# Patient Record
Sex: MALE | Race: WHITE | NOT HISPANIC OR LATINO | Employment: OTHER | URBAN - METROPOLITAN AREA
[De-identification: names, ages, dates, MRNs, and addresses within clinical notes are randomized per-mention and may not be internally consistent; named-entity substitution may affect disease eponyms.]

---

## 2017-01-13 RX ORDER — LISINOPRIL 5 MG/1
TABLET ORAL
Qty: 30 TABLET | Refills: 11 | Status: SHIPPED | OUTPATIENT
Start: 2017-01-13 | End: 2017-04-21

## 2017-01-23 RX ORDER — PEN NEEDLE, DIABETIC 31 GX5/16"
NEEDLE, DISPOSABLE MISCELLANEOUS
Qty: 100 EACH | Refills: 3 | Status: SHIPPED | OUTPATIENT
Start: 2017-01-23 | End: 2017-08-08 | Stop reason: SDUPTHER

## 2017-01-26 DIAGNOSIS — G56.03 CARPAL TUNNEL SYNDROME, BILATERAL: ICD-10-CM

## 2017-01-26 DIAGNOSIS — G56.22 CUBITAL TUNNEL SYNDROME, LEFT: ICD-10-CM

## 2017-01-26 RX ORDER — MELOXICAM 15 MG/1
15 TABLET ORAL DAILY
Qty: 30 TABLET | Refills: 0 | Status: SHIPPED | OUTPATIENT
Start: 2017-01-26 | End: 2017-02-19 | Stop reason: SDUPTHER

## 2017-01-26 NOTE — TELEPHONE ENCOUNTER
----- Message from Madison Monte sent at 1/26/2017  2:23 PM CST -----  Contact: Wayne  Checking the status of a refill Mobic, can be reached at  [phone]//thanksW

## 2017-02-10 ENCOUNTER — LAB VISIT (OUTPATIENT)
Dept: LAB | Facility: HOSPITAL | Age: 67
End: 2017-02-10
Attending: PEDIATRICS
Payer: COMMERCIAL

## 2017-02-10 DIAGNOSIS — E78.5 HYPERLIPIDEMIA ASSOCIATED WITH TYPE 2 DIABETES MELLITUS: ICD-10-CM

## 2017-02-10 DIAGNOSIS — E11.69 HYPERLIPIDEMIA ASSOCIATED WITH TYPE 2 DIABETES MELLITUS: ICD-10-CM

## 2017-02-10 LAB
ALT SERPL W/O P-5'-P-CCNC: 26 U/L
AST SERPL-CCNC: 26 U/L
CHOLEST/HDLC SERPL: 3.9 {RATIO}
HDL/CHOLESTEROL RATIO: 25.8 %
HDLC SERPL-MCNC: 25 MG/DL
HDLC SERPL-MCNC: 97 MG/DL
LDLC SERPL CALC-MCNC: 41.8 MG/DL
NONHDLC SERPL-MCNC: 72 MG/DL
TRIGL SERPL-MCNC: 151 MG/DL

## 2017-02-10 PROCEDURE — 80061 LIPID PANEL: CPT

## 2017-02-10 PROCEDURE — 84460 ALANINE AMINO (ALT) (SGPT): CPT

## 2017-02-10 PROCEDURE — 36415 COLL VENOUS BLD VENIPUNCTURE: CPT | Mod: PO

## 2017-02-10 PROCEDURE — 83036 HEMOGLOBIN GLYCOSYLATED A1C: CPT

## 2017-02-10 PROCEDURE — 84450 TRANSFERASE (AST) (SGOT): CPT

## 2017-02-11 LAB
ESTIMATED AVG GLUCOSE: 186 MG/DL
HBA1C MFR BLD HPLC: 8.1 %

## 2017-02-13 RX ORDER — INSULIN ASPART 100 [IU]/ML
INJECTION, SOLUTION INTRAVENOUS; SUBCUTANEOUS
Qty: 30 ML | Refills: 11 | Status: SHIPPED | OUTPATIENT
Start: 2017-02-13 | End: 2017-02-20 | Stop reason: CLARIF

## 2017-02-16 ENCOUNTER — OFFICE VISIT (OUTPATIENT)
Dept: INTERNAL MEDICINE | Facility: CLINIC | Age: 67
End: 2017-02-16
Payer: COMMERCIAL

## 2017-02-16 VITALS
OXYGEN SATURATION: 97 % | SYSTOLIC BLOOD PRESSURE: 132 MMHG | HEIGHT: 71 IN | WEIGHT: 219.56 LBS | DIASTOLIC BLOOD PRESSURE: 84 MMHG | TEMPERATURE: 96 F | HEART RATE: 64 BPM | BODY MASS INDEX: 30.74 KG/M2

## 2017-02-16 DIAGNOSIS — Z12.11 COLON CANCER SCREENING: ICD-10-CM

## 2017-02-16 DIAGNOSIS — Z79.4 TYPE 2 DIABETES MELLITUS WITH COMPLICATION, WITH LONG-TERM CURRENT USE OF INSULIN: ICD-10-CM

## 2017-02-16 DIAGNOSIS — Z12.5 PROSTATE CANCER SCREENING: ICD-10-CM

## 2017-02-16 DIAGNOSIS — E78.5 HYPERLIPIDEMIA ASSOCIATED WITH TYPE 2 DIABETES MELLITUS: ICD-10-CM

## 2017-02-16 DIAGNOSIS — I25.10 CORONARY ARTERY DISEASE INVOLVING NATIVE CORONARY ARTERY OF NATIVE HEART WITHOUT ANGINA PECTORIS: ICD-10-CM

## 2017-02-16 DIAGNOSIS — E11.8 TYPE 2 DIABETES MELLITUS WITH COMPLICATION, WITH LONG-TERM CURRENT USE OF INSULIN: ICD-10-CM

## 2017-02-16 DIAGNOSIS — E11.59 HYPERTENSION ASSOCIATED WITH DIABETES: Primary | ICD-10-CM

## 2017-02-16 DIAGNOSIS — G47.33 OBSTRUCTIVE SLEEP APNEA: ICD-10-CM

## 2017-02-16 DIAGNOSIS — I15.2 HYPERTENSION ASSOCIATED WITH DIABETES: Primary | ICD-10-CM

## 2017-02-16 DIAGNOSIS — E11.69 HYPERLIPIDEMIA ASSOCIATED WITH TYPE 2 DIABETES MELLITUS: ICD-10-CM

## 2017-02-16 PROCEDURE — 99214 OFFICE O/P EST MOD 30 MIN: CPT | Mod: S$GLB,,, | Performed by: PEDIATRICS

## 2017-02-16 PROCEDURE — 3045F PR MOST RECENT HEMOGLOBIN A1C LEVEL 7.0-9.0%: CPT | Mod: S$GLB,,, | Performed by: PEDIATRICS

## 2017-02-16 PROCEDURE — 3079F DIAST BP 80-89 MM HG: CPT | Mod: S$GLB,,, | Performed by: PEDIATRICS

## 2017-02-16 PROCEDURE — 3075F SYST BP GE 130 - 139MM HG: CPT | Mod: S$GLB,,, | Performed by: PEDIATRICS

## 2017-02-16 PROCEDURE — 99999 PR PBB SHADOW E&M-EST. PATIENT-LVL III: CPT | Mod: PBBFAC,,, | Performed by: PEDIATRICS

## 2017-02-16 PROCEDURE — 4010F ACE/ARB THERAPY RXD/TAKEN: CPT | Mod: S$GLB,,, | Performed by: PEDIATRICS

## 2017-02-16 NOTE — MR AVS SNAPSHOT
[facility] Internal Medicine  [address] [facility] [address] [facility] [address] [place] [address]  Phone: [phone]  Fax: [phone]                  Timothy Ortega   2017 4:40 PM   Office Visit    Description:  Male : 1950   Provider:  SHANE Ochoa Jr., MD   Department:  [facility] - Internal Medicine           Reason for Visit     Follow-up           Diagnoses this Visit        Comments    Hypertension associated with diabetes    -  Primary     Type 2 diabetes mellitus with complication, with long-term current use of insulin         Hyperlipidemia associated with type 2 diabetes mellitus         Obstructive sleep apnea         Coronary artery disease involving native coronary artery of native heart without angina pectoris         Prostate cancer screening         Colon cancer screening                To Do List           Future Appointments        Provider Department Dept Phone    3/21/2017 11:30 AM Lcaho Phillip Jr., PA-C [facility] Diabetes Management [phone]    2017 7:50 AM LABORATORY, [facility] [phone]    2017 8:00 AM SPECIMEN, [facility] [phone]    2017 10:40 AM SHANE Ochoa Jr., MD [facility] Internal Medicine [phone]    2017 8:20 AM Elizabeth Lejeune, NP [facility] Pulmonary Services [phone]      Goals (5 Years of Data)     None      Follow-Up and Disposition     Return in about 6 months (around 2017).    Follow-up and Disposition History      [facility]     [facility] -  Assistance  Registered nurses in the [facility] provide clinical advisement, health education, appointment booking, and other advisory services.  Call for this free service at [phone].             Medications           Message regarding Medications     Verify the changes and/or additions to your medication regime listed below are the same as discussed with your clinician today.  If any of these  "changes or additions are incorrect, please notify your healthcare provider.             Verify that the below list of medications is an accurate representation of the medications you are currently taking.  If none reported, the list may be blank. If incorrect, please contact your healthcare provider. Carry this list with you in case of emergency.           Current Medications     ASCORBATE CALCIUM (VITAMIN C ORAL) Take by mouth once daily.    aspirin 81 mg Tab Take 1 tablet by mouth Daily.    BD INSULIN PEN NEEDLE UF SHORT 31 gauge x 5/16" Ndle USE FIVE TIMES DAILY AS DIRECTED    blood sugar diagnostic (ONE TOUCH ULTRA TEST) Strp Inject 1 strip into the skin as directed. - Strip In Vitro -.  check blood sugar 4-6 times daily. One touch strips    dulaglutide (TRULICITY) 1.5 mg/0.5 mL PnIj Inject 1.5 mg into the skin every 7 days.    ERGOCALCIFEROL, VITAMIN D2, (VITAMIN D ORAL) Take by mouth once daily.    hydrocortisone 2.5 % cream ERROL TO RASH BID PRN    insulin aspart (NOVOLOG FLEXPEN) 100 unit/mL InPn pen INJECT 14 TO 20 UNITS UNDER THE SKIN THREE TIMES DAILY 10 TO 15 MINUTES BEFORE MEALS    insulin needles, disposable, (BD INSULIN PEN NEEDLE UF SHORT) 31 X 5/16 " Ndle USE 5 TIMES DAILY AS DIRECTED    KRILL OIL ORAL Take 1 capsule by mouth once daily.    lancets (ONE TOUCH DELICA LANCETS) 33 gauge Misc Inject 1 lancet into the skin as directed. Use to check BG 4-6 x daily.    LANTUS SOLOSTAR 100 unit/mL (3 mL) InPn pen INJECT 30 UNITS UNDER THE SKIN EVERY MORNING AND 50 UNITS EVERY EVENING    lisinopril (PRINIVIL,ZESTRIL) 5 MG tablet TAKE 1 TABLET BY MOUTH ONCE DAILY    meloxicam (MOBIC) 15 MG tablet Take 1 tablet (15 mg total) by mouth once daily.    metformin (GLUCOPHAGE) 1000 MG tablet TAKE ONE TABLET BY MOUTH TWICE DAILY WITH MEALS    multivitamin (THERAGRAN) per tablet Take 1 tablet by mouth Daily.    rosuvastatin (CRESTOR) 40 MG Tab Take 1 tablet (40 mg total) by mouth every evening.           Clinical " "Reference Information           Your Vitals Were     BP Pulse Temp Height    132/84 (BP Location: Left arm, Patient Position: Sitting, BP Method: Manual) 64 96.3 °F (35.7 °C) (Tympanic) 5' 11" (1.803 m)    Weight SpO2 BMI    99.6 kg (219 lb 9.3 oz) 97% 30.62 kg/m2      Blood Pressure          Most Recent Value    BP  132/84      Allergies as of 2/16/2017     No Known Drug Allergies      Immunizations Administered on Date of Encounter - 2/16/2017     None      Orders Placed During Today's Visit      Normal Orders This Visit    Ambulatory Referral to Diabetic Education     Case request GI: COLONOSCOPY     Future Labs/Procedures Expected by Expires    ALT (SGPT)  2/16/2017 4/17/2018    AST (SGOT)  2/16/2017 4/17/2018    Basic metabolic panel  2/16/2017 4/17/2018    Hemoglobin A1c  2/16/2017 4/17/2018    Lipid panel  2/16/2017 2/16/2018    Microalbumin/creatinine urine ratio  2/16/2017 4/17/2018    PSA, Screening  2/16/2017 4/17/2018      Language Assistance Services     ATTENTION: Language assistance services are available, free of charge. Please call [phone].      ATENCIÓN: Si habla español, tiene a tobias disposición servicios gratuitos de asistencia lingüística. Llame al [phone].     CHÚ Ý: N?u b?n nói Ti?ng Vi?t, có các d?ch v? h? tr? ngôn ng? mi?n phí dành cho b?n. G?i s? [phone].         [facility] complies with applicable Federal civil rights laws and does not discriminate on the basis of race, color, national origin, age, disability, or sex.        "
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
## 2017-02-19 DIAGNOSIS — G56.22 CUBITAL TUNNEL SYNDROME, LEFT: ICD-10-CM

## 2017-02-19 DIAGNOSIS — G56.03 CARPAL TUNNEL SYNDROME, BILATERAL: ICD-10-CM

## 2017-02-19 RX ORDER — MELOXICAM 15 MG/1
TABLET ORAL
Qty: 30 TABLET | Refills: 0 | Status: SHIPPED | OUTPATIENT
Start: 2017-02-19 | End: 2017-03-15 | Stop reason: SDUPTHER

## 2017-02-20 RX ORDER — INSULIN LISPRO 100 [IU]/ML
INJECTION, SOLUTION INTRAVENOUS; SUBCUTANEOUS
Qty: 15 ML | Refills: 11 | Status: SHIPPED | OUTPATIENT
Start: 2017-02-20 | End: 2017-12-05 | Stop reason: SDUPTHER

## 2017-02-20 RX ORDER — SODIUM, POTASSIUM,MAG SULFATES 17.5-3.13G
SOLUTION, RECONSTITUTED, ORAL ORAL
Qty: 354 ML | Refills: 0 | Status: SHIPPED | OUTPATIENT
Start: 2017-02-20 | End: 2017-06-16

## 2017-03-15 DIAGNOSIS — G56.03 CARPAL TUNNEL SYNDROME, BILATERAL: ICD-10-CM

## 2017-03-15 DIAGNOSIS — G56.22 CUBITAL TUNNEL SYNDROME, LEFT: ICD-10-CM

## 2017-03-15 RX ORDER — MELOXICAM 15 MG/1
TABLET ORAL
Qty: 30 TABLET | Refills: 0 | Status: ON HOLD | OUTPATIENT
Start: 2017-03-15 | End: 2017-03-31 | Stop reason: HOSPADM

## 2017-03-31 ENCOUNTER — ANESTHESIA (OUTPATIENT)
Dept: ENDOSCOPY | Facility: HOSPITAL | Age: 67
End: 2017-03-31
Payer: COMMERCIAL

## 2017-03-31 ENCOUNTER — SURGERY (OUTPATIENT)
Age: 67
End: 2017-03-31

## 2017-03-31 ENCOUNTER — ANESTHESIA EVENT (OUTPATIENT)
Dept: ENDOSCOPY | Facility: HOSPITAL | Age: 67
End: 2017-03-31
Payer: COMMERCIAL

## 2017-03-31 ENCOUNTER — HOSPITAL ENCOUNTER (OUTPATIENT)
Facility: HOSPITAL | Age: 67
Discharge: HOME | End: 2017-03-31
Attending: INTERNAL MEDICINE | Admitting: INTERNAL MEDICINE
Payer: COMMERCIAL

## 2017-03-31 VITALS
HEIGHT: 71 IN | SYSTOLIC BLOOD PRESSURE: 132 MMHG | TEMPERATURE: 98 F | RESPIRATION RATE: 18 BRPM | RESPIRATION RATE: 25 BRPM | DIASTOLIC BLOOD PRESSURE: 79 MMHG | HEART RATE: 69 BPM | WEIGHT: 209 LBS | OXYGEN SATURATION: 96 % | BODY MASS INDEX: 29.26 KG/M2

## 2017-03-31 DIAGNOSIS — Z86.010 HISTORY OF COLON POLYPS: ICD-10-CM

## 2017-03-31 PROBLEM — Z86.0100 HISTORY OF COLON POLYPS: Status: ACTIVE | Noted: 2017-03-31

## 2017-03-31 LAB — POCT GLUCOSE: 147 MG/DL (ref 70–110)

## 2017-03-31 PROCEDURE — 88305 TISSUE EXAM BY PATHOLOGIST: CPT | Performed by: PATHOLOGY

## 2017-03-31 PROCEDURE — 45385 COLONOSCOPY W/LESION REMOVAL: CPT | Mod: 33,,, | Performed by: INTERNAL MEDICINE

## 2017-03-31 PROCEDURE — 27201012 HC FORCEPS, HOT/COLD, DISP: Performed by: INTERNAL MEDICINE

## 2017-03-31 PROCEDURE — 45380 COLONOSCOPY AND BIOPSY: CPT | Performed by: INTERNAL MEDICINE

## 2017-03-31 PROCEDURE — 25000003 PHARM REV CODE 250: Performed by: INTERNAL MEDICINE

## 2017-03-31 PROCEDURE — 63600175 PHARM REV CODE 636 W HCPCS: Performed by: NURSE ANESTHETIST, CERTIFIED REGISTERED

## 2017-03-31 PROCEDURE — 37000009 HC ANESTHESIA EA ADD 15 MINS: Performed by: INTERNAL MEDICINE

## 2017-03-31 PROCEDURE — 27201089 HC SNARE, DISP (ANY): Performed by: INTERNAL MEDICINE

## 2017-03-31 PROCEDURE — 25000003 PHARM REV CODE 250: Performed by: NURSE ANESTHETIST, CERTIFIED REGISTERED

## 2017-03-31 PROCEDURE — 37000008 HC ANESTHESIA 1ST 15 MINUTES: Performed by: INTERNAL MEDICINE

## 2017-03-31 PROCEDURE — 88305 TISSUE EXAM BY PATHOLOGIST: CPT | Mod: 26,,, | Performed by: PATHOLOGY

## 2017-03-31 PROCEDURE — 45385 COLONOSCOPY W/LESION REMOVAL: CPT | Performed by: INTERNAL MEDICINE

## 2017-03-31 PROCEDURE — 45380 COLONOSCOPY AND BIOPSY: CPT | Mod: 59,,, | Performed by: INTERNAL MEDICINE

## 2017-03-31 RX ORDER — SODIUM CHLORIDE, SODIUM LACTATE, POTASSIUM CHLORIDE, CALCIUM CHLORIDE 600; 310; 30; 20 MG/100ML; MG/100ML; MG/100ML; MG/100ML
INJECTION, SOLUTION INTRAVENOUS CONTINUOUS PRN
Status: DISCONTINUED | OUTPATIENT
Start: 2017-03-31 | End: 2017-03-31

## 2017-03-31 RX ORDER — PROPOFOL 10 MG/ML
INJECTION, EMULSION INTRAVENOUS
Status: DISCONTINUED | OUTPATIENT
Start: 2017-03-31 | End: 2017-03-31

## 2017-03-31 RX ORDER — LIDOCAINE HCL/PF 100 MG/5ML
SYRINGE (ML) INTRAVENOUS
Status: DISCONTINUED | OUTPATIENT
Start: 2017-03-31 | End: 2017-03-31

## 2017-03-31 RX ORDER — SODIUM CHLORIDE, SODIUM LACTATE, POTASSIUM CHLORIDE, CALCIUM CHLORIDE 600; 310; 30; 20 MG/100ML; MG/100ML; MG/100ML; MG/100ML
INJECTION, SOLUTION INTRAVENOUS CONTINUOUS
Status: DISCONTINUED | OUTPATIENT
Start: 2017-03-31 | End: 2017-03-31 | Stop reason: HOSPADM

## 2017-03-31 RX ADMIN — LIDOCAINE HYDROCHLORIDE 100 MG: 20 INJECTION, SOLUTION INTRAVENOUS at 10:03

## 2017-03-31 RX ADMIN — PROPOFOL 30 MG: 10 INJECTION, EMULSION INTRAVENOUS at 10:03

## 2017-03-31 RX ADMIN — SODIUM CHLORIDE, SODIUM LACTATE, POTASSIUM CHLORIDE, AND CALCIUM CHLORIDE: 600; 310; 30; 20 INJECTION, SOLUTION INTRAVENOUS at 10:03

## 2017-03-31 RX ADMIN — SODIUM CHLORIDE, SODIUM LACTATE, POTASSIUM CHLORIDE, AND CALCIUM CHLORIDE: .6; .31; .03; .02 INJECTION, SOLUTION INTRAVENOUS at 08:03

## 2017-03-31 RX ADMIN — PROPOFOL 140 MG: 10 INJECTION, EMULSION INTRAVENOUS at 10:03

## 2017-03-31 NOTE — TRANSFER OF CARE
"Anesthesia Transfer of Care Note    Patient: Timothy Ortega    Procedure(s) Performed: Procedure(s) (LRB):  COLONOSCOPY (N/A)    Patient location: PACU    Anesthesia Type: MAC    Transport from OR: Transported from OR on room air with adequate spontaneous ventilation    Post pain: adequate analgesia    Post assessment: no apparent anesthetic complications    Post vital signs: stable    Level of consciousness: sedated    Nausea/Vomiting: no nausea/vomiting    Complications: none          Last vitals:   Visit Vitals    /74 (BP Location: Right leg, Patient Position: Lying, BP Method: Manual)    Pulse 67    Temp 36.8 °C (98.2 °F) (Oral)    Resp 16    Ht 5' 11" (1.803 m)    Wt 94.8 kg (209 lb)    SpO2 (!) 94%    BMI 29.15 kg/m2     "

## 2017-03-31 NOTE — IP AVS SNAPSHOT
[address] [place] [address]           Patient Discharge Instructions   Our goal is to set you up for success. This packet includes information on your condition, medications, and your home care.  It will help you care for yourself to prevent having to return to the hospital.     Please ask your nurse if you have any questions.      There are many details to remember when preparing to leave the hospital. Here is what you will need to do:    1. Take your medicine. If you are prescribed medications, review your Medication List on the following pages. You may have new medications to  at the pharmacy and others that you'll need to stop taking. Review the instructions for how and when to take your medications. Talk with your doctor or nurses if you are unsure of what to do.     2. Go to your follow-up appointments. Specific follow-up information is listed in the following pages. Your may be contacted by a nurse or clinical provider about future appointments. Be sure we have all of the phone numbers to reach you. Please contact your provider's office if you are unable to make an appointment.     3. Watch for warning signs. Your doctor or nurse will give you detailed warning signs to watch for and when to call for assistance. These instructions may also include educational information about your condition. If you experience any of warning signs to your health, call your doctor.               ** Verify the list of medication(s) below is accurate and up to date. Carry this with you in case of emergency. If your medications have changed, please notify your healthcare provider.             Medication List      CONTINUE taking these medications        Additional Info                      aspirin 81 mg Tab   Refills:  0   Dose:  1 tablet    Instructions:  Take 1 tablet by mouth Daily.     Begin Date    AM    Noon    PM    Bedtime       blood sugar diagnostic Strp    Commonly known as:  ONETOUCH ULTRA TEST   Quantity:  450 strip   Refills:  11   Dose:  1 strip    Instructions:  Inject 1 strip into the skin as directed. - Strip In Vitro -.  check blood sugar 4-6 times daily. One touch strips     Begin Date    AM    Noon    PM    Bedtime       dulaglutide 1.5 mg/0.5 mL Pnij   Commonly known as:  TRULICITY   Quantity:  4 Syringe   Refills:  11   Dose:  1.5 mg    Instructions:  Inject 1.5 mg into the skin every 7 days.     Begin Date    AM    Noon    PM    Bedtime       hydrocortisone 2.5 % cream   Refills:  3    Instructions:  ERROL TO RASH BID PRN     Begin Date    AM    Noon    PM    Bedtime       insulin lispro 100 unit/mL injection   Commonly known as:  HUMALOG   Quantity:  15 mL   Refills:  11   Comments:  Switching from novolog due to formulary.    Instructions:  14-25 units before each meal     Begin Date    AM    Noon    PM    Bedtime       KRILL OIL ORAL   Refills:  0   Dose:  1 capsule    Instructions:  Take 1 capsule by mouth once daily.     Begin Date    AM    Noon    PM    Bedtime       lancets 33 gauge Misc   Commonly known as:  ONETOUCH DELICA LANCETS   Quantity:  450 each   Refills:  11   Dose:  1 lancet    Instructions:  Inject 1 lancet into the skin as directed. Use to check BG 4-6 x daily.     Begin Date    AM    Noon    PM    Bedtime       LANTUS SOLOSTAR 100 unit/mL (3 mL) Inpn pen   Quantity:  30 mL   Refills:  11   Generic drug:  insulin glargine    Instructions:  INJECT 30 UNITS UNDER THE SKIN EVERY MORNING AND 50 UNITS EVERY EVENING     Begin Date    AM    Noon    PM    Bedtime       lisinopril 5 MG tablet   Commonly known as:  PRINIVIL,ZESTRIL   Quantity:  30 tablet   Refills:  11    Instructions:  TAKE 1 TABLET BY MOUTH ONCE DAILY     Begin Date    AM    Noon    PM    Bedtime       metformin 1000 MG tablet   Commonly known as:  GLUCOPHAGE   Quantity:  180 tablet   Refills:  3    Instructions:  TAKE ONE TABLET BY MOUTH TWICE DAILY WITH MEALS     Begin Date  "   AM    Noon    PM    Bedtime       multivitamin per tablet   Commonly known as:  THERAGRAN   Refills:  0   Dose:  1 tablet    Instructions:  Take 1 tablet by mouth Daily.     Begin Date    AM    Noon    PM    Bedtime       pen needle, diabetic 31 gauge x 5/16" Ndle   Commonly known as:  BD INSULIN PEN NEEDLE UF SHORT   Quantity:  150 each   Refills:  11    Instructions:  USE 5 TIMES DAILY AS DIRECTED     Begin Date    AM    Noon    PM    Bedtime       BD INSULIN PEN NEEDLE UF SHORT 31 gauge x 5/16" Ndle   Quantity:  100 each   Refills:  3   Generic drug:  pen needle, diabetic    Instructions:  USE FIVE TIMES DAILY AS DIRECTED     Begin Date    AM    Noon    PM    Bedtime       rosuvastatin 40 MG Tab   Commonly known as:  CRESTOR   Quantity:  30 tablet   Refills:  11   Dose:  40 mg    Instructions:  Take 1 tablet (40 mg total) by mouth every evening.     Begin Date    AM    Noon    PM    Bedtime       VITAMIN C ORAL   Refills:  0    Instructions:  Take by mouth once daily.     Begin Date    AM    Noon    PM    Bedtime       VITAMIN D ORAL   Refills:  0    Instructions:  Take by mouth once daily.     Begin Date    AM    Noon    PM    Bedtime         STOP taking these medications     meloxicam 15 MG tablet   Commonly known as:  MOBIC         ASK your doctor about these medications        Additional Info                      sodium,potassium,mag sulfates 17.5-3.13-1.6 gram Solr   Commonly known as:  SUPREP BOWEL PREP KIT   Quantity:  354 mL   Refills:  0    Instructions:  Use as directed.     Begin Date    AM    Noon    PM    Bedtime                  Please bring to all follow up appointments:    1. A copy of your discharge instructions.  2. All medicines you are currently taking in their original bottles.  3. Identification and insurance card.    Please arrive 15 minutes ahead of scheduled appointment time.    Please call 24 hours in advance if you must reschedule your appointment and/or time.        Your Scheduled " Appointments     Apr 21, 2017  7:30 AM CDT   New Patient with Lacho Phillip Jr., MYRNA   [facility] ([facility])    [address]  [place] [address]   [phone]            Aug 09, 2017  7:50 AM CDT   Fasting Lab with LABORATORY, [facility]    [address]  [place] [address]   [phone]            Aug 09, 2017  8:00 AM CDT   Urine with SPECIMEN, [facility] ([facility]    [address]  [place] [address]   [phone]            Aug 16, 2017 10:40 AM CDT   Adult Established Patient with SHANE Ochoa Jr., MD   [facility] ([facility])    [address] [place] [address]   [phone]            Sep 29, 2017  8:20 AM CDT   Established Patient Visit with Elizabeth Lejeune, NP   [facility] ([facility])    [address] [place] [address] [phone]              Follow-up Information     Follow up with EM Ochoa Jr, MD.    Specialties:  Internal Medicine, Pediatrics    Contact information:    [address] [place] [address]  [phone]          Discharge Instructions     Future Orders    Activity as tolerated     Diet general     Questions:    Total calories:      Fat restriction, if any:      Protein restriction, if any:      Na restriction, if any:      Fluid restriction:      Additional restrictions:          Discharge Instructions         Understanding Colon and Rectal Polyps    The colon (also called the large intestine) is a muscular tube that forms the last part of the digestive tract. It absorbs water and stores food waste. The colon is about 4 to 6 feet long. The rectum is the last 6 inches of the colon. The colon and rectum have a smooth lining composed of millions of cells. Changes in these cells can lead to growths in the colon that can become cancerous and should be removed. Multiple tests are  available to screen for colon cancer, but the colonoscopy is the most recommended test. During colonoscopy, these polyps can be removed. How often you need this test depends on many things including your condition, your family history, symptoms, and what the findings were at the previous colonoscopy.   When the colon lining changes  Changes that happen in the cells that line the colon or rectum can lead to growths called polyps. Over a period of years, polyps can turn cancerous. Removing polyps early may prevent cancer from ever forming.  Polyps  Polyps are fleshy clumps of tissue that form on the lining of the colon or rectum. Small polyps are usually benign (not cancerous). However, over time, cells in a polyp can change and become cancerous. Certain types of polyps known as adenomatous polyps are premalignant. The risk for invasive cancer increases with the size of the polyp and certain cell and gene features. This means that they can become cancerous if they're not removed. Hyperplastic polyps are benign. They can grow quite large and not turn cancerous.   Cancer  Almost all colorectal cancers start when polyp cells begin growing abnormally. As a cancerous tumor grows, it may involve more and more of the colon or rectum. In time, cancer can also grow beyond the colon or rectum and spread to nearby organs or to glands called lymph nodes. The cells can also travel to other parts of the body. This is known as metastasis. The earlier a cancerous tumor is removed, the better the chance of preventing its spread.    Date Last Reviewed: 8/1/2016  © 8827-1563 [employer]. [address] [place] [address], [place] [address]. All rights reserved. This information is not intended as a substitute for professional medical care. Always follow your healthcare professional's instructions.        Ischemic Colitis     Ischemic colitis happens if blood flow to a part of the colon is reduced.   Ischemic colitis happens when  blood flow to the colon is reduced or blocked. Bloody diarrhea and severe belly pain are the most common symptoms. Other symptoms include vomiting, fever, and fainting. Diarrhea can lead to severe dehydration. This is the rapid loss of the fluids your body needs to function. Because of the severe pain and the risk for dehydration, ischemic colitis should be treated right away.  Causes of ischemic colitis  The cause of the reduction or blockage of blood flow to the colon is not well understood. In some cases, a sudden drop in blood pressure, or dehydration, leads to an episode. Ischemic colitis is more likely in people with blood clotting problems or heart and blood vessel disease.  Diagnosing ischemic colitis  The presence of severe belly pain and bloody diarrhea is often enough to diagnose ischemic colitis. After these symptoms are treated, a test called a colonoscopy will likely be done. This helps rule out other colon problems. The test uses a thin, flexible scope with a light and camera on the end. The scope is inserted through the rectum into the colon. The scope sends pictures from inside the colon to a video screen. A small sample of tissue (biopsy) from the colon may be taken for further testing in a lab.  Treating ischemic colitis  Episodes are treated in the hospital. You may remain in the hospital for several days or longer:  · An IV line is put into a vein in your hand or arm. You will be given fluids through the IV to treat dehydration. You are also given IV pain medicines if you need them.  · You may be given IV antibiotics (medicines that treat infection).  · To rest the bowel, you will not eat or drink for a few days. In rare cases, when symptoms are very severe, you will be given nutrition through the IV.  · If you lost a lot of blood during the episode, you may receive a blood transfusion.  · In rare cases, an episode causes severe damage to the colon. In this case, surgery may need to be done to  "remove the damaged section. Your healthcare provider can tell you more if this is needed.  Follow-up  While you are being treated, your healthcare provider will work to find the cause of your ischemic colitis. After you recover, you may need to make lifestyle changes, such as quitting smoking, or take medicines to decrease your risk of another episode. Call 911 or emergency services or go to the emergency room right away if your symptoms return.  Date Last Reviewed: 7/1/2016  © 7446-7010 [employer]. [address] [place] [address]. All rights reserved. This information is not intended as a substitute for professional medical care. Always follow your healthcare professional's instructions.            Admission Information     Date & Time Provider Department CSN    3/31/2017  8:24 AM Cornelius Ibarra MD [facility] -  [id number]      Care Providers     Provider Role Specialty Primary office phone    Cornelius Ibarra MD Attending Provider Gastroenterology [phone]    Cornelius Ibarra MD Surgeon  Gastroenterology [phone]      Your Vitals Were     BP Pulse Temp Resp Height Weight    116/74 (BP Location: Right leg, Patient Position: Lying, BP Method: Manual) 67 98.2 °F (36.8 °C) (Oral) 16 5' 11" (1.803 m) 94.8 kg (209 lb)    SpO2 BMI             94% 29.15 kg/m2         Recent Lab Values        6/27/2014 12/1/2014 1/2/2015 6/26/2015 1/8/2016 3/4/2016 8/5/2016 2/10/2017      7:44 AM 12:01 PM  7:50 AM  7:42 AM  7:35 AM  7:34 AM  7:27 AM  7:13 AM    A1C 7.4 (H) 7.1 (H) 7.6 (H) 7.1 (H) 7.0 (H) 6.9 (H) 6.7 (H) 8.1 (H)    Comment for A1C at  7:27 AM on 8/5/2016:  According to ADA guidelines, hemoglobin A1C <7.0% represents  optimal control in non-pregnant diabetic patients.  Different  metrics may apply to specific populations.   Standards of Medical Care in Diabetes - 2016.  For the purpose of screening for the presence of diabetes:  <5.7%     Consistent with the absence of " diabetes  5.7-6.4%  Consistent with increasing risk for diabetes   (prediabetes)  >or=6.5%  Consistent with diabetes  Currently no consensus exists for use of hemoglobin A1C  for diagnosis of diabetes for children.      Comment for A1C at  7:13 AM on 2/10/2017:  According to ADA guidelines, hemoglobin A1C <7.0% represents  optimal control in non-pregnant diabetic patients.  Different  metrics may apply to specific populations.   Standards of Medical Care in Diabetes - 2016.  For the purpose of screening for the presence of diabetes:  <5.7%     Consistent with the absence of diabetes  5.7-6.4%  Consistent with increasing risk for diabetes   (prediabetes)  >or=6.5%  Consistent with diabetes  Currently no consensus exists for use of hemoglobin A1C  for diagnosis of diabetes for children.        Pending Labs     Order Current Status    Specimen to Pathology - Surgery Collected (03/31/17 1029)      Allergies as of 3/31/2017        Reactions    No Known Drug Allergies       [facility]     [facility] Nurse Care Line - 24/7 Assistance  Unless otherwise directed by your provider, please contact [facility], our nurse care line that is available for 24/7 assistance.     Registered nurses in the [facility] provide clinical advisement, health education, appointment booking, and other advisory services.  Call for this free service at [phone].        Advance Directives     An advance directive is a document which, in the event you are no longer able to make decisions for yourself, tells your healthcare team what kind of treatment you do or do not want to receive, or who you would like to make those decisions for you.  If you do not currently have an advance directive, [facility] encourages you to create one.  For more information call:  [phone]), [phone] ([phone]),  or log on to [email or web address].        Smoking Cessation     If you would like to quit  smoking:   You may be eligible for free services if you are a [place] resident and started smoking cigarettes before September 1, 1988.  Call the Smoking Cessation Trust (SCT) toll free at (574) 893-9758 or (238) 056-3196.   Call 1-800-QUIT-NOW if you do not meet the above criteria.   Contact us via email: [email or web address]   View our website for more information: [email or web address]        Language Assistance Services     ATTENTION: Language assistance services are available, free of charge. Please call [phone].      ATENCIÓN: Si habla español, tiene a tobias disposición servicios gratuitos de asistencia lingüística. Llame al [phone].     CHÚ Ý: N?u b?n nói Ti?ng Vi?t, có các d?ch v? h? tr? ngôn ng? mi?n phí dành cho b?n. G?i s? [phone].        Diabetes Discharge Instructions                                    [facility] -  complies with applicable Federal civil rights laws and does not discriminate on the basis of race, color, national origin, age, disability, or sex.

## 2017-03-31 NOTE — H&P
Short Stay Endoscopy History and Physical    PCP - EM Ochoa Jr, MD    Procedure - Colonoscopy  ASA - 3  Mallampati - per anesthesia  History of Anesthesia problems - no  Family history Anesthesia problems -  no     HPI:  This is a 66 y.o. male here for evaluation of :     Average Risk Screening: No  High risk screening: yes  History of polyps: yes  Anemia: No  Blood in stools: No  Diarrhea: No  Abdominal Pain: No    Review of Systems:  CONSTITUTIONAL: Denies weight change,  fatigue, fevers, chills, night sweats.  CARDIOVASCULAR: Denies chest pain, shortness of breath, orthopnea and edema.  RESPIRATORY: Denies cough, hemoptysis, dyspnea, and wheezing.  GI: See HPI.    Medical History:  Past Medical History:   Diagnosis Date    Coronary artery disease 2010    [facility] - no stents    Diabetes mellitus 10-12 years     am 10/31/2014    DM (diabetes mellitus) 13 years     am 11/03/2015    DM (diabetes mellitus) 2002     lunch 10/28/2016    Hyperlipemia     Kidney stones     Neuropathy        Surgical History:   Past Surgical History:   Procedure Laterality Date    (L) knee scope      CARDIAC CATHETERIZATION  2010    teeth implantation         Family History:   Family History   Problem Relation Age of Onset    Diabetes Mother     Glaucoma Mother     Heart disease Mother 75     CAB    Diabetes Father     Heart attack Father 58     Fatal MI    Diabetes Brother     Diabetes Sister     Diabetes Sister     Cataracts Paternal Uncle     Cataracts Maternal Grandmother        Social History:   Social History   Substance Use Topics    Smoking status: Former Smoker     Packs/day: 1.00     Years: 20.00     Quit date: 1/1/2002    Smokeless tobacco: Never Used    Alcohol use No       Allergies: Reviewed.    Medications:  No current facility-administered medications on file prior to encounter.      Current Outpatient Prescriptions on File Prior to Encounter   Medication Sig Dispense Refill  "   ASCORBATE CALCIUM (VITAMIN C ORAL) Take by mouth once daily.      BD INSULIN PEN NEEDLE UF SHORT 31 gauge x 5/16" Ndle USE FIVE TIMES DAILY AS DIRECTED 100 each 3    blood sugar diagnostic (ONE TOUCH ULTRA TEST) Strp Inject 1 strip into the skin as directed. - Strip In Vitro -.  check blood sugar 4-6 times daily. One touch strips 450 strip 11    dulaglutide (TRULICITY) 1.5 mg/0.5 mL PnIj Inject 1.5 mg into the skin every 7 days. 4 Syringe 11    ERGOCALCIFEROL, VITAMIN D2, (VITAMIN D ORAL) Take by mouth once daily.      hydrocortisone 2.5 % cream ERROL TO RASH BID PRN  3    insulin needles, disposable, (BD INSULIN PEN NEEDLE UF SHORT) 31 X 5/16 " Ndle USE 5 TIMES DAILY AS DIRECTED 150 each 11    KRILL OIL ORAL Take 1 capsule by mouth once daily.      lancets (ONE TOUCH DELICA LANCETS) 33 gauge Misc Inject 1 lancet into the skin as directed. Use to check BG 4-6 x daily. 450 each 11    LANTUS SOLOSTAR 100 unit/mL (3 mL) InPn pen INJECT 30 UNITS UNDER THE SKIN EVERY MORNING AND 50 UNITS EVERY EVENING 30 mL 11    lisinopril (PRINIVIL,ZESTRIL) 5 MG tablet TAKE 1 TABLET BY MOUTH ONCE DAILY 30 tablet 11    metformin (GLUCOPHAGE) 1000 MG tablet TAKE ONE TABLET BY MOUTH TWICE DAILY WITH MEALS 180 tablet 3    multivitamin (THERAGRAN) per tablet Take 1 tablet by mouth Daily.      rosuvastatin (CRESTOR) 40 MG Tab Take 1 tablet (40 mg total) by mouth every evening. 30 tablet 11    aspirin 81 mg Tab Take 1 tablet by mouth Daily.         Physical Exam:  Vital Signs:   Vitals:    03/31/17 0840   BP: (!) 151/87   Pulse: 64   Resp: 18   Temp: 98.2 °F (36.8 °C)     General Appearance: Well appearing in no acute distress  ENT: OP clear  Chest: CTA B  CV: RRR, no m/r/g  Abd: s/nt/nd/nabs  Ext: no edema    Labs:  Reviewed    Impression: Hx of polyps    Plan:  I have explained the risks and benefits of colonoscopy to the patient including but not limited to bleeding, perforation, infection, and death. The patient wishes to " proceed with colonoscopy.

## 2017-03-31 NOTE — ANESTHESIA POSTPROCEDURE EVALUATION
"Anesthesia Post Evaluation    Patient: Timothy Ortega    Procedure(s) Performed: Procedure(s) (LRB):  COLONOSCOPY (N/A)    Final Anesthesia Type: MAC  Patient location during evaluation: PACU  Patient participation: Yes- Able to Participate  Level of consciousness: awake and alert and oriented  Post-procedure vital signs: reviewed and stable  Pain management: adequate  Airway patency: patent  PONV status at discharge: No PONV  Anesthetic complications: no      Cardiovascular status: blood pressure returned to baseline  Respiratory status: unassisted, room air and spontaneous ventilation  Hydration status: euvolemic  Follow-up not needed.        Visit Vitals    /74 (BP Location: Right leg, Patient Position: Lying, BP Method: Manual)    Pulse 67    Temp 36.8 °C (98.2 °F) (Oral)    Resp 16    Ht 5' 11" (1.803 m)    Wt 94.8 kg (209 lb)    SpO2 (!) 94%    BMI 29.15 kg/m2       Pain/Erik Score: Pain Assessment Performed: Yes (3/31/2017 10:36 AM)  Presence of Pain: non-verbal indicators absent (3/31/2017 10:36 AM)  Erik Score: 9 (3/31/2017 10:36 AM)      "

## 2017-03-31 NOTE — ANESTHESIA PREPROCEDURE EVALUATION
03/31/2017  Timothy Ortega is a 66 y.o., male.    [facility] Anesthesia Evaluation    I have reviewed the Patient Summary Reports.    I have reviewed the Nursing Notes.   I have reviewed the Medications.     Review of Systems  Anesthesia Hx:  No problems with previous Anesthesia    Social:  Former Smoker, No Alcohol Use    Hematology/Oncology:  Hematology Normal   Oncology Normal     EENT/Dental:   chronic allergic rhinitis   Cardiovascular:   Hypertension, well controlled CAD   hyperlipidemia   CONCLUSIONS     1 - Normal left ventricular systolic function (EF 55-60%).     2 - Normal left ventricular diastolic function.     3 - Normal right ventricular systolic function .     4 - Mild mitral regurgitation.     5 - Mild tricuspid regurgitation.    Pulmonary:   Sleep Apnea, CPAP    Renal/:   renal calculi    Hepatic/GI:   Bowel Prep. 0600 last drink of fluid.   Musculoskeletal:   Arthritis     Neurological:  Neurology Normal    Endocrine:   Diabetes, well controlled, type 2    Dermatological:  Skin Normal    Psych:  Psychiatric Normal           Physical Exam  General:  Well nourished    Airway/Jaw/Neck:  Airway Findings: Mallampati: III                Anesthesia Plan  Type of Anesthesia, risks & benefits discussed:  Anesthesia Type:  MAC  Patient's Preference:   Intra-op Monitoring Plan:   Intra-op Monitoring Plan Comments:   Post Op Pain Control Plan:   Post Op Pain Control Plan Comments:   Induction:   IV  Beta Blocker:  Patient is not currently on a Beta-Blocker (No further documentation required).       Informed Consent: Patient understands risks and agrees with Anesthesia plan.  Questions answered. Anesthesia consent signed with patient.  ASA Score: 3     Day of Surgery Review of History & Physical: I have interviewed and examined the patient. I have reviewed the patient's H&P dated: 03/31/17. There are no  significant changes.  H&P update referred to the surgeon.         Ready For Surgery From Anesthesia Perspective.

## 2017-03-31 NOTE — DISCHARGE SUMMARY
"[facility]  Brief Operative Note     SUMMARY     Surgery Date: 3/31/2017     Surgeon(s) and Role:     * Cornelius Ibarra MD - Primary    Assisting Surgeon: None    Pre-op Diagnosis:  Colon cancer screening [Z12.11]    Post-op Diagnosis:  Post-Op Diagnosis Codes:     * Colon cancer screening [Z12.11]    Procedure(s) (LRB):  COLONOSCOPY (N/A)    Anesthesia: Choice    Description of the findings of the procedure: Procedure completed. See Procedure note for details.     Findings/Key Components: Procedure completed. See Procedure note for details.     Prosthesis/Implants: None    Estimated Blood Loss: less than 10         Specimens:   Specimen (12h ago through future)    Start     Ordered    03/31/17 1020  Specimen to Pathology - Surgery  Once     Comments:  1. Colon biopsy for colitis  2. Rectal polyp    03/31/17 1029          Discharge Note    SUMMARY     Admit Date: 3/31/2017    Discharge Date and Time:  03/31/2017 10:31 AM    Hospital Course (synopsis of major diagnoses, care, treatment, and services provided during the course of the hospital stay): Procedure completed. See Procedure note for details.      Final Diagnosis: Post-Op Diagnosis Codes:     * Colon cancer screening [Z12.11]    Disposition: Home or Self Care    Follow Up/Patient Instructions:     Medications:  Reconciled Home Medications:   Current Discharge Medication List      CONTINUE these medications which have NOT CHANGED    Details   ASCORBATE CALCIUM (VITAMIN C ORAL) Take by mouth once daily.      !! BD INSULIN PEN NEEDLE UF SHORT 31 gauge x 5/16" Ndle USE FIVE TIMES DAILY AS DIRECTED  Qty: 100 each, Refills: 3      blood sugar diagnostic (ONE TOUCH ULTRA TEST) Strp Inject 1 strip into the skin as directed. - Strip In Vitro -.  check blood sugar 4-6 times daily. One touch strips  Qty: 450 strip, Refills: 11    Associated Diagnoses: Type II or unspecified type diabetes mellitus without mention of complication, uncontrolled    " "  dulaglutide (TRULICITY) 1.5 mg/0.5 mL PnIj Inject 1.5 mg into the skin every 7 days.  Qty: 4 Syringe, Refills: 11    Associated Diagnoses: Diabetes mellitus type 2, uncontrolled, without complications      ERGOCALCIFEROL, VITAMIN D2, (VITAMIN D ORAL) Take by mouth once daily.      hydrocortisone 2.5 % cream ERROL TO RASH BID PRN  Refills: 3      insulin lispro (HUMALOG) 100 unit/mL injection 14-25 units before each meal  Qty: 15 mL, Refills: 11    Comments: Switching from novolog due to formulary.      !! insulin needles, disposable, (BD INSULIN PEN NEEDLE UF SHORT) 31 X 5/16 " Ndle USE 5 TIMES DAILY AS DIRECTED  Qty: 150 each, Refills: 11      KRILL OIL ORAL Take 1 capsule by mouth once daily.      lancets (ONE TOUCH DELICA LANCETS) 33 gauge Misc Inject 1 lancet into the skin as directed. Use to check BG 4-6 x daily.  Qty: 450 each, Refills: 11    Associated Diagnoses: Type II or unspecified type diabetes mellitus without mention of complication, uncontrolled      LANTUS SOLOSTAR 100 unit/mL (3 mL) InPn pen INJECT 30 UNITS UNDER THE SKIN EVERY MORNING AND 50 UNITS EVERY EVENING  Qty: 30 mL, Refills: 11      lisinopril (PRINIVIL,ZESTRIL) 5 MG tablet TAKE 1 TABLET BY MOUTH ONCE DAILY  Qty: 30 tablet, Refills: 11      metformin (GLUCOPHAGE) 1000 MG tablet TAKE ONE TABLET BY MOUTH TWICE DAILY WITH MEALS  Qty: 180 tablet, Refills: 3      multivitamin (THERAGRAN) per tablet Take 1 tablet by mouth Daily.      rosuvastatin (CRESTOR) 40 MG Tab Take 1 tablet (40 mg total) by mouth every evening.  Qty: 30 tablet, Refills: 11      aspirin 81 mg Tab Take 1 tablet by mouth Daily.      sodium,potassium,mag sulfates (SUPREP BOWEL PREP KIT) 17.5-3.13-1.6 gram SolR Use as directed.  Qty: 354 mL, Refills: 0       !! - Potential duplicate medications found. Please discuss with provider.      STOP taking these medications       meloxicam (MOBIC) 15 MG tablet Comments:   Reason for Stopping:               Discharge Procedure " Orders  Diet general     Activity as tolerated       Follow-up Information     Follow up with EM Ochoa Jr, MD.    Specialties:  Internal Medicine, Pediatrics    Contact information:    [address] [place] [address] [phone]

## 2017-03-31 NOTE — ANESTHESIA RELEASE NOTE
"Anesthesia Release from PACU Note    Patient: Timothy Ortega    Procedure(s) Performed: Procedure(s) (LRB):  COLONOSCOPY (N/A)    Anesthesia type: MAC    Post pain: Adequate analgesia    Post assessment: no apparent anesthetic complications, tolerated procedure well and no evidence of recall    Last Vitals:   Visit Vitals    /74 (BP Location: Right leg, Patient Position: Lying, BP Method: Manual)    Pulse 67    Temp 36.8 °C (98.2 °F) (Oral)    Resp 16    Ht 5' 11" (1.803 m)    Wt 94.8 kg (209 lb)    SpO2 (!) 94%    BMI 29.15 kg/m2       Post vital signs: stable    Level of consciousness: awake    Nausea/Vomiting: no nausea/no vomiting    Complications: none    Airway Patency: patent    Respiratory: unassisted, spontaneous ventilation, room air    Cardiovascular: stable    Hydration: euvolemic  "

## 2017-03-31 NOTE — DISCHARGE INSTRUCTIONS
Understanding Colon and Rectal Polyps    The colon (also called the large intestine) is a muscular tube that forms the last part of the digestive tract. It absorbs water and stores food waste. The colon is about 4 to 6 feet long. The rectum is the last 6 inches of the colon. The colon and rectum have a smooth lining composed of millions of cells. Changes in these cells can lead to growths in the colon that can become cancerous and should be removed. Multiple tests are available to screen for colon cancer, but the colonoscopy is the most recommended test. During colonoscopy, these polyps can be removed. How often you need this test depends on many things including your condition, your family history, symptoms, and what the findings were at the previous colonoscopy.   When the colon lining changes  Changes that happen in the cells that line the colon or rectum can lead to growths called polyps. Over a period of years, polyps can turn cancerous. Removing polyps early may prevent cancer from ever forming.  Polyps  Polyps are fleshy clumps of tissue that form on the lining of the colon or rectum. Small polyps are usually benign (not cancerous). However, over time, cells in a polyp can change and become cancerous. Certain types of polyps known as adenomatous polyps are premalignant. The risk for invasive cancer increases with the size of the polyp and certain cell and gene features. This means that they can become cancerous if they're not removed. Hyperplastic polyps are benign. They can grow quite large and not turn cancerous.   Cancer  Almost all colorectal cancers start when polyp cells begin growing abnormally. As a cancerous tumor grows, it may involve more and more of the colon or rectum. In time, cancer can also grow beyond the colon or rectum and spread to nearby organs or to glands called lymph nodes. The cells can also travel to other parts of the body. This is known as metastasis. The earlier a cancerous  tumor is removed, the better the chance of preventing its spread.    Date Last Reviewed: 8/1/2016  © 7749-1640 [employer]. [address] [place] [address], [place] [address]. All rights reserved. This information is not intended as a substitute for professional medical care. Always follow your healthcare professional's instructions.        Ischemic Colitis     Ischemic colitis happens if blood flow to a part of the colon is reduced.   Ischemic colitis happens when blood flow to the colon is reduced or blocked. Bloody diarrhea and severe belly pain are the most common symptoms. Other symptoms include vomiting, fever, and fainting. Diarrhea can lead to severe dehydration. This is the rapid loss of the fluids your body needs to function. Because of the severe pain and the risk for dehydration, ischemic colitis should be treated right away.  Causes of ischemic colitis  The cause of the reduction or blockage of blood flow to the colon is not well understood. In some cases, a sudden drop in blood pressure, or dehydration, leads to an episode. Ischemic colitis is more likely in people with blood clotting problems or heart and blood vessel disease.  Diagnosing ischemic colitis  The presence of severe belly pain and bloody diarrhea is often enough to diagnose ischemic colitis. After these symptoms are treated, a test called a colonoscopy will likely be done. This helps rule out other colon problems. The test uses a thin, flexible scope with a light and camera on the end. The scope is inserted through the rectum into the colon. The scope sends pictures from inside the colon to a video screen. A small sample of tissue (biopsy) from the colon may be taken for further testing in a lab.  Treating ischemic colitis  Episodes are treated in the hospital. You may remain in the hospital for several days or longer:  · An IV line is put into a vein in your hand or arm. You will be given fluids through the IV to treat dehydration.  You are also given IV pain medicines if you need them.  · You may be given IV antibiotics (medicines that treat infection).  · To rest the bowel, you will not eat or drink for a few days. In rare cases, when symptoms are very severe, you will be given nutrition through the IV.  · If you lost a lot of blood during the episode, you may receive a blood transfusion.  · In rare cases, an episode causes severe damage to the colon. In this case, surgery may need to be done to remove the damaged section. Your healthcare provider can tell you more if this is needed.  Follow-up  While you are being treated, your healthcare provider will work to find the cause of your ischemic colitis. After you recover, you may need to make lifestyle changes, such as quitting smoking, or take medicines to decrease your risk of another episode. Call 911 or emergency services or go to the emergency room right away if your symptoms return.  Date Last Reviewed: 7/1/2016  © 9545-9508 [employer]. [address] [place] [address], [place] [address]. All rights reserved. This information is not intended as a substitute for professional medical care. Always follow your healthcare professional's instructions.

## 2017-04-12 ENCOUNTER — PATIENT MESSAGE (OUTPATIENT)
Dept: RESEARCH | Facility: HOSPITAL | Age: 67
End: 2017-04-12

## 2017-04-21 ENCOUNTER — OFFICE VISIT (OUTPATIENT)
Dept: DIABETES | Facility: CLINIC | Age: 67
End: 2017-04-21
Payer: COMMERCIAL

## 2017-04-21 VITALS
HEIGHT: 71 IN | SYSTOLIC BLOOD PRESSURE: 126 MMHG | WEIGHT: 212.94 LBS | BODY MASS INDEX: 29.81 KG/M2 | DIASTOLIC BLOOD PRESSURE: 78 MMHG

## 2017-04-21 DIAGNOSIS — E11.8 TYPE 2 DIABETES MELLITUS WITH COMPLICATION, WITH LONG-TERM CURRENT USE OF INSULIN: Primary | ICD-10-CM

## 2017-04-21 DIAGNOSIS — Z79.4 TYPE 2 DIABETES MELLITUS WITH COMPLICATION, WITH LONG-TERM CURRENT USE OF INSULIN: Primary | ICD-10-CM

## 2017-04-21 LAB — GLUCOSE SERPL-MCNC: 140 MG/DL (ref 70–110)

## 2017-04-21 PROCEDURE — 1160F RVW MEDS BY RX/DR IN RCRD: CPT | Mod: S$GLB,,, | Performed by: PHYSICIAN ASSISTANT

## 2017-04-21 PROCEDURE — 3078F DIAST BP <80 MM HG: CPT | Mod: S$GLB,,, | Performed by: PHYSICIAN ASSISTANT

## 2017-04-21 PROCEDURE — 3074F SYST BP LT 130 MM HG: CPT | Mod: S$GLB,,, | Performed by: PHYSICIAN ASSISTANT

## 2017-04-21 PROCEDURE — 82948 REAGENT STRIP/BLOOD GLUCOSE: CPT | Mod: S$GLB,,, | Performed by: PHYSICIAN ASSISTANT

## 2017-04-21 PROCEDURE — 99999 PR PBB SHADOW E&M-EST. PATIENT-LVL III: CPT | Mod: PBBFAC,,, | Performed by: PHYSICIAN ASSISTANT

## 2017-04-21 PROCEDURE — 99214 OFFICE O/P EST MOD 30 MIN: CPT | Mod: S$GLB,,, | Performed by: PHYSICIAN ASSISTANT

## 2017-04-21 PROCEDURE — 3045F PR MOST RECENT HEMOGLOBIN A1C LEVEL 7.0-9.0%: CPT | Mod: S$GLB,,, | Performed by: PHYSICIAN ASSISTANT

## 2017-04-21 NOTE — LETTER
April 22, 2017      SHANE Ochoa Jr., MD  [address]  [place] [address]           [facility]  [address] [place] [address]  Phone: [phone]  Fax: [phone]          Patient: Timothy Ortega   MR Number: [id number]   YOB: 1950   Date of Visit: 4/21/2017       Dear Dr. SHANE Ochoa Jr.:    Thank you for referring Timothy Ortega to me for evaluation. Attached you will find relevant portions of my assessment and plan of care.    If you have questions, please do not hesitate to call me. I look forward to following Timothy Ortega along with you.    Sincerely,    Lacho Phillip Jr., MYRNA    Enclosure  CC:  No Recipients    If you would like to receive this communication electronically, please contact [email or web address] or [phone] to request more information on [employer] Link access.    For providers and/or their staff who would like to refer a patient to [facility], please contact us through our one-stop-shop provider referral line, [facility], at [phone].    If you feel you have received this communication in error or would no longer like to receive these types of communications, please e-mail [email or web address]

## 2017-04-21 NOTE — MR AVS SNAPSHOT
[facility]  [address] [place] [address]  Phone: [phone]  Fax: [phone]                  Timothy Ortega   2017 7:30 AM   Office Visit    Description:  Male : 1950   Provider:  Lacho Phillip Jr., PA-C   Department:  [facility]           Reason for Visit     Diabetes Mellitus           Diagnoses this Visit        Comments    Type 2 diabetes mellitus with complication, with long-term current use of insulin    -  Primary            To Do List           Future Appointments        Provider Department Dept Phone    2017 8:00 AM Lacho Phillip Jr., PA-C [facility] Diabetes [facility] [phone]    2017 7:50 AM LABORATORY, [facility] [phone]    2017 8:00 AM SPECIMEN, [facility] [phone]    2017 10:40 AM SHANE Ochoa Jr., MD [facility] Internal Medicine [phone]    2017 8:20 AM Elizabeth Lejeune, NP [facility] Pulmonary Services [phone]      Goals (5 Years of Data)     None      Follow-Up and Disposition     Release results to [facility] Return in about 6 weeks (around 2017), or if symptoms worsen or fail to improve.      [facility]     [facility] Care [facility] -  Assistance  Unless otherwise directed by your provider, please contact [facility], our nurse care line that is available for  assistance.     Registered nurses in the [facility] provide: appointment scheduling, clinical advisement, health education, and other advisory services.  Call: [phone] (toll free)               Medications           Message regarding Medications     Verify the changes and/or additions to your medication regime listed below are the same as discussed with your clinician today.  If any of these changes or additions are incorrect, please notify your healthcare provider.        STOP taking these medications     lisinopril  "(PRINIVIL,ZESTRIL) 5 MG tablet TAKE 1 TABLET BY MOUTH ONCE DAILY           Verify that the below list of medications is an accurate representation of the medications you are currently taking.  If none reported, the list may be blank. If incorrect, please contact your healthcare provider. Carry this list with you in case of emergency.           Current Medications     ASCORBATE CALCIUM (VITAMIN C ORAL) Take by mouth once daily.    aspirin 81 mg Tab Take 1 tablet by mouth Daily.    BD INSULIN PEN NEEDLE UF SHORT 31 gauge x 5/16" Ndle USE FIVE TIMES DAILY AS DIRECTED    blood sugar diagnostic (ONE TOUCH ULTRA TEST) Strp Inject 1 strip into the skin as directed. - Strip In Vitro -.  check blood sugar 4-6 times daily. One touch strips    dulaglutide (TRULICITY) 1.5 mg/0.5 mL PnIj Inject 1.5 mg into the skin every 7 days.    ERGOCALCIFEROL, VITAMIN D2, (VITAMIN D ORAL) Take by mouth once daily.    hydrocortisone 2.5 % cream ERROL TO RASH BID PRN    insulin lispro (HUMALOG) 100 unit/mL injection 14-25 units before each meal    insulin needles, disposable, (BD INSULIN PEN NEEDLE UF SHORT) 31 X 5/16 " Ndle USE 5 TIMES DAILY AS DIRECTED    KRILL OIL ORAL Take 1 capsule by mouth once daily.    lancets (ONE TOUCH DELICA LANCETS) 33 gauge Misc Inject 1 lancet into the skin as directed. Use to check BG 4-6 x daily.    LANTUS SOLOSTAR 100 unit/mL (3 mL) InPn pen INJECT 30 UNITS UNDER THE SKIN EVERY MORNING AND 50 UNITS EVERY EVENING    metformin (GLUCOPHAGE) 1000 MG tablet TAKE ONE TABLET BY MOUTH TWICE DAILY WITH MEALS    multivitamin (THERAGRAN) per tablet Take 1 tablet by mouth Daily.    rosuvastatin (CRESTOR) 40 MG Tab Take 1 tablet (40 mg total) by mouth every evening.    sodium,potassium,mag sulfates (SUPREP BOWEL PREP KIT) 17.5-3.13-1.6 gram SolR Use as directed.           Clinical Reference Information           Your Vitals Were     BP Height Weight BMI       126/78 (BP Location: Right arm, Patient Position: Sitting, BP " "Method: Manual) 5' 11" (1.803 m) 96.6 kg (212 lb 15.4 oz) 29.7 kg/m2       Blood Pressure          Most Recent Value    BP  126/78      Allergies as of 4/21/2017     No Known Drug Allergies      Immunizations Administered on Date of Encounter - 4/21/2017     None      Orders Placed During Today's Visit      Normal Orders This Visit    POCT glucose          4/21/2017  7:38 AM - Shy [place] LPN      Component Results     Component Value Flag Ref Range Units Status    POC Glucose 140 (A) 70 - 110 mg/dL Final            Instructions     I have reviewed your results and they are still quite high. I would like you to start "Treating to Target". The treatment will be Insulin and your target will be the Fasting and 2 hour post meal blood sugar. It will work in this manner;    1. Goal for Fasting blood sugar is  mg/dl. I realize that you will need time to adjust to the new levels and presently you may feel too low if you are too aggressive now. So go slow and aim to lower your blood sugar to below 200 then 150 then 100 over several months.    2. Goal for 2 hour post meal blood sugar is below 180 mg/dl, here the same rules apply as in #1.    3. You will check your fasting blood sugar daily, if not where we would like it to be over a 3 day period then that evening we will increase the Lantus dose by 5 units. Then repeat the process over the next 3 days. Remember this is a slow process and take our time getting to goal. But, each week should be better than prior weeks. Blood sugars below 70 are unacceptable and should raise a "RED FLAG" where we may have to reduce our dose of insulin.    4. You will check your post meal glucose daily as well. However, each day you will check a different meal, (ie. Monday-breakfast; Tuesday- lunch; Wednesday- supper, then repeat). If your post meal glucose is not where we would like, increase pre-meal insulin by 2 units next time. A word of CAUTION: mealtime insulin is dependant on " "the size and concentration of your meal content. If not consuming a large meal do not take large dose of insulin. Use the reasonable person rule.     5. If you have any questions please do not hesitate to call.    Intensive insulin Therapy with correction factor:    You are on Intensive insulin therapy with Basal and Bolus insulin. Lantus, Levamir or NPH is your Basal insulin and will help maintain your fasting and between meal sugar. Your fast acting or rescue insulin is either Humalog, Novalog or Regular insulin and will control your post meal sugar.     You will Take 60 units of Lantus at 9 pm each night. This will be adjusted up or down depending on your fasting blood sugar before breakfast.    Humalog will follow this pre meal schedule; Correction factor of "2 units per 50 mg/dl" and is based on 30-60 grams of carbohydrates per meal.    If blood sugar is below 70 eat first then check your blood sugar 2 hours later and make correction.  If blood pre-meal sugar is  70 -150 take 22 units of Humalog;  If blood pre-meal sugar is 151-200 take +2 units of Humalog;  If blood pre-meal sugar is 201-250 take +4 units of Humalog;  If blood pre-meal sugar is 251-300 take +6 units of Humalog;  If blood pre-meal sugar is 301-350 take +8 units of Humalog;  If blood pre-meal sugar is 351-400+ take +10 units of Humalog;  Also increase water intake and call for appointment.       Language Assistance Services     ATTENTION: Language assistance services are available, free of charge. Please call [phone].      ATENCIÓN: Si habla español, tiene a tobias disposición servicios gratuitos de asistencia lingüística. Llame al [phone].     CHÚ Ý: N?u b?n nói Ti?ng Vi?t, có các d?ch v? h? tr? ngôn ng? mi?n phí dành cho b?n. G?i s? [phone].         [facility] complies with applicable Federal civil rights laws and does not discriminate on the basis of race, color, national origin, age, disability, or sex.      "

## 2017-04-21 NOTE — PATIENT INSTRUCTIONS
" I have reviewed your results and they are still quite high. I would like you to start "Treating to Target". The treatment will be Insulin and your target will be the Fasting and 2 hour post meal blood sugar. It will work in this manner;    1. Goal for Fasting blood sugar is  mg/dl. I realize that you will need time to adjust to the new levels and presently you may feel too low if you are too aggressive now. So go slow and aim to lower your blood sugar to below 200 then 150 then 100 over several months.    2. Goal for 2 hour post meal blood sugar is below 180 mg/dl, here the same rules apply as in #1.    3. You will check your fasting blood sugar daily, if not where we would like it to be over a 3 day period then that evening we will increase the Lantus dose by 5 units. Then repeat the process over the next 3 days. Remember this is a slow process and take our time getting to goal. But, each week should be better than prior weeks. Blood sugars below 70 are unacceptable and should raise a "RED FLAG" where we may have to reduce our dose of insulin.    4. You will check your post meal glucose daily as well. However, each day you will check a different meal, (ie. Monday-breakfast; Tuesday- lunch; Wednesday- supper, then repeat). If your post meal glucose is not where we would like, increase pre-meal insulin by 2 units next time. A word of CAUTION: mealtime insulin is dependant on the size and concentration of your meal content. If not consuming a large meal do not take large dose of insulin. Use the reasonable person rule.     5. If you have any questions please do not hesitate to call.    Intensive insulin Therapy with correction factor:    You are on Intensive insulin therapy with Basal and Bolus insulin. Lantus, Levamir or NPH is your Basal insulin and will help maintain your fasting and between meal sugar. Your fast acting or rescue insulin is either Humalog, Novalog or Regular insulin and will control your " "post meal sugar.     You will Take 60 units of Lantus at 9 pm each night. This will be adjusted up or down depending on your fasting blood sugar before breakfast.    Humalog will follow this pre meal schedule; Correction factor of "2 units per 50 mg/dl" and is based on 30-60 grams of carbohydrates per meal.    If blood sugar is below 70 eat first then check your blood sugar 2 hours later and make correction.  If blood pre-meal sugar is  70 -150 take 22 units of Humalog;  If blood pre-meal sugar is 151-200 take +2 units of Humalog;  If blood pre-meal sugar is 201-250 take +4 units of Humalog;  If blood pre-meal sugar is 251-300 take +6 units of Humalog;  If blood pre-meal sugar is 301-350 take +8 units of Humalog;  If blood pre-meal sugar is 351-400+ take +10 units of Humalog;  Also increase water intake and call for appointment.  "

## 2017-04-21 NOTE — PROGRESS NOTES
Subjective:      Patient ID: Timothy Ortega is a 66 y.o. male.    PCP: EM Ochoa Jr, MD      Timothy Ortega is a pleasant 66 y.o. male presenting to follow up on diabetes mellitus. He has had diabetes for 20 or more years. His last visit in Diabetes Management was 11/20/2015.  Since that time he has had mild improvement in his glycemia. His blood sugar range fasting has been 140 and 2 hour post meal has been 200+, and he has been monitoring 2-3 times per day as directed. His current concerns are glycemic control.    He denies any hospital admissions, emergency room visits, hypoglycemia, syncope, diaphoresis, chest pain, or dyspnea.    He has gained 1 pounds since last visit. His BMI is 29.97    His blood sugar in the clinic today was:   Lab Results   Component Value Date    POCGLU 140 (A) 04/21/2017       We discussed the American diabetes Association recommendations:  hemoglobin A1c below 7.0%; all diabetics should be on statins unless contraindicated; one aspirin daily unless contraindicated; fasting blood sugar between 80 and 130 mg/dL; postprandial blood sugar below 180 mg/dl; prevention of hypoglycemia, may adjust goals to higher levels if persistent; ACE or ARB therapy if not contraindicated; and maintain in an ideal body weight with BMI below 25.    Timothy is compliant most of the time with DM medications.     Timothy is compliant most of the time with lifestyle modifications to include activity and meal planning.       STANDARDS OF CARE:  Eye doctor: Dr. Stoddard, last exam 10/28/2016.  Dental exam: Recommend regular exams; denies gums bleeding.  Podiatry doctor:    ACTIVITY LEVEL: He exercises rarely.  MEAL PLANNING: Number of meals per day - 3. Number of snacks per day - 2.  Per dietary recall, patient is not limiting carbohydrates, saturated fats and sodium.   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    Lab Results   Component Value Date    WBC 7.02 04/17/2015    HGB 14.7 04/17/2015    HCT 43.7 04/17/2015     04/17/2015    CHOL 97 (L) 02/10/2017    TRIG 151 (H) 02/10/2017    HDL 25 (L) 02/10/2017    ALT 26 02/10/2017    AST 26 02/10/2017     08/05/2016    K 4.3 08/05/2016     08/05/2016    CREATININE 1.0 08/05/2016    ESTGFRAFRICA >60.0 08/05/2016    EGFRNONAA >60.0 08/05/2016    BUN 15 08/05/2016    CO2 26 08/05/2016    TSH 1.003 10/05/2012    PSA 0.35 12/30/2011    INR 1.0 12/01/2014     (H) 08/05/2016       Lab Results   Component Value Date    HGBA1C 8.1 (H) 02/10/2017    HGBA1C 6.7 (H) 08/05/2016    HGBA1C 6.9 (H) 03/04/2016     Lab Results   Component Value Date    TSH 1.003 10/05/2012     Lab Results   Component Value Date    TOTALTESTOST 330 04/26/2013     Lab Results   Component Value Date    CALCIUM 9.6 08/05/2016           Review of patient's allergies indicates:   Allergen Reactions    No known drug allergies        Past Medical History:   Diagnosis Date    Coronary artery disease 2010    [facility] - no stents    Diabetes mellitus 10-12 years     am 10/31/2014    DM (diabetes mellitus) 13 years     am 11/03/2015    DM (diabetes mellitus) 2002     lunch 10/28/2016    Hyperlipemia     Kidney stones     Neuropathy        Review of Systems   Constitutional: Negative.  Negative for activity change, appetite change, chills, diaphoresis, fatigue, fever and unexpected weight change.   HENT: Negative.  Negative for dental problem, facial swelling, hearing loss, nosebleeds, trouble swallowing and voice change.    Eyes: Negative.  Negative for photophobia, pain, discharge, redness, itching and visual disturbance.   Respiratory: Negative.  Negative for cough, choking, chest tightness, shortness of breath and wheezing.    Cardiovascular: Negative.  Negative for chest pain, palpitations and leg swelling.   Gastrointestinal: Negative.   "Negative for abdominal distention, abdominal pain, blood in stool, constipation, diarrhea, nausea and vomiting.   Endocrine: Negative.  Negative for cold intolerance, heat intolerance, polydipsia, polyphagia and polyuria.   Genitourinary: Negative.  Negative for decreased urine volume, difficulty urinating, dysuria, frequency, scrotal swelling, testicular pain and urgency.   Musculoskeletal: Negative.  Negative for arthralgias, back pain, gait problem, joint swelling, myalgias, neck pain and neck stiffness.   Skin: Negative.  Negative for color change, pallor, rash and wound.   Allergic/Immunologic: Negative.  Negative for environmental allergies, food allergies and immunocompromised state.   Neurological: Negative.  Negative for dizziness, tremors, seizures, syncope, facial asymmetry, speech difficulty, weakness, light-headedness, numbness and headaches.   Hematological: Negative.  Negative for adenopathy. Does not bruise/bleed easily.   Psychiatric/Behavioral: Negative.  Negative for agitation, behavioral problems, confusion, decreased concentration, dysphoric mood, hallucinations, self-injury, sleep disturbance and suicidal ideas. The patient is not nervous/anxious and is not hyperactive.       Objective:     Vitals - 1 value per visit 3/31/2017 3/31/2017 4/21/2017   SYSTOLIC 132 - 126   DIASTOLIC 79 - 78   PULSE 69 - -   TEMPERATURE 98.2 - -   RESPIRATIONS 18 25 -   SPO2 96 - -   Weight (lb) 209 - 212.96   Weight (kg) 94.802 - 96.6   HEIGHT 5' 11" - 5' 11"   BODY MASS INDEX 29.15 - 29.7   VISIT REPORT - - -   Pain Score  - - -       Physical Exam   Constitutional: He is oriented to person, place, and time. He appears well-developed and well-nourished. He is cooperative.  Non-toxic appearance. He does not have a sickly appearance. He does not appear ill. No distress. He is not intubated.   HENT:   Head: Normocephalic and atraumatic. Not macrocephalic and not microcephalic. Head is without raccoon's eyes, without " Noriega's sign, without abrasion, without contusion, without laceration, without right periorbital erythema and without left periorbital erythema. Hair is normal.   Right Ear: External ear normal. No lacerations. No drainage, swelling or tenderness. No foreign bodies. No mastoid tenderness. Tympanic membrane is not injected, not scarred, not perforated, not erythematous, not retracted and not bulging. Tympanic membrane mobility is normal. No middle ear effusion. No hemotympanum. No decreased hearing is noted.   Left Ear: External ear normal. No lacerations. No drainage, swelling or tenderness. No foreign bodies. No mastoid tenderness. Tympanic membrane is not injected, not scarred, not perforated, not erythematous, not retracted and not bulging. Tympanic membrane mobility is normal.  No middle ear effusion. No hemotympanum. No decreased hearing is noted.   Nose: Nose normal.   Mouth/Throat: Oropharynx is clear and moist.   Eyes: EOM and lids are normal. Pupils are equal, round, and reactive to light. Right eye exhibits no chemosis, no discharge, no exudate and no hordeolum. No foreign body present in the right eye. Left eye exhibits no chemosis, no discharge, no exudate and no hordeolum. No foreign body present in the left eye. Right conjunctiva is not injected. Right conjunctiva has no hemorrhage. Left conjunctiva is not injected. Left conjunctiva has no hemorrhage. No scleral icterus. Right eye exhibits normal extraocular motion and no nystagmus. Left eye exhibits normal extraocular motion and no nystagmus. Right pupil is round and reactive. Left pupil is round and reactive. Pupils are equal.   Fundoscopic exam:       The right eye shows no arteriolar narrowing, no AV nicking, no exudate, no hemorrhage and no papilledema. The right eye shows red reflex and venous pulsations.        The left eye shows no arteriolar narrowing, no AV nicking, no exudate, no hemorrhage and no papilledema. The left eye shows red reflex  and venous pulsations.   Neck: Normal range of motion, full passive range of motion without pain and phonation normal. Neck supple. Normal carotid pulses, no hepatojugular reflux and no JVD present. No tracheal tenderness, no spinous process tenderness and no muscular tenderness present. Carotid bruit is not present. No rigidity. No tracheal deviation, no edema, no erythema and normal range of motion present. No thyroid mass and no thyromegaly present.   Cardiovascular: Normal rate, regular rhythm, normal heart sounds and intact distal pulses.   No extrasystoles are present. PMI is not displaced.  Exam reveals no gallop, no friction rub and no decreased pulses.    No murmur heard.  Pulses:       Carotid pulses are 2+ on the right side, and 2+ on the left side.       Radial pulses are 2+ on the right side, and 2+ on the left side.        Dorsalis pedis pulses are 2+ on the right side, and 2+ on the left side.        Posterior tibial pulses are 2+ on the right side, and 2+ on the left side.   Pulmonary/Chest: Effort normal and breath sounds normal. No accessory muscle usage or stridor. No apnea, no tachypnea and no bradypnea. He is not intubated. No respiratory distress. He has no decreased breath sounds. He has no wheezes. He has no rhonchi. He has no rales. He exhibits no tenderness.   Abdominal: Soft. Bowel sounds are normal. He exhibits no shifting dullness, no distension, no pulsatile liver, no fluid wave, no abdominal bruit, no ascites, no pulsatile midline mass and no mass. There is no hepatosplenomegaly, splenomegaly or hepatomegaly. There is no tenderness. There is no rigidity, no rebound, no guarding, no CVA tenderness and no tenderness at McBurney's point. No hernia. Hernia confirmed negative in the ventral area.   Musculoskeletal: Normal range of motion. He exhibits no edema or tenderness.        Right foot: There is normal range of motion and no deformity.        Left foot: There is normal range of motion  and no deformity.   Feet:   Right Foot:   Protective Sensation: 6 sites tested. 6 sites sensed.   Skin Integrity: Negative for ulcer, blister, skin breakdown, erythema, warmth, callus or dry skin.   Left Foot:   Protective Sensation: 6 sites tested. 6 sites sensed.   Skin Integrity: Negative for ulcer, blister, skin breakdown, erythema, warmth, callus or dry skin.   Lymphadenopathy:        Head (right side): No submental, no submandibular, no tonsillar, no preauricular, no posterior auricular and no occipital adenopathy present.        Head (left side): No submental, no submandibular, no tonsillar, no preauricular, no posterior auricular and no occipital adenopathy present.     He has no cervical adenopathy.        Right cervical: No superficial cervical, no deep cervical and no posterior cervical adenopathy present.       Left cervical: No superficial cervical, no deep cervical and no posterior cervical adenopathy present.   Neurological: He is alert and oriented to person, place, and time. He has normal reflexes. He displays no atrophy and no tremor. No cranial nerve deficit or sensory deficit. He exhibits normal muscle tone. He displays no seizure activity. Coordination and gait normal.   Reflex Scores:       Bicep reflexes are 2+ on the right side and 2+ on the left side.       Brachioradialis reflexes are 2+ on the right side and 2+ on the left side.       Patellar reflexes are 2+ on the right side and 2+ on the left side.  Skin: Skin is warm and dry. No rash noted. No erythema. No pallor.   Psychiatric: He has a normal mood and affect. His mood appears not anxious. His affect is not angry, not blunt, not labile and not inappropriate. His speech is not rapid and/or pressured, not delayed, not tangential and not slurred. He is not agitated, not aggressive, not hyperactive, not slowed, not withdrawn, not actively hallucinating and not combative. Thought content is not paranoid and not delusional. Cognition and  "memory are not impaired. He does not express impulsivity or inappropriate judgment. He does not exhibit a depressed mood. He expresses no homicidal and no suicidal ideation. He expresses no suicidal plans and no homicidal plans. He is communicative. He exhibits normal recent memory and normal remote memory. He is attentive.     Assessment:     1. Type 2 diabetes mellitus with complication, with long-term current use of insulin       Plan:   Timothy Ortega is seen today for   1. Type 2 diabetes mellitus with complication, with long-term current use of insulin      We have discussed the etiology and treatment options associated with the diagnosis as well as alternatives. He has elected the following treatments.     Type 2 diabetes mellitus with complication, with long-term current use of insulin  -     POCT glucose  - Will need to intensify his therapy to basal bolus therapy with terat-to-target as follows.  -  Recheck in 6 weeks     I have reviewed your results and they are still quite high. I would like you to start "Treating to Target". The treatment will be Insulin and your target will be the Fasting and 2 hour post meal blood sugar. It will work in this manner;    1. Goal for Fasting blood sugar is  mg/dl. I realize that you will need time to adjust to the new levels and presently you may feel too low if you are too aggressive now. So go slow and aim to lower your blood sugar to below 200 then 150 then 100 over several months.    2. Goal for 2 hour post meal blood sugar is below 180 mg/dl, here the same rules apply as in #1.    3. You will check your fasting blood sugar daily, if not where we would like it to be over a 3 day period then that evening we will increase the Lantus dose by 5 units. Then repeat the process over the next 3 days. Remember this is a slow process and take our time getting to goal. But, each week should be better than prior weeks. Blood sugars below 70 are unacceptable and should raise " "a "RED FLAG" where we may have to reduce our dose of insulin.    4. You will check your post meal glucose daily as well. However, each day you will check a different meal, (ie. Monday-breakfast; Tuesday- lunch; Wednesday- supper, then repeat). If your post meal glucose is not where we would like, increase pre-meal insulin by 2 units next time. A word of CAUTION: mealtime insulin is dependant on the size and concentration of your meal content. If not consuming a large meal do not take large dose of insulin. Use the reasonable person rule.     5. If you have any questions please do not hesitate to call.    Intensive insulin Therapy with correction factor:    You are on Intensive insulin therapy with Basal and Bolus insulin. Lantus, Levamir or NPH is your Basal insulin and will help maintain your fasting and between meal sugar. Your fast acting or rescue insulin is either Humalog, Novalog or Regular insulin and will control your post meal sugar.     You will Take 60 units of Lantus at 9 pm each night. This will be adjusted up or down depending on your fasting blood sugar before breakfast.    Humalog will follow this pre meal schedule; Correction factor of "2 units per 50 mg/dl" and is based on 30-60 grams of carbohydrates per meal.    If blood sugar is below 70 eat first then check your blood sugar 2 hours later and make correction.  If blood pre-meal sugar is  70 -150 take 22 units of Humalog;  If blood pre-meal sugar is 151-200 take +2 units of Humalog;  If blood pre-meal sugar is 201-250 take +4 units of Humalog;  If blood pre-meal sugar is 251-300 take +6 units of Humalog;  If blood pre-meal sugar is 301-350 take +8 units of Humalog;  If blood pre-meal sugar is 351-400+ take +10 units of Humalog;  Also increase water intake and call for appointment.    1.) Patient was instructed to monitor blood glucose twice daily, fasting, and 2 hour post meal; if on Multiple Daily Injections (MDI) he will need to have pre-meal " blood glucose as well. Reminded to bring BG meter or record to each visit for review.  2.) Reviewed pathophysiology of type 2 diabetes, complications related to the disease, importance of annual dilated eye exam and self daily foot examination.  3.) Continue medications as prescribed MDI with lantus and Humalog. [facility] MyChart or Phone review in 1 week with BG records for adjustment of medication.  4.) Reviewed carb counting, portion control, importance of spacing meals throughout the day to prevent post prandial elevations. Recommended low saturated fat, low sodium diet to aid in control of hypertension and cholesterol.  5.) Discussed activity, benefits, methods, and precautions. Recommended patient start/continue some form of exercise and increase as tolerated to 60 minutes per day to facilitate weight loss and aid in control of BGs. Also reminded patient of WHO recommendation of 10,000 steps daily as a goal.   6.) A1C, TSH, Lipid Panel, CMP with eGFR and Micro/Creatinine per ADA protocol.  7.) Return to clinic in 6 weeks for follow up. Advised patient to call clinic with any questions or concerns.    A total of 60 minutes was spent in face to face time, of which 50 % was spent in counseling patient on disease process, complications, treatment, and side effects of medications.    The patient was explained the above plan and given opportunity to ask questions.  He understands, chooses and consents to this plan and accepts all the risks, which include but are not limited to the risks mentioned above.   He understands the alternative of having no testing, interventions or treatments at this time. He left content and without further questions.

## 2017-05-26 ENCOUNTER — PATIENT MESSAGE (OUTPATIENT)
Dept: DIABETES | Facility: CLINIC | Age: 67
End: 2017-05-26

## 2017-05-26 ENCOUNTER — PATIENT MESSAGE (OUTPATIENT)
Dept: INTERNAL MEDICINE | Facility: CLINIC | Age: 67
End: 2017-05-26

## 2017-06-06 RX ORDER — DULAGLUTIDE 1.5 MG/.5ML
1.5 INJECTION, SOLUTION SUBCUTANEOUS
Qty: 2 ML | Refills: 11 | Status: SHIPPED | OUTPATIENT
Start: 2017-06-06 | End: 2018-06-18 | Stop reason: SDUPTHER

## 2017-06-16 ENCOUNTER — OFFICE VISIT (OUTPATIENT)
Dept: DIABETES | Facility: CLINIC | Age: 67
End: 2017-06-16
Payer: COMMERCIAL

## 2017-06-16 VITALS
DIASTOLIC BLOOD PRESSURE: 74 MMHG | WEIGHT: 210.31 LBS | BODY MASS INDEX: 29.44 KG/M2 | SYSTOLIC BLOOD PRESSURE: 138 MMHG | HEIGHT: 71 IN

## 2017-06-16 DIAGNOSIS — E11.8 TYPE 2 DIABETES MELLITUS WITH COMPLICATION, WITH LONG-TERM CURRENT USE OF INSULIN: Primary | ICD-10-CM

## 2017-06-16 DIAGNOSIS — Z79.4 TYPE 2 DIABETES MELLITUS WITH COMPLICATION, WITH LONG-TERM CURRENT USE OF INSULIN: Primary | ICD-10-CM

## 2017-06-16 LAB — GLUCOSE SERPL-MCNC: 308 MG/DL (ref 70–110)

## 2017-06-16 PROCEDURE — 99214 OFFICE O/P EST MOD 30 MIN: CPT | Mod: S$GLB,,, | Performed by: PHYSICIAN ASSISTANT

## 2017-06-16 PROCEDURE — 3045F PR MOST RECENT HEMOGLOBIN A1C LEVEL 7.0-9.0%: CPT | Mod: S$GLB,,, | Performed by: PHYSICIAN ASSISTANT

## 2017-06-16 PROCEDURE — 99999 PR PBB SHADOW E&M-EST. PATIENT-LVL III: CPT | Mod: PBBFAC,,, | Performed by: PHYSICIAN ASSISTANT

## 2017-06-16 PROCEDURE — 82948 REAGENT STRIP/BLOOD GLUCOSE: CPT | Mod: S$GLB,,, | Performed by: PHYSICIAN ASSISTANT

## 2017-06-16 RX ORDER — LANCETS 33 GAUGE
1 EACH MISCELLANEOUS 3 TIMES DAILY
Qty: 100 EACH | Refills: 11 | Status: SHIPPED | OUTPATIENT
Start: 2017-06-16 | End: 2018-02-28 | Stop reason: SDUPTHER

## 2017-06-16 RX ORDER — INSULIN PUMP SYRINGE, 3 ML
EACH MISCELLANEOUS
Qty: 1 EACH | Refills: 0 | Status: SHIPPED | OUTPATIENT
Start: 2017-06-16 | End: 2022-11-07

## 2017-06-16 NOTE — PROGRESS NOTES
"Subjective:      Patient ID: Timothy Ortega is a 66 y.o. male.    PCP: EM Ochoa Jr, MD      Timothy Ortega is a pleasant 66 y.o. male presenting to follow up on diabetes mellitus. He has had diabetes for 20 or more years. His last visit in Diabetes Management was 4/21/2017 Since that time he has had moderate improvement in his glycemia. His blood sugar range fasting has been  and 2 hour post meal has been , and he has been monitoring 3 times per day as directed. His current concerns are glycemic control.  He has not had any significant problems with medication and no hypoglycemic episodes.  He also has obstructive sleep apnea, hyperlipidemia, hypertension, and coronary artery disease all which has been stable or controlled.              He denies any hospital admissions, emergency room visits, hypoglycemia, syncope, diaphoresis, chest pain, or dyspnea.    He has lost 2 pounds since last visit. His BMI is 29.33    His blood sugar in the clinic today was: "1 hour after meal"  Lab Results   Component Value Date    POCGLU 308 (A) 06/16/2017     We discussed the American diabetes Association recommendations:  hemoglobin A1c below 7.0%; all diabetics should be on statins unless contraindicated; one aspirin daily unless contraindicated; fasting blood sugar between 80 and 130 mg/dL; postprandial blood sugar below 180 mg/dl; prevention of hypoglycemia, may adjust goals to higher levels if persistent; ACE or ARB therapy if not contraindicated; and maintain in an ideal body weight with BMI below 25.    Timothy is compliant most of the time with DM medications.     Timothy is compliant most of the time with lifestyle modifications to include activity and meal planning.       STANDARDS OF CARE:  Eye doctor: Dr. Stoddard, last exam 10/28/2016.  Dental exam: Recommend regular exams; denies gums bleeding.  Podiatry doctor:     ACTIVITY LEVEL: He exercises rarely.  MEAL PLANNING: Number of meals per day - 3. Number of " snacks per day - 2.  Per dietary recall, patient is not limiting carbohydrates, saturated fats and sodium.   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    Lab Results   Component Value Date    WBC 7.02 04/17/2015    HGB 14.7 04/17/2015    HCT 43.7 04/17/2015     04/17/2015    CHOL 97 (L) 02/10/2017    TRIG 151 (H) 02/10/2017    HDL 25 (L) 02/10/2017    ALT 26 02/10/2017    AST 26 02/10/2017     08/05/2016    K 4.3 08/05/2016     08/05/2016    CREATININE 1.0 08/05/2016    ESTGFRAFRICA >60.0 08/05/2016    EGFRNONAA >60.0 08/05/2016    BUN 15 08/05/2016    CO2 26 08/05/2016    TSH 1.003 10/05/2012    PSA 0.35 12/30/2011    INR 1.0 12/01/2014     (H) 08/05/2016       Lab Results   Component Value Date    HGBA1C 8.1 (H) 02/10/2017    HGBA1C 6.7 (H) 08/05/2016    HGBA1C 6.9 (H) 03/04/2016     Lab Results   Component Value Date    TSH 1.003 10/05/2012     Lab Results   Component Value Date    TOTALTESTOST 330 04/26/2013     Lab Results   Component Value Date    CALCIUM 9.6 08/05/2016       Review of patient's allergies indicates:   Allergen Reactions    No known drug allergies        Past Medical History:   Diagnosis Date    Coronary artery disease 2010    [facility] - no stents    Diabetes mellitus 10-12 years     am 10/31/2014    DM (diabetes mellitus) 13 years     am 11/03/2015    DM (diabetes mellitus) 2002     lunch 10/28/2016    Hyperlipemia     Kidney stones     Neuropathy        Review of Systems   Constitutional: Negative.  Negative for activity change, appetite change, chills, diaphoresis, fatigue, fever and unexpected weight change.   HENT: Negative.  Negative for dental problem, facial swelling, hearing loss, nosebleeds, trouble swallowing and voice change.    Eyes: Negative.  Negative for photophobia, pain, discharge, redness, itching and  "visual disturbance.   Respiratory: Negative.  Negative for cough, choking, chest tightness, shortness of breath and wheezing.    Cardiovascular: Negative.  Negative for chest pain, palpitations and leg swelling.   Gastrointestinal: Negative.  Negative for abdominal distention, abdominal pain, blood in stool, constipation, diarrhea, nausea and vomiting.   Endocrine: Negative.  Negative for cold intolerance, heat intolerance, polydipsia, polyphagia and polyuria.   Genitourinary: Negative.  Negative for decreased urine volume, difficulty urinating, dysuria, frequency, scrotal swelling, testicular pain and urgency.   Musculoskeletal: Negative.  Negative for arthralgias, back pain, gait problem, joint swelling, myalgias, neck pain and neck stiffness.   Skin: Negative.  Negative for color change, pallor, rash and wound.   Allergic/Immunologic: Negative.  Negative for environmental allergies, food allergies and immunocompromised state.   Neurological: Negative.  Negative for dizziness, tremors, seizures, syncope, facial asymmetry, speech difficulty, weakness, light-headedness, numbness and headaches.   Hematological: Negative.  Negative for adenopathy. Does not bruise/bleed easily.   Psychiatric/Behavioral: Negative.  Negative for agitation, behavioral problems, confusion, decreased concentration, dysphoric mood, hallucinations, self-injury, sleep disturbance and suicidal ideas. The patient is not nervous/anxious and is not hyperactive.       Objective:     Vitals - 1 value per visit 3/31/2017 3/31/2017 4/21/2017   SYSTOLIC 132 - 126   DIASTOLIC 79 - 78   PULSE 69 - -   TEMPERATURE 98.2 - -   RESPIRATIONS 18 25 -   SPO2 96 - -   Weight (lb) 209 - 212.96   Weight (kg) 94.802 - 96.6   HEIGHT 5' 11" - 5' 11"   BODY MASS INDEX 29.15 - 29.7   VISIT REPORT - - -   Pain Score  - - -       Physical Exam   Constitutional: He is oriented to person, place, and time. He appears well-developed and well-nourished. He is cooperative.  " Non-toxic appearance. He does not have a sickly appearance. He does not appear ill. No distress. He is not intubated.   HENT:   Head: Normocephalic and atraumatic. Not macrocephalic and not microcephalic. Head is without raccoon's eyes, without Noriega's sign, without abrasion, without contusion, without laceration, without right periorbital erythema and without left periorbital erythema. Hair is normal.   Right Ear: External ear normal. No lacerations. No drainage, swelling or tenderness. No foreign bodies. No mastoid tenderness. Tympanic membrane is not injected, not scarred, not perforated, not erythematous, not retracted and not bulging. Tympanic membrane mobility is normal. No middle ear effusion. No hemotympanum. No decreased hearing is noted.   Left Ear: External ear normal. No lacerations. No drainage, swelling or tenderness. No foreign bodies. No mastoid tenderness. Tympanic membrane is not injected, not scarred, not perforated, not erythematous, not retracted and not bulging. Tympanic membrane mobility is normal.  No middle ear effusion. No hemotympanum. No decreased hearing is noted.   Nose: Nose normal.   Mouth/Throat: Oropharynx is clear and moist.   Eyes: EOM and lids are normal. Pupils are equal, round, and reactive to light. Right eye exhibits no chemosis, no discharge, no exudate and no hordeolum. No foreign body present in the right eye. Left eye exhibits no chemosis, no discharge, no exudate and no hordeolum. No foreign body present in the left eye. Right conjunctiva is not injected. Right conjunctiva has no hemorrhage. Left conjunctiva is not injected. Left conjunctiva has no hemorrhage. No scleral icterus. Right eye exhibits normal extraocular motion and no nystagmus. Left eye exhibits normal extraocular motion and no nystagmus. Right pupil is round and reactive. Left pupil is round and reactive. Pupils are equal.   Fundoscopic exam:       The right eye shows no arteriolar narrowing, no AV  nicking, no exudate, no hemorrhage and no papilledema. The right eye shows red reflex and venous pulsations.        The left eye shows no arteriolar narrowing, no AV nicking, no exudate, no hemorrhage and no papilledema. The left eye shows red reflex and venous pulsations.   Neck: Normal range of motion, full passive range of motion without pain and phonation normal. Neck supple. Normal carotid pulses, no hepatojugular reflux and no JVD present. No tracheal tenderness, no spinous process tenderness and no muscular tenderness present. Carotid bruit is not present. No no neck rigidity. No tracheal deviation, no edema, no erythema and normal range of motion present. No thyroid mass and no thyromegaly present.   Cardiovascular: Normal rate, regular rhythm, normal heart sounds and intact distal pulses.   No extrasystoles are present. PMI is not displaced.  Exam reveals no gallop, no friction rub and no decreased pulses.    No murmur heard.  Pulses:       Carotid pulses are 2+ on the right side, and 2+ on the left side.       Radial pulses are 2+ on the right side, and 2+ on the left side.        Dorsalis pedis pulses are 2+ on the right side, and 2+ on the left side.        Posterior tibial pulses are 2+ on the right side, and 2+ on the left side.   Pulmonary/Chest: Effort normal and breath sounds normal. No accessory muscle usage or stridor. No apnea, no tachypnea and no bradypnea. He is not intubated. No respiratory distress. He has no decreased breath sounds. He has no wheezes. He has no rhonchi. He has no rales. He exhibits no tenderness.   Abdominal: Soft. Bowel sounds are normal. He exhibits no shifting dullness, no distension, no pulsatile liver, no fluid wave, no abdominal bruit, no ascites, no pulsatile midline mass and no mass. There is no hepatosplenomegaly, splenomegaly or hepatomegaly. There is no tenderness. There is no rigidity, no rebound, no guarding, no CVA tenderness and no tenderness at McBurney's  point. No hernia. Hernia confirmed negative in the ventral area.   Musculoskeletal: Normal range of motion. He exhibits no edema or tenderness.        Right foot: There is normal range of motion and no deformity.        Left foot: There is normal range of motion and no deformity.   Feet:   Right Foot:   Protective Sensation: 6 sites tested. 6 sites sensed.   Skin Integrity: Negative for ulcer, blister, skin breakdown, erythema, warmth, callus or dry skin.   Left Foot:   Protective Sensation: 6 sites tested. 6 sites sensed.   Skin Integrity: Negative for ulcer, blister, skin breakdown, erythema, warmth, callus or dry skin.   Lymphadenopathy:        Head (right side): No submental, no submandibular, no tonsillar, no preauricular, no posterior auricular and no occipital adenopathy present.        Head (left side): No submental, no submandibular, no tonsillar, no preauricular, no posterior auricular and no occipital adenopathy present.     He has no cervical adenopathy.        Right cervical: No superficial cervical, no deep cervical and no posterior cervical adenopathy present.       Left cervical: No superficial cervical, no deep cervical and no posterior cervical adenopathy present.   Neurological: He is alert and oriented to person, place, and time. He has normal reflexes. He displays no atrophy and no tremor. No cranial nerve deficit or sensory deficit. He exhibits normal muscle tone. He displays no seizure activity. Coordination and gait normal.   Reflex Scores:       Bicep reflexes are 2+ on the right side and 2+ on the left side.       Brachioradialis reflexes are 2+ on the right side and 2+ on the left side.       Patellar reflexes are 2+ on the right side and 2+ on the left side.  Skin: Skin is warm and dry. No rash noted. No erythema. No pallor.   Psychiatric: He has a normal mood and affect. His mood appears not anxious. His affect is not angry, not blunt, not labile and not inappropriate. His speech is not  rapid and/or pressured, not delayed, not tangential and not slurred. He is not agitated, not aggressive, not hyperactive, not slowed, not withdrawn, not actively hallucinating and not combative. Thought content is not paranoid and not delusional. Cognition and memory are not impaired. He does not express impulsivity or inappropriate judgment. He does not exhibit a depressed mood. He expresses no homicidal and no suicidal ideation. He expresses no suicidal plans and no homicidal plans. He is communicative. He exhibits normal recent memory and normal remote memory. He is attentive.     Assessment:     1. Type 2 diabetes mellitus with complication, with long-term current use of insulin       Plan:     Timothy Ortega is seen today for   1. Type 2 diabetes mellitus with complication, with long-term current use of insulin      We have discussed the etiology and treatment options associated with the diagnosis as well as alternatives. He has elected the following treatments.     Type 2 diabetes mellitus with complication, with long-term current use of insulin  -     POCT glucose    1.) Patient was instructed to monitor blood glucose twice daily, fasting, post meal and ac dinner or at bedtime. Reminded to bring BG records or meter to each visit for review.  2.) Reviewed pathophysiology of type 2 diabetes, complications related to the disease, importance of annual dilated eye exam and self daily foot examination.  3.) Continue medications as prescribed MDI with Lantus and Humalog; Metformin; Trulicity. [facility] MyChart or Phone review monthly with BG records for adjustment of medication.  4.) Reviewed carb counting, portion control, importance of spacing meals throughout the day to prevent post prandial elevations. Recommended low saturated fat, low sodium diet to aid in control of hypertension and cholesterol.  5.) Discussed activity, benefits, methods, and precautions. Recommended patient start/continue some form of exercise  and increase as tolerated to 60 minutes per day to facilitate weight loss and aid in control of BGs. Also reminded patient of WHO recommendation of 10,000 steps daily as a goal.   6.) A1C, TSH, Lipid Panel, CMP with eGFR and Micro/Creatinine per ADA protocol.  7.) Return to clinic in 6 months for follow up. Advised patient to call clinic with any questions or concerns.     A total of 40 minutes was spent in face to face time, of which 50 % was spent in counseling patient on disease process, complications, treatment, and side effects of medications.    The patient was explained the above plan and given opportunity to ask questions. He understands, chooses and consents to this plan and accepts all the risks, which include but are not limited to the risks mentioned above. He understands the alternative of having no testing, interventions or treatments at this time. He left content and without further questions.

## 2017-07-20 ENCOUNTER — TELEPHONE (OUTPATIENT)
Dept: INTERNAL MEDICINE | Facility: CLINIC | Age: 67
End: 2017-07-20

## 2017-07-20 NOTE — TELEPHONE ENCOUNTER
I contacted the pt regarding a script request we received for for topical pain and inflammation management creams and acid reflux /peptic ulcer/gerd medications he stated he did not request any of these medications I informed him I was calling because I noticed none of these were in his current or past meds list . He stated it can be discarded. I faxed back to sender and informed them of this and asked that they discard.

## 2017-07-28 ENCOUNTER — PATIENT MESSAGE (OUTPATIENT)
Dept: INTERNAL MEDICINE | Facility: CLINIC | Age: 67
End: 2017-07-28

## 2017-08-08 RX ORDER — PEN NEEDLE, DIABETIC 31 GX5/16"
NEEDLE, DISPOSABLE MISCELLANEOUS
Qty: 100 EACH | Refills: 3 | Status: SHIPPED | OUTPATIENT
Start: 2017-08-08 | End: 2018-02-23 | Stop reason: SDUPTHER

## 2017-08-11 ENCOUNTER — PATIENT MESSAGE (OUTPATIENT)
Dept: INTERNAL MEDICINE | Facility: CLINIC | Age: 67
End: 2017-08-11

## 2017-08-11 ENCOUNTER — LAB VISIT (OUTPATIENT)
Dept: LAB | Facility: HOSPITAL | Age: 67
End: 2017-08-11
Attending: PEDIATRICS
Payer: COMMERCIAL

## 2017-08-11 DIAGNOSIS — Z12.5 PROSTATE CANCER SCREENING: ICD-10-CM

## 2017-08-11 DIAGNOSIS — E11.69 HYPERLIPIDEMIA ASSOCIATED WITH TYPE 2 DIABETES MELLITUS: ICD-10-CM

## 2017-08-11 DIAGNOSIS — Z79.4 TYPE 2 DIABETES MELLITUS WITH COMPLICATION, WITH LONG-TERM CURRENT USE OF INSULIN: ICD-10-CM

## 2017-08-11 DIAGNOSIS — E78.5 HYPERLIPIDEMIA ASSOCIATED WITH TYPE 2 DIABETES MELLITUS: ICD-10-CM

## 2017-08-11 DIAGNOSIS — E11.8 TYPE 2 DIABETES MELLITUS WITH COMPLICATION, WITH LONG-TERM CURRENT USE OF INSULIN: ICD-10-CM

## 2017-08-11 LAB
ALT SERPL W/O P-5'-P-CCNC: 23 U/L
ANION GAP SERPL CALC-SCNC: 7 MMOL/L
AST SERPL-CCNC: 22 U/L
BUN SERPL-MCNC: 15 MG/DL
CALCIUM SERPL-MCNC: 9.5 MG/DL
CHLORIDE SERPL-SCNC: 106 MMOL/L
CHOLEST/HDLC SERPL: 3.3 {RATIO}
CO2 SERPL-SCNC: 25 MMOL/L
COMPLEXED PSA SERPL-MCNC: 0.31 NG/ML
CREAT SERPL-MCNC: 1.1 MG/DL
EST. GFR  (AFRICAN AMERICAN): >60 ML/MIN/1.73 M^2
EST. GFR  (NON AFRICAN AMERICAN): >60 ML/MIN/1.73 M^2
ESTIMATED AVG GLUCOSE: 166 MG/DL
GLUCOSE SERPL-MCNC: 167 MG/DL
HBA1C MFR BLD HPLC: 7.4 %
HDL/CHOLESTEROL RATIO: 30.8 %
HDLC SERPL-MCNC: 28 MG/DL
HDLC SERPL-MCNC: 91 MG/DL
LDLC SERPL CALC-MCNC: 37.4 MG/DL
NONHDLC SERPL-MCNC: 63 MG/DL
POTASSIUM SERPL-SCNC: 3.9 MMOL/L
SODIUM SERPL-SCNC: 138 MMOL/L
TRIGL SERPL-MCNC: 128 MG/DL

## 2017-08-11 PROCEDURE — 80048 BASIC METABOLIC PNL TOTAL CA: CPT

## 2017-08-11 PROCEDURE — 80061 LIPID PANEL: CPT

## 2017-08-11 PROCEDURE — 84153 ASSAY OF PSA TOTAL: CPT

## 2017-08-11 PROCEDURE — 84460 ALANINE AMINO (ALT) (SGPT): CPT

## 2017-08-11 PROCEDURE — 36415 COLL VENOUS BLD VENIPUNCTURE: CPT | Mod: PO

## 2017-08-11 PROCEDURE — 84450 TRANSFERASE (AST) (SGOT): CPT

## 2017-08-11 PROCEDURE — 83036 HEMOGLOBIN GLYCOSYLATED A1C: CPT

## 2017-08-17 ENCOUNTER — OFFICE VISIT (OUTPATIENT)
Dept: INTERNAL MEDICINE | Facility: CLINIC | Age: 67
End: 2017-08-17
Payer: COMMERCIAL

## 2017-08-17 VITALS
SYSTOLIC BLOOD PRESSURE: 124 MMHG | TEMPERATURE: 97 F | HEART RATE: 68 BPM | WEIGHT: 211.19 LBS | OXYGEN SATURATION: 97 % | HEIGHT: 71 IN | BODY MASS INDEX: 29.56 KG/M2 | DIASTOLIC BLOOD PRESSURE: 76 MMHG

## 2017-08-17 DIAGNOSIS — I25.10 CORONARY ARTERY DISEASE INVOLVING NATIVE CORONARY ARTERY OF NATIVE HEART WITHOUT ANGINA PECTORIS: ICD-10-CM

## 2017-08-17 DIAGNOSIS — Z79.4 TYPE 2 DIABETES MELLITUS WITH COMPLICATION, WITH LONG-TERM CURRENT USE OF INSULIN: Primary | ICD-10-CM

## 2017-08-17 DIAGNOSIS — E11.8 TYPE 2 DIABETES MELLITUS WITH COMPLICATION, WITH LONG-TERM CURRENT USE OF INSULIN: Primary | ICD-10-CM

## 2017-08-17 DIAGNOSIS — E78.5 HYPERLIPIDEMIA ASSOCIATED WITH TYPE 2 DIABETES MELLITUS: ICD-10-CM

## 2017-08-17 DIAGNOSIS — E11.59 HYPERTENSION ASSOCIATED WITH DIABETES: ICD-10-CM

## 2017-08-17 DIAGNOSIS — I15.2 HYPERTENSION ASSOCIATED WITH DIABETES: ICD-10-CM

## 2017-08-17 DIAGNOSIS — G47.33 OBSTRUCTIVE SLEEP APNEA: ICD-10-CM

## 2017-08-17 DIAGNOSIS — E11.69 HYPERLIPIDEMIA ASSOCIATED WITH TYPE 2 DIABETES MELLITUS: ICD-10-CM

## 2017-08-17 PROCEDURE — 4010F ACE/ARB THERAPY RXD/TAKEN: CPT | Mod: S$GLB,,, | Performed by: PEDIATRICS

## 2017-08-17 PROCEDURE — 3078F DIAST BP <80 MM HG: CPT | Mod: S$GLB,,, | Performed by: PEDIATRICS

## 2017-08-17 PROCEDURE — 99999 PR PBB SHADOW E&M-EST. PATIENT-LVL III: CPT | Mod: PBBFAC,,, | Performed by: PEDIATRICS

## 2017-08-17 PROCEDURE — 3074F SYST BP LT 130 MM HG: CPT | Mod: S$GLB,,, | Performed by: PEDIATRICS

## 2017-08-17 PROCEDURE — 3045F PR MOST RECENT HEMOGLOBIN A1C LEVEL 7.0-9.0%: CPT | Mod: S$GLB,,, | Performed by: PEDIATRICS

## 2017-08-17 PROCEDURE — 3008F BODY MASS INDEX DOCD: CPT | Mod: S$GLB,,, | Performed by: PEDIATRICS

## 2017-08-17 PROCEDURE — 99214 OFFICE O/P EST MOD 30 MIN: CPT | Mod: S$GLB,,, | Performed by: PEDIATRICS

## 2017-08-17 RX ORDER — LISINOPRIL 10 MG/1
10 TABLET ORAL DAILY
Qty: 90 TABLET | Refills: 3 | Status: CANCELLED | OUTPATIENT
Start: 2017-08-17 | End: 2018-08-17

## 2017-08-17 RX ORDER — LOSARTAN POTASSIUM 50 MG/1
50 TABLET ORAL DAILY
Qty: 90 TABLET | Refills: 3 | Status: SHIPPED | OUTPATIENT
Start: 2017-08-17 | End: 2018-09-06 | Stop reason: SDUPTHER

## 2017-08-17 NOTE — PROGRESS NOTES
Subjective:       Patient ID: Timothy Ortega is a 66 y.o. male.    Chief Complaint: Follow-up (6mo w/lab)    HTN: B/P good, no HTNive symptoms.    LIPIDS:not following D&E, tolerating and compliant with med(s).    DM: no hyper/hypoglycemic symptoms. Self monitoring TIDnormal BS(near normal) trulicity working, Working with DM program.  CAD: no CP, SOB, CHOU. Has seen cards.  He has not yet decided on CTS surgery  LABS REVIEWED AND DISCUSSED WITH PATIENT       Review of Systems   Constitutional: Negative for fever and unexpected weight change.   HENT: Negative for congestion and rhinorrhea.    Eyes: Negative for discharge and redness.   Respiratory: Negative for cough and wheezing.    Cardiovascular: Negative for chest pain, palpitations and leg swelling.   Gastrointestinal: Negative for constipation, diarrhea and vomiting.   Endocrine: Negative for cold intolerance, heat intolerance, polydipsia, polyphagia and polyuria.   Genitourinary: Negative for decreased urine volume and difficulty urinating.   Musculoskeletal: Positive for arthralgias. Negative for joint swelling.   Skin: Negative for rash and wound.   Neurological: Negative for syncope and headaches.   Psychiatric/Behavioral: Negative for behavioral problems and sleep disturbance.       Objective:      Physical Exam   Constitutional: He is oriented to person, place, and time. He appears well-developed and well-nourished. No distress.   HENT:   Head: Normocephalic and atraumatic.   Right Ear: Tympanic membrane normal.   Left Ear: Tympanic membrane normal.   Mouth/Throat: Uvula is midline and mucous membranes are normal. Normal dentition.   Eyes: Conjunctivae, EOM and lids are normal. Pupils are equal, round, and reactive to light.   Neck: Trachea normal and normal range of motion. Neck supple. No JVD present. No thyromegaly present.   Cardiovascular: Normal rate, regular rhythm, S1 normal, S2 normal, normal heart sounds and normal pulses.  Exam reveals no  gallop and no friction rub.    No murmur heard.  Pulmonary/Chest: Effort normal and breath sounds normal. He has no wheezes. He has no rales.   Abdominal: Soft. Normal appearance and bowel sounds are normal. He exhibits no mass. There is no hepatosplenomegaly. There is no tenderness. There is no rebound and no guarding.   Musculoskeletal: He exhibits no edema.   Lymphadenopathy:     He has no cervical adenopathy.   Neurological: He is alert and oriented to person, place, and time. He has normal strength. No cranial nerve deficit. Coordination and gait normal.   Skin: Skin is warm and intact. No rash noted.   Psychiatric: He has a normal mood and affect. His speech is normal and behavior is normal. Judgment and thought content normal.       Assessment:       1. Type 2 diabetes mellitus with complication, with long-term current use of insulin    2. Hyperlipidemia associated with type 2 diabetes mellitus    3. Hypertension associated with diabetes    4. Coronary artery disease involving native coronary artery of native heart without angina pectoris    5. Obstructive sleep apnea        Plan:       Type 2 diabetes mellitus with complication, with long-term current use of insulin  -     ALT (SGPT); Future; Expected date: 08/17/2017  -     AST (SGOT); Future; Expected date: 08/17/2017  -     Basic metabolic panel; Future; Expected date: 08/17/2017  -     Lipid panel; Future; Expected date: 08/17/2017  -     Hemoglobin A1c; Future; Expected date: 08/17/2017  -     Microalbumin/creatinine urine ratio; Future; Expected date: 08/17/2017  -     losartan (COZAAR) 50 MG tablet; Take 1 tablet (50 mg total) by mouth once daily.  Dispense: 90 tablet; Refill: 3    Hyperlipidemia associated with type 2 diabetes mellitus    Hypertension associated with diabetes  -     losartan (COZAAR) 50 MG tablet; Take 1 tablet (50 mg total) by mouth once daily.  Dispense: 90 tablet; Refill: 3    Coronary artery disease involving native coronary  artery of native heart without angina pectoris    Obstructive sleep apnea    Other orders  -     Cancel: lisinopril 10 MG tablet; Take 1 tablet (10 mg total) by mouth once daily.  Dispense: 90 tablet; Refill: 3    Lisinopril caused cough, use losartan for microalbinuria. Continue meds, D&, weight loss, self monitoring. Keep subspecialty care. F/u 6 months with labs.

## 2017-08-30 RX ORDER — ROSUVASTATIN CALCIUM 40 MG/1
TABLET, COATED ORAL
Qty: 30 TABLET | Refills: 11 | Status: SHIPPED | OUTPATIENT
Start: 2017-08-30 | End: 2018-09-17 | Stop reason: SDUPTHER

## 2017-09-29 ENCOUNTER — OFFICE VISIT (OUTPATIENT)
Dept: PULMONOLOGY | Facility: CLINIC | Age: 67
End: 2017-09-29
Payer: COMMERCIAL

## 2017-09-29 VITALS
BODY MASS INDEX: 30 KG/M2 | WEIGHT: 214.31 LBS | RESPIRATION RATE: 18 BRPM | SYSTOLIC BLOOD PRESSURE: 126 MMHG | DIASTOLIC BLOOD PRESSURE: 78 MMHG | HEIGHT: 71 IN | HEART RATE: 63 BPM | OXYGEN SATURATION: 96 %

## 2017-09-29 DIAGNOSIS — G47.33 OSA (OBSTRUCTIVE SLEEP APNEA): Primary | ICD-10-CM

## 2017-09-29 PROCEDURE — 99999 PR PBB SHADOW E&M-EST. PATIENT-LVL IV: CPT | Mod: PBBFAC,,, | Performed by: NURSE PRACTITIONER

## 2017-09-29 PROCEDURE — 99213 OFFICE O/P EST LOW 20 MIN: CPT | Mod: 25,S$GLB,, | Performed by: NURSE PRACTITIONER

## 2017-09-29 PROCEDURE — 90686 IIV4 VACC NO PRSV 0.5 ML IM: CPT | Mod: S$GLB,,, | Performed by: NURSE PRACTITIONER

## 2017-09-29 PROCEDURE — 3008F BODY MASS INDEX DOCD: CPT | Mod: S$GLB,,, | Performed by: NURSE PRACTITIONER

## 2017-09-29 PROCEDURE — 3074F SYST BP LT 130 MM HG: CPT | Mod: S$GLB,,, | Performed by: NURSE PRACTITIONER

## 2017-09-29 PROCEDURE — 90471 IMMUNIZATION ADMIN: CPT | Mod: S$GLB,,, | Performed by: NURSE PRACTITIONER

## 2017-09-29 PROCEDURE — 3078F DIAST BP <80 MM HG: CPT | Mod: S$GLB,,, | Performed by: NURSE PRACTITIONER

## 2017-09-29 NOTE — PROGRESS NOTES
"Subjective:      Patient ID: Timothy Ortega is a 66 y.o. male.    Chief Complaint: Sleep Apnea    Presents to office for review of CPAP therapy. Patient states improved symptoms with use of CPAP. Sleeping more soundly. Waking up feeling more refreshed. Improved daytime sleepiness. Patient states he is benefiting from use of the CPAP.   He qualifies for a new machine 06/2018   No fever, chills, or hemoptysis. No pleuritic type chest pain. Breathing is stable as compared to 6 months ago.    Patient Active Problem List:     Obstructive sleep apnea     Hyperlipidemia associated with type 2 diabetes mellitus     Hypertension associated with diabetes     CAD (coronary artery disease)     Type 2 diabetes mellitus with complication, with long-term current use of insulin     History of colon polyps            /78   Pulse 63   Resp 18   Ht 5' 11" (1.803 m)   Wt 97.2 kg (214 lb 4.6 oz)   SpO2 96%   BMI 29.89 kg/m²   Body mass index is 29.89 kg/m².    Review of Systems   Constitutional: Negative.    HENT: Negative.    Respiratory: Negative.    Cardiovascular: Negative.    Musculoskeletal: Negative.    Gastrointestinal: Negative.    Neurological: Negative.    Psychiatric/Behavioral: Negative.      Objective:      Physical Exam   Constitutional: He is oriented to person, place, and time. He appears well-developed and well-nourished.   HENT:   Head: Normocephalic and atraumatic.   Nose: Nose normal.   Mouth/Throat: Uvula is midline and oropharynx is clear and moist.   Neck: Trachea normal and normal range of motion. Neck supple. No thyroid mass and no thyromegaly present.   Cardiovascular: Normal rate, regular rhythm and normal heart sounds.    Pulmonary/Chest: Effort normal and breath sounds normal. He has no wheezes. He has no rhonchi. He has no rales. Chest wall is not dull to percussion.   Abdominal: Soft. He exhibits no mass. There is no hepatosplenomegaly or splenomegaly. There is no tenderness. " "  Musculoskeletal: Normal range of motion. He exhibits no edema.   Neurological: He is alert and oriented to person, place, and time.   Skin: Skin is warm and dry.   Psychiatric: He has a normal mood and affect.     Personal Diagnostic Review  CPAP download shows patient wears greater than 4 hrs 100 % of the time over the last 30 days.     Assessment:       1. BRIANA (obstructive sleep apnea)        Outpatient Encounter Prescriptions as of 9/29/2017   Medication Sig Dispense Refill    ASCORBATE CALCIUM (VITAMIN C ORAL) Take by mouth once daily.      aspirin 81 mg Tab Take 1 tablet by mouth Daily.      BD INSULIN PEN NEEDLE UF SHORT 31 gauge x 5/16" Ndle USE 5 TIMES DAILY AS DIRECTED 100 each 3    blood sugar diagnostic (FREESTYLE TEST) Strp 1 strip by Misc.(Non-Drug; Combo Route) route 3 (three) times daily. Freestyle test strips 100 strip 11    blood-glucose meter (FREESTYLE LITE METER) kit Freestyle meter   Use as instructed 1 each 0    ERGOCALCIFEROL, VITAMIN D2, (VITAMIN D ORAL) Take by mouth once daily.      hydrocortisone 2.5 % cream ERROL TO RASH BID PRN  3    insulin lispro (HUMALOG) 100 unit/mL injection 14-25 units before each meal 15 mL 11    insulin needles, disposable, (BD INSULIN PEN NEEDLE UF SHORT) 31 X 5/16 " Ndle USE 5 TIMES DAILY AS DIRECTED 150 each 11    KRILL OIL ORAL Take 1 capsule by mouth once daily.      lancets 33 gauge Misc 1 lancet by Misc.(Non-Drug; Combo Route) route 3 (three) times daily. 100 each 11    LANTUS SOLOSTAR 100 unit/mL (3 mL) InPn pen INJECT 30 UNITS UNDER THE SKIN EVERY MORNING AND 50 UNITS EVERY EVENING (Patient taking differently: 70 UNITS EVERY EVENING) 30 mL 11    losartan (COZAAR) 50 MG tablet Take 1 tablet (50 mg total) by mouth once daily. 90 tablet 3    metformin (GLUCOPHAGE) 1000 MG tablet TAKE ONE TABLET BY MOUTH TWICE DAILY WITH MEALS 180 tablet 3    multivitamin (THERAGRAN) per tablet Take 1 tablet by mouth Daily.      rosuvastatin (CRESTOR) 40 MG " Tab TAKE ONE TABLET BY MOUTH EVERY EVENING 30 tablet 11    TRULICITY 1.5 mg/0.5 mL PnIj INJECT 1.5 MG INTO THE SKIN EVERY 7 DAYS 2 mL 11     No facility-administered encounter medications on file as of 9/29/2017.      Orders Placed This Encounter   Procedures    CPAP/BIPAP SUPPLIES     Order Specific Question:   Type of mask:     Answer:   Nasal     Order Specific Question:   Headgear?     Answer:   Yes     Order Specific Question:   Tubing?     Answer:   Yes     Order Specific Question:   Humidifier chamber?     Answer:   Yes     Order Specific Question:   Chin strap?     Answer:   Yes     Order Specific Question:   Filters?     Answer:   Yes     Order Specific Question:   Length of need (1-99 months):     Answer:   99     Order Specific Question:   Vendor:     Answer:   [facility]     Order Specific Question:   Expected Date of Delivery:     Answer:   9/29/2017     Plan:   Doing well on PAP settings. Patient is compliant. Follow up in 12 months with PAP data download or call earlier if any problems.

## 2017-10-23 RX ORDER — METFORMIN HYDROCHLORIDE 1000 MG/1
TABLET ORAL
Qty: 180 TABLET | Refills: 3 | Status: SHIPPED | OUTPATIENT
Start: 2017-10-23 | End: 2018-11-12 | Stop reason: SDUPTHER

## 2017-11-03 ENCOUNTER — OFFICE VISIT (OUTPATIENT)
Dept: OPHTHALMOLOGY | Facility: CLINIC | Age: 67
End: 2017-11-03
Payer: MEDICARE

## 2017-11-03 DIAGNOSIS — H02.836 DERMATOCHALASIS, BILATERAL: ICD-10-CM

## 2017-11-03 DIAGNOSIS — H52.4 BILATERAL PRESBYOPIA: ICD-10-CM

## 2017-11-03 DIAGNOSIS — H02.833 DERMATOCHALASIS, BILATERAL: ICD-10-CM

## 2017-11-03 DIAGNOSIS — E11.9 DIABETES MELLITUS TYPE 2 WITHOUT RETINOPATHY: Primary | ICD-10-CM

## 2017-11-03 DIAGNOSIS — H25.13 CATARACT, NUCLEAR SCLEROTIC SENILE, BILATERAL: ICD-10-CM

## 2017-11-03 PROCEDURE — 92014 COMPRE OPH EXAM EST PT 1/>: CPT | Mod: S$PBB,,, | Performed by: OPTOMETRIST

## 2017-11-03 PROCEDURE — 99999 PR PBB SHADOW E&M-EST. PATIENT-LVL I: CPT | Mod: PBBFAC,,, | Performed by: OPTOMETRIST

## 2017-11-03 PROCEDURE — 99211 OFF/OP EST MAY X REQ PHY/QHP: CPT | Mod: PBBFAC | Performed by: OPTOMETRIST

## 2017-11-03 PROCEDURE — 92015 DETERMINE REFRACTIVE STATE: CPT | Mod: ,,, | Performed by: OPTOMETRIST

## 2017-11-03 NOTE — PROGRESS NOTES
HPI     Last TRF exam 10/28/2016  Diabetic/IDDM  Screening for glaucoma  RE  No visual complaints    Last edited by Ahsan Heard MA on 11/3/2017  7:58 AM. (History)            Assessment /Plan     For exam results, see Encounter Report.    Diabetes mellitus type 2 without retinopathy    Cataract, nuclear sclerotic senile, bilateral    Dermatochalasis, bilateral    Bilateral presbyopia      No Background Diabetic Retinopathy    Mild cataracts OU, not surgical.    Mild dermatochalasis and brow ptosis OU    Dispense Final Rx for glasses.  RTC 1 year

## 2017-11-07 RX ORDER — INSULIN GLARGINE 100 [IU]/ML
INJECTION, SOLUTION SUBCUTANEOUS
Qty: 30 ML | Refills: 11 | Status: SHIPPED | OUTPATIENT
Start: 2017-11-07 | End: 2018-02-19 | Stop reason: SDUPTHER

## 2017-12-06 RX ORDER — INSULIN LISPRO 100 [IU]/ML
INJECTION, SOLUTION INTRAVENOUS; SUBCUTANEOUS
Qty: 15 ML | Refills: 11 | Status: SHIPPED | OUTPATIENT
Start: 2017-12-06 | End: 2018-07-03 | Stop reason: SDUPTHER

## 2018-01-12 ENCOUNTER — OFFICE VISIT (OUTPATIENT)
Dept: URGENT CARE | Facility: CLINIC | Age: 68
End: 2018-01-12
Payer: MEDICARE

## 2018-01-12 VITALS
WEIGHT: 212.75 LBS | HEIGHT: 71 IN | TEMPERATURE: 99 F | SYSTOLIC BLOOD PRESSURE: 126 MMHG | BODY MASS INDEX: 29.78 KG/M2 | HEART RATE: 73 BPM | DIASTOLIC BLOOD PRESSURE: 68 MMHG | RESPIRATION RATE: 20 BRPM

## 2018-01-12 DIAGNOSIS — H10.9 CONJUNCTIVITIS, UNSPECIFIED CONJUNCTIVITIS TYPE, UNSPECIFIED LATERALITY: ICD-10-CM

## 2018-01-12 DIAGNOSIS — J40 BRONCHITIS: Primary | ICD-10-CM

## 2018-01-12 PROCEDURE — 99213 OFFICE O/P EST LOW 20 MIN: CPT | Mod: PBBFAC,PO | Performed by: FAMILY MEDICINE

## 2018-01-12 PROCEDURE — 99214 OFFICE O/P EST MOD 30 MIN: CPT | Mod: S$PBB,,, | Performed by: FAMILY MEDICINE

## 2018-01-12 PROCEDURE — 99999 PR PBB SHADOW E&M-EST. PATIENT-LVL III: CPT | Mod: PBBFAC,,, | Performed by: FAMILY MEDICINE

## 2018-01-12 RX ORDER — BENZONATATE 200 MG/1
200 CAPSULE ORAL 3 TIMES DAILY PRN
Qty: 30 CAPSULE | Refills: 0 | Status: SHIPPED | OUTPATIENT
Start: 2018-01-12 | End: 2018-01-22

## 2018-01-12 RX ORDER — METHYLPREDNISOLONE 4 MG/1
TABLET ORAL
Qty: 1 PACKAGE | Refills: 0 | Status: SHIPPED | OUTPATIENT
Start: 2018-01-12 | End: 2018-02-23

## 2018-01-12 RX ORDER — ERYTHROMYCIN 5 MG/G
OINTMENT OPHTHALMIC NIGHTLY
Qty: 1 TUBE | Refills: 0 | Status: SHIPPED | OUTPATIENT
Start: 2018-01-12 | End: 2018-01-19

## 2018-01-12 NOTE — PROGRESS NOTES
"Subjective:       Patient ID: Timothy Ortega is a 67 y.o. male.    Chief Complaint: Sore Throat (cough, ear ache, left eye irritated)    /68 (BP Location: Right arm, Patient Position: Sitting, BP Method: Large (Manual))   Pulse 73   Temp 98.6 °F (37 °C) (Tympanic)   Resp 20   Ht 5' 11" (1.803 m)   Wt 96.5 kg (212 lb 11.9 oz)   BMI 29.67 kg/m²     HPI  Patient presented with symptoms described in chief complaint. Left eye red and irritated, glued shut this morning. Cough, congestion earache.     Review of Systems   Constitutional: Negative.  Negative for chills and fever.   HENT: Positive for congestion and sore throat. Negative for ear pain, postnasal drip and sinus pressure.    Eyes: Positive for discharge, redness and itching. Negative for visual disturbance.   Respiratory: Positive for cough. Negative for chest tightness and wheezing.    Cardiovascular: Negative.    Gastrointestinal: Negative.    Musculoskeletal: Negative.    Neurological: Negative.        Objective:      Physical Exam   Constitutional: He is oriented to person, place, and time. He appears well-developed and well-nourished. No distress.   HENT:   Head: Normocephalic and atraumatic.   Mouth/Throat: No oropharyngeal exudate.   Tm: clear effusion   Eyes: EOM are normal. Pupils are equal, round, and reactive to light. Right eye exhibits no discharge. Left eye exhibits discharge (conjunctiva injected, inner lid slighly macerated, no preauricular node).   Neck: Normal range of motion. Neck supple.   Cardiovascular: Regular rhythm and normal heart sounds.    No murmur heard.  Pulmonary/Chest: Effort normal. He has no wheezes. He has no rales.   Scant rhonchi on left   Musculoskeletal: Normal range of motion.   Lymphadenopathy:     He has no cervical adenopathy.   Neurological: He is alert and oriented to person, place, and time.   Skin: Skin is warm and dry. He is not diaphoretic.   Nursing note and vitals reviewed.      Assessment:     "   1. Bronchitis    2. Conjunctivitis, unspecified conjunctivitis type, unspecified laterality        Plan:     Timothy was seen today for sore throat.    Diagnoses and all orders for this visit:    Bronchitis  -     benzonatate (TESSALON) 200 MG capsule; Take 1 capsule (200 mg total) by mouth 3 (three) times daily as needed for Cough.  -     methylPREDNISolone (MEDROL DOSEPACK) 4 mg tablet; use as directed    Conjunctivitis, unspecified conjunctivitis type, unspecified laterality  -     erythromycin (ROMYCIN) ophthalmic ointment; Place into the left eye every evening.              Discussed with pt/family all information and results pertaining to this visit. Discussed diagnosis and plan of treatment.  All questions and concerns were addressed at this time. Pt/family expresses understanding of information and instructions.  Care and follow up instruction provided.

## 2018-02-04 ENCOUNTER — PATIENT MESSAGE (OUTPATIENT)
Dept: INTERNAL MEDICINE | Facility: CLINIC | Age: 68
End: 2018-02-04

## 2018-02-06 ENCOUNTER — PATIENT OUTREACH (OUTPATIENT)
Dept: ADMINISTRATIVE | Facility: HOSPITAL | Age: 68
End: 2018-02-06

## 2018-02-16 ENCOUNTER — LAB VISIT (OUTPATIENT)
Dept: LAB | Facility: HOSPITAL | Age: 68
End: 2018-02-16
Attending: PEDIATRICS
Payer: MEDICARE

## 2018-02-16 DIAGNOSIS — E11.8 TYPE 2 DIABETES MELLITUS WITH COMPLICATION, WITH LONG-TERM CURRENT USE OF INSULIN: ICD-10-CM

## 2018-02-16 DIAGNOSIS — Z79.4 TYPE 2 DIABETES MELLITUS WITH COMPLICATION, WITH LONG-TERM CURRENT USE OF INSULIN: ICD-10-CM

## 2018-02-16 LAB
ALT SERPL W/O P-5'-P-CCNC: 30 U/L
ANION GAP SERPL CALC-SCNC: 7 MMOL/L
AST SERPL-CCNC: 21 U/L
BUN SERPL-MCNC: 16 MG/DL
CALCIUM SERPL-MCNC: 9.9 MG/DL
CHLORIDE SERPL-SCNC: 103 MMOL/L
CHOLEST SERPL-MCNC: 131 MG/DL
CHOLEST/HDLC SERPL: 3.4 {RATIO}
CO2 SERPL-SCNC: 30 MMOL/L
CREAT SERPL-MCNC: 1.1 MG/DL
EST. GFR  (AFRICAN AMERICAN): >60 ML/MIN/1.73 M^2
EST. GFR  (NON AFRICAN AMERICAN): >60 ML/MIN/1.73 M^2
ESTIMATED AVG GLUCOSE: 186 MG/DL
GLUCOSE SERPL-MCNC: 143 MG/DL
HBA1C MFR BLD HPLC: 8.1 %
HDLC SERPL-MCNC: 39 MG/DL
HDLC SERPL: 29.8 %
LDLC SERPL CALC-MCNC: 77.2 MG/DL
NONHDLC SERPL-MCNC: 92 MG/DL
POTASSIUM SERPL-SCNC: 4.2 MMOL/L
SODIUM SERPL-SCNC: 140 MMOL/L
TRIGL SERPL-MCNC: 74 MG/DL

## 2018-02-16 PROCEDURE — 80048 BASIC METABOLIC PNL TOTAL CA: CPT

## 2018-02-16 PROCEDURE — 80061 LIPID PANEL: CPT

## 2018-02-16 PROCEDURE — 84460 ALANINE AMINO (ALT) (SGPT): CPT

## 2018-02-16 PROCEDURE — 83036 HEMOGLOBIN GLYCOSYLATED A1C: CPT

## 2018-02-16 PROCEDURE — 84450 TRANSFERASE (AST) (SGOT): CPT

## 2018-02-16 PROCEDURE — 36415 COLL VENOUS BLD VENIPUNCTURE: CPT | Mod: PO

## 2018-02-19 ENCOUNTER — OFFICE VISIT (OUTPATIENT)
Dept: INTERNAL MEDICINE | Facility: CLINIC | Age: 68
End: 2018-02-19
Payer: MEDICARE

## 2018-02-19 VITALS
BODY MASS INDEX: 29.72 KG/M2 | DIASTOLIC BLOOD PRESSURE: 78 MMHG | SYSTOLIC BLOOD PRESSURE: 124 MMHG | HEIGHT: 71 IN | WEIGHT: 212.31 LBS | OXYGEN SATURATION: 98 % | HEART RATE: 74 BPM | TEMPERATURE: 96 F

## 2018-02-19 DIAGNOSIS — E78.5 HYPERLIPIDEMIA ASSOCIATED WITH TYPE 2 DIABETES MELLITUS: ICD-10-CM

## 2018-02-19 DIAGNOSIS — G47.33 OBSTRUCTIVE SLEEP APNEA: ICD-10-CM

## 2018-02-19 DIAGNOSIS — E11.69 HYPERLIPIDEMIA ASSOCIATED WITH TYPE 2 DIABETES MELLITUS: ICD-10-CM

## 2018-02-19 DIAGNOSIS — Z79.4 TYPE 2 DIABETES MELLITUS WITH COMPLICATION, WITH LONG-TERM CURRENT USE OF INSULIN: Primary | ICD-10-CM

## 2018-02-19 DIAGNOSIS — Z12.5 PROSTATE CANCER SCREENING: ICD-10-CM

## 2018-02-19 DIAGNOSIS — E11.8 TYPE 2 DIABETES MELLITUS WITH COMPLICATION, WITH LONG-TERM CURRENT USE OF INSULIN: Primary | ICD-10-CM

## 2018-02-19 DIAGNOSIS — K21.9 GASTROESOPHAGEAL REFLUX DISEASE, ESOPHAGITIS PRESENCE NOT SPECIFIED: ICD-10-CM

## 2018-02-19 DIAGNOSIS — B35.3 TINEA PEDIS OF BOTH FEET: ICD-10-CM

## 2018-02-19 DIAGNOSIS — I15.2 HYPERTENSION ASSOCIATED WITH DIABETES: ICD-10-CM

## 2018-02-19 DIAGNOSIS — E11.59 HYPERTENSION ASSOCIATED WITH DIABETES: ICD-10-CM

## 2018-02-19 DIAGNOSIS — I25.10 CORONARY ARTERY DISEASE INVOLVING NATIVE CORONARY ARTERY OF NATIVE HEART WITHOUT ANGINA PECTORIS: ICD-10-CM

## 2018-02-19 PROCEDURE — 99999 PR PBB SHADOW E&M-EST. PATIENT-LVL III: CPT | Mod: PBBFAC,,, | Performed by: PEDIATRICS

## 2018-02-19 PROCEDURE — 99214 OFFICE O/P EST MOD 30 MIN: CPT | Mod: S$PBB,,, | Performed by: PEDIATRICS

## 2018-02-19 PROCEDURE — 99213 OFFICE O/P EST LOW 20 MIN: CPT | Mod: PBBFAC,PO | Performed by: PEDIATRICS

## 2018-02-19 RX ORDER — INSULIN GLARGINE 100 [IU]/ML
70 INJECTION, SOLUTION SUBCUTANEOUS DAILY
Qty: 30 ML | Refills: 11 | Status: SHIPPED | OUTPATIENT
Start: 2018-02-19 | End: 2018-08-23

## 2018-02-19 NOTE — PROGRESS NOTES
Subjective:       Patient ID: Timothy Ortega is a 67 y.o. male.    Chief Complaint: Follow-up (6mo/f/u/labs)    HTN: B/P good, no HTNive symptoms.    LIPIDS:not following D&E, tolerating and compliant with med(s).    DM: no hyper/hypoglycemic symptoms. Self monitoring TID normal BS(near normal) except when he was sick and 2 rounds of steroids. Trulicity, metformin, humalog (meal sliding scale) and lantus 70 units working, Working with DM program.  CAD: no CP, SOB, CHOU. Has seen cards.  He has not yet decided on CTS surgery  LABS REVIEWED AND DISCUSSED WITH PATIENT       Review of Systems   Constitutional: Negative for fever and unexpected weight change.   HENT: Negative for congestion and rhinorrhea.    Eyes: Negative for discharge and redness.   Respiratory: Negative for cough and wheezing.    Cardiovascular: Negative for chest pain, palpitations and leg swelling.   Gastrointestinal: Positive for abdominal pain (has had some GERD, otc nexium controls). Negative for constipation, diarrhea and vomiting.   Endocrine: Negative for cold intolerance, heat intolerance, polydipsia, polyphagia and polyuria.   Genitourinary: Negative for decreased urine volume and difficulty urinating.   Musculoskeletal: Negative for arthralgias and joint swelling.   Skin: Negative for rash and wound.   Neurological: Negative for syncope and headaches.   Psychiatric/Behavioral: Negative for behavioral problems and sleep disturbance.       Objective:      Physical Exam   Constitutional: He is oriented to person, place, and time. He appears well-developed and well-nourished. No distress.   HENT:   Head: Normocephalic and atraumatic.   Right Ear: Tympanic membrane and external ear normal.   Left Ear: Tympanic membrane and external ear normal.   Nose: Nose normal.   Mouth/Throat: Uvula is midline, oropharynx is clear and moist and mucous membranes are normal. Normal dentition.   Eyes: Conjunctivae, EOM and lids are normal. Pupils are equal,  round, and reactive to light.   Neck: Trachea normal and normal range of motion. Neck supple. No JVD present. No thyromegaly present.   Cardiovascular: Normal rate, regular rhythm, S1 normal, S2 normal, normal heart sounds and normal pulses.  Exam reveals no gallop and no friction rub.    No murmur heard.  Pulmonary/Chest: Effort normal and breath sounds normal. He has no wheezes. He has no rales.   Abdominal: Soft. Normal appearance and bowel sounds are normal. He exhibits no mass. There is no hepatosplenomegaly. There is no tenderness. There is no rebound and no guarding.   Musculoskeletal: He exhibits no edema.   Lymphadenopathy:     He has no cervical adenopathy.   Neurological: He is alert and oriented to person, place, and time. He has normal strength. No cranial nerve deficit. Coordination and gait normal.   Skin: Skin is warm and intact. Capillary refill takes less than 2 seconds. No rash noted.   Tinea pedis both feet.   Psychiatric: He has a normal mood and affect. His speech is normal and behavior is normal. Judgment and thought content normal.       Assessment:       1. Type 2 diabetes mellitus with complication, with long-term current use of insulin    2. Hyperlipidemia associated with type 2 diabetes mellitus    3. Hypertension associated with diabetes    4. Obstructive sleep apnea    5. Coronary artery disease involving native coronary artery of native heart without angina pectoris    6. Prostate cancer screening    7. Tinea pedis of both feet    8. Gastroesophageal reflux disease, esophagitis presence not specified        Plan:       Type 2 diabetes mellitus with complication, with long-term current use of insulin  -     Hemoglobin A1c; Future; Expected date: 02/19/2018  -     Microalbumin/creatinine urine ratio; Future; Expected date: 02/19/2018  -     ALT (SGPT); Future; Expected date: 02/19/2018  -     AST (SGOT); Future; Expected date: 02/19/2018  -     Basic metabolic panel; Future; Expected  date: 02/19/2018  -     Lipid panel; Future; Expected date: 02/19/2018    Hyperlipidemia associated with type 2 diabetes mellitus    Hypertension associated with diabetes    Obstructive sleep apnea    Coronary artery disease involving native coronary artery of native heart without angina pectoris    Prostate cancer screening  -     PSA, Screening; Future; Expected date: 02/19/2018    Tinea pedis of both feet    Gastroesophageal reflux disease, esophagitis presence not specified    Other orders  -     insulin glargine (LANTUS SOLOSTAR U-100 INSULIN) 100 unit/mL (3 mL) InPn pen; Inject 70 Units into the skin once daily.  Dispense: 30 mL; Refill: 11    He needs to work with D&E since steroids are finished. Work with DM program. Use lamisil at and cortaid to tinea on feet. Meds reviewed. If nexium does not help after 4 weeks, reevaluate. F/U 6 months with labs.

## 2018-02-23 ENCOUNTER — OFFICE VISIT (OUTPATIENT)
Dept: DIABETES | Facility: CLINIC | Age: 68
End: 2018-02-23
Payer: MEDICARE

## 2018-02-23 VITALS
WEIGHT: 208.56 LBS | BODY MASS INDEX: 29.2 KG/M2 | DIASTOLIC BLOOD PRESSURE: 72 MMHG | HEIGHT: 71 IN | SYSTOLIC BLOOD PRESSURE: 114 MMHG

## 2018-02-23 DIAGNOSIS — E11.8 TYPE 2 DIABETES MELLITUS WITH COMPLICATION, WITH LONG-TERM CURRENT USE OF INSULIN: Primary | ICD-10-CM

## 2018-02-23 DIAGNOSIS — Z79.4 TYPE 2 DIABETES MELLITUS WITH COMPLICATION, WITH LONG-TERM CURRENT USE OF INSULIN: Primary | ICD-10-CM

## 2018-02-23 LAB — GLUCOSE SERPL-MCNC: 180 MG/DL (ref 70–110)

## 2018-02-23 PROCEDURE — 99213 OFFICE O/P EST LOW 20 MIN: CPT | Mod: PBBFAC,PO | Performed by: PHYSICIAN ASSISTANT

## 2018-02-23 PROCEDURE — 99999 PR PBB SHADOW E&M-EST. PATIENT-LVL III: CPT | Mod: PBBFAC,,, | Performed by: PHYSICIAN ASSISTANT

## 2018-02-23 PROCEDURE — 99214 OFFICE O/P EST MOD 30 MIN: CPT | Mod: S$PBB,,, | Performed by: PHYSICIAN ASSISTANT

## 2018-02-23 PROCEDURE — 82948 REAGENT STRIP/BLOOD GLUCOSE: CPT | Mod: PBBFAC,PO | Performed by: PHYSICIAN ASSISTANT

## 2018-02-23 RX ORDER — PEN NEEDLE, DIABETIC 30 GX3/16"
NEEDLE, DISPOSABLE MISCELLANEOUS
Qty: 450 EACH | Refills: 3 | Status: SHIPPED | OUTPATIENT
Start: 2018-02-23 | End: 2019-09-06 | Stop reason: SDUPTHER

## 2018-02-23 NOTE — PROGRESS NOTES
"Subjective:      Patient ID: Timothy Ortega is a 67 y.o. male.    PCP: EM Ochoa Jr, MD      Timothy Ortega is a pleasant 67 y.o. male presenting to follow up on diabetes mellitus. He has had diabetes for 20 or more years. His last visit in Diabetes Management was 6/16/2017. Since that time he has had mild improvement in his glycemia. His blood sugar range fasting has been 120-158 and 2 hour post meal has been not taking, His pre meal is  and he has been monitoring 3 times per day. His current concerns are glycemic control.  Also, because of orthopedic as well as respiratory problems recently his had 2 steroid injections which has affected his glycemic status as well.    He denies any hospital admissions, emergency room visits, hypoglycemia, syncope, diaphoresis, chest pain, or dyspnea.    He has lost 4 pounds since last visit. His BMI is 29.09 kg/m².    His blood sugar in the clinic today was:   Lab Results   Component Value Date    POCGLU 180 (A) 02/23/2018       We discussed the American diabetes Association recommendations:  hemoglobin A1c below 7.0%; all diabetics should be on statins unless contraindicated; one aspirin daily unless contraindicated; fasting blood sugar between 80 and 130 mg/dL; postprandial blood sugar below 180 mg/dl; prevention of hypoglycemia, may adjust goals to higher levels if persistent; ACE or ARB therapy if not contraindicated; and maintain in an ideal body weight with BMI below 25.    Timothy is compliant most of the time with DM medications.     Timothy is compliant most of the time with lifestyle modifications to include activity and meal planning.       Current Outpatient Prescriptions:     ASCORBATE CALCIUM (VITAMIN C ORAL), Take by mouth once daily., Disp: , Rfl:     aspirin 81 mg Tab, Take 1 tablet by mouth Daily., Disp: , Rfl:     BD INSULIN PEN NEEDLE UF SHORT 31 gauge x 5/16" Ndle, USE 5 TIMES DAILY AS DIRECTED, Disp: 100 each, Rfl: 3    blood sugar diagnostic " "(FREESTYLE TEST) Strp, 1 strip by Misc.(Non-Drug; Combo Route) route 3 (three) times daily. Freestyle test strips, Disp: 100 strip, Rfl: 11    blood-glucose meter (FREESTYLE LITE METER) kit, Freestyle meter   Use as instructed, Disp: 1 each, Rfl: 0    ERGOCALCIFEROL, VITAMIN D2, (VITAMIN D ORAL), Take by mouth once daily., Disp: , Rfl:     HUMALOG KWIKPEN 100 unit/mL InPn pen, INJECT 14 TO 25 UNITS UNDER THE SKIN BEFORE EACH MEAL, Disp: 15 mL, Rfl: 11    hydrocortisone 2.5 % cream, ERROL TO RASH BID PRN, Disp: , Rfl: 3    insulin glargine (LANTUS SOLOSTAR U-100 INSULIN) 100 unit/mL (3 mL) InPn pen, Inject 70 Units into the skin once daily., Disp: 30 mL, Rfl: 11    insulin needles, disposable, (BD INSULIN PEN NEEDLE UF SHORT) 31 X 5/16 " Ndle, USE 5 TIMES DAILY AS DIRECTED, Disp: 150 each, Rfl: 11    KRILL OIL ORAL, Take 1 capsule by mouth once daily., Disp: , Rfl:     lancets 33 gauge Misc, 1 lancet by Misc.(Non-Drug; Combo Route) route 3 (three) times daily., Disp: 100 each, Rfl: 11    losartan (COZAAR) 50 MG tablet, Take 1 tablet (50 mg total) by mouth once daily., Disp: 90 tablet, Rfl: 3    metformin (GLUCOPHAGE) 1000 MG tablet, TAKE 1 TABLET BY MOUTH TWICE DAILY WITH MEALS, Disp: 180 tablet, Rfl: 3    methylPREDNISolone (MEDROL DOSEPACK) 4 mg tablet, use as directed, Disp: 1 Package, Rfl: 0    multivitamin (THERAGRAN) per tablet, Take 1 tablet by mouth Daily., Disp: , Rfl:     rosuvastatin (CRESTOR) 40 MG Tab, TAKE ONE TABLET BY MOUTH EVERY EVENING, Disp: 30 tablet, Rfl: 11    TRULICITY 1.5 mg/0.5 mL PnIj, INJECT 1.5 MG INTO THE SKIN EVERY 7 DAYS, Disp: 2 mL, Rfl: 11    STANDARDS OF CARE:  Eye doctor: Dr. Stoddard, last exam 11/03/2017.  Dental exam: Recommend regular exams; denies gums bleeding.  Podiatry doctor:     ACTIVITY LEVEL: He exercises rarely.  MEAL PLANNING: Number of meals per day - 3. Number of snacks per day - 2.  Per dietary recall, patient is not limiting carbohydrates, saturated fats " and sodium.   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:  ACE/ARB: Yes  Statin: Yes    Health Maintenance   Topic Date Due    Abdominal Aortic Aneurysm Screening  11/13/2017    Pneumococcal (65+) (2 of 2 - PPSV23) 05/07/2018    Foot Exam  06/16/2018    PROSTATE-SPECIFIC ANTIGEN  08/11/2018    Hemoglobin A1c  08/16/2018    Eye Exam  11/03/2018    Lipid Panel  02/16/2019    Urine Microalbumin  02/16/2019    Colonoscopy  03/31/2022    TETANUS VACCINE  01/31/2023    Hepatitis C Screening  Completed    Zoster Vaccine  Completed    Influenza Vaccine  Completed       Diabetes Management Status    Statin: Taking  ACE/ARB: Taking    Screening or Prevention Patient's value Goal Complete/Controlled?   HgA1C Testing and Control   Lab Results   Component Value Date    HGBA1C 8.1 (H) 02/16/2018      Annually/Less than 8% No   Lipid profile : 02/16/2018 Annually Yes   LDL control Lab Results   Component Value Date    LDLCALC 77.2 02/16/2018    Annually/Less than 100 mg/dl  Yes   Nephropathy screening Lab Results   Component Value Date    LABMICR 48.0 02/16/2018     No results found for: PROTEINUA Annually Yes   Blood pressure BP Readings from Last 1 Encounters:   02/19/18 124/78    Less than 140/90 Yes   Dilated retinal exam : 11/03/2017 Annually Yes   Foot exam   : 06/16/2017 Annually Yes       The following results were reviewed with patient.    Lab Results   Component Value Date    WBC 7.02 04/17/2015    HGB 14.7 04/17/2015    HCT 43.7 04/17/2015     04/17/2015    CHOL 131 02/16/2018    TRIG 74 02/16/2018    HDL 39 (L) 02/16/2018    ALT 30 02/16/2018    AST 21 02/16/2018     02/16/2018    K 4.2 02/16/2018     02/16/2018    CREATININE 1.1 02/16/2018    ESTGFRAFRICA >60.0 02/16/2018    EGFRNONAA >60.0 02/16/2018    BUN 16 02/16/2018    CO2 30 (H) 02/16/2018    TSH 1.003 10/05/2012    PSA 0.31 08/11/2017    INR 1.0  12/01/2014     (H) 02/16/2018       Lab Results   Component Value Date    HGBA1C 8.1 (H) 02/16/2018    HGBA1C 7.4 (H) 08/11/2017    HGBA1C 8.1 (H) 02/10/2017     Lab Results   Component Value Date    TSH 1.003 10/05/2012     Lab Results   Component Value Date    TOTALTESTOST 330 04/26/2013     Lab Results   Component Value Date    CALCIUM 9.9 02/16/2018       Review of patient's allergies indicates:   Allergen Reactions    No known drug allergies        Past Medical History:   Diagnosis Date    Coronary artery disease 2010    [facility] - no stents    Diabetes mellitus 10-12 years     am 10/31/2014    DM (diabetes mellitus) 13 years     am 11/03/2015    DM (diabetes mellitus) 2002     lunch 10/28/2016    Hyperlipemia     Kidney stones     Neuropathy        Review of Systems   Constitutional: Negative.  Negative for activity change, appetite change, chills, diaphoresis, fatigue, fever and unexpected weight change.   HENT: Negative.  Negative for dental problem, facial swelling, hearing loss, nosebleeds, trouble swallowing and voice change.    Eyes: Negative.  Negative for photophobia, pain, discharge, redness, itching and visual disturbance.   Respiratory: Negative.  Negative for cough, choking, chest tightness, shortness of breath and wheezing.    Cardiovascular: Negative.  Negative for chest pain, palpitations and leg swelling.   Gastrointestinal: Negative.  Negative for abdominal distention, abdominal pain, blood in stool, constipation, diarrhea, nausea and vomiting.   Endocrine: Negative.  Negative for cold intolerance, heat intolerance, polydipsia, polyphagia and polyuria.   Genitourinary: Negative.  Negative for decreased urine volume, difficulty urinating, dysuria, frequency, scrotal swelling, testicular pain and urgency.   Musculoskeletal: Negative.  Negative for arthralgias, back pain, gait problem, joint swelling, myalgias, neck pain and neck stiffness.   Skin: Negative.  Negative  "for color change, pallor, rash and wound.   Allergic/Immunologic: Negative.  Negative for environmental allergies, food allergies and immunocompromised state.   Neurological: Negative.  Negative for dizziness, tremors, seizures, syncope, facial asymmetry, speech difficulty, weakness, light-headedness, numbness and headaches.   Hematological: Negative.  Negative for adenopathy. Does not bruise/bleed easily.   Psychiatric/Behavioral: Negative.  Negative for agitation, behavioral problems, confusion, decreased concentration, dysphoric mood, hallucinations, self-injury, sleep disturbance and suicidal ideas. The patient is not nervous/anxious and is not hyperactive.       Objective:     Vitals - 1 value per visit 1/12/2018 2/19/2018 2/23/2018   SYSTOLIC 126 124 114   DIASTOLIC 68 78 72   PULSE 73 74 -   TEMPERATURE 98.6 96.1 -   RESPIRATIONS 20 - -   SPO2 - 98 -   Weight (lb) 212.74 212.3 208.56   Weight (kg) 96.5 96.3 94.6   HEIGHT 5' 11" 5' 11" 5' 11"   BODY MASS INDEX 29.67 29.61 29.09   VISIT REPORT - - -   Pain Score  - 0 0   Some recent data might be hidden     Physical Exam   Constitutional: He is oriented to person, place, and time. He appears well-developed and well-nourished. He is cooperative.  Non-toxic appearance. He does not have a sickly appearance. He does not appear ill. No distress. He is not intubated.   HENT:   Head: Normocephalic and atraumatic. Not macrocephalic and not microcephalic. Head is without raccoon's eyes, without Noriega's sign, without abrasion, without contusion, without laceration, without right periorbital erythema and without left periorbital erythema. Hair is normal.   Right Ear: External ear normal. No lacerations. No drainage, swelling or tenderness. No foreign bodies. No mastoid tenderness. Tympanic membrane is not injected, not scarred, not perforated, not erythematous, not retracted and not bulging. Tympanic membrane mobility is normal. No middle ear effusion. No hemotympanum. " No decreased hearing is noted.   Left Ear: External ear normal. No lacerations. No drainage, swelling or tenderness. No foreign bodies. No mastoid tenderness. Tympanic membrane is not injected, not scarred, not perforated, not erythematous, not retracted and not bulging. Tympanic membrane mobility is normal.  No middle ear effusion. No hemotympanum. No decreased hearing is noted.   Nose: Nose normal.   Mouth/Throat: Oropharynx is clear and moist.   Eyes: EOM and lids are normal. Pupils are equal, round, and reactive to light. Right eye exhibits no chemosis, no discharge, no exudate and no hordeolum. No foreign body present in the right eye. Left eye exhibits no chemosis, no discharge, no exudate and no hordeolum. No foreign body present in the left eye. Right conjunctiva is not injected. Right conjunctiva has no hemorrhage. Left conjunctiva is not injected. Left conjunctiva has no hemorrhage. No scleral icterus. Right eye exhibits normal extraocular motion and no nystagmus. Left eye exhibits normal extraocular motion and no nystagmus. Right pupil is round and reactive. Left pupil is round and reactive. Pupils are equal.   Neck: Normal range of motion, full passive range of motion without pain and phonation normal. Neck supple. Normal carotid pulses, no hepatojugular reflux and no JVD present. No tracheal tenderness, no spinous process tenderness and no muscular tenderness present. Carotid bruit is not present. No neck rigidity. No tracheal deviation, no edema, no erythema and normal range of motion present. No thyroid mass and no thyromegaly present.   Cardiovascular: Normal rate, regular rhythm, normal heart sounds and intact distal pulses.   No extrasystoles are present. PMI is not displaced.  Exam reveals no gallop, no friction rub and no decreased pulses.    No murmur heard.  Pulses:       Carotid pulses are 2+ on the right side, and 2+ on the left side.       Radial pulses are 2+ on the right side, and 2+ on  the left side.        Dorsalis pedis pulses are 2+ on the right side, and 2+ on the left side.        Posterior tibial pulses are 2+ on the right side, and 2+ on the left side.   Pulmonary/Chest: Effort normal and breath sounds normal. No accessory muscle usage or stridor. No apnea, no tachypnea and no bradypnea. He is not intubated. No respiratory distress. He has no decreased breath sounds. He has no wheezes. He has no rhonchi. He has no rales. He exhibits no tenderness.   Abdominal: Soft. Bowel sounds are normal. He exhibits no shifting dullness, no distension, no pulsatile liver, no fluid wave, no abdominal bruit, no ascites, no pulsatile midline mass and no mass. There is no hepatosplenomegaly, splenomegaly or hepatomegaly. There is no tenderness. There is no rigidity, no rebound, no guarding, no CVA tenderness and no tenderness at McBurney's point. No hernia. Hernia confirmed negative in the ventral area.   Musculoskeletal: Normal range of motion. He exhibits no edema or tenderness.        Right foot: There is normal range of motion and no deformity.        Left foot: There is normal range of motion and no deformity.   Feet:   Right Foot:   Protective Sensation: 6 sites tested. 6 sites sensed.   Skin Integrity: Negative for ulcer, blister, skin breakdown, erythema, warmth, callus or dry skin.   Left Foot:   Protective Sensation: 6 sites tested. 6 sites sensed.   Skin Integrity: Negative for ulcer, blister, skin breakdown, erythema, warmth, callus or dry skin.   Lymphadenopathy:        Head (right side): No submental, no submandibular, no tonsillar, no preauricular, no posterior auricular and no occipital adenopathy present.        Head (left side): No submental, no submandibular, no tonsillar, no preauricular, no posterior auricular and no occipital adenopathy present.     He has no cervical adenopathy.        Right cervical: No superficial cervical, no deep cervical and no posterior cervical adenopathy  present.       Left cervical: No superficial cervical, no deep cervical and no posterior cervical adenopathy present.   Neurological: He is alert and oriented to person, place, and time. He has normal reflexes. He displays no atrophy and no tremor. No cranial nerve deficit or sensory deficit. He exhibits normal muscle tone. He displays no seizure activity. Coordination and gait normal.   Reflex Scores:       Bicep reflexes are 2+ on the right side and 2+ on the left side.       Brachioradialis reflexes are 2+ on the right side and 2+ on the left side.       Patellar reflexes are 2+ on the right side and 2+ on the left side.  Skin: Skin is warm and dry. No rash noted. No erythema. No pallor.   Psychiatric: He has a normal mood and affect. His mood appears not anxious. His affect is not angry, not blunt, not labile and not inappropriate. His speech is not rapid and/or pressured, not delayed, not tangential and not slurred. He is not agitated, not aggressive, not hyperactive, not slowed, not withdrawn, not actively hallucinating and not combative. Thought content is not paranoid and not delusional. Cognition and memory are not impaired. He does not express impulsivity or inappropriate judgment. He does not exhibit a depressed mood. He expresses no homicidal and no suicidal ideation. He expresses no suicidal plans and no homicidal plans. He is communicative. He exhibits normal recent memory and normal remote memory. He is attentive.     Assessment:     1. Type 2 diabetes mellitus with complication, with long-term current use of insulin      Plan:     Timothy Ortega is seen today for   1. Type 2 diabetes mellitus with complication, with long-term current use of insulin      We have discussed the etiology and treatment options associated with the diagnosis as well as alternatives. He has elected the following treatments.     Type 2 diabetes mellitus with complication, with long-term current use of insulin  -     POCT  "glucose  -     pen needle, diabetic (BD INSULIN PEN NEEDLE UF SHORT) 31 gauge x 5/16" Ndle; USE 5 TIMES DAILY AS DIRECTED  Dispense: 450 each; Refill: 3  -     blood sugar diagnostic (FREESTYLE TEST) Strp; 1 strip by Misc.(Non-Drug; Combo Route) route 3 (three) times daily. Freestyle test strips  Dispense: 270 strip; Refill: 3    Hyperlipidemia:   - Stable and well controlled on statin    Hypertension:   - Stable and well controlled on angiotensin receptor blocker      1.) Patient was instructed to monitor blood glucose twice daily, fasting, post meal and ac dinner or at bedtime. If on MDI will also need to test pre-meal blood sugar. Reminded to bring BG meter or record to each visit for review.  2.) Reviewed pathophysiology of type 2 diabetes, complications related to the disease, importance of annual dilated eye exam and self daily foot examination.  3.) Continue medications as prescribed MDI with Humalog and Lantus; Metformin; Trulicity. [facility] MyChart or Phone review in 1 week with [facility] records for adjustment of medication.  4.) Dietician/CDE evaluation for ongoing meal planning, carbohydrate counting, and diabetic education.  5.) Discussed activity, benefits, methods, and precautions. Recommended patient start/continue some form of exercise and increase as tolerated to 60 minutes per day to facilitate weight loss and aid in control of BGs. Also reminded patient of WHO recommendation of 10,000 steps daily as a goal.   6.) A1C, TSH, Lipid Panel, CMP/Renal panel with eGFR and Micro/Creatinine.  7.) Return to clinic in 3 months for follow up. Advised patient to call clinic with any questions or concerns.     A total of 30 minutes was spent in face to face time, of which 50 % was spent in counseling patient on disease process, complications, treatment, and side effects of medications.    The patient was explained the above plan and given opportunity to ask questions. He understands, chooses and consents to this plan " and accepts all the risks, which include but are not limited to the risks mentioned above. He understands the alternative of having no testing, interventions or treatments at this time. He left content and without further questions.

## 2018-02-28 RX ORDER — LANCETS 33 GAUGE
1 EACH MISCELLANEOUS 3 TIMES DAILY
Qty: 100 EACH | Refills: 11 | Status: SHIPPED | OUTPATIENT
Start: 2018-02-28

## 2018-05-18 ENCOUNTER — LAB VISIT (OUTPATIENT)
Dept: LAB | Facility: HOSPITAL | Age: 68
End: 2018-05-18
Attending: PHYSICIAN ASSISTANT
Payer: MEDICARE

## 2018-05-18 ENCOUNTER — OFFICE VISIT (OUTPATIENT)
Dept: DIABETES | Facility: CLINIC | Age: 68
End: 2018-05-18
Payer: MEDICARE

## 2018-05-18 VITALS
SYSTOLIC BLOOD PRESSURE: 128 MMHG | BODY MASS INDEX: 30.19 KG/M2 | DIASTOLIC BLOOD PRESSURE: 72 MMHG | HEIGHT: 71 IN | WEIGHT: 215.63 LBS

## 2018-05-18 DIAGNOSIS — Z79.4 TYPE 2 DIABETES MELLITUS WITH COMPLICATION, WITH LONG-TERM CURRENT USE OF INSULIN: Primary | ICD-10-CM

## 2018-05-18 DIAGNOSIS — Z79.4 TYPE 2 DIABETES MELLITUS WITH COMPLICATION, WITH LONG-TERM CURRENT USE OF INSULIN: ICD-10-CM

## 2018-05-18 DIAGNOSIS — E11.8 TYPE 2 DIABETES MELLITUS WITH COMPLICATION, WITH LONG-TERM CURRENT USE OF INSULIN: ICD-10-CM

## 2018-05-18 DIAGNOSIS — E11.8 TYPE 2 DIABETES MELLITUS WITH COMPLICATION, WITH LONG-TERM CURRENT USE OF INSULIN: Primary | ICD-10-CM

## 2018-05-18 LAB
ESTIMATED AVG GLUCOSE: 166 MG/DL
GLUCOSE SERPL-MCNC: 148 MG/DL (ref 70–110)
HBA1C MFR BLD HPLC: 7.4 %

## 2018-05-18 PROCEDURE — 99999 PR PBB SHADOW E&M-EST. PATIENT-LVL III: CPT | Mod: PBBFAC,,, | Performed by: PHYSICIAN ASSISTANT

## 2018-05-18 PROCEDURE — 99214 OFFICE O/P EST MOD 30 MIN: CPT | Mod: S$PBB,,, | Performed by: PHYSICIAN ASSISTANT

## 2018-05-18 PROCEDURE — 99213 OFFICE O/P EST LOW 20 MIN: CPT | Mod: PBBFAC,PO | Performed by: PHYSICIAN ASSISTANT

## 2018-05-18 PROCEDURE — 82948 REAGENT STRIP/BLOOD GLUCOSE: CPT | Mod: PBBFAC,PO | Performed by: PHYSICIAN ASSISTANT

## 2018-05-18 PROCEDURE — 83036 HEMOGLOBIN GLYCOSYLATED A1C: CPT

## 2018-05-18 PROCEDURE — 36415 COLL VENOUS BLD VENIPUNCTURE: CPT | Mod: PO

## 2018-05-18 NOTE — PROGRESS NOTES
Subjective:      Patient ID: Timothy Ortega is a 67 y.o. male.    PCP: EM Ochoa Jr, MD      Timothy Ortega is a pleasant 67 y.o. male presenting to follow up on diabetes mellitus. Also, pertinent to this visit in decision making is Hypertension, Hyperlipidemia, and compliance. He has had diabetes for 20 or more years. His last visit in Diabetes Management was 2/23/2018. Since that time he has had mild improvement in his glycemia.His current concerns are glycemic control.    His glucometer down load reveals he has had 74 data points between 4/19/18 - 5/18/18 0 of which were after meals. His average blood glucose is 148 mg/dl and his average reading per day is 2.5.  He is within the target range (ITR) 80%; above target range (ATR) 20%; and below target range (BTR) 0%; his standard deviation is 40.      He denies any hospital admissions, emergency room visits, hypoglycemia, syncope, diaphoresis, chest pain, or dyspnea.    He has gained 7 pounds since last visit. His BMI is 30.07 kg/m².    His blood sugar in the clinic today was:   Lab Results   Component Value Date    POCGLU 148 (A) 05/18/2018       We discussed the American diabetes Association recommendations:  hemoglobin A1c below 7.0%; all diabetics should be on statins unless contraindicated; one aspirin daily unless contraindicated; fasting blood sugar between 80 and 130 mg/dL; postprandial blood sugar below 180 mg/dl; prevention of hypoglycemia, may adjust goals to higher levels if persistent; ACE or ARB therapy if not contraindicated; and maintain in an ideal body weight with BMI below 25.    Timothy is compliant most of the time with DM medications.     Timothy is noncompliant much of the time with lifestyle modifications to include activity and meal planning.       Current Outpatient Prescriptions:     ASCORBATE CALCIUM (VITAMIN C ORAL), Take by mouth once daily., Disp: , Rfl:     aspirin 81 mg Tab, Take 1 tablet by mouth Daily., Disp: , Rfl:     blood  "sugar diagnostic (FREESTYLE TEST) Strp, 1 strip by Misc.(Non-Drug; Combo Route) route 3 (three) times daily. Freestyle test strips, Disp: 270 strip, Rfl: 3    blood-glucose meter (FREESTYLE LITE METER) kit, Freestyle meter   Use as instructed, Disp: 1 each, Rfl: 0    ERGOCALCIFEROL, VITAMIN D2, (VITAMIN D ORAL), Take by mouth once daily., Disp: , Rfl:     HUMALOG KWIKPEN 100 unit/mL InPn pen, INJECT 14 TO 25 UNITS UNDER THE SKIN BEFORE EACH MEAL, Disp: 15 mL, Rfl: 11    hydrocortisone 2.5 % cream, ERROL TO RASH BID PRN, Disp: , Rfl: 3    insulin glargine (LANTUS SOLOSTAR U-100 INSULIN) 100 unit/mL (3 mL) InPn pen, Inject 70 Units into the skin once daily., Disp: 30 mL, Rfl: 11    KRILL OIL ORAL, Take 1 capsule by mouth once daily., Disp: , Rfl:     lancets 33 gauge Misc, 1 lancet by Misc.(Non-Drug; Combo Route) route 3 (three) times daily., Disp: 100 each, Rfl: 11    losartan (COZAAR) 50 MG tablet, Take 1 tablet (50 mg total) by mouth once daily., Disp: 90 tablet, Rfl: 3    metformin (GLUCOPHAGE) 1000 MG tablet, TAKE 1 TABLET BY MOUTH TWICE DAILY WITH MEALS, Disp: 180 tablet, Rfl: 3    pen needle, diabetic (BD INSULIN PEN NEEDLE UF SHORT) 31 gauge x 5/16" Ndle, USE 5 TIMES DAILY AS DIRECTED, Disp: 450 each, Rfl: 3    rosuvastatin (CRESTOR) 40 MG Tab, TAKE ONE TABLET BY MOUTH EVERY EVENING, Disp: 30 tablet, Rfl: 11    TRULICITY 1.5 mg/0.5 mL PnIj, INJECT 1.5 MG INTO THE SKIN EVERY 7 DAYS, Disp: 2 mL, Rfl: 11    STANDARDS OF CARE:  Eye doctor: Dr. Stoddard, last exam 11/03/2017.  Dental exam: Recommend regular exams; denies gums bleeding.  Podiatry doctor:     ACTIVITY LEVEL: He exercises rarely.  MEAL PLANNING: Number of meals per day - 3. Number of snacks per day - 2.  Per dietary recall, patient is not limiting carbohydrates, saturated fats and sodium.   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:  ACE/ARB: Yes  Statin: " Yes    Health Maintenance   Topic Date Due    Abdominal Aortic Aneurysm Screening  11/13/2017    Pneumococcal (65+) (2 of 2 - PPSV23) 05/07/2018    Influenza Vaccine  08/01/2018    PROSTATE-SPECIFIC ANTIGEN  08/11/2018    Hemoglobin A1c  08/16/2018    Eye Exam  11/03/2018    Lipid Panel  02/16/2019    Urine Microalbumin  02/16/2019    Foot Exam  02/23/2019    Colonoscopy  03/31/2022    TETANUS VACCINE  01/31/2023    Hepatitis C Screening  Completed    Zoster Vaccine  Completed       Diabetes Management Status    Statin: Taking  ACE/ARB: Taking    Screening or Prevention Patient's value Goal Complete/Controlled?   HgA1C Testing and Control   Lab Results   Component Value Date    HGBA1C 7.4 (H) 05/18/2018      Annually/Less than 8% Yes   Lipid profile : 02/16/2018 Annually Yes   LDL control Lab Results   Component Value Date    LDLCALC 77.2 02/16/2018    Annually/Less than 100 mg/dl  Yes   Nephropathy screening Lab Results   Component Value Date    LABMICR 48.0 02/16/2018     No results found for: PROTEINUA Annually Yes   Blood pressure BP Readings from Last 1 Encounters:   05/18/18 128/72    Less than 140/90 Yes   Dilated retinal exam : 11/03/2017 Annually Yes   Foot exam   : 05/18/2018 Annually Yes     The following results were reviewed with patient.    Lab Results   Component Value Date    WBC 7.02 04/17/2015    HGB 14.7 04/17/2015    HCT 43.7 04/17/2015     04/17/2015    CHOL 131 02/16/2018    TRIG 74 02/16/2018    HDL 39 (L) 02/16/2018    ALT 30 02/16/2018    AST 21 02/16/2018     02/16/2018    K 4.2 02/16/2018     02/16/2018    ANIONGAP 7 (L) 02/16/2018    CREATININE 1.1 02/16/2018    ESTGFRAFRICA >60.0 02/16/2018    EGFRNONAA >60.0 02/16/2018    BUN 16 02/16/2018    CO2 30 (H) 02/16/2018    TSH 1.003 10/05/2012    PSA 0.31 08/11/2017    INR 1.0 12/01/2014     (H) 02/16/2018       Lab Results   Component Value Date    HGBA1C 8.1 (H) 02/16/2018    HGBA1C 7.4 (H) 08/11/2017     HGBA1C 8.1 (H) 02/10/2017     Lab Results   Component Value Date    TSH 1.003 10/05/2012     Lab Results   Component Value Date    TOTALTESTOST 330 04/26/2013     Lab Results   Component Value Date    CALCIUM 9.9 02/16/2018       Review of patient's allergies indicates:   Allergen Reactions    No known drug allergies        Past Medical History:   Diagnosis Date    Coronary artery disease 2010    [facility] - no stents    Diabetes mellitus 10-12 years     am 10/31/2014    DM (diabetes mellitus) 13 years     am 11/03/2015    DM (diabetes mellitus) 2002     lunch 10/28/2016    Hyperlipemia     Kidney stones     Neuropathy        Review of Systems   Constitutional: Negative.  Negative for activity change, appetite change, chills, diaphoresis, fatigue, fever and unexpected weight change.   HENT: Negative.  Negative for dental problem, facial swelling, hearing loss, nosebleeds, trouble swallowing and voice change.    Eyes: Negative.  Negative for photophobia, pain, discharge, redness, itching and visual disturbance.   Respiratory: Negative.  Negative for cough, choking, chest tightness, shortness of breath and wheezing.    Cardiovascular: Negative.  Negative for chest pain, palpitations and leg swelling.   Gastrointestinal: Negative.  Negative for abdominal distention, abdominal pain, blood in stool, constipation, diarrhea, nausea and vomiting.   Endocrine: Negative.  Negative for cold intolerance, heat intolerance, polydipsia, polyphagia and polyuria.   Genitourinary: Negative.  Negative for decreased urine volume, difficulty urinating, dysuria, frequency, scrotal swelling, testicular pain and urgency.   Musculoskeletal: Negative.  Negative for arthralgias, back pain, gait problem, joint swelling, myalgias, neck pain and neck stiffness.   Skin: Negative.  Negative for color change, pallor, rash and wound.   Allergic/Immunologic: Negative.  Negative for environmental allergies, food allergies and  "immunocompromised state.   Neurological: Negative.  Negative for dizziness, tremors, seizures, syncope, facial asymmetry, speech difficulty, weakness, light-headedness, numbness and headaches.   Hematological: Negative.  Negative for adenopathy. Does not bruise/bleed easily.   Psychiatric/Behavioral: Negative.  Negative for agitation, behavioral problems, confusion, decreased concentration, dysphoric mood, hallucinations, self-injury, sleep disturbance and suicidal ideas. The patient is not nervous/anxious and is not hyperactive.       Objective:     Vitals - 1 value per visit 2/19/2018 2/23/2018 5/18/2018   SYSTOLIC 124 114 128   DIASTOLIC 78 72 72   PULSE 74 - -   TEMPERATURE 96.1 - -   RESPIRATIONS - - -   SPO2 98 - -   Weight (lb) 212.3 208.56 215.61   Weight (kg) 96.3 94.6 97.8   HEIGHT 5' 11" 5' 11" 5' 11"   BODY MASS INDEX 29.61 29.09 30.07   VISIT REPORT - - -   Pain Score  0 0 0   Some recent data might be hidden       Physical Exam   Constitutional: He is oriented to person, place, and time. He appears well-developed and well-nourished. He is cooperative.  Non-toxic appearance. He does not have a sickly appearance. He does not appear ill. No distress. He is not intubated.   HENT:   Head: Normocephalic and atraumatic. Not macrocephalic and not microcephalic. Head is without raccoon's eyes, without Noriega's sign, without abrasion, without contusion, without laceration, without right periorbital erythema and without left periorbital erythema. Hair is normal.   Right Ear: External ear normal. No lacerations. No drainage, swelling or tenderness. No foreign bodies. No mastoid tenderness. Tympanic membrane is not injected, not scarred, not perforated, not erythematous, not retracted and not bulging. Tympanic membrane mobility is normal. No middle ear effusion. No hemotympanum. No decreased hearing is noted.   Left Ear: External ear normal. No lacerations. No drainage, swelling or tenderness. No foreign bodies. No " mastoid tenderness. Tympanic membrane is not injected, not scarred, not perforated, not erythematous, not retracted and not bulging. Tympanic membrane mobility is normal.  No middle ear effusion. No hemotympanum. No decreased hearing is noted.   Nose: Nose normal.   Mouth/Throat: Oropharynx is clear and moist.   Eyes: EOM and lids are normal. Pupils are equal, round, and reactive to light. Right eye exhibits no chemosis, no discharge, no exudate and no hordeolum. No foreign body present in the right eye. Left eye exhibits no chemosis, no discharge, no exudate and no hordeolum. No foreign body present in the left eye. Right conjunctiva is not injected. Right conjunctiva has no hemorrhage. Left conjunctiva is not injected. Left conjunctiva has no hemorrhage. No scleral icterus. Right eye exhibits normal extraocular motion and no nystagmus. Left eye exhibits normal extraocular motion and no nystagmus. Right pupil is round and reactive. Left pupil is round and reactive. Pupils are equal.   Neck: Normal range of motion, full passive range of motion without pain and phonation normal. Neck supple. Normal carotid pulses, no hepatojugular reflux and no JVD present. No tracheal tenderness, no spinous process tenderness and no muscular tenderness present. Carotid bruit is not present. No neck rigidity. No tracheal deviation, no edema, no erythema and normal range of motion present. No thyroid mass and no thyromegaly present.   Cardiovascular: Normal rate, regular rhythm, normal heart sounds and intact distal pulses.   No extrasystoles are present. PMI is not displaced.  Exam reveals no gallop, no friction rub and no decreased pulses.    No murmur heard.  Pulses:       Carotid pulses are 2+ on the right side, and 2+ on the left side.       Radial pulses are 2+ on the right side, and 2+ on the left side.        Dorsalis pedis pulses are 2+ on the right side, and 2+ on the left side.        Posterior tibial pulses are 2+ on the  right side, and 2+ on the left side.   Pulmonary/Chest: Effort normal and breath sounds normal. No accessory muscle usage or stridor. No apnea, no tachypnea and no bradypnea. He is not intubated. No respiratory distress. He has no decreased breath sounds. He has no wheezes. He has no rhonchi. He has no rales. He exhibits no tenderness.   Abdominal: Soft. Bowel sounds are normal. He exhibits no shifting dullness, no distension, no pulsatile liver, no fluid wave, no abdominal bruit, no ascites, no pulsatile midline mass and no mass. There is no hepatosplenomegaly, splenomegaly or hepatomegaly. There is no tenderness. There is no rigidity, no rebound, no guarding, no CVA tenderness and no tenderness at McBurney's point. No hernia. Hernia confirmed negative in the ventral area.   Musculoskeletal: Normal range of motion. He exhibits no edema or tenderness.        Right foot: There is normal range of motion and no deformity.        Left foot: There is normal range of motion and no deformity.   Feet:   Right Foot:   Protective Sensation: 6 sites tested. 6 sites sensed.   Skin Integrity: Negative for ulcer, blister, skin breakdown, erythema, warmth, callus or dry skin.   Left Foot:   Protective Sensation: 6 sites tested. 6 sites sensed.   Skin Integrity: Negative for ulcer, blister, skin breakdown, erythema, warmth, callus or dry skin.   Lymphadenopathy:        Head (right side): No submental, no submandibular, no tonsillar, no preauricular, no posterior auricular and no occipital adenopathy present.        Head (left side): No submental, no submandibular, no tonsillar, no preauricular, no posterior auricular and no occipital adenopathy present.     He has no cervical adenopathy.        Right cervical: No superficial cervical, no deep cervical and no posterior cervical adenopathy present.       Left cervical: No superficial cervical, no deep cervical and no posterior cervical adenopathy present.   Neurological: He is alert  and oriented to person, place, and time. He has normal reflexes. He displays no atrophy and no tremor. No cranial nerve deficit or sensory deficit. He exhibits normal muscle tone. He displays no seizure activity. Coordination and gait normal.   Reflex Scores:       Bicep reflexes are 2+ on the right side and 2+ on the left side.       Brachioradialis reflexes are 2+ on the right side and 2+ on the left side.       Patellar reflexes are 2+ on the right side and 2+ on the left side.  Skin: Skin is warm and dry. No rash noted. No erythema. No pallor.   Psychiatric: He has a normal mood and affect. His mood appears not anxious. His affect is not angry, not blunt, not labile and not inappropriate. His speech is not rapid and/or pressured, not delayed, not tangential and not slurred. He is not agitated, not aggressive, not hyperactive, not slowed, not withdrawn, not actively hallucinating and not combative. Thought content is not paranoid and not delusional. Cognition and memory are not impaired. He does not express impulsivity or inappropriate judgment. He does not exhibit a depressed mood. He expresses no homicidal and no suicidal ideation. He expresses no suicidal plans and no homicidal plans. He is communicative. He exhibits normal recent memory and normal remote memory. He is attentive.     Assessment:     1. Type 2 diabetes mellitus with complication, with long-term current use of insulin       Plan:     Timothy Ortega is seen today for   1. Type 2 diabetes mellitus with complication, with long-term current use of insulin      We have discussed the etiology and treatment options associated with the diagnosis as well as alternatives. He has elected the following treatments.     Type 2 diabetes mellitus with complication, with long-term current use of insulin  -     POCT glucose  -     Hemoglobin A1c; Future; Expected date: 05/18/2018  - Continue with D&E, reduce portion size, and restrict carbohydrates (no more that  45 grams ) per meal.      1.) Patient was instructed to monitor blood glucose 6 times daily. Unless he is not on Multiple Daily Injections (MDI). Then he will check it twice daily, Fasting and occasionally 2 hours after meals. Reminded to bring BG meter or record to each visit for review.  2.) Reviewed pathophysiology of diabetes, complications related to the disease, importance of annual dilated eye exam and self daily foot examination.  3.) Continue medications as prescribed MDI with Lantus and Humalog; Trulicity; Metformin. [facility] MyChart or Phone review in 1 week with [facility] records for adjustment of medication.  4.) Advised patient to continue to follow up with Dietician/CDE as directed.  5.) Discussed activity, benefits, methods, and precautions. Recommended patient start/continue some form of exercise and increase as tolerated to 60 minutes per day to facilitate weight loss and aid in control of BGs. Also reminded patient of WHO recommendation of 10,000 steps daily as a goal.   6.) A1C, TSH, Lipid Panel, CMP/renal panel with eGFR and Micro/Creatinine prior to next visit, if not already done.  7.) Return to clinic in 12 weeks for follow up. Advised patient to call clinic with any questions or concerns.    A total of 30 minutes was spent in face to face time, of which 50 % was spent in counseling patient on disease process, complications, treatment, and side effects of medications.    The patient was explained the above plan and given opportunity to ask questions.  He understands, chooses and consents to this plan and accepts all the risks, which include but are not limited to the risks mentioned above.   He understands the alternative of having no testing, interventions or treatments at this time. He left content and without further questions.     Disclaimer:  This note is prepared using voice recognition software and as such is likely to have errors and has not been proof read. Please contact me for questions.

## 2018-06-12 ENCOUNTER — TELEPHONE (OUTPATIENT)
Dept: PULMONOLOGY | Facility: CLINIC | Age: 68
End: 2018-06-12

## 2018-06-12 NOTE — TELEPHONE ENCOUNTER
----- Message from Fabiola Bach sent at 6/12/2018  2:53 PM CDT -----  Contact: Jeffery with [facility]  Caller states they were unable to contact the pt. [phone] ext [phone]

## 2018-06-18 RX ORDER — DULAGLUTIDE 1.5 MG/.5ML
1.5 INJECTION, SOLUTION SUBCUTANEOUS
Qty: 2 ML | Refills: 11 | Status: SHIPPED | OUTPATIENT
Start: 2018-06-18 | End: 2019-06-01 | Stop reason: SDUPTHER

## 2018-06-20 ENCOUNTER — PATIENT MESSAGE (OUTPATIENT)
Dept: PULMONOLOGY | Facility: CLINIC | Age: 68
End: 2018-06-20

## 2018-06-20 DIAGNOSIS — G47.33 OSA (OBSTRUCTIVE SLEEP APNEA): Primary | ICD-10-CM

## 2018-07-03 RX ORDER — INSULIN LISPRO 100 [IU]/ML
INJECTION, SOLUTION INTRAVENOUS; SUBCUTANEOUS
Qty: 66 ML | Refills: 11 | Status: SHIPPED | OUTPATIENT
Start: 2018-07-03 | End: 2018-08-23

## 2018-07-26 RX ORDER — INSULIN LISPRO 100 [IU]/ML
INJECTION, SOLUTION INTRAVENOUS; SUBCUTANEOUS
Qty: 66 ML | Refills: 11 | Status: SHIPPED | OUTPATIENT
Start: 2018-07-26 | End: 2018-08-23

## 2018-07-29 ENCOUNTER — PATIENT MESSAGE (OUTPATIENT)
Dept: SLEEP MEDICINE | Facility: CLINIC | Age: 68
End: 2018-07-29

## 2018-07-30 ENCOUNTER — PATIENT MESSAGE (OUTPATIENT)
Dept: INTERNAL MEDICINE | Facility: CLINIC | Age: 68
End: 2018-07-30

## 2018-08-06 ENCOUNTER — PATIENT MESSAGE (OUTPATIENT)
Dept: INTERNAL MEDICINE | Facility: CLINIC | Age: 68
End: 2018-08-06

## 2018-08-10 ENCOUNTER — PATIENT OUTREACH (OUTPATIENT)
Dept: ADMINISTRATIVE | Facility: HOSPITAL | Age: 68
End: 2018-08-10

## 2018-08-10 NOTE — PROGRESS NOTES
Health Maintenance reviewed. AAAS order pended. PREVISIT CHART AUDIT LETTER SENT VIA PATIENT PORTAL

## 2018-08-13 ENCOUNTER — LAB VISIT (OUTPATIENT)
Dept: LAB | Facility: HOSPITAL | Age: 68
End: 2018-08-13
Attending: PEDIATRICS
Payer: MEDICARE

## 2018-08-13 DIAGNOSIS — Z79.4 TYPE 2 DIABETES MELLITUS WITH COMPLICATION, WITH LONG-TERM CURRENT USE OF INSULIN: ICD-10-CM

## 2018-08-13 DIAGNOSIS — Z12.5 PROSTATE CANCER SCREENING: ICD-10-CM

## 2018-08-13 DIAGNOSIS — E11.8 TYPE 2 DIABETES MELLITUS WITH COMPLICATION, WITH LONG-TERM CURRENT USE OF INSULIN: ICD-10-CM

## 2018-08-13 LAB
ALT SERPL W/O P-5'-P-CCNC: 22 U/L
ANION GAP SERPL CALC-SCNC: 9 MMOL/L
AST SERPL-CCNC: 18 U/L
BUN SERPL-MCNC: 15 MG/DL
CALCIUM SERPL-MCNC: 9.6 MG/DL
CHLORIDE SERPL-SCNC: 104 MMOL/L
CHOLEST SERPL-MCNC: 120 MG/DL
CHOLEST/HDLC SERPL: 5.2 {RATIO}
CO2 SERPL-SCNC: 28 MMOL/L
COMPLEXED PSA SERPL-MCNC: 0.36 NG/ML
CREAT SERPL-MCNC: 1 MG/DL
EST. GFR  (AFRICAN AMERICAN): >60 ML/MIN/1.73 M^2
EST. GFR  (NON AFRICAN AMERICAN): >60 ML/MIN/1.73 M^2
ESTIMATED AVG GLUCOSE: 180 MG/DL
GLUCOSE SERPL-MCNC: 169 MG/DL
HBA1C MFR BLD HPLC: 7.9 %
HDLC SERPL-MCNC: 23 MG/DL
HDLC SERPL: 19.2 %
LDLC SERPL CALC-MCNC: 46.2 MG/DL
NONHDLC SERPL-MCNC: 97 MG/DL
POTASSIUM SERPL-SCNC: 4.6 MMOL/L
SODIUM SERPL-SCNC: 141 MMOL/L
TRIGL SERPL-MCNC: 254 MG/DL

## 2018-08-13 PROCEDURE — 80048 BASIC METABOLIC PNL TOTAL CA: CPT

## 2018-08-13 PROCEDURE — 80061 LIPID PANEL: CPT

## 2018-08-13 PROCEDURE — 84153 ASSAY OF PSA TOTAL: CPT

## 2018-08-13 PROCEDURE — 83036 HEMOGLOBIN GLYCOSYLATED A1C: CPT

## 2018-08-13 PROCEDURE — 36415 COLL VENOUS BLD VENIPUNCTURE: CPT | Mod: PO

## 2018-08-13 PROCEDURE — 84450 TRANSFERASE (AST) (SGOT): CPT

## 2018-08-13 PROCEDURE — 84460 ALANINE AMINO (ALT) (SGPT): CPT

## 2018-08-17 ENCOUNTER — OFFICE VISIT (OUTPATIENT)
Dept: PULMONOLOGY | Facility: CLINIC | Age: 68
End: 2018-08-17
Payer: MEDICARE

## 2018-08-17 VITALS
OXYGEN SATURATION: 97 % | SYSTOLIC BLOOD PRESSURE: 120 MMHG | WEIGHT: 215.38 LBS | BODY MASS INDEX: 30.15 KG/M2 | HEIGHT: 71 IN | HEART RATE: 70 BPM | RESPIRATION RATE: 18 BRPM | DIASTOLIC BLOOD PRESSURE: 74 MMHG

## 2018-08-17 DIAGNOSIS — G47.33 OSA (OBSTRUCTIVE SLEEP APNEA): Primary | ICD-10-CM

## 2018-08-17 PROCEDURE — 99214 OFFICE O/P EST MOD 30 MIN: CPT | Mod: PBBFAC,PO | Performed by: NURSE PRACTITIONER

## 2018-08-17 PROCEDURE — 99999 PR PBB SHADOW E&M-EST. PATIENT-LVL IV: CPT | Mod: PBBFAC,,, | Performed by: NURSE PRACTITIONER

## 2018-08-17 PROCEDURE — 99213 OFFICE O/P EST LOW 20 MIN: CPT | Mod: S$PBB,,, | Performed by: NURSE PRACTITIONER

## 2018-08-17 NOTE — PROGRESS NOTES
"Subjective:      Patient ID: Timothy Ortega is a 67 y.o. male.    Chief Complaint: Sleep Apnea    Patient presents to the office today for evaluation of CPAP.  She his CPAP is not functioning correctly and is over 5 years old.  He has humidifiers broken.  He needs replacement.  Otherwise he is doing well on CPAP and benefitting from use.  He is wearing nightly.        /74 (BP Location: Right arm, Patient Position: Sitting)   Pulse 70   Resp 18   Ht 5' 10.98" (1.803 m)   Wt 97.7 kg (215 lb 6.2 oz)   SpO2 97%   BMI 30.05 kg/m²   Body mass index is 30.05 kg/m².    Review of Systems   Constitutional: Negative.    HENT: Negative.    Respiratory: Negative.    Cardiovascular: Negative.    Musculoskeletal: Negative.    Gastrointestinal: Negative.    Neurological: Negative.    Psychiatric/Behavioral: Negative.      Objective:      Physical Exam   Constitutional: He is oriented to person, place, and time. He appears well-developed and well-nourished.   HENT:   Head: Normocephalic and atraumatic.   Left Ear: External ear normal.   Neck: Normal range of motion. Neck supple.   Cardiovascular: Normal rate and regular rhythm.   Pulmonary/Chest: Effort normal and breath sounds normal.   Musculoskeletal: Normal range of motion. He exhibits no edema.   Neurological: He is alert and oriented to person, place, and time.   Skin: Skin is warm and dry.   Psychiatric: He has a normal mood and affect.     Personal Diagnostic Review    CPAP download shows patient wears on average 6 hrs and 34 minutes. Greater than 4 hrs 100 % of the time over the last 30 days    Assessment:       1. BRIANA (obstructive sleep apnea)        Outpatient Encounter Medications as of 8/17/2018   Medication Sig Dispense Refill    ASCORBATE CALCIUM (VITAMIN C ORAL) Take by mouth once daily.      aspirin 81 mg Tab Take 1 tablet by mouth Daily.      blood sugar diagnostic (FREESTYLE TEST) Strp 1 strip by Misc.(Non-Drug; Combo Route) route 3 (three) times " "daily. Freestyle test strips 270 strip 3    ERGOCALCIFEROL, VITAMIN D2, (VITAMIN D ORAL) Take by mouth once daily.      HUMALOG KWIKPEN INSULIN 100 unit/mL InPn pen INJECT 14 TO 25 UNITS UNDER THE SKIN BEFORE EACH MEAL 66 mL 11    HUMALOG KWIKPEN INSULIN 100 unit/mL InPn pen INJECT 14 TO 25 UNITS UNDER THE SKIN BEFORE EACH MEAL 66 mL 11    hydrocortisone 2.5 % cream ERROL TO RASH BID PRN  3    insulin glargine (LANTUS SOLOSTAR U-100 INSULIN) 100 unit/mL (3 mL) InPn pen Inject 70 Units into the skin once daily. 30 mL 11    KRILL OIL ORAL Take 1 capsule by mouth once daily.      lancets 33 gauge Misc 1 lancet by Misc.(Non-Drug; Combo Route) route 3 (three) times daily. 100 each 11    losartan (COZAAR) 50 MG tablet Take 1 tablet (50 mg total) by mouth once daily. 90 tablet 3    metformin (GLUCOPHAGE) 1000 MG tablet TAKE 1 TABLET BY MOUTH TWICE DAILY WITH MEALS 180 tablet 3    pen needle, diabetic (BD INSULIN PEN NEEDLE UF SHORT) 31 gauge x 5/16" Ndle USE 5 TIMES DAILY AS DIRECTED 450 each 3    rosuvastatin (CRESTOR) 40 MG Tab TAKE ONE TABLET BY MOUTH EVERY EVENING 30 tablet 11    TRULICITY 1.5 mg/0.5 mL PnIj INJECT 1.5 MG INTO THE SKIN EVERY 7 DAYS 2 mL 11    blood-glucose meter (FREESTYLE LITE METER) kit Freestyle meter   Use as instructed 1 each 0    diphth,pertus,acell,,tetanus (BOOSTRIX) 2.5-8-5 Lf-mcg-Lf/0.5mL Syrg injection Inject into the muscle once 0.5 mL 0     No facility-administered encounter medications on file as of 8/17/2018.      Orders Placed This Encounter   Procedures    CPAP FOR HOME USE     [employer]     Order Specific Question:   Type:     Answer:   CPAP     Order Specific Question:   CPAP setting (cmH20):     Answer:   11     Order Specific Question:   Length of need (1-99 months):     Answer:   99     Order Specific Question:   Humidification:     Answer:   Heated     Order Specific Question:   Type of mask:     Answer:   FFM     Order Specific Question:   Headgear?     Answer:   Yes "     Order Specific Question:   Tubing?     Answer:   Yes     Order Specific Question:   Humidifier chamber?     Answer:   Yes     Order Specific Question:   Chin strap?     Answer:   Yes     Order Specific Question:   Filters?     Answer:   Yes     Plan:      PAP replacement. CPAP over 5 years old and at end of life. Humidifier broken. He is compliant and benefits from use.  CPAP 10 cm H2O  follow up in 8 weeks with download of data card and review of symptoms.

## 2018-08-23 ENCOUNTER — OFFICE VISIT (OUTPATIENT)
Dept: INTERNAL MEDICINE | Facility: CLINIC | Age: 68
End: 2018-08-23
Payer: MEDICARE

## 2018-08-23 VITALS
TEMPERATURE: 98 F | HEIGHT: 71 IN | SYSTOLIC BLOOD PRESSURE: 122 MMHG | OXYGEN SATURATION: 96 % | DIASTOLIC BLOOD PRESSURE: 80 MMHG | HEART RATE: 81 BPM | RESPIRATION RATE: 18 BRPM | BODY MASS INDEX: 30.27 KG/M2 | WEIGHT: 216.25 LBS

## 2018-08-23 DIAGNOSIS — E78.5 HYPERLIPIDEMIA ASSOCIATED WITH TYPE 2 DIABETES MELLITUS: ICD-10-CM

## 2018-08-23 DIAGNOSIS — Z79.4 TYPE 2 DIABETES MELLITUS WITH COMPLICATION, WITH LONG-TERM CURRENT USE OF INSULIN: ICD-10-CM

## 2018-08-23 DIAGNOSIS — E11.69 HYPERLIPIDEMIA ASSOCIATED WITH TYPE 2 DIABETES MELLITUS: ICD-10-CM

## 2018-08-23 DIAGNOSIS — Z13.6 SCREENING FOR AAA (AORTIC ABDOMINAL ANEURYSM): Primary | ICD-10-CM

## 2018-08-23 DIAGNOSIS — E11.59 HYPERTENSION ASSOCIATED WITH DIABETES: ICD-10-CM

## 2018-08-23 DIAGNOSIS — I25.10 CORONARY ARTERY DISEASE INVOLVING NATIVE CORONARY ARTERY OF NATIVE HEART WITHOUT ANGINA PECTORIS: ICD-10-CM

## 2018-08-23 DIAGNOSIS — E11.8 TYPE 2 DIABETES MELLITUS WITH COMPLICATION, WITH LONG-TERM CURRENT USE OF INSULIN: ICD-10-CM

## 2018-08-23 DIAGNOSIS — I15.2 HYPERTENSION ASSOCIATED WITH DIABETES: ICD-10-CM

## 2018-08-23 PROCEDURE — 99213 OFFICE O/P EST LOW 20 MIN: CPT | Mod: PBBFAC,PO | Performed by: PEDIATRICS

## 2018-08-23 PROCEDURE — 99999 PR PBB SHADOW E&M-EST. PATIENT-LVL III: CPT | Mod: PBBFAC,,, | Performed by: PEDIATRICS

## 2018-08-23 PROCEDURE — 99214 OFFICE O/P EST MOD 30 MIN: CPT | Mod: S$PBB,,, | Performed by: PEDIATRICS

## 2018-08-23 RX ORDER — INSULIN GLARGINE 100 [IU]/ML
75 INJECTION, SOLUTION SUBCUTANEOUS DAILY
Qty: 30 ML | Refills: 11 | Status: SHIPPED | OUTPATIENT
Start: 2018-08-23 | End: 2019-09-07 | Stop reason: SDUPTHER

## 2018-08-23 RX ORDER — INSULIN LISPRO 100 [IU]/ML
INJECTION, SOLUTION INTRAVENOUS; SUBCUTANEOUS
Qty: 66 ML | Refills: 11 | Status: SHIPPED | OUTPATIENT
Start: 2018-08-23 | End: 2019-03-08 | Stop reason: CLARIF

## 2018-08-23 NOTE — PROGRESS NOTES
Subjective:       Patient ID: Timothy Ortega is a 67 y.o. male.    Chief Complaint: Follow-up (6 month f/u with labs)    HTN: B/P good, no HTNive symptoms.    LIPIDS:not following D&E, tolerating and compliant with med(s).    DM: no hyper/hypoglycemic symptoms. Self monitoring TID about 150 average BS. Trulicity, metformin, humalog (meal sliding scale) and lantus 70 units , Working with DM program.  CAD: no CP, SOB, CHOU. Has seen cards.  He has not yet decided on CTS surgery  LABS REVIEWED AND DISCUSSED WITH PATIENT           Review of Systems   Constitutional: Negative for fever and unexpected weight change.   HENT: Negative for congestion and rhinorrhea.    Eyes: Negative for discharge and redness.   Respiratory: Negative for cough and wheezing.    Cardiovascular: Negative for chest pain, palpitations and leg swelling.   Gastrointestinal: Negative for abdominal pain, constipation, diarrhea and vomiting.   Endocrine: Negative for cold intolerance, heat intolerance, polydipsia, polyphagia and polyuria.   Genitourinary: Negative for decreased urine volume and difficulty urinating.   Musculoskeletal: Positive for arthralgias and back pain. Negative for joint swelling.        Chronic long term   Skin: Negative for rash and wound.   Neurological: Negative for syncope and headaches.   Psychiatric/Behavioral: Negative for behavioral problems and sleep disturbance.       Objective:      Physical Exam   Constitutional: He is oriented to person, place, and time. He appears well-developed and well-nourished. No distress.   Neck: No JVD present. No thyromegaly present.   Cardiovascular: Normal rate, regular rhythm and normal heart sounds.   No murmur heard.  Pulmonary/Chest: Effort normal and breath sounds normal. No respiratory distress. He has no wheezes. He has no rales.   Abdominal: Soft. He exhibits no distension and no mass. There is no tenderness. There is no guarding.   Musculoskeletal: He exhibits no edema.    Lymphadenopathy:     He has no cervical adenopathy.   Neurological: He is alert and oriented to person, place, and time. No cranial nerve deficit. Coordination normal.   Skin: Capillary refill takes less than 2 seconds. No rash noted.   Psychiatric: He has a normal mood and affect. His behavior is normal. Judgment and thought content normal.       Assessment:       1. Screening for AAA (aortic abdominal aneurysm)    2. Hyperlipidemia associated with type 2 diabetes mellitus    3. Hypertension associated with diabetes    4. Coronary artery disease involving native coronary artery of native heart without angina pectoris    5. Type 2 diabetes mellitus with complication, with long-term current use of insulin        Plan:       Screening for AAA (aortic abdominal aneurysm)  -     US Abdominal Aorta; Future; Expected date: 08/23/2018    Hyperlipidemia associated with type 2 diabetes mellitus    Hypertension associated with diabetes    Coronary artery disease involving native coronary artery of native heart without angina pectoris    Type 2 diabetes mellitus with complication, with long-term current use of insulin  -     Hemoglobin A1c; Future; Expected date: 08/23/2018  -     Microalbumin/creatinine urine ratio; Future; Expected date: 08/23/2018  -     Basic metabolic panel; Future; Expected date: 08/23/2018  -     AST (SGOT); Future; Expected date: 08/23/2018  -     ALT (SGPT); Future; Expected date: 08/23/2018  -     Lipid panel; Future; Expected date: 08/23/2018    Other orders  -     insulin glargine (LANTUS SOLOSTAR U-100 INSULIN) 100 unit/mL (3 mL) InPn pen; Inject 75 Units into the skin once daily.  Dispense: 30 mL; Refill: 11  -     insulin lispro (HUMALOG KWIKPEN INSULIN) 100 unit/mL InPn pen; 24 units each meal with 2 units extra at 150(26 total) or 4 units extra at > 200(28 total)  Dispense: 66 mL; Refill: 11    Work on D&E, self monitoring, weight loss. Maintain meds but increase lantus to 75 ubits and  humalog to 24 with 2 units/50 at 150 and 200. F/U DM program in 1 months, me in 6 months labs.

## 2018-08-30 ENCOUNTER — TELEPHONE (OUTPATIENT)
Dept: RADIOLOGY | Facility: HOSPITAL | Age: 68
End: 2018-08-30

## 2018-08-31 ENCOUNTER — HOSPITAL ENCOUNTER (OUTPATIENT)
Dept: RADIOLOGY | Facility: HOSPITAL | Age: 68
Discharge: HOME | End: 2018-08-31
Attending: PEDIATRICS
Payer: MEDICARE

## 2018-08-31 DIAGNOSIS — Z13.6 SCREENING FOR AAA (AORTIC ABDOMINAL ANEURYSM): ICD-10-CM

## 2018-08-31 PROCEDURE — 76775 US EXAM ABDO BACK WALL LIM: CPT | Mod: 26,,, | Performed by: RADIOLOGY

## 2018-08-31 PROCEDURE — 76775 US EXAM ABDO BACK WALL LIM: CPT | Mod: TC,PO

## 2018-09-06 DIAGNOSIS — E11.8 TYPE 2 DIABETES MELLITUS WITH COMPLICATION, WITH LONG-TERM CURRENT USE OF INSULIN: ICD-10-CM

## 2018-09-06 DIAGNOSIS — I15.2 HYPERTENSION ASSOCIATED WITH DIABETES: ICD-10-CM

## 2018-09-06 DIAGNOSIS — Z79.4 TYPE 2 DIABETES MELLITUS WITH COMPLICATION, WITH LONG-TERM CURRENT USE OF INSULIN: ICD-10-CM

## 2018-09-06 DIAGNOSIS — E11.59 HYPERTENSION ASSOCIATED WITH DIABETES: ICD-10-CM

## 2018-09-07 RX ORDER — LOSARTAN POTASSIUM 50 MG/1
TABLET ORAL
Qty: 90 TABLET | Refills: 0 | Status: SHIPPED | OUTPATIENT
Start: 2018-09-07 | End: 2018-12-06 | Stop reason: SDUPTHER

## 2018-09-18 RX ORDER — ROSUVASTATIN CALCIUM 40 MG/1
TABLET, COATED ORAL
Qty: 30 TABLET | Refills: 11 | Status: SHIPPED | OUTPATIENT
Start: 2018-09-18 | End: 2019-07-23 | Stop reason: SDUPTHER

## 2018-10-05 ENCOUNTER — OFFICE VISIT (OUTPATIENT)
Dept: DIABETES | Facility: CLINIC | Age: 68
End: 2018-10-05
Payer: MEDICARE

## 2018-10-05 VITALS
BODY MASS INDEX: 30.62 KG/M2 | DIASTOLIC BLOOD PRESSURE: 82 MMHG | HEIGHT: 70 IN | WEIGHT: 213.88 LBS | SYSTOLIC BLOOD PRESSURE: 118 MMHG

## 2018-10-05 DIAGNOSIS — E11.8 TYPE 2 DIABETES MELLITUS WITH COMPLICATION, WITH LONG-TERM CURRENT USE OF INSULIN: Primary | ICD-10-CM

## 2018-10-05 DIAGNOSIS — Z79.4 TYPE 2 DIABETES MELLITUS WITH COMPLICATION, WITH LONG-TERM CURRENT USE OF INSULIN: Primary | ICD-10-CM

## 2018-10-05 LAB — GLUCOSE SERPL-MCNC: 205 MG/DL (ref 70–110)

## 2018-10-05 PROCEDURE — 99214 OFFICE O/P EST MOD 30 MIN: CPT | Mod: S$PBB,,, | Performed by: PHYSICIAN ASSISTANT

## 2018-10-05 PROCEDURE — 82948 REAGENT STRIP/BLOOD GLUCOSE: CPT | Mod: PBBFAC,PO | Performed by: PHYSICIAN ASSISTANT

## 2018-10-05 PROCEDURE — 99213 OFFICE O/P EST LOW 20 MIN: CPT | Mod: PBBFAC,PO | Performed by: PHYSICIAN ASSISTANT

## 2018-10-05 PROCEDURE — 99999 PR PBB SHADOW E&M-EST. PATIENT-LVL III: CPT | Mod: PBBFAC,,, | Performed by: PHYSICIAN ASSISTANT

## 2018-10-05 NOTE — PROGRESS NOTES
Subjective:      Patient ID: Timothy Ortega is a 67 y.o. male.    PCP: EM Ochoa Jr, MD    Timothy Ortega is a pleasant 67 y.o. male presenting to follow up on diabetes mellitus. Also, pertinent to this visit in decision making is hypertension, hyperlipidemia, and compliance. He has had diabetes for 20 or more years. His last visit in Diabetes Management was 5/18/2018. Since that time he has had significant improvement in his glycemia. His blood sugar average range fasting has been 120 and he has been monitoring 2-3 times per day. His current concerns are glycemic control.    He denies any hospital admissions, emergency room visits, hypoglycemia, syncope, diaphoresis, chest pain, or dyspnea.    He has lost 3 pounds since last visit. His BMI is 30.68 kg/m².    His blood sugar in the clinic today was:   Lab Results   Component Value Date    POCGLU 205 (A) 10/05/2018     We discussed the American diabetes Association recommendations:  hemoglobin A1c below 7.0%; all diabetics should be on statins unless contraindicated; one aspirin daily unless contraindicated; fasting blood sugar between 80 and 130 mg/dL; postprandial blood sugar below 180 mg/dl; prevention of hypoglycemia, may adjust goals to higher levels if persistent; ACE or ARB therapy if not contraindicated; and maintain in an ideal body weight with BMI below 25.    Timothy is compliant most of the time with DM medications.     Timothy is compliant most of the time with lifestyle modifications to include activity and meal planning.       Current Outpatient Medications:     ASCORBATE CALCIUM (VITAMIN C ORAL), Take by mouth once daily., Disp: , Rfl:     aspirin 81 mg Tab, Take 1 tablet by mouth Daily., Disp: , Rfl:     blood sugar diagnostic (FREESTYLE TEST) Strp, 1 strip by Misc.(Non-Drug; Combo Route) route 3 (three) times daily. Freestyle test strips, Disp: 270 strip, Rfl: 3    blood-glucose meter (FREESTYLE LITE METER) kit, Freestyle meter   Use as  "instructed, Disp: 1 each, Rfl: 0    diphth,pertus,acell,,tetanus (BOOSTRIX) 2.5-8-5 Lf-mcg-Lf/0.5mL Syrg injection, Inject into the muscle once, Disp: 0.5 mL, Rfl: 0    ERGOCALCIFEROL, VITAMIN D2, (VITAMIN D ORAL), Take by mouth once daily., Disp: , Rfl:     hydrocortisone 2.5 % cream, ERROL TO RASH BID PRN, Disp: , Rfl: 3    influenza (FLUZONE HIGH-DOSE) 180 mcg/0.5 mL vaccine, Inject into the muscle., Disp: 0.5 mL, Rfl: 0    insulin glargine (LANTUS SOLOSTAR U-100 INSULIN) 100 unit/mL (3 mL) InPn pen, Inject 75 Units into the skin once daily., Disp: 30 mL, Rfl: 11    insulin lispro (HUMALOG KWIKPEN INSULIN) 100 unit/mL InPn pen, 24 units each meal with 2 units extra at 150(26 total) or 4 units extra at > 200(28 total), Disp: 66 mL, Rfl: 11    KRILL OIL ORAL, Take 1 capsule by mouth once daily., Disp: , Rfl:     lancets 33 gauge Misc, 1 lancet by Misc.(Non-Drug; Combo Route) route 3 (three) times daily., Disp: 100 each, Rfl: 11    losartan (COZAAR) 50 MG tablet, TAKE 1 TABLET(50 MG) BY MOUTH EVERY DAY, Disp: 90 tablet, Rfl: 0    metformin (GLUCOPHAGE) 1000 MG tablet, TAKE 1 TABLET BY MOUTH TWICE DAILY WITH MEALS, Disp: 180 tablet, Rfl: 3    pen needle, diabetic (BD INSULIN PEN NEEDLE UF SHORT) 31 gauge x 5/16" Ndle, USE 5 TIMES DAILY AS DIRECTED, Disp: 450 each, Rfl: 3    rosuvastatin (CRESTOR) 40 MG Tab, TAKE ONE TABLET BY MOUTH EVERY EVENING, Disp: 30 tablet, Rfl: 11    TRULICITY 1.5 mg/0.5 mL PnIj, INJECT 1.5 MG INTO THE SKIN EVERY 7 DAYS, Disp: 2 mL, Rfl: 11    varicella-zoster gE-AS01B, PF, (SHINGRIX, PF,) 50 mcg/0.5 mL injection, Inject into the muscle., Disp: 0.5 mL, Rfl: 1    STANDARDS OF CARE:  Eye doctor: Dr. Stoddard, last exam 11/03/2017.  Dental exam: Recommend regular exams; denies gums bleeding.  Podiatry doctor:     ACTIVITY LEVEL: He exercises rarely.  MEAL PLANNING: Number of meals per day - 3. Number of snacks per day - 2.  Per dietary recall, patient is not limiting carbohydrates, " saturated fats and sodium.   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:  ACE/ARB: Yes  Statin: Yes    Health Maintenance   Topic Date Due    Pneumococcal (65+) (2 of 2 - PPSV23) 05/07/2018    Influenza Vaccine  08/01/2018    Eye Exam  11/03/2018    Hemoglobin A1c  02/13/2019    Foot Exam  05/18/2019    PROSTATE-SPECIFIC ANTIGEN  08/13/2019    Lipid Panel  08/13/2019    Urine Microalbumin  08/13/2019    Colonoscopy  03/31/2022    TETANUS VACCINE  07/20/2028    Hepatitis C Screening  Completed    Zoster Vaccine  Completed    Abdominal Aortic Aneurysm Screening  Completed       Diabetes Status:   Diabetes Management Status    Statin: Taking  ACE/ARB: Taking    Screening or Prevention Patient's value Goal Complete/Controlled?   HgA1C Testing and Control   Lab Results   Component Value Date    HGBA1C 7.9 (H) 08/13/2018      Annually/Less than 8% Yes   Lipid profile : 08/13/2018 Annually Yes   LDL control Lab Results   Component Value Date    LDLCALC 46.2 (L) 08/13/2018    Annually/Less than 100 mg/dl  Yes   Nephropathy screening Lab Results   Component Value Date    LABMICR 19.0 08/13/2018     No results found for: PROTEINUA Annually Yes   Blood pressure BP Readings from Last 1 Encounters:   10/05/18 118/82    Less than 140/90 Yes   Dilated retinal exam : 11/03/2017 Annually Yes   Foot exam   : 05/18/2018 Annually Yes     The following results were reviewed with patient.    Lab Results   Component Value Date    WBC 7.02 04/17/2015    HGB 14.7 04/17/2015    HCT 43.7 04/17/2015     04/17/2015    CHOL 120 08/13/2018    TRIG 254 (H) 08/13/2018    HDL 23 (L) 08/13/2018    LDLCALC 46.2 (L) 08/13/2018    ALT 22 08/13/2018    AST 18 08/13/2018     08/13/2018    K 4.6 08/13/2018     08/13/2018    ANIONGAP 9 08/13/2018    CREATININE 1.0 08/13/2018    ESTGFRAFRICA >60.0 08/13/2018    EGFRNONAA >60.0 08/13/2018    BUN 15  08/13/2018    CO2 28 08/13/2018    TSH 1.003 10/05/2012    PSA 0.36 08/13/2018    INR 1.0 12/01/2014     (H) 08/13/2018    UTPCR 0.10 01/25/2013       Lab Results   Component Value Date    HGBA1C 7.9 (H) 08/13/2018    HGBA1C 7.4 (H) 05/18/2018    HGBA1C 8.1 (H) 02/16/2018       Lab Results   Component Value Date    TSH 1.003 10/05/2012    TOTALTESTOST 330 04/26/2013    CALCIUM 9.6 08/13/2018       Review of patient's allergies indicates:   Allergen Reactions    No known drug allergies        Past Medical History:   Diagnosis Date    Coron[facility] artery disease 2010    [facility] - no stents    Diabetes mellitus 10-12 years     am 10/31/2014    DM (diabetes mellitus) 13 years     am 11/03/2015    DM (diabetes mellitus) 2002     lunch 10/28/2016    Hyperlipemia     Kidney stones     Neuropathy        Review of Systems   Constitutional: Negative.  Negative for activity change, appetite change, chills, diaphoresis, fatigue, fever and unexpected weight change.   HENT: Negative.  Negative for dental problem, facial swelling, hearing loss, nosebleeds, trouble swallowing and voice change.    Eyes: Negative.  Negative for photophobia, pain, discharge, redness, itching and visual disturbance.   Respiratory: Negative.  Negative for cough, choking, chest tightness, shortness of breath and wheezing.    Cardiovascular: Negative.  Negative for chest pain, palpitations and leg swelling.   Gastrointestinal: Negative.  Negative for abdominal distention, abdominal pain, blood in stool, constipation, diarrhea, nausea and vomiting.   Endocrine: Negative.  Negative for cold intolerance, heat intolerance, polydipsia, polyphagia and polyuria.   Genitourinary: Negative.  Negative for decreased urine volume, difficulty urinating, dysuria, frequency, scrotal swelling, testicular pain and urgency.   Musculoskeletal: Negative.  Negative for arthralgias, back pain, gait problem, joint swelling, myalgias, neck pain and neck  "stiffness.   Skin: Negative.  Negative for color change, pallor, rash and wound.   Allergic/Immunologic: Negative.  Negative for environmental allergies, food allergies and immunocompromised state.   Neurological: Negative.  Negative for dizziness, tremors, seizures, syncope, facial asymmetry, speech difficulty, weakness, light-headedness, numbness and headaches.   Hematological: Negative.  Negative for adenopathy. Does not bruise/bleed easily.   Psychiatric/Behavioral: Negative.  Negative for agitation, behavioral problems, confusion, decreased concentration, dysphoric mood, hallucinations, self-injury, sleep disturbance and suicidal ideas. The patient is not nervous/anxious and is not hyperactive.       Objective:     Vitals - 1 value per visit 5/18/2018 8/17/2018 8/23/2018   SYSTOLIC 128 120 122   DIASTOLIC 72 74 80   PULSE - 70 81   TEMPERATURE - - 98.4   RESPIRATIONS - 18 18   SPO2 - 97 96   Weight (lb) 215.61 215.39 216.27   Weight (kg) 97.8 97.7 98.1   HEIGHT 5' 11" 5' 10.984" 5' 11"   BODY MASS INDEX 30.07 30.05 30.16   VISIT REPORT - - -   Pain Score  0 - 0   Some recent data might be hidden       Physical Exam   Constitutional: He is oriented to person, place, and time. He appears well-developed and well-nourished. He is cooperative.  Non-toxic appearance. He does not have a sickly appearance. He does not appear ill. No distress. He is not intubated.   HENT:   Head: Normocephalic and atraumatic. Not macrocephalic and not microcephalic. Head is without raccoon's eyes, without Noriega's sign, without abrasion, without contusion, without laceration, without right periorbital erythema and without left periorbital erythema. Hair is normal.   Right Ear: External ear normal. No lacerations. No drainage, swelling or tenderness. No foreign bodies. No mastoid tenderness. Tympanic membrane is not injected, not scarred, not perforated, not erythematous, not retracted and not bulging. Tympanic membrane mobility is " normal. No middle ear effusion. No hemotympanum. No decreased hearing is noted.   Left Ear: External ear normal. No lacerations. No drainage, swelling or tenderness. No foreign bodies. No mastoid tenderness. Tympanic membrane is not injected, not scarred, not perforated, not erythematous, not retracted and not bulging. Tympanic membrane mobility is normal.  No middle ear effusion. No hemotympanum. No decreased hearing is noted.   Nose: Nose normal.   Mouth/Throat: Oropharynx is clear and moist.   Eyes: EOM and lids are normal. Pupils are equal, round, and reactive to light. Right eye exhibits no chemosis, no discharge, no exudate and no hordeolum. No foreign body present in the right eye. Left eye exhibits no chemosis, no discharge, no exudate and no hordeolum. No foreign body present in the left eye. Right conjunctiva is not injected. Right conjunctiva has no hemorrhage. Left conjunctiva is not injected. Left conjunctiva has no hemorrhage. No scleral icterus. Right eye exhibits normal extraocular motion and no nystagmus. Left eye exhibits normal extraocular motion and no nystagmus. Right pupil is round and reactive. Left pupil is round and reactive. Pupils are equal.   Neck: Normal range of motion, full passive range of motion without pain and phonation normal. Neck supple. Normal carotid pulses, no hepatojugular reflux and no JVD present. No tracheal tenderness, no spinous process tenderness and no muscular tenderness present. Carotid bruit is not present. No neck rigidity. No tracheal deviation, no edema, no erythema and normal range of motion present. No thyroid mass and no thyromegaly present.   Cardiovascular: Normal rate, regular rhythm, normal heart sounds and intact distal pulses.  No extrasystoles are present. PMI is not displaced. Exam reveals no gallop, no friction rub and no decreased pulses.   No murmur heard.  Pulses:       Carotid pulses are 2+ on the right side, and 2+ on the left side.       Radial  pulses are 2+ on the right side, and 2+ on the left side.        Dorsalis pedis pulses are 2+ on the right side, and 2+ on the left side.        Posterior tibial pulses are 2+ on the right side, and 2+ on the left side.   Pulmonary/Chest: Effort normal and breath sounds normal. No accessory muscle usage or stridor. No apnea, no tachypnea and no bradypnea. He is not intubated. No respiratory distress. He has no decreased breath sounds. He has no wheezes. He has no rhonchi. He has no rales. He exhibits no tenderness.   Abdominal: Soft. Bowel sounds are normal. He exhibits no shifting dullness, no distension, no pulsatile liver, no fluid wave, no abdominal bruit, no ascites, no pulsatile midline mass and no mass. There is no hepatosplenomegaly, splenomegaly or hepatomegaly. There is no tenderness. There is no rigidity, no rebound, no guarding, no CVA tenderness and no tenderness at McBurney's point. No hernia. Hernia confirmed negative in the ventral area.   Musculoskeletal: Normal range of motion. He exhibits no edema or tenderness.        Right foot: There is normal range of motion and no deformity.        Left foot: There is normal range of motion and no deformity.   Feet:   Right Foot:   Protective Sensation: 6 sites tested. 6 sites sensed.   Skin Integrity: Negative for ulcer, blister, skin breakdown, erythema, warmth, callus or dry skin.   Left Foot:   Protective Sensation: 6 sites tested. 6 sites sensed.   Skin Integrity: Negative for ulcer, blister, skin breakdown, erythema, warmth, callus or dry skin.   Lymphadenopathy:        Head (right side): No submental, no submandibular, no tonsillar, no preauricular, no posterior auricular and no occipital adenopathy present.        Head (left side): No submental, no submandibular, no tonsillar, no preauricular, no posterior auricular and no occipital adenopathy present.     He has no cervical adenopathy.        Right cervical: No superficial cervical, no deep cervical  and no posterior cervical adenopathy present.       Left cervical: No superficial cervical, no deep cervical and no posterior cervical adenopathy present.   Neurological: He is alert and oriented to person, place, and time. He has normal reflexes. He displays no atrophy and no tremor. No cranial nerve deficit or sensory deficit. He exhibits normal muscle tone. He displays no seizure activity. Coordination and gait normal.   Reflex Scores:       Bicep reflexes are 2+ on the right side and 2+ on the left side.       Brachioradialis reflexes are 2+ on the right side and 2+ on the left side.       Patellar reflexes are 2+ on the right side and 2+ on the left side.  Skin: Skin is warm and dry. No rash noted. No erythema. No pallor.   Psychiatric: He has a normal mood and affect. His mood appears not anxious. His affect is not angry, not blunt, not labile and not inappropriate. His speech is not rapid and/or pressured, not delayed, not tangential and not slurred. He is not agitated, not aggressive, not hyperactive, not slowed, not withdrawn, not actively hallucinating and not combative. Thought content is not paranoid and not delusional. Cognition and memory are not impaired. He does not express impulsivity or inappropriate judgment. He does not exhibit a depressed mood. He expresses no homicidal and no suicidal ideation. He expresses no suicidal plans and no homicidal plans. He is communicative. He exhibits normal recent memory and normal remote memory. He is attentive.     Assessment:     1. Type 2 diabetes mellitus with complication, with long-term current use of insulin       Plan:   Timothy Ortega is seen today for   1. Type 2 diabetes mellitus with complication, with long-term current use of insulin      We have discussed the etiology and treatment options associated with the diagnosis as well as alternatives.       He has elected the following treatments.     Type 2 diabetes mellitus with complication, with  long-term current use of insulin  -     POCT glucose  -  He has had his pre-meal insulin increased with limited effect. However I have asked him to continue at current level.  - Titrate his basal insulin at HS  With goal fasting BG between   - Will add Jardiance today  - F/U in 3 months.    1.) Patient was instructed to continue to monitor blood glucose 4 times daily. Reminded to bring BG meter or record to each visit for review.  2.) Reviewed pathophysiology of diabetes, complications related to the disease, importance of annual dilated eye exam and self daily foot examination.  3.) Continue medications as prescribed MDI with Lantus and Humalog; Trulicity; Metformin. [facility] MyChart or Phone review in 1 week with BG records for adjustment of medication.  4.) Advised patient to continue to follow up with Dietician/CDE as directed.  5.) Discussed activity, benefits, methods, and precautions. Recommended patient start/continue some form of exercise and increase as tolerated to 60 minutes per day to facilitate weight loss and aid in control of BGs. Also reminded patient of WHO recommendation of 10,000 steps daily as a goal.   6.) A1C, TSH, Lipid Panel, CMP/renal panel with eGFR and Micro/Creatinine prior to next visit, if not already done.  7.) Return to clinic in 12 weeks for follow up. Advised patient to call clinic with any questions or concerns.    A total of 30 minutes was spent in face to face time, of which 50 % was spent in counseling patient on disease process, complications, treatment, and side effects of medications.    The patient was explained the above plan and given opportunity to ask questions.  He understands, chooses and consents to this plan and accepts all the risks, which include but are not limited to the risks mentioned above.   He understands the alternative of having no testing, interventions or treatments at this time. He left content and without further questions.     Disclaimer:  This  note is prepared using voice recognition software and as such is likely to have errors and has not been proof read. Please contact me for questions.

## 2018-10-19 ENCOUNTER — OFFICE VISIT (OUTPATIENT)
Dept: PULMONOLOGY | Facility: CLINIC | Age: 68
End: 2018-10-19
Payer: MEDICARE

## 2018-10-19 VITALS
HEART RATE: 70 BPM | HEIGHT: 71 IN | SYSTOLIC BLOOD PRESSURE: 110 MMHG | OXYGEN SATURATION: 97 % | WEIGHT: 215.38 LBS | BODY MASS INDEX: 30.15 KG/M2 | DIASTOLIC BLOOD PRESSURE: 64 MMHG | RESPIRATION RATE: 18 BRPM

## 2018-10-19 DIAGNOSIS — G47.33 OSA (OBSTRUCTIVE SLEEP APNEA): Primary | ICD-10-CM

## 2018-10-19 DIAGNOSIS — Z23 NEED FOR 23-POLYVALENT PNEUMOCOCCAL POLYSACCHARIDE VACCINE: ICD-10-CM

## 2018-10-19 PROCEDURE — 90662 IIV NO PRSV INCREASED AG IM: CPT | Mod: PBBFAC,PO

## 2018-10-19 PROCEDURE — 90732 PPSV23 VACC 2 YRS+ SUBQ/IM: CPT | Mod: PBBFAC,PO

## 2018-10-19 PROCEDURE — 99999 PR PBB SHADOW E&M-EST. PATIENT-LVL V: CPT | Mod: PBBFAC,,, | Performed by: NURSE PRACTITIONER

## 2018-10-19 PROCEDURE — 99215 OFFICE O/P EST HI 40 MIN: CPT | Mod: PBBFAC,PO,25 | Performed by: NURSE PRACTITIONER

## 2018-10-19 PROCEDURE — 99213 OFFICE O/P EST LOW 20 MIN: CPT | Mod: 25,S$PBB,, | Performed by: NURSE PRACTITIONER

## 2018-10-19 NOTE — PROGRESS NOTES
"Subjective:      Patient ID: Timothy Ortega is a 67 y.o. male.    Chief Complaint: Sleep Apnea    HPI  Presents to office for review of CPAP therapy. Recent replacement. Patient states improved symptoms with use of CPAP. Sleeping more soundly. Waking up feeling more refreshed. Improved daytime sleepiness. Patient states he is benefiting from use of the CPAP.     Patient Active Problem List   Diagnosis    Obstructive sleep apnea    Hyperlipidemia associated with type 2 diabetes mellitus    Hypertension associated with diabetes    CAD (coronary artery disease)    Type 2 diabetes mellitus with complication, with long-term current use of insulin    History of colon polyps     /64   Pulse 70   Resp 18   Ht 5' 10.8" (1.798 m)   Wt 97.7 kg (215 lb 6.2 oz)   SpO2 97%   BMI 30.21 kg/m²   Body mass index is 30.21 kg/m².    Review of Systems   Constitutional: Negative.    HENT: Negative.    Respiratory: Negative.    Cardiovascular: Negative.    Musculoskeletal: Negative.    Gastrointestinal: Negative.    Neurological: Negative.    Psychiatric/Behavioral: Negative.      Objective:      Physical Exam   Constitutional: He is oriented to person, place, and time. He appears well-developed and well-nourished.   HENT:   Head: Normocephalic and atraumatic.   Mouth/Throat: Oropharynx is clear and moist.   Mallampati Score: II     Neck: Normal range of motion. Neck supple.   Cardiovascular: Normal rate and regular rhythm.   Pulmonary/Chest: Effort normal and breath sounds normal.   Abdominal: Soft.   Musculoskeletal: Normal range of motion. He exhibits no edema.   Neurological: He is alert and oriented to person, place, and time.   Skin: Skin is warm and dry.   Psychiatric: He has a normal mood and affect.     Personal Diagnostic Review    CPAP download shows patient wears on average 6 hrs and 33 minutes. Greater than 4 hrs 96.7 % of the time. AHI 1.6 over the last 30 days    Assessment:       1. BRIANA (obstructive " "sleep apnea)    2. Need for 23-polyvalent pneumococcal polysaccharide vaccine        Outpatient Encounter Medications as of 10/19/2018   Medication Sig Dispense Refill    ASCORBATE CALCIUM (VITAMIN C ORAL) Take by mouth once daily.      aspirin 81 mg Tab Take 1 tablet by mouth Daily.      blood sugar diagnostic (FREESTYLE TEST) Strp 1 strip by Misc.(Non-Drug; Combo Route) route 3 (three) times daily. Freestyle test strips 270 strip 3    diphth,pertus,acell,,tetanus (BOOSTRIX) 2.5-8-5 Lf-mcg-Lf/0.5mL Syrg injection Inject into the muscle once 0.5 mL 0    empagliflozin (JARDIANCE) 10 mg Tab Take 10 mg by mouth once daily. 30 tablet 11    ERGOCALCIFEROL, VITAMIN D2, (VITAMIN D ORAL) Take by mouth once daily.      hydrocortisone 2.5 % cream ERROL TO RASH BID PRN  3    insulin glargine (LANTUS SOLOSTAR U-100 INSULIN) 100 unit/mL (3 mL) InPn pen Inject 75 Units into the skin once daily. 30 mL 11    insulin lispro (HUMALOG KWIKPEN INSULIN) 100 unit/mL InPn pen 24 units each meal with 2 units extra at 150(26 total) or 4 units extra at > 200(28 total) 66 mL 11    KRILL OIL ORAL Take 1 capsule by mouth once daily.      lancets 33 gauge Misc 1 lancet by Misc.(Non-Drug; Combo Route) route 3 (three) times daily. 100 each 11    losartan (COZAAR) 50 MG tablet TAKE 1 TABLET(50 MG) BY MOUTH EVERY DAY 90 tablet 0    metformin (GLUCOPHAGE) 1000 MG tablet TAKE 1 TABLET BY MOUTH TWICE DAILY WITH MEALS 180 tablet 3    pen needle, diabetic (BD INSULIN PEN NEEDLE UF SHORT) 31 gauge x 5/16" Ndle USE 5 TIMES DAILY AS DIRECTED 450 each 3    rosuvastatin (CRESTOR) 40 MG Tab TAKE ONE TABLET BY MOUTH EVERY EVENING 30 tablet 11    TRULICITY 1.5 mg/0.5 mL PnIj INJECT 1.5 MG INTO THE SKIN EVERY 7 DAYS 2 mL 11    blood-glucose meter (FREESTYLE LITE METER) kit Freestyle meter   Use as instructed 1 each 0    influenza (FLUZONE HIGH-DOSE) 180 mcg/0.5 mL vaccine Inject into the muscle. 0.5 mL 0    varicella-zoster gE-AS01B, PF, " (SHINGRIX, PF,) 50 mcg/0.5 mL injection Inject into the muscle. 0.5 mL 1     No facility-administered encounter medications on file as of 10/19/2018.      Orders Placed This Encounter   Procedures    CPAP/BIPAP SUPPLIES     Order Specific Question:   Type of mask:     Answer:   FFM     Order Specific Question:   Headgear?     Answer:   Yes     Order Specific Question:   Tubing?     Answer:   Yes     Order Specific Question:   Humidifier chamber?     Answer:   Yes     Order Specific Question:   Chin strap?     Answer:   Yes     Order Specific Question:   Filters?     Answer:   Yes     Order Specific Question:   Length of need (1-99 months):     Answer:   99    Influenza - High Dose (65+) (PF) (IM)    Pneumococcal polysaccharide vaccine 23-valent greater than or equal to 1yo subcutaneous/IM     Plan:   Doing well on PAP settings. Patient is compliant. Follow up in 12 months with PAP data download or call earlier if any problems.

## 2018-11-07 ENCOUNTER — TELEPHONE (OUTPATIENT)
Dept: PHARMACY | Facility: CLINIC | Age: 68
End: 2018-11-07

## 2018-11-07 NOTE — TELEPHONE ENCOUNTER
Called and spoke with patient reminding him second dose of Shingrix vaccination is due.    Patient will come on Friday 11/16/18 to get vaccination.    Thanks,   Joanna Gamboa CPhT, B.A  Patient Care Advocate   [facility]  Phone: [phone] Ext [phone]  Fax: [phone]

## 2018-11-13 RX ORDER — METFORMIN HYDROCHLORIDE 1000 MG/1
TABLET ORAL
Qty: 180 TABLET | Refills: 3 | Status: SHIPPED | OUTPATIENT
Start: 2018-11-13 | End: 2019-09-27 | Stop reason: SDUPTHER

## 2018-12-06 DIAGNOSIS — E11.59 HYPERTENSION ASSOCIATED WITH DIABETES: ICD-10-CM

## 2018-12-06 DIAGNOSIS — I15.2 HYPERTENSION ASSOCIATED WITH DIABETES: ICD-10-CM

## 2018-12-06 DIAGNOSIS — Z79.4 TYPE 2 DIABETES MELLITUS WITH COMPLICATION, WITH LONG-TERM CURRENT USE OF INSULIN: ICD-10-CM

## 2018-12-06 DIAGNOSIS — E11.8 TYPE 2 DIABETES MELLITUS WITH COMPLICATION, WITH LONG-TERM CURRENT USE OF INSULIN: ICD-10-CM

## 2018-12-10 RX ORDER — LOSARTAN POTASSIUM 50 MG/1
TABLET ORAL
Qty: 90 TABLET | Refills: 0 | Status: SHIPPED | OUTPATIENT
Start: 2018-12-10 | End: 2019-03-01 | Stop reason: SDUPTHER

## 2018-12-26 ENCOUNTER — PATIENT MESSAGE (OUTPATIENT)
Dept: INTERNAL MEDICINE | Facility: CLINIC | Age: 68
End: 2018-12-26

## 2018-12-26 DIAGNOSIS — I83.893 SYMPTOMATIC VARICOSE VEINS, BILATERAL: ICD-10-CM

## 2018-12-26 DIAGNOSIS — G89.29 CHRONIC BACK PAIN GREATER THAN 3 MONTHS DURATION: Primary | ICD-10-CM

## 2018-12-26 DIAGNOSIS — M54.5 CHRONIC LOW BACK PAIN, UNSPECIFIED BACK PAIN LATERALITY, WITH SCIATICA PRESENCE UNSPECIFIED: Primary | ICD-10-CM

## 2018-12-26 DIAGNOSIS — G89.29 CHRONIC LOW BACK PAIN, UNSPECIFIED BACK PAIN LATERALITY, WITH SCIATICA PRESENCE UNSPECIFIED: Primary | ICD-10-CM

## 2018-12-26 DIAGNOSIS — M54.9 CHRONIC BACK PAIN GREATER THAN 3 MONTHS DURATION: Primary | ICD-10-CM

## 2018-12-26 NOTE — TELEPHONE ENCOUNTER
See [employer] message. Referrals sent to provider for authorization.    LV 08/23/18  [place] 02/22/19

## 2018-12-28 ENCOUNTER — IMMUNIZATION (OUTPATIENT)
Dept: PHARMACY | Facility: CLINIC | Age: 68
End: 2018-12-28
Payer: MEDICARE

## 2018-12-28 NOTE — TELEPHONE ENCOUNTER
Pt showed up to clinic stating he was told he was seeing PMR and [facility] appt today. See [facility] message - pt advised per Dr. Ochoa referrals were placed for Dr. Nicole price/ [facility] [place] and PMR provider, and their offices would be calling pt to sched appts w/ their providers.

## 2018-12-28 NOTE — TELEPHONE ENCOUNTER
Message sent to PMR dept for their office to call and sched pt for appt. Faxed referral to [facility] w/ [facility] and demographics, instructing their office to call pt and sched for appt w/ Dr. Rivera, fax transmission confirmed [phone].

## 2018-12-31 ENCOUNTER — TELEPHONE (OUTPATIENT)
Dept: PHYSICAL MEDICINE AND REHAB | Facility: CLINIC | Age: 68
End: 2018-12-31

## 2018-12-31 NOTE — TELEPHONE ENCOUNTER
----- Message from Norris Campbell LPN sent at 12/28/2018 11:42 AM CST -----  Contact: PCP      ----- Message -----  From: Debra Rowe MA  Sent: 12/28/2018  11:14 AM  To: [facility] Staff    See referral Dr. Ochoa. Please call pt to sched appt w/ next available provider. Thank you/JULIA

## 2019-01-05 RX ORDER — LANCETS 28 GAUGE
EACH MISCELLANEOUS
Qty: 100 EACH | Refills: 0 | Status: SHIPPED | OUTPATIENT
Start: 2019-01-05

## 2019-01-28 ENCOUNTER — OFFICE VISIT (OUTPATIENT)
Dept: PHYSICAL MEDICINE AND REHAB | Facility: CLINIC | Age: 69
End: 2019-01-28
Payer: MEDICARE

## 2019-01-28 VITALS
RESPIRATION RATE: 14 BRPM | SYSTOLIC BLOOD PRESSURE: 165 MMHG | HEIGHT: 71 IN | BODY MASS INDEX: 28.7 KG/M2 | DIASTOLIC BLOOD PRESSURE: 76 MMHG | WEIGHT: 205 LBS | HEART RATE: 82 BPM

## 2019-01-28 DIAGNOSIS — M46.1 SACROILIITIS, NOT ELSEWHERE CLASSIFIED: Primary | ICD-10-CM

## 2019-01-28 DIAGNOSIS — M62.9 HAMSTRING TIGHTNESS OF BOTH LOWER EXTREMITIES: ICD-10-CM

## 2019-01-28 PROCEDURE — 99999 PR PBB SHADOW E&M-EST. PATIENT-LVL V: ICD-10-PCS | Mod: PBBFAC,,, | Performed by: PHYSICAL MEDICINE & REHABILITATION

## 2019-01-28 PROCEDURE — 99204 OFFICE O/P NEW MOD 45 MIN: CPT | Mod: S$PBB,,, | Performed by: PHYSICAL MEDICINE & REHABILITATION

## 2019-01-28 PROCEDURE — 99204 PR OFFICE/OUTPT VISIT, NEW, LEVL IV, 45-59 MIN: ICD-10-PCS | Mod: S$PBB,,, | Performed by: PHYSICAL MEDICINE & REHABILITATION

## 2019-01-28 PROCEDURE — 99999 PR PBB SHADOW E&M-EST. PATIENT-LVL V: CPT | Mod: PBBFAC,,, | Performed by: PHYSICAL MEDICINE & REHABILITATION

## 2019-01-28 PROCEDURE — 99215 OFFICE O/P EST HI 40 MIN: CPT | Mod: PBBFAC,PN | Performed by: PHYSICAL MEDICINE & REHABILITATION

## 2019-01-28 NOTE — PROGRESS NOTES
PM&R NEW PATIENT HISTORY & PHYSICAL :    Referring Physician:    Chief Complaint   Patient presents with    Back Pain     low back pain, to the right buttocks     HPI: This is a 68 y.o.  male being seen in clinic today for evaluation of chronic low back pain that has worsened over the past few months.  He describes an achy pain in his low back that is worse after periods of inactivity.  With movement and change of position, he has a slow improvement of symptoms.  He denies numbness, tingling, or weakness into his legs.     History obtained from patient    Functional History:  Walking: Not limited  Transfers: Independent  Assistive devices: No  Power mobility: No  Falls: None     Needs help with:  Nothing - all ADLS normal    Cooking   Cleaning  Bathing   Dressing   Toileting     Past family, medical, social, and surgical history reviewed in chart    Review of Systems:     General- denies lethargy, weight change, fever, chills  Head/neck- denies swallowing difficulties  ENT- denies hearing changes  Cardiovascular-denies chest pain  Pulmonary- denies shortness of breath  GI- denies constipation or bowel incontinence  - denies bladder incontinence  Skin- denies wounds or rashes  Musculoskeletal- denies weakness, +pain  Neurologic- denies numbness and tingling  Psychiatric- denies depressive or psychotic features, denies anxiety  Lymphatic-denies swelling  Endocrine- denies hypoglycemic symptoms/DM history  All other pertinent systems negative     Physical Examination:  General: Well developed, well nourished male, NAD  HEENT:NCAT EOMI bilaterally   Pulmonary:Normal respirations    Spinal Examination: CERVICAL  Active ROM is within normal limits.  Inspection: No deformity of spinal alignment.      Spinal Examination: LUMBAR or THORACIC  Active ROM is within normal limits except limited at full flex due to tightness  Inspection: No deformity of spinal alignment.  No palpable olisthesis.  Palpation: No vertebral  tenderness to percussion.  ttp at si joints  RENETTA: positive bilaterally  Facet loading mild +  SLR Test (seated and supine):negative to greater than 70 degrees bilaterally, tight hamstrings  Able to stand on heels and toes    Bilateral Upper and Lower Extremities:  Pulses are 2+ at radial,  bilaterally.  Shoulder/Elbow/Wrist/Hand ROM   Hip/Knee/Ankle ROM wnl, mild discomfort with hip ext rot  Bilateral Extremities show normal capillary refill.  No signs of cyanosis, rubor, edema, skin changes, or dysvascular changes of appendages.  Nails appear intact.    Neurological Exam:  Cranial Nerves:  II-XII grossly intact    Manual Muscle Testing: (Motor 5=normal)  RIGHT Lower extremity: Hip flexion 5/5, Hip Abduction 5/5, Hip Adduction 5/5, Knee extension 5/5, Knee flexion 5/5, Ankle dorsiflexion 5/5, Extensor hallucis longus 5/5, Ankle plantarflexion 5/5  LEFT Lower extremity:  Hip flexion 5/5, Hip Abduction 5/5,Hip Adduction 5/5, Knee extension 5/5, Knee flexion 5/5, Ankle dorsiflexion 5/5, Extensor hallucis longus 5/5, Ankle plantarflexion 5/5    No focal atrophy is noted of either  lower extremity.    Bilateral Reflexes:hypo at patellar  No clonus at knee or ankle.    Sensation: tested to light touch  - intact in legs  Gait: Narrow base and good arm swing.      IMPRESSION/PLAN: This is a 68 y.o.  male with sacroiliitis, prob mild facet arthropathy, hip DJD-mild, hamstring tightness     1. Handouts on back care, core and hip strengthening, stretch, etc provided  2. Cont otc nsaids or tylenol prn, ice/heat modalities  3. Pt will focus on diet and weight loss  4. If not improving, will consider formal PT    Vaishnavi Dickinson M.D.  Physical Medicine and Rehab

## 2019-01-28 NOTE — LETTER
January 28, 2019      SHANE Ochoa Jr., MD  [address]  [place] [address]           [facility]  [address]  [place] [address]  Phone: [phone]  Fax: [phone]          Patient: Timothy Ortega   MR Number: [id number]   YOB: 1950   Date of Visit: 1/28/2019       Dear Dr. SHANE Ochoa Jr.:    Thank you for referring Timothy Ortega to me for evaluation. Attached you will find relevant portions of my assessment and plan of care.    If you have questions, please do not hesitate to call me. I look forward to following Timothy Ortega along with you.    Sincerely,    Vaishnavi Dickinson MD    Enclosure  CC:  No Recipients    If you would like to receive this communication electronically, please contact [email or web address] or [phone] to request more information on [employer] access.    For providers and/or their staff who would like to refer a patient to [facility], please contact us through our one-stop-shop provider referral line, [facility], at [phone].    If you feel you have received this communication in error or would no longer like to receive these types of communications, please e-mail [email or web address]

## 2019-01-28 NOTE — PATIENT INSTRUCTIONS
Back Care Tips    Caring for your back  These are things you can do to prevent a recurrence of acute back pain and to reduce symptoms from chronic back pain:  · Maintain a healthy weight. If you are overweight, losing weight will help most types of back pain.  · Exercise is an important part of recovery from most types of back pain. The muscles behind and in front of the spine support the back. This means strengthening both the back muscles and the abdominal muscles will provide better support for your spine.   · Swimming and brisk walking are good overall exercises to improve your fitness level.  · Practice safe lifting methods (below).  · Practice good posture when sitting, standing and walking. Avoid prolonged sitting. This puts more stress on the lower back than standing or walking.  · Wear quality shoes with sufficient arch support. Foot and ankle alignment can affect back symptoms. Women should avoid wearing high heels.  · Therapeutic massage can help relax the back muscles without stretching them.  · During the first 24 to 72 hours after an acute injury or flare-up of chronic back pain, apply an ice pack to the painful area for 20 minutes and then remove it for 20 minutes, over a period of 60 to 90 minutes, or several times a day. As a safety precaution, do not use a heating pad at bedtime. Sleeping on a heating pad can lead to skin burns or tissue damage.  · You can alternate ice and heat therapies.  Medications  Talk to your healthcare provider before using medicines, especially if you have other medical problems or are taking other medicines.  · You may use acetaminophen or ibuprofen to control pain, unless your healthcare provider prescribed other pain medicine. If you have chronic conditions like diabetes, liver or kidney disease, stomach ulcers, or gastrointestinal bleeding, or are taking blood thinners, talk with your healthcare provider before taking any medicines.  · Be careful if you are given  prescription pain medicines, narcotics, or medicine for muscle spasm. They can cause drowsiness, affect your coordination, reflexes, and judgment. Do not drive or operate heavy machinery while taking these types of medicines. Take prescription pain medicine only as prescribed by your healthcare provider.  Lumbar stretch  Here is a simple stretching exercise that will help relax muscle spasm and keep your back more limber. If exercise makes your back pain worse, dont do it.  · Lie on your back with your knees bent and both feet on the ground.  · Slowly raise your left knee to your chest as you flatten your lower back against the floor. Hold for 5 seconds.  · Relax and repeat the exercise with your right knee.  · Do 10 of these exercises for each leg.  Safe lifting method  · Dont bend over at the waist to lift an object off the floor.  Instead, bend your knees and hips in a squat.   · Keep your back and head upright  · Hold the object close to your body, directly in front of you.  · Straighten your legs to lift the object.   · Lower the object to the floor in the reverse fashion.  · If you must slide something across the floor, push it.  Posture tips  Sitting  Sit in chairs with straight backs or low-back support. Keep your knees lower than your hips, with your feet flat on the floor.  When driving, sit up straight. Adjust the seat forward so you are not leaning toward the steering wheel.  A small pillow or rolled towel behind your lower back may help if you are driving long distances.   Standing  When standing for long periods, shift most of your weight to one leg at a time. Alternate legs every few minutes.   Sleeping  The best way to sleep is on your side with your knees bent. Put a low pillow under your head to support your neck in a neutral spine position. Avoid thick pillows that bend your neck to one side. Put a pillow between your legs to further relax your lower back. If you sleep on your back, put pillows  under your knees to support your legs in a slightly flexed position. Use a firm mattress. If your mattress sags, replace it, or use a 1/2-inch plywood board under the mattress to add support.  Follow-up care  Follow up with your healthcare provider, or as advised.  If X-rays, a CT scan or an MRI scan were taken, they will be reviewed by a radiologist. You will be notified of any new findings that may affect your care.  Call 911  Seek emergency medical care if any of the following occur:  · Trouble breathing  · Confusion  · Very drowsy  · Fainting or loss of consciousness  · Rapid or very slow heart rate  · Loss of  bowel or bladder control  When to seek medical care  Call your healthcare provider if any of the following occur:  · Pain becomes worse or spreads to your arms or legs  · Weakness or numbness in one or both arms or legs  · Numbness in the groin area  Date Last Reviewed: 6/1/2016  © 6130-3295 [employer]. [address] [place] [address]. All rights reserved. This information is not intended as a substitute for professional medical care. Always follow your healthcare professional's instructions.        Exercises to Strengthen Your Lower Back  Strong lower back and abdominal muscles work together to support your spine. The exercises below will help strengthen the lower back. It is important that you begin exercising slowly and increase levels gradually.  Always begin any exercise program with stretching. If you feel pain while doing any of these exercises, stop and talk to your doctor about a more specific exercise program that better suits your condition.   Low back stretch  The point of stretching is to make you more flexible and increase your range of motion. Stretch only as much as you are able. Stretch slowly. Do not push your stretch to the limit. If at any point you feel pain while stretching, this is your (temporary) limit.  · Lie on your back with your knees bent and both  feet on the ground.  · Slowly raise your left knee to your chest as you flatten your lower back against the floor. Hold for 5 seconds.  · Relax and repeat the exercise with your right knee.  · Do 10 of these exercises for each leg.  · Repeat hugging both knees to your chest at the same time.  Building lower back strength  Start your exercise routine with 10 to 30 minutes a day, 1 to 3 times a day.  Initial exercises  Lying on your back:  1. Ankle pumps: Move your foot up and down, towards your head, and then away. Repeat 10 times with each foot.  2. Heel slides: Slowly bend your knee, drawing the heel of your foot towards you. Then slide your heel/foot from you, straightening your knee. Do not lift your foot off the floor (this is not a leg lift).  3. Abdominal contraction: Bend your knees and put your hands on your stomach. Tighten your stomach muscles. Hold for 5 seconds, then relax. Repeat 10 times.  4. Straight leg raise: Bend one leg at the knee and keep the other leg straight. Tighten your stomach muscles. Slowly lift your straight leg 6 to 12 inches off the floor and hold for up to 5 seconds. Repeat 10 times on each side.  Standin. Wall squats: Stand with your back against the wall. Move your feet about 12 inches away from the wall. Tighten your stomach muscles, and slowly bend your knees until they are at about a 45 degree angle. Do not go down too far. Hold about 5 seconds. Then slowly return to your starting position. Repeat 10 times.  2. Heel raises: Stand facing the wall. Slowly raise the heels of your feet up and down, while keeping your toes on the floor. If you have trouble balancing, you can touch the wall with your hands. Repeat 10 times.  More advanced exercises  When you feel comfortable enough, try these exercises.  1. Kneeling lumbar extension: Begin on your hands and knees. At the same time, raise and straighten your right arm and left leg until they are parallel to the ground. Hold for 2  seconds and come back slowly to a starting position. Repeat with left arm and right leg, alternating 10 times.  2. Prone lumbar extension: Lie face down, arms extended overhead, palms on the floor. At the same time, raise your right arm and left leg as high as comfortably possible. Hold for 10 seconds and slowly return to start. Repeat with left arm and right leg, alternating 10 times. Gradually build up to 20 times. (Advanced: Repeat this exercise raising both arms and both legs a few inches off the floor at the same time. Hold for 5 seconds and release.)  3. Pelvic tilt: Lie on the floor on your back with your knees bent at 90 degrees. Your feet should be flat on the floor. Inhale, exhale, then slowly contract your abdominal muscles bringing your navel toward your spine. Let your pelvis rock back until your lower back is flat on the floor. Hold for 10 seconds while breathing smoothly.  4. Abdominal crunch: Perform a pelvic tilt (above) flattening your lower back against the floor. Holding the tension in your abdominal muscles, take another breath and raise your shoulder blades off the ground (this is not a full sit-up). Keep your head in line with your body (dont bend your neck forward). Hold for 2 seconds, then slowly lower.  Date Last Reviewed: 6/1/2016  © 7280-6894 [employer]. [address] [place] [address], [place] [address]. All rights reserved. This information is not intended as a substitute for professional medical care. Always follow your healthcare professional's instructions.        Relieving Back Pain  Back pain is a common problem. You can strain back muscles by lifting too much weight or just by moving the wrong way. Back strain can be uncomfortable, even painful. And it can take weeks or months to improve. To help yourself feel better and prevent future back strains, try these tips.  Important Note: Do not give aspirin to children or teens without first discussing it with your healthcare  provider.      ? Ice    Ice reduces muscle pain and swelling. It helps most during the first 24 to 48 hours after an injury.  · Wrap an ice pack or a bag of frozen peas in a thin towel. (Never place ice directly on your skin.)  · Place the ice where your back hurts the most.  · Dont ice for more than 20 minutes at a time.  · You can use ice several times a day.  ? Medicines  Over-the-counter pain relievers can include acetaminophen and anti-inflammatory medicines, which includes aspirin or ibuprofen. They can help ease discomfort. Some also reduce swelling.  · Tell your healthcare provider about any medicines you are already taking.  · Take medicines only as directed.  ? Heat  After the first 48 hours, heat can relax sore muscles and improve blood flow.  · Try a warm bath or shower. Or use a heating pad set on low. To prevent a burn, keep a cloth between you and the heating pad.  · Dont use a heating pad for more than 15 minutes at a time. Never sleep on a heating pad.  Date Last Reviewed: 9/1/2015 © 2000-2017 [employer]. [address] [place] [address]. All rights reserved. This information is not intended as a substitute for professional medical care. Always follow your healthcare professional's instructions.        Possible Causes of Low Back or Leg Pain    The symptoms in your back or leg may be due to pressure on a nerve. This pressure may be caused by a damaged disk or by abnormal bone growth. Either way, you may feel pain, burning, tingling, or numbness. If you have pressure on a nerve that connects to the sciatic nerve, pain may shoot down your leg.    Pressure from the disk  Constant wear and tear can weaken a disk over time and cause back pain. The disk can then be damaged by a sudden movement or injury. If its soft center begins to bulge, the disk may press on a nerve. Or the outside of the disk may tear, and the soft center may squeeze through and pinch a nerve.    Pressure  from bone  As a disk wears out, the vertebrae right above and below the disk begin to touch. This can put pressure on a nerve. Often, abnormal bone (called bone spurs) grows where the vertebrae rub against each other. This can cause the foramen or the spinal canal to narrow (called stenosis) and press against a nerve.  Date Last Reviewed: 10/4/2015  © 2770-6372 [employer]. [address] [place] [address], [place] [address]. All rights reserved. This information is not intended as a substitute for professional medical care. Always follow your healthcare professional's instructions.        Caring for Your Back Throughout the Day  Take care of your back throughout the day. You will likely have fewer back problems if you do. Try to warm up before you move. Shift positions often. Also do your best to form healthy habits.    Warm up for the day  Do a few slow, catlike stretches before starting your day. This simple warmup can soften your disks, stretch your back muscles, and help prevent injuries.  Shift positions often  At work and at home, change positions often. This helps keep your body from getting stiff. Stand up or lean back while you sit. If you can, get up and move every 1/2 hour.  Form healthy habits  Here are some suggestions:   · Keep a healthy weight. When you weigh too much, your back is under excess strain. But losing just a few extra pounds can help a lot.  · Try not to overeat. Learn about serving sizes. The size of a serving depends on the food and the food group. Many foods list serving sizes on the labels.  · Handle minor aches with cold and heat. Apply cold the first 24 to 48 hours. Use heat after that. Always place a thin cloth between your skin and the source of cold or heat.  · Take medicines as directed. This helps keep pain under control. Always read labels, and call your healthcare provider or pharmacist if you have any questions.  Walk each day  A daily walk keeps your back and thigh  muscles stretched and strong. This gives your back better support. Be sure to walk with your spines three curves aligned, by keeping your head, hips, and toes connected by a vertical line.   Date Last Reviewed: 10/18/2015  © 9503-1229 . [address] [place] [address]. All rights reserved. This information is not intended as a substitute for professional medical care. Always follow your healthcare professional's instructions.        Back Exercises: Abdominal Lift Brace with Marching    The abdominal lift brace with march strengthens your lower abdominal muscles, helping you keep your pelvis and back stable:  · Lie on the floor with both knees bent. Put your feet flat on the floor and your arms by your sides. Tighten your abdominal muscles. Be sure to continue to breathe.  · Lift one bent knee about 2 inches then return it to the floor and lift the other about 2 inches. Keep your abdominal muscles tight and continue to breathe. These motions should be slow and controlled without your pelvis rocking side to side.  · Repeat 10 times.  Date Last Reviewed: 8/16/2015  © 9650-5175 . [address] [place] [address]. All rights reserved. This information is not intended as a substitute for professional medical care. Always follow your healthcare professional's instructions.        Back Exercises: Back Press    Do this exercise on your hands and knees. Keep your knees under your hips and your hands under your shoulders. Keep your spine in a neutral position (not arched or sagging). Be sure to maintain your necks natural curve:  · Tighten your stomach and buttock muscles to press your back upward. Let your head drop slightly.  · Hold for 5 seconds. Return to starting position.  · Repeat 5 times.  Date Last Reviewed: 10/11/2015  © 2208-7304 . [address] [place] [address]. All rights reserved. This information is not  intended as a substitute for professional medical care. Always follow your healthcare professional's instructions.        Back Exercises: Leg Pull    To start, lie on your back with your knees bent and feet flat on the floor. Dont press your neck or lower back to the floor. Breathe deeply. You should feel comfortable and relaxed in this position.  · Pull one knee to your chest.  · Hold for 30 to 60 seconds. Return to starting position.  · Repeat 2 times.  · Switch legs.  · For a double leg pull, pull both legs to your chest at the same time. Repeat 2 times.  For your safety, check with your healthcare provider before starting an exercise program.   Date Last Reviewed: 8/16/2015  © 3311-3622 [employer]. [address] [place] [address]. All rights reserved. This information is not intended as a substitute for professional medical care. Always follow your healthcare professional's instructions.        Back Exercises: Lower Back Rotation    To start, lie on your back with your knees bent and feet flat on the floor. Dont press your neck or lower back to the floor. Breathe deeply. You should feel comfortable and relaxed in this position.  · Drop both knees to one side. Turn your head to the other side. Keep your shoulders flat on the floor.  · Do not push through pain.  · Hold for 20 seconds.  · Slowly switch sides.  · Repeat 2 to 5 times.  Date Last Reviewed: 10/11/2015  © 9726-0969 [employer]. [address] [place] [address]. All rights reserved. This information is not intended as a substitute for professional medical care. Always follow your healthcare professional's instructions.        Back Exercises: Lower Back Stretch    To start, sit in a chair with your feet flat on the floor. Shift your weight slightly forward. Relax, and keep your ears, shoulders, and hips aligned.  · Sit with your feet well apart.  · Bend forward and touch the floor with the backs of your  hands. Relax and let your body drop.  · Hold for 20 seconds. Return to starting position.  · Repeat 2 times.   Date Last Reviewed: 8/16/2015  © 4933-6404 [employer]. [address] [place] [address]. All rights reserved. This information is not intended as a substitute for professional medical care. Always follow your healthcare professional's instructions.        Back Exercises: Seated Rotation    To start, sit in a chair with your feet flat on the floor. Shift your weight slightly forward to avoid rounding your back. Relax, and keep your ears, shoulders, and hips aligned:  · Fold your arms and elbows just below shoulder height.  · Turn from the waist with hips forward. Turn your head last. Do not push through the pain.  · Hold for a count of 10 to 30. Return to starting position.  · Repeat 3 to 5 times on one side. Then switch sides.  Date Last Reviewed: 10/11/2015  © 3846-5483 [employer]. [address] [place] [address]. All rights reserved. This information is not intended as a substitute for professional medical care. Always follow your healthcare professional's instructions.        Back Exercises: Side Stretch      To start, sit in a chair with your feet flat on the floor. Shift your weight slightly forward to avoid rounding your back. Relax. Keep your ears, shoulders, and hips aligned:  · Stretch your right arm overhead.  · Slowly bend to the left. Dont twist your torso. Stay within your pain limits.  · Hold for 20 seconds. Return to starting position.  · Repeat 2 to 5 times. Then, switch to the other side.  Date Last Reviewed: 10/13/2015  © 6625-9632 [employer]. [address] [place] [address]. All rights reserved. This information is not intended as a substitute for professional medical care. Always follow your healthcare professional's instructions.        Back Safety: Bending  Bending can strain or even injure your back. Follow the  tips below to move safely and protect your back as you perform everyday activities.  Bending over    · Keep your feet shoulder-width apart.  · Move your whole body as one unit.  · Bend at your hips and knees, not at your waist.  · Flatten your stomach and tighten your leg muscles.  · To keep your spine straight, let your buttocks move out behind you. Dont try to tuck them under.  · If you need to, place one hand on a sturdy object for support.     Bending to the floor  · Lower yourself to one knee. If you can, rest one hand on a sturdy object to help lower yourself.  · Rest one arm on your raised knee.  · Dont bend at the waist.  · Do not hunch your back or neck to reach to the floor. Instead, bend more at your hips and knees to get closer.  Date Last Reviewed: 8/31/2015 © 2000-2017 [employer]. [address] [place] [address]. All rights reserved. This information is not intended as a substitute for professional medical care. Always follow your healthcare professional's instructions.        Back Safety: Lifting  Lifting can strain or even injure your back. Follow these tips to keep your back safe while you bend, lift, and carry.      Protect Your Back While Lifting       Step 1:  · Face the object.  · With your back straight, get down on one knee.  · If you can, tilt the object so one side lifts off the ground.  · Keep the object close to you. Step 2:  · Tighten your stomach muscles.  · Use your legs, arms, and buttocks to lift, not your back.  · Avoid twisting.  · Lift the object to your knee.  · Grasp the object firmly. Step 3:  · Lift with your arms and legs, not your back.  · Move quickly to help make this easier.   To carry an object:  · Hold it close to your body.  · Bend your knees slightly as you walk. The heavier the object, the more you should bend your knees.  · Get help with heavy or unbalanced objects.  Date Last Reviewed: 8/31/2015 © 2000-2017 [employer]. [facility] [place] [address]. All rights reserved. This information is not intended as a substitute for professional medical care. Always follow your healthcare professional's instructions.        Back Safety: Sleeping Positions  Good posture protects your back when you sit, stand, and walk. It is also important while sleeping. Keep your ears, shoulders, and hips in line. Try the tips below. Also, be sure to follow any guidelines from your health care provider.  If you lie on your back  Safe sleeping     Place pillows under your legs from your thighs to your ankles.     Ask your healthcare provider how firm your mattress should be.  · Find a position that keeps your back aligned and comfortable.  · Fill gaps between your body and the mattress with pillows.  · Never sleep on your back without bending your legs.  · Never sleep on your stomach.  If you lie on your side  Turning in bed     Support your upper body and top leg with pillows.    · If you change positions, you will need to move your pillows. This can become so natural that you hardly wake up.  · When you turn in bed, move your whole body as one unit.  · Tighten your stomach muscles. Bend your knees slightly toward your chest.  · Roll to one side, keeping your ears, shoulders, and hips in line. Keep a pillow between your legs.  · Be careful not to bend or twist at the waist.  Date Last Reviewed: 8/31/2015  © 2422-5558 [employer]. [address] [place] [address]. All rights reserved. This information is not intended as a substitute for professional medical care. Always follow your healthcare professional's instructions.        Back Exercises: Hip Rotator Stretch    To start, lie on your back with your knees bent and feet flat on the floor. Dont press your neck or lower back to the floor. Breathe deeply. You should feel comfortable and relaxed in this position.  · Rest your right ankle on your left knee.  · Place a towel  behind your left thigh, and use it to pull the knee toward your chest. Feel the stretch in your buttocks.  · Hold for 30 to 60 seconds. Release.  · Repeat 2 times.  · Switch legs.   · If there is any pain other than stretch in the knee or buttock, stop and contact your healthcare provider.  For your safety, check with your healthcare provider before starting an exercise program.   Date Last Reviewed: 8/16/2015  © 1083-8480 [employer]. [address] [place] [address]. All rights reserved. This information is not intended as a substitute for professional medical care. Always follow your healthcare professional's instructions.        Back Exercises: Hip Lift    To start, lie on your back with your knees bent and feet flat on the floor. Dont press your neck or lower back to the floor. Breathe deeply. You should feel comfortable and relaxed in this position:  · Tighten your abdomen and buttocks.  · Slowly raise your hips upward. Be careful not to arch your back.  · Hold for 5 seconds. Lower your hips to the floor.  · Repeat 10 times.  For your safety, check with your healthcare provider before starting an exercise program.   Date Last Reviewed: 8/16/2015 © 2000-2017 [employer]. [address] [place] [address]. All rights reserved. This information is not intended as a substitute for professional medical care. Always follow your healthcare professional's instructions.        Hip Abduction (Strength)    1. Lie on your right side on the floor with your legs straight.  2. Raise your left leg about 6 to 8 inches. Keep your legs and hips straight. Dont roll back onto your hip. Hold for 5 seconds, then lower your leg.  3. Repeat 10 times, or as instructed.  4. Switch legs and repeat.     Challenge yourself  Put an elastic band or tubing around both ankles. Hold the band to the floor with your bottom ankle. Raise and lower your top leg slowly and steadily.   Date Last  Reviewed: 3/10/2016  © 0384-2217 [employer]. [address] [place] [address]. All rights reserved. This information is not intended as a substitute for professional medical care. Always follow your healthcare professional's instructions.        Hip Adduction (Strength)    These instructions are for your right foot. Switch sides for your left foot.  5. Lie on your right side on the floor. Keep your right leg straight. Bend your left leg and put your left foot flat on the floor behind your right knee.  6. Raise your right leg as high as you comfortably can. Hold for 5 seconds, then lower it back down.  7. Repeat 10 times, or as instructed.  8. Switch legs and repeat.  Date Last Reviewed: 3/10/2016  © 6190-3304 [employer]. [address] [place] [address]. All rights reserved. This information is not intended as a substitute for professional medical care. Always follow your healthcare professional's instructions.        Hip Adductor Stretch (Flexibility)    9. Sit on the floor. Put the soles of your feet together so your knees are pointed outward.  10. Pull your heels in toward your groin, as close as is comfortable.  11. Put your hands on your knees, and gently push them closer to the floor.  12. Hold for 30 to 60 seconds.  13. Relax and repeat 2 times, or as instructed.  14. Repeat this exercise 3 times a day, or as instructed.  Date Last Reviewed: 3/10/2016  © 3531-6417 [employer]. [address] [place] [address]. All rights reserved. This information is not intended as a substitute for professional medical care. Always follow your healthcare professional's instructions.        Prone Hip Extension     15. Lie on your stomach on the floor with your legs straight. You can lie on a mat or towel. Rest your head on your arms.  16. Raise your right leg a few inches off the floor. Keep the right knee straight. Hold for 5 seconds.  17. Slowly lower  your leg back down.  18. Repeat 5 times, or as instructed.  Date Last Reviewed: 3/10/2016  © 8655-0620 [employer]. [address] [place] [address]. All rights reserved. This information is not intended as a substitute for professional medical care. Always follow your healthcare professional's instructions.        Side Lying Hip Abduction (Strength)    19. Lie down on the floor on your side. Rest your head on your arm. Bend your legs at the knees.  20. Keep your feet together and lift your top leg up so that your knees are . Keep your hips steady.     21. Slowly lower your leg back down.  22. Repeat 10 times, or as instructed.  23. Switch sides if instructed.     Challenge yourself  Put an elastic band or tubing around your thighs. Raise and lower your top leg slowly and steadily.      Date Last Reviewed: 3/29/2016  © 0262-5752 [employer]. [address] [place] [address]. All rights reserved. This information is not intended as a substitute for professional medical care. Always follow your healthcare professional's instructions.        Sacroiliitis  The sacrum is the triangle-shaped bone at the base of the spine. It is linked to the other pelvis bones by the sacroiliac joints, also called SI joints. Sacroiliitis is when one or both SI joints are hurt or inflamed. It can make small movements of the lower back and pelvis very painful.        This condition has been linked to other diseases. They include ankylosing spondylitis, rheumatoid arthritis, psoriasis, and Crohns disease or colitis. Symptoms may include pain or stiffness in the hips, lower back, thighs, or buttocks. Pain occurs most often in the morning or after sitting for long periods of time. The pain may get worse when you walk. The swinging motion of the hips strains the SI joints.  Sacroiliitis is caused by many factors such as:  · Heavy lifting, especially if not done the right way  · Severe  injury, such as a fall or car accident  · Osteoarthritis  · Pregnancy  · Infection of the joint  This condition is hard to diagnose. It may be confused with other causes of low back pain. To confirm the diagnosis, you may be given a shot of numbing medicine in the SI joint. Treatment includes rest, physical therapy, and anti-inflammatory medicines. If another health problem is the cause, then that must also be treated. More testing may be needed if your symptoms dont get better.  Home care  · If your healthcare provider has prescribed medicines, take all of them as directed.  · You may use over-the-counter pain medicine to control pain, unless another medicine was prescribed. If prednisone was prescribed, dont use NSAIDs, or nonsteroidal anti-inflammatory drugs, such as ibuprofen or naproxen. Talk with your provider before using this medicine if you have chronic liver or kidney disease or ever had a stomach ulcer or GI bleeding.  · If you were referred to physical therapy, make an appointment. Be sure to do any prescribed exercises.  · Dont smoke. Smoking reduces blood flow to the inflamed area. This makes it harder to treat.  Follow-up care  Follow up with your healthcare provider, or as advised.  If you had an X-ray or an MRI, you will be notified of any new findings that may affect your care.  When to seek medical advice  Contact your healthcare provider right away if any of these occur:  · Increasing low back pain  · Inflammation of the eyes  · Skin rash or redness  · Weakness or numbness in one or both legs  · Loss of bowel or bladder control  · Numbness in the groin area  Date Last Reviewed: 11/24/2015  © 7290-1123 [employer]. [address] [place] [address], [place] [address]. All rights reserved. This information is not intended as a substitute for professional medical care. Always follow your healthcare professional's instructions.

## 2019-01-31 ENCOUNTER — TELEPHONE (OUTPATIENT)
Dept: INTERNAL MEDICINE | Facility: CLINIC | Age: 69
End: 2019-01-31

## 2019-01-31 NOTE — TELEPHONE ENCOUNTER
Left voicemail for pt advising him appt 02/22/19 r/s to 02/21/19 d/t provider meeting. [employer] message also sent.

## 2019-02-15 ENCOUNTER — LAB VISIT (OUTPATIENT)
Dept: LAB | Facility: HOSPITAL | Age: 69
End: 2019-02-15
Attending: PEDIATRICS
Payer: MEDICARE

## 2019-02-15 ENCOUNTER — TELEPHONE (OUTPATIENT)
Dept: INTERNAL MEDICINE | Facility: CLINIC | Age: 69
End: 2019-02-15

## 2019-02-15 DIAGNOSIS — E11.8 TYPE 2 DIABETES MELLITUS WITH COMPLICATION, WITH LONG-TERM CURRENT USE OF INSULIN: ICD-10-CM

## 2019-02-15 DIAGNOSIS — Z79.4 TYPE 2 DIABETES MELLITUS WITH COMPLICATION, WITH LONG-TERM CURRENT USE OF INSULIN: ICD-10-CM

## 2019-02-15 LAB
ALT SERPL W/O P-5'-P-CCNC: 27 U/L
ANION GAP SERPL CALC-SCNC: 8 MMOL/L
AST SERPL-CCNC: 20 U/L
BUN SERPL-MCNC: 17 MG/DL
CALCIUM SERPL-MCNC: 9.6 MG/DL
CHLORIDE SERPL-SCNC: 103 MMOL/L
CHOLEST SERPL-MCNC: 132 MG/DL
CHOLEST/HDLC SERPL: 5.3 {RATIO}
CO2 SERPL-SCNC: 27 MMOL/L
CREAT SERPL-MCNC: 0.8 MG/DL
EST. GFR  (AFRICAN AMERICAN): >60 ML/MIN/1.73 M^2
EST. GFR  (NON AFRICAN AMERICAN): >60 ML/MIN/1.73 M^2
ESTIMATED AVG GLUCOSE: 157 MG/DL
GLUCOSE SERPL-MCNC: 104 MG/DL
HBA1C MFR BLD HPLC: 7.1 %
HDLC SERPL-MCNC: 25 MG/DL
HDLC SERPL: 18.9 %
LDLC SERPL CALC-MCNC: 50.4 MG/DL
NONHDLC SERPL-MCNC: 107 MG/DL
POTASSIUM SERPL-SCNC: 3.9 MMOL/L
SODIUM SERPL-SCNC: 138 MMOL/L
TRIGL SERPL-MCNC: 283 MG/DL

## 2019-02-15 PROCEDURE — 84450 TRANSFERASE (AST) (SGOT): CPT

## 2019-02-15 PROCEDURE — 80048 BASIC METABOLIC PNL TOTAL CA: CPT

## 2019-02-15 PROCEDURE — 80061 LIPID PANEL: CPT

## 2019-02-15 PROCEDURE — 36415 COLL VENOUS BLD VENIPUNCTURE: CPT

## 2019-02-15 PROCEDURE — 83036 HEMOGLOBIN GLYCOSYLATED A1C: CPT

## 2019-02-15 PROCEDURE — 84460 ALANINE AMINO (ALT) (SGPT): CPT

## 2019-02-15 NOTE — TELEPHONE ENCOUNTER
Per fax request received from pt's pharmacy, initiated prior auth request to pt's insurance for Humalog Kwikpen rx, submitted online via [email or web address] Request [id number]; returned fax to pt's pharmacy notifying them PA request initiated and awaiting insurance response, F#[phone] fax transmission confirmed.

## 2019-02-21 ENCOUNTER — PATIENT MESSAGE (OUTPATIENT)
Dept: ADMINISTRATIVE | Facility: OTHER | Age: 69
End: 2019-02-21

## 2019-02-21 ENCOUNTER — OFFICE VISIT (OUTPATIENT)
Dept: INTERNAL MEDICINE | Facility: CLINIC | Age: 69
End: 2019-02-21
Payer: MEDICARE

## 2019-02-21 VITALS
TEMPERATURE: 98 F | WEIGHT: 209.88 LBS | BODY MASS INDEX: 29.38 KG/M2 | DIASTOLIC BLOOD PRESSURE: 68 MMHG | HEART RATE: 69 BPM | HEIGHT: 71 IN | SYSTOLIC BLOOD PRESSURE: 124 MMHG | OXYGEN SATURATION: 96 %

## 2019-02-21 DIAGNOSIS — E11.8 TYPE 2 DIABETES MELLITUS WITH COMPLICATION, WITH LONG-TERM CURRENT USE OF INSULIN: Primary | ICD-10-CM

## 2019-02-21 DIAGNOSIS — E11.59 HYPERTENSION ASSOCIATED WITH DIABETES: ICD-10-CM

## 2019-02-21 DIAGNOSIS — G47.33 OBSTRUCTIVE SLEEP APNEA: ICD-10-CM

## 2019-02-21 DIAGNOSIS — I15.2 HYPERTENSION ASSOCIATED WITH DIABETES: ICD-10-CM

## 2019-02-21 DIAGNOSIS — I25.10 CORONARY ARTERY DISEASE INVOLVING NATIVE CORONARY ARTERY OF NATIVE HEART WITHOUT ANGINA PECTORIS: ICD-10-CM

## 2019-02-21 DIAGNOSIS — E11.9 TYPE 2 DIABETES MELLITUS: ICD-10-CM

## 2019-02-21 DIAGNOSIS — E78.5 HYPERLIPIDEMIA ASSOCIATED WITH TYPE 2 DIABETES MELLITUS: ICD-10-CM

## 2019-02-21 DIAGNOSIS — E11.69 HYPERLIPIDEMIA ASSOCIATED WITH TYPE 2 DIABETES MELLITUS: ICD-10-CM

## 2019-02-21 DIAGNOSIS — Z79.4 TYPE 2 DIABETES MELLITUS WITH COMPLICATION, WITH LONG-TERM CURRENT USE OF INSULIN: Primary | ICD-10-CM

## 2019-02-21 DIAGNOSIS — Z12.5 PROSTATE CANCER SCREENING: ICD-10-CM

## 2019-02-21 PROCEDURE — 99214 PR OFFICE/OUTPT VISIT, EST, LEVL IV, 30-39 MIN: ICD-10-PCS | Mod: S$PBB,,, | Performed by: PEDIATRICS

## 2019-02-21 PROCEDURE — 99214 OFFICE O/P EST MOD 30 MIN: CPT | Mod: S$PBB,,, | Performed by: PEDIATRICS

## 2019-02-21 PROCEDURE — 99213 OFFICE O/P EST LOW 20 MIN: CPT | Mod: PBBFAC,PN | Performed by: PEDIATRICS

## 2019-02-21 PROCEDURE — 99999 PR PBB SHADOW E&M-EST. PATIENT-LVL III: CPT | Mod: PBBFAC,,, | Performed by: PEDIATRICS

## 2019-02-21 PROCEDURE — 99999 PR PBB SHADOW E&M-EST. PATIENT-LVL III: ICD-10-PCS | Mod: PBBFAC,,, | Performed by: PEDIATRICS

## 2019-02-21 NOTE — PROGRESS NOTES
Subjective:       Patient ID: Timothy Ortega is a 68 y.o. male.    Chief Complaint: No chief complaint on file.    HTN: B/P good, no HTNive symptoms.    LIPIDS:not following D&E, tolerating and compliant with med(s).    DM: no hyper/hypoglycemic symptoms. Self monitoring TID about 140 AM average BS, variable in afternoon, none > 200 uses sliding scale for 150-180. Trulicity, metformin, humalog (meal sliding scale) and lantus 70 units , Working with DM program.  CAD: no CP, SOB, CHOU. Followed by cards.    LABS REVIEWED AND DISCUSSED WITH PATIENT       Review of Systems   Constitutional: Negative for fever and unexpected weight change.   HENT: Negative for congestion and rhinorrhea.    Eyes: Negative for discharge and redness.   Respiratory: Negative for cough and wheezing.    Cardiovascular: Negative for chest pain, palpitations and leg swelling.   Gastrointestinal: Negative for constipation, diarrhea and vomiting.   Endocrine: Negative for polydipsia, polyphagia and polyuria.   Genitourinary: Negative for decreased urine volume and difficulty urinating.   Musculoskeletal: Positive for arthralgias, back pain and myalgias. Negative for joint swelling.        Chronic back and CTS.   Skin: Negative for rash and wound.   Neurological: Negative for syncope and headaches.   Psychiatric/Behavioral: Negative for behavioral problems, dysphoric mood and sleep disturbance. The patient is not nervous/anxious.        Objective:      Physical Exam   Constitutional: He is oriented to person, place, and time. He appears well-developed and well-nourished. No distress.   Neck: No JVD present. No thyromegaly present.   Cardiovascular: Normal rate, regular rhythm and normal heart sounds.   No murmur heard.  Pulmonary/Chest: Effort normal and breath sounds normal. No respiratory distress. He has no wheezes. He has no rales.   Abdominal: Soft. He exhibits no distension and no mass. There is no tenderness. There is no guarding.    Musculoskeletal: He exhibits no edema.   Lymphadenopathy:     He has no cervical adenopathy.   Neurological: He is alert and oriented to person, place, and time. No cranial nerve deficit. Coordination normal.   Skin: Capillary refill takes less than 2 seconds. No rash noted.   Psychiatric: He has a normal mood and affect. His behavior is normal. Judgment and thought content normal.       Assessment:       1. Type 2 diabetes mellitus with complication, with long-term current use of insulin    2. Coronary artery disease involving native coronary artery of native heart without angina pectoris    3. Hypertension associated with diabetes    4. Hyperlipidemia associated with type 2 diabetes mellitus    5. Obstructive sleep apnea    6. Prostate cancer screening        Plan:       Type 2 diabetes mellitus with complication, with long-term current use of insulin  -     Hemoglobin A1c; Future; Expected date: 02/21/2019  -     Lipid panel; Future; Expected date: 02/21/2019  -     ALT (SGPT); Future; Expected date: 02/21/2019  -     AST (SGOT); Future; Expected date: 02/21/2019    Coronary artery disease involving native coronary artery of native heart without angina pectoris  -     Ambulatory consult to Cardiology    Hypertension associated with diabetes    Hyperlipidemia associated with type 2 diabetes mellitus    Obstructive sleep apnea    Prostate cancer screening  -     PSA, Screening; Future; Expected date: 02/21/2019    Over all he has made improvement. Continue working with DM program, enroll in Dig medicine. D&e, weight loss, self monitor B/P and BS. See cards in follow up, also have them consider additional lipid therapy, possible PCSK9 inhibitor tx if appropriate. F/U in 6 months with labs.

## 2019-02-22 ENCOUNTER — PATIENT OUTREACH (OUTPATIENT)
Dept: OTHER | Facility: OTHER | Age: 69
End: 2019-02-22

## 2019-02-22 NOTE — PROGRESS NOTES
Digital Medicine Enrollment Call    Introduced Mr. Timothy Ortega to Digital Medicine.     Discussed program expectations and requirements.    Introduced digital medicine care team.     Reviewed the importance of self-monitoring for digital medicine participation.     Reviewed that the Digital Medicine team is not available for emergencies and instructed the patient to call 911 or [facility] ([phone] or [phone]) if one arises.    Pt states that he also enrolled in the DDMP. He's waiting to begin blood sugar readings once he receives instructions from his MD regarding frequency due to the number of strips he received.                 1st attempt for enrollment call. Left voicemail.         Last 5 Patient Entered Readings                                      Current 30 Day Average: 140/89     Recent Readings 2/21/2019 2/21/2019    SBP (mmHg) 140 140    DBP (mmHg) 89 89    Pulse 66 66

## 2019-02-22 NOTE — LETTER
February 22, 2019     Timothy Ortega  [address]  [place] [address]       Dear Timothy,    Welcome to [facility]! Our goal is to make care effective, proactive and convenient by using data you send us from home to better treat your chronic conditions.          My name is Shanell Madrigal, and I am your dedicated Digital Medicine clinician. As an expert in medication management, I will help ensure that the medications you are taking continue to provide the intended benefits and help you reach your goals. You can reach me directly at [phone] or by sending me a message directly through your [facility] account.        I am Karina Allen and I will be your health . My job is to help you identify lifestyle changes to improve your disease control. We will talk about nutrition, exercise, and other ways you may be able to adjust your current habits to better your health. Additionally, we will help ensure you are completing the tests and screenings that are necessary to help manage your conditions. You can reach me directly at [phone] or by sending me a message directly through your [facility] account.    Most importantly, YOU are at the center of this team. Together, we will work to improve your overall health and encourage you to meet your goals for a healthier lifestyle.     What we expect from YOU:  · Please take frequent home blood pressure measurements. We ask that you take at least 1 blood pressure reading per week, but more information will better help us get you know you. Be sure you rest for a few minutes before taking the reading in a quiet, comfortable place.     Be available to receive phone calls or [facility] messages, when appropriate, from your care team. Please let us know if there are any specific days or times that work best for us to reach you via phone.     Complete routine tests and screenings. Dont worry, we will help keep you on track!           What  you should expect from your Digital Medicine Care Team:   We will work with you to create a personalized plan of care and provide you with encouragement and education, including regarding lifestyle changes, that could help you manage your disease states.     We will adjust your current medications, if needed, and continue to monitor your long-term progress.     We will provide you and your physician with monthly progress reports after you have been in the program for more than 30 days.     We will send you reminders through [facility] and text messages to help ensure you do not miss any testing deadlines to help manage your disease states.    You will be able to reach us by phone or through your [facility] account by clicking our names under Care Team on the right side of the home screen.    I look forward to working with you to achieve your blood pressure goals!    We look forward to working with you to help manage your health,    Sincerely,    Your Digital Medicine Team    Please visit our websites to learn more:   · Hypertension: [email or web address]      Remember, we are not available for emergencies. If you have an emergency, please contact your doctors office directly or call [facility] on-call ([phone] or [phone]) or [phone].

## 2019-02-27 ENCOUNTER — PATIENT OUTREACH (OUTPATIENT)
Dept: OTHER | Facility: OTHER | Age: 69
End: 2019-02-27

## 2019-02-27 ENCOUNTER — TELEPHONE (OUTPATIENT)
Dept: INTERNAL MEDICINE | Facility: CLINIC | Age: 69
End: 2019-02-27

## 2019-02-27 NOTE — PROGRESS NOTES
Last 5 Patient Entered Readings                                      Current 30 Day Average: 143/79     Recent Readings 2/23/2019 2/23/2019 2/21/2019 2/21/2019    SBP (mmHg) 145 145 140 140    DBP (mmHg) 69 69 89 89    Pulse 80 80 66 66          2/27:   Digital Medicine: Health  Introduction Call     Left voicemail and requested call back in order to complete Digital Medicine health  introduction call.  Will try again in a couple days.    Digital Medicine: Health  Introduction    Introduced . Timothy Ortega to Digital Medicine. Discussed health  role and recommended lifestyle modifications.    Went over proper technique    Lifestyle Assessment:  Current Dietary Habits(i.e. low sodium, food labels, dining out):    Patient still uses seasonings, and has not cut out salt; used in moderation. States he is is eating out and cooking about 50/50. He states he is mindful of sodium intake but does not want to cut out salt in his diet.     Exercise:  Patient still works in the construction industry (on-site). He states he has been promising he would start walking around his neighborhood and walk more at work.     Alcohol/Tobacco:    Deferred.     Medication Adherence: has been compliant with the medicaiton regimen    Other goals:  None currently.     Reviewed AHA/AACE recommendations:  Limit sodium intake to <2000mg/day  Recommended CHO intake, 45-65% of daily caloric intake  Perform 150 minutes of physical activity per week    Reviewed the importance of self-monitoring, medication adherence, and that the health  can be used as a resource for lifestyle modifications to help reduce or maintain a healthy lifestyle.  Reviewed that the Digital Medicine team is not available for emergencies and instructed the patient to call 911 or [facility] ([phone] or [phone]) if one arises.

## 2019-02-27 NOTE — TELEPHONE ENCOUNTER
PA APPROVAL received from pt's insurance for Humalog Kwikpen rx DOS 02/15/2019 or 02/14/2020, faxed approval to pt's pharmacy instructing them to fill rx and notify pt when ready, F#[phone] fax transmission confirmed.

## 2019-03-01 ENCOUNTER — OFFICE VISIT (OUTPATIENT)
Dept: OPHTHALMOLOGY | Facility: CLINIC | Age: 69
End: 2019-03-01
Payer: MEDICARE

## 2019-03-01 ENCOUNTER — OFFICE VISIT (OUTPATIENT)
Dept: CARDIOLOGY | Facility: CLINIC | Age: 69
End: 2019-03-01
Payer: MEDICARE

## 2019-03-01 ENCOUNTER — CLINICAL SUPPORT (OUTPATIENT)
Dept: CARDIOLOGY | Facility: CLINIC | Age: 69
End: 2019-03-01
Payer: MEDICARE

## 2019-03-01 VITALS
WEIGHT: 211.19 LBS | HEART RATE: 72 BPM | BODY MASS INDEX: 29.56 KG/M2 | HEIGHT: 71 IN | DIASTOLIC BLOOD PRESSURE: 76 MMHG | SYSTOLIC BLOOD PRESSURE: 132 MMHG

## 2019-03-01 DIAGNOSIS — E11.9 DIABETES MELLITUS TYPE 2 WITHOUT RETINOPATHY: Primary | ICD-10-CM

## 2019-03-01 DIAGNOSIS — I25.10 CAD (CORONARY ARTERY DISEASE): Primary | ICD-10-CM

## 2019-03-01 DIAGNOSIS — E11.59 HYPERTENSION ASSOCIATED WITH DIABETES: ICD-10-CM

## 2019-03-01 DIAGNOSIS — I25.10 CAD (CORONARY ARTERY DISEASE): ICD-10-CM

## 2019-03-01 DIAGNOSIS — H52.4 BILATERAL PRESBYOPIA: ICD-10-CM

## 2019-03-01 DIAGNOSIS — H25.13 CATARACT, NUCLEAR SCLEROTIC SENILE, BILATERAL: ICD-10-CM

## 2019-03-01 DIAGNOSIS — Z79.4 TYPE 2 DIABETES MELLITUS WITH COMPLICATION, WITH LONG-TERM CURRENT USE OF INSULIN: ICD-10-CM

## 2019-03-01 DIAGNOSIS — E11.8 TYPE 2 DIABETES MELLITUS WITH COMPLICATION, WITH LONG-TERM CURRENT USE OF INSULIN: ICD-10-CM

## 2019-03-01 DIAGNOSIS — E11.9 TYPE 2 DIABETES MELLITUS: ICD-10-CM

## 2019-03-01 DIAGNOSIS — I15.2 HYPERTENSION ASSOCIATED WITH DIABETES: ICD-10-CM

## 2019-03-01 PROCEDURE — 92014 COMPRE OPH EXAM EST PT 1/>: CPT | Mod: S$PBB,,, | Performed by: OPTOMETRIST

## 2019-03-01 PROCEDURE — 99999 PR PBB SHADOW E&M-EST. PATIENT-LVL I: CPT | Mod: PBBFAC,,, | Performed by: OPTOMETRIST

## 2019-03-01 PROCEDURE — 92015 PR REFRACTION: ICD-10-PCS | Mod: ,,, | Performed by: OPTOMETRIST

## 2019-03-01 PROCEDURE — 99999 PR PBB SHADOW E&M-EST. PATIENT-LVL III: CPT | Mod: PBBFAC,,, | Performed by: INTERNAL MEDICINE

## 2019-03-01 PROCEDURE — 99213 OFFICE O/P EST LOW 20 MIN: CPT | Mod: PBBFAC,27,25 | Performed by: INTERNAL MEDICINE

## 2019-03-01 PROCEDURE — 99999 PR PBB SHADOW E&M-EST. PATIENT-LVL I: ICD-10-PCS | Mod: PBBFAC,,, | Performed by: OPTOMETRIST

## 2019-03-01 PROCEDURE — 99214 OFFICE O/P EST MOD 30 MIN: CPT | Mod: S$PBB,,, | Performed by: INTERNAL MEDICINE

## 2019-03-01 PROCEDURE — 93010 ELECTROCARDIOGRAM REPORT: CPT | Mod: S$PBB,,, | Performed by: INTERNAL MEDICINE

## 2019-03-01 PROCEDURE — 99214 PR OFFICE/OUTPT VISIT, EST, LEVL IV, 30-39 MIN: ICD-10-PCS | Mod: S$PBB,,, | Performed by: INTERNAL MEDICINE

## 2019-03-01 PROCEDURE — 92014 PR EYE EXAM, EST PATIENT,COMPREHESV: ICD-10-PCS | Mod: S$PBB,,, | Performed by: OPTOMETRIST

## 2019-03-01 PROCEDURE — 99211 OFF/OP EST MAY X REQ PHY/QHP: CPT | Mod: PBBFAC | Performed by: OPTOMETRIST

## 2019-03-01 PROCEDURE — 99999 PR PBB SHADOW E&M-EST. PATIENT-LVL III: ICD-10-PCS | Mod: PBBFAC,,, | Performed by: INTERNAL MEDICINE

## 2019-03-01 PROCEDURE — 92015 DETERMINE REFRACTIVE STATE: CPT | Mod: ,,, | Performed by: OPTOMETRIST

## 2019-03-01 PROCEDURE — 93005 ELECTROCARDIOGRAM TRACING: CPT | Mod: PBBFAC | Performed by: INTERNAL MEDICINE

## 2019-03-01 PROCEDURE — 93010 EKG 12-LEAD: ICD-10-PCS | Mod: S$PBB,,, | Performed by: INTERNAL MEDICINE

## 2019-03-01 RX ORDER — LOSARTAN POTASSIUM 100 MG/1
100 TABLET ORAL DAILY
Qty: 30 TABLET | Refills: 5 | Status: SHIPPED | OUTPATIENT
Start: 2019-03-01 | End: 2019-05-03 | Stop reason: DRUGHIGH

## 2019-03-01 RX ORDER — MINOCYCLINE HYDROCHLORIDE 50 MG/1
CAPSULE ORAL
Refills: 2 | COMMUNITY
Start: 2019-02-22 | End: 2020-09-21

## 2019-03-01 NOTE — PROGRESS NOTES
HPI     Diabetic Eye Exam      Additional comments: Latest blood sugar reading was 97. Normally   fluctuating widely.               Comments     Pt here for annual DM exam. No pain or discomfort. VA has been stable. Pt   says he only needs to use cheaters for reading. Able to see distance   without glasses.           Last edited by Moris Mcfarland, Patient Care Assistant on 3/1/2019  2:43   PM. (History)            Assessment /Plan     For exam results, see Encounter Report.    Diabetes mellitus type 2 without retinopathy    Cataract, nuclear sclerotic senile, bilateral    Bilateral presbyopia      No Background Diabetic Retinopathy    Mild cataracts OU, not surgical.    Dispense Final Rx for glasses.  RTC 1 year  Discussed above and answered questions.

## 2019-03-01 NOTE — LETTER
March 1, 2019      [place]  [address]  [place] [address]           '[facility]  [address] [place] [id number]  Phone: [phone]  Fax: [phone]          Patient: Timothy Ortega   MR Number: [id number]   YOB: 1950   Date of Visit: 3/1/2019       Dear Dr. SHANE Ochoa Jr.:    Thank you for referring Timothy Ortega to me for evaluation. Attached you will find relevant portions of my assessment and plan of care.    If you have questions, please do not hesitate to call me. I look forward to following Timothy Ortega along with you.    Sincerely,    Mekhi Stoddard, OD    Enclosure  CC:  No Recipients    If you would like to receive this communication electronically, please contact [email or web address] or [phone] to request more information on [employer] Link access.    For providers and/or their staff who would like to refer a patient to [facility], please contact us through our one-stop-shop provider referral line, [facility], at [phone].    If you feel you have received this communication in error or would no longer like to receive these types of communications, please e-mail [email or web address]

## 2019-03-01 NOTE — LETTER
March 1, 2019      [place]  [address]  [place] [address]           [facility]  [address] [place] [address]  Phone: [phone]  Fax: [phone]          Patient: Timothy Ortega   MR Number: [id number]   YOB: 1950   Date of Visit: 3/1/2019       Dear Dr. SHANE Ochoa Jr.:    Thank you for referring Timothy Ortega to me for evaluation. Attached you will find relevant portions of my assessment and plan of care.    If you have questions, please do not hesitate to call me. I look forward to following Timothy Ortega along with you.    Sincerely,    Naveen Gr MD    Enclosure  CC:  No Recipients    If you would like to receive this communication electronically, please contact [email or web address] or [phone] to request more information on [employer] Link access.    For providers and/or their staff who would like to refer a patient to [facility], please contact us through our one-stop-shop provider referral line, [facility] , at [phone].    If you feel you have received this communication in error or would no longer like to receive these types of communications, please e-mail [email or web address]

## 2019-03-01 NOTE — PROGRESS NOTES
"Subjective:   Patient ID:  Timothy Ortega is a 68 y.o. male who presents for follow up of Consult      69 yo male, came in for f/u. Last visit with Dr. Fontana was . .  Hx of CAD, had LHC in  and was told "nonobstructive", HTN, HLP, IDDM, BRIANA on cpap, ex smoker.    No chest pain, no sob, no palpitation, no dizziness, no syncope, no CNS symptoms.  Patient has fairly good exercise tolerance.  Patient is compliant with medications.  No smoking/drinking  EKG NSR and possible inferior infarct   nuke stress normal EF and no ischemia.        Past Medical History:   Diagnosis Date    Coronary artery disease     [facility] - no stents    Diabetes mellitus 10-12 years     am 10/31/2014    DM (diabetes mellitus) 13 years     am 2015    DM (diabetes mellitus)      lunch 10/28/2016    Hyperlipemia     Kidney stones     Neuropathy        Past Surgical History:   Procedure Laterality Date    (L) knee scope      CARDIAC CATHETERIZATION      COLONOSCOPY N/A 3/31/2017    Performed by Cornelius Ibarra [facility] at [facility] END[facility]    teeth implantation         Social History     Tobacco Use    Smoking status: Former Smoker     Packs/day: 1.00     Years: 20.00     Pack years: 20.00     Last attempt to quit: 2002     Years since quittin.1    Smokeless tobacco: Never Used   Substance Use Topics    Alcohol use: No     Alcohol/week: 0.0 oz    Drug use: No       Family History   Problem Relation Age of Onset    Diabetes Mother     Glaucoma Mother     Heart disease Mother 75        CAB    Diabetes Father     Heart attack Father 58        Fatal MI    Diabetes Brother     Diabetes Sister     Diabetes Sister     Cataracts Paternal Uncle     Cataracts Maternal Grandmother          Review of Systems   Constitution: Negative for decreased appetite, diaphoresis, fever, weakness, malaise/fatigue and night sweats.   HENT: Negative for nosebleeds.    Eyes: " Negative for blurred vision and double vision.   Cardiovascular: Negative for chest pain, claudication, dyspnea on exertion, irregular heartbeat, leg swelling, near-syncope, orthopnea, palpitations, paroxysmal nocturnal dyspnea and syncope.   Respiratory: Negative for cough, shortness of breath, sleep disturbances due to breathing, snoring, sputum production and wheezing.    Endocrine: Negative for cold intolerance and polyuria.   Hematologic/Lymphatic: Does not bruise/bleed easily.   Skin: Negative for rash.   Musculoskeletal: Negative for back pain, falls, joint pain, joint swelling and neck pain.   Gastrointestinal: Negative for abdominal pain, heartburn, nausea and vomiting.   Genitourinary: Negative for dysuria, frequency and hematuria.   Neurological: Negative for difficulty with concentration, dizziness, focal weakness, headaches, light-headedness, numbness and seizures.   Psychiatric/Behavioral: Negative for depression, memory loss and substance abuse. The patient does not have insomnia.    Allergic/Immunologic: Negative for HIV exposure and hives.       Objective:   Physical Exam   Constitutional: He is oriented to person, place, and time. He appears well-nourished.   HENT:   Head: Normocephalic.   Eyes: Pupils are equal, round, and reactive to light.   Neck: Normal carotid pulses and no JVD present. Carotid bruit is not present. No thyromegaly present.   Cardiovascular: Normal rate, regular rhythm, normal heart sounds and normal pulses.  No extrasystoles are present. PMI is not displaced. Exam reveals no gallop and no S3.   No murmur heard.  Pulmonary/Chest: Breath sounds normal. No stridor. No respiratory distress.   Abdominal: Soft. Bowel sounds are normal. There is no tenderness. There is no rebound.   Musculoskeletal: Normal range of motion.   Neurological: He is alert and oriented to person, place, and time.   Skin: Skin is intact. No rash noted.   Psychiatric: His behavior is normal.       Lab  Results   Component Value Date    CHOL 132 02/15/2019    CHOL 120 08/13/2018    CHOL 131 02/16/2018     Lab Results   Component Value Date    HDL 25 (L) 02/15/2019    HDL 23 (L) 08/13/2018    HDL 39 (L) 02/16/2018     Lab Results   Component Value Date    LDLCALC 50.4 (L) 02/15/2019    LDLCALC 46.2 (L) 08/13/2018    LDLCALC 77.2 02/16/2018     Lab Results   Component Value Date    TRIG 283 (H) 02/15/2019    TRIG 254 (H) 08/13/2018    TRIG 74 02/16/2018     Lab Results   Component Value Date    CHOLHDL 18.9 (L) 02/15/2019    CHOLHDL 19.2 (L) 08/13/2018    CHOLHDL 29.8 02/16/2018       Chemistry        Component Value Date/Time     02/15/2019 0735    K 3.9 02/15/2019 0735     02/15/2019 0735    CO2 27 02/15/2019 0735    BUN 17 02/15/2019 0735    CREATININE 0.8 02/15/2019 0735     02/15/2019 0735        Component Value Date/Time    CALCIUM 9.6 02/15/2019 0735    ALKPHOS 46 (L) 12/01/2014 0545    AST 20 02/15/2019 0735    ALT 27 02/15/2019 0735    BILITOT 0.5 12/01/2014 0545    ESTGFRAFRICA >60.0 02/15/2019 0735    EGFRNONAA >60.0 02/15/2019 0735          Lab Results   Component Value Date    HGBA1C 7.1 (H) 02/15/2019     Lab Results   Component Value Date    TSH 1.003 10/05/2012     Lab Results   Component Value Date    INR 1.0 12/01/2014    INR 1.0 05/06/2011     Lab Results   Component Value Date    WBC 7.02 04/17/2015    HGB 14.7 04/17/2015    HCT 43.7 04/17/2015    MCV 96 04/17/2015     04/17/2015     BMP  Sodium   Date Value Ref Range Status   02/15/2019 138 136 - 145 mmol/L Final     Potassium   Date Value Ref Range Status   02/15/2019 3.9 3.5 - 5.1 mmol/L Final     Chloride   Date Value Ref Range Status   02/15/2019 103 95 - 110 mmol/L Final     CO2   Date Value Ref Range Status   02/15/2019 27 23 - 29 mmol/L Final     BUN, Bld   Date Value Ref Range Status   02/15/2019 17 8 - 23 mg/dL Final     Creatinine   Date Value Ref Range Status   02/15/2019 0.8 0.5 - 1.4 mg/dL Final     Calcium    Date Value Ref Range Status   02/15/2019 9.6 8.7 - 10.5 mg/dL Final     Anion Gap   Date Value Ref Range Status   02/15/2019 8 8 - 16 mmol/L Final     eGFR if    Date Value Ref Range Status   02/15/2019 >60.0 >60 mL/min/1.73 m^2 Final     eGFR if non    Date Value Ref Range Status   02/15/2019 >60.0 >60 mL/min/1.73 m^2 Final     Comment:     Calculation used to obtain the estimated glomerular filtration  rate (eGFR) is the CKD-EPI equation.        BNP  @LABRCNTIP(BNP,BNPTRIAGEBLO)@  @LABRCNTIP(troponini)@  CrCl cannot be calculated (Patient's most recent lab result is older than the maximum 7 days allowed.).  No results found in the last 24 hours.  No results found in the last 24 hours.  No results found in the last 24 hours.    Assessment:      1. Type 2 diabetes mellitus with complication, with long-term current use of insulin    2. Hypertension associated with diabetes        Plan:   Increase Losartan from 50 mg to 100 mg daily  Continue current meds.  Check BP at home  Recommend heart-healthy diet, weight control and regular exercise.  [place]. Risk modification.   RTC in 1 yr with ERROL    I have reviewed all pertinent labs and cardiac studies. Plans and recommendations have been formulated under my direct supervision. All questions answered and patient voiced understanding. Patient to continue current medications.

## 2019-03-07 DIAGNOSIS — E11.8 TYPE 2 DIABETES MELLITUS WITH COMPLICATION, WITH LONG-TERM CURRENT USE OF INSULIN: ICD-10-CM

## 2019-03-07 DIAGNOSIS — E11.59 HYPERTENSION ASSOCIATED WITH DIABETES: ICD-10-CM

## 2019-03-07 DIAGNOSIS — I15.2 HYPERTENSION ASSOCIATED WITH DIABETES: ICD-10-CM

## 2019-03-07 DIAGNOSIS — Z79.4 TYPE 2 DIABETES MELLITUS WITH COMPLICATION, WITH LONG-TERM CURRENT USE OF INSULIN: ICD-10-CM

## 2019-03-08 ENCOUNTER — OFFICE VISIT (OUTPATIENT)
Dept: DIABETES | Facility: CLINIC | Age: 69
End: 2019-03-08
Payer: MEDICARE

## 2019-03-08 ENCOUNTER — PATIENT OUTREACH (OUTPATIENT)
Dept: OTHER | Facility: OTHER | Age: 69
End: 2019-03-08

## 2019-03-08 VITALS
BODY MASS INDEX: 29.35 KG/M2 | DIASTOLIC BLOOD PRESSURE: 70 MMHG | SYSTOLIC BLOOD PRESSURE: 126 MMHG | HEIGHT: 71 IN | WEIGHT: 209.69 LBS

## 2019-03-08 DIAGNOSIS — Z79.4 TYPE 2 DIABETES MELLITUS WITH COMPLICATION, WITH LONG-TERM CURRENT USE OF INSULIN: Primary | ICD-10-CM

## 2019-03-08 DIAGNOSIS — E11.8 TYPE 2 DIABETES MELLITUS WITH COMPLICATION, WITH LONG-TERM CURRENT USE OF INSULIN: Primary | ICD-10-CM

## 2019-03-08 LAB — GLUCOSE SERPL-MCNC: 176 MG/DL (ref 70–110)

## 2019-03-08 PROCEDURE — 99999 PR PBB SHADOW E&M-EST. PATIENT-LVL III: ICD-10-PCS | Mod: PBBFAC,,, | Performed by: PHYSICIAN ASSISTANT

## 2019-03-08 PROCEDURE — 82962 GLUCOSE BLOOD TEST: CPT | Mod: PBBFAC,PN | Performed by: PHYSICIAN ASSISTANT

## 2019-03-08 PROCEDURE — 99214 OFFICE O/P EST MOD 30 MIN: CPT | Mod: S$PBB,,, | Performed by: PHYSICIAN ASSISTANT

## 2019-03-08 PROCEDURE — 99999 PR PBB SHADOW E&M-EST. PATIENT-LVL III: CPT | Mod: PBBFAC,,, | Performed by: PHYSICIAN ASSISTANT

## 2019-03-08 PROCEDURE — 99213 OFFICE O/P EST LOW 20 MIN: CPT | Mod: PBBFAC,PN | Performed by: PHYSICIAN ASSISTANT

## 2019-03-08 PROCEDURE — 99214 PR OFFICE/OUTPT VISIT, EST, LEVL IV, 30-39 MIN: ICD-10-PCS | Mod: S$PBB,,, | Performed by: PHYSICIAN ASSISTANT

## 2019-03-08 RX ORDER — INSULIN ASPART 100 [IU]/ML
28 INJECTION, SOLUTION INTRAVENOUS; SUBCUTANEOUS
Qty: 75.6 ML | Refills: 3 | Status: SHIPPED | OUTPATIENT
Start: 2019-03-08 | End: 2020-04-03 | Stop reason: SDUPTHER

## 2019-03-08 RX ORDER — LOSARTAN POTASSIUM 50 MG/1
TABLET ORAL
Qty: 90 TABLET | Refills: 3 | OUTPATIENT
Start: 2019-03-08

## 2019-03-08 NOTE — PROGRESS NOTES
HPI:  69 yo male with a PMH listed below.  Past Medical History:   Diagnosis Date    Coronary artery disease 2010    [facility] - [facility] stents    Diabetes mellitus 10-12 years     am 10/31/2014    DM (diabetes mellitus) 13 years     am 11/03/2015    DM (diabetes mellitus) 2002     lunch 10/28/2016    Hyperlipemia     Kidney stones     Neuropathy        Last 5 Patient Entered Readings                                      Current 30 Day Average: 138/72     Recent Readings 2/27/2019 2/27/2019 2/27/2019 2/27/2019 2/23/2019    SBP (mmHg) 130 130 145 145 145    DBP (mmHg) 59 59 69 69 69    Pulse 73 73 80 80 80        Patient denies s/s of hypotension (lightheadedness, dizziness, nausea, fatigue) associated with low readings. Instructed patient to inform me if this occurs, patient confirms understanding.    Patient denies s/s of hypertension (SOB, CP, severe headaches, changes in vision) associated with high readings. Instructed patient to go to the ED if BP >180/110 and accompanied by hypertensive s/s, patient confirms understanding.    Last 6 Patient Entered Readings                                          Most Recent A1c: 7.1% on 2/15/2019  (Goal: 7%)     Recent Readings 3/8/2019 3/8/2019 3/7/2019 3/7/2019 3/7/2019    Blood Glucose (mg/dL) 81 127 124 126 135        Patient denies s/s or episodes of hypoglycemia (weakness, dizziness, hunger, shakiness, nausea, headache, heart palpitations, sweating, fatigue, anxiety, etc.). Patient denies a history of hypoglycemia. We reviewed how to correct a reading <70.    Patient denies s/s of hyperglycemia (headache, increased thirst, increased urination, fatigue, blurred vision).    Patient seen by endocrine today who is pleased with his blood glucose readings.    Patient cannot remember his log in and password for [facility] causing his lack of BP readings.     Assessment:  Patient's current 30-day average is not at goal of <130/80 mmHg based on ACC/AHA HTN  guidelines.     Diabetes is not controlled based on patient's last A1C. SMBG readings reviewed and are at goal.    Health maintenance is up to date.    Plan:  Continue current regimen. If his BP remains elevated, I will add amlodipine. Contact the technical team for the [employer] to obtain your user name and password.  Patients health , Karina Chuckyporfirio, will be following up every 3-4 weeks.   I will continue to monitor regularly and will follow-up in 3 weeks, sooner if blood pressure or blood glucose begins to trend upward or downward.     Current medication regimen:  Hypertension Medications             losartan (COZAAR) 100 MG tablet Take 1 tablet (100 mg total) by mouth once daily.        Diabetes Medications             empagliflozin (JARDIANCE) 10 mg Tab Take 10 mg by mouth once daily.    insulin glargine (LANTUS SOLOSTAR U-100 INSULIN) 100 unit/mL (3 mL) InPn pen Inject 75 Units into the skin once daily.    metFORMIN (GLUCOPHAGE) 1000 MG tablet TAKE 1 TABLET BY MOUTH TWICE DAILY WITH MEALS    TRULICITY 1.5 mg/0.5 mL PnIj INJECT 1.5 MG INTO THE SKIN EVERY 7 DAYS    insulin aspart U-100 (NOVOLOG) 100 unit/mL InPn pen Inject 28 Units into the skin 3 (three) times daily before meals.        Patient has my contact information and knows to call with any concerns or clinical changes.

## 2019-03-08 NOTE — PROGRESS NOTES
Subjective:      Patient ID: Timothy Ortega is a 68 y.o. male.    PCP: EM Ochoa Jr, MD      Timothy Ortega is a pleasant 68 y.o. male presenting to follow up on diabetes mellitus. Also, pertinent to this visit in decision making is hypertension, hyperlipidemia, and adherence. He has had diabetes for 20 or more years. His last visit in Diabetes Management was Visit date not found.  Since that time he has been in his usual state of health without significant hyperglycemia or hypoglycemia. He has been checking his blood glucose regularly twice daily, fasting and before supper. Also, since that time he has had moderate improvement in his glycemia with A1c of 7.1%. His digital medicine tab was reviewed and his BG's are at goal fasting >90% of the time. His current concerns are glycemic control.    He denies any hospital admissions, emergency room visits, hypoglycemia, syncope, diaphoresis, chest pain, or dyspnea.    He has lost 3 pounds since last visit. His BMI is 29.24 kg/m².    His blood sugar in the clinic today was:   Lab Results   Component Value Date    POCGLU 176 (A) 03/08/2019     We discussed the American diabetes Association recommendations:  hemoglobin A1c below 7.0%; all diabetics should be on statins unless contraindicated; one aspirin daily unless contraindicated; fasting blood sugar between 80 and 130 mg/dL; postprandial blood sugar below 180 mg/dl; prevention of hypoglycemia, may adjust goals to higher levels if persistent; ACE or ARB therapy if not contraindicated; and maintain in an ideal body weight with BMI below 25.    Timothy is compliant most of the time with DM medications.     Timothy is compliant most of the time with lifestyle modifications to include activity and meal planning.       Current Outpatient Medications:     ASCORBATE CALCIUM (VITAMIN C ORAL), Take by mouth once daily., Disp: , Rfl:     aspirin 81 mg Tab, Take 1 tablet by mouth Daily., Disp: , Rfl:     empagliflozin  "(JARDIANCE) 10 mg Tab, Take 10 mg by mouth once daily., Disp: 30 tablet, Rfl: 11    ERGOCALCIFEROL, VITAMIN D2, (VITAMIN D ORAL), Take by mouth once daily., Disp: , Rfl:     FREESTYLE LANCETS 28 gauge lancets, USE THREE TIMES DAILY, Disp: 100 each, Rfl: 0    hydrocortisone 2.5 % cream, ERROL TO RASH BID PRN, Disp: , Rfl: 3    insulin glargine (LANTUS SOLOSTAR U-100 INSULIN) 100 unit/mL (3 mL) InPn pen, Inject 75 Units into the skin once daily., Disp: 30 mL, Rfl: 11    insulin lispro (HUMALOG KWIKPEN INSULIN) 100 unit/mL InPn pen, 24 units each meal with 2 units extra at 150(26 total) or 4 units extra at > 200(28 total), Disp: 66 mL, Rfl: 11    KRILL OIL ORAL, Take 1 capsule by mouth once daily., Disp: , Rfl:     lancets 33 gauge Misc, 1 lancet by Misc.(Non-Drug; Combo Route) route 3 (three) times daily., Disp: 100 each, Rfl: 11    losartan (COZAAR) 100 MG tablet, Take 1 tablet (100 mg total) by mouth once daily., Disp: 30 tablet, Rfl: 5    metFORMIN (GLUCOPHAGE) 1000 MG tablet, TAKE 1 TABLET BY MOUTH TWICE DAILY WITH MEALS, Disp: 180 tablet, Rfl: 3    minocycline (MINOCIN,DYNACIN) 50 MG Cap, TK 1 C PO BID, Disp: , Rfl: 2    pen needle, diabetic (BD INSULIN PEN NEEDLE UF SHORT) 31 gauge x 5/16" Ndle, USE 5 TIMES DAILY AS DIRECTED, Disp: 450 each, Rfl: 3    rosuvastatin (CRESTOR) 40 MG Tab, TAKE ONE TABLET BY MOUTH EVERY EVENING, Disp: 30 tablet, Rfl: 11    TRULICITY 1.5 mg/0.5 mL PnIj, INJECT 1.5 MG INTO THE SKIN EVERY 7 DAYS, Disp: 2 mL, Rfl: 11    blood-glucose meter (FREESTYLE LITE METER) kit, Freestyle meter   Use as instructed, Disp: 1 each, Rfl: 0    STANDARDS OF CARE:  Eye doctor: Dr. Stoddard, last exam 11/03/2017.  Dental exam: Recommend regular exams; denies gums bleeding.  Podiatry doctor:     ACTIVITY LEVEL: He exercises rarely.  MEAL PLANNING: Number of meals per day - 3. Number of snacks per day - 2.  Per dietary recall, patient is not limiting carbohydrates, saturated fats and sodium. 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:  ACE/ARB: Yes  Statin: Yes    Health Maintenance   Topic Date Due    PROSTATE-SPECIFIC ANTIGEN  08/13/2019    Hemoglobin A1c  08/15/2019    Foot Exam  10/05/2019    Lipid Panel  02/15/2020    Urine Microalbumin  02/15/2020    Eye Exam  03/01/2020    High Dose Statin  03/08/2020    Aspirin/Antiplatelet Therapy  03/08/2020    Colonoscopy  03/31/2022    TETANUS VACCINE  07/20/2028    Hepatitis C Screening  Completed    Zoster Vaccine  Completed    Pneumococcal Vaccine (65+ Low/Medium Risk)  Completed    Influenza Vaccine  Completed    Abdominal Aortic Aneurysm Screening  Completed       Diabetes Status:   Diabetes Management Status    Statin: Taking  ACE/ARB: Taking    Screening or Prevention Patient's value Goal Complete/Controlled?   HgA1C Testing and Control   Lab Results   Component Value Date    HGBA1C 7.1 (H) 02/15/2019      Annually/Less than 8% Yes   Lipid profile : 02/15/2019 Annually Yes   LDL control Lab Results   Component Value Date    LDLCALC 50.4 (L) 02/15/2019    Annually/Less than 100 mg/dl  Yes   Nephropathy screening Lab Results   Component Value Date    LABMICR 16.0 02/15/2019     No results found for: PROTEINUA Annually Yes   Blood pressure BP Readings from Last 1 Encounters:   03/08/19 126/70    Less than 140/90 Yes   Dilated retinal exam : 03/01/2019 Annually Yes   Foot exam   : 10/05/2018 Annually Yes     The following results were reviewed with patient.    Lab Results   Component Value Date    WBC 7.02 04/17/2015    HGB 14.7 04/17/2015    HCT 43.7 04/17/2015     04/17/2015    CHOL 132 02/15/2019    TRIG 283 (H) 02/15/2019    HDL 25 (L) 02/15/2019    LDLCALC 50.4 (L) 02/15/2019    ALT 27 02/15/2019    AST 20 02/15/2019     02/15/2019    K 3.9 02/15/2019     02/15/2019    ANIONGAP 8 02/15/2019    CREATININE 0.8 02/15/2019    ESTGFRAFRICA >60.0  02/15/2019    EGFRNONAA >60.0 02/15/2019    BUN 17 02/15/2019    CO2 27 02/15/2019    TSH 1.003 10/05/2012    PSA 0.36 08/13/2018    INR 1.0 12/01/2014     02/15/2019    UTPCR 0.10 01/25/2013       Lab Results   Component Value Date    HGBA1C 7.1 (H) 02/15/2019    HGBA1C 7.9 (H) 08/13/2018    HGBA1C 7.4 (H) 05/18/2018       Lab Results   Component Value Date    TSH 1.003 10/05/2012    TOTALTESTOST 330 04/26/2013    CALCIUM 9.6 02/15/2019       Review of patient's allergies indicates:   Allergen Reactions    No known drug allergies        Past Medical History:   Diagnosis Date    Coronary artery disease 2010    [facility] - no stents    Diabetes mellitus 10-12 years     am 10/31/2014    DM (diabetes mellitus) 13 years     am 11/03/2015    DM (diabetes mellitus) 2002     lunch 10/28/2016    Hyperlipemia     Kidney stones     Neuropathy        Review of Systems   Constitutional: Negative.  Negative for activity change, appetite change, chills, diaphoresis, fatigue, fever and unexpected weight change.   HENT: Negative.  Negative for dental problem, facial swelling, hearing loss, nosebleeds, trouble swallowing and voice change.    Eyes: Negative.  Negative for photophobia, pain, discharge, redness, itching and visual disturbance.   Respiratory: Negative.  Negative for cough, choking, chest tightness, shortness of breath and wheezing.    Cardiovascular: Negative.  Negative for chest pain, palpitations and leg swelling.   Gastrointestinal: Negative.  Negative for abdominal distention, abdominal pain, blood in stool, constipation, diarrhea, nausea and vomiting.   Endocrine: Negative.  Negative for cold intolerance, heat intolerance, polydipsia, polyphagia and polyuria.   Genitourinary: Negative.  Negative for decreased urine volume, difficulty urinating, dysuria, frequency, scrotal swelling, testicular pain and urgency.   Musculoskeletal: Negative.  Negative for arthralgias, back pain, gait  "problem, joint swelling, myalgias, neck pain and neck stiffness.   Skin: Negative.  Negative for color change, pallor, rash and wound.   Allergic/Immunologic: Negative.  Negative for environmental allergies, food allergies and immunocompromised state.   Neurological: Negative.  Negative for dizziness, tremors, seizures, syncope, facial asymmetry, speech difficulty, weakness, light-headedness, numbness and headaches.   Hematological: Negative.  Negative for adenopathy. Does not bruise/bleed easily.   Psychiatric/Behavioral: Negative.  Negative for agitation, behavioral problems, confusion, decreased concentration, dysphoric mood, hallucinations, self-injury, sleep disturbance and suicidal ideas. The patient is not nervous/anxious and is not hyperactive.       Objective:     Vitals - 1 value per visit 2/21/2019 3/1/2019 3/8/2019   SYSTOLIC 124 132 126   DIASTOLIC 68 76 70   PULSE 69 72 -   TEMPERATURE 98.1 - -   RESPIRATIONS - - -   SPO2 96 - -   Weight (lb) 209.88 211.2 209.66   Weight (kg) 95.2 95.8 95.1   HEIGHT 5' 10.8" 5' 10.8" 5' 11"   BODY MASS INDEX 29.44 29.62 29.24   VISIT REPORT - - -   Pain Score  0 0 0   Some recent data might be hidden       Physical Exam   Constitutional: He is oriented to person, place, and time. He appears well-developed and well-nourished. He is cooperative.  Non-toxic appearance. He does not have a sickly appearance. He does not appear ill. No distress. He is not intubated.   HENT:   Head: Normocephalic and atraumatic. Not macrocephalic and not microcephalic. Head is without raccoon's eyes, without Noriega's sign, without abrasion, without contusion, without laceration, without right periorbital erythema and without left periorbital erythema. Hair is normal.   Right Ear: External ear normal. No lacerations. No drainage, swelling or tenderness. No foreign bodies. No mastoid tenderness. Tympanic membrane is not injected, not scarred, not perforated, not erythematous, not retracted and " not bulging. Tympanic membrane mobility is normal. No middle ear effusion. No hemotympanum. No decreased hearing is noted.   Left Ear: External ear normal. No lacerations. No drainage, swelling or tenderness. No foreign bodies. No mastoid tenderness. Tympanic membrane is not injected, not scarred, not perforated, not erythematous, not retracted and not bulging. Tympanic membrane mobility is normal.  No middle ear effusion. No hemotympanum. No decreased hearing is noted.   Nose: Nose normal.   Mouth/Throat: Oropharynx is clear and moist.   Eyes: EOM and lids are normal. Pupils are equal, round, and reactive to light. Right eye exhibits no chemosis, no discharge, no exudate and no hordeolum. No foreign body present in the right eye. Left eye exhibits no chemosis, no discharge, no exudate and no hordeolum. No foreign body present in the left eye. Right conjunctiva is not injected. Right conjunctiva has no hemorrhage. Left conjunctiva is not injected. Left conjunctiva has no hemorrhage. No scleral icterus. Right eye exhibits normal extraocular motion and no nystagmus. Left eye exhibits normal extraocular motion and no nystagmus. Right pupil is round and reactive. Left pupil is round and reactive. Pupils are equal.   Neck: Normal range of motion, full passive range of motion without pain and phonation normal. Neck supple. Normal carotid pulses, no hepatojugular reflux and no JVD present. No tracheal tenderness, no spinous process tenderness and no muscular tenderness present. Carotid bruit is not present. No neck rigidity. No tracheal deviation, no edema, no erythema and normal range of motion present. No thyroid mass and no thyromegaly present.   Cardiovascular: Normal rate, regular rhythm, normal heart sounds and intact distal pulses.  No extrasystoles are present. PMI is not displaced. Exam reveals no gallop, no friction rub and no decreased pulses.   No murmur heard.  Pulses:       Carotid pulses are 2+ on the right  side, and 2+ on the left side.       Radial pulses are 2+ on the right side, and 2+ on the left side.        Dorsalis pedis pulses are 2+ on the right side, and 2+ on the left side.        Posterior tibial pulses are 2+ on the right side, and 2+ on the left side.   Pulmonary/Chest: Effort normal and breath sounds normal. No accessory muscle usage or stridor. No apnea, no tachypnea and no bradypnea. He is not intubated. No respiratory distress. He has no decreased breath sounds. He has no wheezes. He has no rhonchi. He has no rales. He exhibits no tenderness.   Abdominal: Soft. Bowel sounds are normal. He exhibits no shifting dullness, no distension, no pulsatile liver, no fluid wave, no abdominal bruit, no ascites, no pulsatile midline mass and no mass. There is no hepatosplenomegaly, splenomegaly or hepatomegaly. There is no tenderness. There is no rigidity, no rebound, no guarding, no CVA tenderness and no tenderness at McBurney's point. No hernia. Hernia confirmed negative in the ventral area.   Musculoskeletal: Normal range of motion. He exhibits no edema or tenderness.        Right foot: There is normal range of motion and no deformity.        Left foot: There is normal range of motion and no deformity.   Feet:   Right Foot:   Protective Sensation: 6 sites tested. 6 sites sensed.   Skin Integrity: Negative for ulcer, blister, skin breakdown, erythema, warmth, callus or dry skin.   Left Foot:   Protective Sensation: 6 sites tested. 6 sites sensed.   Skin Integrity: Negative for ulcer, blister, skin breakdown, erythema, warmth, callus or dry skin.   Lymphadenopathy:        Head (right side): No submental, no submandibular, no tonsillar, no preauricular, no posterior auricular and no occipital adenopathy present.        Head (left side): No submental, no submandibular, no tonsillar, no preauricular, no posterior auricular and no occipital adenopathy present.     He has no cervical adenopathy.        Right  cervical: No superficial cervical, no deep cervical and no posterior cervical adenopathy present.       Left cervical: No superficial cervical, no deep cervical and no posterior cervical adenopathy present.   Neurological: He is alert and oriented to person, place, and time. He has normal reflexes. He displays no atrophy and no tremor. No cranial nerve deficit or sensory deficit. He exhibits normal muscle tone. He displays no seizure activity. Coordination and gait normal.   Reflex Scores:       Bicep reflexes are 2+ on the right side and 2+ on the left side.       Brachioradialis reflexes are 2+ on the right side and 2+ on the left side.       Patellar reflexes are 2+ on the right side and 2+ on the left side.  Skin: Skin is warm and dry. No rash noted. No erythema. No pallor.   Psychiatric: He has a normal mood and affect. His mood appears not anxious. His affect is not angry, not blunt, not labile and not inappropriate. His speech is not rapid and/or pressured, not delayed, not tangential and not slurred. He is not agitated, not aggressive, not hyperactive, not slowed, not withdrawn, not actively hallucinating and not combative. Thought content is not paranoid and not delusional. Cognition and memory are not impaired. He does not express impulsivity or inappropriate judgment. He does not exhibit a depressed mood. He expresses no homicidal and no suicidal ideation. He expresses no suicidal plans and no homicidal plans. He is communicative. He exhibits normal recent memory and normal remote memory. He is attentive.     Assessment:     1. Type 2 diabetes mellitus with complication, with long-term current use of insulin       Plan:   Timothy Ortega is seen today for   1. Type 2 diabetes mellitus with complication, with long-term current use of insulin      We have discussed the etiology and treatment options associated with the diagnosis as well as alternatives.       He has elected the following treatments.     Type  2 diabetes mellitus with complication, with long-term current use of insulin  -     POCT Glucose, Hand-Held Device    Type 2 diabetes mellitus with complication, with long-term current use of insulin  -     POCT glucose  -  improved control will continue at current treatment regimen  - Titrate his basal insulin at HS  With goal fasting BG between . Continue digital medicine.  - Continue with D&E, reduce portion size, and restrict carbohydrates (no more that 45 grams ) per meal.  - F/U in 3 months.    1.) Patient was instructed to continue to monitor blood glucose 4 times daily. Reminded to bring BG meter or record to each visit for review.  2.) Reviewed pathophysiology of diabetes, complications related to the disease, importance of annual dilated eye exam and self daily foot examination.  3.) Continue medications as prescribed MDI with Lantus and Humalog; Trulicity; Metformin. [facility] MyChart or Phone review in 1 week with BG records for adjustment of medication.  4.) Advised patient to continue to follow up with Dietician/CDE as directed.  5.) Discussed activity, benefits, methods, and precautions. Recommended patient start/continue some form of exercise and increase as tolerated to 60 minutes per day to facilitate weight loss and aid in control of BGs. Also reminded patient of WHO recommendation of 10,000 steps daily as a goal.   6.) A1C, TSH, Lipid Panel, CMP/renal panel with eGFR and Micro/Creatinine prior to next visit, if not already done.  7.) Return to clinic in 12 weeks for follow up. Advised patient to call clinic with any questions or concerns.    A total of 30 minutes was spent in face to face time, of which 50 % was spent in counseling patient on disease process, complications, treatment, and side effects of medications.    The patient was explained the above plan and given opportunity to ask questions.  He understands, chooses and consents to this plan and accepts all the risks, which include  but are not limited to the risks mentioned above.   He understands the alternative of having no testing, interventions or treatments at this time. He left content and without further questions.     Disclaimer:  This note is prepared using voice recognition software and as such is likely to have errors and has not been proof read. Please contact me for questions.

## 2019-03-18 ENCOUNTER — PATIENT MESSAGE (OUTPATIENT)
Dept: ADMINISTRATIVE | Facility: OTHER | Age: 69
End: 2019-03-18

## 2019-03-25 ENCOUNTER — PATIENT OUTREACH (OUTPATIENT)
Dept: OTHER | Facility: OTHER | Age: 69
End: 2019-03-25

## 2019-03-25 DIAGNOSIS — E11.59 HYPERTENSION ASSOCIATED WITH DIABETES: Primary | ICD-10-CM

## 2019-03-25 DIAGNOSIS — I15.2 HYPERTENSION ASSOCIATED WITH DIABETES: Primary | ICD-10-CM

## 2019-03-25 RX ORDER — AMLODIPINE BESYLATE 5 MG/1
5 TABLET ORAL DAILY
Qty: 30 TABLET | Refills: 3 | Status: SHIPPED | OUTPATIENT
Start: 2019-03-25 | End: 2019-07-26 | Stop reason: SDUPTHER

## 2019-03-25 NOTE — PROGRESS NOTES
HPI:  Last 5 Patient Entered Readings                                      Current 30 Day Average: 138/75     Recent Readings 3/22/2019 3/22/2019 3/22/2019 3/22/2019 3/21/2019    SBP (mmHg) 150 150 151 151 126    DBP (mmHg) 83 83 83 83 69    Pulse 63 63 60 60 75        Patient denies s/s of hypotension (lightheadedness, dizziness, nausea, fatigue) associated with low readings. Instructed patient to inform me if this occurs, patient confirms understanding.    Patient denies s/s of hypertension (SOB, CP, severe headaches, changes in vision) associated with high readings. Instructed patient to go to the ED if BP >180/110 and accompanied by hypertensive s/s, patient confirms understanding.    Last 6 Patient Entered Readings                                          Most Recent A1c: 7.1% on 2/15/2019  (Goal: 8%)     Recent Readings 3/25/2019 3/24/2019 3/24/2019 3/24/2019 3/22/2019    Blood Glucose (mg/dL) 106 214 96 127 156        Patient denies s/s or episodes of hypoglycemia (weakness, dizziness, hunger, shakiness, nausea, headache, heart palpitations, sweating, fatigue, anxiety, etc.). He had two low readings that weren't accurate on 3/19. They were deleted.    Patient denies s/s of hyperglycemia (headache, increased thirst, increased urination, fatigue, blurred vision).    Assessment:  Patient's current 30-day average is not at goal of <130/80 mmHg based on ACC/AHA HTN guidelines.     Diabetes is close to goal based on patient's last A1C. SMBG readings reviewed and are elevated at times due to a diet change. Overall, they are controlled.    Health maintenance is up to date.    Plan:  Add amlodipine 5mg daily. Maintain current DM medications.  Patients health , Karina Allen, will be following up every 3-4 weeks.   I will continue to monitor regularly and will follow-up in 2 weeks, sooner if blood pressure or blood glucose begins to trend upward or downward.     Current medication regimen:  Hypertension  Medications             losartan (COZAAR) 100 MG tablet Take 1 tablet (100 mg total) by mouth once daily.        Diabetes Medications             empagliflozin (JARDIANCE) 10 mg Tab Take 10 mg by mouth once daily.    insulin aspart U-100 (NOVOLOG) 100 unit/mL InPn pen Inject 28 Units into the skin 3 (three) times daily before meals.    insulin glargine (LANTUS SOLOSTAR U-100 INSULIN) 100 unit/mL (3 mL) InPn pen Inject 75 Units into the skin once daily.    metFORMIN (GLUCOPHAGE) 1000 MG tablet TAKE 1 TABLET BY MOUTH TWICE DAILY WITH MEALS    TRULICITY 1.5 mg/0.5 mL PnIj INJECT 1.5 MG INTO THE SKIN EVERY 7 DAYS        Patient has my contact information and knows to call with any concerns or clinical changes.

## 2019-04-03 ENCOUNTER — PATIENT OUTREACH (OUTPATIENT)
Dept: OTHER | Facility: OTHER | Age: 69
End: 2019-04-03

## 2019-04-03 NOTE — PROGRESS NOTES
"Last 5 Patient Entered Readings                                      Current 30 Day Average: 134/77     Recent Readings 3/29/2019 3/29/2019 3/28/2019 3/28/2019 3/27/2019    SBP (mmHg) 128 128 118 118 121    DBP (mmHg) 67 67 75 75 73    Pulse 69 69 77 77 80          Last 6 Patient Entered Readings                                          Most Recent A1c: 7.1% on 2/15/2019  (Goal: 7%)     Recent Readings 4/3/2019 4/3/2019 4/2/2019 4/2/2019 4/2/2019    Blood Glucose (mg/dL) 216 128 115 206 93          Digital Medicine: Health  Follow Up    Lifestyle Modifications:    1.Dietary Modifications (Sodium intake <2,000mg/day, food labels, dining out):  Patient is avoiding "bad foods" such as starches, breads, etc.    2.Physical Activity: patient started walking 2 miles 5-7 times a week    3.Medication Therapy: Patient has been compliant with the medication regimen.    4.Patient has the following medication side effects/concerns:  The low (67) on 3/29 was accurate and he states he felt dizzy and that he ate some raisins and felt better shortly after.  (Frequency/Alleviating factors/Precipitating factors, etc.)     Follow up with Mr. Timothy Ortgea completed. No further questions or concerns. Will continue to follow up to achieve health goals.    "

## 2019-04-08 ENCOUNTER — PATIENT OUTREACH (OUTPATIENT)
Dept: OTHER | Facility: OTHER | Age: 69
End: 2019-04-08

## 2019-04-08 NOTE — PROGRESS NOTES
HPI:  Called patient to discuss his low BG reading 4/5 and see how he is tolerating amlodipine.  Last 5 Patient Entered Readings                                      Current 30 Day Average: 133/76     Recent Readings 4/5/2019 4/5/2019 3/29/2019 3/29/2019 3/28/2019    SBP (mmHg) 132 132 128 128 118    DBP (mmHg) 72 72 67 67 75    Pulse 62 62 69 69 77        Patient denies s/s of hypotension (lightheadedness, dizziness, nausea, fatigue) associated with low readings. Instructed patient to inform me if this occurs, patient confirms understanding.    Patient denies s/s of hypertension (SOB, CP, severe headaches, changes in vision) associated with high readings. Instructed patient to go to the ED if BP >180/110 and accompanied by hypertensive s/s, patient confirms understanding.    Patient is tolerating amlodipine without any issues.    Last 6 Patient Entered Readings                                          Most Recent A1c: 7.1% on 2/15/2019  (Goal: 7%)     Recent Readings 4/8/2019 4/8/2019 4/7/2019 4/7/2019 4/7/2019    Blood Glucose (mg/dL) 101 188 175 147 127        Patient had s/s or episodes of hypoglycemia of dizziness and denies weakness, hunger, shakiness, nausea, headache, heart palpitations, sweating, fatigue, and anxiety. He had a true low reading 4/5 since he missed a snack.    Patient denies s/s of hyperglycemia (headache, increased thirst, increased urination, fatigue, blurred vision).    Assessment:  Patient's current 30-day average is close to the goal of <130/80 mmHg based on ACC/AHA HTN guidelines.     Diabetes is close to goal based on patient's last A1C. SMBG readings reviewed and are close to goal.    Health maintenance is up to date.    Plan:  Continue current regimen.  Patients health , Karina Allen, will be following up every 3-4 weeks.   I will continue to monitor regularly and will follow-up in 3 weeks, sooner if blood pressure or blood glucose begins to trend upward or downward.      Current medication regimen:  Hypertension Medications             amLODIPine (NORVASC) 5 MG tablet Take 1 tablet (5 mg total) by mouth once daily.    losartan (COZAAR) 100 MG tablet Take 1 tablet (100 mg total) by mouth once daily.        Diabetes Medications             empagliflozin (JARDIANCE) 10 mg Tab Take 10 mg by mouth once daily.    insulin aspart U-100 (NOVOLOG) 100 unit/mL InPn pen Inject 28 Units into the skin 3 (three) times daily before meals.    insulin glargine (LANTUS SOLOSTAR U-100 INSULIN) 100 unit/mL (3 mL) InPn pen Inject 75 Units into the skin once daily.    metFORMIN (GLUCOPHAGE) 1000 MG tablet TAKE 1 TABLET BY MOUTH TWICE DAILY WITH MEALS    TRULICITY 1.5 mg/0.5 mL PnIj INJECT 1.5 MG INTO THE SKIN EVERY 7 DAYS        Patient has my contact information and knows to call with any concerns or clinical changes.

## 2019-04-29 ENCOUNTER — PATIENT MESSAGE (OUTPATIENT)
Dept: INTERNAL MEDICINE | Facility: CLINIC | Age: 69
End: 2019-04-29

## 2019-04-29 NOTE — TELEPHONE ENCOUNTER
This sounds like trouble not in back but in neck or distal arm. Come in to be seen by me or  Augustine.

## 2019-05-01 ENCOUNTER — OFFICE VISIT (OUTPATIENT)
Dept: INTERNAL MEDICINE | Facility: CLINIC | Age: 69
End: 2019-05-01
Payer: MEDICARE

## 2019-05-01 VITALS
HEART RATE: 69 BPM | HEIGHT: 71 IN | OXYGEN SATURATION: 97 % | SYSTOLIC BLOOD PRESSURE: 116 MMHG | BODY MASS INDEX: 29.78 KG/M2 | TEMPERATURE: 98 F | DIASTOLIC BLOOD PRESSURE: 70 MMHG | WEIGHT: 212.75 LBS

## 2019-05-01 DIAGNOSIS — G62.9 POLYNEUROPATHY: Primary | ICD-10-CM

## 2019-05-01 DIAGNOSIS — G56.20 ULNAR NERVE ENTRAPMENT, UNSPECIFIED LATERALITY: ICD-10-CM

## 2019-05-01 DIAGNOSIS — M50.90 CERVICAL DISC DISORDER: ICD-10-CM

## 2019-05-01 DIAGNOSIS — G56.03 BILATERAL CARPAL TUNNEL SYNDROME: ICD-10-CM

## 2019-05-01 PROCEDURE — 99213 OFFICE O/P EST LOW 20 MIN: CPT | Mod: PBBFAC | Performed by: PEDIATRICS

## 2019-05-01 PROCEDURE — 99999 PR PBB SHADOW E&M-EST. PATIENT-LVL III: ICD-10-PCS | Mod: PBBFAC,,, | Performed by: PEDIATRICS

## 2019-05-01 PROCEDURE — 99213 OFFICE O/P EST LOW 20 MIN: CPT | Mod: S$PBB,,, | Performed by: PEDIATRICS

## 2019-05-01 PROCEDURE — 99213 PR OFFICE/OUTPT VISIT, EST, LEVL III, 20-29 MIN: ICD-10-PCS | Mod: S$PBB,,, | Performed by: PEDIATRICS

## 2019-05-01 PROCEDURE — 99999 PR PBB SHADOW E&M-EST. PATIENT-LVL III: CPT | Mod: PBBFAC,,, | Performed by: PEDIATRICS

## 2019-05-01 NOTE — PROGRESS NOTES
Subjective:       Patient ID: Timothy Ortega is a 68 y.o. male.    Chief Complaint: Numbness (hands, arms, neck)    Has hx polyneuropathy, CTS, ulnar compression back in 2016 by [facility]. Splints and NSAIDs had worked. Now several months of fingers tingling stinging up into neck, both sides. No coordination, strength issues. Mild pain in neck. Wakes him at night. Positioning worsens.     Review of Systems   Constitutional: Negative for fever and unexpected weight change.   HENT: Negative for congestion and rhinorrhea.    Eyes: Negative for discharge and redness.   Respiratory: Negative for cough and wheezing.    Cardiovascular: Negative for chest pain, palpitations and leg swelling.   Gastrointestinal: Negative for constipation, diarrhea and vomiting.   Genitourinary: Negative for decreased urine volume and difficulty urinating.   Musculoskeletal: Positive for neck pain. Negative for arthralgias and joint swelling.   Skin: Negative for rash and wound.   Neurological: Negative for syncope and headaches.   Psychiatric/Behavioral: Negative for behavioral problems and sleep disturbance.       Objective:      Physical Exam   Constitutional: He is oriented to person, place, and time. He appears well-nourished. No distress.   Neck: Normal range of motion. Neck supple.   Musculoskeletal: Normal range of motion.   Lymphadenopathy:     He has no cervical adenopathy.   Neurological: He is alert and oriented to person, place, and time. He displays normal reflexes. No cranial nerve deficit or sensory deficit. He exhibits normal muscle tone. Coordination normal.   Light touch/descrimination to both upper extremities intact. 5/5 all muscle groups but does release with stressing of abduction and external rotation of both shoulders due to neck pain.   Psychiatric: He has a normal mood and affect. His behavior is normal. Judgment and thought content normal.       Assessment:       1. Polyneuropathy    2. Cervical disc disorder    3.  Bilateral carpal tunnel syndrome    4. Ulnar nerve entrapment, unspecified laterality        Plan:       Polyneuropathy  -     Nerve conduction test; Future  -     MRI Cervical Spine Without Contrast; Future; Expected date: 05/01/2019    Cervical disc disorder  -     Nerve conduction test; Future  -     MRI Cervical Spine Without Contrast; Future; Expected date: 05/01/2019    Bilateral carpal tunnel syndrome  -     Nerve conduction test; Future  -     MRI Cervical Spine Without Contrast; Future; Expected date: 05/01/2019    Ulnar nerve entrapment, unspecified laterality  -     Nerve conduction test; Future  -     MRI Cervical Spine Without Contrast; Future; Expected date: 05/01/2019    I suspect he has multilevel sources. Await test results to see if ortho and or NS/PMR referral. Continue NSAIDs.

## 2019-05-03 ENCOUNTER — PATIENT OUTREACH (OUTPATIENT)
Dept: OTHER | Facility: OTHER | Age: 69
End: 2019-05-03

## 2019-05-03 DIAGNOSIS — I15.2 HYPERTENSION ASSOCIATED WITH DIABETES: Primary | ICD-10-CM

## 2019-05-03 DIAGNOSIS — E11.59 HYPERTENSION ASSOCIATED WITH DIABETES: Primary | ICD-10-CM

## 2019-05-03 RX ORDER — VALSARTAN 320 MG/1
320 TABLET ORAL DAILY
Qty: 30 TABLET | Refills: 3 | Status: SHIPPED | OUTPATIENT
Start: 2019-05-03 | End: 2019-08-28 | Stop reason: SDUPTHER

## 2019-05-03 NOTE — PROGRESS NOTES
HPI:  Last 5 Patient Entered Readings                                      Current 30 Day Average: 135/73     Recent Readings 5/1/2019 4/28/2019 4/24/2019 4/18/2019 4/13/2019    SBP (mmHg) 136 136 133 134 141    DBP (mmHg) 80 77 67 70 74    Pulse 76 63 80 70 68        Patient denies s/s of hypotension (lightheadedness, dizziness, nausea, fatigue) associated with low readings. Instructed patient to inform me if this occurs, patient confirms understanding.    Patient denies s/s of hypertension (SOB, CP, severe headaches, changes in vision) associated with high readings. Instructed patient to go to the ED if BP >180/110 and accompanied by hypertensive s/s, patient confirms understanding.    Last 6 Patient Entered Readings                                          Most Recent A1c: 7.1% on 2/15/2019  (Goal: 7%)     Recent Readings 5/3/2019 5/3/2019 5/2/2019 5/2/2019 5/2/2019    Blood Glucose (mg/dL) 147 152 80 152 101        Patient denies s/s or episodes of hypoglycemia (weakness, dizziness, hunger, shakiness, nausea, headache, heart palpitations, sweating, fatigue, anxiety, etc.).    Patient denies s/s of hyperglycemia (headache, increased thirst, increased urination, fatigue, blurred vision).    Assessment:  Patient's current 30-day average is close to goal of <130/80 mmHg based on ACC/AHA HTN guidelines.     Diabetes is close to control based on patient's last A1C. SMBG readings reviewed and are     Health maintenance is up to date.    Plan:  Change losartan 100mg daily to valsartan 320mg daily for better SBP control.  Patients health , Karina Allen, will be following up every 3-4 weeks.   I will continue to monitor regularly and will follow-up in 2 weeks, sooner if blood pressure or blood glucose begins to trend upward or downward.     Current medication regimen:  Hypertension Medications             amLODIPine (NORVASC) 5 MG tablet Take 1 tablet (5 mg total) by mouth once daily.    losartan (COZAAR) 100 MG  tablet Take 1 tablet (100 mg total) by mouth once daily.        Diabetes Medications             empagliflozin (JARDIANCE) 10 mg Tab Take 10 mg by mouth once daily.    insulin aspart U-100 (NOVOLOG) 100 unit/mL InPn pen Inject 28 Units into the skin 3 (three) times daily before meals.    insulin glargine (LANTUS SOLOSTAR U-100 INSULIN) 100 unit/mL (3 mL) InPn pen Inject 75 Units into the skin once daily.    metFORMIN (GLUCOPHAGE) 1000 MG tablet TAKE 1 TABLET BY MOUTH TWICE DAILY WITH MEALS    TRULICITY 1.5 mg/0.5 mL PnIj INJECT 1.5 MG INTO THE SKIN EVERY 7 DAYS        Patient has my contact information and knows to call with any concerns or clinical changes.

## 2019-05-05 ENCOUNTER — PATIENT MESSAGE (OUTPATIENT)
Dept: OTHER | Facility: OTHER | Age: 69
End: 2019-05-05

## 2019-05-06 ENCOUNTER — OFFICE VISIT (OUTPATIENT)
Dept: PHYSICAL MEDICINE AND REHAB | Facility: CLINIC | Age: 69
End: 2019-05-06
Payer: MEDICARE

## 2019-05-06 ENCOUNTER — HOSPITAL ENCOUNTER (OUTPATIENT)
Dept: RADIOLOGY | Facility: HOSPITAL | Age: 69
Discharge: HOME | End: 2019-05-06
Attending: PHYSICAL MEDICINE & REHABILITATION
Payer: MEDICARE

## 2019-05-06 VITALS
RESPIRATION RATE: 14 BRPM | DIASTOLIC BLOOD PRESSURE: 74 MMHG | WEIGHT: 212 LBS | HEART RATE: 73 BPM | BODY MASS INDEX: 29.68 KG/M2 | HEIGHT: 71 IN | SYSTOLIC BLOOD PRESSURE: 137 MMHG

## 2019-05-06 DIAGNOSIS — G56.03 BILATERAL CARPAL TUNNEL SYNDROME: ICD-10-CM

## 2019-05-06 PROCEDURE — 99999 PR PBB SHADOW E&M-EST. PATIENT-LVL III: ICD-10-PCS | Mod: PBBFAC,,, | Performed by: PHYSICAL MEDICINE & REHABILITATION

## 2019-05-06 PROCEDURE — 95911 NRV CNDJ TEST 9-10 STUDIES: CPT | Mod: 26,S$PBB,, | Performed by: PHYSICAL MEDICINE & REHABILITATION

## 2019-05-06 PROCEDURE — 99214 OFFICE O/P EST MOD 30 MIN: CPT | Mod: 25,S$PBB,, | Performed by: PHYSICAL MEDICINE & REHABILITATION

## 2019-05-06 PROCEDURE — 99214 PR OFFICE/OUTPT VISIT, EST, LEVL IV, 30-39 MIN: ICD-10-PCS | Mod: 25,S$PBB,, | Performed by: PHYSICAL MEDICINE & REHABILITATION

## 2019-05-06 PROCEDURE — 95911 PR NERVE CONDUCTION STUDY; 9-10 STUDIES: ICD-10-PCS | Mod: 26,S$PBB,, | Performed by: PHYSICAL MEDICINE & REHABILITATION

## 2019-05-06 PROCEDURE — 99999 PR PBB SHADOW E&M-EST. PATIENT-LVL III: CPT | Mod: PBBFAC,,, | Performed by: PHYSICAL MEDICINE & REHABILITATION

## 2019-05-06 PROCEDURE — 99213 OFFICE O/P EST LOW 20 MIN: CPT | Mod: PBBFAC,25,PN | Performed by: PHYSICAL MEDICINE & REHABILITATION

## 2019-05-06 PROCEDURE — 95911 NRV CNDJ TEST 9-10 STUDIES: CPT | Mod: PBBFAC | Performed by: PHYSICAL MEDICINE & REHABILITATION

## 2019-05-06 PROCEDURE — 73130 XR HAND COMPLETE 3 VIEWS BILATERAL: ICD-10-PCS | Mod: 26,50,, | Performed by: RADIOLOGY

## 2019-05-06 PROCEDURE — 73130 X-RAY EXAM OF HAND: CPT | Mod: 26,50,, | Performed by: RADIOLOGY

## 2019-05-06 PROCEDURE — 73130 X-RAY EXAM OF HAND: CPT | Mod: 50,TC

## 2019-05-06 NOTE — PROGRESS NOTES
[facility]   [address]  [place] [address]  Phone: [phone]        Full Name: deysi ellis YOB: 1950  Patient ID: [id number]      Visit Date: 5/6/2019 08:34  Age: 68 Years 5 Months Old  Examining Physician: Vaishnavi Dickinson M.D.  Referring Physician:   Reason for Referral: uex numbness      Chief Complaint   Patient presents with    Numbness     bilateral hand numbness       HPI: This is a 68 y.o.  male being seen in clinic today for evaluation of chronic bilateral hand numbness and tingling that has worsened over the past few months.  His symptoms have become daily-often worse with increased hand usage, but at rest as well.  Changing the position or shaking his hands provide minimal relief.  He denies significant weakness or loss of .  Lately his right hand has been worse than the left.     History obtained from patient    Past family, medical, social, and surgical history reviewed in chart    Review of Systems:     General- denies lethargy, weight change, fever, chills  Head/neck- denies swallowing difficulties  ENT- denies hearing changes  Cardiovascular-denies chest pain  Pulmonary- denies shortness of breath  GI- denies constipation or bowel incontinence  - denies bladder incontinence  Skin- denies wounds or rashes  Musculoskeletal- denies weakness, +pain  Neurologic- +numbness and tingling  Psychiatric- denies depressive or psychotic features, denies anxiety  Lymphatic-denies swelling  Endocrine- denies hypoglycemic symptoms/+DM history  All other pertinent systems negative     Physical Examination:  General: Well developed, well nourished male, NAD  HEENT:NCAT EOMI bilaterally   Pulmonary:Normal respirations    Spinal Examination: CERVICAL  Active ROM is within normal limits.  Inspection: No deformity of spinal alignment.  Palpation: No vertebral tenderness to percussion.      Spinal Examination: LUMBAR or THORACIC  Active ROM is within normal  limits.  Inspection: No deformity of spinal alignment.      Musculoskeletal Tests:  Phalen: neg  Elbow compression (ulnar): neg  Tinels at wrist: +bilaterally    Bilateral Upper and Lower Extremities:  Pulses are 2+ at radial bilaterally.  Shoulder/Elbow/Wrist/Hand ROM wnl, mild ttp at bilateral trapezius   Hip/Knee/Ankle ROM   Bilateral Extremities show normal capillary refill.  No signs of cyanosis, rubor, edema, skin changes, or dysvascular changes of appendages.  Nails appear intact.    Neurological Exam:  Cranial Nerves:  II-XII grossly intact    Manual Muscle Testing: (Motor 5=normal)  5/5 strength bilateral upper extremities    No focal atrophy is noted of either upper extremity.    Bilateral Reflexes:1+bic tri br  Lea's response is absent bilaterally.    Sensation: tested to light touch  - intact in arms  Gait: Narrow base and good arm swing.      Entire procedure explained to patient prior to proceeding.  Verbal consent obtained        SNC      Nerve / Sites Rec. Site Onset Lat Peak Lat Amp Segments Distance Velocity     ms ms µV  mm m/s   R Median - Digit II (Antidromic)      Wrist Dig II 9.8 11.1 14.4 Wrist - Dig  14   L Median - Digit II (Antidromic)      Wrist Dig II 4.6 6.3 6.3 Wrist - Dig  31   R Ulnar - Digit V (Antidromic)      Wrist Dig V 2.7 3.2 14.8 Wrist - Dig V 140 52   L Ulnar - Digit V (Antidromic)      Wrist Dig V 2.7 3.4 11.1 Wrist - Dig V 140 52   R Radial - Anatomical snuff box (Forearm)      Forearm Wrist 1.9 2.5 10.7 Forearm - Wrist 100 52   L Radial - Anatomical snuff box (Forearm)      Forearm Wrist 1.9 2.6 11.6 Forearm - Wrist 100 52       MNC      Nerve / Sites Muscle Latency Amplitude Duration Rel Amp Segments Distance Lat Diff Velocity     ms mV ms %  mm ms m/s   R Median - APB      Wrist APB 6.7 6.4 7.1 100 Wrist - APB 80        Elbow APB 11.6 5.7 7.5 89 Elbow - Wrist 260 4.9 53   L Median - APB      Wrist APB 6.5 4.9 7.9 100 Wrist - APB 80        Elbow APB 12.3  4.7 7.2 96.9 Elbow - Wrist 255 5.8 44   R Ulnar - ADM      Wrist ADM 2.8 7.9 5.8 100 Wrist - ADM 80        B.Elbow ADM 6.9 7.6 6.3 96.9 B.Elbow - Wrist 230 4.1 57      A.Elbow ADM 8.8 7.4 6.6 96.5 A.Elbow - B.Elbow 120 1.9 62         A.Elbow - Wrist  6.0    L Ulnar - ADM      Wrist ADM 3.0 6.8 6.4 100 Wrist - ADM 80        B.Elbow ADM 7.4 6.5 6.4 95.3 B.Elbow - Wrist 260 4.4 59      A.Elbow ADM 9.2 6.3 7.1 97.3 A.Elbow - B.Elbow 100 1.8 56         A.Elbow - Wrist  6.2                                             INTERPRETATION  -Bilateral median motor nerve conduction study showed prolonged latency, normal amplitude, and dec conduction velocity on the left  -Bilateral median sensory nerve conduction study showed prolonged peak latency and dec amplitude on the left  -Bilateral ulnar motor nerve conduction study showed normal latency, amplitude, and conduction velocity  -Bilateral ulnar sensory nerve conduction study showed normal peak latency and amplitude  -Bilateral radial sensory nerve conduction study showed normal peak latency and amplitude      IMPRESSION  1. ABNORMAL study  2. 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  There was no preliminary evidence of a cervical radiculopathy and no evidence of diabetic polyneuropathy     PLAN  1. Follow up with referring provider: Dr. SHANE Ochoa Jr.  2. Ref to orthopedics for CTS release eval. Handouts on CTS provided. Will contact pt to review MRI results.   3. This study is good for one year. If symptoms worsen or do not improve, please re-consult.    Vaishnavi Dickinson M.D.  Physical Medicine and Rehab

## 2019-05-06 NOTE — PATIENT INSTRUCTIONS
Carpal Tunnel Syndrome Prevention Tips  Some repetitive hand activities put you at higher risk for carpal tunnel syndrome (CTS). But you can reduce your risk. Learn how to change the way you use your hands. Below are tips for at home and on the job. Be sure to also follow the hand and wrist safety policies at your workplace.      Keep your wrist in a neutral (straight) position when exercising.      Keep your wrist in neutral  Keep a neutral (straight) wrist position as often as you can. Dont use your wrist in a bent (flexed) position for long periods of time. This includes extended or twisted positions.  Watch your   Dont just use your thumb and index finger to grasp or lift. This can put stress on your wrist. When you can, use your whole hand and all its fingers to grasp an object.  Minimize repetition  Dont move your arms or hands or hold an object in the same way for long periods of time. Even simple, light tasks can cause injury this way. Instead, alternate tasks or switch hands.  Rest your hands  Give your hands a break from time to time with a rest. Even a few minutes once an hour can help.  Reduce speed and force  Slow down the speed in which you do a forceful, repetitive motion. This gives your wrist time to recover from the effort. Use power tools to help reduce the force.  Strengthen the muscles  Weak muscles may lead to a poor wrist or arm position. Exercises will make your hand and arm muscles stronger. This can help you keep a better position.  Date Last Reviewed: 9/11/2015 © 2000-2017 [employer].. [address] [place] [address], [place] [address]. All rights reserved. This information is not intended as a substitute for professional medical care. Always follow your healthcare professional's instructions.        Carpal Tunnel Syndrome    Carpal tunnel syndrome is a painful condition of the wrist and arm. It is caused by pressure on the median nerve.  The median nerve is one of the  nerves that give feeling and movement to the hand. It passes through a tunnel in the wrist called the carpal tunnel. This tunnel is made up of bones and ligaments. Narrowing of this tunnel or swelling of the tissues inside the tunnel puts pressure on the median nerve. This causes numbness, pins and needles, or electric shooting pains in your hand and forearm. Often the pain is worse at night and may wake you when you are asleep.  Carpal tunnel syndrome may occur during pregnancy and with use of birth control pills. It is more common in workers who must often bend their wrists. It is also common in people who work with power tools that cause strong vibrations.  Home care  · Rest the painful wrist. Avoid repeated bending of the wrist back and forth. This puts pressure on the median nerve. Avoid using power tools with strong vibrations.  · If you were given a splint, wear it at night while you sleep. You may also wear it during the day for comfort.  · Move your fingers and wrists often to avoid stiffness.  · Elevate your arms on pillows when you lie down.  · Try using the unaffected hand more.  · Try not to hold your wrists in a bent, downward position.  · Sometimes changes in the work place may ease symptoms. If you type most of the day, it may help to change the position of your keyboard or add a wrist support. Your wrist should be in a neutral position and not bent back when typing.  · You may use over-the-counter pain medicine to treat pain and inflammation, unless another medicine was prescribed. Anti-inflammatory pain medicines, such as ibuprofen or naproxen may be more effective than acetaminophen, which treats pain, but not inflammation. If you have chronic liver or kidney disease or ever had a stomach ulcer or GI bleeding, talk with your doctor before using these medicines.  · Opioid pain medicine will only give temporary relief and does not treat the problem. If pain continues, you may need a shot of a  steroid drug into your wrist.  · If the above methods fail, you may need surgery. This will open the carpal tunnel and release the pressure on the trapped nerve.  Follow-up care  Follow up with your healthcare provider, or as advised, if the pain doesnt begin to improve within the next week.  If X-rays were taken, you will be notified of any new findings that may affect your care.  When to seek medical advice  Call your healthcare provider right away if any of these occur:  · Pain not improving with the above treatment  · Fingers or hand become cold, blue, numb, or tingly  · Your whole arm becomes swollen or weak  Date Last Reviewed: 11/23/2015 © 2000-2017 [employer]. [address] [place] [address], [place] [address]. All rights reserved. This information is not intended as a substitute for professional medical care. Always follow your healthcare professional's instructions.        Understanding Carpal Tunnel Syndrome    The carpal tunnel is a narrow space inside the wrist. It is ringed by bone and a band of tough tissue called the transverse carpal ligament. A major nerve called the median nerve runs from the forearm into the hand through the carpal tunnel. Tendons also run through the carpal tunnel.  With carpal tunnel syndrome, the tendons or nearby tissues within the carpal tunnel may swell or thicken. Or the transverse carpal ligament may harden and shorten. This narrows the space in the carpal tunnel and puts pressure on the median nerve. This pressure leads to tingling and numbness of the hand and wrist. In time, the condition can make even simple tasks hard to do.  What causes carpal tunnel syndrome?  Doctors arent entirely clear why the condition occurs. Certain things may make a person more likely to have it. These include:  · Being female  · Being pregnant  · Being overweight  · Having diabetes or rheumatoid arthritis  Symptoms of carpal tunnel syndrome  Symptoms often come and go. At first,  symptoms may occur mainly at night. Later, they may be noticed during the day as well. They may get worse with activities such as driving, reading, typing, or holding a phone. Symptoms can include:  · Tingling and numbness in the hand or wrist  · Sharp pain that shoots up the arm or down to the fingers  · Hand stiffness or cramping, especially in the morning  · Trouble making a fist  · Hand weakness and clumsiness  Treatment for carpal tunnel syndrome  Certain treatments help reduce the pressure on the median nerve and relieve symptoms. Choices for treatment may include one or more of the following:  · Wrist splint. This involves wearing a special brace on the wrist and hand. The splint holds the wrist straight, in a neutral position. This helps keep the carpal tunnel as open as possible.  · Cortisone shots. Cortisone is a medicine that helps reduce swelling. It is injected directly into the wrist. It helps shrink tissues inside the carpal tunnel. This relieves symptoms for a time.  · Pain medicines. You may take over-the-counter or prescription medicines to help reduce swelling and relieve symptoms.  · Surgery. If the condition doesnt respond to other treatments and doesnt go away on its own, you may need surgery. During surgery, the surgeon cuts the transverse carpal ligament to relieve pressure on the median nerve.     When to call your healthcare provider  Call your healthcare provider right away if you have any of these:  · Fever of 100.4°F (38°C) or higher, or as directed  · Symptoms that dont get better, or get worse  · New symptoms   Date Last Reviewed: 3/10/2016  © 4769-2121 [employer]. [address] [place] [address]. All rights reserved. This information is not intended as a substitute for professional medical care. Always follow your healthcare professional's instructions.        Carpal Tunnel Release Surgery  Surgery may be done if your carpal tunnel syndrome (CTS) symptoms  become severe. Or, you may have surgery if no other treatment brings relief. There are 2 types of CTS procedures. You will be told about the one you will have. Youll also be instructed how to prepare for it.      The goals of surgery  Two types of surgery--open and endoscopic--are used to treat CTS.  · With open surgery, your surgeon makes one incision in your palm. Standard surgical tools are used.  · With endoscopic surgery, one or two small incisions may be made in your hand. A scope (with a very small camera attached) and tools are inserted under the carpal ligament. The surgeon then operates while watching images on a video screen. No matter which one you have, the goal remains the same: Your surgeon will relieve pressure on the median nerve. To do this, the transverse carpal ligament is cut (released).  After surgery  If youve had carpal tunnel surgery, you will spend a few hours resting before you go home. The nerve sensation and circulation in your hand will be checked at this time. For the safest healing, keep the following in mind.  · Keep your hand raised above heart level. This will help reduce swelling.  · Limit hand and wrist use as instructed. A wrist brace may be required.  · Take any pain medication as directed.  · Do hand exercises as directed by your surgeon or therapist.  When to call the surgeon  Call your surgeon if you notice any of the following:  · White or pale-blue hand or nails (If you pinch your skin or nail and the color doesnt return)  · Pain that is not relieved by prescribed medicine  · Loss of sensation or excess swelling in hand or fingers  · Fever over 100.4°F (38°C)   Date Last Reviewed: 9/11/2015  © 1965-1030 [employer]. [address] [place] [address]. All rights reserved. This information is not intended as a substitute for professional medical care. Always follow your healthcare professional's instructions.

## 2019-05-06 NOTE — LETTER
May 6, 2019      [place]  [address] [place] [address]           [facility]  [address] [place] [address]  Phone: [phone]  Fax: [phone]          Patient: Timothy Ortega   MR Number: [id number]   YOB: 1950   Date of Visit: 5/6/2019       Dear Dr. SHANE Ochoa Jr.:    Thank you for referring Timothy Ortega to me for evaluation. Attached you will find relevant portions of my assessment and plan of care.    If you have questions, please do not hesitate to call me. I look forward to following Timothy Ortega along with you.    Sincerely,    Vaishnavi Dickinson MD    Enclosure  CC:  No Recipients    If you would like to receive this communication electronically, please contact [email or web address] or [phone] to request more information on [employer] access.    For providers and/or their staff who would like to refer a patient to [facility], please contact us through our one-stop-shop provider referral line, [facility], at [phone].    If you feel you have received this communication in error or would no longer like to receive these types of communications, please e-mail [email or web address]

## 2019-05-09 ENCOUNTER — TELEPHONE (OUTPATIENT)
Dept: RADIOLOGY | Facility: HOSPITAL | Age: 69
End: 2019-05-09

## 2019-05-10 ENCOUNTER — HOSPITAL ENCOUNTER (OUTPATIENT)
Dept: RADIOLOGY | Facility: HOSPITAL | Age: 69
Discharge: HOME | End: 2019-05-10
Attending: PEDIATRICS
Payer: MEDICARE

## 2019-05-10 DIAGNOSIS — G56.20 ULNAR NERVE ENTRAPMENT, UNSPECIFIED LATERALITY: ICD-10-CM

## 2019-05-10 DIAGNOSIS — G56.03 BILATERAL CARPAL TUNNEL SYNDROME: ICD-10-CM

## 2019-05-10 DIAGNOSIS — G62.9 POLYNEUROPATHY: ICD-10-CM

## 2019-05-10 DIAGNOSIS — M50.90 CERVICAL DISC DISORDER: ICD-10-CM

## 2019-05-10 PROCEDURE — 72141 MRI NECK SPINE W/O DYE: CPT | Mod: TC

## 2019-05-10 PROCEDURE — 72141 MRI CERVICAL SPINE WITHOUT CONTRAST: ICD-10-PCS | Mod: 26,,, | Performed by: RADIOLOGY

## 2019-05-10 PROCEDURE — 72141 MRI NECK SPINE W/O DYE: CPT | Mod: 26,,, | Performed by: RADIOLOGY

## 2019-05-13 ENCOUNTER — PATIENT MESSAGE (OUTPATIENT)
Dept: PHYSICAL MEDICINE AND REHAB | Facility: CLINIC | Age: 69
End: 2019-05-13

## 2019-05-16 ENCOUNTER — PATIENT MESSAGE (OUTPATIENT)
Dept: ADMINISTRATIVE | Facility: OTHER | Age: 69
End: 2019-05-16

## 2019-05-17 ENCOUNTER — OFFICE VISIT (OUTPATIENT)
Dept: ORTHOPEDICS | Facility: CLINIC | Age: 69
End: 2019-05-17
Payer: MEDICARE

## 2019-05-17 ENCOUNTER — PATIENT OUTREACH (OUTPATIENT)
Dept: OTHER | Facility: OTHER | Age: 69
End: 2019-05-17

## 2019-05-17 VITALS
HEART RATE: 66 BPM | DIASTOLIC BLOOD PRESSURE: 76 MMHG | SYSTOLIC BLOOD PRESSURE: 129 MMHG | WEIGHT: 212.06 LBS | BODY MASS INDEX: 29.69 KG/M2 | HEIGHT: 71 IN

## 2019-05-17 DIAGNOSIS — G56.03 BILATERAL CARPAL TUNNEL SYNDROME: Primary | ICD-10-CM

## 2019-05-17 PROCEDURE — 99999 PR PBB SHADOW E&M-EST. PATIENT-LVL III: CPT | Mod: PBBFAC,,, | Performed by: ORTHOPAEDIC SURGERY

## 2019-05-17 PROCEDURE — 99999 PR PBB SHADOW E&M-EST. PATIENT-LVL III: ICD-10-PCS | Mod: PBBFAC,,, | Performed by: ORTHOPAEDIC SURGERY

## 2019-05-17 PROCEDURE — 99213 OFFICE O/P EST LOW 20 MIN: CPT | Mod: PBBFAC | Performed by: ORTHOPAEDIC SURGERY

## 2019-05-17 PROCEDURE — 99214 OFFICE O/P EST MOD 30 MIN: CPT | Mod: S$PBB,,, | Performed by: ORTHOPAEDIC SURGERY

## 2019-05-17 PROCEDURE — 99214 PR OFFICE/OUTPT VISIT, EST, LEVL IV, 30-39 MIN: ICD-10-PCS | Mod: S$PBB,,, | Performed by: ORTHOPAEDIC SURGERY

## 2019-05-17 RX ORDER — MELOXICAM 15 MG/1
15 TABLET ORAL DAILY
Qty: 30 TABLET | Refills: 2 | Status: SHIPPED | OUTPATIENT
Start: 2019-05-17 | End: 2019-08-03 | Stop reason: SDUPTHER

## 2019-05-17 NOTE — PROGRESS NOTES
HPI:  Called patient to follow-up on the change to valsartan.  Last 5 Patient Entered Readings                                      Current 30 Day Average: 130/72     Recent Readings 5/14/2019 5/10/2019 5/1/2019 4/28/2019 4/24/2019    SBP (mmHg) 113 125 136 136 133    DBP (mmHg) 67 68 80 77 67    Pulse 78 67 76 63 80        Patient denies s/s of hypotension (lightheadedness, dizziness, nausea, fatigue) associated with low readings. Instructed patient to inform me if this occurs, patient confirms understanding.    Patient denies s/s of hypertension (SOB, CP, severe headaches, changes in vision) associated with high readings. Instructed patient to go to the ED if BP >180/110 and accompanied by hypertensive s/s, patient confirms understanding.    He is tolerating the change to valsartan without any issues.    Last 6 Patient Entered Readings                                          Most Recent A1c: 7.1% on 2/15/2019  (Goal: 7%)     Recent Readings 5/16/2019 5/15/2019 5/15/2019 5/15/2019 5/14/2019    Blood Glucose (mg/dL) 164 88 175 152 127        Patient denies s/s or episodes of hypoglycemia (weakness, dizziness, hunger, shakiness, nausea, headache, heart palpitations, sweating, fatigue, anxiety, etc.).    Patient denies s/s of hyperglycemia (headache, increased thirst, increased urination, fatigue, blurred vision).    Assessment:  Patient's current 30-day average is at goal of <130/80 mmHg based on ACC/AHA HTN guidelines.     Diabetes is close to controlled based on patient's last A1C. SMBG readings reviewed and are on a slight upward trend.     Health maintenance is up to date.    Plan:  Continue current regimen.  If his BG readings continue to climb, will consider increasing Jardiance.   Patients health , Karnia Allen, will be following up every 3-4 weeks.   I will continue to monitor regularly and will follow-up in 4 weeks, sooner if blood pressure or blood glucose begins to trend upward or downward.      Current medication regimen:  Hypertension Medications             amLODIPine (NORVASC) 5 MG tablet Take 1 tablet (5 mg total) by mouth once daily.    valsartan (DIOVAN) 320 MG tablet Take 1 tablet (320 mg total) by mouth once daily.        Diabetes Medications             empagliflozin (JARDIANCE) 10 mg Tab Take 10 mg by mouth once daily.    insulin aspart U-100 (NOVOLOG) 100 unit/mL InPn pen Inject 28 Units into the skin 3 (three) times daily before meals.    insulin glargine (LANTUS SOLOSTAR U-100 INSULIN) 100 unit/mL (3 mL) InPn pen Inject 75 Units into the skin once daily.    metFORMIN (GLUCOPHAGE) 1000 MG tablet TAKE 1 TABLET BY MOUTH TWICE DAILY WITH MEALS    TRULICITY 1.5 mg/0.5 mL PnIj INJECT 1.5 MG INTO THE SKIN EVERY 7 DAYS        Patient has my contact information and knows to call with any concerns or clinical changes.

## 2019-05-17 NOTE — LETTER
May 17, 2019      Vaishnavi Dickinson MD  [address]  [place] [address]           [facility]  [address]  [place] [address]  Phone: [phone]  Fax: [phone]          Patient: Timothy Ortega   MR Number: [id number]   YOB: 1950   Date of Visit: 5/17/2019       Dear Dr. Vaishnavi Dickinson:    Thank you for referring Timothy Ortega to me for evaluation. Attached you will find relevant portions of my assessment and plan of care.    If you have questions, please do not hesitate to call me. I look forward to following Timothy Ortega along with you.    Sincerely,    Moshe Street MD    Enclosure  CC:  No Recipients    If you would like to receive this communication electronically, please contact [email or web address] or [phone] to request more information on [employer] Link access.    For providers and/or their staff who would like to refer a patient to [facility], please contact us through our one-stop-shop provider referral line, [facility], at [phone].    If you feel you have received this communication in error or would no longer like to receive these types of communications, please e-mail [email or web address]

## 2019-05-17 NOTE — PROGRESS NOTES
Subjective:     Patient ID: Timothy Ortega is a 68 y.o. male.    Chief Complaint: Pain of the Left Wrist and Pain of the Right Wrist    Bilateral carpal tunnel syndrome.  Wears night splints at night with some mild improvement.  Had recent nerve study done.     Wrist Pain    The pain is present in the right wrist and left wrist. The pain radiates to the left forearm and right forearm. This is a new problem. The current episode started more than 1 year ago. The problem occurs intermittently. The quality of the pain is described as dull, tingling and numb. The pain is at a severity of 1/10. Associated symptoms include numbness and tingling. Pertinent negatives include no fever. The symptoms are aggravated by lying down. He has tried NSAIDs and OTC pain meds for the symptoms. The treatment provided no relief.       Past Medical History:   Diagnosis Date    Coronary artery disease 2010    [facility] - no stents    DM (diabetes mellitus) 2002     lunch 10/28/2016    Hyperlipemia     Hypertension     Kidney stones     Neuropathy     Type 2 diabetes mellitus 10-12 years     am 10/31/2014     Past Surgical History:   Procedure Laterality Date    CARDIAC CATHETERIZATION  2010    COLONOSCOPY N/A 3/31/2017    [facility]    DENTAL SURGERY      Implant    KNEE ARTHROSCOPY Left      Family History   Problem Relation Age of Onset    Diabetes Mother     Glaucoma Mother     Heart disease Mother 75        CAB    Diabetes Father     Heart attack Father 58        Fatal MI    Diabetes Brother     Diabetes Sister     Diabetes Sister     Cataracts Paternal Uncle     Cataracts Maternal Grandmother      Social History     Socioeconomic History    Marital status:      Spouse name: Not on file    Number of children: Not on file    Years of education: Not on file    Highest education level: Not on file   Occupational History    Not on file   Social Needs    Financial  resource strain: Not on file    Food insecurity:     Worry: Not on file     Inability: Not on file    Transportation needs:     Medical: Not on file     Non-medical: Not on file   Tobacco Use    Smoking status: Former Smoker     Packs/day: 1.00     Years: 20.00     Pack years: 20.00     Last attempt to quit: 2002     Years since quittin.3    Smokeless tobacco: Never Used   Substance and Sexual Activity    Alcohol use: No     Alcohol/week: 0.0 oz    Drug use: No    Sexual activity: Yes     Partners: Female   Lifestyle    Physical activity:     Days per week: Not on file     Minutes per session: Not on file    Stress: Not on file   Relationships    Social connections:     Talks on phone: Not on file     Gets together: Not on file     Attends Orthodoxy service: Not on file     Active member of club or organization: Not on file     Attends meetings of clubs or organizations: Not on file     Relationship status: Not on file   Other Topics Concern    Not on file   Social History Narrative    . Lives with spouse. Has 2 children. Patient works full time as  in construction.      Medication List with Changes/Refills   New Medications    MELOXICAM (MOBIC) 15 MG TABLET    Take 1 tablet (15 mg total) by mouth once daily.   Current Medications    AMLODIPINE (NORVASC) 5 MG TABLET    Take 1 tablet (5 mg total) by mouth once daily.    ASCORBATE CALCIUM (VITAMIN C ORAL)    Take by mouth once daily.    ASPIRIN 81 MG TAB    Take 1 tablet by mouth Daily.    BLOOD-GLUCOSE METER (FREESTYLE LITE METER) KIT    Freestyle meter   Use as instructed    EMPAGLIFLOZIN (JARDIANCE) 10 MG TAB    Take 10 mg by mouth once daily.    ERGOCALCIFEROL, VITAMIN D2, (VITAMIN D ORAL)    Take by mouth once daily.    FREESTYLE LANCETS 28 GAUGE LANCETS    USE THREE TIMES DAILY    HYDROCORTISONE 2.5 % CREAM    ERROL TO RASH BID PRN    INSULIN ASPART U-100 (NOVOLOG) 100 UNIT/ML INPN PEN    Inject 28 Units into the skin 3  "(three) times daily before meals.    INSULIN GLARGINE (LANTUS SOLOSTAR U-100 INSULIN) 100 UNIT/ML (3 ML) INPN PEN    Inject 75 Units into the skin once daily.    KRILL OIL ORAL    Take 1 capsule by mouth once daily.    LANCETS 33 GAUGE MISC    1 lancet by Misc.(Non-Drug; Combo Route) route 3 (three) times daily.    METFORMIN (GLUCOPHAGE) 1000 MG TABLET    TAKE 1 TABLET BY MOUTH TWICE DAILY WITH MEALS    MINOCYCLINE (MINOCIN,DYNACIN) 50 MG CAP    TK 1 C PO BID    PEN NEEDLE, DIABETIC (BD INSULIN PEN NEEDLE UF SHORT) 31 GAUGE X 5/16" NDLE    USE 5 TIMES DAILY AS DIRECTED    ROSUVASTATIN (CRESTOR) 40 MG TAB    TAKE ONE TABLET BY MOUTH EVERY EVENING    TRULICITY 1.5 MG/0.5 ML PNIJ    INJECT 1.5 MG INTO THE SKIN EVERY 7 DAYS    VALSARTAN (DIOVAN) 320 MG TABLET    Take 1 tablet (320 mg total) by mouth once daily.     Review of patient's allergies indicates:  No Known Allergies  Review of Systems   Constitution: Negative for fever and night sweats.   HENT: Negative for hearing loss.    Eyes: Positive for blurred vision. Negative for visual disturbance.   Cardiovascular: Negative for chest pain and leg swelling.   Respiratory: Negative for shortness of breath.    Endocrine: Negative for polyuria.   Hematologic/Lymphatic: Negative for bleeding problem.   Skin: Negative for rash.   Musculoskeletal: Positive for back pain and joint pain. Negative for joint swelling, muscle cramps and muscle weakness.   Gastrointestinal: Negative for melena.   Genitourinary: Negative for hematuria.   Neurological: Positive for numbness and tingling. Negative for loss of balance and paresthesias.   Psychiatric/Behavioral: Negative for altered mental status.       Objective:   Body mass index is 29.58 kg/m².  Vitals:    05/17/19 0707   BP: 129/76   Pulse: 66       General: Timothy is well-developed, well-nourished, appears stated age, in no acute distress, alert and oriented.       General    Vitals reviewed.  Constitutional: He is oriented to " person, place, and time. He appears well-developed and well-nourished. No distress.   HENT:   Right Ear: External ear normal.   Left Ear: External ear normal.   Nose: Nose normal.   Eyes: Pupils are equal, round, and reactive to light. Right eye exhibits no discharge. Left eye exhibits no discharge.   Neck: Normal range of motion.   Pulmonary/Chest: Effort normal. No respiratory distress.   Abdominal: Soft.   Neurological: He is alert and oriented to person, place, and time. No cranial nerve deficit. He exhibits normal muscle tone. Coordination normal.   Psychiatric: He has a normal mood and affect. His behavior is normal. Judgment and thought content normal.             Right Hand/Wrist Exam     Inspection   Scars: Wrist - absent   Effusion: Wrist - absent   Bruising: Wrist - absent   Deformity: Wrist - deformity     Range of Motion     Wrist   Extension:  50 normal   Flexion:  70 normal   Abduction: 20 normal  Adduction: 35 normal    Tests   Phalens sign: positive  Tinel's sign (median nerve): positive  Finkelstein's test: negative  Carpal Tunnel Compression Test: positive  Cubital Tunnel Compression Test: negative      Other     Neuorologic Exam    Median Distribution: normal  Ulnar Distribution: normal  Radial Distribution: normal      Left Hand/Wrist Exam     Inspection   Scars: Wrist - absent   Effusion: Wrist - absent   Bruising: Wrist - absent   Deformity: Wrist - absent     Range of Motion     Wrist   Extension:  50 normal   Flexion:  70 normal   Abduction: 20 normal  Adduction: 35 normal    Tests   Phalens sign: positive  Tinel's sign (median nerve): positive  Finkelstein's test: negative  Carpal Tunnel Compression Test: positive  Cubital Tunnel Compression Test: negative      Other     Sensory Exam  Median Distribution: normal  Ulnar Distribution: normal  Radial Distribution: normal      Right Elbow Exam     Inspection   Scars: absent  Effusion: absent  Bruising: absent  Deformity: absent  Atrophy:  absent    Range of Motion   Extension:  0 normal   Flexion:  130 normal   Pronation: normal   Supination:  80 normal     Tests   Varus: negative  Valgus: negative  Tinel's sign (cubital tunnel): negative  Tennis Elbow: negative  Golfer's Elbow: negative  Radial Capitellar Grind: negative    Other   Sensation: normal      Left Elbow Exam     Inspection   Scars: absent  Effusion: absent  Bruising: absent  Deformity: absent  Atrophy: absent    Range of Motion   Extension:  0 normal   Flexion:  130 normal   Pronation: normal   Supination:  80 normal     Tests   Varus: negative  Tinel's sign (cubital tunnel): negative  Tennis Elbow: negative  Golfer's Elbow: negative  Radial Capitellar Grind: negative    Other   Sensation: normal        Muscle Strength   Right Upper Extremity   Wrist extension: 5/5/5   Wrist flexion: 5/5/5   : 5/5/5   Pinch Mechanism: 5/5  Elbow Pronation:  5/5   Elbow Supination:  5/5   Elbow Extension: 5/5  Elbow Flexion: 5/5  Intrinsics: 5/5  EPL (Extensor Pollicis Longus): 5/5  Left Upper Extremity  Wrist extension: 5/5/5   Wrist flexion: 5/5/5   :  5/5/5   Pinch Mechanism: 5/5  Elbow Pronation:  5/5   Elbow Supination:  5/5   Elbow Extension: 5/5  Elbow Flexion: 5/5  Intrinsics: 5/5  EPL (Extensor Pollicis Longus): 5/5    Vascular Exam     Right Pulses      Radial:                    2+      Left Pulses      Radial:                    2+      Capillary Refill  Right Hand: normal capillary refill  Left Hand: normal capillary refill    Edema  Right Forearm: absent  Left Forearm: absent      Imaging reviewed and interpreted today  Lang Morales DO 5/6/2019       Narrative     EXAMINATION:  XR HAND COMPLETE 3 VIEWS BILATERAL    CLINICAL HISTORY:  . Carpal tunnel syndrome, bilateral upper limbs    TECHNIQUE:  PA, lateral, and oblique views of both hands were performed.    COMPARISON:  None    FINDINGS:  Moderate degenerative changes noted at the interphalangeal joints, 1st through 3rd MCP  joints bilaterally, the bilateral 1st CMC joints and triscaphe joints and to a lesser degree the radiocarpal joints.  No acute fracture or dislocation.      Impression       As above      Electronically signed by: Lang Morales DO  Date: 05/06/2019  Time: 09:21            EMG/NCS report reviewed:  INTERPRETATION  -Bilateral median motor nerve conduction study showed prolonged latency, normal amplitude, and dec conduction velocity on the left  -Bilateral median sensory nerve conduction study showed prolonged peak latency and dec amplitude on the left  -Bilateral ulnar motor nerve conduction study showed normal latency, amplitude, and conduction velocity  -Bilateral ulnar sensory nerve conduction study showed normal peak latency and amplitude  -Bilateral radial sensory nerve conduction study showed normal peak latency and amplitude        IMPRESSION  1. ABNORMAL study  2. 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  There was no preliminary evidence of a cervical radiculopathy and no evidence of diabetic polyneuropathy        Assessment:     Encounter Diagnosis   Name Primary?    Bilateral carpal tunnel syndrome Yes        Plan:     We reviewed with Timothy today, the pathology and natural history of his diagnosis. We had an extensive discussion as to the conservative treatment and management of their condition. We also discussed the variety of treatment options to include medication, physical therapy, diagnostic testing as well as other treatments.  Surgery was discussed, patient would like to think about it for now and maybe plan in a few months. The decision was made to go forward with:    -continue night splinting  -Mobic  -follow-up 3 months

## 2019-05-30 NOTE — PROGRESS NOTES
Last 5 Patient Entered Readings                                      Current 30 Day Average: 126/71     Recent Readings 5/28/2019 5/22/2019 5/17/2019 5/14/2019 5/10/2019    SBP (mmHg) 137 128 119 113 125    DBP (mmHg) 72 70 71 67 68    Pulse 68 71 68 78 67          Last 6 Patient Entered Readings                                          Most Recent A1c: 7.1% on 2/15/2019  (Goal: 7%)     Recent Readings 5/30/2019 5/29/2019 5/29/2019 5/29/2019 5/28/2019    Blood Glucose (mg/dL) 112 76 135 127 82        Patient's low reading 5/27 was in error. He felt fine and tested minutes later and was above 70.     He is making lifestyle improvements that are improving his BG readings.

## 2019-06-03 RX ORDER — DULAGLUTIDE 1.5 MG/.5ML
1.5 INJECTION, SOLUTION SUBCUTANEOUS
Qty: 2 ML | Refills: 11 | Status: SHIPPED | OUTPATIENT
Start: 2019-06-03 | End: 2020-04-30

## 2019-06-17 ENCOUNTER — PATIENT OUTREACH (OUTPATIENT)
Dept: OTHER | Facility: OTHER | Age: 69
End: 2019-06-17

## 2019-06-17 NOTE — PROGRESS NOTES
"Last 5 Patient Entered Readings                                      Current 30 Day Average: 130/69     Recent Readings 6/15/2019 6/9/2019 6/8/2019 5/31/2019 5/28/2019    SBP (mmHg) 112 142 124 137 137    DBP (mmHg) 63 74 65 69 72    Pulse 78 71 64 67 68          Last 6 Patient Entered Readings                                          Most Recent A1c: 7.1% on 2/15/2019  (Goal: 7%)     Recent Readings 6/17/2019 6/17/2019 6/16/2019 6/16/2019 6/16/2019    Blood Glucose (mg/dL) 156 143 236 101 202          Digital Medicine: Health  Follow Up    Lifestyle Modifications:    1.Dietary Modifications (Sodium intake <2,000mg/day, food labels, dining out):   Patient states his "high" readings are from what he is eating. He states he is monitoring his diet for the most part. Mentioned 80/20 and to monitor intake with indulging.     2.Physical Activity:    He is continuing to walk 2 miles 5-7 days a week. Encouraged to continue and maybe push more. States he is trying to  his pace in order to make sure he gets his HR up. Mentioned that it will aid in his weight loss.     3.Medication Therapy: Patient has been compliant with the medication regimen.    4.Patient has the following medication side effects/concerns:    None.   (Frequency/Alleviating factors/Precipitating factors, etc.)     Patient expressed new goal of cutting back on his medication. Mentioned the importance of lifestyle maintenance in order to consider option and he expressed understanding.     Follow up with Mr. Timothy Ortega completed. No further questions or concerns. Will continue to follow up to achieve health goals.    "

## 2019-07-06 ENCOUNTER — PATIENT MESSAGE (OUTPATIENT)
Dept: PULMONOLOGY | Facility: CLINIC | Age: 69
End: 2019-07-06

## 2019-07-08 ENCOUNTER — PATIENT MESSAGE (OUTPATIENT)
Dept: SLEEP MEDICINE | Facility: CLINIC | Age: 69
End: 2019-07-08

## 2019-07-10 ENCOUNTER — OFFICE VISIT (OUTPATIENT)
Dept: SLEEP MEDICINE | Facility: CLINIC | Age: 69
End: 2019-07-10
Payer: MEDICARE

## 2019-07-10 VITALS
BODY MASS INDEX: 29.75 KG/M2 | HEIGHT: 71 IN | WEIGHT: 212.5 LBS | SYSTOLIC BLOOD PRESSURE: 132 MMHG | OXYGEN SATURATION: 96 % | HEART RATE: 74 BPM | RESPIRATION RATE: 18 BRPM | DIASTOLIC BLOOD PRESSURE: 60 MMHG

## 2019-07-10 DIAGNOSIS — G47.33 OBSTRUCTIVE SLEEP APNEA: ICD-10-CM

## 2019-07-10 PROCEDURE — 99999 PR PBB SHADOW E&M-EST. PATIENT-LVL V: CPT | Mod: PBBFAC,,, | Performed by: NURSE PRACTITIONER

## 2019-07-10 PROCEDURE — 99213 OFFICE O/P EST LOW 20 MIN: CPT | Mod: S$PBB,,, | Performed by: NURSE PRACTITIONER

## 2019-07-10 PROCEDURE — 99213 PR OFFICE/OUTPT VISIT, EST, LEVL III, 20-29 MIN: ICD-10-PCS | Mod: S$PBB,,, | Performed by: NURSE PRACTITIONER

## 2019-07-10 PROCEDURE — 99999 PR PBB SHADOW E&M-EST. PATIENT-LVL V: ICD-10-PCS | Mod: PBBFAC,,, | Performed by: NURSE PRACTITIONER

## 2019-07-10 PROCEDURE — 99215 OFFICE O/P EST HI 40 MIN: CPT | Mod: PBBFAC | Performed by: NURSE PRACTITIONER

## 2019-07-10 NOTE — PROGRESS NOTES
"Subjective:      Patient ID: Timothy Ortega is a 68 y.o. male.    Chief Complaint: Sleep Apnea    HPI  Patient presents to the office today for evaluation of sleep apnea.  Patient on CPAP 11 cm water pressure.  Last visit October 2018,  patient was tolerating CPAP pressure well.  I recently received phone call about requesting to lower settings due to intolerance and mask blowing off of his face.  I advised patient to come in for evaluation.  He states he been doing fine on the prescribed pressure until 1 week ago when the pressure felt much higher.  He has new supplies, he denies sinus problems.  Denies any mask fitting problems.    Patient Active Problem List   Diagnosis    Obstructive sleep apnea    Hyperlipidemia associated with type 2 diabetes mellitus    Hypertension associated with diabetes    CAD (coronary artery disease)    Type 2 diabetes mellitus with complication, with long-term current use of insulin    History of colon polyps    Bilateral carpal tunnel syndrome           /60   Pulse 74   Resp 18   Ht 5' 11" (1.803 m)   Wt 96.4 kg (212 lb 8.4 oz)   SpO2 96%   BMI 29.64 kg/m²   Body mass index is 29.64 kg/m².    Review of Systems  Objective:      Physical Exam  Personal Diagnostic Review  CPAP download shows patient wears on average 6 hrs and 19 minutes. Greater than 4 hrs 96.7 % of the time. AHI 2.5    Assessment:       1. Obstructive sleep apnea        Outpatient Encounter Medications as of 7/10/2019   Medication Sig Dispense Refill    amLODIPine (NORVASC) 5 MG tablet Take 1 tablet (5 mg total) by mouth once daily. 30 tablet 3    ASCORBATE CALCIUM (VITAMIN C ORAL) Take by mouth once daily.      aspirin 81 mg Tab Take 1 tablet by mouth Daily.      empagliflozin (JARDIANCE) 10 mg Tab Take 10 mg by mouth once daily. 30 tablet 11    ERGOCALCIFEROL, VITAMIN D2, (VITAMIN D ORAL) Take by mouth once daily.      FREESTYLE LANCETS 28 gauge lancets USE THREE TIMES DAILY 100 each 0    " "hydrocortisone 2.5 % cream ERROL TO RASH BID PRN  3    insulin aspart U-100 (NOVOLOG) 100 unit/mL InPn pen Inject 28 Units into the skin 3 (three) times daily before meals. 75.6 mL 3    insulin glargine (LANTUS SOLOSTAR U-100 INSULIN) 100 unit/mL (3 mL) InPn pen Inject 75 Units into the skin once daily. 30 mL 11    KRILL OIL ORAL Take 1 capsule by mouth once daily.      lancets 33 gauge Misc 1 lancet by Misc.(Non-Drug; Combo Route) route 3 (three) times daily. 100 each 11    meloxicam (MOBIC) 15 MG tablet Take 1 tablet (15 mg total) by mouth once daily. 30 tablet 2    metFORMIN (GLUCOPHAGE) 1000 MG tablet TAKE 1 TABLET BY MOUTH TWICE DAILY WITH MEALS 180 tablet 3    minocycline (MINOCIN,DYNACIN) 50 MG Cap TK 1 C PO BID  2    pen needle, diabetic (BD INSULIN PEN NEEDLE UF SHORT) 31 gauge x 5/16" Ndle USE 5 TIMES DAILY AS DIRECTED 450 each 3    rosuvastatin (CRESTOR) 40 MG Tab TAKE ONE TABLET BY MOUTH EVERY EVENING 30 tablet 11    TRULICITY 1.5 mg/0.5 mL PnIj INJECT 1.5 MG INTO THE SKIN EVERY 7 DAYS 2 mL 11    valsartan (DIOVAN) 320 MG tablet Take 1 tablet (320 mg total) by mouth once daily. 30 tablet 3    blood-glucose meter (FREESTYLE LITE METER) kit Freestyle meter   Use as instructed 1 each 0     No facility-administered encounter medications on file as of 7/10/2019.      No orders of the defined types were placed in this encounter.    Plan:   Patient is compliant benefits from use.  Over the last week CPAP pressure feels much higher and almost intolerable.  No answer for this reviewing his download.  I have advised patient to go by Sussy LOZA to measure out pressure using manometer.     Problem List Items Addressed This Visit        Other    Obstructive sleep apnea    Overview     CPAP. Sussy LOZA. FFM.              "

## 2019-07-23 DIAGNOSIS — E78.5 HYPERLIPIDEMIA ASSOCIATED WITH TYPE 2 DIABETES MELLITUS: Primary | ICD-10-CM

## 2019-07-23 DIAGNOSIS — E11.69 HYPERLIPIDEMIA ASSOCIATED WITH TYPE 2 DIABETES MELLITUS: Primary | ICD-10-CM

## 2019-07-23 RX ORDER — ROSUVASTATIN CALCIUM 40 MG/1
40 TABLET, COATED ORAL NIGHTLY
Qty: 90 TABLET | Refills: 3 | Status: SHIPPED | OUTPATIENT
Start: 2019-07-23 | End: 2020-07-08

## 2019-07-23 NOTE — TELEPHONE ENCOUNTER
Fax refill request received from pt's pharmacy for #90 ct refill, request sent to Dr. Ochoa for authorization [Rosuvastatin].    [place] 05/01/19  [place] 08/21/19    Last lipid panel 02/15/19, repeat sched 08/14/19.

## 2019-07-26 DIAGNOSIS — E11.59 HYPERTENSION ASSOCIATED WITH DIABETES: ICD-10-CM

## 2019-07-26 DIAGNOSIS — I15.2 HYPERTENSION ASSOCIATED WITH DIABETES: ICD-10-CM

## 2019-07-26 RX ORDER — AMLODIPINE BESYLATE 5 MG/1
TABLET ORAL
Qty: 30 TABLET | Refills: 11 | Status: SHIPPED | OUTPATIENT
Start: 2019-07-26 | End: 2020-07-05

## 2019-08-02 DIAGNOSIS — G56.03 BILATERAL CARPAL TUNNEL SYNDROME: ICD-10-CM

## 2019-08-03 RX ORDER — MELOXICAM 15 MG/1
TABLET ORAL
Qty: 30 TABLET | Refills: 0 | Status: SHIPPED | OUTPATIENT
Start: 2019-08-03 | End: 2019-08-29 | Stop reason: SDUPTHER

## 2019-08-06 ENCOUNTER — PATIENT OUTREACH (OUTPATIENT)
Dept: OTHER | Facility: OTHER | Age: 69
End: 2019-08-06

## 2019-08-06 NOTE — PROGRESS NOTES
HPI:  Called Mr. Timothy Ortega for hypertension and diabetes digital medicine follow-up. Patient reports adherence to medication regimen with no complaints/complaints.   Last 5 Patient Entered Readings                                      Current 30 Day Average: 126/71     Recent Readings 8/3/2019 7/29/2019 7/29/2019 7/25/2019 7/25/2019    SBP (mmHg) 137 130 140 134 151    DBP (mmHg) 78 64 77 79 75    Pulse 66 70 69 68 73        Patient denies s/s of hypotension (lightheadedness, dizziness, nausea, fatigue) associated with low readings. Instructed patient to inform me if this occurs, patient confirms understanding.    Patient denies s/s of hypertension (SOB, CP, severe headaches, changes in vision) associated with high readings. Instructed patient to go to the ED if BP >180/110 and accompanied by hypertensive s/s, patient confirms understanding.    Last 6 Patient Entered Readings                                          Most Recent A1c: 7.1% on 2/15/2019  (Goal: 7%)     Recent Readings 8/6/2019 8/6/2019 8/5/2019 8/5/2019 8/5/2019    Blood Glucose (mg/dL) 182 146 127 136 112        Patient denies s/s or episodes of hypoglycemia (weakness, dizziness, hunger, shakiness, nausea, headache, heart palpitations, sweating, fatigue, anxiety, etc.).    Patient denies s/s of hyperglycemia (headache, increased thirst, increased urination, fatigue, blurred vision).    Assessment:  Patient's current 30-day average is at goal of <130/80 mmHg based on ACC/AHA HTN guidelines but on an upward trend.     Diabetes is not at goal based on patient's last A1C. SMBG readings reviewed and are elevated in his fasting readings.    Health maintenance is up to date.    Plan:  Continue current regimen.  Patient needs an updated A1C.  Patients health , John Casillas, will follow-up as scheduled.    I will continue to monitor regularly and will follow-up in 2 weeks, sooner if blood pressure or blood glucose begins to trend upward or  downward.     Current medication regimen:  Hypertension Medications             amLODIPine (NORVASC) 5 MG tablet TAKE 1 TABLET BY MOUTH ONCE DAILY    valsartan (DIOVAN) 320 MG tablet Take 1 tablet (320 mg total) by mouth once daily.        Diabetes Medications             empagliflozin (JARDIANCE) 10 mg Tab Take 10 mg by mouth once daily.    insulin aspart U-100 (NOVOLOG) 100 unit/mL InPn pen Inject 28 Units into the skin 3 (three) times daily before meals.    insulin glargine (LANTUS SOLOSTAR U-100 INSULIN) 100 unit/mL (3 mL) InPn pen Inject 75 Units into the skin once daily.    metFORMIN (GLUCOPHAGE) 1000 MG tablet TAKE 1 TABLET BY MOUTH TWICE DAILY WITH MEALS    TRULICITY 1.5 mg/0.5 mL PnIj INJECT 1.5 MG INTO THE SKIN EVERY 7 DAYS        Patient has my contact information and knows to call with any concerns or clinical changes.

## 2019-08-09 ENCOUNTER — OFFICE VISIT (OUTPATIENT)
Dept: DIABETES | Facility: CLINIC | Age: 69
End: 2019-08-09
Payer: MEDICARE

## 2019-08-09 VITALS
WEIGHT: 209.44 LBS | DIASTOLIC BLOOD PRESSURE: 68 MMHG | SYSTOLIC BLOOD PRESSURE: 110 MMHG | BODY MASS INDEX: 28.37 KG/M2 | HEIGHT: 72 IN

## 2019-08-09 DIAGNOSIS — E11.8 TYPE 2 DIABETES MELLITUS WITH COMPLICATION, WITH LONG-TERM CURRENT USE OF INSULIN: Primary | ICD-10-CM

## 2019-08-09 DIAGNOSIS — Z79.4 TYPE 2 DIABETES MELLITUS WITH COMPLICATION, WITH LONG-TERM CURRENT USE OF INSULIN: Primary | ICD-10-CM

## 2019-08-09 LAB — GLUCOSE SERPL-MCNC: 112 MG/DL (ref 70–110)

## 2019-08-09 PROCEDURE — 99214 OFFICE O/P EST MOD 30 MIN: CPT | Mod: S$PBB,,, | Performed by: PHYSICIAN ASSISTANT

## 2019-08-09 PROCEDURE — 99214 OFFICE O/P EST MOD 30 MIN: CPT | Mod: PBBFAC | Performed by: PHYSICIAN ASSISTANT

## 2019-08-09 PROCEDURE — 99214 PR OFFICE/OUTPT VISIT, EST, LEVL IV, 30-39 MIN: ICD-10-PCS | Mod: S$PBB,,, | Performed by: PHYSICIAN ASSISTANT

## 2019-08-09 PROCEDURE — 99999 PR PBB SHADOW E&M-EST. PATIENT-LVL IV: CPT | Mod: PBBFAC,,, | Performed by: PHYSICIAN ASSISTANT

## 2019-08-09 PROCEDURE — 82962 GLUCOSE BLOOD TEST: CPT | Mod: PBBFAC | Performed by: PHYSICIAN ASSISTANT

## 2019-08-09 PROCEDURE — 99999 PR PBB SHADOW E&M-EST. PATIENT-LVL IV: ICD-10-PCS | Mod: PBBFAC,,, | Performed by: PHYSICIAN ASSISTANT

## 2019-08-09 NOTE — PROGRESS NOTES
Subjective:      Patient ID: Timothy Ortega is a 68 y.o. male.    PCP: EM Ochoa Jr, MD      Timothy Ortega is a pleasant 68 y.o. male presenting to follow up on Type 3 diabetes mellitus.  Also, pertinent to this visit in decision making is hypertension, hyperlipidemia, and adherence.  He has had diabetes for 20 or more years.  His last visit in Diabetes Management was 3/8/2019 .   Since that time he has been in his usual state of health without significant hyperglycemia or hypoglycemia. He has been checking his blood glucose regularly four times daily, before meals and HS.  Since that time he has had moderate improvement in his glycemia with A1c of 7.1%.   He is currently on MDI with Lantus and NovoLog along with Metformin, Jardiance and Trulicity.  His blood sugar range fasting has been  and fed has been ; 182, and he has been monitoring 2-3 times per day. His current concerns are glycemic control.    He denies any hospital admissions, emergency room visits, hypoglycemia, syncope, diaphoresis, chest pain, or dyspnea.    He has lost 3 pounds since last visit. His BMI is 28.80 kg/m².    His blood sugar in the clinic today was:   Lab Results   Component Value Date    POCGLU 176 (A) 03/08/2019     Timothy is compliant most of the time with DM medications.     Timothy is compliant most of the time with lifestyle modifications to include activity and meal planning.       Diabetes Management Status    Statin: Taking  ACE/ARB: Taking    Screening or Prevention Patient's value Goal Complete/Controlled?   HgA1C Testing and Control   Lab Results   Component Value Date    HGBA1C 7.1 (H) 02/15/2019      Annually/Less than 8% Yes   Lipid profile : 02/15/2019 Annually Yes   LDL control Lab Results   Component Value Date    LDLCALC 50.4 (L) 02/15/2019    Annually/Less than 100 mg/dl  Yes   Nephropathy screening Lab Results   Component Value Date    LABMICR 16.0 02/15/2019     No results found for: PROTEINUA  "Annually Yes   Blood pressure BP Readings from Last 1 Encounters:   08/09/19 110/68    Less than 140/90 Yes   Dilated retinal exam : 03/01/2019 Annually Yes   Foot exam   : 03/08/2019 Annually Yes         Lab Results   Component Value Date    HGBA1C 7.1 (H) 02/15/2019    HGBA1C 7.9 (H) 08/13/2018    HGBA1C 7.4 (H) 05/18/2018       Review of Systems   Constitutional: Negative for activity change and unexpected weight change.   Eyes: Negative for visual disturbance.   Respiratory: Negative for chest tightness and shortness of breath.    Cardiovascular: Negative for chest pain and leg swelling.   Gastrointestinal: Negative for constipation and diarrhea.   Endocrine: Negative for cold intolerance, heat intolerance, polydipsia, polyphagia and polyuria.   Genitourinary: Negative for frequency.   Skin: Negative for wound.   Neurological: Negative for numbness.   Psychiatric/Behavioral: Negative for confusion.      Objective:   /68   Ht 5' 11.5" (1.816 m)   Wt 95 kg (209 lb 7 oz)   BMI 28.80 kg/m²       Physical Exam   Constitutional: He is oriented to person, place, and time. He appears well-developed and well-nourished. No distress.   Neck: Normal range of motion. Neck supple. No tracheal deviation present. No thyromegaly present.   Cardiovascular: Normal rate, regular rhythm and normal heart sounds.   Pulmonary/Chest: Effort normal and breath sounds normal.   Musculoskeletal: He exhibits no edema.   Lymphadenopathy:     He has no cervical adenopathy.   Neurological: He is alert and oriented to person, place, and time.   Skin: Skin is warm and dry. He is not diaphoretic.   Psychiatric: He has a normal mood and affect.     Assessment:     1. Type 2 diabetes mellitus with complication, with long-term current use of insulin       Plan:   Timothy Ortega is seen today for   1. Type 2 diabetes mellitus with complication, with long-term current use of insulin      Type 2 diabetes mellitus with complication, with " long-term current use of insulin  -     POCT Glucose, Hand-Held Device    Type 2 diabetes mellitus with complication, with long-term current use of insulin  -     POCT glucose  -  improved control will continue at current treatment regimen  - Titrate his basal insulin at HS  With goal fasting BG between . Continue digital medicine.  - Continue with D&E, reduce portion size, and restrict carbohydrates (no more that 45 grams ) per meal.  - F/U in 6 months.    A total of 30 minutes was spent in face to face time, of which 50 % was spent in counseling patient on disease process, complications, treatment, and side effects of medications.    The patient was explained the above plan and given opportunity to ask questions.  He understands, chooses and consents to this plan and accepts all the risks, which include but are not limited to the risks mentioned above.   He understands the alternative of having no testing, interventions or treatments at this time. He left content and without further questions.     Disclaimer:  This note is prepared using voice recognition software and as such is likely to have errors and has not been proof read. Please contact me for questions.

## 2019-08-16 ENCOUNTER — PATIENT OUTREACH (OUTPATIENT)
Dept: OTHER | Facility: OTHER | Age: 69
End: 2019-08-16

## 2019-08-16 NOTE — PROGRESS NOTES
"Last 5 Patient Entered Readings                                      Current 30 Day Average: 128/72     Recent Readings 8/15/2019 8/7/2019 8/3/2019 7/29/2019 7/29/2019    SBP (mmHg) 130 114 137 130 140    DBP (mmHg) 67 68 78 64 77    Pulse 66 80 66 70 69          Last 6 Patient Entered Readings                                          Most Recent A1c: 7.1% on 2/15/2019  (Goal: 8%)     Recent Readings 8/16/2019 8/16/2019 8/15/2019 8/15/2019 8/15/2019    Blood Glucose (mg/dL) 183 114 133 142 129        Checked in with patient to briefly introduce myself as his new health  and to check in regarding his recent low glucometer reading of 44. He discussed that the reading was wrong, and that he took it again shortly after. We reviewed the digital medicine goals for hypertension and discussed that his glucometer goals are often within the program goals. He discussed that his "daibetes person was happy", and that he has an appointment shortly when he will discuss his blood pressure, which he feels good about. He mentioned that he has been walking at least two miles a day, plus "whatever he walks at work".     "

## 2019-08-17 ENCOUNTER — LAB VISIT (OUTPATIENT)
Dept: LAB | Facility: HOSPITAL | Age: 69
End: 2019-08-17
Attending: PEDIATRICS
Payer: MEDICARE

## 2019-08-17 DIAGNOSIS — Z79.4 TYPE 2 DIABETES MELLITUS WITH COMPLICATION, WITH LONG-TERM CURRENT USE OF INSULIN: ICD-10-CM

## 2019-08-17 DIAGNOSIS — E11.8 TYPE 2 DIABETES MELLITUS WITH COMPLICATION, WITH LONG-TERM CURRENT USE OF INSULIN: ICD-10-CM

## 2019-08-17 DIAGNOSIS — Z12.5 PROSTATE CANCER SCREENING: ICD-10-CM

## 2019-08-17 LAB
ALT SERPL W/O P-5'-P-CCNC: 28 U/L (ref 10–44)
AST SERPL-CCNC: 23 U/L (ref 10–40)
CHOLEST SERPL-MCNC: 113 MG/DL (ref 120–199)
CHOLEST/HDLC SERPL: 3.4 {RATIO} (ref 2–5)
COMPLEXED PSA SERPL-MCNC: 0.44 NG/ML (ref 0–4)
ESTIMATED AVG GLUCOSE: 140 MG/DL (ref 68–131)
HBA1C MFR BLD HPLC: 6.5 % (ref 4–5.6)
HDLC SERPL-MCNC: 33 MG/DL (ref 40–75)
HDLC SERPL: 29.2 % (ref 20–50)
LDLC SERPL CALC-MCNC: 55.8 MG/DL (ref 63–159)
NONHDLC SERPL-MCNC: 80 MG/DL
TRIGL SERPL-MCNC: 121 MG/DL (ref 30–150)

## 2019-08-17 PROCEDURE — 36415 COLL VENOUS BLD VENIPUNCTURE: CPT

## 2019-08-17 PROCEDURE — 80061 LIPID PANEL: CPT

## 2019-08-17 PROCEDURE — 83036 HEMOGLOBIN GLYCOSYLATED A1C: CPT

## 2019-08-17 PROCEDURE — 84450 TRANSFERASE (AST) (SGOT): CPT

## 2019-08-17 PROCEDURE — 84153 ASSAY OF PSA TOTAL: CPT

## 2019-08-17 PROCEDURE — 84460 ALANINE AMINO (ALT) (SGPT): CPT

## 2019-08-20 NOTE — PROGRESS NOTES
Last 5 Patient Entered Readings                                      Current 30 Day Average: 129/71     Recent Readings 8/18/2019 8/15/2019 8/7/2019 8/3/2019 7/29/2019    SBP (mmHg) 131 130 114 137 130    DBP (mmHg) 68 67 68 78 64    Pulse 68 66 80 66 70          Last 6 Patient Entered Readings                                          Most Recent A1c: 6.5% on 8/17/2019  (Goal: 7%)     Recent Readings 8/20/2019 8/20/2019 8/19/2019 8/19/2019 8/19/2019    Blood Glucose (mg/dL) 102 100 82 158 95        Patient's A1C returned within goal. His BP and BG are trending down nicely. Will maintain current medications at this time.

## 2019-08-21 ENCOUNTER — OFFICE VISIT (OUTPATIENT)
Dept: INTERNAL MEDICINE | Facility: CLINIC | Age: 69
End: 2019-08-21
Payer: MEDICARE

## 2019-08-21 VITALS
SYSTOLIC BLOOD PRESSURE: 108 MMHG | WEIGHT: 210.31 LBS | HEIGHT: 72 IN | HEART RATE: 66 BPM | OXYGEN SATURATION: 98 % | DIASTOLIC BLOOD PRESSURE: 62 MMHG | BODY MASS INDEX: 28.48 KG/M2 | TEMPERATURE: 99 F

## 2019-08-21 DIAGNOSIS — I15.2 HYPERTENSION ASSOCIATED WITH DIABETES: ICD-10-CM

## 2019-08-21 DIAGNOSIS — E11.69 HYPERLIPIDEMIA ASSOCIATED WITH TYPE 2 DIABETES MELLITUS: ICD-10-CM

## 2019-08-21 DIAGNOSIS — I25.10 CORONARY ARTERY DISEASE INVOLVING NATIVE CORONARY ARTERY OF NATIVE HEART WITHOUT ANGINA PECTORIS: ICD-10-CM

## 2019-08-21 DIAGNOSIS — E11.59 HYPERTENSION ASSOCIATED WITH DIABETES: ICD-10-CM

## 2019-08-21 DIAGNOSIS — G47.33 OBSTRUCTIVE SLEEP APNEA: ICD-10-CM

## 2019-08-21 DIAGNOSIS — E11.8 TYPE 2 DIABETES MELLITUS WITH COMPLICATION, WITH LONG-TERM CURRENT USE OF INSULIN: Primary | ICD-10-CM

## 2019-08-21 DIAGNOSIS — E78.5 HYPERLIPIDEMIA ASSOCIATED WITH TYPE 2 DIABETES MELLITUS: ICD-10-CM

## 2019-08-21 DIAGNOSIS — G56.03 BILATERAL CARPAL TUNNEL SYNDROME: ICD-10-CM

## 2019-08-21 DIAGNOSIS — Z79.4 TYPE 2 DIABETES MELLITUS WITH COMPLICATION, WITH LONG-TERM CURRENT USE OF INSULIN: Primary | ICD-10-CM

## 2019-08-21 PROCEDURE — 99213 OFFICE O/P EST LOW 20 MIN: CPT | Mod: PBBFAC | Performed by: PEDIATRICS

## 2019-08-21 PROCEDURE — 99999 PR PBB SHADOW E&M-EST. PATIENT-LVL III: CPT | Mod: PBBFAC,,, | Performed by: PEDIATRICS

## 2019-08-21 PROCEDURE — 99999 PR PBB SHADOW E&M-EST. PATIENT-LVL III: ICD-10-PCS | Mod: PBBFAC,,, | Performed by: PEDIATRICS

## 2019-08-21 PROCEDURE — 99214 OFFICE O/P EST MOD 30 MIN: CPT | Mod: S$PBB,,, | Performed by: PEDIATRICS

## 2019-08-21 PROCEDURE — 99214 PR OFFICE/OUTPT VISIT, EST, LEVL IV, 30-39 MIN: ICD-10-PCS | Mod: S$PBB,,, | Performed by: PEDIATRICS

## 2019-08-21 NOTE — PROGRESS NOTES
Subjective:       Patient ID: Timothy Ortega is a 68 y.o. male.     Chief Complaint: F/U in DM program and Dig HTN med, working hard at lifestyle mod.     HTN: B/P good, no HTNive symptoms.    LIPIDS:not following D&E, tolerating and compliant with med(s).    DM: no hyper/hypoglycemic symptoms. Self monitoring TID normal. Compliant with meds , Working with DM program.  CAD: no CP, SOB, CHOU. Followed by cards.  CTS: will be having surgery this winter.   LABS REVIEWED AND DISCUSSED WITH PATIENT         Review of Systems   Constitutional: Negative for fever and unexpected weight change.   HENT: Negative for congestion and rhinorrhea.    Eyes: Negative for discharge and redness.   Respiratory: Negative for cough and wheezing.    Cardiovascular: Negative for chest pain, palpitations and leg swelling.   Gastrointestinal: Negative for constipation, diarrhea and vomiting.   Endocrine: Negative for polydipsia, polyphagia and polyuria.   Genitourinary: Negative for decreased urine volume and difficulty urinating.   Musculoskeletal: Positive for arthralgias, back pain and myalgias. Negative for joint swelling.        Chronic back and CTS.   Skin: Negative for rash and wound.   Neurological: Negative for syncope and headaches.   Psychiatric/Behavioral: Negative for behavioral problems, dysphoric mood and sleep disturbance. The patient is not nervous/anxious.        Objective:   Physical Exam   Constitutional: He is oriented to person, place, and time. He appears well-developed and well-nourished. No distress.   Neck: No JVD present. No thyromegaly present.   Cardiovascular: Normal rate, regular rhythm and normal heart sounds.   No murmur heard.  Pulmonary/Chest: Effort normal and breath sounds normal. No respiratory distress. He has no wheezes. He has no rales.   Abdominal: Soft. He exhibits no distension and no mass. There is no tenderness. There is no guarding.   Musculoskeletal: He exhibits no edema.   Lymphadenopathy:      He has no cervical adenopathy.   Neurological: He is alert and oriented to person, place, and time. No cranial nerve deficit. Coordination normal.   Skin: Capillary refill takes less than 2 seconds. No rash noted.   Psychiatric: He has a normal mood and affect. His behavior is normal. Judgment and thought content normal.       Assessment:       1. Type 2 diabetes mellitus with complication, with long-term current use of insulin    2. Coronary artery disease involving native coronary artery of native heart without angina pectoris    3. Hypertension associated with diabetes    4. Hyperlipidemia associated with type 2 diabetes mellitus    5. Obstructive sleep apnea    6. CTS       Plan:    Over all he has made improvement. Continue working with DM program and Dig medicine. D&E, weight loss, self monitor B/P and BS. F/U in 6 months with labs.

## 2019-08-26 ENCOUNTER — OFFICE VISIT (OUTPATIENT)
Dept: ORTHOPEDICS | Facility: CLINIC | Age: 69
End: 2019-08-26
Payer: MEDICARE

## 2019-08-26 VITALS
SYSTOLIC BLOOD PRESSURE: 115 MMHG | BODY MASS INDEX: 29.4 KG/M2 | HEIGHT: 71 IN | DIASTOLIC BLOOD PRESSURE: 65 MMHG | WEIGHT: 210 LBS | HEART RATE: 69 BPM

## 2019-08-26 DIAGNOSIS — G56.03 BILATERAL CARPAL TUNNEL SYNDROME: Primary | ICD-10-CM

## 2019-08-26 DIAGNOSIS — M18.11 PRIMARY OSTEOARTHRITIS OF FIRST CARPOMETACARPAL JOINT OF RIGHT HAND: ICD-10-CM

## 2019-08-26 PROCEDURE — 99213 OFFICE O/P EST LOW 20 MIN: CPT | Mod: S$PBB,,, | Performed by: ORTHOPAEDIC SURGERY

## 2019-08-26 PROCEDURE — 99999 PR PBB SHADOW E&M-EST. PATIENT-LVL III: CPT | Mod: PBBFAC,,, | Performed by: ORTHOPAEDIC SURGERY

## 2019-08-26 PROCEDURE — 99999 PR PBB SHADOW E&M-EST. PATIENT-LVL III: ICD-10-PCS | Mod: PBBFAC,,, | Performed by: ORTHOPAEDIC SURGERY

## 2019-08-26 PROCEDURE — 99213 OFFICE O/P EST LOW 20 MIN: CPT | Mod: PBBFAC | Performed by: ORTHOPAEDIC SURGERY

## 2019-08-26 PROCEDURE — 99213 PR OFFICE/OUTPT VISIT, EST, LEVL III, 20-29 MIN: ICD-10-PCS | Mod: S$PBB,,, | Performed by: ORTHOPAEDIC SURGERY

## 2019-08-26 NOTE — PROGRESS NOTES
Subjective:     Patient ID: Timothy Ortega is a 68 y.o. male.    Chief Complaint: Follow-up, Numbness, and Pain of the Left Wrist and Follow-up, Numbness, and Pain of the Right Wrist    Bilateral carpal tunnel syndrome.  Right worse than left.  Has been wearing night splints but having some worsening in his symptoms recently.    Wrist Pain    The pain is present in the right wrist and left wrist. The pain radiates to the left forearm and right forearm. This is a chronic problem. The current episode started more than 1 year ago. The problem occurs intermittently. The quality of the pain is described as dull, tingling, numb and numbing. The pain is at a severity of 3/10. Associated symptoms include tingling. The symptoms are aggravated by lying down. He has tried NSAIDs and OTC pain meds for the symptoms. The treatment provided significant relief. Physical therapy was not tried.      Past Medical History:   Diagnosis Date    Coronary artery disease 2010    [facility] - no stents    DM (diabetes mellitus) 2002     lunch 10/28/2016    Hyperlipemia     Hypertension     Kidney stones     Neuropathy     Type 2 diabetes mellitus 10-12 years     am 10/31/2014     Past Surgical History:   Procedure Laterality Date    CARDIAC CATHETERIZATION  2010    COLONOSCOPY N/A 3/31/2017    Per[facility]    DENTAL SURGERY      Implant    KNEE ARTHROSCOPY Left      Family History   Problem Relation Age of Onset    Diabetes Mother     Glaucoma Mother     Heart disease Mother 75        CAB    Diabetes Father     Heart attack Father 58        Fatal MI    Diabetes Brother     Diabetes Sister     Diabetes Sister     Cataracts Paternal Uncle     Cataracts Maternal Grandmother      Social History     Socioeconomic History    Marital status:      Spouse name: Not on file    Number of children: Not on file    Years of education: Not on file    Highest education level: Not on  file   Occupational History    Not on file   Social Needs    Financial resource strain: Not on file    Food insecurity:     Worry: Not on file     Inability: Not on file    Transportation needs:     Medical: Not on file     Non-medical: Not on file   Tobacco Use    Smoking status: Former Smoker     Packs/day: 1.00     Years: 20.00     Pack years: 20.00     Last attempt to quit: 2002     Years since quittin.6    Smokeless tobacco: Never Used   Substance and Sexual Activity    Alcohol use: No     Alcohol/week: 0.0 oz    Drug use: No    Sexual activity: Yes     Partners: Female   Lifestyle    Physical activity:     Days per week: Not on file     Minutes per session: Not on file    Stress: Not on file   Relationships    Social connections:     Talks on phone: Not on file     Gets together: Not on file     Attends Nondenominational service: Not on file     Active member of club or organization: Not on file     Attends meetings of clubs or organizations: Not on file     Relationship status: Not on file   Other Topics Concern    Not on file   Social History Narrative    . Lives with spouse. Has 2 children. Patient works full time as  in construction.      Medication List with Changes/Refills   Current Medications    AMLODIPINE (NORVASC) 5 MG TABLET    TAKE 1 TABLET BY MOUTH ONCE DAILY    ASCORBATE CALCIUM (VITAMIN C ORAL)    Take by mouth once daily.    ASPIRIN 81 MG TAB    Take 1 tablet by mouth Daily.    BLOOD-GLUCOSE METER (FREESTYLE LITE METER) KIT    Freestyle meter   Use as instructed    EMPAGLIFLOZIN (JARDIANCE) 10 MG TAB    Take 10 mg by mouth once daily.    ERGOCALCIFEROL, VITAMIN D2, (VITAMIN D ORAL)    Take by mouth once daily.    FREESTYLE LANCETS 28 GAUGE LANCETS    USE THREE TIMES DAILY    HYDROCORTISONE 2.5 % CREAM    ERROL TO RASH BID PRN    INSULIN ASPART U-100 (NOVOLOG) 100 UNIT/ML INPN PEN    Inject 28 Units into the skin 3 (three) times daily before meals.    INSULIN  "GLARGINE (LANTUS SOLOSTAR U-100 INSULIN) 100 UNIT/ML (3 ML) INPN PEN    Inject 75 Units into the skin once daily.    KRILL OIL ORAL    Take 1 capsule by mouth once daily.    LANCETS 33 GAUGE MISC    1 lancet by Misc.(Non-Drug; Combo Route) route 3 (three) times daily.    MELOXICAM (MOBIC) 15 MG TABLET    TAKE 1 TABLET BY MOUTH ONCE DAILY    METFORMIN (GLUCOPHAGE) 1000 MG TABLET    TAKE 1 TABLET BY MOUTH TWICE DAILY WITH MEALS    MINOCYCLINE (MINOCIN,DYNACIN) 50 MG CAP    TK 1 C PO BID    PEN NEEDLE, DIABETIC (BD INSULIN PEN NEEDLE UF SHORT) 31 GAUGE X 5/16" NDLE    USE 5 TIMES DAILY AS DIRECTED    ROSUVASTATIN (CRESTOR) 40 MG TAB    Take 1 tablet (40 mg total) by mouth every evening.    TRULICITY 1.5 MG/0.5 ML PNIJ    INJECT 1.5 MG INTO THE SKIN EVERY 7 DAYS    VALSARTAN (DIOVAN) 320 MG TABLET    Take 1 tablet (320 mg total) by mouth once daily.     Review of patient's allergies indicates:  No Known Allergies  Review of Systems   Constitution: Negative for night sweats.   HENT: Negative for ear discharge and hearing loss.    Eyes: Positive for blurred vision. Negative for visual disturbance.   Cardiovascular: Negative for leg swelling.   Respiratory: Negative for shortness of breath.    Endocrine: Negative for polyuria.   Hematologic/Lymphatic: Negative for bleeding problem.   Musculoskeletal: Positive for back pain and joint pain. Negative for muscle cramps and muscle weakness.   Gastrointestinal: Negative for melena.   Genitourinary: Negative for hematuria.   Neurological: Positive for tingling. Negative for loss of balance and paresthesias.   Psychiatric/Behavioral: Negative for altered mental status.       Objective:   Body mass index is 29.29 kg/m².  Vitals:    08/26/19 1508   BP: 115/65   Pulse: 69       General: Timothy is well-developed, well-nourished, appears stated age, in no acute distress, alert and oriented.       General    Vitals reviewed.  Constitutional: He is oriented to person, place, and time. He " appears well-developed and well-nourished. No distress.   HENT:   Right Ear: External ear normal.   Left Ear: External ear normal.   Nose: Nose normal.   Eyes: Pupils are equal, round, and reactive to light. Right eye exhibits no discharge. Left eye exhibits no discharge.   Neck: Normal range of motion.   Pulmonary/Chest: Effort normal. No respiratory distress.   Abdominal: Soft.   Neurological: He is alert and oriented to person, place, and time. No cranial nerve deficit. He exhibits normal muscle tone. Coordination normal.   Psychiatric: He has a normal mood and affect. His behavior is normal. Judgment and thought content normal.             Right Hand/Wrist Exam     Inspection   Scars: Wrist - absent   Effusion: Wrist - absent   Bruising: Wrist - absent   Deformity: Wrist - deformity     Range of Motion     Wrist   Extension:  50 normal   Flexion:  70 normal   Abduction: 20 normal  Adduction: 35 normal    Tests   Phalens sign: positive  Tinel's sign (median nerve): positive  Finkelstein's test: negative  Carpal Tunnel Compression Test: positive  Cubital Tunnel Compression Test: negative      Other     Neuorologic Exam    Median Distribution: normal  Ulnar Distribution: normal  Radial Distribution: normal      Left Hand/Wrist Exam     Inspection   Scars: Wrist - absent   Effusion: Wrist - absent   Bruising: Wrist - absent   Deformity: Wrist - absent     Range of Motion     Wrist   Extension:  50 normal   Flexion:  70 normal   Abduction: 20 normal  Adduction: 35 normal    Tests   Phalens sign: positive  Tinel's sign (median nerve): positive  Finkelstein's test: negative  Carpal Tunnel Compression Test: positive  Cubital Tunnel Compression Test: negative      Other     Sensory Exam  Median Distribution: normal  Ulnar Distribution: normal  Radial Distribution: normal      Right Elbow Exam     Inspection   Scars: absent  Effusion: absent  Bruising: absent  Deformity: absent  Atrophy: absent    Range of Motion    Extension:  0 normal   Flexion:  130 normal   Pronation: normal   Supination:  80 normal     Tests   Varus: negative  Valgus: negative  Tinel's sign (cubital tunnel): negative  Tennis Elbow: negative  Golfer's Elbow: negative  Radial Capitellar Grind: negative    Other   Sensation: normal    Comments:  +cmc grind      Left Elbow Exam     Inspection   Scars: absent  Effusion: absent  Bruising: absent  Deformity: absent  Atrophy: absent    Range of Motion   Extension:  0 normal   Flexion:  130 normal   Pronation: normal   Supination:  80 normal     Tests   Varus: negative  Tinel's sign (cubital tunnel): negative  Tennis Elbow: negative  Golfer's Elbow: negative  Radial Capitellar Grind: negative    Other   Sensation: normal        Muscle Strength   Right Upper Extremity   Wrist extension: 5/5/5   Wrist flexion: 5/5/5   : 5/5/5   Pinch Mechanism: 5/5  Elbow Pronation:  5/5   Elbow Supination:  5/5   Elbow Extension: 5/5  Elbow Flexion: 5/5  Intrinsics: 5/5  EPL (Extensor Pollicis Longus): 5/5  Left Upper Extremity  Wrist extension: 5/5/5   Wrist flexion: 5/5/5   :  5/5/5   Pinch Mechanism: 5/5  Elbow Pronation:  5/5   Elbow Supination:  5/5   Elbow Extension: 5/5  Elbow Flexion: 5/5  Intrinsics: 5/5  EPL (Extensor Pollicis Longus): 5/5    Vascular Exam     Right Pulses      Radial:                    2+      Left Pulses      Radial:                    2+      Capillary Refill  Right Hand: normal capillary refill  Left Hand: normal capillary refill    Edema  Right Forearm: absent  Left Forearm: absent      Imaging reviewed and interpreted today  Lang Morales DO 5/6/2019       Narrative     EXAMINATION:  XR HAND COMPLETE 3 VIEWS BILATERAL    CLINICAL HISTORY:  . Carpal tunnel syndrome, bilateral upper limbs    TECHNIQUE:  PA, lateral, and oblique views of both hands were performed.    COMPARISON:  None    FINDINGS:  Moderate degenerative changes noted at the interphalangeal joints, 1st through 3rd MCP  joints bilaterally, the bilateral 1st CMC joints and triscaphe joints and to a lesser degree the radiocarpal joints.  No acute fracture or dislocation.      Impression       As above      Electronically signed by: Lang Morales DO  Date: 05/06/2019  Time: 09:21            EMG/NCS report reviewed:  INTERPRETATION  -Bilateral median motor nerve conduction study showed prolonged latency, normal amplitude, and dec conduction velocity on the left  -Bilateral median sensory nerve conduction study showed prolonged peak latency and dec amplitude on the left  -Bilateral ulnar motor nerve conduction study showed normal latency, amplitude, and conduction velocity  -Bilateral ulnar sensory nerve conduction study showed normal peak latency and amplitude  -Bilateral radial sensory nerve conduction study showed normal peak latency and amplitude        IMPRESSION  1. ABNORMAL study  2. 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  There was no preliminary evidence of a cervical radiculopathy and no evidence of diabetic polyneuropathy        Assessment:     Encounter Diagnoses   Name Primary?    Bilateral carpal tunnel syndrome Yes    Primary osteoarthritis of first carpometacarpal joint of right hand         Plan:     We reviewed with Timothy today, the pathology and natural history of his diagnosis. We had an extensive discussion as to the conservative treatment and management of their condition. We also discussed the variety of treatment options to include medication, physical therapy, diagnostic testing as well as other treatments.  Surgery was discussed, patient would like to think about it for now and maybe plan in a few months. The decision was made to go forward with:    -continue night splinting    As he is having symptomatic right hand CMC osteoarthritis, I will  get him referred to hand for evaluation for eval for possible treatment for his CMC osteoarthritis with carpal tunnel release.

## 2019-08-27 ENCOUNTER — PATIENT OUTREACH (OUTPATIENT)
Dept: OTHER | Facility: OTHER | Age: 69
End: 2019-08-27

## 2019-08-27 NOTE — PROGRESS NOTES
"Last 5 Patient Entered Readings                                      Current 30 Day Average: 130/71     Recent Readings 8/25/2019 8/18/2019 8/15/2019 8/7/2019 8/3/2019    SBP (mmHg) 137 131 130 114 137    DBP (mmHg) 73 68 67 68 78    Pulse 75 68 66 80 66          Last 6 Patient Entered Readings                                          Most Recent A1c: 6.5% on 8/17/2019  (Goal: 7%)     Recent Readings 8/27/2019 8/27/2019 8/26/2019 8/26/2019 8/26/2019    Blood Glucose (mg/dL) 144 124 96 154 140          Called patient to follow up on the low glucometer readings from 8/24 - patient reports that he felt "fine" despite the reading of 66. He discussed that he didn't have any symptoms of hypoglycemia, but that "he ate a little something" and retested it for a higher glucometer readings. He agreed that he will start to charge his glucometer once a month to ensure continued accuracy of readings.   "

## 2019-08-28 DIAGNOSIS — G56.03 BILATERAL CARPAL TUNNEL SYNDROME: ICD-10-CM

## 2019-08-28 DIAGNOSIS — I15.2 HYPERTENSION ASSOCIATED WITH DIABETES: ICD-10-CM

## 2019-08-28 DIAGNOSIS — E11.59 HYPERTENSION ASSOCIATED WITH DIABETES: ICD-10-CM

## 2019-08-29 RX ORDER — VALSARTAN 320 MG/1
TABLET ORAL
Qty: 30 TABLET | Refills: 11 | Status: SHIPPED | OUTPATIENT
Start: 2019-08-29 | End: 2020-04-03 | Stop reason: SDUPTHER

## 2019-08-29 RX ORDER — MELOXICAM 15 MG/1
TABLET ORAL
Qty: 30 TABLET | Refills: 0 | Status: SHIPPED | OUTPATIENT
Start: 2019-08-29 | End: 2019-10-16 | Stop reason: SDUPTHER

## 2019-09-07 RX ORDER — PEN NEEDLE, DIABETIC 30 GX3/16"
NEEDLE, DISPOSABLE MISCELLANEOUS
Qty: 500 EACH | Refills: 0 | Status: SHIPPED | OUTPATIENT
Start: 2019-09-07 | End: 2020-08-26 | Stop reason: SDUPTHER

## 2019-09-09 RX ORDER — INSULIN GLARGINE 100 [IU]/ML
INJECTION, SOLUTION SUBCUTANEOUS
Qty: 30 ML | Refills: 11 | Status: SHIPPED | OUTPATIENT
Start: 2019-09-09 | End: 2020-05-11 | Stop reason: SDUPTHER

## 2019-09-27 ENCOUNTER — PATIENT OUTREACH (OUTPATIENT)
Dept: OTHER | Facility: OTHER | Age: 69
End: 2019-09-27

## 2019-09-30 RX ORDER — METFORMIN HYDROCHLORIDE 1000 MG/1
TABLET ORAL
Qty: 180 TABLET | Refills: 4 | Status: SHIPPED | OUTPATIENT
Start: 2019-09-30 | End: 2020-05-11 | Stop reason: SDUPTHER

## 2019-10-04 ENCOUNTER — OFFICE VISIT (OUTPATIENT)
Dept: ORTHOPEDICS | Facility: CLINIC | Age: 69
End: 2019-10-04
Payer: MEDICARE

## 2019-10-04 VITALS
HEART RATE: 63 BPM | SYSTOLIC BLOOD PRESSURE: 134 MMHG | WEIGHT: 210 LBS | DIASTOLIC BLOOD PRESSURE: 75 MMHG | HEIGHT: 71 IN | BODY MASS INDEX: 29.4 KG/M2

## 2019-10-04 DIAGNOSIS — G56.03 BILATERAL CARPAL TUNNEL SYNDROME: Primary | ICD-10-CM

## 2019-10-04 DIAGNOSIS — M18.0 ARTHRITIS OF CARPOMETACARPAL (CMC) JOINT OF BOTH THUMBS: ICD-10-CM

## 2019-10-04 PROCEDURE — 99213 OFFICE O/P EST LOW 20 MIN: CPT | Mod: PBBFAC | Performed by: ORTHOPAEDIC SURGERY

## 2019-10-04 PROCEDURE — 99213 PR OFFICE/OUTPT VISIT, EST, LEVL III, 20-29 MIN: ICD-10-PCS | Mod: S$PBB,,, | Performed by: ORTHOPAEDIC SURGERY

## 2019-10-04 PROCEDURE — 99213 OFFICE O/P EST LOW 20 MIN: CPT | Mod: S$PBB,,, | Performed by: ORTHOPAEDIC SURGERY

## 2019-10-04 PROCEDURE — 99999 PR PBB SHADOW E&M-EST. PATIENT-LVL III: CPT | Mod: PBBFAC,,, | Performed by: ORTHOPAEDIC SURGERY

## 2019-10-04 PROCEDURE — 99999 PR PBB SHADOW E&M-EST. PATIENT-LVL III: ICD-10-PCS | Mod: PBBFAC,,, | Performed by: ORTHOPAEDIC SURGERY

## 2019-10-04 NOTE — LETTER
October 4, 2019      [place]  [address]  [place] [address]           '[facility]  [address] [place] [id number]  Phone: [phone]  Fax: [phone]          Patient: Timothy Ortega   MR Number: [id number]   YOB: 1950   Date of Visit: 10/4/2019       Dear Dr. SHANE Ochoa Jr.:    Thank you for referring Timothy Ortega to me for evaluation. Attached you will find relevant portions of my assessment and plan of care.    If you have questions, please do not hesitate to call me. I look forward to following Timothy Ortega along with you.    Sincerely,    Conner Ramírez MD    Enclosure  CC:  No Recipients    If you would like to receive this communication electronically, please contact [email or web address] or [phone] to request more information on [employer] Link access.    For providers and/or their staff who would like to refer a patient to [facility], please contact us through our one-stop-shop provider referral line, [facility] , at [phone].    If you feel you have received this communication in error or would no longer like to receive these types of communications, please e-mail [email or web address]

## 2019-10-04 NOTE — PROGRESS NOTES
Subjective:     Patient ID: Timothy Ortega is a 68 y.o. male.    Chief Complaint: Pain and Numbness of the Right Hand and Pain and Numbness of the Left Hand    The patient is 68-year-old male with bilateral carpal tunnel syndrome documented by nerve conduction study. His numbness is constant rather than intermittent.  He has tried splints without improvement.  He also has an early osteoarthritis of both thumb basal joints but does not wish to have surgery at this time for that or injection.      Past Medical History:   Diagnosis Date    Coronary artery disease 2010    [facility] - no stents    DM (diabetes mellitus) 2002     lunch 10/28/2016    Hyperlipemia     Hypertension     Kidney stones     Neuropathy     Type 2 diabetes mellitus 10-12 years     am 10/31/2014     Past Surgical History:   Procedure Laterality Date    CARDIAC CATHETERIZATION  2010    COLONOSCOPY N/A 3/31/2017    Procedure: COLONOSCOPY;  Surgeon: Cornelius Ibarra MD;  Location: [facility];  Service: Endoscopy;  Laterality: N/A;    DENTAL SURGERY      Implant    KNEE ARTHROSCOPY Left      Family History   Problem Relation Age of Onset    Diabetes Mother     Glaucoma Mother     Heart disease Mother 75        CAB    Diabetes Father     Heart attack Father 58        Fatal MI    Diabetes Brother     Diabetes Sister     Diabetes Sister     Cataracts Paternal Uncle     Cataracts Maternal Grandmother      Social History     Socioeconomic History    Marital status:      Spouse name: Not on file    Number of children: Not on file    Years of education: Not on file    Highest education level: Not on file   Occupational History    Not on file   Social Needs    Financial resource strain: Not on file    Food insecurity:     Worry: Not on file     Inability: Not on file    Transportation needs:     Medical: Not on file     Non-medical: Not on file   Tobacco Use    Smoking status: Former Smoker     Packs/day: 1.00      Years: 20.00     Pack years: 20.00     Last attempt to quit: 2002     Years since quittin.7    Smokeless tobacco: Never Used   Substance and Sexual Activity    Alcohol use: No     Alcohol/week: 0.0 standard drinks    Drug use: No    Sexual activity: Yes     Partners: Female   Lifestyle    Physical activity:     Days per week: Not on file     Minutes per session: Not on file    Stress: Not on file   Relationships    Social connections:     Talks on phone: Not on file     Gets together: Not on file     Attends [employer] service: Not on file     Active member of club or organization: Not on file     Attends meetings of clubs or organizations: Not on file     Relationship status: Not on file   Other Topics Concern    Not on file   Social History Narrative    . Lives with spouse. Has 2 children. Patient works full time as  in construction.      Medication List with Changes/Refills   Current Medications    AMLODIPINE (NORVASC) 5 MG TABLET    TAKE 1 TABLET BY MOUTH ONCE DAILY    ASCORBATE CALCIUM (VITAMIN C ORAL)    Take by mouth once daily.    ASPIRIN 81 MG TAB    Take 1 tablet by mouth Daily.    BLOOD-GLUCOSE METER (FREESTYLE LITE METER) KIT    Freestyle meter   Use as instructed    EMPAGLIFLOZIN (JARDIANCE) 10 MG TAB    Take 10 mg by mouth once daily.    ERGOCALCIFEROL, VITAMIN D2, (VITAMIN D ORAL)    Take by mouth once daily.    FREESTYLE LANCETS 28 GAUGE LANCETS    USE THREE TIMES DAILY    HYDROCORTISONE 2.5 % CREAM    ERROL TO RASH BID PRN    INSULIN ASPART U-100 (NOVOLOG) 100 UNIT/ML INPN PEN    Inject 28 Units into the skin 3 (three) times daily before meals.    KRILL OIL ORAL    Take 1 capsule by mouth once daily.    LANCETS 33 GAUGE MISC    1 lancet by Misc.(Non-Drug; Combo Route) route 3 (three) times daily.    LANTUS SOLOSTAR U-100 INSULIN GLARGINE 100 UNITS/ML (3ML) SUBQ PEN    ADMINISTER 75 UNITS UNDER THE SKIN EVERY DAY    MELOXICAM (MOBIC) 15 MG TABLET    TAKE 1 TABLET BY  "MOUTH ONCE DAILY    METFORMIN (GLUCOPHAGE) 1000 MG TABLET    TAKE 1 TABLET BY MOUTH TWICE DAILY WITH MEALS    MINOCYCLINE (MINOCIN,DYNACIN) 50 MG CAP    TK 1 C PO BID    PEN NEEDLE, DIABETIC (BD ULTRA-FINE SHORT PEN NEEDLE) 31 GAUGE X 5/16" NDLE    USE AS DIRECTED FIVE TIMES DAILY    ROSUVASTATIN (CRESTOR) 40 MG TAB    Take 1 tablet (40 mg total) by mouth every evening.    TRULICITY 1.5 MG/0.5 ML PNIJ    INJECT 1.5 MG INTO THE SKIN EVERY 7 DAYS    VALSARTAN (DIOVAN) 320 MG TABLET    TAKE 1 TABLET BY MOUTH ONCE DAILY     Review of patient's allergies indicates:  No Known Allergies  Review of Systems   Constitution: Negative for malaise/fatigue.   HENT: Negative for hearing loss.    Eyes: Negative for double vision and visual disturbance.   Cardiovascular: Positive for chest pain.   Respiratory: Positive for sleep disturbances due to breathing. Negative for shortness of breath.    Endocrine: Negative for cold intolerance.   Hematologic/Lymphatic: Does not bruise/bleed easily.   Skin: Negative for poor wound healing and suspicious lesions.   Musculoskeletal: Positive for arthritis, joint pain and joint swelling. Negative for gout.   Gastrointestinal: Negative for nausea and vomiting.   Genitourinary: Negative for dysuria.   Neurological: Positive for numbness, paresthesias and sensory change.   Psychiatric/Behavioral: Negative for depression, memory loss and substance abuse. The patient is not nervous/anxious.    Allergic/Immunologic: Negative for persistent infections.       Objective:   Body mass index is 29.29 kg/m².  Vitals:    10/04/19 0803   BP: 134/75   Pulse: 63                General    Constitutional: He is oriented to person, place, and time. He appears well-developed and well-nourished. No distress.   HENT:   Head: Normocephalic.   Eyes: EOM are normal.   Neck: Normal range of motion.   Pulmonary/Chest: Effort normal.   Neurological: He is oriented to person, place, and time.   Psychiatric: He has a normal " mood and affect.             Right Hand/Wrist Exam     Inspection   Scars: Wrist - absent Hand -  absent    Pain   Wrist - The patient exhibits pain of the CMC.    Tenderness   The patient is tender to palpation of the milton area and radial area.    Tests   Phalens sign: positive  Tinel's sign (median nerve): positive  Carpal Tunnel Compression Test: positive    Atrophy   Thenar:  negative  Intrinsic:  negative    Other     Neuorologic Exam    Median Distribution: abnormal  Ulnar Distribution: normal  Radial Distribution: normal    Comments:  The patient has tenderness about the basal joint with a positive axial circumduction grind test that is fairly minimal.  He also has a positive Tinel and positive Phalen sign.  There is no intrinsic atrophy noted.      Left Hand/Wrist Exam     Inspection   Scars: Wrist - absent Hand -  absent    Pain   Wrist - The patient exhibits pain of the CMC.    Tenderness   The patient is tender to palpation of the milton area and radial area.     Tests   Phalens sign: positive  Tinel's sign (median nerve): positive  Carpal Tunnel Compression Test: positive    Atrophy  Thenar:  Negative  Intrinsic: negative    Other     Sensory Exam  Median Distribution: abnormal  Ulnar Distribution: normal  Radial Distribution: normal    Comments:  The patient has tenderness about the basal joint with a positive axial circumduction grind test that is fairly minimal.  He also has a positive Tinel and positive Phalen sign.  There is no intrinsic atrophy noted.          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  He wished to have a right carpal tunnel release.  He wishes to wait until March when he retires.  He will return at that time for re-evaluation and scheduling          Disclaimer: This note was prepared using a voice recognition system and is likely to have sound alike errors within the text.

## 2019-10-08 RX ORDER — EMPAGLIFLOZIN 10 MG/1
TABLET, FILM COATED ORAL
Qty: 30 TABLET | Refills: 0 | Status: SHIPPED | OUTPATIENT
Start: 2019-10-08 | End: 2019-11-04 | Stop reason: SDUPTHER

## 2019-10-16 DIAGNOSIS — G56.03 BILATERAL CARPAL TUNNEL SYNDROME: ICD-10-CM

## 2019-10-17 RX ORDER — MELOXICAM 15 MG/1
TABLET ORAL
Qty: 30 TABLET | Refills: 0 | Status: SHIPPED | OUTPATIENT
Start: 2019-10-17 | End: 2020-07-16 | Stop reason: RX

## 2019-11-04 RX ORDER — EMPAGLIFLOZIN 10 MG/1
TABLET, FILM COATED ORAL
Qty: 30 TABLET | Refills: 0 | Status: SHIPPED | OUTPATIENT
Start: 2019-11-04 | End: 2019-11-30 | Stop reason: SDUPTHER

## 2019-11-15 NOTE — PROGRESS NOTES
"Digital Medicine: Health  Follow-Up    Checked in with patient briefly who was at work. He explained that his blood sugar and blood pressure numbers are "fine" and that he "finally" got all of the issues taken care of with his phone and his equipment. We quickly reviewed digital medicine program goals for both blood sugar and blood pressure and how his readings compare.           INTERVENTION(S)  encouragement/support    PLAN  continue monitoring      There are no preventive care reminders to display for this patient.    Last 5 Patient Entered Readings                                      Current 30 Day Average: 134/68     Recent Readings 11/13/2019 11/3/2019 10/27/2019 10/19/2019 9/30/2019    SBP (mmHg) 130 133 143 128 138    DBP (mmHg) 74 59 72 65 68    Pulse 69 75 71 78 78        Last 6 Patient Entered Readings                                          Most Recent A1c: 6.5% on 8/17/2019  (Goal: 7%)     Recent Readings 11/15/2019 11/15/2019 11/14/2019 11/14/2019 11/13/2019    Blood Glucose (mg/dL) 136 119 157 135 86                Screenings    SDOH  "

## 2019-12-01 RX ORDER — EMPAGLIFLOZIN 10 MG/1
TABLET, FILM COATED ORAL
Qty: 30 TABLET | Refills: 1 | Status: SHIPPED | OUTPATIENT
Start: 2019-12-01 | End: 2020-02-07

## 2019-12-05 ENCOUNTER — PATIENT OUTREACH (OUTPATIENT)
Dept: OTHER | Facility: OTHER | Age: 69
End: 2019-12-05

## 2019-12-05 NOTE — PROGRESS NOTES
Digital Medicine: Clinician Follow-Up    Called patient to discuss his upward trend in blood pressure and blood glucose.    The history is provided by the patient.     Follow Up  Follow-up reason(s): reading review      Readings are trending up due to lifestyle change and lack of exercise and weight gain.  Patient's weight is increasing attributing to his blood pressure and higher blood glucose readings.    Patient denies hypertension symptoms.     Patient isn't eating regularly or exercising due to a change in work schedule. Patient hasn't been walking two miles a day causing his higher readings.       INTERVENTION(S)  reviewed appropriate dose schedule and recommended physical activity    PLAN  patient verbalizes understanding and patient amenable to changes    Patient will focus on lifestyle by resuming daily walking before making further medication changes.     Patient will continue his current HTN and DM medication regimen at this time.      There are no preventive care reminders to display for this patient.    Last 5 Patient Entered Readings                                      Current 30 Day Average: 135/72     Recent Readings 12/2/2019 11/25/2019 11/15/2019 11/13/2019 11/3/2019    SBP (mmHg) 139 137 133 130 133    DBP (mmHg) 72 74 67 74 59    Pulse 71 71 70 69 75        Last 6 Patient Entered Readings                                          Most Recent A1c: 6.5% on 8/17/2019  (Goal: 7%)     Recent Readings 12/5/2019 12/5/2019 12/4/2019 12/4/2019 12/4/2019    Blood Glucose (mg/dL) 149 137 149 180 164           Hypertension Medications             amLODIPine (NORVASC) 5 MG tablet TAKE 1 TABLET BY MOUTH ONCE DAILY    valsartan (DIOVAN) 320 MG tablet TAKE 1 TABLET BY MOUTH ONCE DAILY        Diabetes Medications             insulin aspart U-100 (NOVOLOG) 100 unit/mL InPn pen Inject 28 Units into the skin 3 (three) times daily before meals.    JARDIANCE 10 mg Tab TAKE 1 TABLET BY MOUTH EVERY DAY    LANTUS  SOLOSTAR U-100 INSULIN glargine 100 units/mL (3mL) SubQ pen ADMINISTER 75 UNITS UNDER THE SKIN EVERY DAY    metFORMIN (GLUCOPHAGE) 1000 MG tablet TAKE 1 TABLET BY MOUTH TWICE DAILY WITH MEALS    TRULICITY 1.5 mg/0.5 mL PnIj INJECT 1.5 MG INTO THE SKIN EVERY 7 DAYS                           Medication Adherence Screening   He misses doses: never      His wife manages his medications.

## 2020-01-10 ENCOUNTER — PATIENT OUTREACH (OUTPATIENT)
Dept: OTHER | Facility: OTHER | Age: 70
End: 2020-01-10

## 2020-01-10 NOTE — PROGRESS NOTES
Digital Medicine: Health  Follow-Up    Patient reports that his blood sugar got a little high but that he is working on reducing it. He notes that it happened during the holidays.             INTERVENTION(S)  encouragement/support          Topic    Urine Protein Check        Last 5 Patient Entered Readings                                      Current 30 Day Average: 123/64     Recent Readings 1/8/2020 12/28/2019 12/24/2019 12/19/2019 12/10/2019    SBP (mmHg) 113 127 135 117 129    DBP (mmHg) 60 73 65 58 58    Pulse 76 63 71 81 73        Last 6 Patient Entered Readings                                          Most Recent A1c: 6.5% on 8/17/2019  (Goal: 7%)     Recent Readings 1/10/2020 1/10/2020 1/9/2020 1/9/2020 1/9/2020    Blood Glucose (mg/dL) 114 134 111 151 103                    Physical Activity Screening   When asked if exercising, patient responded: yesHis level of intensity when exercising is moderate.    Patient participates in the following activities: walking    Patient reports that he is getting more regular with his walking. His goal is minimum of 4 walks a week with a target of 5, even shooting eventually for 6-7. He has a route that he walks around [place] - 2 miles at a brisk pace that takes him about 40 minutes. He notes the benefits of his regular exercise on his blood sugar, blood pressure, and weight management. 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     Intervention(s): goal tracking       SDOH

## 2020-01-22 ENCOUNTER — PATIENT OUTREACH (OUTPATIENT)
Dept: OTHER | Facility: OTHER | Age: 70
End: 2020-01-22

## 2020-01-22 NOTE — PROGRESS NOTES
Digital Medicine: Health  Follow-Up    Checked in with patient regarding the reading of 52 from this morning. He reports no symptoms of hypoglycemia, he ate lunch shortly after and meant to but forgot to retest. He reports no change in his diet or routine and reports that his equipment is charged. He is unclear as to wether it was a true reading. He explained that he believes that he had adequate blood on the strip.     We reviewed the digital medicine standard to re-test shortly after receiving a reading under 70.           INTERVENTION(S)  reviewed monitoring technique and encouragement/support          Topic    Urine Protein Check        Last 5 Patient Entered Readings                                      Current 30 Day Average: 124/65     Recent Readings 1/20/2020 1/12/2020 1/8/2020 12/28/2019 12/24/2019    SBP (mmHg) 115 130 113 127 135    DBP (mmHg) 66 62 60 73 65    Pulse 67 68 76 63 71        Last 6 Patient Entered Readings                                          Most Recent A1c: 6.5% on 8/17/2019  (Goal: 7%)     Recent Readings 1/22/2020 1/22/2020 1/21/2020 1/21/2020 1/21/2020    Blood Glucose (mg/dL) 52 120 119 113 99                Screenings    SDOH

## 2020-02-06 ENCOUNTER — PATIENT OUTREACH (OUTPATIENT)
Dept: OTHER | Facility: OTHER | Age: 70
End: 2020-02-06

## 2020-02-07 ENCOUNTER — PATIENT OUTREACH (OUTPATIENT)
Dept: OTHER | Facility: OTHER | Age: 70
End: 2020-02-07

## 2020-02-07 ENCOUNTER — OFFICE VISIT (OUTPATIENT)
Dept: DIABETES | Facility: CLINIC | Age: 70
End: 2020-02-07
Payer: MEDICARE

## 2020-02-07 VITALS
SYSTOLIC BLOOD PRESSURE: 119 MMHG | DIASTOLIC BLOOD PRESSURE: 70 MMHG | HEIGHT: 71 IN | BODY MASS INDEX: 28.67 KG/M2 | HEART RATE: 70 BPM | WEIGHT: 204.81 LBS

## 2020-02-07 DIAGNOSIS — I15.2 HYPERTENSION ASSOCIATED WITH DIABETES: ICD-10-CM

## 2020-02-07 DIAGNOSIS — E78.5 HYPERLIPIDEMIA ASSOCIATED WITH TYPE 2 DIABETES MELLITUS: ICD-10-CM

## 2020-02-07 DIAGNOSIS — L60.8 TOENAIL DEFORMITY: ICD-10-CM

## 2020-02-07 DIAGNOSIS — E11.69 HYPERLIPIDEMIA ASSOCIATED WITH TYPE 2 DIABETES MELLITUS: ICD-10-CM

## 2020-02-07 DIAGNOSIS — I25.10 CORONARY ARTERY DISEASE INVOLVING NATIVE CORONARY ARTERY OF NATIVE HEART WITHOUT ANGINA PECTORIS: ICD-10-CM

## 2020-02-07 DIAGNOSIS — E11.59 HYPERTENSION ASSOCIATED WITH DIABETES: ICD-10-CM

## 2020-02-07 DIAGNOSIS — Z79.4 TYPE 2 DIABETES MELLITUS WITH COMPLICATION, WITH LONG-TERM CURRENT USE OF INSULIN: Primary | ICD-10-CM

## 2020-02-07 DIAGNOSIS — E11.8 TYPE 2 DIABETES MELLITUS WITH COMPLICATION, WITH LONG-TERM CURRENT USE OF INSULIN: Primary | ICD-10-CM

## 2020-02-07 LAB — GLUCOSE SERPL-MCNC: 155 MG/DL (ref 70–110)

## 2020-02-07 PROCEDURE — 99214 OFFICE O/P EST MOD 30 MIN: CPT | Mod: PBBFAC | Performed by: PHYSICIAN ASSISTANT

## 2020-02-07 PROCEDURE — 82962 GLUCOSE BLOOD TEST: CPT | Mod: PBBFAC | Performed by: PHYSICIAN ASSISTANT

## 2020-02-07 PROCEDURE — 99999 PR PBB SHADOW E&M-EST. PATIENT-LVL IV: CPT | Mod: PBBFAC,,, | Performed by: PHYSICIAN ASSISTANT

## 2020-02-07 PROCEDURE — 99214 PR OFFICE/OUTPT VISIT, EST, LEVL IV, 30-39 MIN: ICD-10-PCS | Mod: S$PBB,,, | Performed by: PHYSICIAN ASSISTANT

## 2020-02-07 PROCEDURE — 99214 OFFICE O/P EST MOD 30 MIN: CPT | Mod: S$PBB,,, | Performed by: PHYSICIAN ASSISTANT

## 2020-02-07 PROCEDURE — 99999 PR PBB SHADOW E&M-EST. PATIENT-LVL IV: ICD-10-PCS | Mod: PBBFAC,,, | Performed by: PHYSICIAN ASSISTANT

## 2020-02-07 NOTE — PATIENT INSTRUCTIONS
"Intensive insulin Therapy with correction factor:    You are on Intensive insulin therapy with Basal and Bolus insulin. Lantus is your Basal (or long acting) insulin and will help maintain your fasting and between meal sugar. Your fast acting or rescue insulin is Novalog and will control your post meal sugar.     You will Take 75 units of Lantus each night. This amount is based off of your FASTING blood sugars the next morning and may be adjusted by your provider if not at goal between .     Novolog will follow this pre meal schedule; Correction factor of "2 units per 50 mg/dl" and is based on 30-50 grams of carbohydrates per meal.    If blood sugar is below 70 eat first then check your blood sugar 2 hours later and make correction.  If blood pre-meal sugar is  70 -150 take 22 units of Novolog;  If blood pre-meal sugar is 151-200 take +2 units of Novolog;  If blood pre-meal sugar is 201-250 take +4 units of Novolog;  If blood pre-meal sugar is 251-300 take +6 units of Novolog;  If blood pre-meal sugar is 301-350 take +8 units of Novolog;  If blood pre-meal sugar is 351-400+ take +10 units of Novolog;  Also increase water intake and call for appointment.    "

## 2020-02-07 NOTE — PROGRESS NOTES
"PCP: EM Ochoa Jr, MD    Subjective:     Chief Complaint: Diabetes - Established Patient    HISTORY OF PRESENT ILLNESS: 69 y.o.   male presenting for diabetes management visit.   Patient has previously been established with the Diabetes Management Department and was last seen by Lacho Phillip PA-C on 08/09/19.   Patient is new to me and is here for establishment of future care .   Patient has had Type II diabetes since 1990s.  Pertinent to decision making is the following comorbidities: HTN, HLD, CAD and Overweight by BMI  Patient has the following Diabetes complications: without complications  He  has attended diabetes education in the past.     Patient's most recent A1c of 6.5% was completed 5 months ago.   Patient states since His last A1c His blood glucose levels have been low throughout the day .   Patient monitors blood glucose 3 times per day with [facility] meter : Fasting, Before Lunch and Before Dinner.   Patient blood glucose monitoring device data will be reviewed under the Episodes tab today.   Patient endorses the following diabetes related symptoms: None*.   Patient is due today for the following diabetes-related health maintenance standards: Foot Exam - to be completed today, Eye Exam and Urine Microalbumin/creatinine ratio.   He denies any recent hospital admissions or emergency room visits.   He voices having hypoglycemia episodes and has documented episodes to 52 in chart prior to meals.   Patient's concerns today include glycemic control.   Patient medication regimen is as below.     CURRENT DM MEDICATIONS:    Metformin 1000 mg BID    Lantus 75 units qhs   Novolog 24 units w/ ss TID wm    Jardiance 10 mg    Trulicity 1.5 mg weekly      Labs Reviewed.       No results found for: CPEPTIDE  No results found for: GLUTAMICACID    Height: 5' 11" (180.3 cm)  //  Weight: 92.9 kg (204 lb 12.9 oz), Body mass index is 28.56 kg/m².  His blood sugar in clinic today " is:    Lab Results   Component Value Date    POCGLU 155 (A) 02/07/2020       Review of Systems   Constitutional: Negative for activity change, appetite change, chills and fever.   HENT: Negative for dental problem, mouth sores, nosebleeds, sore throat and trouble swallowing.    Eyes: Negative for pain and discharge.   Respiratory: Negative for shortness of breath, wheezing and stridor.    Cardiovascular: Negative for chest pain, palpitations and leg swelling.   Gastrointestinal: Negative for abdominal pain, diarrhea, nausea and vomiting.   Endocrine: Negative for polydipsia, polyphagia and polyuria.   Genitourinary: Negative for dysuria, frequency and urgency.   Musculoskeletal: Negative for joint swelling and myalgias.   Skin: Negative for rash and wound.   Neurological: Negative for dizziness, syncope, weakness and headaches.   Psychiatric/Behavioral: Negative for behavioral problems and dysphoric mood.         Diabetes Management Status  Statin: Taking  ACE/ARB: Taking    Screening or Prevention Patient's value Goal Complete/Controlled?   HgA1C Testing and Control   Lab Results   Component Value Date    HGBA1C 6.5 (H) 08/17/2019      Annually/Less than 8% Yes   Lipid profile : 08/17/2019 Annually Yes   LDL control Lab Results   Component Value Date    LDLCALC 55.8 (L) 08/17/2019    Annually/Less than 100 mg/dl  Yes   Nephropathy screening Lab Results   Component Value Date    MICALBCREAT 19.0 02/15/2019     No results found for: PROTEINUA Annually Yes   Blood pressure BP Readings from Last 1 Encounters:   02/07/20 119/70    Less than 140/90 Yes   Dilated retinal exam : 03/01/2019 Annually Yes    Foot exam   : 03/08/2019 Annually Yes     ACTIVITY LEVEL: Moderately Active. Discussed activities, benefits, methods, and precautions.  MEAL PLANNING: Patient reports number of meals per day to be 3 and number of snacks per day to be 2.   Patient is encouraged to carb count and consume no more than 30 - 45 grams of  carbohydrates in each meal and 15 grams of carbohydrates in each snack.     Social History     Socioeconomic History    Marital status:      Spouse name: Not on file    Number of children: Not on file    Years of education: Not on file    Highest education level: Not on file   Occupational History    Not on file   Social Needs    Financial resource strain: Not on file    Food insecurity:     Worry: Not on file     Inability: Not on file    Transportation needs:     Medical: Not on file     Non-medical: Not on file   Tobacco Use    Smoking status: Former Smoker     Packs/day: 1.00     Years: 20.00     Pack years: 20.00     Last attempt to quit: 2002     Years since quittin.1    Smokeless tobacco: Never Used   Substance and Sexual Activity    Alcohol use: No     Alcohol/week: 0.0 standard drinks    Drug use: No    Sexual activity: Yes     Partners: Female   Lifestyle    Physical activity:     Days per week: Not on file     Minutes per session: Not on file    Stress: Not on file   Relationships    Social connections:     Talks on phone: Not on file     Gets together: Not on file     Attends Taoism service: Not on file     Active member of club or organization: Not on file     Attends meetings of clubs or organizations: Not on file     Relationship status: Not on file   Other Topics Concern    Not on file   Social History Narrative    . Lives with spouse. Has 2 children. Patient works full time as  in construction.      Past Medical History:   Diagnosis Date    Coronary artery disease     [facility] - no stents    DM (diabetes mellitus)      lunch 10/28/2016    Hyperlipemia     Hypertension     Kidney stones     Neuropathy     Type 2 diabetes mellitus 10-12 years     am 10/31/2014       Objective:        Physical Exam   Constitutional: He is oriented to person, place, and time. He appears well-developed and well-nourished. No distress.   HENT:    Head: Normocephalic and atraumatic.   Right Ear: External ear normal.   Left Ear: External ear normal.   Nose: Nose normal.   Eyes: Pupils are equal, round, and reactive to light. EOM are normal. Right eye exhibits no discharge. Left eye exhibits no discharge.   Neck: Normal range of motion. Neck supple.   Cardiovascular: Normal rate, regular rhythm, normal heart sounds and intact distal pulses.   Pulses:       Dorsalis pedis pulses are 2+ on the right side, and 2+ on the left side.        Posterior tibial pulses are 2+ on the right side, and 2+ on the left side.   Pulmonary/Chest: Effort normal and breath sounds normal.   Abdominal: Soft.   Musculoskeletal: Normal range of motion.        Right foot: There is normal range of motion and no deformity.        Left foot: There is normal range of motion and no deformity.   Feet:   Right Foot:   Protective Sensation: 6 sites tested. 6 sites sensed.   Skin Integrity: Positive for callus and dry skin. Negative for ulcer, blister, skin breakdown, erythema or warmth.   Left Foot:   Protective Sensation: 6 sites tested. 6 sites sensed.   Skin Integrity: Positive for callus and dry skin. Negative for ulcer, blister, skin breakdown, erythema or warmth.   Neurological: He is oriented to person, place, and time.   Skin: Skin is warm and dry. Capillary refill takes less than 2 seconds. He is not diaphoretic.   Psychiatric: He has a normal mood and affect. His behavior is normal. Judgment and thought content normal.         Assessment / Plan:     Type 2 diabetes mellitus with complication, with long-term current use of insulin  -     POCT Glucose, Hand-Held Device  -     empagliflozin (JARDIANCE) 25 mg Tab; Take 25 mg by mouth once daily.  Dispense: 90 tablet; Refill: 3  -     Hemoglobin A1c; Standing  -     Ambulatory referral/consult to Podiatry; Future; Expected date: 02/14/2020    Toenail deformity  -     Ambulatory referral/consult to Podiatry; Future; Expected date:  02/14/2020    Hypertension associated with diabetes    Hyperlipidemia associated with type 2 diabetes mellitus    Coronary artery disease involving native coronary artery of native heart without angina pectoris      Additional Plan Details:    - POCT Glucose  - Encouraged continuation of lifestyle changes including regular exercise and limiting carbohydrates to 30-45 grams per meal threes times daily and 15 grams per snack with a limit of two daily.   - Patient is new to me today and will establish care with me for future visits.  - Encouraged continued monitoring of blood glucose with maintenance of 3 times daily at Fasting, Before Lunch and Before Dinner.   - Current DM Medication Regimen: Change Novolog to 22 units with ss TID wm. Change Jardiance from 10 mg to 25 mg.  Continue Lantus 75 units qhs, Trulicity 1.5 mg, and Metformin 1000 mg BID.   - Health Maintenance standards addressed today: Foot Exam - completed today and documented in physical exam with feedback to patient about proper diabetic foot care and findings, Eye Exam - will be completed within [facility] system and scheduled today and Urine Microalbumin / Creatinine Ratio  - Referral to Podiatry to establish Diabetic Foot Care; Toenail Care  - Nursing Visit: Patient is below goal range for nursing visit for age group and will not need nursing visit at this time .   - Follow up in 3 months with A1c prior.       Al Daugherty PA-C  [facility]    A total of 30 minutes was spent in face to face time, of which over 50 % was spent in counseling patient on disease process, complications, treatment, and side effects of medications.

## 2020-02-10 NOTE — PROGRESS NOTES
Digital Medicine: Clinician Follow-Up    Called patient to discuss his low blood pressure and low blood sugar readings.    The history is provided by the patient.     Follow Up  Follow-up reason(s): reading review      Alert received.   Care Team received low BG alert.  Patient is not experiencing symptoms.Patient normally consumes a snack between lunch and dinner. He did not on 2/5 causing his low blood glucose reading. Overall is diabetes is well controlled. He recently saw a PA in Endocrine who increased Jaridance from 10mg daily to 25mg daily. He hasn't started this change and we discussed hydration associated with an increase.    Patient's blood pressure is also well controlled. He denies hypotension symptoms despite lower readings.      INTERVENTION(S)  reviewed appropriate dose schedule and encouragement/support    PLAN  patient verbalizes understanding and continue monitoring    Patient will maintain his current hypertension and diabetic medication regimen with continued monitoring.     Patient aware to contact us with any hypotension or hypoglycemia symptoms prior to my next outreach.          Topic    Urine Protein Check     Hemoglobin A1C     Eye Exam        Last 5 Patient Entered Readings                                      Current 30 Day Average: 116/63     Recent Readings 2/4/2020 1/28/2020 1/20/2020 1/12/2020 1/8/2020    SBP (mmHg) 105 114 115 130 113    DBP (mmHg) 65 59 66 62 60    Pulse 88 79 67 68 76        Last 6 Patient Entered Readings                                          Most Recent A1c: 6.5% on 8/17/2019  (Goal: 7%)     Recent Readings 2/10/2020 2/10/2020 2/9/2020 2/9/2020 2/8/2020    Blood Glucose (mg/dL) 189 121 139 152 161           Hypertension Medications             amLODIPine (NORVASC) 5 MG tablet TAKE 1 TABLET BY MOUTH ONCE DAILY    valsartan (DIOVAN) 320 MG tablet TAKE 1 TABLET BY MOUTH ONCE DAILY        Diabetes Medications             empagliflozin (JARDIANCE) 25 mg Tab Take  25 mg by mouth once daily.    insulin aspart U-100 (NOVOLOG) 100 unit/mL InPn pen Inject 28 Units into the skin 3 (three) times daily before meals.    LANTUS SOLOSTAR U-100 INSULIN glargine 100 units/mL (3mL) SubQ pen ADMINISTER 75 UNITS UNDER THE SKIN EVERY DAY    metFORMIN (GLUCOPHAGE) 1000 MG tablet TAKE 1 TABLET BY MOUTH TWICE DAILY WITH MEALS    TRULICITY 1.5 mg/0.5 mL PnIj INJECT 1.5 MG INTO THE SKIN EVERY 7 DAYS                           Medication Adherence Screening   He did not miss a dose this month.

## 2020-02-14 ENCOUNTER — LAB VISIT (OUTPATIENT)
Dept: LAB | Facility: HOSPITAL | Age: 70
End: 2020-02-14
Attending: PEDIATRICS
Payer: MEDICARE

## 2020-02-14 DIAGNOSIS — Z79.4 TYPE 2 DIABETES MELLITUS WITH COMPLICATION, WITH LONG-TERM CURRENT USE OF INSULIN: ICD-10-CM

## 2020-02-14 DIAGNOSIS — E11.8 TYPE 2 DIABETES MELLITUS WITH COMPLICATION, WITH LONG-TERM CURRENT USE OF INSULIN: ICD-10-CM

## 2020-02-14 LAB
ALT SERPL W/O P-5'-P-CCNC: 20 U/L (ref 10–44)
ANION GAP SERPL CALC-SCNC: 7 MMOL/L (ref 8–16)
AST SERPL-CCNC: 19 U/L (ref 10–40)
BUN SERPL-MCNC: 18 MG/DL (ref 8–23)
CALCIUM SERPL-MCNC: 9.6 MG/DL (ref 8.7–10.5)
CHLORIDE SERPL-SCNC: 106 MMOL/L (ref 95–110)
CHOLEST SERPL-MCNC: 99 MG/DL (ref 120–199)
CHOLEST/HDLC SERPL: 3.4 {RATIO} (ref 2–5)
CO2 SERPL-SCNC: 27 MMOL/L (ref 23–29)
CREAT SERPL-MCNC: 1 MG/DL (ref 0.5–1.4)
EST. GFR  (AFRICAN AMERICAN): >60 ML/MIN/1.73 M^2
EST. GFR  (NON AFRICAN AMERICAN): >60 ML/MIN/1.73 M^2
ESTIMATED AVG GLUCOSE: 140 MG/DL (ref 68–131)
GLUCOSE SERPL-MCNC: 89 MG/DL (ref 70–110)
HBA1C MFR BLD HPLC: 6.5 % (ref 4–5.6)
HDLC SERPL-MCNC: 29 MG/DL (ref 40–75)
HDLC SERPL: 29.3 % (ref 20–50)
LDLC SERPL CALC-MCNC: 49.8 MG/DL (ref 63–159)
NONHDLC SERPL-MCNC: 70 MG/DL
POTASSIUM SERPL-SCNC: 4.3 MMOL/L (ref 3.5–5.1)
SODIUM SERPL-SCNC: 140 MMOL/L (ref 136–145)
TRIGL SERPL-MCNC: 101 MG/DL (ref 30–150)

## 2020-02-14 PROCEDURE — 84460 ALANINE AMINO (ALT) (SGPT): CPT

## 2020-02-14 PROCEDURE — 36415 COLL VENOUS BLD VENIPUNCTURE: CPT

## 2020-02-14 PROCEDURE — 83036 HEMOGLOBIN GLYCOSYLATED A1C: CPT

## 2020-02-14 PROCEDURE — 84450 TRANSFERASE (AST) (SGOT): CPT

## 2020-02-14 PROCEDURE — 80048 BASIC METABOLIC PNL TOTAL CA: CPT

## 2020-02-14 PROCEDURE — 80061 LIPID PANEL: CPT

## 2020-02-24 ENCOUNTER — TELEPHONE (OUTPATIENT)
Dept: DIABETES | Facility: CLINIC | Age: 70
End: 2020-02-24

## 2020-02-25 ENCOUNTER — OFFICE VISIT (OUTPATIENT)
Dept: PODIATRY | Facility: CLINIC | Age: 70
End: 2020-02-25
Payer: MEDICARE

## 2020-02-25 VITALS
HEIGHT: 71 IN | DIASTOLIC BLOOD PRESSURE: 76 MMHG | WEIGHT: 206.63 LBS | SYSTOLIC BLOOD PRESSURE: 131 MMHG | BODY MASS INDEX: 28.93 KG/M2 | HEART RATE: 68 BPM

## 2020-02-25 DIAGNOSIS — B35.3 TINEA PEDIS OF BOTH FEET: ICD-10-CM

## 2020-02-25 DIAGNOSIS — Z79.4 TYPE 2 DIABETES MELLITUS WITH COMPLICATION, WITH LONG-TERM CURRENT USE OF INSULIN: ICD-10-CM

## 2020-02-25 DIAGNOSIS — E11.9 ENCOUNTER FOR COMPREHENSIVE DIABETIC FOOT EXAMINATION, TYPE 2 DIABETES MELLITUS: Primary | ICD-10-CM

## 2020-02-25 DIAGNOSIS — E11.8 TYPE 2 DIABETES MELLITUS WITH COMPLICATION, WITH LONG-TERM CURRENT USE OF INSULIN: ICD-10-CM

## 2020-02-25 DIAGNOSIS — B35.1 ONYCHOMYCOSIS DUE TO DERMATOPHYTE: ICD-10-CM

## 2020-02-25 PROCEDURE — 99203 OFFICE O/P NEW LOW 30 MIN: CPT | Mod: S$PBB,,, | Performed by: PODIATRIST

## 2020-02-25 PROCEDURE — 99999 PR PBB SHADOW E&M-EST. PATIENT-LVL III: CPT | Mod: PBBFAC,,, | Performed by: PODIATRIST

## 2020-02-25 PROCEDURE — 99213 OFFICE O/P EST LOW 20 MIN: CPT | Mod: PBBFAC,PO | Performed by: PODIATRIST

## 2020-02-25 PROCEDURE — 99999 PR PBB SHADOW E&M-EST. PATIENT-LVL III: ICD-10-PCS | Mod: PBBFAC,,, | Performed by: PODIATRIST

## 2020-02-25 PROCEDURE — 99203 PR OFFICE/OUTPT VISIT, NEW, LEVL III, 30-44 MIN: ICD-10-PCS | Mod: S$PBB,,, | Performed by: PODIATRIST

## 2020-02-25 NOTE — LETTER
February 25, 2020      Al Daugherty PA-C  [id number]  [place] [address]           [facility]  [address] [place] [address]  Phone: [phone]  Fax: [phone]          Patient: Timothy Ortega   MR Number: [id number]   YOB: 1950   Date of Visit: 2/25/2020       Dear Al Daugherty:    Thank you for referring Timothy Ortega to me for evaluation. Attached you will find relevant portions of my assessment and plan of care.    If you have questions, please do not hesitate to call me. I look forward to following Timothy Ortega along with you.    Sincerely,    Yessi Lynn, TASHIA    Enclosure  CC:  No Recipients    If you would like to receive this communication electronically, please contact [email or web address] or [phone] to request more information on [facility] access.    For providers and/or their staff who would like to refer a patient to [facility], please contact us through our one-stop-shop provider referral line, [facility], at [phone].    If you feel you have received this communication in error or would no longer like to receive these types of communications, please e-mail [email or web address]

## 2020-02-26 NOTE — PROGRESS NOTES
Subjective:       Patient ID: Timothy Ortega is a 69 y.o. male.    Chief Complaint: Diabetic Foot Exam (Pt presents for feet exam. Pt c/o bilat foot pain in the plantar aspect of foot rating 3/10 at present. Wears tennis shoes w/ socks. PCP Dr Ochoa.)      HPI: Timothy Ortega presents to the office today, under referral by their Primary Care Provider, EM Ochoa Jr, MD, for his annual diabetic foot assessment and risk evaluation.  Patient is a DMII.  He also complains of a scaly and pruitic rash to the plantar aspect of the right left foot.  States he has been treating this at home with Tinactin foot spray.  Reports that he wears boots to work every day. Has not utilize any other kinds of medication for this.  Also complains of thickening to the hallux nails and is concerned about possible fungus.  Patient states Coronary arterial disease. This patient last saw his/her primary care provider on 08/21/2019 and LISA Villalpando with diabetes education on 2/7/2020.     Hemoglobin A1C   Date Value Ref Range Status   02/14/2020 6.5 (H) 4.0 - 5.6 % Final     Comment:     ADA Screening Guidelines:  5.7-6.4%  Consistent with prediabetes  >or=6.5%  Consistent with diabetes  High levels of fetal hemoglobin interfere with the HbA1C  assay. Heterozygous hemoglobin variants (HbS, HgC, etc)do  not significantly interfere with this assay.   However, presence of multiple variants may affect accuracy.     08/17/2019 6.5 (H) 4.0 - 5.6 % Final     Comment:     ADA Screening Guidelines:  5.7-6.4%  Consistent with prediabetes  >or=6.5%  Consistent with diabetes  High levels of fetal hemoglobin interfere with the HbA1C  assay. Heterozygous hemoglobin variants (HbS, HgC, etc)do  not significantly interfere with this assay.   However, presence of multiple variants may affect accuracy.     02/15/2019 7.1 (H) 4.0 - 5.6 % Final     Comment:     ADA Screening Guidelines:  5.7-6.4%  Consistent with prediabetes  >or=6.5%  Consistent  with diabetes  High levels of fetal hemoglobin interfere with the HbA1C  assay. Heterozygous hemoglobin variants (HbS, HgC, etc)do  not significantly interfere with this assay.   However, presence of multiple variants may affect accuracy.     .    Review of patient's allergies indicates:  No Known Allergies    Past Medical History:   Diagnosis Date    Coronary artery disease     [facility] - no stents    DM (diabetes mellitus)      lunch 10/28/2016    Hyperlipemia     Hypertension     Kidney stones     Neuropathy     Type 2 diabetes mellitus 10-12 years     am 10/31/2014       Family History   Problem Relation Age of Onset    Diabetes Mother     Glaucoma Mother     Heart disease Mother 75        CAB    Diabetes Father     Heart attack Father 58        Fatal MI    Diabetes Brother     Diabetes Sister     Diabetes Sister     Cataracts Paternal Uncle     Cataracts Maternal Grandmother        Social History     Socioeconomic History    Marital status:      Spouse name: Not on file    Number of children: Not on file    Years of education: Not on file    Highest education level: Not on file   Occupational History    Not on file   Social Needs    Financial resource strain: Not on file    Food insecurity:     Worry: Not on file     Inability: Not on file    Transportation needs:     Medical: Not on file     Non-medical: Not on file   Tobacco Use    Smoking status: Former Smoker     Packs/day: 1.00     Years: 20.00     Pack years: 20.00     Last attempt to quit: 2002     Years since quittin.1    Smokeless tobacco: Never Used   Substance and Sexual Activity    Alcohol use: No     Alcohol/week: 0.0 standard drinks    Drug use: No    Sexual activity: Yes     Partners: Female   Lifestyle    Physical activity:     Days per week: Not on file     Minutes per session: Not on file    Stress: Not on file   Relationships    Social connections:     Talks on phone: Not on file  "    Gets together: Not on file     Attends Restorationist service: Not on file     Active member of club or organization: Not on file     Attends meetings of clubs or organizations: Not on file     Relationship status: Not on file   Other Topics Concern    Not on file   Social History Narrative    . Lives with spouse. Has 2 children. Patient works full time as  in construction.        Past Surgical History:   Procedure Laterality Date    CARDIAC CATHETERIZATION  2010    COLONOSCOPY N/A 3/31/2017    Procedure: COLONOSCOPY;  Surgeon: Cornelius Ibarra MD;  Location: [facility];  Service: Endoscopy;  Laterality: N/A;    DENTAL SURGERY      Implant    KNEE ARTHROSCOPY Left        Review of Systems   Constitutional: Negative for activity change, appetite change, chills and fever.   HENT: Negative for sinus pain, sore throat and voice change.    Eyes: Negative for pain, redness and visual disturbance.   Respiratory: Negative for cough and shortness of breath.    Cardiovascular: Negative for chest pain, palpitations and leg swelling.   Gastrointestinal: Negative for diarrhea, nausea and vomiting.   Musculoskeletal: Negative for back pain and joint swelling.   Skin: Positive for rash (Plantar aspect the right left foot). Negative for color change and wound.   Neurological: Negative for dizziness, weakness and numbness.   Psychiatric/Behavioral: The patient is not nervous/anxious.          Objective:   /76   Pulse 68   Ht 5' 11" (1.803 m)   Wt 93.7 kg (206 lb 9.6 oz)   BMI 28.81 kg/m²     Physical Exam  LOWER EXTREMITY PHYSICAL EXAMINATION    CONSTITUTIONAL:  Patient is awake, alert, and oriented to person, place, and time.  Patient is well groomed with normal appearance.  No activity change.  No appetite change.      ORTHOPEDIC:  Manual muscle strength testing demonstrating 5/5 dorsiflexion, plantar flexion, inversion, and eversion.  Patient ambulates with a rectus foot type.  He ambulates with " a nonantalgic gait. No pain with palpation of the right or left foot.  No pain with range of motion of the forefoot, hindfoot, and ankle joints bilaterally.    VASCULAR:  Dorsalis pedis pulse on the right 2/4, on the left 2/4.  Posterior tibial pulse on the right 2/4, on the left 2/4.  Capillary refill intact less than 3 sec of bilateral lower extremities.  Pedal hair growth is present to the dorsal aspect of the foot and digits bilaterally.  No presence of edema to lower extremities. Rubor on dependency is noted.     NEUROLOGY:  Protective sensation is intact with [place] monofilament. Proprioception is intact. Intact sensation to light touch. No neurological symptoms noted on palpation of interspace with radiating sensations proximally or distally. Tinel's and Valliex's sign is negative. No neurological symptoms with compression of metatarsal heads. No numbness or tingling reported on examination.     DERMATOLOGY: Skin is well dry scaly and intact.  Appears to be a moccasin appearance rash to the plantar aspect of the right or left foot. There is slight erythema to the plantar aspect of foot as well.  There is flaking tissue noted to the plantar surface of the right left foot. There is no interdigital maceration.  There is no evidence of wounds or openings in the skin. The right and left hallux nail are discolored, thickened, and is dystrophic.  These are nonpainful.  The skin is normal temperature to touch.    Assessment:     1. Encounter for comprehensive diabetic foot examination, type 2 diabetes mellitus    2. Type 2 diabetes mellitus with complication, with long-term current use of insulin    3. Tinea pedis of both feet    4. Onychomycosis due to dermatophyte        Plan:     Encounter for comprehensive diabetic foot examination, type 2 diabetes mellitus  (Diabetic) Foot counseling and education is provided at this visit. Patient is advised to wear socks and shoes at all times.  Do not walk barefoot,  or with just socks, even when indoors.  Be sure to check and inspect the inside of the shoe before putting them on her feet.  Protect your feet at all times.  Walking shoes and/or athletic shoes are the best types of shoe gear. Do not wear vinyl or plastic type shoe gear, as they do not stretch and/or breathe.  Protect your feet from hot and/or cold. Elevate the extremities when sitting.  Do not wear excessively tight socks and/or shoe gear. Wiggle your toes for a few minutes throughout the day. Move your ankles up and down, in and out, to help blood flow in your lower extremity.     Type 2 diabetes mellitus with complication, with long-term current use of insulin  I counseled the patient on his/her Diabetic Mellitus regarding today's clinical examination and objection findings. We did also discuss recent medication changes, pertinent labs and imaging evaluations and other medical consultation notes and progress notes. Greater than 50% of this visit was spent on counseling and coordination of care. Greater than 20 minutes of this appt. was spent on education about the diabetic foot, in relation to PVD and/or neuropathy, and the prevention of limb loss.     Shoe gear is inspected and wear and proper fit/type. Patient is reminded of the importance of good nutrition and blood sugar control to help prevent podiatric complications of diabetes. Patient instructed on proper foot hygeine. We discussed wearing proper shoe gear, daily foot inspections, never walking without protective shoe gear, never putting sharp instruments to feet.  Patient  will continue to monitor the areas daily, inspect feet, wear protective shoe gear when ambulatory, moisturizer to maintain skin integrity.     Patient's DMI/DMII is managed by Primary Care Provider and/or Endocrinology Advanced Practice Provider.      Tinea pedis of both feet  Recommend moisture wicking socks to alleviate the hyperhidrosis which makes one prone to recurrent tinea  pedis.  I also recommend to alternate shoe gear, meaning, Monday, Wednesday, Friday, Saturday/Sunday and Tuesday, Thursday, Saturday/Sunday.  I do also recommend Tinactin antifungal spray to shoes daily.  After the aforementioned, put the shoes aside and allow to dry overnight and/or one full day.  Patient may purchase OTC Gold Bond Powder and use to his/her shoes daily prior to putting them on. He/She may also sprinkle a slight amount of powder to the foot prior to putting on socks.    Onychomycosis due to dermatophyte  We discussed medical options in regarding to onychomycosis.  We did discuss utilizing oral medications, topical medications, fungal creams, and Vicks vapor rub.  We also discussed performing an avulsion/matricectomy to remove the offending nail.  Patient states that they would like to consider these options.  He did discussed that he will try to use Vicks vapor rub as this condition is not painful at this time.       Protective Sensation (w/ 10 gram monofilament):  Right: Intact  Left: Intact    Visual Inspection:  Onychomycosis -  Bilateral and Tinea pedis    Pedal Pulses:   Right: Present  Left: Present    Posterior tibialis:   Right:Present  Left: Present

## 2020-03-03 ENCOUNTER — PATIENT OUTREACH (OUTPATIENT)
Dept: ADMINISTRATIVE | Facility: OTHER | Age: 70
End: 2020-03-03

## 2020-03-06 ENCOUNTER — OFFICE VISIT (OUTPATIENT)
Dept: OPHTHALMOLOGY | Facility: CLINIC | Age: 70
End: 2020-03-06
Payer: MEDICARE

## 2020-03-06 DIAGNOSIS — H52.4 BILATERAL PRESBYOPIA: ICD-10-CM

## 2020-03-06 DIAGNOSIS — E11.59 HYPERTENSION ASSOCIATED WITH DIABETES: ICD-10-CM

## 2020-03-06 DIAGNOSIS — I25.10 CORONARY ARTERY DISEASE WITHOUT ANGINA PECTORIS, UNSPECIFIED VESSEL OR LESION TYPE, UNSPECIFIED WHETHER NATIVE OR TRANSPLANTED HEART: Primary | ICD-10-CM

## 2020-03-06 DIAGNOSIS — I15.2 HYPERTENSION ASSOCIATED WITH DIABETES: ICD-10-CM

## 2020-03-06 DIAGNOSIS — E11.9 DIABETES MELLITUS TYPE 2 WITHOUT RETINOPATHY: Primary | ICD-10-CM

## 2020-03-06 DIAGNOSIS — E11.36 DIABETIC CATARACT: ICD-10-CM

## 2020-03-06 PROCEDURE — 92014 PR EYE EXAM, EST PATIENT,COMPREHESV: ICD-10-PCS | Mod: S$PBB,,, | Performed by: OPTOMETRIST

## 2020-03-06 PROCEDURE — 99999 PR PBB SHADOW E&M-EST. PATIENT-LVL I: CPT | Mod: PBBFAC,,, | Performed by: OPTOMETRIST

## 2020-03-06 PROCEDURE — 92014 COMPRE OPH EXAM EST PT 1/>: CPT | Mod: S$PBB,,, | Performed by: OPTOMETRIST

## 2020-03-06 PROCEDURE — 92015 DETERMINE REFRACTIVE STATE: CPT | Mod: ,,, | Performed by: OPTOMETRIST

## 2020-03-06 PROCEDURE — 99211 OFF/OP EST MAY X REQ PHY/QHP: CPT | Mod: PBBFAC | Performed by: OPTOMETRIST

## 2020-03-06 PROCEDURE — 99999 PR PBB SHADOW E&M-EST. PATIENT-LVL I: ICD-10-PCS | Mod: PBBFAC,,, | Performed by: OPTOMETRIST

## 2020-03-06 PROCEDURE — 92015 PR REFRACTION: ICD-10-PCS | Mod: ,,, | Performed by: OPTOMETRIST

## 2020-03-06 NOTE — PROGRESS NOTES
HPI     Last TRF exam 03/01/2019  Diabetic/IDDM  Lab Results       Component                Value               Date                       HGBA1C                   6.5 (H)             02/14/2020            Cataract, nuclear sclerotic senile, bilateral  Screening for glaucoma  RE  Uses OTC Readers did not bring to exam     Last edited by Mekhi Stoddard, OD on 3/6/2020  2:46 PM. (History)            Assessment /Plan     For exam results, see Encounter Report.    Diabetes mellitus type 2 without retinopathy    Hypertension associated with diabetes    Diabetic cataract    Bilateral presbyopia      No Background Diabetic Retinopathy    No HTN Retinopathy    Moderate cataracts OU, not surgical    Dispense Final Rx for glasses or may use OTC glasses.  RTC 1 year  Discussed above and answered questions.

## 2020-03-09 ENCOUNTER — OFFICE VISIT (OUTPATIENT)
Dept: CARDIOLOGY | Facility: CLINIC | Age: 70
End: 2020-03-09
Payer: MEDICARE

## 2020-03-09 ENCOUNTER — HOSPITAL ENCOUNTER (OUTPATIENT)
Dept: CARDIOLOGY | Facility: HOSPITAL | Age: 70
Discharge: HOME | End: 2020-03-09
Attending: INTERNAL MEDICINE
Payer: MEDICARE

## 2020-03-09 ENCOUNTER — PATIENT OUTREACH (OUTPATIENT)
Dept: ADMINISTRATIVE | Facility: OTHER | Age: 70
End: 2020-03-09

## 2020-03-09 ENCOUNTER — PATIENT MESSAGE (OUTPATIENT)
Dept: DIABETES | Facility: CLINIC | Age: 70
End: 2020-03-09

## 2020-03-09 VITALS
DIASTOLIC BLOOD PRESSURE: 58 MMHG | BODY MASS INDEX: 28.63 KG/M2 | HEART RATE: 76 BPM | SYSTOLIC BLOOD PRESSURE: 100 MMHG | OXYGEN SATURATION: 97 % | WEIGHT: 205.25 LBS

## 2020-03-09 DIAGNOSIS — I25.10 CORONARY ARTERY DISEASE INVOLVING NATIVE CORONARY ARTERY OF NATIVE HEART WITHOUT ANGINA PECTORIS: Primary | ICD-10-CM

## 2020-03-09 DIAGNOSIS — I25.10 CORONARY ARTERY DISEASE WITHOUT ANGINA PECTORIS, UNSPECIFIED VESSEL OR LESION TYPE, UNSPECIFIED WHETHER NATIVE OR TRANSPLANTED HEART: ICD-10-CM

## 2020-03-09 DIAGNOSIS — E78.5 HYPERLIPIDEMIA ASSOCIATED WITH TYPE 2 DIABETES MELLITUS: ICD-10-CM

## 2020-03-09 DIAGNOSIS — Z79.4 TYPE 2 DIABETES MELLITUS WITH COMPLICATION, WITH LONG-TERM CURRENT USE OF INSULIN: ICD-10-CM

## 2020-03-09 DIAGNOSIS — G47.33 OBSTRUCTIVE SLEEP APNEA: ICD-10-CM

## 2020-03-09 DIAGNOSIS — E11.8 TYPE 2 DIABETES MELLITUS WITH COMPLICATION, WITH LONG-TERM CURRENT USE OF INSULIN: ICD-10-CM

## 2020-03-09 DIAGNOSIS — I15.2 HYPERTENSION ASSOCIATED WITH DIABETES: ICD-10-CM

## 2020-03-09 DIAGNOSIS — E11.69 HYPERLIPIDEMIA ASSOCIATED WITH TYPE 2 DIABETES MELLITUS: ICD-10-CM

## 2020-03-09 DIAGNOSIS — E11.59 HYPERTENSION ASSOCIATED WITH DIABETES: ICD-10-CM

## 2020-03-09 PROCEDURE — 99214 OFFICE O/P EST MOD 30 MIN: CPT | Mod: S$PBB,,, | Performed by: INTERNAL MEDICINE

## 2020-03-09 PROCEDURE — 99214 PR OFFICE/OUTPT VISIT, EST, LEVL IV, 30-39 MIN: ICD-10-PCS | Mod: S$PBB,,, | Performed by: INTERNAL MEDICINE

## 2020-03-09 PROCEDURE — 93005 ELECTROCARDIOGRAM TRACING: CPT

## 2020-03-09 PROCEDURE — 93010 ELECTROCARDIOGRAM REPORT: CPT | Mod: ,,, | Performed by: INTERNAL MEDICINE

## 2020-03-09 PROCEDURE — 99999 PR PBB SHADOW E&M-EST. PATIENT-LVL III: CPT | Mod: PBBFAC,,, | Performed by: INTERNAL MEDICINE

## 2020-03-09 PROCEDURE — 99213 OFFICE O/P EST LOW 20 MIN: CPT | Mod: PBBFAC,25 | Performed by: INTERNAL MEDICINE

## 2020-03-09 PROCEDURE — 93010 EKG 12-LEAD: ICD-10-PCS | Mod: ,,, | Performed by: INTERNAL MEDICINE

## 2020-03-09 PROCEDURE — 99999 PR PBB SHADOW E&M-EST. PATIENT-LVL III: ICD-10-PCS | Mod: PBBFAC,,, | Performed by: INTERNAL MEDICINE

## 2020-03-09 NOTE — PROGRESS NOTES
"Subjective:   Patient ID:  Timothy Ortega is a 69 y.o. male who presents for follow up of No chief complaint on file.      68 yo male, came in for 1yr f/u. .  Hx of CAD, had [facility] in  and was told "nonobstructive", HTN, HLP, IDDM > 20 yrs, BRIANA on cpap, ex smoker.    No chest pain, no sob, no palpitation, no dizziness, no syncope, no CNS symptoms.  Patient has fairly good exercise tolerance. Walks 2 miles a day  Patient is compliant with medications.  No smoking/drinking  EKG NSR and possible inferior infarct   nuke stress normal EF and no ischemia.      Past Medical History:   Diagnosis Date    Coronary artery disease     [facility] - no stents    DM (diabetes mellitus)      lunch 10/28/2016    Hyperlipemia     Hypertension     Kidney stones     Neuropathy     Type 2 diabetes mellitus 10-12 years     am 10/31/2014       Past Surgical History:   Procedure Laterality Date    CARDIAC CATHETERIZATION      COLONOSCOPY N/A 3/31/2017    Procedure: COLONOSCOPY;  Surgeon: Cornelius Ibarra MD;  Location: [facility];  Service: Endoscopy;  Laterality: N/A;    DENTAL SURGERY      Implant    KNEE ARTHROSCOPY Left        Social History     Tobacco Use    Smoking status: Former Smoker     Packs/day: 1.00     Years: 20.00     Pack years: 20.00     Last attempt to quit: 2002     Years since quittin.1    Smokeless tobacco: Never Used   Substance Use Topics    Alcohol use: No     Alcohol/week: 0.0 standard drinks    Drug use: No       Family History   Problem Relation Age of Onset    Diabetes Mother     Glaucoma Mother     Heart disease Mother 75        CAB    Diabetes Father     Heart attack Father 58        Fatal MI    Diabetes Brother     Diabetes Sister     Diabetes Sister     Cataracts Paternal Uncle     Cataracts Maternal Grandmother          Review of Systems   Constitution: Negative for decreased appetite, diaphoresis, fever, malaise/fatigue " and night sweats.   HENT: Negative for nosebleeds.    Eyes: Negative for blurred vision and double vision.   Cardiovascular: Negative for chest pain, claudication, dyspnea on exertion, irregular heartbeat, leg swelling, near-syncope, orthopnea, palpitations, paroxysmal nocturnal dyspnea and syncope.   Respiratory: Negative for cough, shortness of breath, sleep disturbances due to breathing, snoring, sputum production and wheezing.    Endocrine: Negative for cold intolerance and polyuria.   Hematologic/Lymphatic: Does not bruise/bleed easily.   Skin: Negative for rash.   Musculoskeletal: Negative for back pain, falls, joint pain, joint swelling and neck pain.   Gastrointestinal: Negative for abdominal pain, heartburn, nausea and vomiting.   Genitourinary: Negative for dysuria, frequency and hematuria.   Neurological: Negative for difficulty with concentration, dizziness, focal weakness, headaches, light-headedness, numbness, seizures and weakness.   Psychiatric/Behavioral: Negative for depression, memory loss and substance abuse. The patient does not have insomnia.    Allergic/Immunologic: Negative for HIV exposure and hives.       Objective:   Physical Exam   Constitutional: He is oriented to person, place, and time. He appears well-nourished.   HENT:   Head: Normocephalic.   Eyes: Pupils are equal, round, and reactive to light.   Neck: Normal carotid pulses and no JVD present. Carotid bruit is not present. No thyromegaly present.   Cardiovascular: Normal rate, regular rhythm, normal heart sounds and normal pulses.  No extrasystoles are present. PMI is not displaced. Exam reveals no gallop and no S3.   No murmur heard.  Pulmonary/Chest: Breath sounds normal. No stridor. No respiratory distress.   Abdominal: Soft. Bowel sounds are normal. There is no tenderness. There is no rebound.   Musculoskeletal: Normal range of motion.   Neurological: He is alert and oriented to person, place, and time.   Skin: Skin is intact.  No rash noted.   Psychiatric: His behavior is normal.       Lab Results   Component Value Date    CHOL 99 (L) 02/14/2020    CHOL 113 (L) 08/17/2019    CHOL 132 02/15/2019     Lab Results   Component Value Date    HDL 29 (L) 02/14/2020    HDL 33 (L) 08/17/2019    HDL 25 (L) 02/15/2019     Lab Results   Component Value Date    LDLCALC 49.8 (L) 02/14/2020    LDLCALC 55.8 (L) 08/17/2019    LDLCALC 50.4 (L) 02/15/2019     Lab Results   Component Value Date    TRIG 101 02/14/2020    TRIG 121 08/17/2019    TRIG 283 (H) 02/15/2019     Lab Results   Component Value Date    CHOLHDL 29.3 02/14/2020    CHOLHDL 29.2 08/17/2019    CHOLHDL 18.9 (L) 02/15/2019       Chemistry        Component Value Date/Time     02/14/2020 0738    K 4.3 02/14/2020 0738     02/14/2020 0738    CO2 27 02/14/2020 0738    BUN 18 02/14/2020 0738    CREATININE 1.0 02/14/2020 0738    GLU 89 02/14/2020 0738        Component Value Date/Time    CALCIUM 9.6 02/14/2020 0738    ALKPHOS 46 (L) 12/01/2014 0545    AST 19 02/14/2020 0738    ALT 20 02/14/2020 0738    BILITOT 0.5 12/01/2014 0545    ESTGFRAFRICA >60.0 02/14/2020 0738    EGFRNONAA >60.0 02/14/2020 0738          Lab Results   Component Value Date    HGBA1C 6.5 (H) 02/14/2020     Lab Results   Component Value Date    TSH 1.003 10/05/2012     Lab Results   Component Value Date    INR 1.0 12/01/2014    INR 1.0 05/06/2011     Lab Results   Component Value Date    WBC 7.02 04/17/2015    HGB 14.7 04/17/2015    HCT 43.7 04/17/2015    MCV 96 04/17/2015     04/17/2015     BMP  Sodium   Date Value Ref Range Status   02/14/2020 140 136 - 145 mmol/L Final     Potassium   Date Value Ref Range Status   02/14/2020 4.3 3.5 - 5.1 mmol/L Final     Chloride   Date Value Ref Range Status   02/14/2020 106 95 - 110 mmol/L Final     CO2   Date Value Ref Range Status   02/14/2020 27 23 - 29 mmol/L Final     BUN, Bld   Date Value Ref Range Status   02/14/2020 18 8 - 23 mg/dL Final     Creatinine   Date Value  Ref Range Status   02/14/2020 1.0 0.5 - 1.4 mg/dL Final     Calcium   Date Value Ref Range Status   02/14/2020 9.6 8.7 - 10.5 mg/dL Final     Anion Gap   Date Value Ref Range Status   02/14/2020 7 (L) 8 - 16 mmol/L Final     eGFR if    Date Value Ref Range Status   02/14/2020 >60.0 >60 mL/min/1.73 m^2 Final     eGFR if non    Date Value Ref Range Status   02/14/2020 >60.0 >60 mL/min/1.73 m^2 Final     Comment:     Calculation used to obtain the estimated glomerular filtration  rate (eGFR) is the CKD-EPI equation.        BNP  @LABRCNTIP(BNP,BNPTRIAGEBLO)@  @LABRCNTIP(troponini)@  CrCl cannot be calculated (Patient's most recent lab result is older than the maximum 7 days allowed.).  No results found in the last 24 hours.  No results found in the last 24 hours.  No results found in the last 24 hours.    Assessment:      1. Coronary artery disease involving native coronary artery of native heart without angina pectoris    2. Hypertension associated with diabetes    3. Hyperlipidemia associated with type 2 diabetes mellitus    4. Type 2 diabetes mellitus with complication, with long-term current use of insulin    5. Obstructive sleep apnea        Plan:     Continue ASA 81mg, Crestor, ARB and amlodipine  Counseled DASH  Recommend heart-healthy diet, weight control and regular exercise.  [place]. Risk modification.   RTC in 1 yr    I have reviewed all pertinent labs and cardiac studies. Plans and recommendations have been formulated under my direct supervision. All questions answered and patient voiced understanding. Patient to continue current medications.

## 2020-03-17 ENCOUNTER — TELEPHONE (OUTPATIENT)
Dept: ORTHOPEDICS | Facility: CLINIC | Age: 70
End: 2020-03-17

## 2020-03-17 NOTE — TELEPHONE ENCOUNTER
Contacted the patient due to the Covid-19 concerns. We are rescheduling all New Patient with chronic issues and well patient follow ups to a later date. Patient verbalized understanding.  Left Voicemail

## 2020-03-19 ENCOUNTER — TELEPHONE (OUTPATIENT)
Dept: INTERNAL MEDICINE | Facility: CLINIC | Age: 70
End: 2020-03-19

## 2020-03-19 NOTE — TELEPHONE ENCOUNTER
D/t Alaina  protocol for COVID-19 risk, called pt to postpone appt sched w/ Dr. Ochoa 03/20/20 or to advise of prescreen visit protocols should pt want to keep appt. No answer, lvm, mychart message sent.

## 2020-03-20 ENCOUNTER — OFFICE VISIT (OUTPATIENT)
Dept: INTERNAL MEDICINE | Facility: CLINIC | Age: 70
End: 2020-03-20
Payer: MEDICARE

## 2020-03-20 VITALS
SYSTOLIC BLOOD PRESSURE: 118 MMHG | HEART RATE: 73 BPM | DIASTOLIC BLOOD PRESSURE: 64 MMHG | HEIGHT: 71 IN | OXYGEN SATURATION: 95 % | BODY MASS INDEX: 28.8 KG/M2 | WEIGHT: 205.69 LBS | TEMPERATURE: 98 F

## 2020-03-20 DIAGNOSIS — Z12.5 PROSTATE CANCER SCREENING: ICD-10-CM

## 2020-03-20 DIAGNOSIS — E78.5 HYPERLIPIDEMIA ASSOCIATED WITH TYPE 2 DIABETES MELLITUS: ICD-10-CM

## 2020-03-20 DIAGNOSIS — E11.8 TYPE 2 DIABETES MELLITUS WITH COMPLICATION, WITH LONG-TERM CURRENT USE OF INSULIN: Primary | ICD-10-CM

## 2020-03-20 DIAGNOSIS — E11.69 HYPERLIPIDEMIA ASSOCIATED WITH TYPE 2 DIABETES MELLITUS: ICD-10-CM

## 2020-03-20 DIAGNOSIS — E11.59 HYPERTENSION ASSOCIATED WITH DIABETES: ICD-10-CM

## 2020-03-20 DIAGNOSIS — Z79.4 TYPE 2 DIABETES MELLITUS WITH COMPLICATION, WITH LONG-TERM CURRENT USE OF INSULIN: Primary | ICD-10-CM

## 2020-03-20 DIAGNOSIS — G47.33 OBSTRUCTIVE SLEEP APNEA: ICD-10-CM

## 2020-03-20 DIAGNOSIS — I15.2 HYPERTENSION ASSOCIATED WITH DIABETES: ICD-10-CM

## 2020-03-20 DIAGNOSIS — I25.10 CORONARY ARTERY DISEASE INVOLVING NATIVE CORONARY ARTERY OF NATIVE HEART WITHOUT ANGINA PECTORIS: ICD-10-CM

## 2020-03-20 PROCEDURE — 99214 PR OFFICE/OUTPT VISIT, EST, LEVL IV, 30-39 MIN: ICD-10-PCS | Mod: S$PBB,,, | Performed by: PEDIATRICS

## 2020-03-20 PROCEDURE — 99213 OFFICE O/P EST LOW 20 MIN: CPT | Mod: PBBFAC | Performed by: PEDIATRICS

## 2020-03-20 PROCEDURE — 99214 OFFICE O/P EST MOD 30 MIN: CPT | Mod: S$PBB,,, | Performed by: PEDIATRICS

## 2020-03-20 PROCEDURE — 99999 PR PBB SHADOW E&M-EST. PATIENT-LVL III: ICD-10-PCS | Mod: PBBFAC,,, | Performed by: PEDIATRICS

## 2020-03-20 PROCEDURE — 99999 PR PBB SHADOW E&M-EST. PATIENT-LVL III: CPT | Mod: PBBFAC,,, | Performed by: PEDIATRICS

## 2020-03-20 RX ORDER — TRIAMCINOLONE ACETONIDE 1 MG/G
CREAM TOPICAL
COMMUNITY
Start: 2020-02-06 | End: 2023-03-01

## 2020-03-20 NOTE — PROGRESS NOTES
Subjective:       Patient ID: Timothy Ortega is a 69 y.o. male.     Chief Complaint: F/U in DM program and Dig HTN med, working hard at lifestyle mod.     HTN: B/P good, no HTNive symptoms.    LIPIDS:not following D&E, tolerating and compliant with med(s).    DM: no hyper/hypoglycemic symptoms. Self monitoring normal. Compliant with meds , Working with DM program.  CAD: no CP, SOB, CHOU. Followed by cards.    LABS REVIEWED AND DISCUSSED WITH PATIENT         Review of Systems   Constitutional: Negative for fever and unexpected weight change.   HENT: Negative for congestion and rhinorrhea.    Eyes: Negative for discharge and redness.   Respiratory: Negative for cough and wheezing.    Cardiovascular: Negative for chest pain, palpitations and leg swelling.   Gastrointestinal: Negative for constipation, diarrhea and vomiting.   Endocrine: Negative for polydipsia, polyphagia and polyuria.   Genitourinary: Negative for decreased urine volume and difficulty urinating.   Musculoskeletal: Positive for arthralgias, back pain and myalgias. Negative for joint swelling.        Chronic back and CTS.   Skin: Negative for rash and wound.   Neurological: Negative for syncope and headaches.   Psychiatric/Behavioral: Negative for behavioral problems, dysphoric mood and sleep disturbance. The patient is not nervous/anxious.       Objective:   Physical Exam   Constitutional: He is oriented to person, place, and time. He appears well-developed and well-nourished. No distress.   Neck: No JVD present. No thyromegaly present.   Cardiovascular: Normal rate, regular rhythm and normal heart sounds.   No murmur heard.  Pulmonary/Chest: Effort normal and breath sounds normal. No respiratory distress. He has no wheezes. He has no rales.   Abdominal: Soft. He exhibits no distension and no mass. There is no tenderness. There is no guarding.   Musculoskeletal: He exhibits no edema.   Lymphadenopathy:     He has no cervical adenopathy.   Neurological:  He is alert and oriented to person, place, and time. No cranial nerve deficit. Coordination normal.   Skin: Capillary refill takes less than 2 seconds. No rash noted.   Psychiatric: He has a normal mood and affect. His behavior is normal. Judgment and thought content normal.      Assessment:      1. Type 2 diabetes mellitus with complication, with long-term current use of insulin    2. Coronary artery disease involving native coronary artery of native heart without angina pectoris    3. Hypertension associated with diabetes    4. Hyperlipidemia associated with type 2 diabetes mellitus    5. Obstructive sleep apnea           Plan:    Over he is at goal. Continue working with DM program and Dig medicine. D&E, weight loss, self monitor B/P and BS. F/U in 6 months with labs.

## 2020-03-30 ENCOUNTER — TELEPHONE (OUTPATIENT)
Dept: ORTHOPEDICS | Facility: CLINIC | Age: 70
End: 2020-03-30

## 2020-03-30 NOTE — TELEPHONE ENCOUNTER
Spoke with the patient about changing his appointment to telehealth and the patient states to just cancel his appointment at this time and he will call when Covid-19 concerns are over.  He was written down on call to reschedule sheet

## 2020-04-03 ENCOUNTER — PATIENT MESSAGE (OUTPATIENT)
Dept: DIABETES | Facility: CLINIC | Age: 70
End: 2020-04-03

## 2020-04-03 DIAGNOSIS — I15.2 HYPERTENSION ASSOCIATED WITH DIABETES: ICD-10-CM

## 2020-04-03 DIAGNOSIS — E11.59 HYPERTENSION ASSOCIATED WITH DIABETES: ICD-10-CM

## 2020-04-03 RX ORDER — INSULIN ASPART 100 [IU]/ML
28 INJECTION, SOLUTION INTRAVENOUS; SUBCUTANEOUS
Qty: 75.6 ML | Refills: 3 | Status: SHIPPED | OUTPATIENT
Start: 2020-04-03 | End: 2021-04-06

## 2020-04-03 NOTE — TELEPHONE ENCOUNTER
Fax refill request rec'vd from pt's pharm for #[phone], sent to [facility] for auth [Valsartan].    LV 03/20/20  NV 09/21/20

## 2020-04-06 RX ORDER — VALSARTAN 320 MG/1
320 TABLET ORAL DAILY
Qty: 90 TABLET | Refills: 3 | Status: SHIPPED | OUTPATIENT
Start: 2020-04-06 | End: 2020-07-16 | Stop reason: RX

## 2020-04-13 RX ORDER — INSULIN ASPART 100 [IU]/ML
28 INJECTION, SOLUTION INTRAVENOUS; SUBCUTANEOUS
Qty: 75.6 ML | Refills: 3 | OUTPATIENT
Start: 2020-04-13 | End: 2021-04-13

## 2020-04-30 RX ORDER — DULAGLUTIDE 1.5 MG/.5ML
INJECTION, SOLUTION SUBCUTANEOUS
Qty: 2 ML | Refills: 11 | Status: SHIPPED | OUTPATIENT
Start: 2020-04-30 | End: 2020-11-09

## 2020-05-11 ENCOUNTER — OFFICE VISIT (OUTPATIENT)
Dept: DIABETES | Facility: CLINIC | Age: 70
End: 2020-05-11
Payer: MEDICARE

## 2020-05-11 VITALS
DIASTOLIC BLOOD PRESSURE: 78 MMHG | WEIGHT: 207.25 LBS | BODY MASS INDEX: 28.9 KG/M2 | HEART RATE: 67 BPM | SYSTOLIC BLOOD PRESSURE: 120 MMHG

## 2020-05-11 DIAGNOSIS — E11.59 HYPERTENSION ASSOCIATED WITH DIABETES: ICD-10-CM

## 2020-05-11 DIAGNOSIS — I25.10 CORONARY ARTERY DISEASE INVOLVING NATIVE CORONARY ARTERY OF NATIVE HEART WITHOUT ANGINA PECTORIS: ICD-10-CM

## 2020-05-11 DIAGNOSIS — E11.8 TYPE 2 DIABETES MELLITUS WITH COMPLICATION, WITH LONG-TERM CURRENT USE OF INSULIN: Primary | ICD-10-CM

## 2020-05-11 DIAGNOSIS — Z79.4 TYPE 2 DIABETES MELLITUS WITH COMPLICATION, WITH LONG-TERM CURRENT USE OF INSULIN: Primary | ICD-10-CM

## 2020-05-11 DIAGNOSIS — E11.69 HYPERLIPIDEMIA ASSOCIATED WITH TYPE 2 DIABETES MELLITUS: ICD-10-CM

## 2020-05-11 DIAGNOSIS — E78.5 HYPERLIPIDEMIA ASSOCIATED WITH TYPE 2 DIABETES MELLITUS: ICD-10-CM

## 2020-05-11 DIAGNOSIS — I15.2 HYPERTENSION ASSOCIATED WITH DIABETES: ICD-10-CM

## 2020-05-11 LAB — GLUCOSE SERPL-MCNC: 112 MG/DL (ref 70–110)

## 2020-05-11 PROCEDURE — 82962 GLUCOSE BLOOD TEST: CPT | Mod: PBBFAC | Performed by: PHYSICIAN ASSISTANT

## 2020-05-11 PROCEDURE — 99214 PR OFFICE/OUTPT VISIT, EST, LEVL IV, 30-39 MIN: ICD-10-PCS | Mod: S$PBB,,, | Performed by: PHYSICIAN ASSISTANT

## 2020-05-11 PROCEDURE — 99214 OFFICE O/P EST MOD 30 MIN: CPT | Mod: S$PBB,,, | Performed by: PHYSICIAN ASSISTANT

## 2020-05-11 PROCEDURE — 99999 PR PBB SHADOW E&M-EST. PATIENT-LVL III: ICD-10-PCS | Mod: PBBFAC,,, | Performed by: PHYSICIAN ASSISTANT

## 2020-05-11 PROCEDURE — 99213 OFFICE O/P EST LOW 20 MIN: CPT | Mod: PBBFAC | Performed by: PHYSICIAN ASSISTANT

## 2020-05-11 PROCEDURE — 99999 PR PBB SHADOW E&M-EST. PATIENT-LVL III: CPT | Mod: PBBFAC,,, | Performed by: PHYSICIAN ASSISTANT

## 2020-05-11 RX ORDER — METFORMIN HYDROCHLORIDE 1000 MG/1
1000 TABLET ORAL 2 TIMES DAILY WITH MEALS
Qty: 180 TABLET | Refills: 4 | Status: SHIPPED | OUTPATIENT
Start: 2020-05-11 | End: 2021-06-21

## 2020-05-11 RX ORDER — INSULIN GLARGINE 100 [IU]/ML
75 INJECTION, SOLUTION SUBCUTANEOUS NIGHTLY
Qty: 67.5 ML | Refills: 3 | Status: SHIPPED | OUTPATIENT
Start: 2020-05-11 | End: 2021-04-06

## 2020-05-11 NOTE — PROGRESS NOTES
PCP: EM Ochoa Jr, MD    Subjective:     Chief Complaint: Diabetes - Established Patient    HISTORY OF PRESENT ILLNESS: 69 y.o.   male presenting for diabetes management visit.   Patient has previously been established with the Diabetes Management Department and was last seen by myself on 02/07/20.   Patient has had Type II diabetes since 1990s.  Pertinent to decision making is the following comorbidities: HTN, HLD, CAD and Overweight by BMI  Patient has the following Diabetes complications: without complications  He  has attended diabetes education in the past.     Patient's most recent A1c of 6.5% was completed 3 months ago.   Patient states since His last A1c His blood glucose levels have been high  before meals.   Patient monitors blood glucose 3 times per day with [facility] meter : Fasting, Before Lunch and Before Dinner.   Patient blood glucose monitoring device data will be reviewed under the Episodes tab today - see below.   Patient endorses the following diabetes related symptoms: None.   Patient is due today for the following diabetes-related health maintenance standards: None - up to date.   He denies any recent hospital admissions or emergency room visits.   He denies having hypoglycemia.  Patient's concerns today include glycemic control.                  BB      AB     BL      BD  5/10/2020  6:22 PM 5/10/2020  6:22 PM       219       5/10/2020 12:05 PM 5/10/2020 12:06 PM     113         5/10/2020  7:21 AM 5/10/2020  7:21 AM   103           5/9/2020  6:00 PM 5/9/2020  6:00 PM       111       5/9/2020  1:11 PM 5/9/2020  1:11 PM     141         5/9/2020  8:07 AM 5/9/2020  8:07 AM   111           5/8/2020  5:33 PM 5/8/2020  5:33 PM       124       5/8/2020 12:48 PM 5/8/2020 12:48 PM     170         5/8/2020  7:55 AM 5/8/2020  7:55 AM   111           5/7/2020  6:12 PM 5/7/2020  6:12 PM       76       5/7/2020 11:21 AM 5/7/2020 11:21 AM     143         5/7/2020  8:56 AM 5/7/2020   8:56 AM   148           5/6/2020  5:57 PM 5/6/2020  5:57 PM       130       5/6/2020 11:29 AM 5/6/2020 11:29 AM     153         5/6/2020  9:09 AM 5/6/2020  9:09 AM   139           5/5/2020  5:57 PM 5/5/2020  5:57 PM       124       5/5/2020 11:13 AM 5/5/2020 11:13 AM     189         5/5/2020  8:57 AM 5/5/2020  8:57 AM    109          5/4/2020  5:59 PM 5/4/2020  5:59 PM       117       5/4/2020 11:15 AM 5/4/2020  5:15 PM     203         5/4/2020  9:10 AM 5/4/2020  5:15 PM   161           5/3/2020  5:40 PM 5/3/2020  5:40 PM       128       5/3/2020  1:14 PM 5/3/2020  1:15 PM     116         5/3/2020  8:38 AM 5/3/2020  8:38 AM   173           5/2/2020  5:41 PM 5/2/2020  5:41 PM       151       5/2/2020  7:33 AM 5/2/2020  7:33 AM   130           5/1/2020  6:31 PM 5/1/2020  6:31 PM       166       5/1/2020 12:48 PM 5/1/2020 12:48 PM     163         5/1/2020  8:12 AM 5/1/2020  8:14   117           4/30/2020  5:51 PM 4/30/2020  5:51 PM       113       4/30/2020 11:17 AM 4/30/2020 11:17 AM     165         4/30/2020  8:55 AM 4/30/2020  8:56 AM   119           4/29/2020  5:54 PM 4/29/2020  5:54 PM       226       4/29/2020 11:20 AM 4/29/2020 11:20 AM     146         4/29/2020  8:58 AM 4/29/2020  8:58 AM   152           4/28/2020  5:57 PM 4/28/2020  5:57 PM       151       4/28/2020 11:20 AM 4/28/2020 11:20 AM     205         4/28/2020  8:56 AM 4/28/2020 11:19   157           4/27/2020  6:12 PM 4/27/2020  6:12 PM       133       4/27/2020 11:30 AM 4/27/2020 11:30 AM     184         4/27/2020  9:12 AM 4/27/2020  9:12 AM   167           4/26/2020  6:15 PM 4/26/2020  6:15 PM       197       4/26/2020  1:11 PM 4/26/2020  1:12 PM     227         4/26/2020  8:56 AM 4/26/2020  8:56 AM   118           4/25/2020  5:40 PM 4/25/2020  5:40 PM       123       4/25/2020 12:30 PM 4/25/2020 12:30 PM     137         4/25/2020  8:01 AM 4/25/2020  8:01 AM   131           4/24/2020  6:14 PM 4/24/2020  6:14 PM       145        4/24/2020 11:24 AM 4/24/2020 11:24 AM     142         4/24/2020  9:00 AM 4/24/2020  9:01 AM   124           4/23/2020  5:52 PM 4/23/2020  5:52 PM       120       4/23/2020 11:20 AM 4/23/2020 11:20 AM     130         4/23/2020  9:15 AM 4/23/2020  9:15 AM    135          4/22/2020  6:04 PM 4/22/2020  6:04 PM       100       4/22/2020 11:19 AM 4/22/2020 11:19 AM     200         4/22/2020  9:09 AM 4/22/2020  9:10 AM   151           4/21/2020  5:50 PM 4/21/2020  5:50 PM       164       4/21/2020 11:26 AM 4/21/2020 11:26 AM     113         4/21/2020  9:03 AM 4/21/2020  9:04 AM   121               Patient medication regimen is as below.     CURRENT DM MEDICATIONS:    Metformin 1000 mg BID    Lantus 75 units qhs   Novolog 22 units w/ ss TID wm    Jardiance 25 mg    Trulicity 1.5 mg weekly       Labs Reviewed.       No results found for: CPEPTIDE  No results found for: GLUTAMICACID       //   , There is no height or weight on file to calculate BMI.  His blood sugar in clinic today is:    Lab Results   Component Value Date    POCGLU 112 (A) 05/11/2020       Review of Systems   Constitutional: Negative for activity change, appetite change, chills and fever.   HENT: Negative for dental problem, mouth sores, nosebleeds, sore throat and trouble swallowing.    Eyes: Negative for pain and discharge.   Respiratory: Negative for shortness of breath, wheezing and stridor.    Cardiovascular: Negative for chest pain, palpitations and leg swelling.   Gastrointestinal: Negative for abdominal pain, diarrhea, nausea and vomiting.   Endocrine: Negative for polydipsia, polyphagia and polyuria.   Genitourinary: Negative for dysuria, frequency and urgency.   Musculoskeletal: Negative for joint swelling and myalgias.   Skin: Negative for rash and wound.   Neurological: Negative for dizziness, syncope, weakness and headaches.   Psychiatric/Behavioral: Negative for behavioral problems and dysphoric mood.         Diabetes Management  Status  Statin: Taking  ACE/ARB: Taking    Screening or Prevention Patient's value Goal Complete/Controlled?   HgA1C Testing and Control   Lab Results   Component Value Date    HGBA1C 6.5 (H) 2020      Annually/Less than 8% Yes   Lipid profile : 2020 Annually Yes   LDL control Lab Results   Component Value Date    LDLCALC 49.8 (L) 2020    Annually/Less than 100 mg/dl  Yes   Nephropathy screening Lab Results   Component Value Date    MICALBCREAT 25.6 2020     No results found for: PROTEINUA Annually Yes   Blood pressure BP Readings from Last 1 Encounters:   20 118/64    Less than 140/90 Yes   Dilated retinal exam : 2020 Annually Yes    Foot exam   : 2020 Annually Yes     ACTIVITY LEVEL: Moderately Active. Discussed activities, benefits, methods, and precautions.  MEAL PLANNING: Patient reports number of meals per day to be 3 and number of snacks per day to be 2.   Patient is encouraged to carb count and consume no more than 30 - 45 grams of carbohydrates in each meal and 15 grams of carbohydrates in each snack.     Social History     Socioeconomic History    Marital status:      Spouse name: Not on file    Number of children: Not on file    Years of education: Not on file    Highest education level: Not on file   Occupational History    Not on file   Social Needs    Financial resource strain: Not on file    Food insecurity:     Worry: Not on file     Inability: Not on file    Transportation needs:     Medical: Not on file     Non-medical: Not on file   Tobacco Use    Smoking status: Former Smoker     Packs/day: 1.00     Years: 20.00     Pack years: 20.00     Last attempt to quit: 2002     Years since quittin.3    Smokeless tobacco: Never Used   Substance and Sexual Activity    Alcohol use: No     Alcohol/week: 0.0 standard drinks    Drug use: No    Sexual activity: Yes     Partners: Female   Lifestyle    Physical activity:     Days per week: Not  on file     Minutes per session: Not on file    Stress: Not on file   Relationships    Social connections:     Talks on phone: Not on file     Gets together: Not on file     Attends Yazidism service: Not on file     Active member of club or organization: Not on file     Attends meetings of clubs or organizations: Not on file     Relationship status: Not on file   Other Topics Concern    Not on file   Social History Narrative    . Lives with spouse. Has 2 children. Patient works full time as  in construction.      Past Medical History:   Diagnosis Date    Coronary artery disease 2010    [facility] - no stents    DM (diabetes mellitus) 2002     lunch 10/28/2016    Hyperlipemia     Hypertension     Kidney stones     Neuropathy     Type 2 diabetes mellitus 10-12 years     am 10/31/2014       Objective:        Physical Exam   Constitutional: He is oriented to person, place, and time. He appears well-developed and well-nourished. No distress.   HENT:   Head: Normocephalic and atraumatic.   Right Ear: External ear normal.   Left Ear: External ear normal.   Nose: Nose normal.   Eyes: Pupils are equal, round, and reactive to light. EOM are normal. Right eye exhibits no discharge. Left eye exhibits no discharge.   Neck: Normal range of motion. Neck supple.   Cardiovascular: Normal rate, regular rhythm, normal heart sounds and intact distal pulses.   Pulmonary/Chest: Effort normal and breath sounds normal.   Abdominal: Soft.   Musculoskeletal: Normal range of motion.   Neurological: He is alert and oriented to person, place, and time. He exhibits normal muscle tone. Coordination normal.   Skin: Skin is warm and dry. Capillary refill takes less than 2 seconds. He is not diaphoretic.   Psychiatric: He has a normal mood and affect. His behavior is normal. Judgment and thought content normal.         Assessment / Plan:     Type 2 diabetes mellitus with complication, with long-term current use of  insulin  -     POCT Glucose, Hand-Held Device  -     insulin (LANTUS SOLOSTAR U-100 INSULIN) glargine 100 units/mL (3mL) SubQ pen; Inject 75 Units into the skin every evening.  Dispense: 67.5 mL; Refill: 3  -     metFORMIN (GLUCOPHAGE) 1000 MG tablet; Take 1 tablet (1,000 mg total) by mouth 2 (two) times daily with meals.  Dispense: 180 tablet; Refill: 4    Hypertension associated with diabetes    Hyperlipidemia associated with type 2 diabetes mellitus    Coronary artery disease involving native coronary artery of native heart without angina pectoris      Additional Plan Details:    - POCT Glucose  - Encouraged continuation of lifestyle changes including regular exercise and limiting carbohydrates to 30-45 grams per meal threes times daily and 15 grams per snack with a limit of two daily.   - Encouraged continued monitoring of blood glucose with maintenance of 3 times daily at Fasting, Before Lunch and Before Dinner.   - Current DM Medication Regimen: Continue Novolog to 22 units with ss TID wm. Continue Jardiance 25 mg.  Continue Lantus 75 units qhs, Trulicity 1.5 mg, and Metformin 1000 mg BID.   - Health Maintenance standards addressed today: None - up to date  - Nursing Visit: Patient is below goal range for nursing visit for age group and will not need nursing visit at this time .   - Follow up in 6 months with A1c prior.       Blakeney McKnight, PA-C [facility]    A total of 30 minutes was spent in face to face time, of which over 50 % was spent in counseling patient on disease process, complications, treatment, and side effects of medications.

## 2020-05-26 ENCOUNTER — PATIENT MESSAGE (OUTPATIENT)
Dept: ADMINISTRATIVE | Facility: OTHER | Age: 70
End: 2020-05-26

## 2020-05-27 ENCOUNTER — PATIENT MESSAGE (OUTPATIENT)
Dept: SLEEP MEDICINE | Facility: CLINIC | Age: 70
End: 2020-05-27

## 2020-05-27 ENCOUNTER — TELEPHONE (OUTPATIENT)
Dept: ORTHOPEDICS | Facility: CLINIC | Age: 70
End: 2020-05-27

## 2020-05-28 ENCOUNTER — TELEPHONE (OUTPATIENT)
Dept: ORTHOPEDICS | Facility: CLINIC | Age: 70
End: 2020-05-28

## 2020-05-28 NOTE — TELEPHONE ENCOUNTER
Attempted to call the patient in regards to the message that was sent to set up an appointment ASAP. No answer Left a message on his phone [phone]

## 2020-05-31 PROCEDURE — 99454 REM MNTR PHYSIOL PARAM 16-30: CPT | Mod: PBBFAC | Performed by: PEDIATRICS

## 2020-06-16 ENCOUNTER — OFFICE VISIT (OUTPATIENT)
Dept: SLEEP MEDICINE | Facility: CLINIC | Age: 70
End: 2020-06-16
Payer: MEDICARE

## 2020-06-16 VITALS
WEIGHT: 206.81 LBS | OXYGEN SATURATION: 95 % | DIASTOLIC BLOOD PRESSURE: 76 MMHG | SYSTOLIC BLOOD PRESSURE: 120 MMHG | HEIGHT: 71 IN | RESPIRATION RATE: 20 BRPM | BODY MASS INDEX: 28.95 KG/M2 | HEART RATE: 76 BPM

## 2020-06-16 DIAGNOSIS — G47.33 OBSTRUCTIVE SLEEP APNEA: Primary | ICD-10-CM

## 2020-06-16 DIAGNOSIS — I25.10 CORONARY ARTERY DISEASE, ANGINA PRESENCE UNSPECIFIED, UNSPECIFIED VESSEL OR LESION TYPE, UNSPECIFIED WHETHER NATIVE OR TRANSPLANTED HEART: ICD-10-CM

## 2020-06-16 PROCEDURE — 99999 PR PBB SHADOW E&M-EST. PATIENT-LVL V: ICD-10-PCS | Mod: PBBFAC,,, | Performed by: NURSE PRACTITIONER

## 2020-06-16 PROCEDURE — 99214 OFFICE O/P EST MOD 30 MIN: CPT | Mod: S$PBB,,, | Performed by: NURSE PRACTITIONER

## 2020-06-16 PROCEDURE — 99999 PR PBB SHADOW E&M-EST. PATIENT-LVL V: CPT | Mod: PBBFAC,,, | Performed by: NURSE PRACTITIONER

## 2020-06-16 PROCEDURE — 99214 PR OFFICE/OUTPT VISIT, EST, LEVL IV, 30-39 MIN: ICD-10-PCS | Mod: S$PBB,,, | Performed by: NURSE PRACTITIONER

## 2020-06-16 PROCEDURE — 99215 OFFICE O/P EST HI 40 MIN: CPT | Mod: PBBFAC | Performed by: NURSE PRACTITIONER

## 2020-06-16 NOTE — PROGRESS NOTES
"Subjective:      Patient ID: Timothy Ortega is a 69 y.o. male.    Chief Complaint: Sleep Apnea (dl in media)    HPI  Presents to office for review of CPAP therapy. Patient states improved symptoms with use of CPAP. Sleeping more soundly. Waking up feeling more refreshed. Improved daytime sleepiness. Patient states he is benefiting from use of the CPAP.   Weight loss 6 lbs from last year.     Patient Active Problem List   Diagnosis    Obstructive sleep apnea    Hyperlipidemia associated with type 2 diabetes mellitus    Hypertension associated with diabetes    CAD (coronary artery disease)    Type 2 diabetes mellitus with complication, with long-term current use of insulin    History of colon polyps    Bilateral carpal tunnel syndrome    Primary osteoarthritis of first carpometacarpal joint of right hand       /76   Pulse 76   Resp 20   Ht 5' 11" (1.803 m)   Wt 93.8 kg (206 lb 12.7 oz)   SpO2 95%   BMI 28.84 kg/m²   Body mass index is 28.84 kg/m².    Review of Systems   Constitutional: Negative.    HENT: Negative.    Respiratory: Negative.    Cardiovascular: Negative.    Musculoskeletal: Negative.    Gastrointestinal: Negative.    Neurological: Negative.    Psychiatric/Behavioral: Negative.      Objective:      Physical Exam  Constitutional:       Appearance: He is well-developed.   HENT:      Head: Normocephalic and atraumatic.      Mouth/Throat:      Pharynx: Uvula midline.   Neck:      Musculoskeletal: Normal range of motion and neck supple.      Thyroid: No thyroid mass or thyromegaly.      Trachea: Trachea normal.   Cardiovascular:      Rate and Rhythm: Normal rate and regular rhythm.      Heart sounds: Normal heart sounds.   Pulmonary:      Effort: Pulmonary effort is normal.      Breath sounds: Normal breath sounds. No wheezing, rhonchi or rales.   Chest:      Chest wall: There is no dullness to percussion.   Abdominal:      Palpations: Abdomen is soft. There is no splenomegaly or mass.     " " Tenderness: There is no abdominal tenderness.   Musculoskeletal: Normal range of motion.   Skin:     General: Skin is warm and dry.   Neurological:      Mental Status: He is alert and oriented to person, place, and time.       Personal Diagnostic Review  CPAP download shows patient wears on average 6 hrs and 28 minutes. Greater than 4 hrs 93.3 % of the time. AHI 5.9        Assessment:       1. Obstructive sleep apnea    2. Coronary artery disease, angina presence unspecified, unspecified vessel or lesion type, unspecified whether native or transplanted heart        Outpatient Encounter Medications as of 6/16/2020   Medication Sig Dispense Refill    amLODIPine (NORVASC) 5 MG tablet TAKE 1 TABLET BY MOUTH ONCE DAILY 30 tablet 11    ASCORBATE CALCIUM (VITAMIN C ORAL) Take by mouth once daily.      aspirin 81 mg Tab Take 1 tablet by mouth Daily.      empagliflozin (JARDIANCE) 25 mg Tab Take 25 mg by mouth once daily. 90 tablet 3    ERGOCALCIFEROL, VITAMIN D2, (VITAMIN D ORAL) Take by mouth once daily.      FREESTYLE LANCETS 28 gauge lancets USE THREE TIMES DAILY 100 each 0    hydrocortisone 2.5 % cream ERROL TO RASH BID PRN  3    insulin (LANTUS SOLOSTAR U-100 INSULIN) glargine 100 units/mL (3mL) SubQ pen Inject 75 Units into the skin every evening. 67.5 mL 3    insulin aspart U-100 (NOVOLOG) 100 unit/mL (3 mL) InPn pen Inject 28 Units into the skin 3 (three) times daily before meals. 75.6 mL 3    KRILL OIL ORAL Take 1 capsule by mouth once daily.      lancets 33 gauge Misc 1 lancet by Misc.(Non-Drug; Combo Route) route 3 (three) times daily. 100 each 11    metFORMIN (GLUCOPHAGE) 1000 MG tablet Take 1 tablet (1,000 mg total) by mouth 2 (two) times daily with meals. 180 tablet 4    minocycline (MINOCIN,DYNACIN) 50 MG Cap TK 1 C PO BID  2    pen needle, diabetic (BD ULTRA-FINE SHORT PEN NEEDLE) 31 gauge x 5/16" Ndle USE AS DIRECTED FIVE TIMES DAILY 500 each 0    rosuvastatin (CRESTOR) 40 MG Tab Take 1 tablet " (40 mg total) by mouth every evening. 90 tablet 3    triamcinolone acetonide 0.1% (KENALOG) 0.1 % cream ERROL TO HAND RASH BID PRN      TRULICITY 1.5 mg/0.5 mL PnIj ADMINISTER 0.5 ML UNDER THE SKIN EVERY 7 DAYS 2 mL 11    valsartan (DIOVAN) 320 MG tablet Take 1 tablet (320 mg total) by mouth once daily. 90 tablet 3    blood-glucose meter (FREESTYLE LITE METER) kit Freestyle meter   Use as instructed 1 each 0    meloxicam (MOBIC) 15 MG tablet TAKE 1 TABLET BY MOUTH ONCE DAILY 30 tablet 0     No facility-administered encounter medications on file as of 6/16/2020.      Orders Placed This Encounter   Procedures    CPAP/BIPAP SUPPLIES     [employer]. 90 day supply with 3 refills.     Order Specific Question:   Type of mask:     Answer:   FFM     Order Specific Question:   Headgear?     Answer:   Yes     Order Specific Question:   Tubing?     Answer:   Yes     Order Specific Question:   Humidifier chamber?     Answer:   Yes     Order Specific Question:   Chin strap?     Answer:   Yes     Order Specific Question:   Filters?     Answer:   Yes     Order Specific Question:   Cushions?     Answer:   Yes     Order Specific Question:   Length of need (1-99 months):     Answer:   99     Plan:        Problem List Items Addressed This Visit        Cardiac/Vascular    CAD (coronary artery disease)       Other    Obstructive sleep apnea - Primary    Overview     CPAP. [facility]. [facility].          Relevant Orders    CPAP/BIPAP SUPPLIES        CAD stable. Continue CPAP therapy..   Compliant with PAP and benefits from use. Follow up annually in the sleep clinic.

## 2020-06-19 NOTE — PROGRESS NOTES
"Digital [facility]: Health  Follow-Up    Patient reports that he recently retired.    He did not experience symptoms of hypoglycemia with his recent low reading, but treated it by "drinking a whole coke", which he explains he rarely does, which is what he believes caused the high spike soon after.    We reviewed the importance of charging his device monthly for 6-8 hours to ensure continued accuracy.    He explained that he called to re-order test strips and was told that they had to call me to confirm. I will follow up that his strips were re-ordered and will trouble shoot if not.          Intervention/Plan    There are no preventive care reminders to display for this patient.    Last 5 Patient Entered Readings                                      Current 30 Day Average: 117/63     Recent Readings 6/18/2020 6/14/2020 6/14/2020 6/8/2020 5/31/2020    SBP (mmHg) 109 136 140 119 116    DBP (mmHg) 65 65 64 66 65    Pulse 76 67 70 82 72        Last 6 Patient Entered Readings                                          Most Recent A1c: 6.5% on 2/14/2020  (Goal: 7%)     Recent Readings 6/19/2020 6/19/2020 6/18/2020 6/18/2020 6/17/2020    Blood Glucose (mg/dL) 79 129 110 95 84                    Diet Screening       Patient explained that he was eating more while in quarantine, which is why he believes his blood sugar went up over the last few months.     Physical Activity Screening       Patient explained that he has more time to walk since he has retired, and that he has been walking regularly, which he attributes to his blood sugar going down over the past month.      SDOH  "

## 2020-06-25 ENCOUNTER — PATIENT OUTREACH (OUTPATIENT)
Dept: OTHER | Facility: OTHER | Age: 70
End: 2020-06-25

## 2020-06-29 PROCEDURE — 99454 REM MNTR PHYSIOL PARAM 16-30: CPT | Mod: PBBFAC | Performed by: PEDIATRICS

## 2020-07-16 ENCOUNTER — PATIENT MESSAGE (OUTPATIENT)
Dept: INTERNAL MEDICINE | Facility: CLINIC | Age: 70
End: 2020-07-16

## 2020-07-16 DIAGNOSIS — E11.59 HYPERTENSION ASSOCIATED WITH DIABETES: Primary | ICD-10-CM

## 2020-07-16 DIAGNOSIS — I15.2 HYPERTENSION ASSOCIATED WITH DIABETES: Primary | ICD-10-CM

## 2020-07-16 NOTE — TELEPHONE ENCOUNTER
See pt's [place] message, alternative rx needed since Valsartan on national backorder. Request sent to Dr. Ochoa for review.    LV 03/20/20  NV 09/21/20

## 2020-07-17 RX ORDER — LOSARTAN POTASSIUM 100 MG/1
100 TABLET ORAL DAILY
Qty: 90 TABLET | Refills: 3 | Status: SHIPPED | OUTPATIENT
Start: 2020-07-17 | End: 2021-08-18

## 2020-08-05 ENCOUNTER — TELEPHONE (OUTPATIENT)
Dept: INTERNAL MEDICINE | Facility: CLINIC | Age: 70
End: 2020-08-05

## 2020-08-05 NOTE — TELEPHONE ENCOUNTER
----- Message from Montse Aleman sent at 8/5/2020 12:19 PM CDT -----  The patient received an email concerning diabetes labwork.  The patient is asking if he needs to fast for this?  Please contact & advise.    Thank you.

## 2020-08-06 ENCOUNTER — PATIENT OUTREACH (OUTPATIENT)
Dept: OTHER | Facility: OTHER | Age: 70
End: 2020-08-06

## 2020-08-06 NOTE — PROGRESS NOTES
Digital Medicine: Clinician Follow-Up    Called patient to do a six month follow-up.    The history is provided by the patient.   Follow-up reason(s): routine follow up.     Hypertension    Patient readings are stable   Diabetes    Patient readings are stable   Patient is not experiencing signs/symptoms of hypotension.  Patient is not experiencing signs/symptoms of hypertension.  Patient is not experiencing signs/symptoms of hypoglycemia.  Patient is not experiencing signs/symptoms of hyperglycemia.    Additional Follow-up details: Patient fell in May and is in PT for knee pain. This stopped his walking therefore he is watching his caloric intake to keep from gaining weight. His goal to is resume exercise to reduce diabetic medications.      Last 5 Patient Entered Readings                                      Current 30 Day Average: 129/72     Recent Readings 8/2/2020 7/26/2020 7/19/2020 7/14/2020 7/6/2020    SBP (mmHg) 131 119 133 132 120    DBP (mmHg) 69 71 77 72 69    Pulse 74 71 80 66 72        Last 6 Patient Entered Readings                                          Most Recent A1c: 6.5% on 2/14/2020  (Goal: 8%)     Recent Readings 8/6/2020 8/5/2020 8/5/2020 8/5/2020 8/4/2020    Blood Glucose (mg/dL) 124 173 136 177 103                         Medication Adherence-Medication adherence was assessed.          ASSESSMENT(S)  Patients BP average is 129/72 mmHg, which is at goal. Patient's BP goal is less than or equal to 130/80 per 2017 ACC/AHA Hypertension Guidelines.  Patient's A1C goal is less than or equal to 8 per 2020 ADA guidelines. Patient's most recent A1C result is at goal. Lab Results    Component                Value               Date                     HGBA1C                   6.5 (H)             02/14/2020          .         Hypertension Plan  Continue current therapy.    Diabetes Plan  Continue current therapy.  Provided patient education. Discussed his diabetic medication regimen and how each  medication plays a part. We discussed if he looses weight or his A1C returns <6.5% that we could discuss reducing insulin.  Six month A1C scheduled 9/15/20.  Patient is aware to contact me with any changes or problems especially hypotension or hypoglycemia symptoms.       Addressed any questions or concerns and patient has my contact information if needed prior to next outreach. Patient verbalizes understanding.            There are no preventive care reminders to display for this patient.      Hypertension Medications             amLODIPine (NORVASC) 5 MG tablet TAKE 1 TABLET BY MOUTH ONCE DAILY    losartan (COZAAR) 100 MG tablet Take 1 tablet (100 mg total) by mouth once daily.        Diabetes Medications             empagliflozin (JARDIANCE) 25 mg Tab Take 25 mg by mouth once daily.    insulin (LANTUS SOLOSTAR U-100 INSULIN) glargine 100 units/mL (3mL) SubQ pen Inject 75 Units into the skin every evening.    insulin aspart U-100 (NOVOLOG) 100 unit/mL (3 mL) InPn pen Inject 28 Units into the skin 3 (three) times daily before meals.    metFORMIN (GLUCOPHAGE) 1000 MG tablet Take 1 tablet (1,000 mg total) by mouth 2 (two) times daily with meals.    TRULICITY 1.5 mg/0.5 mL PnIj ADMINISTER 0.5 ML UNDER THE SKIN EVERY 7 DAYS

## 2020-08-11 ENCOUNTER — OFFICE VISIT (OUTPATIENT)
Dept: INTERNAL MEDICINE | Facility: CLINIC | Age: 70
End: 2020-08-11
Payer: MEDICARE

## 2020-08-11 VITALS
HEIGHT: 71 IN | DIASTOLIC BLOOD PRESSURE: 62 MMHG | SYSTOLIC BLOOD PRESSURE: 124 MMHG | WEIGHT: 201.5 LBS | TEMPERATURE: 98 F | OXYGEN SATURATION: 98 % | BODY MASS INDEX: 28.21 KG/M2 | HEART RATE: 68 BPM

## 2020-08-11 DIAGNOSIS — Z00.00 ENCOUNTER FOR PREVENTIVE HEALTH EXAMINATION: Primary | ICD-10-CM

## 2020-08-11 DIAGNOSIS — E78.5 HYPERLIPIDEMIA ASSOCIATED WITH TYPE 2 DIABETES MELLITUS: ICD-10-CM

## 2020-08-11 DIAGNOSIS — I15.2 HYPERTENSION ASSOCIATED WITH DIABETES: ICD-10-CM

## 2020-08-11 DIAGNOSIS — I25.10 CORONARY ARTERY DISEASE INVOLVING NATIVE CORONARY ARTERY OF NATIVE HEART WITHOUT ANGINA PECTORIS: ICD-10-CM

## 2020-08-11 DIAGNOSIS — E11.8 TYPE 2 DIABETES MELLITUS WITH COMPLICATION, WITH LONG-TERM CURRENT USE OF INSULIN: ICD-10-CM

## 2020-08-11 DIAGNOSIS — Z79.4 TYPE 2 DIABETES MELLITUS WITH COMPLICATION, WITH LONG-TERM CURRENT USE OF INSULIN: ICD-10-CM

## 2020-08-11 DIAGNOSIS — E11.59 HYPERTENSION ASSOCIATED WITH DIABETES: ICD-10-CM

## 2020-08-11 DIAGNOSIS — E11.69 HYPERLIPIDEMIA ASSOCIATED WITH TYPE 2 DIABETES MELLITUS: ICD-10-CM

## 2020-08-11 DIAGNOSIS — G47.33 OBSTRUCTIVE SLEEP APNEA: ICD-10-CM

## 2020-08-11 DIAGNOSIS — G56.03 BILATERAL CARPAL TUNNEL SYNDROME: ICD-10-CM

## 2020-08-11 PROCEDURE — G0439 PPPS, SUBSEQ VISIT: HCPCS | Mod: S$GLB,,, | Performed by: NURSE PRACTITIONER

## 2020-08-11 PROCEDURE — 99999 PR PBB SHADOW E&M-EST. PATIENT-LVL V: CPT | Mod: PBBFAC,,, | Performed by: NURSE PRACTITIONER

## 2020-08-11 PROCEDURE — 99999 PR PBB SHADOW E&M-EST. PATIENT-LVL V: ICD-10-PCS | Mod: PBBFAC,,, | Performed by: NURSE PRACTITIONER

## 2020-08-11 PROCEDURE — G0439 PR MEDICARE ANNUAL WELLNESS SUBSEQUENT VISIT: ICD-10-PCS | Mod: S$GLB,,, | Performed by: NURSE PRACTITIONER

## 2020-08-11 PROCEDURE — 99215 OFFICE O/P EST HI 40 MIN: CPT | Mod: PBBFAC | Performed by: NURSE PRACTITIONER

## 2020-08-11 NOTE — Clinical Note
Your patient was seen today for a HRA visit.  I have included a copy of my visit note, please review the note and feel free to contact me with any questions.   Thank you for allowing me to participate in the care of your patients.   Danielle Monte NP

## 2020-08-11 NOTE — PROGRESS NOTES
"  Timothy Ortega presented for a  Medicare AWV and comprehensive Health Risk Assessment today. The following components were reviewed and updated:    · Medical history  · Family History  · Social history  · Allergies and Current Medications  · Health Risk Assessment  · Health Maintenance  · Care Team     ** See Completed Assessments for Annual Wellness Visit within the encounter summary.**         The following assessments were completed:  · Living Situation  · CAGE  · Depression Screening  · Timed Get Up and Go  · Whisper Test  · Cognitive Function Screening  · Nutrition Screening  · ADL Screening  · PAQ Screening        Vitals:    08/11/20 0754   BP: 124/62   Pulse: 68   Temp: 98.1 °F (36.7 °C)   SpO2: 98%   Weight: 91.4 kg (201 lb 8 oz)   Height: 5' 11" (1.803 m)     Body mass index is 28.1 kg/m².          Current Outpatient Medications:     amLODIPine (NORVASC) 5 MG tablet, TAKE 1 TABLET BY MOUTH ONCE DAILY, Disp: 90 tablet, Rfl: 3    ASCORBATE CALCIUM (VITAMIN C ORAL), Take by mouth once daily., Disp: , Rfl:     aspirin 81 mg Tab, Take 1 tablet by mouth Daily., Disp: , Rfl:     empagliflozin (JARDIANCE) 25 mg Tab, Take 25 mg by mouth once daily., Disp: 90 tablet, Rfl: 3    ERGOCALCIFEROL, VITAMIN D2, (VITAMIN D ORAL), Take by mouth once daily., Disp: , Rfl:     FREESTYLE LANCETS 28 gauge lancets, USE THREE TIMES DAILY, Disp: 100 each, Rfl: 0    hydrocortisone 2.5 % cream, ERROL TO RASH BID PRN, Disp: , Rfl: 3    insulin (LANTUS SOLOSTAR U-100 INSULIN) glargine 100 units/mL (3mL) SubQ pen, Inject 75 Units into the skin every evening., Disp: 67.5 mL, Rfl: 3    insulin aspart U-100 (NOVOLOG) 100 unit/mL (3 mL) InPn pen, Inject 28 Units into the skin 3 (three) times daily before meals., Disp: 75.6 mL, Rfl: 3    KRILL OIL ORAL, Take 1 capsule by mouth once daily., Disp: , Rfl:     lancets 33 gauge Misc, 1 lancet by Misc.(Non-Drug; Combo Route) route 3 (three) times daily., Disp: 100 each, Rfl: 11    " "losartan (COZAAR) 100 MG tablet, Take 1 tablet (100 mg total) by mouth once daily., Disp: 90 tablet, Rfl: 3    metFORMIN (GLUCOPHAGE) 1000 MG tablet, Take 1 tablet (1,000 mg total) by mouth 2 (two) times daily with meals., Disp: 180 tablet, Rfl: 4    minocycline (MINOCIN,DYNACIN) 50 MG Cap, TK 1 C PO BID, Disp: , Rfl: 2    pen needle, diabetic (BD ULTRA-FINE SHORT PEN NEEDLE) 31 gauge x 5/16" Ndle, USE AS DIRECTED FIVE TIMES DAILY, Disp: 500 each, Rfl: 0    rosuvastatin (CRESTOR) 40 MG Tab, TAKE 1 TABLET(40 MG) BY MOUTH EVERY EVENING, Disp: 90 tablet, Rfl: 3    triamcinolone acetonide 0.1% (KENALOG) 0.1 % cream, ERROL TO HAND RASH BID PRN, Disp: , Rfl:     TRULICITY 1.5 mg/0.5 mL PnIj, ADMINISTER 0.5 ML UNDER THE SKIN EVERY 7 DAYS, Disp: 2 mL, Rfl: 11    blood-glucose meter (FREESTYLE LITE METER) kit, Freestyle meter   Use as instructed, Disp: 1 each, Rfl: 0    Diagnoses and health risks identified today and associated recommendations/orders:    1. Encounter for preventive health examination  Completed     2. Type 2 diabetes mellitus with complication, with long-term current use of insulin  Component      Latest Ref Rng & Units 2/14/2020   Hemoglobin A1C External      4.0 - 5.6 % 6.5 (H)   Chronic and Stable on  Metformin, Truciluty,diet and exericse. UTD dm eye exam and labs  Continue current treatment plan as previously prescribed with your PCP    3. Hypertension associated with diabetes  Chronic and Stable on Losartan . Continue current treatment plan as previously prescribed with your PCP    4. Hyperlipidemia associated with type 2 diabetes mellitus  Component      Latest Ref Rng & Units 2/14/2020   Cholesterol      120 - 199 mg/dL 99 (L)   Triglycerides      30 - 150 mg/dL 101   HDL      40 - 75 mg/dL 29 (L)   LDL Cholesterol External      63.0 - 159.0 mg/dL 49.8 (L)   Hdl/Cholesterol Ratio      20.0 - 50.0 % 29.3   Total Cholesterol/HDL Ratio      2.0 - 5.0 3.4   Non-HDL Cholesterol      mg/dL 70 "   Chronic and Stable on Crestor. Continue current treatment plan as previously prescribed with your PCP    5. Coronary artery disease involving native coronary artery of native heart without angina pectoris  Chronic and Stable.  S/P PTCA in the past. No hx of MI- denies chest pain  Continue current treatment plan as previously prescribed with your cardiologist    6. Bilateral carpal tunnel syndrome  .Chronic and Ongoing.  S/P rt CTS 7/ 2020 -still with pain left  Continue current treatment plan as previously prescribed with your orthopeidc    7. Obstructive sleep apnea  Chronic and Ongoing. Waiting to get new face machine- use nose strips at this courtney  Continue current treatment plan as previously prescribed with your  pulm noloogust    Provided Timothy with a 5-10 year written screening schedule and personal prevention plan. Recommendations were developed using the USPSTF age appropriate recommendations. Education, counseling, and referrals were provided as needed. After Visit Summary printed and given to patient which includes a list of additional screenings\tests needed.    I offered to discuss end of life issues, including information on how to make advance directives that the patient could use to name someone who would make medical decisions on their behalf if they became too ill to make themselves.  _X_Patient is interested, I provided paper work and offered to discuss.    Follow up in about 1 year (around 8/11/2021).    Danielle Monte NP

## 2020-08-11 NOTE — PATIENT INSTRUCTIONS
Counseling and Referral of Other Preventative  (Italic type indicates deductible and co-insurance are waived)    Patient Name: Timothy Ortega  Today's Date: 8/11/2020    Health Maintenance       Date Due Completion Date    Hemoglobin A1c 08/14/2020 2/14/2020    PROSTATE-SPECIFIC ANTIGEN 08/17/2020 8/17/2019    Override on 2/27/2014: Not Clinically Appropriate    Influenza Vaccine (1) 09/01/2020 10/19/2018    Lipid Panel 02/14/2021 2/14/2020    Urine Microalbumin 02/14/2021 2/14/2020    Foot Exam 02/25/2021 2/25/2020    Override on 2/16/2017: Done    Override on 11/20/2015: Done    Override on 5/28/2014: Done    Override on 3/11/2014: Done    Override on 12/10/2013: Done    Override on 10/22/2013: Done    Override on 9/16/2013: Done    Eye Exam 03/06/2021 3/6/2020 (Done)    Override on 3/6/2020: Done    Override on 3/1/2019: Done    Override on 11/3/2017: Done    Override on 10/28/2016: Done    Override on 11/3/2015: Done    Override on 10/31/2014: Done    Override on 9/17/2013: Done    Override on 9/14/2012: Done    High Dose Statin 08/11/2021 8/11/2020    Aspirin/Antiplatelet Therapy 08/11/2021 8/11/2020    Colorectal Cancer Screening 03/31/2022 3/31/2017    TETANUS VACCINE 07/20/2028 7/20/2018        No orders of the defined types were placed in this encounter.    The following information is provided to all patients.  This information is to help you find resources for any of the problems found today that may be affecting your health:                Living healthy guide: www.[facility].[place].gov      Understanding Diabetes: www.diabetes.org      Eating healthy: www.cdc.gov/healthyweight      CDC home safety checklist: www.cdc.gov/steadi/patient.html      Agency on Aging: www.goea.[place].[facility]      Alcoholics anonymous (AA): www.aa.org      Physical Activity: www.alex.nih.gov/lt0rzrj      Tobacco use: www.quitwithusla.org     Counseling and Referral of Other Preventative  (Italic type indicates deductible and  co-insurance are waived)    Patient Name: Timothy Ortega  Today's Date: 8/12/2020    Health Maintenance       Date Due Completion Date    Hemoglobin A1c 08/14/2020 2/14/2020    PROSTATE-SPECIFIC ANTIGEN 08/17/2020 8/17/2019    Override on 2/27/2014: Not Clinically Appropriate    Influenza Vaccine (1) 09/01/2020 10/19/2018    Lipid Panel 02/14/2021 2/14/2020    Urine Microalbumin 02/14/2021 2/14/2020    Foot Exam 02/25/2021 2/25/2020    Override on 2/16/2017: Done    Override on 11/20/2015: Done    Override on 5/28/2014: Done    Override on 3/11/2014: Done    Override on 12/10/2013: Done    Override on 10/22/2013: Done    Override on 9/16/2013: Done    Eye Exam 03/06/2021 3/6/2020 (Done)    Override on 3/6/2020: Done    Override on 3/1/2019: Done    Override on 11/3/2017: Done    Override on 10/28/2016: Done    Override on 11/3/2015: Done    Override on 10/31/2014: Done    Override on 9/17/2013: Done    Override on 9/14/2012: Done    High Dose Statin 08/11/2021 8/11/2020    Aspirin/Antiplatelet Therapy 08/11/2021 8/11/2020    Colorectal Cancer Screening 03/31/2022 3/31/2017    TETANUS VACCINE 07/20/2028 7/20/2018        No orders of the defined types were placed in this encounter.    The following information is provided to all patients.  This information is to help you find resources for any of the problems found today that may be affecting your health:                Living healthy guide: www.[facility].[place].gov      Understanding Diabetes: www.diabetes.org      Eating healthy: www.cdc.gov/healthyweight      CDC home safety checklist: www.cdc.gov/steadi/patient.html      Agency on Aging: www.goea.[place].gov      Alcoholics anonymous (AA): www.aa.org      Physical Activity: www.alex.nih.gov/xo3eygt      Tobacco use: www.quitwithusla.org

## 2020-08-24 NOTE — PROGRESS NOTES
"Digital Medicine: Health  Follow-Up    The history is provided by the patient.             Reason for review: Blood glucose not at goal and Blood pressure not at goal  Care Team received low BG alert.          Topics Covered on Call: physical activity    Additional Follow-up details: Patient reports that he did not feel symptoms with his recent low bg reading, and that he treated it by drinking 1/2 cup of coca cola.     He doesn't know why he had a reading of "2" from 7/7/20, but reports that it is a mistake. I deleted it.        Diet-Not assessed          Physical Activity-Change      He identified the following barriers to physical activity: Knee pain        Additional physical activity details: Patient discussed that he hasn't been walking his 2 miles twice a day as his knee has been hurting. He has been working with a physical therapist, and hopes that he is able to resolve it soon either through his p/t sessions, or through a scope that he reports he may schedule if his physician thinks it is a good idea. He wants to wait to resume his walking until he speaks with his physician, as he doesn't want to aggravate his knee. He believes that when he is able to resume his regular walknig that his blood pressure and blood sugar will improve.      Medication Adherence-Medication Adherence not addressed.      Substance, Sleep, Stress-Not assessed      Additional monitoring needed.         Explained the importance of self-monitoring and medication adherence. Encouraged the patient to communicate with their health  for lifestyle modifications to help improve or maintain a healthy lifestyle.                Topic    Hemoglobin A1C        Last 5 Patient Entered Readings                                      Current 30 Day Average: 131/72     Recent Readings 8/16/2020 8/9/2020 8/2/2020 7/26/2020 7/19/2020    SBP (mmHg) 133 140 131 119 133    DBP (mmHg) 79 69 69 71 77    Pulse 72 75 74 71 80        Last 6 Patient " Entered Readings                                          Most Recent A1c: 6.5% on 2/14/2020  (Goal: 8%)     Recent Readings 8/24/2020 8/23/2020 8/23/2020 8/22/2020 8/22/2020    Blood Glucose (mg/dL) 168 142 156 114 119

## 2020-08-26 RX ORDER — PEN NEEDLE, DIABETIC 30 GX3/16"
NEEDLE, DISPOSABLE MISCELLANEOUS
Qty: 300 EACH | Refills: 3 | Status: SHIPPED | OUTPATIENT
Start: 2020-08-26 | End: 2021-11-02

## 2020-08-31 PROCEDURE — 99454 REM MNTR PHYSIOL PARAM 16-30: CPT | Mod: PBBFAC | Performed by: PEDIATRICS

## 2020-09-15 ENCOUNTER — LAB VISIT (OUTPATIENT)
Dept: LAB | Facility: HOSPITAL | Age: 70
End: 2020-09-15
Attending: PEDIATRICS
Payer: MEDICARE

## 2020-09-15 DIAGNOSIS — Z79.4 TYPE 2 DIABETES MELLITUS WITH COMPLICATION, WITH LONG-TERM CURRENT USE OF INSULIN: ICD-10-CM

## 2020-09-15 DIAGNOSIS — E11.8 TYPE 2 DIABETES MELLITUS WITH COMPLICATION, WITH LONG-TERM CURRENT USE OF INSULIN: ICD-10-CM

## 2020-09-15 LAB
ALT SERPL W/O P-5'-P-CCNC: 32 U/L (ref 10–44)
AST SERPL-CCNC: 23 U/L (ref 10–40)
CHOLEST SERPL-MCNC: 120 MG/DL (ref 120–199)
CHOLEST/HDLC SERPL: 4.6 {RATIO} (ref 2–5)
ESTIMATED AVG GLUCOSE: 148 MG/DL (ref 68–131)
HBA1C MFR BLD HPLC: 6.8 % (ref 4–5.6)
HDLC SERPL-MCNC: 26 MG/DL (ref 40–75)
HDLC SERPL: 21.7 % (ref 20–50)
LDLC SERPL CALC-MCNC: 27.4 MG/DL (ref 63–159)
NONHDLC SERPL-MCNC: 94 MG/DL
TRIGL SERPL-MCNC: 333 MG/DL (ref 30–150)

## 2020-09-15 PROCEDURE — 80061 LIPID PANEL: CPT

## 2020-09-15 PROCEDURE — 36415 COLL VENOUS BLD VENIPUNCTURE: CPT

## 2020-09-15 PROCEDURE — 84460 ALANINE AMINO (ALT) (SGPT): CPT

## 2020-09-15 PROCEDURE — 83036 HEMOGLOBIN GLYCOSYLATED A1C: CPT

## 2020-09-15 PROCEDURE — 84450 TRANSFERASE (AST) (SGOT): CPT

## 2020-09-21 ENCOUNTER — LAB VISIT (OUTPATIENT)
Dept: LAB | Facility: HOSPITAL | Age: 70
End: 2020-09-21
Attending: PEDIATRICS
Payer: MEDICARE

## 2020-09-21 ENCOUNTER — OFFICE VISIT (OUTPATIENT)
Dept: INTERNAL MEDICINE | Facility: CLINIC | Age: 70
End: 2020-09-21
Payer: MEDICARE

## 2020-09-21 VITALS
BODY MASS INDEX: 28.89 KG/M2 | RESPIRATION RATE: 16 BRPM | DIASTOLIC BLOOD PRESSURE: 70 MMHG | HEIGHT: 71 IN | HEART RATE: 62 BPM | SYSTOLIC BLOOD PRESSURE: 110 MMHG | WEIGHT: 206.38 LBS | OXYGEN SATURATION: 100 % | TEMPERATURE: 98 F

## 2020-09-21 DIAGNOSIS — E11.69 HYPERLIPIDEMIA ASSOCIATED WITH TYPE 2 DIABETES MELLITUS: ICD-10-CM

## 2020-09-21 DIAGNOSIS — I25.10 CORONARY ARTERY DISEASE INVOLVING NATIVE CORONARY ARTERY OF NATIVE HEART WITHOUT ANGINA PECTORIS: ICD-10-CM

## 2020-09-21 DIAGNOSIS — Z12.5 PROSTATE CANCER SCREENING: ICD-10-CM

## 2020-09-21 DIAGNOSIS — G47.33 OBSTRUCTIVE SLEEP APNEA: ICD-10-CM

## 2020-09-21 DIAGNOSIS — R94.6 ABNORMAL RESULTS OF THYROID FUNCTION STUDIES: ICD-10-CM

## 2020-09-21 DIAGNOSIS — I15.2 HYPERTENSION ASSOCIATED WITH DIABETES: ICD-10-CM

## 2020-09-21 DIAGNOSIS — Z79.4 TYPE 2 DIABETES MELLITUS WITH COMPLICATION, WITH LONG-TERM CURRENT USE OF INSULIN: Primary | ICD-10-CM

## 2020-09-21 DIAGNOSIS — E11.59 HYPERTENSION ASSOCIATED WITH DIABETES: ICD-10-CM

## 2020-09-21 DIAGNOSIS — Z86.010 HISTORY OF COLON POLYPS: ICD-10-CM

## 2020-09-21 DIAGNOSIS — E11.8 TYPE 2 DIABETES MELLITUS WITH COMPLICATION, WITH LONG-TERM CURRENT USE OF INSULIN: Primary | ICD-10-CM

## 2020-09-21 DIAGNOSIS — E78.5 HYPERLIPIDEMIA ASSOCIATED WITH TYPE 2 DIABETES MELLITUS: ICD-10-CM

## 2020-09-21 LAB
BASOPHILS # BLD AUTO: 0.04 K/UL (ref 0–0.2)
BASOPHILS NFR BLD: 0.6 % (ref 0–1.9)
COMPLEXED PSA SERPL-MCNC: 0.5 NG/ML (ref 0–4)
DIFFERENTIAL METHOD: ABNORMAL
EOSINOPHIL # BLD AUTO: 0.1 K/UL (ref 0–0.5)
EOSINOPHIL NFR BLD: 1.9 % (ref 0–8)
ERYTHROCYTE [DISTWIDTH] IN BLOOD BY AUTOMATED COUNT: 14.2 % (ref 11.5–14.5)
HCT VFR BLD AUTO: 48.1 % (ref 40–54)
HGB BLD-MCNC: 15.1 G/DL (ref 14–18)
IMM GRANULOCYTES # BLD AUTO: 0.03 K/UL (ref 0–0.04)
IMM GRANULOCYTES NFR BLD AUTO: 0.5 % (ref 0–0.5)
LYMPHOCYTES # BLD AUTO: 2.4 K/UL (ref 1–4.8)
LYMPHOCYTES NFR BLD: 37.9 % (ref 18–48)
MCH RBC QN AUTO: 30.9 PG (ref 27–31)
MCHC RBC AUTO-ENTMCNC: 31.4 G/DL (ref 32–36)
MCV RBC AUTO: 98 FL (ref 82–98)
MONOCYTES # BLD AUTO: 0.8 K/UL (ref 0.3–1)
MONOCYTES NFR BLD: 11.9 % (ref 4–15)
NEUTROPHILS # BLD AUTO: 3 K/UL (ref 1.8–7.7)
NEUTROPHILS NFR BLD: 47.2 % (ref 38–73)
NRBC BLD-RTO: 0 /100 WBC
PLATELET # BLD AUTO: 168 K/UL (ref 150–350)
PMV BLD AUTO: 11 FL (ref 9.2–12.9)
RBC # BLD AUTO: 4.89 M/UL (ref 4.6–6.2)
TSH SERPL DL<=0.005 MIU/L-ACNC: 1.65 UIU/ML (ref 0.4–4)
WBC # BLD AUTO: 6.28 K/UL (ref 3.9–12.7)

## 2020-09-21 PROCEDURE — 85025 COMPLETE CBC W/AUTO DIFF WBC: CPT

## 2020-09-21 PROCEDURE — 99999 PR PBB SHADOW E&M-EST. PATIENT-LVL IV: ICD-10-PCS | Mod: PBBFAC,,, | Performed by: PEDIATRICS

## 2020-09-21 PROCEDURE — 99999 PR PBB SHADOW E&M-EST. PATIENT-LVL IV: CPT | Mod: PBBFAC,,, | Performed by: PEDIATRICS

## 2020-09-21 PROCEDURE — 84443 ASSAY THYROID STIM HORMONE: CPT

## 2020-09-21 PROCEDURE — 99214 OFFICE O/P EST MOD 30 MIN: CPT | Mod: PBBFAC | Performed by: PEDIATRICS

## 2020-09-21 PROCEDURE — 90694 VACC AIIV4 NO PRSRV 0.5ML IM: CPT | Mod: PBBFAC

## 2020-09-21 PROCEDURE — 99214 PR OFFICE/OUTPT VISIT, EST, LEVL IV, 30-39 MIN: ICD-10-PCS | Mod: S$PBB,,, | Performed by: PEDIATRICS

## 2020-09-21 PROCEDURE — 84153 ASSAY OF PSA TOTAL: CPT

## 2020-09-21 PROCEDURE — 36415 COLL VENOUS BLD VENIPUNCTURE: CPT

## 2020-09-21 PROCEDURE — G0008 ADMIN INFLUENZA VIRUS VAC: HCPCS | Mod: PBBFAC

## 2020-09-21 PROCEDURE — 99214 OFFICE O/P EST MOD 30 MIN: CPT | Mod: S$PBB,,, | Performed by: PEDIATRICS

## 2020-09-21 RX ORDER — VALSARTAN 320 MG/1
320 TABLET ORAL DAILY
COMMUNITY
Start: 2020-07-16 | End: 2020-09-21

## 2020-09-21 NOTE — PROGRESS NOTES
Subjective:       Patient ID: Timothy Ortega is a 69 y.o. male.     Chief Complaint: F/U. He is in  DM program and Dig HTN and DM  med, working hard at lifestyle mod. B/P and BS readings reviewed- basically normal.     HTN: B/P good, no HTNive symptoms. Tolerating change from valsartan to losartan 100 mg. Also on amlodipine 5 mg  LIPIDS:not following D&E, tolerating and compliant with med(s).    DM: no hyper/hypoglycemic symptoms. Self monitoring normal. Compliant with meds(metformin 1000 mg bid, trulicity 1.5, jardiance 25, lantus 75, humalog 28 with meals. , Working with DM program.  CAD: no CP, SOB, CHOU. Followed by cards.     LABS REVIEWED AND DISCUSSED WITH PATIENT         Review of Systems   Constitutional: Negative for fever and unexpected weight change.   HENT: Negative for congestion and rhinorrhea.    Eyes: Negative for discharge and redness.   Respiratory: Negative for cough and wheezing.    Cardiovascular: Negative for chest pain, palpitations and leg swelling.   Gastrointestinal: Negative for constipation, diarrhea and vomiting.   Endocrine: Negative for polydipsia, polyphagia and polyuria.   Genitourinary: Negative for decreased urine volume and difficulty urinating.   Musculoskeletal: Positive for arthralgias, back pain and myalgias. Negative for joint swelling.        Chronic back and CTS.   Skin: Negative for rash and wound.   Neurological: Negative for syncope and headaches.   Psychiatric/Behavioral: Negative for behavioral problems, dysphoric mood and sleep disturbance(mild sleep apnea from poor fitting mask.). The patient is not nervous/anxious.       Objective:   Physical Exam   Constitutional: He is oriented to person, place, and time. He appears well-developed and well-nourished. No distress.   Neck: No JVD present. No thyromegaly present.   Cardiovascular: Normal rate, regular rhythm and normal heart sounds.   No murmur heard.  Pulmonary/Chest: Effort normal and breath sounds normal.  No respiratory distress. He has no wheezes. He has no rales.   Abdominal: Soft. He exhibits no distension and no mass. There is no tenderness. There is no guarding.   Musculoskeletal: He exhibits no edema.   Lymphadenopathy:     He has no cervical adenopathy.   Neurological: He is alert and oriented to person, place, and time. No cranial nerve deficit. Coordination normal.   Skin: Capillary refill takes less than 2 seconds. No rash noted.   Psychiatric: He has a normal mood and affect. His behavior is normal. Judgment and thought content normal.      Assessment:      1. Type 2 diabetes mellitus with complication, with long-term current use of insulin    2. Coronary artery disease involving native coronary artery of native heart without angina pectoris    3. Hypertension associated with diabetes    4. Hyperlipidemia associated with type 2 diabetes mellitus    5. Obstructive sleep apnea             Plan:    Over he is at goal. Continue working with DM program and Dig medicine. See pulmonary for nasal pillow. Recheck TSH and cbc. He had slight increase in triglycerides and weight, work on D&E, weight loss, self monitor B/P and BS. F/U in 6 months with labs. Flu vaccine and PSA today.

## 2020-09-23 ENCOUNTER — PATIENT OUTREACH (OUTPATIENT)
Dept: OTHER | Facility: OTHER | Age: 70
End: 2020-09-23

## 2020-09-23 NOTE — PROGRESS NOTES
Digital Medicine: Clinician Follow-Up    Called patient to discuss his elevated blood pressure.    The history is provided by the patient.      Review of patient's allergies indicates:  No Known Allergies  Follow-up reason(s): routine follow up.     Hypertension    Readings are trending up due to lifestyle change and Patient isn't able to exercise due to knee pain..    Diabetes    Patient readings are stable   Patient is not experiencing signs/symptoms of hypotension.  Patient is not experiencing signs/symptoms of hypertension.  Patient is not experiencing signs/symptoms of hypoglycemia.  Patient is not experiencing signs/symptoms of hyperglycemia.        Last 5 Patient Entered Readings                                      Current 30 Day Average: 134/75     Recent Readings 9/13/2020 9/13/2020 9/9/2020 8/29/2020 8/27/2020    SBP (mmHg) 141 143 134 134 125    DBP (mmHg) 79 80 73 70 75    Pulse 63 65 71 65 68        Last 6 Patient Entered Readings                                          Most Recent A1c: 6.8% on 9/15/2020  (Goal: 7%)     Recent Readings 9/23/2020 9/23/2020 9/22/2020 9/22/2020 9/21/2020    Blood Glucose (mg/dL) 181 149 124 148 147                   Sleep Apnea Screening  Patient previously diagnosed with BRIANA and is not interested in a referral at this time.     He reports he is currently using CPAP for 1 hour(s) per night.           Medication Adherence-Medication adherence was assessed.          ASSESSMENT(S)  Patient's A1C goal is less than or equal to 7 per 2020 ADA guidelines. Patient's most recent A1C result is at goal. Lab Results    Component                Value               Date                     HGBA1C                   6.8 (H)             09/15/2020          .     Patients BP average is 134/75 mmHg, which is above goal. Patient's BP goal is less than or equal to 130/80 per 2017 ACC/AHA Hypertension Guidelines.     Patient stated his CPAP machine is giving him issues causing him to not use  it at night.       Hypertension Plan  Additional monitoring needed. Recommended increasing readings to assess if his trend continues.  Continue current therapy.  Instructed to charge device.  Continue to work up a different mask for appropriate CPAP usage.  Diabetes Plan  Continue current therapy.       Addressed patient questions and patient has my contact information if needed prior to next outreach. Patient verbalizes understanding.            There are no preventive care reminders to display for this patient.  There are no preventive care reminders to display for this patient.    Hypertension Medications             amLODIPine (NORVASC) 5 MG tablet TAKE 1 TABLET BY MOUTH ONCE DAILY    losartan (COZAAR) 100 MG tablet Take 1 tablet (100 mg total) by mouth once daily.        Diabetes Medications             empagliflozin (JARDIANCE) 25 mg Tab Take 25 mg by mouth once daily.    insulin (LANTUS SOLOSTAR U-100 INSULIN) glargine 100 units/mL (3mL) SubQ pen Inject 75 Units into the skin every evening.    insulin aspart U-100 (NOVOLOG) 100 unit/mL (3 mL) InPn pen Inject 28 Units into the skin 3 (three) times daily before meals.    metFORMIN (GLUCOPHAGE) 1000 MG tablet Take 1 tablet (1,000 mg total) by mouth 2 (two) times daily with meals.    TRULICITY 1.5 mg/0.5 mL PnIj ADMINISTER 0.5 ML UNDER THE SKIN EVERY 7 DAYS

## 2020-11-02 ENCOUNTER — LAB VISIT (OUTPATIENT)
Dept: LAB | Facility: HOSPITAL | Age: 70
End: 2020-11-02
Attending: PHYSICIAN ASSISTANT
Payer: MEDICARE

## 2020-11-02 DIAGNOSIS — E11.8 TYPE 2 DIABETES MELLITUS WITH COMPLICATION, WITH LONG-TERM CURRENT USE OF INSULIN: ICD-10-CM

## 2020-11-02 DIAGNOSIS — Z79.4 TYPE 2 DIABETES MELLITUS WITH COMPLICATION, WITH LONG-TERM CURRENT USE OF INSULIN: ICD-10-CM

## 2020-11-02 LAB
ESTIMATED AVG GLUCOSE: 143 MG/DL (ref 68–131)
HBA1C MFR BLD HPLC: 6.6 % (ref 4–5.6)

## 2020-11-02 PROCEDURE — 83036 HEMOGLOBIN GLYCOSYLATED A1C: CPT

## 2020-11-02 PROCEDURE — 36415 COLL VENOUS BLD VENIPUNCTURE: CPT

## 2020-11-04 ENCOUNTER — PATIENT OUTREACH (OUTPATIENT)
Dept: OTHER | Facility: OTHER | Age: 70
End: 2020-11-04

## 2020-11-04 NOTE — PROGRESS NOTES
Digital Medicine: Health  Follow-Up    The history is provided by the patient.             Reason for review: Blood glucose at goal and Blood pressure not at goal        Topics Covered on Call: physical activity, Diet and timing of blood pressure readings    Additional Follow-up details: Mr. Ortega takes his blood pressure readings in the evening sometimes, and when he does, they are before he takes his blood pressure medications. He reports that he drank three cups of coffee this morning and took his blood pressure reading, he believes it was higher because of it. We reviewed proper timing recommendations for his readings.     He asked about the required frequency of blood pressure readings. I reviewed that it is one a week minimum, but that it gives the digital medicine team a better understanding of his blood pressure if he takes it at least three times a week.            Diet-Change    Patient reports eating or drinking the following: He has been eating smaller portions more frequently throughout the day. He explained that his diabetes nurse offered to connect him to a dietician, but that at this time he prefers to try different dietary things on his own.      Physical Activity-Change      Additional physical activity details: His physician gave him clearance to ride his bike, which he has been doing 4 miles a day. We reviewed that regular exercise can effect the systolic value of his pressure readings.       Medication Adherence-Medication Adherence not addressed.      Substance, Sleep, Stress-Not assessed      Additional monitoring needed.  Continue current diet/physical activity routine.  Reviewed Device Techniques.     Patient verbalizes understanding.      Explained the importance of self-monitoring and medication adherence. Encouraged the patient to communicate with their health  for lifestyle modifications to help improve or maintain a healthy lifestyle.               There are no preventive  care reminders to display for this patient.      Last 5 Patient Entered Readings                                      Current 30 Day Average: 133/75     Recent Readings 11/4/2020 11/4/2020 10/28/2020 10/18/2020 10/15/2020    SBP (mmHg) 148 141 132 145 132    DBP (mmHg) 86 86 69 76 66    Pulse 63 61 69 65 71        Last 6 Patient Entered Readings                                          Most Recent A1c: 6.6% on 11/2/2020  (Goal: 7%)     Recent Readings 11/4/2020 11/3/2020 11/3/2020 11/3/2020 11/2/2020    Blood Glucose (mg/dL) 109 123 109 102 84

## 2020-11-09 ENCOUNTER — OFFICE VISIT (OUTPATIENT)
Dept: DIABETES | Facility: CLINIC | Age: 70
End: 2020-11-09
Payer: MEDICARE

## 2020-11-09 VITALS
DIASTOLIC BLOOD PRESSURE: 71 MMHG | BODY MASS INDEX: 28.5 KG/M2 | WEIGHT: 204.38 LBS | HEART RATE: 76 BPM | SYSTOLIC BLOOD PRESSURE: 126 MMHG

## 2020-11-09 DIAGNOSIS — E66.3 OVERWEIGHT (BMI 25.0-29.9): ICD-10-CM

## 2020-11-09 DIAGNOSIS — I15.2 HYPERTENSION ASSOCIATED WITH DIABETES: ICD-10-CM

## 2020-11-09 DIAGNOSIS — Z79.4 TYPE 2 DIABETES MELLITUS WITH COMPLICATION, WITH LONG-TERM CURRENT USE OF INSULIN: Primary | ICD-10-CM

## 2020-11-09 DIAGNOSIS — E11.59 HYPERTENSION ASSOCIATED WITH DIABETES: ICD-10-CM

## 2020-11-09 DIAGNOSIS — E11.8 TYPE 2 DIABETES MELLITUS WITH COMPLICATION, WITH LONG-TERM CURRENT USE OF INSULIN: Primary | ICD-10-CM

## 2020-11-09 DIAGNOSIS — G47.33 OBSTRUCTIVE SLEEP APNEA: ICD-10-CM

## 2020-11-09 DIAGNOSIS — I25.10 CORONARY ARTERY DISEASE INVOLVING NATIVE CORONARY ARTERY OF NATIVE HEART WITHOUT ANGINA PECTORIS: ICD-10-CM

## 2020-11-09 DIAGNOSIS — E78.5 HYPERLIPIDEMIA ASSOCIATED WITH TYPE 2 DIABETES MELLITUS: ICD-10-CM

## 2020-11-09 DIAGNOSIS — E11.69 HYPERLIPIDEMIA ASSOCIATED WITH TYPE 2 DIABETES MELLITUS: ICD-10-CM

## 2020-11-09 LAB — GLUCOSE SERPL-MCNC: 166 MG/DL (ref 70–110)

## 2020-11-09 PROCEDURE — 82962 GLUCOSE BLOOD TEST: CPT | Mod: PBBFAC | Performed by: PHYSICIAN ASSISTANT

## 2020-11-09 PROCEDURE — 99214 PR OFFICE/OUTPT VISIT, EST, LEVL IV, 30-39 MIN: ICD-10-PCS | Mod: S$PBB,,, | Performed by: PHYSICIAN ASSISTANT

## 2020-11-09 PROCEDURE — 99999 PR PBB SHADOW E&M-EST. PATIENT-LVL III: CPT | Mod: PBBFAC,,, | Performed by: PHYSICIAN ASSISTANT

## 2020-11-09 PROCEDURE — 99214 OFFICE O/P EST MOD 30 MIN: CPT | Mod: S$PBB,,, | Performed by: PHYSICIAN ASSISTANT

## 2020-11-09 PROCEDURE — 99999 PR PBB SHADOW E&M-EST. PATIENT-LVL III: ICD-10-PCS | Mod: PBBFAC,,, | Performed by: PHYSICIAN ASSISTANT

## 2020-11-09 PROCEDURE — 99213 OFFICE O/P EST LOW 20 MIN: CPT | Mod: PBBFAC | Performed by: PHYSICIAN ASSISTANT

## 2020-11-09 NOTE — PROGRESS NOTES
PCP: EM Ochoa Jr, MD    Subjective:     Chief Complaint: Diabetes - Established Patient    HISTORY OF PRESENT ILLNESS: 69 y.o.   male presenting for diabetes management visit.   Patient has previously been established with the Diabetes Management Department and was last seen by myself on 05/11/20.   Patient has had Type II diabetes since 1990s.  Pertinent to decision making is the following comorbidities: HTN, HLD, CAD and Overweight by BMI  Patient has the following Diabetes complications: without complications  He  has attended diabetes education in the past.     Patient's most recent A1c of 6.6% was completed 1 weeks ago.   Patient states since His last A1c His blood glucose levels have been within range throughout the day .   Patient monitors blood glucose 3 times per day with [facility] meter : Fasting, Before Lunch and Before Dinner.   Patient blood glucose monitoring device data will be reviewed under the Episodes tab today - see below.   Patient endorses the following diabetes related symptoms: None.   Patient is due today for the following diabetes-related health maintenance standards: None - up to date.   He denies any recent hospital admissions or emergency room visits.   He denies having hypoglycemia.  Patient's concerns today include glycemic control.              Fast      BL      BD        11/9/2020  8:16 AM 11/9/2020  8:17 AM  127          11/8/2020  6:05 PM 11/8/2020  6:05 PM      160      11/8/2020 12:35 PM 11/8/2020 12:35 PM    117        11/8/2020  8:04 AM 11/8/2020  8:04 AM  152          11/7/2020  1:50 PM 11/7/2020  1:50 PM    72        11/7/2020  9:07 AM 11/7/2020  9:07 AM  111          11/6/2020  5:59 PM 11/6/2020  5:59 PM      137      11/6/2020 12:52 PM 11/6/2020 12:52 PM    154        11/6/2020  8:26 AM 11/6/2020  8:26 AM  105          11/5/2020  5:39 PM 11/5/2020  5:39 PM      112      11/5/2020 12:28 PM 11/5/2020 12:28 PM    84        11/5/2020  7:35 AM  11/5/2020  7:35 AM  108          11/4/2020  5:53 PM 11/4/2020  5:53 PM      104      11/4/2020 12:20 PM 11/4/2020 12:20 PM    183        11/4/2020  8:45 AM 11/4/2020  8:45 AM  109          11/3/2020  5:52 PM 11/3/2020  5:52 PM      123      11/3/2020  1:08 PM 11/3/2020  1:08 PM    109        11/3/2020  8:09 AM 11/3/2020  8:09 AM  102          11/2/2020  6:00 PM 11/2/2020  6:00 PM      84      11/2/2020 12:22 PM 11/2/2020 12:22 PM    104        11/2/2020  8:18 AM 11/2/2020  8:18 AM  118          11/1/2020  5:51 PM 11/1/2020  5:51 PM      159      11/1/2020 11:46 AM 11/1/2020 11:46 AM    119        11/1/2020  8:14 AM 11/1/2020  8:14 AM  88          10/31/2020  7:16 PM 10/31/2020  7:16 PM      127      10/31/2020  8:39 AM 10/31/2020  8:40 AM  98          10/30/2020  5:10 PM 10/30/2020  5:10 PM      109      10/30/2020 12:53 PM 10/30/2020 12:53 PM    83        10/30/2020  7:14 AM 10/30/2020  7:14 AM  111          10/29/2020  6:45 PM 10/29/2020  6:45 PM      114      10/29/2020 12:10 PM 10/29/2020 12:10 PM    97        10/29/2020  6:30 AM 10/29/2020  6:31 AM  222          10/28/2020  6:15 PM 10/28/2020  6:15 PM      137      10/28/2020 12:18 PM 10/28/2020 12:18 PM    124        10/28/2020  8:38 AM 10/28/2020  8:38 AM  157          10/27/2020  6:21 PM 10/27/2020  6:21 PM      152      10/27/2020  1:05 PM 10/27/2020  1:06 PM    92        10/27/2020  7:18 AM 10/27/2020  7:18 AM  104          10/26/2020  6:11 PM 10/26/2020  6:11 PM      105      10/26/2020 12:27 PM 10/26/2020 12:27 PM    162        10/26/2020  9:09 AM 10/26/2020  9:10 AM  144            Patient medication regimen is as below.     CURRENT DM MEDICATIONS:    Metformin 1000 mg BID    Lantus 75 units qhs   Novolog 18 - 22 units w/ ss TID wm    Jardiance 25 mg    Trulicity 1.5 mg weekly       Labs Reviewed.       No results found for: CPEPTIDE  No results found for: GLUTAMICACID       //  Weight: 92.7 kg (204 lb 5.9 oz), Body mass index is 28.5 kg/m².  His  blood sugar in clinic today is:    Lab Results   Component Value Date    POCGLU 166 (A) 11/09/2020       Review of Systems   Constitutional: Negative for activity change, appetite change, chills and fever.   HENT: Negative for dental problem, mouth sores, nosebleeds, sore throat and trouble swallowing.    Eyes: Negative for pain and discharge.   Respiratory: Negative for shortness of breath, wheezing and stridor.    Cardiovascular: Negative for chest pain, palpitations and leg swelling.   Gastrointestinal: Negative for abdominal pain, diarrhea, nausea and vomiting.   Endocrine: Negative for polydipsia, polyphagia and polyuria.   Genitourinary: Negative for dysuria, frequency and urgency.   Musculoskeletal: Negative for joint swelling and myalgias.   Skin: Negative for rash and wound.   Neurological: Negative for dizziness, syncope, weakness and headaches.   Psychiatric/Behavioral: Negative for behavioral problems and dysphoric mood.         Diabetes Management Status  Statin: Taking  ACE/ARB: Taking    Screening or Prevention Patient's value Goal Complete/Controlled?   HgA1C Testing and Control   Lab Results   Component Value Date    HGBA1C 6.6 (H) 11/02/2020      Annually/Less than 8% Yes   Lipid profile : 09/15/2020 Annually Yes   LDL control Lab Results   Component Value Date    LDLCALC 27.4 (L) 09/15/2020    Annually/Less than 100 mg/dl  Yes   Nephropathy screening Lab Results   Component Value Date    MICALBCREAT 25.6 02/14/2020     No results found for: PROTEINUA Annually Yes   Blood pressure BP Readings from Last 1 Encounters:   11/09/20 126/71    Less than 140/90 Yes   Dilated retinal exam : 03/06/2020 Annually Yes    Foot exam   : 02/25/2020 Annually Yes     ACTIVITY LEVEL: Moderately Active. Discussed activities, benefits, methods, and precautions.  MEAL PLANNING: Patient reports number of meals per day to be 3 and number of snacks per day to be 2.   Patient is encouraged to carb count and consume no more  than 30 - 45 grams of carbohydrates in each meal and 15 grams of carbohydrates in each snack.     Social History     Socioeconomic History    Marital status:      Spouse name: Not on file    Number of children: Not on file    Years of education: Not on file    Highest education level: Not on file   Occupational History    Not on file   Social Needs    Financial resource strain: Not on file    Food insecurity     Worry: Not on file     Inability: Not on file    Transportation needs     Medical: Not on file     Non-medical: Not on file   Tobacco Use    Smoking status: Former Smoker     Packs/day: 1.00     Years: 20.00     Pack years: 20.00     Quit date: 2000     Years since quittin.8    Smokeless tobacco: Never Used   Substance and Sexual Activity    Alcohol use: No     Alcohol/week: 0.0 standard drinks    Drug use: No    Sexual activity: Yes     Partners: Female   Lifestyle    Physical activity     Days per week: Not on file     Minutes per session: Not on file    Stress: Not on file   Relationships    Social connections     Talks on phone: Not on file     Gets together: Not on file     Attends [employer] service: Not on file     Active member of club or organization: Not on file     Attends meetings of clubs or organizations: Not on file     Relationship status: Not on file   Other Topics Concern    Not on file   Social History Narrative    . Lives with spouse. Has 2 children. Patient works full time as  in construction. No pets or smokers in household.     Past Medical History:   Diagnosis Date    Coronary artery disease     [facility] - no stents    DM (diabetes mellitus)      lunch 10/28/2016    Hyperlipemia     Hypertension     Kidney stones     Neuropathy     Type 2 diabetes mellitus 10-12 years     am 10/31/2014       Objective:        Physical Exam  Constitutional:       General: He is not in acute distress.     Appearance: He is  well-developed. He is not diaphoretic.   HENT:      Head: Normocephalic and atraumatic.      Right Ear: External ear normal.      Left Ear: External ear normal.      Nose: Nose normal.   Eyes:      General:         Right eye: No discharge.         Left eye: No discharge.      Pupils: Pupils are equal, round, and reactive to light.   Neck:      Musculoskeletal: Normal range of motion and neck supple.   Cardiovascular:      Rate and Rhythm: Normal rate and regular rhythm.      Heart sounds: Normal heart sounds.   Pulmonary:      Effort: Pulmonary effort is normal.      Breath sounds: Normal breath sounds.   Abdominal:      Palpations: Abdomen is soft.   Musculoskeletal: Normal range of motion.   Skin:     General: Skin is warm and dry.      Capillary Refill: Capillary refill takes less than 2 seconds.   Neurological:      Mental Status: He is alert and oriented to person, place, and time.      Motor: No abnormal muscle tone.      Coordination: Coordination normal.   Psychiatric:         Behavior: Behavior normal.         Thought Content: Thought content normal.         Judgment: Judgment normal.           Assessment / Plan:     Type 2 diabetes mellitus with complication, with long-term current use of insulin  -     POCT Glucose, Hand-Held Device  -     dulaglutide (TRULICITY) 3 mg/0.5 mL pen injector; Inject 3 mg into the skin once a week.  Dispense: 12 pen; Refill: 3    Hyperlipidemia associated with type 2 diabetes mellitus    Hypertension associated with diabetes    Coronary artery disease involving native coronary artery of native heart without angina pectoris    Obstructive sleep apnea    Overweight (BMI 25.0-29.9)      Additional Plan Details:    - POCT Glucose  - Encouraged continuation of lifestyle changes including regular exercise and limiting carbohydrates to 30-45 grams per meal threes times daily and 15 grams per snack with a limit of two daily.   - Encouraged continued monitoring of blood glucose with  maintenance of 3 times daily at Fasting, Before Lunch and Before Dinner.   - Current DM Medication Regimen: Change Trulicity to 3 mg weekly. Change Lantus to 60 units qhs with GLP-1 increase. Change Novolog to 15 units with ss TID wm with GLP-1 increase. Continue Jardiance 25 mg and Metformin 1000 mg BID.   - Health Maintenance standards addressed today: None - up to date  - Nursing Visit: Patient is below goal range for nursing visit for age group and will not need nursing visit at this time .   - Follow up in 4 months with A1c prior.       Blakeney McKnight, PA-C [facility]    A total of 30 minutes was spent in face to face time, of which over 50 % was spent in counseling patient on disease process, complications, treatment, and side effects of medications.

## 2020-11-09 NOTE — PATIENT INSTRUCTIONS
CURRENT DM MEDICATIONS:    Metformin 1000 mg BID    Lantus 60 units qhs - change the day before increase of Trulicity   Novolog 15 units w/ ss TID wm - change the day before increase of Trulicity   Jardiance 25 mg    Trulicity 3 mg weekly - if you do want to transition, can double up on 1.5 mg syringes until done

## 2020-11-10 ENCOUNTER — PATIENT MESSAGE (OUTPATIENT)
Dept: SLEEP MEDICINE | Facility: CLINIC | Age: 70
End: 2020-11-10

## 2020-11-10 DIAGNOSIS — G47.33 OSA (OBSTRUCTIVE SLEEP APNEA): Primary | ICD-10-CM

## 2020-11-19 ENCOUNTER — PATIENT OUTREACH (OUTPATIENT)
Dept: OTHER | Facility: OTHER | Age: 70
End: 2020-11-19

## 2020-11-19 NOTE — PROGRESS NOTES
Digital Medicine: Clinician Follow-Up    Called patient to discuss his elevated BP readings.    The history is provided by the patient.   Follow-up reason(s): routine follow up.     Hypertension    Readings are trending up   Diabetes    Patient's blood glucose is stable.   Patient is not experiencing signs/symptoms of hypotension.  Patient is not experiencing signs/symptoms of hypertension.  Patient is not experiencing signs/symptoms of hypoglycemia.  Patient is not experiencing signs/symptoms of hyperglycemia.          Last 5 Patient Entered Readings                                      Current 30 Day Average: 142/78     Recent Readings 11/15/2020 11/4/2020 11/4/2020 10/28/2020 10/18/2020    SBP (mmHg) 147 148 141 132 145    DBP (mmHg) 71 86 86 69 76    Pulse 64 63 61 69 65        Last 6 Patient Entered Readings                                          Most Recent A1c: 6.6% on 11/2/2020  (Goal: 7%)     Recent Readings 11/19/2020 11/18/2020 11/18/2020 11/18/2020 11/17/2020    Blood Glucose (mg/dL) 101 196 139 104 96               Depression Screening  Did not address depression screening.    Sleep Apnea Screening  Patient previously diagnosed with BRIANA and is not interested in a referral at this time. He reports he is not currently using CPAP. He is not using CPAP because: unable to tolerate machine.   Supplies needed: He needs a new mask and he is getting a new one today.      Medication Affordability Screening  Did not address medication affordability screening.     Medication Adherence-Medication adherence was asssessed.  Patient continue taking medication as prescribed.          Missed a full day of medications last week but that is very rare.      ASSESSMENT(S)  Patient's A1C goal is less than or equal to 7. Patient's most recent A1C result is at goal. Lab Results    Component                Value               Date                     HGBA1C                   6.6 (H)             11/02/2020          .      Patients BP average is 142/78 mmHg, which is above goal. Patient's BP goal is less than or equal to 130/80.     T2DM is well controlled.    BP is elevated due to a lack of CPAP use.      Hypertension Plan  Additional monitoring needed. Increase readings to daily once he is back on his CPAP machine to confirm his high readings are a lack of CPAP use.  Continue current therapy.    Diabetes Plan  Continue current therapy.       Addressed patient questions and patient has my contact information if needed prior to next outreach. Patient verbalizes understanding.             There are no preventive care reminders to display for this patient.  There are no preventive care reminders to display for this patient.      Hypertension Medications             amLODIPine (NORVASC) 5 MG tablet TAKE 1 TABLET BY MOUTH ONCE DAILY    losartan (COZAAR) 100 MG tablet Take 1 tablet (100 mg total) by mouth once daily.        Diabetes Medications             dulaglutide (TRULICITY) 3 mg/0.5 mL pen injector Inject 3 mg into the skin once a week.    empagliflozin (JARDIANCE) 25 mg Tab Take 25 mg by mouth once daily.    insulin (LANTUS SOLOSTAR U-100 INSULIN) glargine 100 units/mL (3mL) SubQ pen Inject 75 Units into the skin every evening.    insulin aspart U-100 (NOVOLOG) 100 unit/mL (3 mL) InPn pen Inject 28 Units into the skin 3 (three) times daily before meals.    metFORMIN (GLUCOPHAGE) 1000 MG tablet Take 1 tablet (1,000 mg total) by mouth 2 (two) times daily with meals.

## 2020-11-20 PROCEDURE — 99454 REM MNTR PHYSIOL PARAM 16-30: CPT | Mod: PBBFAC | Performed by: PEDIATRICS

## 2020-11-23 ENCOUNTER — PATIENT OUTREACH (OUTPATIENT)
Dept: OTHER | Facility: OTHER | Age: 70
End: 2020-11-23

## 2020-12-04 NOTE — PROGRESS NOTES
Digital Medicine: Health  Follow-Up    The history is provided by the patient.             Reason for review: Blood glucose at goal and Blood pressure not at goal  Care Team received low BG alert.          Topics Covered on Call: Diet    Additional Follow-up details: Mr. Ortega explained that the low blood sugar of 12 from 11/22 was not correct. He tested it again a minute later for a reading of 268. He discussed that he believes the 268 was correct as he had recently eaten a large portion of pecan pie. He did not experience symptoms of hypoglycemia. I deleted the reading.             Diet-Assessed          Physical Activity-Not assessed    Medication Adherence-Medication Adherence not addressed.      Substance, Sleep, Stress-Not assessed      Continue current diet/physical activity routine.       Addressed patient questions and patient has my contact information if needed prior to next outreach. Patient verbalizes understanding.      Explained the importance of self-monitoring and medication adherence. Encouraged the patient to communicate with their health  for lifestyle modifications to help improve or maintain a healthy lifestyle.               There are no preventive care reminders to display for this patient.      Last 5 Patient Entered Readings                                      Current 30 Day Average: 139/78     Recent Readings 11/28/2020 11/15/2020 11/4/2020 11/4/2020 10/28/2020    SBP (mmHg) 121 147 148 141 132    DBP (mmHg) 70 71 86 86 69    Pulse 69 64 63 61 69        Last 6 Patient Entered Readings                                          Most Recent A1c: 6.6% on 11/2/2020  (Goal: 8%)     Recent Readings 12/4/2020 12/4/2020 12/3/2020 12/3/2020 12/3/2020    Blood Glucose (mg/dL) 136 115 103 164 125

## 2020-12-15 ENCOUNTER — TELEPHONE (OUTPATIENT)
Dept: INTERNAL MEDICINE | Facility: CLINIC | Age: 70
End: 2020-12-15

## 2020-12-15 NOTE — TELEPHONE ENCOUNTER
Rec'vd fax clearance request from [facility], requesting pt be cleared by Dr. Ochoa for ALIA. Pt has not been seen in almost 4mths, pt will need to sched pre-op appt w/ Dr. Ochoa for clearance.

## 2020-12-15 NOTE — TELEPHONE ENCOUNTER
Called pt, no ans, lvm requesting pt return call to clinic to sched pre-op appt w/ Dr. Ochoa. Returned fax to [facility] w/ notation pt must call our office to sched pre-op clearance appt w/ Dr. Krishna since not seen >3mths.

## 2020-12-17 PROCEDURE — 99454 REM MNTR PHYSIOL PARAM 16-30: CPT | Mod: PBBFAC | Performed by: PEDIATRICS

## 2021-01-17 PROCEDURE — 99454 REM MNTR PHYSIOL PARAM 16-30: CPT | Mod: PBBFAC | Performed by: PEDIATRICS

## 2021-01-29 ENCOUNTER — OFFICE VISIT (OUTPATIENT)
Dept: URGENT CARE | Facility: CLINIC | Age: 71
End: 2021-01-29
Payer: MEDICARE

## 2021-01-29 ENCOUNTER — TELEPHONE (OUTPATIENT)
Dept: URGENT CARE | Facility: CLINIC | Age: 71
End: 2021-01-29

## 2021-01-29 ENCOUNTER — HOSPITAL ENCOUNTER (OUTPATIENT)
Dept: RADIOLOGY | Facility: HOSPITAL | Age: 71
Discharge: HOME | End: 2021-01-29
Attending: NURSE PRACTITIONER
Payer: MEDICARE

## 2021-01-29 VITALS
SYSTOLIC BLOOD PRESSURE: 133 MMHG | HEIGHT: 71 IN | OXYGEN SATURATION: 95 % | WEIGHT: 204 LBS | DIASTOLIC BLOOD PRESSURE: 60 MMHG | RESPIRATION RATE: 16 BRPM | HEART RATE: 78 BPM | BODY MASS INDEX: 28.56 KG/M2 | TEMPERATURE: 98 F

## 2021-01-29 DIAGNOSIS — R59.0 LYMPHADENOPATHY, INGUINAL: ICD-10-CM

## 2021-01-29 DIAGNOSIS — R19.04 ABDOMINAL SWELLING, LEFT LOWER QUADRANT: ICD-10-CM

## 2021-01-29 DIAGNOSIS — R19.04 ABDOMINAL SWELLING, LEFT LOWER QUADRANT: Primary | ICD-10-CM

## 2021-01-29 LAB
BILIRUB UR QL STRIP: NEGATIVE
GLUCOSE UR QL STRIP: POSITIVE
KETONES UR QL STRIP: NEGATIVE
LEUKOCYTE ESTERASE UR QL STRIP: NEGATIVE
PH, POC UA: 5
POC BLOOD, URINE: NEGATIVE
POC NITRATES, URINE: NEGATIVE
PROT UR QL STRIP: NEGATIVE
SP GR UR STRIP: 1.02 (ref 1–1.03)
UROBILINOGEN UR STRIP-ACNC: NORMAL (ref 0.3–2.2)

## 2021-01-29 PROCEDURE — 81003 POCT URINALYSIS, DIPSTICK, AUTOMATED, W/O SCOPE: ICD-10-PCS | Mod: QW,S$GLB,, | Performed by: NURSE PRACTITIONER

## 2021-01-29 PROCEDURE — 76882 US SOFT TISSUE, GROIN LEFT: ICD-10-PCS | Mod: 26,LT,, | Performed by: RADIOLOGY

## 2021-01-29 PROCEDURE — 76882 US LMTD JT/FCL EVL NVASC XTR: CPT | Mod: TC,LT

## 2021-01-29 PROCEDURE — 99213 PR OFFICE/OUTPT VISIT, EST, LEVL III, 20-29 MIN: ICD-10-PCS | Mod: 25,S$GLB,, | Performed by: NURSE PRACTITIONER

## 2021-01-29 PROCEDURE — 76882 US LMTD JT/FCL EVL NVASC XTR: CPT | Mod: 26,LT,, | Performed by: RADIOLOGY

## 2021-01-29 PROCEDURE — 81003 URINALYSIS AUTO W/O SCOPE: CPT | Mod: QW,S$GLB,, | Performed by: NURSE PRACTITIONER

## 2021-01-29 PROCEDURE — 99213 OFFICE O/P EST LOW 20 MIN: CPT | Mod: 25,S$GLB,, | Performed by: NURSE PRACTITIONER

## 2021-01-30 ENCOUNTER — TELEPHONE (OUTPATIENT)
Dept: URGENT CARE | Facility: CLINIC | Age: 71
End: 2021-01-30

## 2021-02-01 ENCOUNTER — PATIENT MESSAGE (OUTPATIENT)
Dept: INTERNAL MEDICINE | Facility: CLINIC | Age: 71
End: 2021-02-01

## 2021-02-01 ENCOUNTER — TELEPHONE (OUTPATIENT)
Dept: URGENT CARE | Facility: CLINIC | Age: 71
End: 2021-02-01

## 2021-02-03 ENCOUNTER — OFFICE VISIT (OUTPATIENT)
Dept: PODIATRY | Facility: CLINIC | Age: 71
End: 2021-02-03
Payer: MEDICARE

## 2021-02-03 VITALS — HEIGHT: 71 IN | WEIGHT: 199.44 LBS | BODY MASS INDEX: 27.92 KG/M2

## 2021-02-03 DIAGNOSIS — B35.1 ONYCHOMYCOSIS DUE TO DERMATOPHYTE: ICD-10-CM

## 2021-02-03 DIAGNOSIS — E11.8 TYPE 2 DIABETES MELLITUS WITH COMPLICATION, WITH LONG-TERM CURRENT USE OF INSULIN: Primary | ICD-10-CM

## 2021-02-03 DIAGNOSIS — Z79.4 TYPE 2 DIABETES MELLITUS WITH COMPLICATION, WITH LONG-TERM CURRENT USE OF INSULIN: Primary | ICD-10-CM

## 2021-02-03 PROCEDURE — 99213 OFFICE O/P EST LOW 20 MIN: CPT | Mod: PBBFAC | Performed by: PODIATRIST

## 2021-02-03 PROCEDURE — 99213 OFFICE O/P EST LOW 20 MIN: CPT | Mod: S$PBB,,, | Performed by: PODIATRIST

## 2021-02-03 PROCEDURE — 99999 PR PBB SHADOW E&M-EST. PATIENT-LVL III: CPT | Mod: PBBFAC,,, | Performed by: PODIATRIST

## 2021-02-03 PROCEDURE — 99213 PR OFFICE/OUTPT VISIT, EST, LEVL III, 20-29 MIN: ICD-10-PCS | Mod: S$PBB,,, | Performed by: PODIATRIST

## 2021-02-03 PROCEDURE — 99999 PR PBB SHADOW E&M-EST. PATIENT-LVL III: ICD-10-PCS | Mod: PBBFAC,,, | Performed by: PODIATRIST

## 2021-02-05 ENCOUNTER — OFFICE VISIT (OUTPATIENT)
Dept: URGENT CARE | Facility: CLINIC | Age: 71
End: 2021-02-05
Payer: MEDICARE

## 2021-02-05 ENCOUNTER — TELEPHONE (OUTPATIENT)
Dept: INTERNAL MEDICINE | Facility: CLINIC | Age: 71
End: 2021-02-05

## 2021-02-05 VITALS
WEIGHT: 200 LBS | HEART RATE: 78 BPM | DIASTOLIC BLOOD PRESSURE: 57 MMHG | OXYGEN SATURATION: 97 % | BODY MASS INDEX: 28 KG/M2 | SYSTOLIC BLOOD PRESSURE: 121 MMHG | TEMPERATURE: 98 F | RESPIRATION RATE: 20 BRPM | HEIGHT: 71 IN

## 2021-02-05 DIAGNOSIS — R10.32 GROIN PAIN, LEFT: ICD-10-CM

## 2021-02-05 DIAGNOSIS — S46.912A LEFT SHOULDER STRAIN, INITIAL ENCOUNTER: Primary | ICD-10-CM

## 2021-02-05 DIAGNOSIS — R19.09 LUMP IN THE GROIN: Primary | ICD-10-CM

## 2021-02-05 PROCEDURE — 96372 THER/PROPH/DIAG INJ SC/IM: CPT | Mod: S$GLB,,, | Performed by: FAMILY MEDICINE

## 2021-02-05 PROCEDURE — 96372 PR INJECTION,THERAP/PROPH/DIAG2ST, IM OR SUBCUT: ICD-10-PCS | Mod: S$GLB,,, | Performed by: FAMILY MEDICINE

## 2021-02-05 PROCEDURE — 99214 OFFICE O/P EST MOD 30 MIN: CPT | Mod: 25,S$GLB,, | Performed by: PHYSICIAN ASSISTANT

## 2021-02-05 PROCEDURE — 99214 PR OFFICE/OUTPT VISIT, EST, LEVL IV, 30-39 MIN: ICD-10-PCS | Mod: 25,S$GLB,, | Performed by: PHYSICIAN ASSISTANT

## 2021-02-05 RX ORDER — KETOROLAC TROMETHAMINE 30 MG/ML
30 INJECTION, SOLUTION INTRAMUSCULAR; INTRAVENOUS
Status: COMPLETED | OUTPATIENT
Start: 2021-02-05 | End: 2021-02-05

## 2021-02-05 RX ORDER — METHYLPREDNISOLONE 4 MG/1
TABLET ORAL
Qty: 1 PACKAGE | Refills: 0 | Status: SHIPPED | OUTPATIENT
Start: 2021-02-05 | End: 2021-05-17

## 2021-02-05 RX ADMIN — KETOROLAC TROMETHAMINE 30 MG: 30 INJECTION, SOLUTION INTRAMUSCULAR; INTRAVENOUS at 09:02

## 2021-02-11 ENCOUNTER — OFFICE VISIT (OUTPATIENT)
Dept: UROLOGY | Facility: CLINIC | Age: 71
End: 2021-02-11
Payer: MEDICARE

## 2021-02-11 VITALS
WEIGHT: 198 LBS | TEMPERATURE: 98 F | HEART RATE: 78 BPM | HEIGHT: 71 IN | BODY MASS INDEX: 27.72 KG/M2 | OXYGEN SATURATION: 95 % | SYSTOLIC BLOOD PRESSURE: 120 MMHG | DIASTOLIC BLOOD PRESSURE: 60 MMHG

## 2021-02-11 DIAGNOSIS — N20.0 NEPHROLITHIASIS: ICD-10-CM

## 2021-02-11 DIAGNOSIS — N52.9 ERECTILE DYSFUNCTION, UNSPECIFIED ERECTILE DYSFUNCTION TYPE: ICD-10-CM

## 2021-02-11 DIAGNOSIS — R10.32 GROIN PAIN, LEFT: Primary | ICD-10-CM

## 2021-02-11 PROCEDURE — 99204 PR OFFICE/OUTPT VISIT, NEW, LEVL IV, 45-59 MIN: ICD-10-PCS | Mod: S$PBB,,, | Performed by: UROLOGY

## 2021-02-11 PROCEDURE — 99999 PR PBB SHADOW E&M-EST. PATIENT-LVL V: CPT | Mod: PBBFAC,,, | Performed by: UROLOGY

## 2021-02-11 PROCEDURE — 99204 OFFICE O/P NEW MOD 45 MIN: CPT | Mod: S$PBB,,, | Performed by: UROLOGY

## 2021-02-11 PROCEDURE — 99215 OFFICE O/P EST HI 40 MIN: CPT | Mod: PBBFAC | Performed by: UROLOGY

## 2021-02-11 PROCEDURE — 99999 PR PBB SHADOW E&M-EST. PATIENT-LVL V: ICD-10-PCS | Mod: PBBFAC,,, | Performed by: UROLOGY

## 2021-02-13 ENCOUNTER — PATIENT MESSAGE (OUTPATIENT)
Dept: ADMINISTRATIVE | Facility: OTHER | Age: 71
End: 2021-02-13

## 2021-02-16 ENCOUNTER — PATIENT MESSAGE (OUTPATIENT)
Dept: ADMINISTRATIVE | Facility: OTHER | Age: 71
End: 2021-02-16

## 2021-02-16 ENCOUNTER — PATIENT MESSAGE (OUTPATIENT)
Dept: UROLOGY | Facility: CLINIC | Age: 71
End: 2021-02-16

## 2021-02-16 PROCEDURE — 99454 REM MNTR PHYSIOL PARAM 16-30: CPT | Mod: PBBFAC | Performed by: PEDIATRICS

## 2021-02-17 ENCOUNTER — TELEPHONE (OUTPATIENT)
Dept: UROLOGY | Facility: CLINIC | Age: 71
End: 2021-02-17

## 2021-02-17 DIAGNOSIS — E11.59 HYPERTENSION ASSOCIATED WITH DIABETES: Primary | ICD-10-CM

## 2021-02-17 DIAGNOSIS — I15.2 HYPERTENSION ASSOCIATED WITH DIABETES: Primary | ICD-10-CM

## 2021-02-18 ENCOUNTER — HOSPITAL ENCOUNTER (OUTPATIENT)
Dept: CARDIOLOGY | Facility: HOSPITAL | Age: 71
Discharge: HOME | End: 2021-02-18
Attending: INTERNAL MEDICINE
Payer: MEDICARE

## 2021-02-18 ENCOUNTER — HOSPITAL ENCOUNTER (OUTPATIENT)
Dept: RADIOLOGY | Facility: HOSPITAL | Age: 71
Discharge: HOME | End: 2021-02-18
Attending: UROLOGY
Payer: MEDICARE

## 2021-02-18 ENCOUNTER — TELEPHONE (OUTPATIENT)
Dept: UROLOGY | Facility: CLINIC | Age: 71
End: 2021-02-18

## 2021-02-18 ENCOUNTER — OFFICE VISIT (OUTPATIENT)
Dept: CARDIOLOGY | Facility: CLINIC | Age: 71
End: 2021-02-18
Payer: MEDICARE

## 2021-02-18 VITALS
BODY MASS INDEX: 27.89 KG/M2 | SYSTOLIC BLOOD PRESSURE: 118 MMHG | DIASTOLIC BLOOD PRESSURE: 72 MMHG | WEIGHT: 200 LBS | OXYGEN SATURATION: 96 % | HEART RATE: 67 BPM

## 2021-02-18 DIAGNOSIS — I25.10 CORONARY ARTERY DISEASE INVOLVING NATIVE CORONARY ARTERY OF NATIVE HEART WITHOUT ANGINA PECTORIS: Primary | ICD-10-CM

## 2021-02-18 DIAGNOSIS — G47.33 OBSTRUCTIVE SLEEP APNEA: ICD-10-CM

## 2021-02-18 DIAGNOSIS — E11.59 HYPERTENSION ASSOCIATED WITH DIABETES: ICD-10-CM

## 2021-02-18 DIAGNOSIS — E78.5 HYPERLIPIDEMIA ASSOCIATED WITH TYPE 2 DIABETES MELLITUS: ICD-10-CM

## 2021-02-18 DIAGNOSIS — Z79.4 TYPE 2 DIABETES MELLITUS WITH COMPLICATION, WITH LONG-TERM CURRENT USE OF INSULIN: ICD-10-CM

## 2021-02-18 DIAGNOSIS — N20.0 NEPHROLITHIASIS: ICD-10-CM

## 2021-02-18 DIAGNOSIS — I15.2 HYPERTENSION ASSOCIATED WITH DIABETES: ICD-10-CM

## 2021-02-18 DIAGNOSIS — E11.69 HYPERLIPIDEMIA ASSOCIATED WITH TYPE 2 DIABETES MELLITUS: ICD-10-CM

## 2021-02-18 DIAGNOSIS — I34.0 NONRHEUMATIC MITRAL VALVE REGURGITATION: ICD-10-CM

## 2021-02-18 DIAGNOSIS — E11.8 TYPE 2 DIABETES MELLITUS WITH COMPLICATION, WITH LONG-TERM CURRENT USE OF INSULIN: ICD-10-CM

## 2021-02-18 PROCEDURE — 93010 EKG 12-LEAD: ICD-10-PCS | Mod: ,,, | Performed by: INTERNAL MEDICINE

## 2021-02-18 PROCEDURE — 93010 ELECTROCARDIOGRAM REPORT: CPT | Mod: ,,, | Performed by: INTERNAL MEDICINE

## 2021-02-18 PROCEDURE — 99999 PR PBB SHADOW E&M-EST. PATIENT-LVL IV: CPT | Mod: PBBFAC,,, | Performed by: INTERNAL MEDICINE

## 2021-02-18 PROCEDURE — 99214 OFFICE O/P EST MOD 30 MIN: CPT | Mod: S$PBB,25,, | Performed by: INTERNAL MEDICINE

## 2021-02-18 PROCEDURE — 99999 PR PBB SHADOW E&M-EST. PATIENT-LVL IV: ICD-10-PCS | Mod: PBBFAC,,, | Performed by: INTERNAL MEDICINE

## 2021-02-18 PROCEDURE — 93005 ELECTROCARDIOGRAM TRACING: CPT

## 2021-02-18 PROCEDURE — 99214 OFFICE O/P EST MOD 30 MIN: CPT | Mod: PBBFAC | Performed by: INTERNAL MEDICINE

## 2021-02-18 PROCEDURE — 99214 PR OFFICE/OUTPT VISIT, EST, LEVL IV, 30-39 MIN: ICD-10-PCS | Mod: S$PBB,25,, | Performed by: INTERNAL MEDICINE

## 2021-02-18 PROCEDURE — 74176 CT ABD & PELVIS W/O CONTRAST: CPT | Mod: TC

## 2021-02-18 RX ORDER — MELOXICAM 7.5 MG/1
7.5 TABLET ORAL DAILY
COMMUNITY
End: 2021-10-13

## 2021-02-18 RX ORDER — ROSUVASTATIN CALCIUM 20 MG/1
20 TABLET, COATED ORAL DAILY
Qty: 90 TABLET | Refills: 3 | Status: SHIPPED | OUTPATIENT
Start: 2021-02-18 | End: 2022-04-18

## 2021-02-20 ENCOUNTER — PATIENT MESSAGE (OUTPATIENT)
Dept: ADMINISTRATIVE | Facility: OTHER | Age: 71
End: 2021-02-20

## 2021-02-22 ENCOUNTER — OFFICE VISIT (OUTPATIENT)
Dept: SURGERY | Facility: CLINIC | Age: 71
End: 2021-02-22
Payer: MEDICARE

## 2021-02-22 ENCOUNTER — HOSPITAL ENCOUNTER (OUTPATIENT)
Dept: CARDIOLOGY | Facility: HOSPITAL | Age: 71
Discharge: HOME | End: 2021-02-22
Attending: INTERNAL MEDICINE
Payer: MEDICARE

## 2021-02-22 ENCOUNTER — OFFICE VISIT (OUTPATIENT)
Dept: UROLOGY | Facility: CLINIC | Age: 71
End: 2021-02-22
Payer: MEDICARE

## 2021-02-22 ENCOUNTER — TELEPHONE (OUTPATIENT)
Dept: CARDIOLOGY | Facility: CLINIC | Age: 71
End: 2021-02-22

## 2021-02-22 ENCOUNTER — TELEPHONE (OUTPATIENT)
Dept: PREADMISSION TESTING | Facility: HOSPITAL | Age: 71
End: 2021-02-22

## 2021-02-22 VITALS
SYSTOLIC BLOOD PRESSURE: 130 MMHG | DIASTOLIC BLOOD PRESSURE: 78 MMHG | WEIGHT: 198.19 LBS | HEART RATE: 76 BPM | HEIGHT: 71 IN | BODY MASS INDEX: 27.75 KG/M2

## 2021-02-22 VITALS
HEART RATE: 73 BPM | HEIGHT: 71 IN | BODY MASS INDEX: 28 KG/M2 | SYSTOLIC BLOOD PRESSURE: 130 MMHG | WEIGHT: 200 LBS | WEIGHT: 197.56 LBS | DIASTOLIC BLOOD PRESSURE: 77 MMHG | BODY MASS INDEX: 27.55 KG/M2

## 2021-02-22 DIAGNOSIS — I34.0 NONRHEUMATIC MITRAL VALVE REGURGITATION: ICD-10-CM

## 2021-02-22 DIAGNOSIS — R10.32 GROIN PAIN, LEFT: Primary | ICD-10-CM

## 2021-02-22 DIAGNOSIS — K40.90 LEFT INGUINAL HERNIA: Primary | ICD-10-CM

## 2021-02-22 DIAGNOSIS — K40.90 NON-RECURRENT UNILATERAL INGUINAL HERNIA WITHOUT OBSTRUCTION OR GANGRENE: ICD-10-CM

## 2021-02-22 DIAGNOSIS — Z01.818 PRE-OP TESTING: ICD-10-CM

## 2021-02-22 DIAGNOSIS — N20.0 NEPHROLITHIASIS: ICD-10-CM

## 2021-02-22 DIAGNOSIS — N52.9 ERECTILE DYSFUNCTION, UNSPECIFIED ERECTILE DYSFUNCTION TYPE: ICD-10-CM

## 2021-02-22 LAB
AORTIC ROOT ANNULUS: 3.52 CM
AV INDEX (PROSTH): 0.78
AV MEAN GRADIENT: 3 MMHG
AV PEAK GRADIENT: 5 MMHG
AV VALVE AREA: 2.5 CM2
AV VELOCITY RATIO: 0.86
BSA FOR ECHO PROCEDURE: 2.13 M2
CV ECHO LV RWT: 0.59 CM
DOP CALC AO PEAK VEL: 1.09 M/S
DOP CALC AO VTI: 23.51 CM
DOP CALC LVOT AREA: 3.2 CM2
DOP CALC LVOT DIAMETER: 2.02 CM
DOP CALC LVOT PEAK VEL: 0.94 M/S
DOP CALC LVOT STROKE VOLUME: 58.84 CM3
DOP CALC RVOT PEAK VEL: 0.85 M/S
DOP CALC RVOT VTI: 16.32 CM
DOP CALCLVOT PEAK VEL VTI: 18.37 CM
E WAVE DECELERATION TIME: 389.99 MSEC
E/A RATIO: 0.79
E/E' RATIO: 12 M/S
ECHO LV POSTERIOR WALL: 1.07 CM (ref 0.6–1.1)
FRACTIONAL SHORTENING: 23 % (ref 28–44)
INTERVENTRICULAR SEPTUM: 1.34 CM (ref 0.6–1.1)
IVRT: 88.49 MSEC
LA MAJOR: 4.6 CM
LA MINOR: 4.65 CM
LA WIDTH: 3.26 CM
LEFT ATRIUM SIZE: 3.31 CM
LEFT ATRIUM VOLUME INDEX: 20.1 ML/M2
LEFT ATRIUM VOLUME: 42.42 CM3
LEFT INTERNAL DIMENSION IN SYSTOLE: 2.79 CM (ref 2.1–4)
LEFT VENTRICLE DIASTOLIC VOLUME INDEX: 25.85 ML/M2
LEFT VENTRICLE DIASTOLIC VOLUME: 54.55 ML
LEFT VENTRICLE MASS INDEX: 67 G/M2
LEFT VENTRICLE SYSTOLIC VOLUME INDEX: 13.9 ML/M2
LEFT VENTRICLE SYSTOLIC VOLUME: 29.25 ML
LEFT VENTRICULAR INTERNAL DIMENSION IN DIASTOLE: 3.6 CM (ref 3.5–6)
LEFT VENTRICULAR MASS: 142.39 G
LV LATERAL E/E' RATIO: 13.2 M/S
LV SEPTAL E/E' RATIO: 11 M/S
MV A" WAVE DURATION": 9.42 MSEC
MV PEAK A VEL: 0.84 M/S
MV PEAK E VEL: 0.66 M/S
PISA TR MAX VEL: 2.74 M/S
PULM VEIN S/D RATIO: 2.03
PV MEAN GRADIENT: 2 MMHG
PV PEAK D VEL: 0.29 M/S
PV PEAK S VEL: 0.59 M/S
PV PEAK VELOCITY: 1.17 CM/S
RA MAJOR: 4.41 CM
RA PRESSURE: 3 MMHG
RA WIDTH: 2.47 CM
SINUS: 3.02 CM
STJ: 2.24 CM
TDI LATERAL: 0.05 M/S
TDI SEPTAL: 0.06 M/S
TDI: 0.06 M/S
TR MAX PG: 30 MMHG
TV REST PULMONARY ARTERY PRESSURE: 33 MMHG

## 2021-02-22 PROCEDURE — 99204 PR OFFICE/OUTPT VISIT, NEW, LEVL IV, 45-59 MIN: ICD-10-PCS | Mod: S$PBB,,, | Performed by: SURGERY

## 2021-02-22 PROCEDURE — 99999 PR PBB SHADOW E&M-EST. PATIENT-LVL IV: CPT | Mod: PBBFAC,,, | Performed by: UROLOGY

## 2021-02-22 PROCEDURE — 93306 ECHO (CUPID ONLY): ICD-10-PCS | Mod: 26,,, | Performed by: INTERNAL MEDICINE

## 2021-02-22 PROCEDURE — 99213 OFFICE O/P EST LOW 20 MIN: CPT | Mod: S$PBB,,, | Performed by: UROLOGY

## 2021-02-22 PROCEDURE — 93306 TTE W/DOPPLER COMPLETE: CPT | Mod: 26,,, | Performed by: INTERNAL MEDICINE

## 2021-02-22 PROCEDURE — 99213 PR OFFICE/OUTPT VISIT, EST, LEVL III, 20-29 MIN: ICD-10-PCS | Mod: S$PBB,,, | Performed by: UROLOGY

## 2021-02-22 PROCEDURE — 99999 PR PBB SHADOW E&M-EST. PATIENT-LVL V: CPT | Mod: PBBFAC,,, | Performed by: SURGERY

## 2021-02-22 PROCEDURE — 99215 OFFICE O/P EST HI 40 MIN: CPT | Mod: PBBFAC,25,27 | Performed by: SURGERY

## 2021-02-22 PROCEDURE — 99999 PR PBB SHADOW E&M-EST. PATIENT-LVL IV: ICD-10-PCS | Mod: PBBFAC,,, | Performed by: UROLOGY

## 2021-02-22 PROCEDURE — 93306 TTE W/DOPPLER COMPLETE: CPT

## 2021-02-22 PROCEDURE — 99214 OFFICE O/P EST MOD 30 MIN: CPT | Mod: PBBFAC,25 | Performed by: UROLOGY

## 2021-02-22 PROCEDURE — 99204 OFFICE O/P NEW MOD 45 MIN: CPT | Mod: S$PBB,,, | Performed by: SURGERY

## 2021-02-22 PROCEDURE — 99999 PR PBB SHADOW E&M-EST. PATIENT-LVL V: ICD-10-PCS | Mod: PBBFAC,,, | Performed by: SURGERY

## 2021-02-22 RX ORDER — SODIUM CHLORIDE 9 MG/ML
INJECTION, SOLUTION INTRAVENOUS CONTINUOUS
Status: CANCELLED | OUTPATIENT
Start: 2021-02-22

## 2021-02-22 RX ORDER — LIDOCAINE HYDROCHLORIDE 10 MG/ML
1 INJECTION, SOLUTION EPIDURAL; INFILTRATION; INTRACAUDAL; PERINEURAL ONCE
Status: CANCELLED | OUTPATIENT
Start: 2021-02-22 | End: 2021-02-22

## 2021-02-23 ENCOUNTER — TELEPHONE (OUTPATIENT)
Dept: CARDIOLOGY | Facility: CLINIC | Age: 71
End: 2021-02-23

## 2021-02-23 ENCOUNTER — LAB VISIT (OUTPATIENT)
Dept: OTOLARYNGOLOGY | Facility: CLINIC | Age: 71
End: 2021-02-23
Payer: MEDICARE

## 2021-02-23 ENCOUNTER — PATIENT MESSAGE (OUTPATIENT)
Dept: DIABETES | Facility: CLINIC | Age: 71
End: 2021-02-23

## 2021-02-23 DIAGNOSIS — Z01.818 PRE-OP TESTING: ICD-10-CM

## 2021-02-23 LAB — SARS-COV-2 RNA RESP QL NAA+PROBE: NOT DETECTED

## 2021-02-23 PROCEDURE — U0003 INFECTIOUS AGENT DETECTION BY NUCLEIC ACID (DNA OR RNA); SEVERE ACUTE RESPIRATORY SYNDROME CORONAVIRUS 2 (SARS-COV-2) (CORONAVIRUS DISEASE [COVID-19]), AMPLIFIED PROBE TECHNIQUE, MAKING USE OF HIGH THROUGHPUT TECHNOLOGIES AS DESCRIBED BY CMS-2020-01-R: HCPCS

## 2021-02-23 PROCEDURE — U0005 INFEC AGEN DETEC AMPLI PROBE: HCPCS

## 2021-02-24 ENCOUNTER — ANESTHESIA EVENT (OUTPATIENT)
Dept: SURGERY | Facility: HOSPITAL | Age: 71
End: 2021-02-24
Payer: MEDICARE

## 2021-02-25 ENCOUNTER — TELEPHONE (OUTPATIENT)
Dept: PREADMISSION TESTING | Facility: HOSPITAL | Age: 71
End: 2021-02-25

## 2021-02-26 ENCOUNTER — HOSPITAL ENCOUNTER (OUTPATIENT)
Facility: HOSPITAL | Age: 71
Discharge: HOME | End: 2021-02-26
Attending: SURGERY | Admitting: SURGERY
Payer: MEDICARE

## 2021-02-26 ENCOUNTER — ANESTHESIA (OUTPATIENT)
Dept: SURGERY | Facility: HOSPITAL | Age: 71
End: 2021-02-26
Payer: MEDICARE

## 2021-02-26 VITALS
DIASTOLIC BLOOD PRESSURE: 63 MMHG | BODY MASS INDEX: 27.47 KG/M2 | WEIGHT: 196.19 LBS | OXYGEN SATURATION: 95 % | TEMPERATURE: 98 F | HEART RATE: 73 BPM | SYSTOLIC BLOOD PRESSURE: 119 MMHG | HEIGHT: 71 IN | RESPIRATION RATE: 18 BRPM

## 2021-02-26 DIAGNOSIS — K40.90 LEFT INGUINAL HERNIA: ICD-10-CM

## 2021-02-26 LAB
POCT GLUCOSE: 158 MG/DL (ref 70–110)
POCT GLUCOSE: 168 MG/DL (ref 70–110)

## 2021-02-26 PROCEDURE — 71000033 HC RECOVERY, INTIAL HOUR: Performed by: SURGERY

## 2021-02-26 PROCEDURE — 71000015 HC POSTOP RECOV 1ST HR: Performed by: SURGERY

## 2021-02-26 PROCEDURE — D9220A PRA ANESTHESIA: Mod: CRNA,,, | Performed by: NURSE ANESTHETIST, CERTIFIED REGISTERED

## 2021-02-26 PROCEDURE — 37000008 HC ANESTHESIA 1ST 15 MINUTES: Performed by: SURGERY

## 2021-02-26 PROCEDURE — 49650 LAP ING HERNIA REPAIR INIT: CPT | Mod: LT,,, | Performed by: SURGERY

## 2021-02-26 PROCEDURE — 25000003 PHARM REV CODE 250: Performed by: NURSE ANESTHETIST, CERTIFIED REGISTERED

## 2021-02-26 PROCEDURE — 25000003 PHARM REV CODE 250: Performed by: SURGERY

## 2021-02-26 PROCEDURE — D9220A PRA ANESTHESIA: ICD-10-PCS | Mod: CRNA,,, | Performed by: NURSE ANESTHETIST, CERTIFIED REGISTERED

## 2021-02-26 PROCEDURE — C1781 MESH (IMPLANTABLE): HCPCS | Performed by: SURGERY

## 2021-02-26 PROCEDURE — 37000009 HC ANESTHESIA EA ADD 15 MINS: Performed by: SURGERY

## 2021-02-26 PROCEDURE — D9220A PRA ANESTHESIA: Mod: ANES,,, | Performed by: ANESTHESIOLOGY

## 2021-02-26 PROCEDURE — 63600175 PHARM REV CODE 636 W HCPCS: Performed by: NURSE ANESTHETIST, CERTIFIED REGISTERED

## 2021-02-26 PROCEDURE — D9220A PRA ANESTHESIA: ICD-10-PCS | Mod: ANES,,, | Performed by: ANESTHESIOLOGY

## 2021-02-26 PROCEDURE — 63600175 PHARM REV CODE 636 W HCPCS: Performed by: ANESTHESIOLOGY

## 2021-02-26 PROCEDURE — 82962 GLUCOSE BLOOD TEST: CPT | Performed by: SURGERY

## 2021-02-26 PROCEDURE — 71000039 HC RECOVERY, EACH ADD'L HOUR: Performed by: SURGERY

## 2021-02-26 PROCEDURE — 71000016 HC POSTOP RECOV ADDL HR: Performed by: SURGERY

## 2021-02-26 PROCEDURE — 49650 PR LAP,INGUINAL HERNIA REPR,INITIAL: ICD-10-PCS | Mod: LT,,, | Performed by: SURGERY

## 2021-02-26 PROCEDURE — 36000710: Performed by: SURGERY

## 2021-02-26 PROCEDURE — 36000711: Performed by: SURGERY

## 2021-02-26 PROCEDURE — 63600175 PHARM REV CODE 636 W HCPCS: Performed by: SURGERY

## 2021-02-26 PROCEDURE — 25000003 PHARM REV CODE 250: Performed by: ANESTHESIOLOGY

## 2021-02-26 DEVICE — MESH 3DMAX LF MED 7.9CMX13.4CM: Type: IMPLANTABLE DEVICE | Site: INGUINAL | Status: FUNCTIONAL

## 2021-02-26 RX ORDER — HYDROCODONE BITARTRATE AND ACETAMINOPHEN 5; 325 MG/1; MG/1
1 TABLET ORAL EVERY 4 HOURS PRN
Status: DISCONTINUED | OUTPATIENT
Start: 2021-02-26 | End: 2021-02-26 | Stop reason: HOSPADM

## 2021-02-26 RX ORDER — BUPIVACAINE HYDROCHLORIDE 2.5 MG/ML
INJECTION, SOLUTION EPIDURAL; INFILTRATION; INTRACAUDAL
Status: DISCONTINUED
Start: 2021-02-26 | End: 2021-02-26 | Stop reason: HOSPADM

## 2021-02-26 RX ORDER — PROPOFOL 10 MG/ML
VIAL (ML) INTRAVENOUS
Status: DISCONTINUED | OUTPATIENT
Start: 2021-02-26 | End: 2021-02-26

## 2021-02-26 RX ORDER — SODIUM CHLORIDE 9 MG/ML
INJECTION, SOLUTION INTRAVENOUS CONTINUOUS
Status: DISCONTINUED | OUTPATIENT
Start: 2021-02-26 | End: 2021-02-26 | Stop reason: HOSPADM

## 2021-02-26 RX ORDER — SODIUM CHLORIDE, SODIUM LACTATE, POTASSIUM CHLORIDE, CALCIUM CHLORIDE 600; 310; 30; 20 MG/100ML; MG/100ML; MG/100ML; MG/100ML
INJECTION, SOLUTION INTRAVENOUS CONTINUOUS
Status: ACTIVE | OUTPATIENT
Start: 2021-02-26

## 2021-02-26 RX ORDER — ACETAMINOPHEN 10 MG/ML
INJECTION, SOLUTION INTRAVENOUS
Status: DISCONTINUED | OUTPATIENT
Start: 2021-02-26 | End: 2021-02-26

## 2021-02-26 RX ORDER — FENTANYL CITRATE 50 UG/ML
25 INJECTION, SOLUTION INTRAMUSCULAR; INTRAVENOUS EVERY 5 MIN PRN
Status: DISCONTINUED | OUTPATIENT
Start: 2021-02-26 | End: 2021-02-26 | Stop reason: HOSPADM

## 2021-02-26 RX ORDER — LIDOCAINE HYDROCHLORIDE 10 MG/ML
INJECTION, SOLUTION EPIDURAL; INFILTRATION; INTRACAUDAL; PERINEURAL
Status: DISCONTINUED | OUTPATIENT
Start: 2021-02-26 | End: 2021-02-26 | Stop reason: HOSPADM

## 2021-02-26 RX ORDER — LIDOCAINE HYDROCHLORIDE 10 MG/ML
INJECTION, SOLUTION EPIDURAL; INFILTRATION; INTRACAUDAL; PERINEURAL
Status: DISCONTINUED
Start: 2021-02-26 | End: 2021-02-26 | Stop reason: HOSPADM

## 2021-02-26 RX ORDER — LIDOCAINE HYDROCHLORIDE 20 MG/ML
INJECTION, SOLUTION EPIDURAL; INFILTRATION; INTRACAUDAL; PERINEURAL
Status: DISCONTINUED | OUTPATIENT
Start: 2021-02-26 | End: 2021-02-26

## 2021-02-26 RX ORDER — ONDANSETRON 4 MG/1
8 TABLET, ORALLY DISINTEGRATING ORAL EVERY 8 HOURS PRN
Status: DISCONTINUED | OUTPATIENT
Start: 2021-02-26 | End: 2021-02-26 | Stop reason: HOSPADM

## 2021-02-26 RX ORDER — MIDAZOLAM HYDROCHLORIDE 1 MG/ML
INJECTION INTRAMUSCULAR; INTRAVENOUS
Status: DISCONTINUED | OUTPATIENT
Start: 2021-02-26 | End: 2021-02-26

## 2021-02-26 RX ORDER — FENTANYL CITRATE 50 UG/ML
INJECTION, SOLUTION INTRAMUSCULAR; INTRAVENOUS
Status: DISCONTINUED | OUTPATIENT
Start: 2021-02-26 | End: 2021-02-26

## 2021-02-26 RX ORDER — HYDROCODONE BITARTRATE AND ACETAMINOPHEN 10; 325 MG/1; MG/1
1 TABLET ORAL EVERY 4 HOURS PRN
Status: DISCONTINUED | OUTPATIENT
Start: 2021-02-26 | End: 2021-02-26 | Stop reason: HOSPADM

## 2021-02-26 RX ORDER — ONDANSETRON 2 MG/ML
INJECTION INTRAMUSCULAR; INTRAVENOUS
Status: DISCONTINUED | OUTPATIENT
Start: 2021-02-26 | End: 2021-02-26

## 2021-02-26 RX ORDER — KETOROLAC TROMETHAMINE 30 MG/ML
INJECTION, SOLUTION INTRAMUSCULAR; INTRAVENOUS
Status: DISCONTINUED | OUTPATIENT
Start: 2021-02-26 | End: 2021-02-26

## 2021-02-26 RX ORDER — IBUPROFEN 800 MG/1
800 TABLET ORAL 3 TIMES DAILY PRN
Qty: 30 TABLET | Refills: 0 | Status: SHIPPED | OUTPATIENT
Start: 2021-02-26 | End: 2021-07-06

## 2021-02-26 RX ORDER — DIPHENHYDRAMINE HYDROCHLORIDE 50 MG/ML
25 INJECTION INTRAMUSCULAR; INTRAVENOUS EVERY 6 HOURS PRN
Status: DISCONTINUED | OUTPATIENT
Start: 2021-02-26 | End: 2021-02-26 | Stop reason: HOSPADM

## 2021-02-26 RX ORDER — ROCURONIUM BROMIDE 10 MG/ML
INJECTION, SOLUTION INTRAVENOUS
Status: DISCONTINUED | OUTPATIENT
Start: 2021-02-26 | End: 2021-02-26

## 2021-02-26 RX ORDER — ALBUTEROL SULFATE 0.83 MG/ML
2.5 SOLUTION RESPIRATORY (INHALATION) EVERY 4 HOURS PRN
Status: DISCONTINUED | OUTPATIENT
Start: 2021-02-26 | End: 2021-02-26 | Stop reason: HOSPADM

## 2021-02-26 RX ORDER — LIDOCAINE HYDROCHLORIDE 10 MG/ML
1 INJECTION, SOLUTION EPIDURAL; INFILTRATION; INTRACAUDAL; PERINEURAL ONCE
Status: DISCONTINUED | OUTPATIENT
Start: 2021-02-26 | End: 2021-02-26 | Stop reason: HOSPADM

## 2021-02-26 RX ORDER — SUCCINYLCHOLINE CHLORIDE 20 MG/ML
INJECTION INTRAMUSCULAR; INTRAVENOUS
Status: DISCONTINUED | OUTPATIENT
Start: 2021-02-26 | End: 2021-02-26

## 2021-02-26 RX ORDER — HYDROCODONE BITARTRATE AND ACETAMINOPHEN 5; 325 MG/1; MG/1
1 TABLET ORAL EVERY 4 HOURS PRN
Qty: 20 TABLET | Refills: 0 | Status: SHIPPED | OUTPATIENT
Start: 2021-02-26 | End: 2021-06-07 | Stop reason: ALTCHOICE

## 2021-02-26 RX ORDER — ONDANSETRON 2 MG/ML
4 INJECTION INTRAMUSCULAR; INTRAVENOUS ONCE AS NEEDED
Status: DISCONTINUED | OUTPATIENT
Start: 2021-02-26 | End: 2021-02-26 | Stop reason: HOSPADM

## 2021-02-26 RX ORDER — CEFAZOLIN SODIUM 2 G/50ML
2 SOLUTION INTRAVENOUS
Status: COMPLETED | OUTPATIENT
Start: 2021-02-26 | End: 2021-02-26

## 2021-02-26 RX ORDER — HYDROCODONE BITARTRATE AND ACETAMINOPHEN 5; 325 MG/1; MG/1
1 TABLET ORAL
Status: DISCONTINUED | OUTPATIENT
Start: 2021-02-26 | End: 2021-02-26 | Stop reason: HOSPADM

## 2021-02-26 RX ORDER — BUPIVACAINE HYDROCHLORIDE 2.5 MG/ML
INJECTION, SOLUTION EPIDURAL; INFILTRATION; INTRACAUDAL
Status: DISCONTINUED | OUTPATIENT
Start: 2021-02-26 | End: 2021-02-26 | Stop reason: HOSPADM

## 2021-02-26 RX ORDER — MEPERIDINE HYDROCHLORIDE 25 MG/ML
12.5 INJECTION INTRAMUSCULAR; INTRAVENOUS; SUBCUTANEOUS ONCE
Status: DISCONTINUED | OUTPATIENT
Start: 2021-02-26 | End: 2021-02-26 | Stop reason: HOSPADM

## 2021-02-26 RX ORDER — EPHEDRINE SULFATE 50 MG/ML
INJECTION, SOLUTION INTRAVENOUS
Status: DISCONTINUED | OUTPATIENT
Start: 2021-02-26 | End: 2021-02-26

## 2021-02-26 RX ADMIN — EPHEDRINE SULFATE 15 MG: 50 INJECTION INTRAVENOUS at 07:02

## 2021-02-26 RX ADMIN — PROPOFOL 100 MG: 10 INJECTION, EMULSION INTRAVENOUS at 07:02

## 2021-02-26 RX ADMIN — HYDROCODONE BITARTRATE AND ACETAMINOPHEN 1 TABLET: 5; 325 TABLET ORAL at 09:02

## 2021-02-26 RX ADMIN — SUCCINYLCHOLINE CHLORIDE 100 MG: 20 INJECTION, SOLUTION INTRAMUSCULAR; INTRAVENOUS at 07:02

## 2021-02-26 RX ADMIN — ROCURONIUM BROMIDE 20 MG: 10 INJECTION, SOLUTION INTRAVENOUS at 07:02

## 2021-02-26 RX ADMIN — MIDAZOLAM HYDROCHLORIDE 2 MG: 1 INJECTION INTRAMUSCULAR; INTRAVENOUS at 07:02

## 2021-02-26 RX ADMIN — SODIUM CHLORIDE, SODIUM LACTATE, POTASSIUM CHLORIDE, AND CALCIUM CHLORIDE: .6; .31; .03; .02 INJECTION, SOLUTION INTRAVENOUS at 06:02

## 2021-02-26 RX ADMIN — KETOROLAC TROMETHAMINE 30 MG: 30 INJECTION, SOLUTION INTRAMUSCULAR at 09:02

## 2021-02-26 RX ADMIN — CEFAZOLIN SODIUM 2 G: 2 SOLUTION INTRAVENOUS at 07:02

## 2021-02-26 RX ADMIN — FENTANYL CITRATE 50 MCG: 50 INJECTION, SOLUTION INTRAMUSCULAR; INTRAVENOUS at 07:02

## 2021-02-26 RX ADMIN — ONDANSETRON 4 MG: 2 INJECTION, SOLUTION INTRAMUSCULAR; INTRAVENOUS at 07:02

## 2021-02-26 RX ADMIN — FENTANYL CITRATE 50 MCG: 50 INJECTION, SOLUTION INTRAMUSCULAR; INTRAVENOUS at 08:02

## 2021-02-26 RX ADMIN — FENTANYL CITRATE 100 MCG: 50 INJECTION, SOLUTION INTRAMUSCULAR; INTRAVENOUS at 07:02

## 2021-02-26 RX ADMIN — LIDOCAINE HYDROCHLORIDE 100 MG: 20 INJECTION, SOLUTION EPIDURAL; INFILTRATION; INTRACAUDAL; PERINEURAL at 07:02

## 2021-02-26 RX ADMIN — ACETAMINOPHEN 1000 MG: 10 INJECTION, SOLUTION INTRAVENOUS at 08:02

## 2021-03-10 ENCOUNTER — LAB VISIT (OUTPATIENT)
Dept: LAB | Facility: HOSPITAL | Age: 71
End: 2021-03-10
Attending: PEDIATRICS
Payer: MEDICARE

## 2021-03-10 ENCOUNTER — OFFICE VISIT (OUTPATIENT)
Dept: SURGERY | Facility: CLINIC | Age: 71
End: 2021-03-10
Payer: MEDICARE

## 2021-03-10 VITALS
WEIGHT: 197.75 LBS | TEMPERATURE: 98 F | HEIGHT: 71 IN | HEART RATE: 66 BPM | SYSTOLIC BLOOD PRESSURE: 140 MMHG | DIASTOLIC BLOOD PRESSURE: 77 MMHG | BODY MASS INDEX: 27.69 KG/M2

## 2021-03-10 DIAGNOSIS — Z79.4 TYPE 2 DIABETES MELLITUS WITH COMPLICATION, WITH LONG-TERM CURRENT USE OF INSULIN: ICD-10-CM

## 2021-03-10 DIAGNOSIS — E11.8 TYPE 2 DIABETES MELLITUS WITH COMPLICATION, WITH LONG-TERM CURRENT USE OF INSULIN: ICD-10-CM

## 2021-03-10 DIAGNOSIS — K40.90 LEFT INGUINAL HERNIA: Primary | ICD-10-CM

## 2021-03-10 LAB
ALBUMIN SERPL BCP-MCNC: 4.1 G/DL (ref 3.5–5.2)
ALP SERPL-CCNC: 41 U/L (ref 55–135)
ALT SERPL W/O P-5'-P-CCNC: 26 U/L (ref 10–44)
ANION GAP SERPL CALC-SCNC: 8 MMOL/L (ref 8–16)
AST SERPL-CCNC: 21 U/L (ref 10–40)
BILIRUB SERPL-MCNC: 0.6 MG/DL (ref 0.1–1)
BUN SERPL-MCNC: 18 MG/DL (ref 8–23)
CALCIUM SERPL-MCNC: 8.9 MG/DL (ref 8.7–10.5)
CHLORIDE SERPL-SCNC: 106 MMOL/L (ref 95–110)
CHOLEST SERPL-MCNC: 129 MG/DL (ref 120–199)
CHOLEST/HDLC SERPL: 5 {RATIO} (ref 2–5)
CO2 SERPL-SCNC: 27 MMOL/L (ref 23–29)
CREAT SERPL-MCNC: 0.8 MG/DL (ref 0.5–1.4)
EST. GFR  (AFRICAN AMERICAN): >60 ML/MIN/1.73 M^2
EST. GFR  (NON AFRICAN AMERICAN): >60 ML/MIN/1.73 M^2
GLUCOSE SERPL-MCNC: 102 MG/DL (ref 70–110)
HDLC SERPL-MCNC: 26 MG/DL (ref 40–75)
HDLC SERPL: 20.2 % (ref 20–50)
LDLC SERPL CALC-MCNC: 52.4 MG/DL (ref 63–159)
NONHDLC SERPL-MCNC: 103 MG/DL
POTASSIUM SERPL-SCNC: 4 MMOL/L (ref 3.5–5.1)
PROT SERPL-MCNC: 6.7 G/DL (ref 6–8.4)
SODIUM SERPL-SCNC: 141 MMOL/L (ref 136–145)
TRIGL SERPL-MCNC: 253 MG/DL (ref 30–150)

## 2021-03-10 PROCEDURE — 80061 LIPID PANEL: CPT | Performed by: PEDIATRICS

## 2021-03-10 PROCEDURE — 36415 COLL VENOUS BLD VENIPUNCTURE: CPT | Performed by: PEDIATRICS

## 2021-03-10 PROCEDURE — 80053 COMPREHEN METABOLIC PANEL: CPT | Performed by: PEDIATRICS

## 2021-03-10 PROCEDURE — 83036 HEMOGLOBIN GLYCOSYLATED A1C: CPT | Performed by: PEDIATRICS

## 2021-03-10 PROCEDURE — 99024 PR POST-OP FOLLOW-UP VISIT: ICD-10-PCS | Mod: POP,,, | Performed by: SURGERY

## 2021-03-10 PROCEDURE — 99024 POSTOP FOLLOW-UP VISIT: CPT | Mod: POP,,, | Performed by: SURGERY

## 2021-03-10 PROCEDURE — 99999 PR PBB SHADOW E&M-EST. PATIENT-LVL IV: CPT | Mod: PBBFAC,,, | Performed by: SURGERY

## 2021-03-10 PROCEDURE — 99214 OFFICE O/P EST MOD 30 MIN: CPT | Mod: PBBFAC | Performed by: SURGERY

## 2021-03-10 PROCEDURE — 99999 PR PBB SHADOW E&M-EST. PATIENT-LVL IV: ICD-10-PCS | Mod: PBBFAC,,, | Performed by: SURGERY

## 2021-03-11 PROBLEM — R10.32 GROIN PAIN, LEFT: Status: RESOLVED | Noted: 2021-02-11 | Resolved: 2021-03-11

## 2021-03-11 PROBLEM — K40.90 LEFT INGUINAL HERNIA: Status: RESOLVED | Noted: 2021-02-26 | Resolved: 2021-03-11

## 2021-03-11 LAB
ESTIMATED AVG GLUCOSE: 154 MG/DL (ref 68–131)
HBA1C MFR BLD: 7 % (ref 4–5.6)

## 2021-03-15 ENCOUNTER — OFFICE VISIT (OUTPATIENT)
Dept: UROLOGY | Facility: CLINIC | Age: 71
End: 2021-03-15
Payer: MEDICARE

## 2021-03-15 VITALS
HEIGHT: 71 IN | SYSTOLIC BLOOD PRESSURE: 144 MMHG | DIASTOLIC BLOOD PRESSURE: 72 MMHG | WEIGHT: 200.38 LBS | HEART RATE: 71 BPM | BODY MASS INDEX: 28.05 KG/M2

## 2021-03-15 DIAGNOSIS — N20.0 NEPHROLITHIASIS: ICD-10-CM

## 2021-03-15 DIAGNOSIS — N52.9 ERECTILE DYSFUNCTION, UNSPECIFIED ERECTILE DYSFUNCTION TYPE: ICD-10-CM

## 2021-03-15 DIAGNOSIS — K40.90 NON-RECURRENT UNILATERAL INGUINAL HERNIA WITHOUT OBSTRUCTION OR GANGRENE: ICD-10-CM

## 2021-03-15 DIAGNOSIS — R10.32 GROIN PAIN, LEFT: Primary | ICD-10-CM

## 2021-03-15 PROCEDURE — 99214 PR OFFICE/OUTPT VISIT, EST, LEVL IV, 30-39 MIN: ICD-10-PCS | Mod: S$PBB,,, | Performed by: UROLOGY

## 2021-03-15 PROCEDURE — 99999 PR PBB SHADOW E&M-EST. PATIENT-LVL IV: ICD-10-PCS | Mod: PBBFAC,,, | Performed by: UROLOGY

## 2021-03-15 PROCEDURE — 99214 OFFICE O/P EST MOD 30 MIN: CPT | Mod: S$PBB,,, | Performed by: UROLOGY

## 2021-03-15 PROCEDURE — 99999 PR PBB SHADOW E&M-EST. PATIENT-LVL IV: CPT | Mod: PBBFAC,,, | Performed by: UROLOGY

## 2021-03-15 PROCEDURE — 99214 OFFICE O/P EST MOD 30 MIN: CPT | Mod: PBBFAC | Performed by: UROLOGY

## 2021-03-16 ENCOUNTER — TELEPHONE (OUTPATIENT)
Dept: OPHTHALMOLOGY | Facility: CLINIC | Age: 71
End: 2021-03-16

## 2021-03-16 ENCOUNTER — OFFICE VISIT (OUTPATIENT)
Dept: DIABETES | Facility: CLINIC | Age: 71
End: 2021-03-16
Payer: MEDICARE

## 2021-03-16 VITALS
SYSTOLIC BLOOD PRESSURE: 126 MMHG | HEART RATE: 66 BPM | HEIGHT: 71 IN | WEIGHT: 200.19 LBS | DIASTOLIC BLOOD PRESSURE: 77 MMHG | RESPIRATION RATE: 18 BRPM | BODY MASS INDEX: 28.02 KG/M2

## 2021-03-16 DIAGNOSIS — E11.8 TYPE 2 DIABETES MELLITUS WITH COMPLICATION, WITH LONG-TERM CURRENT USE OF INSULIN: Primary | ICD-10-CM

## 2021-03-16 DIAGNOSIS — E11.69 HYPERLIPIDEMIA ASSOCIATED WITH TYPE 2 DIABETES MELLITUS: ICD-10-CM

## 2021-03-16 DIAGNOSIS — G47.33 OBSTRUCTIVE SLEEP APNEA: ICD-10-CM

## 2021-03-16 DIAGNOSIS — I15.2 HYPERTENSION ASSOCIATED WITH DIABETES: ICD-10-CM

## 2021-03-16 DIAGNOSIS — Z79.4 TYPE 2 DIABETES MELLITUS WITH COMPLICATION, WITH LONG-TERM CURRENT USE OF INSULIN: Primary | ICD-10-CM

## 2021-03-16 DIAGNOSIS — E78.5 HYPERLIPIDEMIA ASSOCIATED WITH TYPE 2 DIABETES MELLITUS: ICD-10-CM

## 2021-03-16 DIAGNOSIS — E66.3 OVERWEIGHT (BMI 25.0-29.9): ICD-10-CM

## 2021-03-16 DIAGNOSIS — E11.59 HYPERTENSION ASSOCIATED WITH DIABETES: ICD-10-CM

## 2021-03-16 DIAGNOSIS — I25.10 CORONARY ARTERY DISEASE INVOLVING NATIVE CORONARY ARTERY OF NATIVE HEART WITHOUT ANGINA PECTORIS: ICD-10-CM

## 2021-03-16 LAB — GLUCOSE SERPL-MCNC: 123 MG/DL (ref 70–110)

## 2021-03-16 PROCEDURE — 82962 GLUCOSE BLOOD TEST: CPT | Mod: PBBFAC | Performed by: PHYSICIAN ASSISTANT

## 2021-03-16 PROCEDURE — 99214 PR OFFICE/OUTPT VISIT, EST, LEVL IV, 30-39 MIN: ICD-10-PCS | Mod: S$PBB,,, | Performed by: PHYSICIAN ASSISTANT

## 2021-03-16 PROCEDURE — 99999 PR PBB SHADOW E&M-EST. PATIENT-LVL V: ICD-10-PCS | Mod: PBBFAC,,, | Performed by: PHYSICIAN ASSISTANT

## 2021-03-16 PROCEDURE — 99214 OFFICE O/P EST MOD 30 MIN: CPT | Mod: S$PBB,,, | Performed by: PHYSICIAN ASSISTANT

## 2021-03-16 PROCEDURE — 99999 PR PBB SHADOW E&M-EST. PATIENT-LVL V: CPT | Mod: PBBFAC,,, | Performed by: PHYSICIAN ASSISTANT

## 2021-03-16 PROCEDURE — 99215 OFFICE O/P EST HI 40 MIN: CPT | Mod: PBBFAC | Performed by: PHYSICIAN ASSISTANT

## 2021-03-17 PROCEDURE — 99454 REM MNTR PHYSIOL PARAM 16-30: CPT | Mod: PBBFAC | Performed by: PEDIATRICS

## 2021-03-30 ENCOUNTER — PATIENT MESSAGE (OUTPATIENT)
Dept: UROLOGY | Facility: CLINIC | Age: 71
End: 2021-03-30

## 2021-03-30 ENCOUNTER — PATIENT MESSAGE (OUTPATIENT)
Dept: DIABETES | Facility: CLINIC | Age: 71
End: 2021-03-30

## 2021-03-30 ENCOUNTER — OFFICE VISIT (OUTPATIENT)
Dept: INTERNAL MEDICINE | Facility: CLINIC | Age: 71
End: 2021-03-30
Payer: MEDICARE

## 2021-03-30 VITALS
OXYGEN SATURATION: 98 % | HEART RATE: 78 BPM | SYSTOLIC BLOOD PRESSURE: 132 MMHG | DIASTOLIC BLOOD PRESSURE: 68 MMHG | TEMPERATURE: 98 F | BODY MASS INDEX: 27.52 KG/M2 | WEIGHT: 197.31 LBS | RESPIRATION RATE: 16 BRPM

## 2021-03-30 DIAGNOSIS — E78.5 HYPERLIPIDEMIA ASSOCIATED WITH TYPE 2 DIABETES MELLITUS: ICD-10-CM

## 2021-03-30 DIAGNOSIS — N52.9 ERECTILE DYSFUNCTION, UNSPECIFIED ERECTILE DYSFUNCTION TYPE: ICD-10-CM

## 2021-03-30 DIAGNOSIS — I34.0 NONRHEUMATIC MITRAL VALVE REGURGITATION: ICD-10-CM

## 2021-03-30 DIAGNOSIS — E11.69 HYPERLIPIDEMIA ASSOCIATED WITH TYPE 2 DIABETES MELLITUS: ICD-10-CM

## 2021-03-30 DIAGNOSIS — I15.2 HYPERTENSION ASSOCIATED WITH DIABETES: Primary | ICD-10-CM

## 2021-03-30 DIAGNOSIS — E11.8 TYPE 2 DIABETES MELLITUS WITH COMPLICATION, WITH LONG-TERM CURRENT USE OF INSULIN: ICD-10-CM

## 2021-03-30 DIAGNOSIS — G47.33 OBSTRUCTIVE SLEEP APNEA: ICD-10-CM

## 2021-03-30 DIAGNOSIS — E11.59 HYPERTENSION ASSOCIATED WITH DIABETES: Primary | ICD-10-CM

## 2021-03-30 DIAGNOSIS — I25.10 CORONARY ARTERY DISEASE INVOLVING NATIVE CORONARY ARTERY OF NATIVE HEART WITHOUT ANGINA PECTORIS: ICD-10-CM

## 2021-03-30 DIAGNOSIS — Z79.4 TYPE 2 DIABETES MELLITUS WITH COMPLICATION, WITH LONG-TERM CURRENT USE OF INSULIN: ICD-10-CM

## 2021-03-30 PROCEDURE — 99999 PR PBB SHADOW E&M-EST. PATIENT-LVL V: ICD-10-PCS | Mod: PBBFAC,,, | Performed by: NURSE PRACTITIONER

## 2021-03-30 PROCEDURE — 99215 OFFICE O/P EST HI 40 MIN: CPT | Mod: PBBFAC | Performed by: NURSE PRACTITIONER

## 2021-03-30 PROCEDURE — 99214 OFFICE O/P EST MOD 30 MIN: CPT | Mod: S$PBB,,, | Performed by: NURSE PRACTITIONER

## 2021-03-30 PROCEDURE — 99999 PR PBB SHADOW E&M-EST. PATIENT-LVL V: CPT | Mod: PBBFAC,,, | Performed by: NURSE PRACTITIONER

## 2021-03-30 PROCEDURE — 99214 PR OFFICE/OUTPT VISIT, EST, LEVL IV, 30-39 MIN: ICD-10-PCS | Mod: S$PBB,,, | Performed by: NURSE PRACTITIONER

## 2021-04-02 ENCOUNTER — PATIENT MESSAGE (OUTPATIENT)
Dept: ADMINISTRATIVE | Facility: OTHER | Age: 71
End: 2021-04-02

## 2021-04-08 ENCOUNTER — PATIENT MESSAGE (OUTPATIENT)
Dept: SURGERY | Facility: CLINIC | Age: 71
End: 2021-04-08

## 2021-04-12 ENCOUNTER — OFFICE VISIT (OUTPATIENT)
Dept: OPHTHALMOLOGY | Facility: CLINIC | Age: 71
End: 2021-04-12
Payer: MEDICARE

## 2021-04-12 DIAGNOSIS — H52.4 BILATERAL PRESBYOPIA: ICD-10-CM

## 2021-04-12 DIAGNOSIS — I15.2 HYPERTENSION ASSOCIATED WITH DIABETES: ICD-10-CM

## 2021-04-12 DIAGNOSIS — E11.36 DIABETIC CATARACT: ICD-10-CM

## 2021-04-12 DIAGNOSIS — E11.59 HYPERTENSION ASSOCIATED WITH DIABETES: ICD-10-CM

## 2021-04-12 DIAGNOSIS — E11.9 DIABETES MELLITUS TYPE 2 WITHOUT RETINOPATHY: Primary | ICD-10-CM

## 2021-04-12 PROCEDURE — 92015 PR REFRACTION: ICD-10-PCS | Mod: ,,, | Performed by: OPTOMETRIST

## 2021-04-12 PROCEDURE — 99213 OFFICE O/P EST LOW 20 MIN: CPT | Mod: PBBFAC | Performed by: OPTOMETRIST

## 2021-04-12 PROCEDURE — 92014 COMPRE OPH EXAM EST PT 1/>: CPT | Mod: S$PBB,,, | Performed by: OPTOMETRIST

## 2021-04-12 PROCEDURE — 92015 DETERMINE REFRACTIVE STATE: CPT | Mod: ,,, | Performed by: OPTOMETRIST

## 2021-04-12 PROCEDURE — 92014 PR EYE EXAM, EST PATIENT,COMPREHESV: ICD-10-PCS | Mod: S$PBB,,, | Performed by: OPTOMETRIST

## 2021-04-12 PROCEDURE — 99999 PR PBB SHADOW E&M-EST. PATIENT-LVL III: ICD-10-PCS | Mod: PBBFAC,,, | Performed by: OPTOMETRIST

## 2021-04-12 PROCEDURE — 99999 PR PBB SHADOW E&M-EST. PATIENT-LVL III: CPT | Mod: PBBFAC,,, | Performed by: OPTOMETRIST

## 2021-04-17 PROCEDURE — 99454 REM MNTR PHYSIOL PARAM 16-30: CPT | Mod: PBBFAC | Performed by: PEDIATRICS

## 2021-05-03 ENCOUNTER — PATIENT MESSAGE (OUTPATIENT)
Dept: DIABETES | Facility: CLINIC | Age: 71
End: 2021-05-03

## 2021-05-13 ENCOUNTER — OFFICE VISIT (OUTPATIENT)
Dept: PODIATRY | Facility: CLINIC | Age: 71
End: 2021-05-13
Payer: MEDICARE

## 2021-05-13 VITALS — HEIGHT: 71 IN | WEIGHT: 201.38 LBS | BODY MASS INDEX: 28.19 KG/M2

## 2021-05-13 DIAGNOSIS — B35.3 TINEA PEDIS OF BOTH FEET: ICD-10-CM

## 2021-05-13 DIAGNOSIS — B35.1 ONYCHOMYCOSIS DUE TO DERMATOPHYTE: ICD-10-CM

## 2021-05-13 DIAGNOSIS — Q82.8 POROKERATOSIS: ICD-10-CM

## 2021-05-13 DIAGNOSIS — Z79.4 TYPE 2 DIABETES MELLITUS WITH COMPLICATION, WITH LONG-TERM CURRENT USE OF INSULIN: Primary | ICD-10-CM

## 2021-05-13 DIAGNOSIS — E11.8 TYPE 2 DIABETES MELLITUS WITH COMPLICATION, WITH LONG-TERM CURRENT USE OF INSULIN: Primary | ICD-10-CM

## 2021-05-13 PROCEDURE — 11719 TRIM NAIL(S) ANY NUMBER: CPT | Mod: Q9,PBBFAC | Performed by: PODIATRIST

## 2021-05-13 PROCEDURE — 11719 TRIM NAIL(S) ANY NUMBER: CPT | Mod: 59,Q9,S$PBB, | Performed by: PODIATRIST

## 2021-05-13 PROCEDURE — 11056 PARNG/CUTG B9 HYPRKR LES 2-4: CPT | Mod: Q9,PBBFAC | Performed by: PODIATRIST

## 2021-05-13 PROCEDURE — 11056 PR TRIM BENIGN HYPERKERATOTIC SKIN LESION,2-4: ICD-10-PCS | Mod: Q9,S$PBB,, | Performed by: PODIATRIST

## 2021-05-13 PROCEDURE — 11056 PARNG/CUTG B9 HYPRKR LES 2-4: CPT | Mod: Q9,S$PBB,, | Performed by: PODIATRIST

## 2021-05-13 PROCEDURE — 11719 PR TRIM NAIL(S): ICD-10-PCS | Mod: 59,Q9,S$PBB, | Performed by: PODIATRIST

## 2021-05-13 PROCEDURE — 11720 DEBRIDE NAIL 1-5: CPT | Mod: 59,Q9,S$PBB, | Performed by: PODIATRIST

## 2021-05-13 PROCEDURE — 99213 OFFICE O/P EST LOW 20 MIN: CPT | Mod: PBBFAC | Performed by: PODIATRIST

## 2021-05-13 PROCEDURE — 99214 PR OFFICE/OUTPT VISIT, EST, LEVL IV, 30-39 MIN: ICD-10-PCS | Mod: 25,S$PBB,, | Performed by: PODIATRIST

## 2021-05-13 PROCEDURE — 99999 PR PBB SHADOW E&M-EST. PATIENT-LVL III: CPT | Mod: PBBFAC,,, | Performed by: PODIATRIST

## 2021-05-13 PROCEDURE — 99999 PR PBB SHADOW E&M-EST. PATIENT-LVL III: ICD-10-PCS | Mod: PBBFAC,,, | Performed by: PODIATRIST

## 2021-05-13 PROCEDURE — 99214 OFFICE O/P EST MOD 30 MIN: CPT | Mod: 25,S$PBB,, | Performed by: PODIATRIST

## 2021-05-13 PROCEDURE — 11720 PR DEBRIDEMENT OF NAIL(S), 1-5: ICD-10-PCS | Mod: 59,Q9,S$PBB, | Performed by: PODIATRIST

## 2021-05-13 PROCEDURE — 11720 DEBRIDE NAIL 1-5: CPT | Mod: 59,Q9,PBBFAC | Performed by: PODIATRIST

## 2021-05-13 RX ORDER — KETOCONAZOLE 20 MG/G
CREAM TOPICAL 2 TIMES DAILY
Qty: 1 TUBE | Refills: 1 | Status: SHIPPED | OUTPATIENT
Start: 2021-05-13 | End: 2021-05-26

## 2021-05-17 ENCOUNTER — OFFICE VISIT (OUTPATIENT)
Dept: UROLOGY | Facility: CLINIC | Age: 71
End: 2021-05-17
Payer: MEDICARE

## 2021-05-17 ENCOUNTER — OFFICE VISIT (OUTPATIENT)
Dept: PULMONOLOGY | Facility: CLINIC | Age: 71
End: 2021-05-17
Payer: MEDICARE

## 2021-05-17 VITALS
DIASTOLIC BLOOD PRESSURE: 84 MMHG | BODY MASS INDEX: 27.77 KG/M2 | SYSTOLIC BLOOD PRESSURE: 137 MMHG | WEIGHT: 199.06 LBS | TEMPERATURE: 97 F | HEART RATE: 72 BPM

## 2021-05-17 VITALS
OXYGEN SATURATION: 97 % | SYSTOLIC BLOOD PRESSURE: 130 MMHG | RESPIRATION RATE: 18 BRPM | WEIGHT: 200.81 LBS | DIASTOLIC BLOOD PRESSURE: 62 MMHG | HEART RATE: 68 BPM | HEIGHT: 71 IN | BODY MASS INDEX: 28.11 KG/M2

## 2021-05-17 DIAGNOSIS — G47.33 OBSTRUCTIVE SLEEP APNEA: ICD-10-CM

## 2021-05-17 DIAGNOSIS — N52.9 ERECTILE DYSFUNCTION, UNSPECIFIED ERECTILE DYSFUNCTION TYPE: ICD-10-CM

## 2021-05-17 DIAGNOSIS — R10.32 GROIN PAIN, LEFT: ICD-10-CM

## 2021-05-17 DIAGNOSIS — N20.0 NEPHROLITHIASIS: Primary | ICD-10-CM

## 2021-05-17 PROCEDURE — 99215 OFFICE O/P EST HI 40 MIN: CPT | Mod: PBBFAC,25 | Performed by: UROLOGY

## 2021-05-17 PROCEDURE — 99214 OFFICE O/P EST MOD 30 MIN: CPT | Mod: PBBFAC,27 | Performed by: NURSE PRACTITIONER

## 2021-05-17 PROCEDURE — 99999 PR PBB SHADOW E&M-EST. PATIENT-LVL IV: ICD-10-PCS | Mod: PBBFAC,,, | Performed by: NURSE PRACTITIONER

## 2021-05-17 PROCEDURE — 99999 PR PBB SHADOW E&M-EST. PATIENT-LVL IV: CPT | Mod: PBBFAC,,, | Performed by: NURSE PRACTITIONER

## 2021-05-17 PROCEDURE — 99454 REM MNTR PHYSIOL PARAM 16-30: CPT | Mod: PBBFAC | Performed by: PEDIATRICS

## 2021-05-17 PROCEDURE — 99213 PR OFFICE/OUTPT VISIT, EST, LEVL III, 20-29 MIN: ICD-10-PCS | Mod: S$PBB,,, | Performed by: NURSE PRACTITIONER

## 2021-05-17 PROCEDURE — 99999 PR PBB SHADOW E&M-EST. PATIENT-LVL V: ICD-10-PCS | Mod: PBBFAC,,, | Performed by: UROLOGY

## 2021-05-17 PROCEDURE — 99213 OFFICE O/P EST LOW 20 MIN: CPT | Mod: S$PBB,,, | Performed by: NURSE PRACTITIONER

## 2021-05-17 PROCEDURE — 99214 PR OFFICE/OUTPT VISIT, EST, LEVL IV, 30-39 MIN: ICD-10-PCS | Mod: S$PBB,,, | Performed by: UROLOGY

## 2021-05-17 PROCEDURE — 99214 OFFICE O/P EST MOD 30 MIN: CPT | Mod: S$PBB,,, | Performed by: UROLOGY

## 2021-05-17 PROCEDURE — 99999 PR PBB SHADOW E&M-EST. PATIENT-LVL V: CPT | Mod: PBBFAC,,, | Performed by: UROLOGY

## 2021-05-17 RX ORDER — SILDENAFIL CITRATE 20 MG/1
20 TABLET ORAL DAILY PRN
Qty: 45 TABLET | Refills: 11 | Status: SHIPPED | OUTPATIENT
Start: 2021-05-17 | End: 2022-06-13

## 2021-05-18 ENCOUNTER — TELEPHONE (OUTPATIENT)
Dept: RADIOLOGY | Facility: HOSPITAL | Age: 71
End: 2021-05-18

## 2021-05-19 ENCOUNTER — TELEPHONE (OUTPATIENT)
Dept: RADIOLOGY | Facility: HOSPITAL | Age: 71
End: 2021-05-19

## 2021-05-20 ENCOUNTER — HOSPITAL ENCOUNTER (OUTPATIENT)
Dept: RADIOLOGY | Facility: HOSPITAL | Age: 71
Discharge: HOME | End: 2021-05-20
Attending: UROLOGY
Payer: MEDICARE

## 2021-05-20 ENCOUNTER — TELEPHONE (OUTPATIENT)
Dept: UROLOGY | Facility: CLINIC | Age: 71
End: 2021-05-20

## 2021-05-20 DIAGNOSIS — R10.32 GROIN PAIN, LEFT: ICD-10-CM

## 2021-05-20 PROCEDURE — 76870 US EXAM SCROTUM: CPT | Mod: TC

## 2021-05-20 PROCEDURE — 76870 US SCROTUM AND TESTICLES: ICD-10-PCS | Mod: 26,,, | Performed by: RADIOLOGY

## 2021-05-20 PROCEDURE — 76870 US EXAM SCROTUM: CPT | Mod: 26,,, | Performed by: RADIOLOGY

## 2021-05-28 ENCOUNTER — PATIENT MESSAGE (OUTPATIENT)
Dept: ADMINISTRATIVE | Facility: OTHER | Age: 71
End: 2021-05-28

## 2021-06-01 ENCOUNTER — PATIENT MESSAGE (OUTPATIENT)
Dept: DIABETES | Facility: CLINIC | Age: 71
End: 2021-06-01

## 2021-06-03 ENCOUNTER — PATIENT MESSAGE (OUTPATIENT)
Dept: DIABETES | Facility: CLINIC | Age: 71
End: 2021-06-03

## 2021-06-03 RX ORDER — DULAGLUTIDE 4.5 MG/.5ML
4.5 INJECTION, SOLUTION SUBCUTANEOUS
Qty: 12 PEN | Refills: 3 | Status: SHIPPED | OUTPATIENT
Start: 2021-06-03 | End: 2021-06-17 | Stop reason: DRUGHIGH

## 2021-06-07 ENCOUNTER — OFFICE VISIT (OUTPATIENT)
Dept: SURGERY | Facility: CLINIC | Age: 71
End: 2021-06-07
Payer: MEDICARE

## 2021-06-07 ENCOUNTER — OFFICE VISIT (OUTPATIENT)
Dept: UROLOGY | Facility: CLINIC | Age: 71
End: 2021-06-07
Payer: MEDICARE

## 2021-06-07 VITALS
WEIGHT: 197.56 LBS | DIASTOLIC BLOOD PRESSURE: 66 MMHG | HEIGHT: 71 IN | TEMPERATURE: 98 F | SYSTOLIC BLOOD PRESSURE: 113 MMHG | HEART RATE: 83 BPM | BODY MASS INDEX: 27.66 KG/M2

## 2021-06-07 VITALS
DIASTOLIC BLOOD PRESSURE: 68 MMHG | SYSTOLIC BLOOD PRESSURE: 140 MMHG | TEMPERATURE: 98 F | WEIGHT: 198.19 LBS | BODY MASS INDEX: 27.64 KG/M2

## 2021-06-07 DIAGNOSIS — R10.32 GROIN PAIN, LEFT: ICD-10-CM

## 2021-06-07 DIAGNOSIS — N52.9 ERECTILE DYSFUNCTION, UNSPECIFIED ERECTILE DYSFUNCTION TYPE: ICD-10-CM

## 2021-06-07 DIAGNOSIS — N20.0 NEPHROLITHIASIS: Primary | ICD-10-CM

## 2021-06-07 DIAGNOSIS — K40.90 LEFT INGUINAL HERNIA: Primary | ICD-10-CM

## 2021-06-07 PROCEDURE — 99024 POSTOP FOLLOW-UP VISIT: CPT | Mod: POP,,, | Performed by: SURGERY

## 2021-06-07 PROCEDURE — 99999 PR PBB SHADOW E&M-EST. PATIENT-LVL III: CPT | Mod: PBBFAC,,, | Performed by: UROLOGY

## 2021-06-07 PROCEDURE — 99999 PR PBB SHADOW E&M-EST. PATIENT-LVL IV: ICD-10-PCS | Mod: PBBFAC,,, | Performed by: SURGERY

## 2021-06-07 PROCEDURE — 99213 OFFICE O/P EST LOW 20 MIN: CPT | Mod: PBBFAC,27 | Performed by: UROLOGY

## 2021-06-07 PROCEDURE — 99214 OFFICE O/P EST MOD 30 MIN: CPT | Mod: S$PBB,,, | Performed by: UROLOGY

## 2021-06-07 PROCEDURE — 99999 PR PBB SHADOW E&M-EST. PATIENT-LVL IV: CPT | Mod: PBBFAC,,, | Performed by: SURGERY

## 2021-06-07 PROCEDURE — 99214 PR OFFICE/OUTPT VISIT, EST, LEVL IV, 30-39 MIN: ICD-10-PCS | Mod: S$PBB,,, | Performed by: UROLOGY

## 2021-06-07 PROCEDURE — 99024 PR POST-OP FOLLOW-UP VISIT: ICD-10-PCS | Mod: POP,,, | Performed by: SURGERY

## 2021-06-07 PROCEDURE — 99999 PR PBB SHADOW E&M-EST. PATIENT-LVL III: ICD-10-PCS | Mod: PBBFAC,,, | Performed by: UROLOGY

## 2021-06-07 PROCEDURE — 99214 OFFICE O/P EST MOD 30 MIN: CPT | Mod: PBBFAC | Performed by: SURGERY

## 2021-06-07 RX ORDER — GABAPENTIN 100 MG/1
100 CAPSULE ORAL 3 TIMES DAILY
Qty: 90 CAPSULE | Refills: 2 | Status: SHIPPED | OUTPATIENT
Start: 2021-06-07 | End: 2022-02-22

## 2021-06-14 ENCOUNTER — OFFICE VISIT (OUTPATIENT)
Dept: PODIATRY | Facility: CLINIC | Age: 71
End: 2021-06-14
Payer: MEDICARE

## 2021-06-14 DIAGNOSIS — Z79.4 TYPE 2 DIABETES MELLITUS WITH COMPLICATION, WITH LONG-TERM CURRENT USE OF INSULIN: ICD-10-CM

## 2021-06-14 DIAGNOSIS — B35.3 TINEA PEDIS OF BOTH FEET: Primary | ICD-10-CM

## 2021-06-14 DIAGNOSIS — E11.8 TYPE 2 DIABETES MELLITUS WITH COMPLICATION, WITH LONG-TERM CURRENT USE OF INSULIN: ICD-10-CM

## 2021-06-14 PROCEDURE — 99213 PR OFFICE/OUTPT VISIT, EST, LEVL III, 20-29 MIN: ICD-10-PCS | Mod: S$PBB,,, | Performed by: PODIATRIST

## 2021-06-14 PROCEDURE — 99999 PR PBB SHADOW E&M-EST. PATIENT-LVL III: ICD-10-PCS | Mod: PBBFAC,,, | Performed by: PODIATRIST

## 2021-06-14 PROCEDURE — 99213 OFFICE O/P EST LOW 20 MIN: CPT | Mod: PBBFAC | Performed by: PODIATRIST

## 2021-06-14 PROCEDURE — 99999 PR PBB SHADOW E&M-EST. PATIENT-LVL III: CPT | Mod: PBBFAC,,, | Performed by: PODIATRIST

## 2021-06-14 PROCEDURE — 99213 OFFICE O/P EST LOW 20 MIN: CPT | Mod: S$PBB,,, | Performed by: PODIATRIST

## 2021-06-16 PROCEDURE — 99454 REM MNTR PHYSIOL PARAM 16-30: CPT | Mod: PBBFAC | Performed by: PEDIATRICS

## 2021-06-28 ENCOUNTER — PATIENT MESSAGE (OUTPATIENT)
Dept: DIABETES | Facility: CLINIC | Age: 71
End: 2021-06-28

## 2021-06-28 ENCOUNTER — PES CALL (OUTPATIENT)
Dept: ADMINISTRATIVE | Facility: CLINIC | Age: 71
End: 2021-06-28

## 2021-06-29 ENCOUNTER — PES CALL (OUTPATIENT)
Dept: ADMINISTRATIVE | Facility: CLINIC | Age: 71
End: 2021-06-29

## 2021-06-29 ENCOUNTER — LAB VISIT (OUTPATIENT)
Dept: LAB | Facility: HOSPITAL | Age: 71
End: 2021-06-29
Attending: PEDIATRICS
Payer: MEDICARE

## 2021-06-29 DIAGNOSIS — E11.8 TYPE 2 DIABETES MELLITUS WITH COMPLICATION, WITH LONG-TERM CURRENT USE OF INSULIN: ICD-10-CM

## 2021-06-29 DIAGNOSIS — Z79.4 TYPE 2 DIABETES MELLITUS WITH COMPLICATION, WITH LONG-TERM CURRENT USE OF INSULIN: ICD-10-CM

## 2021-06-29 LAB
ESTIMATED AVG GLUCOSE: 160 MG/DL (ref 68–131)
HBA1C MFR BLD: 7.2 % (ref 4–5.6)

## 2021-06-29 PROCEDURE — 36415 COLL VENOUS BLD VENIPUNCTURE: CPT | Performed by: PHYSICIAN ASSISTANT

## 2021-06-29 PROCEDURE — 83036 HEMOGLOBIN GLYCOSYLATED A1C: CPT | Performed by: PHYSICIAN ASSISTANT

## 2021-07-06 ENCOUNTER — OFFICE VISIT (OUTPATIENT)
Dept: DIABETES | Facility: CLINIC | Age: 71
End: 2021-07-06
Payer: MEDICARE

## 2021-07-06 VITALS
DIASTOLIC BLOOD PRESSURE: 71 MMHG | HEIGHT: 71 IN | RESPIRATION RATE: 18 BRPM | HEART RATE: 65 BPM | WEIGHT: 203.5 LBS | SYSTOLIC BLOOD PRESSURE: 123 MMHG | BODY MASS INDEX: 28.49 KG/M2

## 2021-07-06 DIAGNOSIS — E11.59 HYPERTENSION ASSOCIATED WITH DIABETES: ICD-10-CM

## 2021-07-06 DIAGNOSIS — E66.3 OVERWEIGHT (BMI 25.0-29.9): ICD-10-CM

## 2021-07-06 DIAGNOSIS — Z79.4 TYPE 2 DIABETES MELLITUS WITH COMPLICATION, WITH LONG-TERM CURRENT USE OF INSULIN: Primary | ICD-10-CM

## 2021-07-06 DIAGNOSIS — G47.33 OBSTRUCTIVE SLEEP APNEA: ICD-10-CM

## 2021-07-06 DIAGNOSIS — E11.8 TYPE 2 DIABETES MELLITUS WITH COMPLICATION, WITH LONG-TERM CURRENT USE OF INSULIN: Primary | ICD-10-CM

## 2021-07-06 DIAGNOSIS — I25.10 CORONARY ARTERY DISEASE INVOLVING NATIVE CORONARY ARTERY OF NATIVE HEART WITHOUT ANGINA PECTORIS: ICD-10-CM

## 2021-07-06 DIAGNOSIS — I15.2 HYPERTENSION ASSOCIATED WITH DIABETES: ICD-10-CM

## 2021-07-06 DIAGNOSIS — E78.5 HYPERLIPIDEMIA ASSOCIATED WITH TYPE 2 DIABETES MELLITUS: ICD-10-CM

## 2021-07-06 DIAGNOSIS — E11.69 HYPERLIPIDEMIA ASSOCIATED WITH TYPE 2 DIABETES MELLITUS: ICD-10-CM

## 2021-07-06 LAB — GLUCOSE SERPL-MCNC: 141 MG/DL (ref 70–110)

## 2021-07-06 PROCEDURE — 99999 PR PBB SHADOW E&M-EST. PATIENT-LVL III: CPT | Mod: PBBFAC,,, | Performed by: PHYSICIAN ASSISTANT

## 2021-07-06 PROCEDURE — 99999 PR PBB SHADOW E&M-EST. PATIENT-LVL III: ICD-10-PCS | Mod: PBBFAC,,, | Performed by: PHYSICIAN ASSISTANT

## 2021-07-06 PROCEDURE — 99213 OFFICE O/P EST LOW 20 MIN: CPT | Mod: PBBFAC | Performed by: PHYSICIAN ASSISTANT

## 2021-07-06 PROCEDURE — 99214 OFFICE O/P EST MOD 30 MIN: CPT | Mod: S$PBB,,, | Performed by: PHYSICIAN ASSISTANT

## 2021-07-06 PROCEDURE — 82962 GLUCOSE BLOOD TEST: CPT | Mod: PBBFAC | Performed by: PHYSICIAN ASSISTANT

## 2021-07-06 PROCEDURE — 99214 PR OFFICE/OUTPT VISIT, EST, LEVL IV, 30-39 MIN: ICD-10-PCS | Mod: S$PBB,,, | Performed by: PHYSICIAN ASSISTANT

## 2021-07-06 RX ORDER — DULAGLUTIDE 4.5 MG/.5ML
4.5 INJECTION, SOLUTION SUBCUTANEOUS WEEKLY
Qty: 12 PEN | Refills: 3 | Status: SHIPPED | OUTPATIENT
Start: 2021-07-06 | End: 2022-06-07 | Stop reason: ALTCHOICE

## 2021-07-15 ENCOUNTER — PATIENT MESSAGE (OUTPATIENT)
Dept: INTERNAL MEDICINE | Facility: CLINIC | Age: 71
End: 2021-07-15

## 2021-07-17 PROCEDURE — 99454 REM MNTR PHYSIOL PARAM 16-30: CPT | Mod: PBBFAC | Performed by: PEDIATRICS

## 2021-08-09 ENCOUNTER — OFFICE VISIT (OUTPATIENT)
Dept: SURGERY | Facility: CLINIC | Age: 71
End: 2021-08-09
Payer: MEDICARE

## 2021-08-09 VITALS
DIASTOLIC BLOOD PRESSURE: 67 MMHG | TEMPERATURE: 98 F | HEART RATE: 69 BPM | SYSTOLIC BLOOD PRESSURE: 136 MMHG | BODY MASS INDEX: 28.07 KG/M2 | WEIGHT: 201.25 LBS

## 2021-08-09 DIAGNOSIS — K40.90 LEFT INGUINAL HERNIA: Primary | ICD-10-CM

## 2021-08-09 DIAGNOSIS — R10.32 GROIN PAIN, LEFT: ICD-10-CM

## 2021-08-09 PROCEDURE — 99024 POSTOP FOLLOW-UP VISIT: CPT | Mod: POP,,, | Performed by: SURGERY

## 2021-08-09 PROCEDURE — 99999 PR PBB SHADOW E&M-EST. PATIENT-LVL V: CPT | Mod: PBBFAC,,, | Performed by: SURGERY

## 2021-08-09 PROCEDURE — 99024 PR POST-OP FOLLOW-UP VISIT: ICD-10-PCS | Mod: POP,,, | Performed by: SURGERY

## 2021-08-09 PROCEDURE — 99999 PR PBB SHADOW E&M-EST. PATIENT-LVL V: ICD-10-PCS | Mod: PBBFAC,,, | Performed by: SURGERY

## 2021-08-09 PROCEDURE — 99215 OFFICE O/P EST HI 40 MIN: CPT | Mod: PBBFAC | Performed by: SURGERY

## 2021-08-10 ENCOUNTER — TELEPHONE (OUTPATIENT)
Dept: PAIN MEDICINE | Facility: CLINIC | Age: 71
End: 2021-08-10

## 2021-08-11 ENCOUNTER — TELEPHONE (OUTPATIENT)
Dept: SURGERY | Facility: CLINIC | Age: 71
End: 2021-08-11

## 2021-08-16 ENCOUNTER — TELEPHONE (OUTPATIENT)
Dept: INTERNAL MEDICINE | Facility: CLINIC | Age: 71
End: 2021-08-16

## 2021-08-17 PROCEDURE — 99454 REM MNTR PHYSIOL PARAM 16-30: CPT | Mod: PBBFAC | Performed by: PEDIATRICS

## 2021-09-07 ENCOUNTER — OFFICE VISIT (OUTPATIENT)
Dept: URGENT CARE | Facility: CLINIC | Age: 71
End: 2021-09-07
Payer: MEDICARE

## 2021-09-07 VITALS
WEIGHT: 195 LBS | OXYGEN SATURATION: 95 % | BODY MASS INDEX: 27.3 KG/M2 | RESPIRATION RATE: 16 BRPM | SYSTOLIC BLOOD PRESSURE: 143 MMHG | TEMPERATURE: 98 F | HEART RATE: 66 BPM | DIASTOLIC BLOOD PRESSURE: 68 MMHG | HEIGHT: 71 IN

## 2021-09-07 DIAGNOSIS — R42 VERTIGO: Primary | ICD-10-CM

## 2021-09-07 DIAGNOSIS — R42 DIZZINESS: ICD-10-CM

## 2021-09-07 LAB — GLUCOSE SERPL-MCNC: 174 MG/DL (ref 70–110)

## 2021-09-07 PROCEDURE — 82962 GLUCOSE BLOOD TEST: CPT | Mod: S$GLB,,, | Performed by: EMERGENCY MEDICINE

## 2021-09-07 PROCEDURE — 82962 POCT GLUCOSE, HAND-HELD DEVICE: ICD-10-PCS | Mod: S$GLB,,, | Performed by: EMERGENCY MEDICINE

## 2021-09-07 PROCEDURE — 99214 OFFICE O/P EST MOD 30 MIN: CPT | Mod: S$GLB,,, | Performed by: EMERGENCY MEDICINE

## 2021-09-07 PROCEDURE — 99214 PR OFFICE/OUTPT VISIT, EST, LEVL IV, 30-39 MIN: ICD-10-PCS | Mod: S$GLB,,, | Performed by: EMERGENCY MEDICINE

## 2021-09-07 RX ORDER — MECLIZINE HYDROCHLORIDE 25 MG/1
25 TABLET ORAL 3 TIMES DAILY PRN
Qty: 30 TABLET | Refills: 0 | Status: SHIPPED | OUTPATIENT
Start: 2021-09-07 | End: 2023-03-01

## 2021-09-09 ENCOUNTER — PATIENT MESSAGE (OUTPATIENT)
Dept: ADMINISTRATIVE | Facility: OTHER | Age: 71
End: 2021-09-09

## 2021-09-15 ENCOUNTER — TELEPHONE (OUTPATIENT)
Dept: PAIN MEDICINE | Facility: CLINIC | Age: 71
End: 2021-09-15

## 2021-09-15 ENCOUNTER — PATIENT MESSAGE (OUTPATIENT)
Dept: PULMONOLOGY | Facility: CLINIC | Age: 71
End: 2021-09-15

## 2021-09-17 PROCEDURE — 99454 REM MNTR PHYSIOL PARAM 16-30: CPT | Mod: PBBFAC | Performed by: PEDIATRICS

## 2021-09-18 ENCOUNTER — OFFICE VISIT (OUTPATIENT)
Dept: URGENT CARE | Facility: CLINIC | Age: 71
End: 2021-09-18
Payer: MEDICARE

## 2021-09-18 VITALS
HEART RATE: 67 BPM | TEMPERATURE: 98 F | SYSTOLIC BLOOD PRESSURE: 115 MMHG | DIASTOLIC BLOOD PRESSURE: 59 MMHG | OXYGEN SATURATION: 97 % | RESPIRATION RATE: 20 BRPM

## 2021-09-18 DIAGNOSIS — S51.832A PUNCTURE WOUND OF LEFT FOREARM, INITIAL ENCOUNTER: Primary | ICD-10-CM

## 2021-09-18 DIAGNOSIS — L02.414 ABSCESS OF LEFT FOREARM: ICD-10-CM

## 2021-09-18 PROCEDURE — 99214 PR OFFICE/OUTPT VISIT, EST, LEVL IV, 30-39 MIN: ICD-10-PCS | Mod: S$GLB,,, | Performed by: PHYSICIAN ASSISTANT

## 2021-09-18 PROCEDURE — 99214 OFFICE O/P EST MOD 30 MIN: CPT | Mod: S$GLB,,, | Performed by: PHYSICIAN ASSISTANT

## 2021-09-18 RX ORDER — SULFAMETHOXAZOLE AND TRIMETHOPRIM 800; 160 MG/1; MG/1
1 TABLET ORAL 2 TIMES DAILY
Qty: 14 TABLET | Refills: 0 | Status: SHIPPED | OUTPATIENT
Start: 2021-09-18 | End: 2021-09-25

## 2021-09-18 RX ORDER — MUPIROCIN 20 MG/G
OINTMENT TOPICAL
Qty: 22 G | Refills: 1 | Status: SHIPPED | OUTPATIENT
Start: 2021-09-18

## 2021-09-23 ENCOUNTER — LAB VISIT (OUTPATIENT)
Dept: LAB | Facility: HOSPITAL | Age: 71
End: 2021-09-23
Attending: NURSE PRACTITIONER
Payer: MEDICARE

## 2021-09-23 DIAGNOSIS — Z79.4 TYPE 2 DIABETES MELLITUS WITH COMPLICATION, WITH LONG-TERM CURRENT USE OF INSULIN: ICD-10-CM

## 2021-09-23 DIAGNOSIS — E11.69 HYPERLIPIDEMIA ASSOCIATED WITH TYPE 2 DIABETES MELLITUS: ICD-10-CM

## 2021-09-23 DIAGNOSIS — E11.8 TYPE 2 DIABETES MELLITUS WITH COMPLICATION, WITH LONG-TERM CURRENT USE OF INSULIN: ICD-10-CM

## 2021-09-23 DIAGNOSIS — E78.5 HYPERLIPIDEMIA ASSOCIATED WITH TYPE 2 DIABETES MELLITUS: ICD-10-CM

## 2021-09-23 LAB
ALBUMIN SERPL BCP-MCNC: 3.9 G/DL (ref 3.5–5.2)
ALP SERPL-CCNC: 41 U/L (ref 55–135)
ALT SERPL W/O P-5'-P-CCNC: 22 U/L (ref 10–44)
ANION GAP SERPL CALC-SCNC: 10 MMOL/L (ref 8–16)
AST SERPL-CCNC: 24 U/L (ref 10–40)
BILIRUB SERPL-MCNC: 0.6 MG/DL (ref 0.1–1)
BUN SERPL-MCNC: 18 MG/DL (ref 8–23)
CALCIUM SERPL-MCNC: 9.3 MG/DL (ref 8.7–10.5)
CHLORIDE SERPL-SCNC: 107 MMOL/L (ref 95–110)
CHOLEST SERPL-MCNC: 99 MG/DL (ref 120–199)
CHOLEST/HDLC SERPL: 3.5 {RATIO} (ref 2–5)
CO2 SERPL-SCNC: 23 MMOL/L (ref 23–29)
CREAT SERPL-MCNC: 1 MG/DL (ref 0.5–1.4)
EST. GFR  (AFRICAN AMERICAN): >60 ML/MIN/1.73 M^2
EST. GFR  (NON AFRICAN AMERICAN): >60 ML/MIN/1.73 M^2
ESTIMATED AVG GLUCOSE: 160 MG/DL (ref 68–131)
GLUCOSE SERPL-MCNC: 124 MG/DL (ref 70–110)
HBA1C MFR BLD: 7.2 % (ref 4–5.6)
HDLC SERPL-MCNC: 28 MG/DL (ref 40–75)
HDLC SERPL: 28.3 % (ref 20–50)
LDLC SERPL CALC-MCNC: 51.8 MG/DL (ref 63–159)
NONHDLC SERPL-MCNC: 71 MG/DL
POTASSIUM SERPL-SCNC: 4.4 MMOL/L (ref 3.5–5.1)
PROT SERPL-MCNC: 6.3 G/DL (ref 6–8.4)
SODIUM SERPL-SCNC: 140 MMOL/L (ref 136–145)
TRIGL SERPL-MCNC: 96 MG/DL (ref 30–150)

## 2021-09-23 PROCEDURE — 36415 COLL VENOUS BLD VENIPUNCTURE: CPT | Performed by: NURSE PRACTITIONER

## 2021-09-23 PROCEDURE — 80061 LIPID PANEL: CPT | Performed by: NURSE PRACTITIONER

## 2021-09-23 PROCEDURE — 83036 HEMOGLOBIN GLYCOSYLATED A1C: CPT | Performed by: NURSE PRACTITIONER

## 2021-09-23 PROCEDURE — 80053 COMPREHEN METABOLIC PANEL: CPT | Performed by: NURSE PRACTITIONER

## 2021-09-24 ENCOUNTER — TELEPHONE (OUTPATIENT)
Dept: INTERNAL MEDICINE | Facility: CLINIC | Age: 71
End: 2021-09-24

## 2021-09-28 ENCOUNTER — LAB VISIT (OUTPATIENT)
Dept: LAB | Facility: HOSPITAL | Age: 71
End: 2021-09-28
Attending: PEDIATRICS
Payer: MEDICARE

## 2021-09-28 DIAGNOSIS — Z79.4 TYPE 2 DIABETES MELLITUS WITH COMPLICATION, WITH LONG-TERM CURRENT USE OF INSULIN: ICD-10-CM

## 2021-09-28 DIAGNOSIS — E11.8 TYPE 2 DIABETES MELLITUS WITH COMPLICATION, WITH LONG-TERM CURRENT USE OF INSULIN: ICD-10-CM

## 2021-09-28 LAB
ESTIMATED AVG GLUCOSE: 166 MG/DL (ref 68–131)
HBA1C MFR BLD: 7.4 % (ref 4–5.6)

## 2021-09-28 PROCEDURE — 83036 HEMOGLOBIN GLYCOSYLATED A1C: CPT | Performed by: PHYSICIAN ASSISTANT

## 2021-09-28 PROCEDURE — 36415 COLL VENOUS BLD VENIPUNCTURE: CPT | Performed by: PHYSICIAN ASSISTANT

## 2021-10-04 ENCOUNTER — PATIENT OUTREACH (OUTPATIENT)
Dept: ADMINISTRATIVE | Facility: OTHER | Age: 71
End: 2021-10-04

## 2021-10-05 ENCOUNTER — OFFICE VISIT (OUTPATIENT)
Dept: DIABETES | Facility: CLINIC | Age: 71
End: 2021-10-05
Payer: MEDICARE

## 2021-10-05 VITALS
BODY MASS INDEX: 27.77 KG/M2 | HEART RATE: 62 BPM | WEIGHT: 199.06 LBS | SYSTOLIC BLOOD PRESSURE: 132 MMHG | DIASTOLIC BLOOD PRESSURE: 69 MMHG

## 2021-10-05 DIAGNOSIS — I25.10 CORONARY ARTERY DISEASE INVOLVING NATIVE CORONARY ARTERY OF NATIVE HEART WITHOUT ANGINA PECTORIS: ICD-10-CM

## 2021-10-05 DIAGNOSIS — E66.3 OVERWEIGHT (BMI 25.0-29.9): ICD-10-CM

## 2021-10-05 DIAGNOSIS — E11.69 HYPERLIPIDEMIA ASSOCIATED WITH TYPE 2 DIABETES MELLITUS: ICD-10-CM

## 2021-10-05 DIAGNOSIS — I15.2 HYPERTENSION ASSOCIATED WITH DIABETES: ICD-10-CM

## 2021-10-05 DIAGNOSIS — G47.33 OBSTRUCTIVE SLEEP APNEA: ICD-10-CM

## 2021-10-05 DIAGNOSIS — Z79.4 TYPE 2 DIABETES MELLITUS WITH COMPLICATION, WITH LONG-TERM CURRENT USE OF INSULIN: Primary | ICD-10-CM

## 2021-10-05 DIAGNOSIS — E78.5 HYPERLIPIDEMIA ASSOCIATED WITH TYPE 2 DIABETES MELLITUS: ICD-10-CM

## 2021-10-05 DIAGNOSIS — E11.8 TYPE 2 DIABETES MELLITUS WITH COMPLICATION, WITH LONG-TERM CURRENT USE OF INSULIN: Primary | ICD-10-CM

## 2021-10-05 DIAGNOSIS — E11.59 HYPERTENSION ASSOCIATED WITH DIABETES: ICD-10-CM

## 2021-10-05 PROCEDURE — 99214 OFFICE O/P EST MOD 30 MIN: CPT | Mod: S$PBB,,, | Performed by: PHYSICIAN ASSISTANT

## 2021-10-05 PROCEDURE — 99999 PR PBB SHADOW E&M-EST. PATIENT-LVL IV: CPT | Mod: PBBFAC,,, | Performed by: PHYSICIAN ASSISTANT

## 2021-10-05 PROCEDURE — 99999 PR PBB SHADOW E&M-EST. PATIENT-LVL IV: ICD-10-PCS | Mod: PBBFAC,,, | Performed by: PHYSICIAN ASSISTANT

## 2021-10-05 PROCEDURE — 99214 OFFICE O/P EST MOD 30 MIN: CPT | Mod: PBBFAC | Performed by: PHYSICIAN ASSISTANT

## 2021-10-05 PROCEDURE — 99214 PR OFFICE/OUTPT VISIT, EST, LEVL IV, 30-39 MIN: ICD-10-PCS | Mod: S$PBB,,, | Performed by: PHYSICIAN ASSISTANT

## 2021-10-13 ENCOUNTER — OFFICE VISIT (OUTPATIENT)
Dept: INTERNAL MEDICINE | Facility: CLINIC | Age: 71
End: 2021-10-13
Payer: MEDICARE

## 2021-10-13 VITALS
WEIGHT: 194 LBS | TEMPERATURE: 98 F | RESPIRATION RATE: 16 BRPM | DIASTOLIC BLOOD PRESSURE: 62 MMHG | HEART RATE: 67 BPM | SYSTOLIC BLOOD PRESSURE: 102 MMHG | OXYGEN SATURATION: 97 % | HEIGHT: 71 IN | BODY MASS INDEX: 27.16 KG/M2

## 2021-10-13 DIAGNOSIS — I15.2 HYPERTENSION ASSOCIATED WITH DIABETES: ICD-10-CM

## 2021-10-13 DIAGNOSIS — Z79.4 TYPE 2 DIABETES MELLITUS WITH COMPLICATION, WITH LONG-TERM CURRENT USE OF INSULIN: Primary | ICD-10-CM

## 2021-10-13 DIAGNOSIS — I25.10 CORONARY ARTERY DISEASE INVOLVING NATIVE CORONARY ARTERY OF NATIVE HEART WITHOUT ANGINA PECTORIS: ICD-10-CM

## 2021-10-13 DIAGNOSIS — E11.59 HYPERTENSION ASSOCIATED WITH DIABETES: ICD-10-CM

## 2021-10-13 DIAGNOSIS — E11.8 TYPE 2 DIABETES MELLITUS WITH COMPLICATION, WITH LONG-TERM CURRENT USE OF INSULIN: Primary | ICD-10-CM

## 2021-10-13 DIAGNOSIS — Z86.010 HISTORY OF COLON POLYPS: ICD-10-CM

## 2021-10-13 DIAGNOSIS — E11.69 HYPERLIPIDEMIA ASSOCIATED WITH TYPE 2 DIABETES MELLITUS: ICD-10-CM

## 2021-10-13 DIAGNOSIS — E78.5 HYPERLIPIDEMIA ASSOCIATED WITH TYPE 2 DIABETES MELLITUS: ICD-10-CM

## 2021-10-13 DIAGNOSIS — Z12.5 PROSTATE CANCER SCREENING: ICD-10-CM

## 2021-10-13 DIAGNOSIS — Z00.00 WELL ADULT EXAM: ICD-10-CM

## 2021-10-13 DIAGNOSIS — G47.33 OBSTRUCTIVE SLEEP APNEA: ICD-10-CM

## 2021-10-13 PROCEDURE — 99999 PR PBB SHADOW E&M-EST. PATIENT-LVL V: CPT | Mod: PBBFAC,,, | Performed by: PEDIATRICS

## 2021-10-13 PROCEDURE — 90694 VACC AIIV4 NO PRSRV 0.5ML IM: CPT | Mod: PBBFAC

## 2021-10-13 PROCEDURE — 99397 PR PREVENTIVE VISIT,EST,65 & OVER: ICD-10-PCS | Mod: S$PBB,,, | Performed by: PEDIATRICS

## 2021-10-13 PROCEDURE — 99999 PR PBB SHADOW E&M-EST. PATIENT-LVL V: ICD-10-PCS | Mod: PBBFAC,,, | Performed by: PEDIATRICS

## 2021-10-13 PROCEDURE — G0008 ADMIN INFLUENZA VIRUS VAC: HCPCS | Mod: PBBFAC

## 2021-10-13 PROCEDURE — 99397 PER PM REEVAL EST PAT 65+ YR: CPT | Mod: S$PBB,,, | Performed by: PEDIATRICS

## 2021-10-13 PROCEDURE — 99215 OFFICE O/P EST HI 40 MIN: CPT | Mod: PBBFAC,25 | Performed by: PEDIATRICS

## 2021-10-13 RX ORDER — MELOXICAM 15 MG/1
15 TABLET ORAL DAILY
Qty: 90 TABLET | Refills: 3 | Status: SHIPPED | OUTPATIENT
Start: 2021-10-13 | End: 2023-01-09

## 2021-10-14 ENCOUNTER — PATIENT MESSAGE (OUTPATIENT)
Dept: INTERNAL MEDICINE | Facility: CLINIC | Age: 71
End: 2021-10-14
Payer: MEDICARE

## 2021-10-17 PROCEDURE — 99454 REM MNTR PHYSIOL PARAM 16-30: CPT | Mod: PBBFAC | Performed by: PEDIATRICS

## 2021-10-18 ENCOUNTER — OFFICE VISIT (OUTPATIENT)
Dept: PODIATRY | Facility: CLINIC | Age: 71
End: 2021-10-18
Payer: MEDICARE

## 2021-10-18 DIAGNOSIS — B35.1 ONYCHOMYCOSIS DUE TO DERMATOPHYTE: ICD-10-CM

## 2021-10-18 DIAGNOSIS — E11.8 TYPE 2 DIABETES MELLITUS WITH COMPLICATION, WITH LONG-TERM CURRENT USE OF INSULIN: Primary | ICD-10-CM

## 2021-10-18 DIAGNOSIS — Q82.8 POROKERATOSIS: ICD-10-CM

## 2021-10-18 DIAGNOSIS — Z79.4 TYPE 2 DIABETES MELLITUS WITH COMPLICATION, WITH LONG-TERM CURRENT USE OF INSULIN: Primary | ICD-10-CM

## 2021-10-18 PROCEDURE — 11055 PR TRIM HYPERKERATOTIC SKIN LESION, ONE: ICD-10-PCS | Mod: Q9,S$PBB,, | Performed by: PODIATRIST

## 2021-10-18 PROCEDURE — 11719 TRIM NAIL(S) ANY NUMBER: CPT | Mod: 59,Q9,S$PBB, | Performed by: PODIATRIST

## 2021-10-18 PROCEDURE — 99999 PR PBB SHADOW E&M-EST. PATIENT-LVL III: CPT | Mod: PBBFAC,,, | Performed by: PODIATRIST

## 2021-10-18 PROCEDURE — 11055 PARING/CUTG B9 HYPRKER LES 1: CPT | Mod: Q9,S$PBB,, | Performed by: PODIATRIST

## 2021-10-18 PROCEDURE — 11719 TRIM NAIL(S) ANY NUMBER: CPT | Mod: Q9,PBBFAC | Performed by: PODIATRIST

## 2021-10-18 PROCEDURE — 99499 UNLISTED E&M SERVICE: CPT | Mod: S$PBB,,, | Performed by: PODIATRIST

## 2021-10-18 PROCEDURE — 11720 DEBRIDE NAIL 1-5: CPT | Mod: Q9,PBBFAC | Performed by: PODIATRIST

## 2021-10-18 PROCEDURE — 11720 DEBRIDE NAIL 1-5: CPT | Mod: 59,Q9,S$PBB, | Performed by: PODIATRIST

## 2021-10-18 PROCEDURE — 11055 PARING/CUTG B9 HYPRKER LES 1: CPT | Mod: Q9,PBBFAC | Performed by: PODIATRIST

## 2021-10-18 PROCEDURE — 99499 NO LOS: ICD-10-PCS | Mod: S$PBB,,, | Performed by: PODIATRIST

## 2021-10-18 PROCEDURE — 99213 OFFICE O/P EST LOW 20 MIN: CPT | Mod: PBBFAC | Performed by: PODIATRIST

## 2021-10-18 PROCEDURE — 99999 PR PBB SHADOW E&M-EST. PATIENT-LVL III: ICD-10-PCS | Mod: PBBFAC,,, | Performed by: PODIATRIST

## 2021-10-18 PROCEDURE — 11719 PR TRIM NAIL(S): ICD-10-PCS | Mod: 59,Q9,S$PBB, | Performed by: PODIATRIST

## 2021-10-18 PROCEDURE — 11720 PR DEBRIDEMENT OF NAIL(S), 1-5: ICD-10-PCS | Mod: 59,Q9,S$PBB, | Performed by: PODIATRIST

## 2021-10-25 ENCOUNTER — PATIENT MESSAGE (OUTPATIENT)
Dept: OTHER | Facility: OTHER | Age: 71
End: 2021-10-25
Payer: MEDICARE

## 2021-11-09 ENCOUNTER — PATIENT MESSAGE (OUTPATIENT)
Dept: DIABETES | Facility: CLINIC | Age: 71
End: 2021-11-09
Payer: MEDICARE

## 2021-11-17 PROCEDURE — 99454 REM MNTR PHYSIOL PARAM 16-30: CPT | Mod: PBBFAC | Performed by: PEDIATRICS

## 2021-11-24 ENCOUNTER — PATIENT MESSAGE (OUTPATIENT)
Dept: ADMINISTRATIVE | Facility: OTHER | Age: 71
End: 2021-11-24
Payer: MEDICARE

## 2021-12-02 ENCOUNTER — OFFICE VISIT (OUTPATIENT)
Dept: PODIATRY | Facility: CLINIC | Age: 71
End: 2021-12-02
Payer: MEDICARE

## 2021-12-02 VITALS — WEIGHT: 194 LBS | BODY MASS INDEX: 27.16 KG/M2 | HEIGHT: 71 IN

## 2021-12-02 DIAGNOSIS — Q82.8 POROKERATOSIS: ICD-10-CM

## 2021-12-02 DIAGNOSIS — E11.8 TYPE 2 DIABETES MELLITUS WITH COMPLICATION, WITH LONG-TERM CURRENT USE OF INSULIN: Primary | ICD-10-CM

## 2021-12-02 DIAGNOSIS — Z79.4 TYPE 2 DIABETES MELLITUS WITH COMPLICATION, WITH LONG-TERM CURRENT USE OF INSULIN: Primary | ICD-10-CM

## 2021-12-02 PROCEDURE — 99999 PR PBB SHADOW E&M-EST. PATIENT-LVL III: ICD-10-PCS | Mod: PBBFAC,,, | Performed by: PODIATRIST

## 2021-12-02 PROCEDURE — 11055 PR TRIM HYPERKERATOTIC SKIN LESION, ONE: ICD-10-PCS | Mod: Q9,S$PBB,, | Performed by: PODIATRIST

## 2021-12-02 PROCEDURE — 99999 PR PBB SHADOW E&M-EST. PATIENT-LVL III: CPT | Mod: PBBFAC,,, | Performed by: PODIATRIST

## 2021-12-02 PROCEDURE — 99213 OFFICE O/P EST LOW 20 MIN: CPT | Mod: PBBFAC | Performed by: PODIATRIST

## 2021-12-02 PROCEDURE — 11055 PARING/CUTG B9 HYPRKER LES 1: CPT | Mod: Q9,S$PBB,, | Performed by: PODIATRIST

## 2021-12-02 PROCEDURE — 11055 PARING/CUTG B9 HYPRKER LES 1: CPT | Mod: Q9,PBBFAC | Performed by: PODIATRIST

## 2021-12-02 PROCEDURE — 99499 NO LOS: ICD-10-PCS | Mod: S$PBB,,, | Performed by: PODIATRIST

## 2021-12-02 PROCEDURE — 99499 UNLISTED E&M SERVICE: CPT | Mod: S$PBB,,, | Performed by: PODIATRIST

## 2021-12-17 PROCEDURE — 99454 REM MNTR PHYSIOL PARAM 16-30: CPT | Mod: PBBFAC | Performed by: PEDIATRICS

## 2021-12-27 ENCOUNTER — LAB VISIT (OUTPATIENT)
Dept: LAB | Facility: HOSPITAL | Age: 71
End: 2021-12-27
Attending: PHYSICIAN ASSISTANT
Payer: MEDICARE

## 2021-12-27 DIAGNOSIS — Z79.4 TYPE 2 DIABETES MELLITUS WITH COMPLICATION, WITH LONG-TERM CURRENT USE OF INSULIN: ICD-10-CM

## 2021-12-27 DIAGNOSIS — E11.8 TYPE 2 DIABETES MELLITUS WITH COMPLICATION, WITH LONG-TERM CURRENT USE OF INSULIN: ICD-10-CM

## 2021-12-27 LAB
ESTIMATED AVG GLUCOSE: 157 MG/DL (ref 68–131)
HBA1C MFR BLD: 7.1 % (ref 4–5.6)

## 2021-12-27 PROCEDURE — 83036 HEMOGLOBIN GLYCOSYLATED A1C: CPT | Performed by: PHYSICIAN ASSISTANT

## 2021-12-27 PROCEDURE — 36415 COLL VENOUS BLD VENIPUNCTURE: CPT | Performed by: PHYSICIAN ASSISTANT

## 2022-01-05 ENCOUNTER — OFFICE VISIT (OUTPATIENT)
Dept: DIABETES | Facility: CLINIC | Age: 72
End: 2022-01-05
Payer: MEDICARE

## 2022-01-05 ENCOUNTER — PATIENT MESSAGE (OUTPATIENT)
Dept: DIABETES | Facility: CLINIC | Age: 72
End: 2022-01-05

## 2022-01-05 VITALS
WEIGHT: 196.63 LBS | SYSTOLIC BLOOD PRESSURE: 131 MMHG | BODY MASS INDEX: 27.43 KG/M2 | HEART RATE: 65 BPM | DIASTOLIC BLOOD PRESSURE: 71 MMHG

## 2022-01-05 DIAGNOSIS — E11.69 HYPERLIPIDEMIA ASSOCIATED WITH TYPE 2 DIABETES MELLITUS: ICD-10-CM

## 2022-01-05 DIAGNOSIS — E78.5 HYPERLIPIDEMIA ASSOCIATED WITH TYPE 2 DIABETES MELLITUS: ICD-10-CM

## 2022-01-05 DIAGNOSIS — I25.10 CORONARY ARTERY DISEASE INVOLVING NATIVE CORONARY ARTERY OF NATIVE HEART WITHOUT ANGINA PECTORIS: ICD-10-CM

## 2022-01-05 DIAGNOSIS — I15.2 HYPERTENSION ASSOCIATED WITH DIABETES: ICD-10-CM

## 2022-01-05 DIAGNOSIS — Z79.4 TYPE 2 DIABETES MELLITUS WITH COMPLICATION, WITH LONG-TERM CURRENT USE OF INSULIN: Primary | ICD-10-CM

## 2022-01-05 DIAGNOSIS — E66.3 OVERWEIGHT (BMI 25.0-29.9): ICD-10-CM

## 2022-01-05 DIAGNOSIS — E11.8 TYPE 2 DIABETES MELLITUS WITH COMPLICATION, WITH LONG-TERM CURRENT USE OF INSULIN: Primary | ICD-10-CM

## 2022-01-05 DIAGNOSIS — G47.33 OBSTRUCTIVE SLEEP APNEA: ICD-10-CM

## 2022-01-05 DIAGNOSIS — E11.59 HYPERTENSION ASSOCIATED WITH DIABETES: ICD-10-CM

## 2022-01-05 LAB — GLUCOSE SERPL-MCNC: 129 MG/DL (ref 70–110)

## 2022-01-05 PROCEDURE — 82962 GLUCOSE BLOOD TEST: CPT | Mod: PBBFAC | Performed by: PHYSICIAN ASSISTANT

## 2022-01-05 PROCEDURE — 99214 OFFICE O/P EST MOD 30 MIN: CPT | Mod: PBBFAC | Performed by: PHYSICIAN ASSISTANT

## 2022-01-05 PROCEDURE — 99214 OFFICE O/P EST MOD 30 MIN: CPT | Mod: S$PBB,,, | Performed by: PHYSICIAN ASSISTANT

## 2022-01-05 PROCEDURE — 99999 PR PBB SHADOW E&M-EST. PATIENT-LVL IV: ICD-10-PCS | Mod: PBBFAC,,, | Performed by: PHYSICIAN ASSISTANT

## 2022-01-05 PROCEDURE — 99214 PR OFFICE/OUTPT VISIT, EST, LEVL IV, 30-39 MIN: ICD-10-PCS | Mod: S$PBB,,, | Performed by: PHYSICIAN ASSISTANT

## 2022-01-05 PROCEDURE — 99999 PR PBB SHADOW E&M-EST. PATIENT-LVL IV: CPT | Mod: PBBFAC,,, | Performed by: PHYSICIAN ASSISTANT

## 2022-01-05 NOTE — PROGRESS NOTES
PCP: EM Ochoa Jr, MD    Subjective:     Chief Complaint: Diabetes - Established Patient    HISTORY OF PRESENT ILLNESS: 71 y.o.  male presenting for diabetes management visit.   The patient's last visit with me was on 10/5/2021.  Patient has had Type II diabetes since 1990s.  Pertinent to decision making is the following comorbidities: HTN, HLD, CAD and Overweight by BMI  Patient has the following Diabetes complications: without complications  He has attended diabetes education in the past.     Patient's most recent A1c of 7.1% was completed 1 weeks ago.   Patient states since His last A1c His blood glucose levels have been high  in the evening.   Patient monitors blood glucose 3 times per day with [facility] meter : Fasting, Before Lunch and Before Dinner.   Patient blood glucose monitoring device data will be reviewed under the Episodes tab today - see below.   Patient endorses the following diabetes related symptoms: None.   Patient is due today for the following diabetes-related health maintenance standards: Foot Exam .   He denies any recent hospital admissions or emergency room visits.   He denies having hypoglycemia.  Patient's concerns today include glycemic control.     Timothy's recent readings (past 60 days):  Time Taken Time Submitted Glucose (Before Breakfast) (mg/dL) Glucose (After Breakfast) (mg/dL) Glucose (Before Lunch) (mg/dL) Glucose (After Lunch) (mg/dL) Glucose (Before Dinner) (mg/dL)   1/2/2022 12:29 PM 1/2/2022 12:29 PM   166     1/2/2022  7:43 AM 1/2/2022  7:43        1/1/2022  5:38 PM 1/1/2022  5:38 PM     98   1/1/2022 12:11 PM 1/1/2022 12:11 PM   242     12/29/2021  5:51 PM 12/29/2021  5:51 PM     110   12/29/2021  8:37 AM 12/29/2021  8:37        12/28/2021  5:50 PM 12/28/2021  5:50 PM     152   12/28/2021  9:04 AM 12/28/2021  5:50 PM        12/28/2021  9:04 AM 12/28/2021 12:39 PM   156     12/27/2021  6:25 PM 12/27/2021  6:25 PM     168   12/27/2021   1:06 PM 12/27/2021  1:06 PM   258     12/27/2021  8:51 AM 12/27/2021  8:52        12/26/2021  5:38 PM 12/26/2021  5:38 PM     252   12/26/2021 11:38 AM 12/26/2021 11:38 AM   177     12/26/2021  8:08 AM 12/26/2021  8:09        12/25/2021  9:09 AM 12/25/2021  9:09        12/24/2021  6:51 PM 12/24/2021  6:51 PM     112   12/24/2021 12:53 PM 12/24/2021 12:53 PM   167     12/24/2021  8:03 AM 12/24/2021  8:04        12/23/2021  1:12 PM 12/23/2021  1:13 PM   114     12/23/2021  9:22 AM 12/23/2021  9:22        12/22/2021  5:39 PM 12/22/2021  5:39 PM     252   12/21/2021  5:53 PM 12/21/2021  5:53 PM     171   12/21/2021  1:17 PM 12/21/2021  1:17 PM   175     12/21/2021  7:58 AM 12/21/2021  7:58        12/20/2021  5:45 PM 12/20/2021  5:45 PM     242   12/20/2021 12:52 PM 12/20/2021 12:52 PM   104     12/20/2021  8:14 AM 12/20/2021  8:14            Patient medication regimen is as below.     CURRENT DM MEDICATIONS:    Metformin 1000 mg BID    Lantus 50 units qhs    Novolog 14 units w/ ss TID wm    Jardiance 25 mg    Trulicity 4.5 mg weekly     Labs Reviewed.       No results found for: CPEPTIDE  No results found for: GLUTAMICACID       //  Weight: 89.2 kg (196 lb 10.4 oz), Body mass index is 27.43 kg/m².  His blood sugar in clinic today is:    Lab Results   Component Value Date    POCGLU 174 (A) 09/07/2021       Review of Systems   Constitutional: Negative for activity change, appetite change, chills and fever.   HENT: Negative for dental problem, mouth sores, nosebleeds, sore throat and trouble swallowing.    Eyes: Negative for pain and discharge.   Respiratory: Negative for shortness of breath, wheezing and stridor.    Cardiovascular: Negative for chest pain, palpitations and leg swelling.   Gastrointestinal: Negative for abdominal pain, diarrhea, nausea and vomiting.   Endocrine: Negative for polydipsia, polyphagia and polyuria.   Genitourinary: Negative for dysuria, frequency  and urgency.   Musculoskeletal: Negative for joint swelling and myalgias.   Skin: Negative for rash and wound.   Neurological: Negative for dizziness, syncope, weakness and headaches.   Psychiatric/Behavioral: Negative for behavioral problems and dysphoric mood.         Diabetes Management Status  Statin: Taking  ACE/ARB: Taking    Screening or Prevention Patient's value Goal Complete/Controlled?   HgA1C Testing and Control   Lab Results   Component Value Date    HGBA1C 7.1 (H) 2021      Annually/Less than 8% Yes   Lipid profile : 2021 Annually Yes   LDL control Lab Results   Component Value Date    LDLCALC 51.8 (L) 2021    Annually/Less than 100 mg/dl  Yes   Nephropathy screening Lab Results   Component Value Date    MICALBCREAT 24.3 03/10/2021     No results found for: PROTEINUA Annually Yes   Blood pressure BP Readings from Last 1 Encounters:   22 131/71    Less than 140/90 Yes   Dilated retinal exam : 2021 Annually Yes    Foot exam   : 2021 Annually Yes     ACTIVITY LEVEL: Moderately Active. Discussed activities, benefits, methods, and precautions.  MEAL PLANNING: Patient reports number of meals per day to be 3 and number of snacks per day to be 2.   Patient is encouraged to carb count and consume no more than 30 - 45 grams of carbohydrates in each meal and 15 grams of carbohydrates in each snack.     Social History     Socioeconomic History    Marital status:    Tobacco Use    Smoking status: Former Smoker     Packs/day: 1.00     Years: 20.00     Pack years: 20.00     Quit date: 2000     Years since quittin.0    Smokeless tobacco: Never Used   Substance and Sexual Activity    Alcohol use: No     Alcohol/week: 0.0 standard drinks    Drug use: No    Sexual activity: Yes     Partners: Female   Social History Narrative    . Lives with spouse. Has 2 children. Retired. No pets or smokers in household.     Past Medical History:   Diagnosis Date     Coronary artery disease 2010    [facility] - no stents    DM (diabetes mellitus) 2002     lunch 10/28/2016    DM (diabetes mellitus)      am 04/12/2021    Hyperlipemia     Hypertension     Kidney stones     Neuropathy     Type 2 diabetes mellitus 10-12 years     am 10/31/2014       Objective:        Physical Exam  Constitutional:       General: He is not in acute distress.     Appearance: He is well-developed and well-nourished. He is not diaphoretic.   HENT:      Head: Normocephalic and atraumatic.      Right Ear: External ear normal.      Left Ear: External ear normal.      Nose: Nose normal.   Eyes:      General:         Right eye: No discharge.         Left eye: No discharge.      Extraocular Movements: EOM normal.      Pupils: Pupils are equal, round, and reactive to light.   Cardiovascular:      Rate and Rhythm: Normal rate and regular rhythm.      Pulses: Intact distal pulses.           Dorsalis pedis pulses are 2+ on the right side and 2+ on the left side.        Posterior tibial pulses are 2+ on the right side and 2+ on the left side.      Heart sounds: Normal heart sounds.   Pulmonary:      Effort: Pulmonary effort is normal.      Breath sounds: Normal breath sounds.   Abdominal:      Palpations: Abdomen is soft.   Musculoskeletal:         General: Normal range of motion.      Cervical back: Normal range of motion and neck supple.      Right foot: Normal range of motion. No deformity.      Left foot: Normal range of motion. No deformity.   Feet:      Right foot:      Protective Sensation: 6 sites tested. 6 sites sensed.      Skin integrity: Dry skin present. No ulcer, blister, skin breakdown, erythema, warmth or callus.      Left foot:      Protective Sensation: 6 sites tested. 6 sites sensed.      Skin integrity: Dry skin present. No ulcer, blister, skin breakdown, erythema, warmth or callus.   Skin:     General: Skin is warm and dry.      Capillary Refill: Capillary refill takes less  than 2 seconds.   Neurological:      Mental Status: He is alert and oriented to person, place, and time.      Motor: No abnormal muscle tone.      Coordination: Coordination normal.   Psychiatric:         Mood and Affect: Mood and affect normal.         Behavior: Behavior normal.         Thought Content: Thought content normal.         Judgment: Judgment normal.           Assessment / Plan:     Type 2 diabetes mellitus with complication, with long-term current use of insulin  -     POCT Glucose, Hand-Held Device    Hyperlipidemia associated with type 2 diabetes mellitus    Hypertension associated with diabetes    Coronary artery disease involving native coronary artery of native heart without angina pectoris    Obstructive sleep apnea    Overweight (BMI 25.0-29.9)      Additional Plan Details:    - POCT Glucose  - Encouraged continuation of lifestyle changes including regular exercise and limiting carbohydrates to 30-45 grams per meal threes times daily and 15 grams per snack with a limit of two daily.   - Encouraged continued monitoring of blood glucose with maintenance of 3 times daily at Fasting, Before Lunch and Before Dinner.   - Current DM Medication Regimen: continue Trulicity 4.5 mg weekly. Change Lantus 52 units qhs. Change Novolog to 14 units with ss TID wm - see below. Continue Jardiance 25 mg and Metformin 1000 mg BID.   - Health Maintenance standards addressed today: Foot Exam - completed today and documented in physical exam with feedback to patient about proper diabetic foot care and findings and COVID - 19 Vaccine - booster declined  - Nursing Visit: Patient is below goal range for nursing visit for age group and will not need nursing visit at this time .   - Follow up in 3 months with A1c prior.     WITH MEALS:   If blood sugar is below 70 eat first then check your blood sugar 2 hours later and make correction.  If blood pre-meal sugar is  70 -150 take 14 units of Novolog;  If blood pre-meal sugar  is 151-200 take +2 units of Novolog;  If blood pre-meal sugar is 201-250 take +4 units of Novolog;  If blood pre-meal sugar is 251-300 take +6 units of Novolog;  If blood pre-meal sugar is 301-350 take +8 units of Novolog;  If blood pre-meal sugar is 351-400+ take +10 units of Novolog;  Also increase water intake and call for appointment.       Blakeney McKnight, PA-C [facility]    A total of 30 minutes was spent in face to face time, of which over 50 % was spent in counseling patient on disease process, complications, treatment, and side effects of medications.

## 2022-01-11 DIAGNOSIS — E11.69 HYPERLIPIDEMIA ASSOCIATED WITH TYPE 2 DIABETES MELLITUS: ICD-10-CM

## 2022-01-11 DIAGNOSIS — E78.5 HYPERLIPIDEMIA ASSOCIATED WITH TYPE 2 DIABETES MELLITUS: ICD-10-CM

## 2022-01-11 NOTE — TELEPHONE ENCOUNTER
No new care gaps identified.  Powered by [employer]. Reference number: [id number].   1/11/2022 8:16:05 AM CST

## 2022-01-12 NOTE — TELEPHONE ENCOUNTER
Refill Routing Note   Medication(s) are not appropriate for processing by [facility] for the following reason(s):      - unclear dosage and medication not previously prescribed by PCP    [facility] action(s):  Defer       Medication Therapy Plan: DR. Naveen AUSTIN/NAZANIN 40mg on 2/18/21 and called in 20mg the same day  adherence shows pt most recently filled the 40mg, defer to you   --->Care Gap information included in message below if applicable.   Medication reconciliation completed: No   Automatic Epic Generated Protocol Data:        Requested Prescriptions   Pending Prescriptions Disp Refills    rosuvastatin (CRESTOR) 40 MG Tab [Pharmacy Med Name: ROSUVASTATIN 40MG TABLETS] 90 tablet 2     Sig: TAKE 1 TABLET(40 MG) BY MOUTH EVERY EVENING       Cardiovascular:  Antilipid - Statins Passed - 1/12/2022  3:09 PM        Passed - Patient is at least 18 years old        Passed - Valid encounter within last 15 months     Recent Visits  Date Type Provider Dept   10/13/21 Office Visit SHANE Ochoa Jr., MD [facility] Internal Medicine   09/21/20 Office Visit SHANE Ochoa Jr., MD [facility] Internal Medicine   03/20/20 Office Visit SHANE Ochoa Jr., MD [facility] Internal Medicine   Showing recent visits within past 720 days and meeting all other requirements  Future Appointments  No visits were found meeting these conditions.  Showing future appointments within next 150 days and meeting all other requirements      Future Appointments              In 2 months LABORATORY, [facility] 1st Fl, [facility]    In 2 months Al Daugherty PA-C [facility] Diabetes Mgmt 2nd Fl, [facility]    In 5 months Bandar Garcia MD [facility] Urology 3rd Fl, [facility]    In 8 months SPECIMEN, [facility] [place] [facility], [facility]    In 8 months LABORATORY, [facility] [place] - [facility] 1st Fl, [facility]    In 9 months SHANE Ochoa Jr., MD [facility] Internal Med 2nd Fl, [place]                Passed - ALT is 131 or below and within 360  days     ALT   Date Value Ref Range Status   09/23/2021 22 10 - 44 U/L Final   03/10/2021 26 10 - 44 U/L Final   02/22/2021 30 10 - 44 U/L Final              Passed - AST is 119 or below and within 360 days     AST   Date Value Ref Range Status   09/23/2021 24 10 - 40 U/L Final   03/10/2021 21 10 - 40 U/L Final   02/22/2021 19 10 - 40 U/L Final              Passed - Total Cholesterol within 360 days     Lab Results   Component Value Date    CHOL 99 (L) 09/23/2021    CHOL 129 03/10/2021    CHOL 120 09/15/2020              Passed - LDL within 360 days     LDL Cholesterol   Date Value Ref Range Status   09/23/2021 51.8 (L) 63.0 - 159.0 mg/dL Final     Comment:     The National Cholesterol Education Program (NCEP) has set the  following guidelines (reference values) for LDL Cholesterol:  Optimal.......................<130 mg/dL  Borderline High...............130-159 mg/dL  High..........................160-189 mg/dL  Very High.....................>190 mg/dL              Passed - HDL within 360 days     HDL   Date Value Ref Range Status   09/23/2021 28 (L) 40 - 75 mg/dL Final     Comment:     The National Cholesterol Education Program (NCEP) has set the  following guidelines (reference values) for HDL Cholesterol:  Low...............<40 mg/dL  Optimal...........>60 mg/dL              Passed - Triglycerides within 360 days     Lab Results   Component Value Date    TRIG 96 09/23/2021    TRIG 253 (H) 03/10/2021    TRIG 333 (H) 09/15/2020                    Appointments  past 12m or future 3m with PCP    Date Provider   Last Visit   10/13/2021 SHANE Ochoa Jr., MD   Next Visit   Visit date not found SHANE Ochoa Jr., MD   ED visits in past 90 days: 0        Note composed:2:39 PM 01/13/2022

## 2022-01-13 ENCOUNTER — OFFICE VISIT (OUTPATIENT)
Dept: PODIATRY | Facility: CLINIC | Age: 72
End: 2022-01-13
Payer: MEDICARE

## 2022-01-13 DIAGNOSIS — E11.9 ENCOUNTER FOR COMPREHENSIVE DIABETIC FOOT EXAMINATION, TYPE 2 DIABETES MELLITUS: Primary | ICD-10-CM

## 2022-01-13 DIAGNOSIS — E11.8 TYPE 2 DIABETES MELLITUS WITH COMPLICATION, WITH LONG-TERM CURRENT USE OF INSULIN: ICD-10-CM

## 2022-01-13 DIAGNOSIS — Z79.4 TYPE 2 DIABETES MELLITUS WITH COMPLICATION, WITH LONG-TERM CURRENT USE OF INSULIN: ICD-10-CM

## 2022-01-13 DIAGNOSIS — B35.1 ONYCHOMYCOSIS DUE TO DERMATOPHYTE: ICD-10-CM

## 2022-01-13 PROCEDURE — 99213 PR OFFICE/OUTPT VISIT, EST, LEVL III, 20-29 MIN: ICD-10-PCS | Mod: 25,S$PBB,, | Performed by: PODIATRIST

## 2022-01-13 PROCEDURE — 11721 DEBRIDE NAIL 6 OR MORE: CPT | Mod: Q9,S$PBB,, | Performed by: PODIATRIST

## 2022-01-13 PROCEDURE — 11721 PR DEBRIDEMENT OF NAILS, 6 OR MORE: ICD-10-PCS | Mod: Q9,S$PBB,, | Performed by: PODIATRIST

## 2022-01-13 PROCEDURE — 11721 DEBRIDE NAIL 6 OR MORE: CPT | Mod: Q9,PBBFAC | Performed by: PODIATRIST

## 2022-01-13 PROCEDURE — 99213 OFFICE O/P EST LOW 20 MIN: CPT | Mod: PBBFAC | Performed by: PODIATRIST

## 2022-01-13 PROCEDURE — 99213 OFFICE O/P EST LOW 20 MIN: CPT | Mod: 25,S$PBB,, | Performed by: PODIATRIST

## 2022-01-13 PROCEDURE — 99999 PR PBB SHADOW E&M-EST. PATIENT-LVL III: CPT | Mod: PBBFAC,,, | Performed by: PODIATRIST

## 2022-01-13 PROCEDURE — 99999 PR PBB SHADOW E&M-EST. PATIENT-LVL III: ICD-10-PCS | Mod: PBBFAC,,, | Performed by: PODIATRIST

## 2022-01-13 NOTE — PROGRESS NOTES
Subjective:       Patient ID: Timothy Ortega is a 71 y.o. male.    Chief Complaint: Diabetic Foot Exam (Patient is a diabetic and was last seen on 11.17.21 by Dr. SHANE Ochoa. Denies pain at present and is wearing socks and tennis shoes. )      HPI: Timothy Ortega presents to the office today, under referral by , EM Ochoa Jr, MD, for his annual diabetic foot assessment and risk evaluation.  Patient is a DMII. This patient last saw his/her internal/family medicine physician on 11/17/21. 0/10 pain to the right and left foot     Hemoglobin A1C   Date Value Ref Range Status   12/27/2021 7.1 (H) 4.0 - 5.6 % Final     Comment:     ADA Screening Guidelines:  5.7-6.4%  Consistent with prediabetes  >or=6.5%  Consistent with diabetes    High levels of fetal hemoglobin interfere with the HbA1C  assay. Heterozygous hemoglobin variants (HbS, HgC, etc)do  not significantly interfere with this assay.   However, presence of multiple variants may affect accuracy.     09/28/2021 7.4 (H) 4.0 - 5.6 % Final     Comment:     ADA Screening Guidelines:  5.7-6.4%  Consistent with prediabetes  >or=6.5%  Consistent with diabetes    High levels of fetal hemoglobin interfere with the HbA1C  assay. Heterozygous hemoglobin variants (HbS, HgC, etc)do  not significantly interfere with this assay.   However, presence of multiple variants may affect accuracy.     09/23/2021 7.2 (H) 4.0 - 5.6 % Final     Comment:     ADA Screening Guidelines:  5.7-6.4%  Consistent with prediabetes  >or=6.5%  Consistent with diabetes    High levels of fetal hemoglobin interfere with the HbA1C  assay. Heterozygous hemoglobin variants (HbS, HgC, etc)do  not significantly interfere with this assay.   However, presence of multiple variants may affect accuracy.     .    Review of patient's allergies indicates:  No Known Allergies    Past Medical History:   Diagnosis Date    Coron[facility] artery disease 2010    [facility] - [facility] stents    DM (diabetes mellitus) 2002    BS  135 lunch 10/28/2016    DM (diabetes mellitus)      am 2021    Hyperlipemia     Hypertension     Kidney stones     Neuropathy     Type 2 diabetes mellitus 10-12 years     am 10/31/2014       Family History   Problem Relation Age of Onset    Diabetes Mother     Glaucoma Mother     Heart disease Mother 75        CAB    Diabetes Father     Heart attack Father 58        Fatal MI    Diabetes Brother     Diabetes Sister     Diabetes Sister     Cataracts Paternal Uncle     Cataracts Maternal Grandmother        Social History     Socioeconomic History    Marital status:    Tobacco Use    Smoking status: Former Smoker     Packs/day: 1.00     Years: 20.00     Pack years: 20.00     Quit date: 2000     Years since quittin.0    Smokeless tobacco: Never Used   Substance and Sexual Activity    Alcohol use: No     Alcohol/week: 0.0 standard drinks    Drug use: No    Sexual activity: Yes     Partners: Female   Social History Narrative    . Lives with spouse. Has 2 children. Retired. No pets or smokers in household.       Past Surgical History:   Procedure Laterality Date    CARPAL TUNNEL RELEASE Right 2020    COLONOSCOPY N/A 3/31/2017    Procedure: COLONOSCOPY;  Surgeon: Cornelius Ibarra MD;  Location: [facility];  Service: Endoscopy;  Laterality: N/A;    DENTAL SURGERY      Implant    KNEE ARTHROSCOPY Left     ROBOT-ASSISTED LAPAROSCOPIC REPAIR OF INGUINAL HERNIA USING DA JOSE ANGEL XI Left 2021    Procedure: XI ROBOTIC REPAIR, HERNIA, INGUINAL;  Surgeon: Tavon Cruz MD;  Location: [facility];  Service: General;  Laterality: Left;       Review of Systems      Objective:   There were no vitals taken for this visit.    Physical Exam  LOWER EXTREMITY PHYSICAL EXAMINATION    ORTHOPEDIC:  No pain on palpation of the foot or ankle.   Range of motion within normal limits of the ankle joint, rearfoot, and forefoot.  Manual muscle strength testing is 5/5 with  dorsiflexion, plantar flexion, abduction and abduction of the lower extremity.  No pain with or without resistance.  The patient is a full to ambulate without pain or discomfort.  The patient's gait is non antalgic.  The patient does not utilize any assistive device for ambulation.    VASCULAR:  The right dorsalis pedis pulse 2/4 and the right posterior tibial pulse 1/4.  The left dorsalis pedis pulse 2/4 and posterior tibial pulse on the left is 1/4.  Capillary refill is intact.  Pedal hair growth decreased.     NEUROLOGY:  Protective sensation is intact with [place] monofilament. Proprioception is intact. Intact sensation to light touch.  Vibratory sensation is diminished to the 1st metatarsal phalangeal joint.     DERMATOLOGY:  Skin is supple, moist, intact.  There is no signs of callusing, ulcerations, other lesions identified to the dorsal or plantar aspect of the right or left foot.  The R1, 2, 5 and left L1,2, 5 are thickened, discolored dystrophic.  There is subungual debris.  Nail plates have area of dark discoloration.  The remaining nails 3-4 on the right foot and the left foot are elongated but of normal color, thickness, and texture.   There is no signs of ingrowing into the medial or lateral borders.  There is no evidence of wounds or skin breakdown.  No edema or erythema.  No obvious lacerations or fissuring.  Interdigital spaces are clean, dry, intact.  No rashes or scars appreciated.    Assessment:     1. Encounter for comprehensive diabetic foot examination, type 2 diabetes mellitus    2. Type 2 diabetes mellitus with complication, with long-term current use of insulin    3. Onychomycosis due to dermatophyte        Plan:     Encounter for comprehensive diabetic foot examination, type 2 diabetes mellitus    Type 2 diabetes mellitus with complication, with long-term current use of insulin    Onychomycosis due to dermatophyte         I counseled the patient on his/her Diabetic Mellitus  regarding today's clinical examination and objection findings. We did also discuss recent medication changes, pertinent labs and imaging evaluations and other medical consultation notes and progress notes. Greater than 50% of this visit was spent on counseling and coordination of care. Greater than 20 minutes of this appt. was spent on education about the diabetic foot, in relation to PVD and/or neuropathy, and the prevention of limb loss.     Shoe gear is inspected and wear and proper fit/type. Patient is reminded of the importance of good nutrition and blood sugar control to help prevent podiatric complications of diabetes. Patient instructed on proper foot hygeine. We discussed wearing proper shoe gear, daily foot inspections, never walking without protective shoe gear, never putting sharp instruments to feet.  Patient  will continue to monitor the areas daily, inspect feet, wear protective shoe gear when ambulatory, moisturizer to maintain skin integrity.     Patient's DMI/DMII is managed by Internal/Family Medicine Physician and/or Endocrinology Advanced Practice Provider.    Dystrophic nail plates, as outlined above (R#1,2,5  ; L#1,2,5 ), are sharply debrided with double action nail nipper, and/or with the assistance of a mechanical rotary blayne, with removal of all offending nail and nail border(s), for reduction of pains. Nails are reduced in terms of length, width and girth with removal of subungual debris to facilitate pain free weight bearing and ambulation. The elongated nails as outlined in the objective portion of this note, were trimmed to appropriate length, with a double action nail nipper, for alleviation/reduction of pains as well. Follow up in approx. 3-4 months.

## 2022-01-14 RX ORDER — ROSUVASTATIN CALCIUM 40 MG/1
TABLET, COATED ORAL
Qty: 90 TABLET | Refills: 3 | OUTPATIENT
Start: 2022-01-14

## 2022-01-16 ENCOUNTER — OFFICE VISIT (OUTPATIENT)
Dept: URGENT CARE | Facility: CLINIC | Age: 72
End: 2022-01-16
Payer: MEDICARE

## 2022-01-16 VITALS
WEIGHT: 185 LBS | OXYGEN SATURATION: 96 % | HEIGHT: 71 IN | BODY MASS INDEX: 25.9 KG/M2 | HEART RATE: 78 BPM | TEMPERATURE: 99 F | SYSTOLIC BLOOD PRESSURE: 120 MMHG | DIASTOLIC BLOOD PRESSURE: 67 MMHG | RESPIRATION RATE: 78 BRPM

## 2022-01-16 DIAGNOSIS — R05.9 COUGH: ICD-10-CM

## 2022-01-16 DIAGNOSIS — U07.1 COVID-19 VIRUS DETECTED: ICD-10-CM

## 2022-01-16 DIAGNOSIS — U07.1 COVID-19: Primary | ICD-10-CM

## 2022-01-16 LAB
CTP QC/QA: YES
SARS-COV-2 RDRP RESP QL NAA+PROBE: POSITIVE

## 2022-01-16 PROCEDURE — U0002 COVID-19 LAB TEST NON-CDC: HCPCS | Mod: QW,CR,S$GLB, | Performed by: PHYSICIAN ASSISTANT

## 2022-01-16 PROCEDURE — 99213 OFFICE O/P EST LOW 20 MIN: CPT | Mod: CR,S$GLB,, | Performed by: PHYSICIAN ASSISTANT

## 2022-01-16 PROCEDURE — U0002: ICD-10-PCS | Mod: QW,CR,S$GLB, | Performed by: PHYSICIAN ASSISTANT

## 2022-01-16 PROCEDURE — 99213 PR OFFICE/OUTPT VISIT, EST, LEVL III, 20-29 MIN: ICD-10-PCS | Mod: CR,S$GLB,, | Performed by: PHYSICIAN ASSISTANT

## 2022-01-16 NOTE — PROGRESS NOTES
"Subjective:       Patient ID: Timothy Ortega is a 71 y.o. male.    Vitals:  height is 5' 11" (1.803 m) and weight is 83.9 kg (185 lb). His temperature is 99 °F (37.2 °C). His blood pressure is 120/67 and his pulse is 78. His respiration is 78 (abnormal) and oxygen saturation is 96%.     Chief Complaint: Sore Throat    Pt symptoms started 2 days ago. Pt has been exposed to covid.  Pt is vaccinated.    Sore Throat   This is a new problem. The current episode started yesterday. The problem has been unchanged. Neither side of throat is experiencing more pain than the other. The maximum temperature recorded prior to his arrival was 100.4 - 100.9 F. The pain is at a severity of 0/10. The pain is mild. Associated symptoms include congestion, coughing, ear pain, headaches and a hoarse voice. Pertinent negatives include no abdominal pain, diarrhea, drooling, ear discharge, plugged ear sensation, neck pain, shortness of breath, stridor, swollen glands, trouble swallowing or vomiting. He has tried acetaminophen (day quil, ny quil, tylenol) for the symptoms. The treatment provided mild relief.       HENT: Positive for ear pain, congestion and sore throat. Negative for ear discharge, drooling and trouble swallowing.    Neck: Negative for neck pain.   Respiratory: Positive for cough. Negative for shortness of breath and stridor.    Gastrointestinal: Negative for abdominal pain, vomiting and diarrhea.   Neurological: Positive for headaches.       Objective:      Physical Exam   Constitutional: He is oriented to person, place, and time. He appears well-developed and well-nourished. He is cooperative.  Non-toxic appearance. He does not have a sickly appearance. He does not appear ill. No distress.   HENT:   Head: Normocephalic and atraumatic.   Ears:   Right Ear: Hearing, tympanic membrane, external ear and ear canal normal.   Left Ear: Hearing, tympanic membrane, external ear and ear canal normal.   Nose: Nose normal. No mucosal " edema, rhinorrhea or nasal deformity. No epistaxis. Right sinus exhibits no maxillary sinus tenderness and no frontal sinus tenderness. Left sinus exhibits no maxillary sinus tenderness and no frontal sinus tenderness.   Mouth/Throat: Uvula is midline, oropharynx is clear and moist and mucous membranes are normal. No trismus in the jaw. Normal dentition. No uvula swelling. No oropharyngeal exudate, posterior oropharyngeal edema or posterior oropharyngeal erythema.   Eyes: Conjunctivae and lids are normal. No scleral icterus.   Neck: Trachea normal and phonation normal. Neck supple. No edema present. No erythema present. No neck rigidity present.   Cardiovascular: Normal rate, regular rhythm, normal heart sounds, intact distal pulses and normal pulses.   Pulmonary/Chest: Effort normal and breath sounds normal. No respiratory distress. He has no decreased breath sounds. He has no rhonchi.   Abdominal: Normal appearance.   Musculoskeletal: Normal range of motion.         General: No deformity or edema. Normal range of motion.   Neurological: He is alert and oriented to person, place, and time. He exhibits normal muscle tone. Coordination normal.   Skin: Skin is warm, dry, intact, not diaphoretic and not pale.   Psychiatric: He has a normal mood and affect. His speech is normal and behavior is normal. Judgment and thought content normal. Cognition and memory  Nursing note and vitals reviewed.        Assessment:       1. COVID-19    2. Cough        Here with 2 days or cough, headache and congestion. He was tested for covid and it was positive. He denies shortness of breath or chest pain. He was referred to the antibody infusion center. He was instructed to quarantine for the next 5 days. He was instructed to hydrate and return to the clinic if new or worsening symptoms occur.    Plan:         COVID-19  -     Ambulatory referral/consult to [facility]    Cough  -     POCT COVID-19 Rapid Screening

## 2022-01-16 NOTE — PATIENT INSTRUCTIONS
Your test was POSITIVE for COVID-19 (coronavirus).       Please isolate yourself at home.  You may leave home and/or return to work once the following conditions are met:    If you were not hospitalized and are not moderately to severely immunocompromised:    More than 5 days since symptoms first appeared AND   More than 24 hours fever free without medications AND   Symptoms are improving   Continue to wear a mask around others for 5 additional days.    If you were hospitalized OR are moderately to severely immunocompromised:   More than 20 days since symptoms first appeared   More than 24 hours fever free without medications   Symptoms have improved    If you had no symptoms but tested positive:   More than 5 days since the date of the first positive test (20 days if moderately to severely immunocompromised). If you develop symptoms, then use the guidelines above.   Continue to wear a mask around others for 5 additional days.      Contact Tracing    As one of the next steps, you will receive a call or text from the [place] Department of Health ([facility]) COVID-19 Tracing Team. See the contact information below so you know not to ignore the health departments call. It is important that you contact them back immediately so they can help.      Contact Tracer Number:  [phone]  Caller ID for most carriers: [facility]     What is contact tracing?  · Contact tracing is a process that helps identify everyone who has been in close contact with an infected person. Contact tracers let those people know they may have been exposed and guide them on next steps. Confidentiality is important for everyone; no one will be told who may have exposed them to the virus.  · Your involvement is important. The more we know about where and how this virus is spreading, the better chance we have at stopping it from spreading further.  What does exposure mean?  · Exposure means you have been within 6 feet for more than 15  minutes with a person who has or had COVID-19.  What kind of questions do the contact tracers ask?  · A contact tracer will confirm your basic contact information including name, address, phone number, and next of kin, as well as asking about any symptoms you may have had. Theyll also ask you how you think you may have gotten sick, such as places where you may have been exposed to the virus, and people you were with. Those names will never be shared with anyone outside of that call, and will only be used to help trace and stop the spread of the virus.   I have privacy concerns. How will the state use my information?  · Your privacy about your health is important. All calls are completed using call centers that use the appropriate health privacy protection measures (HIPAA compliance), meaning that your patient information is safe. No one will ever ask you any questions related to immigration status. Your health comes first.   Do I have to participate?  · You do not have to participate, but we strongly encourage you to. Contact tracing can help us catch and control new outbreaks as theyre developing to keep your friends and family safe.   What if I dont hear from anyone?  · If you dont receive a call within 24 hours, you can call the number above right away to inquire about your status. That line is open from 8:00 am - 8:00 p.m., 7 days a week.  Contact tracing saves lives! Together, we have the power to beat this virus and keep our loved ones and neighbors safe.    For more information see CDC link below.      https://www.cdc.gov/coronavirus/2019-ncov/hcp/guidance-prevent-spread.html#precautions        Sources:  [facility], [facility]           Sincerely,     Shaina Lynn PA-C     A referral to the Emergency Use Authorization (EUA) infusion clinic has been sent on your behalf.  You can expect to be called within the next 1-2 business days to discuss possible appointment to get the  infusion. If you do not receive a call, you may call to schedule the infusion. Reach out to Prachi at [phone].  The infusion clinic should be available by phone Mon-Fri 7:30 -4:30. Infusions are given Monday, Wednesday, and Friday as well as limited times on Saturday.     If we discussed the COVID surveillance/home monitoring program, you will also get a call from [facility] pharmacy at the [place] or [facility] location to get a pulse oximeter to monitor your blood oxygen level.  This will be followed by a COVID surveillance team daily through [employer] (available on computer or through mobile stuart).

## 2022-01-18 ENCOUNTER — PATIENT MESSAGE (OUTPATIENT)
Dept: INFECTIOUS DISEASES | Facility: HOSPITAL | Age: 72
End: 2022-01-18
Payer: MEDICARE

## 2022-01-18 PROCEDURE — 99454 REM MNTR PHYSIOL PARAM 16-30: CPT | Mod: PBBFAC | Performed by: PEDIATRICS

## 2022-01-19 ENCOUNTER — INFUSION (OUTPATIENT)
Dept: INFECTIOUS DISEASES | Facility: HOSPITAL | Age: 72
End: 2022-01-19
Attending: PHYSICIAN ASSISTANT
Payer: MEDICARE

## 2022-01-19 ENCOUNTER — TELEPHONE (OUTPATIENT)
Dept: URGENT CARE | Facility: CLINIC | Age: 72
End: 2022-01-19
Payer: MEDICARE

## 2022-01-19 VITALS
DIASTOLIC BLOOD PRESSURE: 66 MMHG | TEMPERATURE: 98 F | HEART RATE: 76 BPM | OXYGEN SATURATION: 96 % | SYSTOLIC BLOOD PRESSURE: 133 MMHG | RESPIRATION RATE: 18 BRPM

## 2022-01-19 DIAGNOSIS — U07.1 COVID-19: Primary | ICD-10-CM

## 2022-01-19 PROCEDURE — 63600175 PHARM REV CODE 636 W HCPCS: Performed by: INTERNAL MEDICINE

## 2022-01-19 PROCEDURE — 25000003 PHARM REV CODE 250: Performed by: INTERNAL MEDICINE

## 2022-01-19 PROCEDURE — M0243 CASIRIVI AND IMDEVI INFUSION: HCPCS | Performed by: INTERNAL MEDICINE

## 2022-01-19 RX ORDER — EPINEPHRINE 0.3 MG/.3ML
0.3 INJECTION SUBCUTANEOUS
Status: DISCONTINUED | OUTPATIENT
Start: 2022-01-19 | End: 2024-03-05

## 2022-01-19 RX ORDER — SODIUM CHLORIDE 0.9 % (FLUSH) 0.9 %
10 SYRINGE (ML) INJECTION
Status: DISCONTINUED | OUTPATIENT
Start: 2022-01-19 | End: 2022-04-04

## 2022-01-19 RX ORDER — ALBUTEROL SULFATE 90 UG/1
2 AEROSOL, METERED RESPIRATORY (INHALATION)
Status: DISCONTINUED | OUTPATIENT
Start: 2022-01-19 | End: 2022-04-04

## 2022-01-19 RX ORDER — ACETAMINOPHEN 325 MG/1
650 TABLET ORAL ONCE AS NEEDED
Status: DISCONTINUED | OUTPATIENT
Start: 2022-01-19 | End: 2022-04-04

## 2022-01-19 RX ORDER — DIPHENHYDRAMINE HYDROCHLORIDE 50 MG/ML
25 INJECTION INTRAMUSCULAR; INTRAVENOUS ONCE AS NEEDED
Status: DISCONTINUED | OUTPATIENT
Start: 2022-01-19 | End: 2022-04-04

## 2022-01-19 RX ORDER — ONDANSETRON 4 MG/1
4 TABLET, ORALLY DISINTEGRATING ORAL ONCE AS NEEDED
Status: DISCONTINUED | OUTPATIENT
Start: 2022-01-19 | End: 2022-04-04

## 2022-01-19 RX ADMIN — CASIRIVIMAB 600 MG: 1332 INJECTION, SOLUTION, CONCENTRATE INTRAVENOUS at 09:01

## 2022-01-19 NOTE — PROGRESS NOTES
If you have any further COVID related symptoms that have not improved or feel you're having a reaction to your infusion please notify your primary care physician, go to the nearest emergency room or call 911. Patient discharged via Ambulatory with escort to front door of hospital in no apparent distress. Tolerated infusion well. Instructed patient to notify primary care physician if symptoms do not resolve or worsen and to continue to quarantine for the full 10 day period. Also, instructed to wait 90 days post-infusion to receive Covid vaccine. Patient verbalized intent to comply.

## 2022-01-23 ENCOUNTER — PATIENT MESSAGE (OUTPATIENT)
Dept: OTHER | Facility: OTHER | Age: 72
End: 2022-01-23
Payer: MEDICARE

## 2022-01-27 ENCOUNTER — PES CALL (OUTPATIENT)
Dept: ADMINISTRATIVE | Facility: CLINIC | Age: 72
End: 2022-01-27
Payer: MEDICARE

## 2022-02-11 ENCOUNTER — TELEPHONE (OUTPATIENT)
Dept: INTERNAL MEDICINE | Facility: CLINIC | Age: 72
End: 2022-02-11
Payer: MEDICARE

## 2022-02-11 NOTE — TELEPHONE ENCOUNTER
PA APPROVAL fax rec'vd from pt's ins for Trulicity 4.5mg/0.5mL pen rx, faxed approval to pt's pharm instrc them to fill rx and notify pt when rx ready [id number]#[phone].    DOS 02/10/2022 - 02/09/2023  Case Id [id number]

## 2022-02-15 ENCOUNTER — OFFICE VISIT (OUTPATIENT)
Dept: PODIATRY | Facility: CLINIC | Age: 72
End: 2022-02-15
Payer: MEDICARE

## 2022-02-15 VITALS — BODY MASS INDEX: 25.89 KG/M2 | WEIGHT: 184.94 LBS | HEIGHT: 71 IN

## 2022-02-15 DIAGNOSIS — E11.8 TYPE 2 DIABETES MELLITUS WITH COMPLICATION, WITH LONG-TERM CURRENT USE OF INSULIN: Primary | ICD-10-CM

## 2022-02-15 DIAGNOSIS — Z79.4 TYPE 2 DIABETES MELLITUS WITH COMPLICATION, WITH LONG-TERM CURRENT USE OF INSULIN: Primary | ICD-10-CM

## 2022-02-15 DIAGNOSIS — Q82.8 POROKERATOSIS: ICD-10-CM

## 2022-02-15 PROCEDURE — 99499 NO LOS: ICD-10-PCS | Mod: S$PBB,,, | Performed by: PODIATRIST

## 2022-02-15 PROCEDURE — 11056 PARNG/CUTG B9 HYPRKR LES 2-4: CPT | Mod: Q9,S$PBB,, | Performed by: PODIATRIST

## 2022-02-15 PROCEDURE — 11056 PARNG/CUTG B9 HYPRKR LES 2-4: CPT | Mod: Q9,PBBFAC | Performed by: PODIATRIST

## 2022-02-15 PROCEDURE — 11056 PR TRIM BENIGN HYPERKERATOTIC SKIN LESION,2-4: ICD-10-PCS | Mod: Q9,S$PBB,, | Performed by: PODIATRIST

## 2022-02-15 PROCEDURE — 99499 UNLISTED E&M SERVICE: CPT | Mod: S$PBB,,, | Performed by: PODIATRIST

## 2022-02-15 PROCEDURE — 99214 OFFICE O/P EST MOD 30 MIN: CPT | Mod: PBBFAC | Performed by: PODIATRIST

## 2022-02-15 PROCEDURE — 99999 PR PBB SHADOW E&M-EST. PATIENT-LVL IV: ICD-10-PCS | Mod: PBBFAC,,, | Performed by: PODIATRIST

## 2022-02-15 PROCEDURE — 99999 PR PBB SHADOW E&M-EST. PATIENT-LVL IV: CPT | Mod: PBBFAC,,, | Performed by: PODIATRIST

## 2022-02-16 NOTE — PROGRESS NOTES
Subjective:       Patient ID: Timothy Ortega is a 71 y.o. male.    Chief Complaint: Callouses ( C/o 4/10 to bilateral callouses, diabetic, wears socks and shoes.)      HPI: Patient presents to the office today with the chief complaint of painful calluses to the left and/or right foot. This patient is a Diabetic Type. Patient does follow with Primary Care and/or Endocrinology for management of Diabetes Mellitus. This patient's PMD is EM Ochoa Jr, MD. This patient last saw his/her primary care provider on 01/05/2022.  Complains of 4/10 pain to the right left foot.  Continues have painful calluses to the right left foot causing severe discomfort with ambulation.  States that he has attempted to modify shoe gear this point there has been no significant improvement.    Hemoglobin A1C   Date Value Ref Range Status   12/27/2021 7.1 (H) 4.0 - 5.6 % Final     Comment:     ADA Screening Guidelines:  5.7-6.4%  Consistent with prediabetes  >or=6.5%  Consistent with diabetes    High levels of fetal hemoglobin interfere with the HbA1C  assay. Heterozygous hemoglobin variants (HbS, HgC, etc)do  not significantly interfere with this assay.   However, presence of multiple variants may affect accuracy.     09/28/2021 7.4 (H) 4.0 - 5.6 % Final     Comment:     ADA Screening Guidelines:  5.7-6.4%  Consistent with prediabetes  >or=6.5%  Consistent with diabetes    High levels of fetal hemoglobin interfere with the HbA1C  assay. Heterozygous hemoglobin variants (HbS, HgC, etc)do  not significantly interfere with this assay.   However, presence of multiple variants may affect accuracy.     09/23/2021 7.2 (H) 4.0 - 5.6 % Final     Comment:     ADA Screening Guidelines:  5.7-6.4%  Consistent with prediabetes  >or=6.5%  Consistent with diabetes    High levels of fetal hemoglobin interfere with the HbA1C  assay. Heterozygous hemoglobin variants (HbS, HgC, etc)do  not significantly interfere with this assay.   However, presence of  "multiple variants may affect accuracy.     .     Review of patient's allergies indicates:  No Known Allergies    Past Medical History:   Diagnosis Date    Coronary artery disease     [facility] - no stents    DM (diabetes mellitus) 2002     lunch 10/28/2016    DM (diabetes mellitus)      am 2021    Hyperlipemia     Hypertension     Kidney stones     Neuropathy     Type 2 diabetes mellitus 10-12 years     am 10/31/2014       Family History   Problem Relation Age of Onset    Diabetes Mother     Glaucoma Mother     Heart disease Mother 75        CAB    Diabetes Father     Heart attack Father 58        Fatal MI    Diabetes Brother     Diabetes Sister     Diabetes Sister     Cataracts Paternal Uncle     Cataracts Maternal Grandmother        Social History     Socioeconomic History    Marital status:    Tobacco Use    Smoking status: Former Smoker     Packs/day: 1.00     Years: 20.00     Pack years: 20.00     Quit date: 2000     Years since quittin.1    Smokeless tobacco: Never Used   Substance and Sexual Activity    Alcohol use: No     Alcohol/week: 0.0 standard drinks    Drug use: No    Sexual activity: Yes     Partners: Female   Social History Narrative    . Lives with spouse. Has 2 children. Retired. No pets or smokers in household.       Past Surgical History:   Procedure Laterality Date    CARPAL TUNNEL RELEASE Right 2020    COLONOSCOPY N/A 3/31/2017    Procedure: COLONOSCOPY;  Surgeon: Cornelius Ibarra MD;  Location: [facility];  Service: Endoscopy;  Laterality: N/A;    DENTAL SURGERY      Implant    KNEE ARTHROSCOPY Left     ROBOT-ASSISTED LAPAROSCOPIC REPAIR OF INGUINAL HERNIA USING DA JOSE ANGEL XI Left 2021    Procedure: XI ROBOTIC REPAIR, HERNIA, INGUINAL;  Surgeon: Tavon Cruz MD;  Location: [facility] OR;  Service: General;  Laterality: Left;       Review of Systems       Objective:   Ht 5' 11" (1.803 m)   Wt 83.9 kg (184 lb 15.5 oz)  "  BMI 25.80 kg/m²     Physical Exam  LOWER EXTREMITY PHYSICAL EXAMINATION    VASCULAR:  The right dorsalis pedis pulse 2/4 and the right posterior tibial pulse 2/4.  The left dorsalis pedis pulse 2/4 and posterior tibial pulse on the left is 2/4.  Capillary refill is intact.  Pedal hair growth intact    NEUROLOGY:  Sharp/dull, light touch, proprioception all intact and equal bilaterally.  Vibratory sensation is intact.  Protective sensation intact    DERMATOLOGY:  Callus formation present to the right and left foot sub 4th metatarsal head.  Callus does have centralized porokeratotic lesion which is causing pain discomfort.  No signs of underlying ulceration.    ORTHOPEDIC: Manual Muscle Testing is 5/5 in all planes on the left and right, without pains, with and without resistance. Gait pattern is non-antalgic.    Assessment:     1. Type 2 diabetes mellitus with complication, with long-term current use of insulin    2. Porokeratosis        Plan:     Type 2 diabetes mellitus with complication, with long-term current use of insulin    Porokeratosis        Thorough discussion is had with the patient this afternoon, concerning the diagnosis, its etiology, and the treatment algorithm at present.  Greater than 50% of this visit spent on counseling and coordination of care. Greater than 15 minutes of a 20 minute appointment spent on education about the diabetic foot, neuropathy, and prevention of limb loss.  Shoe inspection. Diabetic Foot Education. Patient reminded of the importance of good nutrition and blood sugar control to help prevent podiatric complications of diabetes. Patient instructed on proper foot hygeine. We discussed wearing proper and supportive shoe gear, daily foot inspections, never walking barefooted or sock footed, never putting sharp instruments to feet which can cause major complications associated with infection, ulcers, lacerations.      Hypertrophic skin formation x2, as outlined within the  examination portion of this note, is surgically debrided with sharp #10/#15 blade, to alleviate discomfort with weight bearing and ambulation, and to lessen the possibility of skin complications, e.g., ulceration due to pressure. No ulceration(s) is are noted with/post debridement. The lesion is completed healed and resolved. No evidence of infection.    Discussed with patient the importance of utilizing offloading pads.  1/4 inch felt padding was placed in the patient's shoe gear to completely offload the 4th metatarsal head.  This will help alleviate the discomfort secondary to porokeratotic lesion.          Future Appointments   Date Time Provider Department Center   2/22/2022  8:00 AM Tanvir Young NP [facility]   3/28/2022 10:10 AM LABORATORY, [facility] LAB [facility]   4/4/2022 10:00 AM Al Daugherty PA-C [facility] DIABETE [facility]   6/13/2022  9:15 AM Bandar Garcia MD [facility] UROLOGY [facility]   10/6/2022  7:00 AM SPECIMEN, [facility] SPECLAB [facility]   10/6/2022  7:30 AM LABORATORY, [facility] LAB [facility]   10/13/2022 10:20 AM SHANE Ochoa Jr., MD [facility]

## 2022-02-19 PROCEDURE — 99454 REM MNTR PHYSIOL PARAM 16-30: CPT | Mod: PBBFAC | Performed by: PEDIATRICS

## 2022-02-21 ENCOUNTER — TELEPHONE (OUTPATIENT)
Dept: INTERNAL MEDICINE | Facility: CLINIC | Age: 72
End: 2022-02-21
Payer: MEDICARE

## 2022-02-21 NOTE — TELEPHONE ENCOUNTER
PA APPROVAL fax rec'vd from pt's ins for Trulicity 4.5mg/0.5mL pen inj rx DOS 02/09/2023, faxed approval to pt's pharm instrc them to fill rx and notify pt when rx ready F#[phone].

## 2022-02-22 ENCOUNTER — OFFICE VISIT (OUTPATIENT)
Dept: INTERNAL MEDICINE | Facility: CLINIC | Age: 72
End: 2022-02-22
Payer: MEDICARE

## 2022-02-22 VITALS
TEMPERATURE: 98 F | DIASTOLIC BLOOD PRESSURE: 70 MMHG | OXYGEN SATURATION: 97 % | RESPIRATION RATE: 20 BRPM | SYSTOLIC BLOOD PRESSURE: 130 MMHG | HEIGHT: 71 IN | BODY MASS INDEX: 27.22 KG/M2 | HEART RATE: 75 BPM | WEIGHT: 194.44 LBS

## 2022-02-22 DIAGNOSIS — Z79.4 TYPE 2 DIABETES MELLITUS WITH COMPLICATION, WITH LONG-TERM CURRENT USE OF INSULIN: ICD-10-CM

## 2022-02-22 DIAGNOSIS — Z00.00 ENCOUNTER FOR PREVENTIVE HEALTH EXAMINATION: Primary | ICD-10-CM

## 2022-02-22 DIAGNOSIS — I25.10 CORONARY ARTERY DISEASE INVOLVING NATIVE CORONARY ARTERY OF NATIVE HEART WITHOUT ANGINA PECTORIS: ICD-10-CM

## 2022-02-22 DIAGNOSIS — Z12.11 SCREENING FOR COLON CANCER: ICD-10-CM

## 2022-02-22 DIAGNOSIS — I77.1 TORTUOUS AORTA: ICD-10-CM

## 2022-02-22 DIAGNOSIS — R10.32 GROIN PAIN, LEFT: ICD-10-CM

## 2022-02-22 DIAGNOSIS — E11.69 HYPERLIPIDEMIA ASSOCIATED WITH TYPE 2 DIABETES MELLITUS: ICD-10-CM

## 2022-02-22 DIAGNOSIS — K40.90 NON-RECURRENT UNILATERAL INGUINAL HERNIA WITHOUT OBSTRUCTION OR GANGRENE: ICD-10-CM

## 2022-02-22 DIAGNOSIS — E78.5 HYPERLIPIDEMIA ASSOCIATED WITH TYPE 2 DIABETES MELLITUS: ICD-10-CM

## 2022-02-22 DIAGNOSIS — I15.2 HYPERTENSION ASSOCIATED WITH DIABETES: ICD-10-CM

## 2022-02-22 DIAGNOSIS — E11.8 TYPE 2 DIABETES MELLITUS WITH COMPLICATION, WITH LONG-TERM CURRENT USE OF INSULIN: ICD-10-CM

## 2022-02-22 DIAGNOSIS — E11.59 HYPERTENSION ASSOCIATED WITH DIABETES: ICD-10-CM

## 2022-02-22 DIAGNOSIS — G47.33 OBSTRUCTIVE SLEEP APNEA: ICD-10-CM

## 2022-02-22 PROBLEM — N20.0 NEPHROLITHIASIS: Status: RESOLVED | Noted: 2021-02-11 | Resolved: 2022-02-22

## 2022-02-22 PROCEDURE — 99214 OFFICE O/P EST MOD 30 MIN: CPT | Mod: PBBFAC,PO | Performed by: NURSE PRACTITIONER

## 2022-02-22 PROCEDURE — G0439 PPPS, SUBSEQ VISIT: HCPCS | Mod: S$PBB,,, | Performed by: NURSE PRACTITIONER

## 2022-02-22 PROCEDURE — 99999 PR PBB SHADOW E&M-EST. PATIENT-LVL IV: CPT | Mod: PBBFAC,,, | Performed by: NURSE PRACTITIONER

## 2022-02-22 PROCEDURE — 99999 PR PBB SHADOW E&M-EST. PATIENT-LVL IV: ICD-10-PCS | Mod: PBBFAC,,, | Performed by: NURSE PRACTITIONER

## 2022-02-22 PROCEDURE — G0439 PR MEDICARE ANNUAL WELLNESS SUBSEQUENT VISIT: ICD-10-PCS | Mod: S$PBB,,, | Performed by: NURSE PRACTITIONER

## 2022-02-22 NOTE — PROGRESS NOTES
"  Timothy Ortega presented for a  Medicare AWV and comprehensive Health Risk Assessment today. The following components were reviewed and updated:    · Medical history  · Family History  · Social history  · Allergies and Current Medications  · Health Risk Assessment  · Health Maintenance  · Care Team         ** See Completed Assessments for Annual Wellness Visit within the encounter summary.**         The following assessments were completed:  · Living Situation  · CAGE  · Depression Screening  · Timed Get Up and Go  · Whisper Test  · Cognitive Function Screening  · Nutrition Screening  · ADL Screening  · PAQ Screening        Vitals:    02/22/22 0805   BP: 130/70   Pulse: 75   Resp: 20   Temp: 98.1 °F (36.7 °C)   TempSrc: Temporal   SpO2: 97%   Weight: 88.2 kg (194 lb 7.1 oz)   Height: 5' 11" (1.803 m)     Body mass index is 27.12 kg/m².  Physical Exam  Constitutional:       General: He is not in acute distress.     Appearance: Normal appearance. He is well-developed. He is not ill-appearing, toxic-appearing or diaphoretic.   HENT:      Head: Normocephalic and atraumatic.      Right Ear: External ear normal.      Left Ear: External ear normal.      Nose: Nose normal. No rhinorrhea.      Mouth/Throat:      Pharynx: No posterior oropharyngeal erythema.   Eyes:      Conjunctiva/sclera: Conjunctivae normal.   Neck:      Vascular: No carotid bruit.   Cardiovascular:      Rate and Rhythm: Normal rate and regular rhythm.      Heart sounds: Normal heart sounds. No murmur heard.    No friction rub. No gallop.   Pulmonary:      Effort: Pulmonary effort is normal. No respiratory distress.      Breath sounds: Normal breath sounds. No stridor. No wheezing, rhonchi or rales.   Chest:      Chest wall: No tenderness.   Abdominal:      General: Bowel sounds are normal. There is no distension.      Palpations: Abdomen is soft. There is no mass.      Tenderness: There is no abdominal tenderness.      Hernia: No hernia is present. "   Musculoskeletal:         General: No tenderness. Normal range of motion.      Cervical back: Normal range of motion and neck supple. No tenderness.   Lymphadenopathy:      Cervical: No cervical adenopathy.   Skin:     General: Skin is warm and dry.   Neurological:      Mental Status: He is alert and oriented to person, place, and time.      Cranial Nerves: No cranial nerve deficit.   Psychiatric:         Mood and Affect: Mood normal.         Behavior: Behavior normal.               Diagnoses and health risks identified today and associated recommendations/orders:    1. Encounter for preventive health examination  Screenings performed, as noted above.  Personal preventative testing needs reviewed.     2. Hyperlipidemia associated with type 2 diabetes mellitus  Monitored/treated on meds, continue the same tx, stable, follow up with pcp    3. Hypertension associated with diabetes  Monitored/treated on meds, continue the same tx, stable, follow up with pcp    4. Coronary artery disease involving native coronary artery of native heart without angina pectoris  Monitored/treated on meds, continue the same tx, stable, follow up with pcp    5. Tortuous aorta  Monitored/treated on meds, continue the same tx, stable, follow up with pcp    6. Screening for colon cancer  - Case Request Endoscopy: COLONOSCOPY    7. Type 2 diabetes mellitus with complication, with long-term current use of insulin  Monitored/treated on meds, continue the same tx, stable, follow up with pcp    8. Groin pain, left  Monitored/treated on meds, continue the same tx, stable, follow up with pcp and urology, Dr Garcia    9. Non-recurrent unilateral inguinal hernia without obstruction or gangrene  Monitored/treated on meds, continue the same tx, stable, follow up with pcp and urology    10. Obstructive sleep apnea  Monitored/treated on meds, continue the same tx, stable, follow up with pcp, uses cpap nightly      Provided Timothy with a 5-10 year written  screening schedule and personal prevention plan. Recommendations were developed using the USPSTF age appropriate recommendations. Education, counseling, and referrals were provided as needed. After Visit Summary printed and given to patient which includes a list of additional screenings\tests needed.    No follow-ups on file.    Tanvir Young, NP  I offered to discuss advanced care planning, including how to pick a person who would make decisions for you if you were unable to make them for yourself, called a health care power of , and what kind of decisions you might make such as use of life sustaining treatments such as ventilators and tube feeding when faced with a life limiting illness recorded on a living will that they will need to know. (How you want to be cared for as you near the end of your natural life)     X Patient is interested in learning more about how to make advanced directives.  I provided them paperwork and offered to discuss this with them.

## 2022-02-22 NOTE — PATIENT INSTRUCTIONS
Counseling and Referral of Other Preventative  (Italic type indicates deductible and co-insurance are waived)    Patient Name: Timothy Ortega  Today's Date: 2/22/2022    Health Maintenance       Date Due Completion Date    High Dose Statin Never done ---    COVID-19 Vaccine (3 - Booster for Pfizer series) 08/24/2021 3/24/2021    PROSTATE-SPECIFIC ANTIGEN 09/21/2021 9/21/2020    Override on 2/27/2014: Not Clinically Appropriate    Diabetes Urine Screening 03/10/2022 3/10/2021    Colorectal Cancer Screening 03/31/2022 3/31/2017    Eye Exam 04/12/2022 4/12/2021    Override on 4/12/2021: Done    Override on 3/6/2020: Done    Override on 3/1/2019: Done    Override on 11/3/2017: Done    Override on 10/28/2016: Done    Override on 11/3/2015: Done    Override on 10/31/2014: Done    Override on 9/17/2013: Done    Override on 9/14/2012: Done    Hemoglobin A1c 06/27/2022 12/27/2021    Lipid Panel 09/23/2022 9/23/2021    Foot Exam 01/13/2023 1/13/2022    Override on 1/13/2022: Done    Override on 2/16/2017: Done    Override on 11/20/2015: Done    Override on 5/28/2014: Done    Override on 3/11/2014: Done    Override on 12/10/2013: Done    Override on 10/22/2013: Done    Override on 9/16/2013: Done    Aspirin/Antiplatelet Therapy 02/15/2023 2/15/2022    TETANUS VACCINE 07/20/2028 7/20/2018        No orders of the defined types were placed in this encounter.    The following information is provided to all patients.  This information is to help you find resources for any of the problems found today that may be affecting your health:                Living healthy guide: www.[facility].[place].gov      Understanding Diabetes: www.diabetes.org      Eating healthy: www.cdc.gov/healthyweight      CDC home safety checklist: www.cdc.gov/steadi/patient.html      Agency on Aging: www.goea.[place].gov      Alcoholics anonymous (AA): www.aa.org      Physical Activity: www.alex.nih.gov/am5luys      Tobacco use: www.quitwithusla.org

## 2022-03-02 ENCOUNTER — PATIENT MESSAGE (OUTPATIENT)
Dept: RESEARCH | Facility: HOSPITAL | Age: 72
End: 2022-03-02
Payer: MEDICARE

## 2022-03-28 ENCOUNTER — LAB VISIT (OUTPATIENT)
Dept: LAB | Facility: HOSPITAL | Age: 72
End: 2022-03-28
Attending: PHYSICIAN ASSISTANT
Payer: MEDICARE

## 2022-03-28 DIAGNOSIS — Z79.4 TYPE 2 DIABETES MELLITUS WITH COMPLICATION, WITH LONG-TERM CURRENT USE OF INSULIN: ICD-10-CM

## 2022-03-28 DIAGNOSIS — E11.8 TYPE 2 DIABETES MELLITUS WITH COMPLICATION, WITH LONG-TERM CURRENT USE OF INSULIN: ICD-10-CM

## 2022-03-28 LAB
ESTIMATED AVG GLUCOSE: 157 MG/DL (ref 68–131)
HBA1C MFR BLD: 7.1 % (ref 4–5.6)

## 2022-03-28 PROCEDURE — 99454 REM MNTR PHYSIOL PARAM 16-30: CPT | Mod: PBBFAC | Performed by: PEDIATRICS

## 2022-03-28 PROCEDURE — 83036 HEMOGLOBIN GLYCOSYLATED A1C: CPT | Performed by: PHYSICIAN ASSISTANT

## 2022-03-28 PROCEDURE — 36415 COLL VENOUS BLD VENIPUNCTURE: CPT | Performed by: PHYSICIAN ASSISTANT

## 2022-03-30 RX ORDER — SODIUM, POTASSIUM,MAG SULFATES 17.5-3.13G
1 SOLUTION, RECONSTITUTED, ORAL ORAL DAILY
Qty: 1 KIT | Refills: 0 | Status: SHIPPED | OUTPATIENT
Start: 2022-03-30 | End: 2022-04-01

## 2022-04-04 ENCOUNTER — PATIENT MESSAGE (OUTPATIENT)
Dept: DIABETES | Facility: CLINIC | Age: 72
End: 2022-04-04

## 2022-04-04 ENCOUNTER — OFFICE VISIT (OUTPATIENT)
Dept: DIABETES | Facility: CLINIC | Age: 72
End: 2022-04-04
Payer: MEDICARE

## 2022-04-04 VITALS
WEIGHT: 192.44 LBS | SYSTOLIC BLOOD PRESSURE: 119 MMHG | HEART RATE: 78 BPM | DIASTOLIC BLOOD PRESSURE: 72 MMHG | HEIGHT: 71 IN | BODY MASS INDEX: 26.94 KG/M2

## 2022-04-04 DIAGNOSIS — E66.3 OVERWEIGHT (BMI 25.0-29.9): ICD-10-CM

## 2022-04-04 DIAGNOSIS — E11.8 TYPE 2 DIABETES MELLITUS WITH COMPLICATION, WITH LONG-TERM CURRENT USE OF INSULIN: Primary | ICD-10-CM

## 2022-04-04 DIAGNOSIS — Z79.4 TYPE 2 DIABETES MELLITUS WITH COMPLICATION, WITH LONG-TERM CURRENT USE OF INSULIN: Primary | ICD-10-CM

## 2022-04-04 DIAGNOSIS — E78.5 HYPERLIPIDEMIA ASSOCIATED WITH TYPE 2 DIABETES MELLITUS: ICD-10-CM

## 2022-04-04 DIAGNOSIS — E11.59 HYPERTENSION ASSOCIATED WITH DIABETES: ICD-10-CM

## 2022-04-04 DIAGNOSIS — E11.69 HYPERLIPIDEMIA ASSOCIATED WITH TYPE 2 DIABETES MELLITUS: ICD-10-CM

## 2022-04-04 DIAGNOSIS — I15.2 HYPERTENSION ASSOCIATED WITH DIABETES: ICD-10-CM

## 2022-04-04 DIAGNOSIS — I25.10 CORONARY ARTERY DISEASE INVOLVING NATIVE CORONARY ARTERY OF NATIVE HEART WITHOUT ANGINA PECTORIS: ICD-10-CM

## 2022-04-04 DIAGNOSIS — G47.33 OBSTRUCTIVE SLEEP APNEA: ICD-10-CM

## 2022-04-04 LAB — GLUCOSE SERPL-MCNC: 181 MG/DL (ref 70–110)

## 2022-04-04 PROCEDURE — 82962 GLUCOSE BLOOD TEST: CPT | Mod: PBBFAC | Performed by: PHYSICIAN ASSISTANT

## 2022-04-04 PROCEDURE — 99214 OFFICE O/P EST MOD 30 MIN: CPT | Mod: PBBFAC | Performed by: PHYSICIAN ASSISTANT

## 2022-04-04 PROCEDURE — 99214 PR OFFICE/OUTPT VISIT, EST, LEVL IV, 30-39 MIN: ICD-10-PCS | Mod: S$PBB,,, | Performed by: PHYSICIAN ASSISTANT

## 2022-04-04 PROCEDURE — 99999 PR PBB SHADOW E&M-EST. PATIENT-LVL IV: ICD-10-PCS | Mod: PBBFAC,,, | Performed by: PHYSICIAN ASSISTANT

## 2022-04-04 PROCEDURE — 99214 OFFICE O/P EST MOD 30 MIN: CPT | Mod: S$PBB,,, | Performed by: PHYSICIAN ASSISTANT

## 2022-04-04 PROCEDURE — 99999 PR PBB SHADOW E&M-EST. PATIENT-LVL IV: CPT | Mod: PBBFAC,,, | Performed by: PHYSICIAN ASSISTANT

## 2022-04-04 NOTE — PROGRESS NOTES
PCP: EM Ochoa Jr, MD    Subjective:     Chief Complaint: Diabetes - Established Patient    HISTORY OF PRESENT ILLNESS: 71 y.o.  male presenting for diabetes management visit.   The patient's last visit with me was on 2022.  Patient has had Type II diabetes since .  Pertinent to decision making is the following comorbidities: HTN, HLD, CAD and Overweight by BMI  Patient has the following Diabetes complications: without complications  He has attended diabetes education in the past.     Patient's most recent A1c of 7.1% was completed 1 weeks ago.   Patient states since His last A1c His blood glucose levels have been high  in the evening.   Patient monitors blood glucose 3 times per day with [facility] meter : Fasting, Before Lunch and Before Dinner. CGM declined.   Patient blood glucose monitoring device data will be reviewed under the Episodes tab today - see below.   Patient endorses the following diabetes related symptoms: None.   Patient is due today for the following diabetes-related health maintenance standards: Eye Exam, Statin therapy per ADA guidelines, Urine Microalbumin/creatinine ratio and COVID-19 Vaccine . Previously established with Dr. Stoddard of [facility] for eye care - sched  . Patient previously taking Crestor but Rx .   He denies any recent hospital admissions or emergency room visits.   He denies having hypoglycemia.  Patient's concerns today include glycemic control.     Timothy's recent readings (past 60 days):  Time Taken Time Submitted Glucose (Before Breakfast) (mg/dL) Glucose (Before Lunch) (mg/dL) Glucose (Before Dinner) (mg/dL)   2022  8:38 AM 2022  8:38      4/3/2022  6:56 PM 4/3/2022  6:56 PM   163   4/3/2022 12:32 PM 4/3/2022 12:32 PM  148    4/3/2022  7:24 AM 4/3/2022  7:24      2022  6:33 PM 2022  6:33 PM   124   2022  8:01 AM 2022  8:01      2022  5:08 PM 2022  5:08 PM   149    4/1/2022  7:34 AM 4/1/2022  7:34      3/31/2022  6:02 PM 3/31/2022  6:02 PM   149   3/31/2022 12:05 PM 3/31/2022 12:05 PM  180    3/31/2022  7:20 AM 3/31/2022  7:20      3/30/2022  4:40 PM 3/30/2022  4:40 PM   139   3/30/2022  8:09 AM 3/30/2022  8:09      3/29/2022  6:25 PM 3/29/2022  6:25 PM   75   3/29/2022  1:37 PM 3/29/2022  1:37 PM  441    3/29/2022  7:34 AM 3/29/2022  7:34      3/28/2022  5:58 PM 3/28/2022  5:58 PM   119   3/28/2022  1:35 PM 3/28/2022  1:35 PM  124    3/28/2022  6:58 AM 3/28/2022  6:58      3/27/2022  6:45 PM 3/27/2022  6:45 PM   167   3/27/2022 12:15 PM 3/27/2022 12:16 PM  145    3/27/2022  7:32 AM 3/27/2022  7:32      3/26/2022  6:05 PM 3/26/2022  6:05 PM   134   3/26/2022 12:25 PM 3/26/2022 12:25 PM  85    3/26/2022  6:35 AM 3/26/2022  6:35      3/25/2022  4:48 PM 3/25/2022  4:48 PM   127   3/25/2022  1:25 PM 3/25/2022  1:25 PM  116    3/25/2022  7:20 AM 3/25/2022  7:20      3/24/2022  6:37 AM 3/24/2022  6:37 AM 77     3/23/2022  4:59 PM 3/23/2022  4:59 PM   198   3/23/2022  1:12 PM 3/23/2022  1:12 PM  139    3/23/2022  8:40 AM 3/23/2022  8:40      3/22/2022  6:00 PM 3/22/2022  6:00 PM   169   3/22/2022 12:20 PM 3/22/2022 12:20 PM  176    3/22/2022  9:43 AM 3/22/2022  9:44      3/21/2022  6:17 PM 3/21/2022  6:17 PM   88   3/21/2022 12:08 PM 3/21/2022 12:08 PM  99    3/21/2022  8:57 AM 3/21/2022  9:04 AM 92     3/21/2022  8:53 AM 3/21/2022  9:04 AM 90           Patient medication regimen is as below.     CURRENT DM MEDICATIONS:    Metformin 1000 mg BID    Lantus 52 units qhs    Novolog 14 units w/ ss TID wm    Jardiance 25 mg    Trulicity 4.5 mg weekly     WITH MEALS:   If blood sugar is below 70 eat first then check your blood sugar 2 hours later and make correction.  If blood pre-meal sugar is  70 -150 take 14 units of Novolog;  If blood pre-meal sugar is 151-200 take +2 units of Novolog;  If blood pre-meal sugar is 201-250  take +4 units of Novolog;  If blood pre-meal sugar is 251-300 take +6 units of Novolog;  If blood pre-meal sugar is 301-350 take +8 units of Novolog;  If blood pre-meal sugar is 351-400+ take +10 units of Novolog;  Also increase water intake and call for appointment.       Labs Reviewed.       No results found for: CPEPTIDE  No results found for: GLUTAMICACID       //   , There is no height or weight on file to calculate BMI.  His blood sugar in clinic today is:    Lab Results   Component Value Date    POCGLU 129 (A) 01/05/2022       Review of Systems   Constitutional: Negative for activity change, appetite change, chills and fever.   HENT: Negative for dental problem, mouth sores, nosebleeds, sore throat and trouble swallowing.    Eyes: Negative for pain and discharge.   Respiratory: Negative for shortness of breath, wheezing and stridor.    Cardiovascular: Negative for chest pain, palpitations and leg swelling.   Gastrointestinal: Negative for abdominal pain, diarrhea, nausea and vomiting.   Endocrine: Negative for polydipsia, polyphagia and polyuria.   Genitourinary: Negative for dysuria, frequency and urgency.   Musculoskeletal: Negative for joint swelling and myalgias.   Skin: Negative for rash and wound.   Neurological: Negative for dizziness, syncope, weakness and headaches.   Psychiatric/Behavioral: Negative for behavioral problems and dysphoric mood.         Diabetes Management Status  Statin: Taking  ACE/ARB: Taking    Screening or Prevention Patient's value Goal Complete/Controlled?   HgA1C Testing and Control   Lab Results   Component Value Date    HGBA1C 7.1 (H) 03/28/2022      Annually/Less than 8% Yes   Lipid profile : 09/23/2021 Annually Yes   LDL control Lab Results   Component Value Date    LDLCALC 51.8 (L) 09/23/2021    Annually/Less than 100 mg/dl  Yes   Nephropathy screening Lab Results   Component Value Date    MICALBCREAT 24.3 03/10/2021     No results found for: PROTEINUA Annually Yes   Blood  pressure BP Readings from Last 1 Encounters:   22 130/70    Less than 140/90 Yes   Dilated retinal exam : 2021 Annually Yes    Foot exam   : 2022 Annually Yes     ACTIVITY LEVEL: Moderately Active. Discussed activities, benefits, methods, and precautions.  MEAL PLANNING: Patient reports number of meals per day to be 3 and number of snacks per day to be 2.   Patient is encouraged to carb count and consume no more than 30 - 45 grams of carbohydrates in each meal and 15 grams of carbohydrates in each snack.     Social History     Socioeconomic History    Marital status:    Tobacco Use    Smoking status: Former Smoker     Packs/day: 1.00     Years: 20.00     Pack years: 20.00     Quit date: 2000     Years since quittin.2    Smokeless tobacco: Never Used   Substance and Sexual Activity    Alcohol use: No     Alcohol/week: 0.0 standard drinks    Drug use: No    Sexual activity: Yes     Partners: Female   Social History Narrative    . Lives with spouse. Has 2 children. Retired. No pets or smokers in household.     Social Determinants of Health     Financial Resource Strain: Low Risk     Difficulty of Paying Living Expenses: Not hard at all   Food Insecurity: No Food Insecurity    Worried About Running Out of Food in the Last Year: Never true    Ran Out of Food in the Last Year: Never true   Transportation Needs: No Transportation Needs    Lack of Transportation (Medical): No    Lack of Transportation (Non-Medical): No   Physical Activity: Insufficiently Active    Days of Exercise per Week: 3 days    Minutes of Exercise per Session: 30 min   Stress: No Stress Concern Present    Feeling of Stress : Only a little   Social Connections: Socially Integrated    Frequency of Communication with Friends and Family: More than three times a week    Frequency of Social Gatherings with Friends and Family: More than three times a week    Attends Zoroastrian Services: 1 to 4 times  per year    Active Member of Clubs or Organizations: Yes    Attends Club or Organization Meetings: 1 to 4 times per year    Marital Status:    Housing Stability: Unknown    Unable to Pay for Housing in the Last Year: No    Unstable Housing in the Last Year: No     Past Medical History:   Diagnosis Date    Coronary artery disease 2010    [facility] - no stents    DM (diabetes mellitus) 2002     lunch 10/28/2016    DM (diabetes mellitus)      am 04/12/2021    Groin pain, left 2/11/2021    Hyperlipemia     Hypertension     Kidney stones     Nephrolithiasis 2/11/2021    Neuropathy     Type 2 diabetes mellitus 10-12 years     am 10/31/2014       Objective:        Physical Exam  Constitutional:       General: He is not in acute distress.     Appearance: He is well-developed. He is not diaphoretic.   HENT:      Head: Normocephalic and atraumatic.      Right Ear: External ear normal.      Left Ear: External ear normal.      Nose: Nose normal.   Eyes:      General:         Right eye: No discharge.         Left eye: No discharge.      Pupils: Pupils are equal, round, and reactive to light.   Cardiovascular:      Rate and Rhythm: Normal rate and regular rhythm.      Heart sounds: Normal heart sounds.   Pulmonary:      Effort: Pulmonary effort is normal.      Breath sounds: Normal breath sounds.   Abdominal:      Palpations: Abdomen is soft.   Musculoskeletal:         General: Normal range of motion.      Cervical back: Normal range of motion and neck supple.   Skin:     General: Skin is warm and dry.      Capillary Refill: Capillary refill takes less than 2 seconds.   Neurological:      Mental Status: He is alert and oriented to person, place, and time.      Motor: No abnormal muscle tone.      Coordination: Coordination normal.   Psychiatric:         Behavior: Behavior normal.         Thought Content: Thought content normal.         Judgment: Judgment normal.           Assessment / Plan:      Type 2 diabetes mellitus with complication, with long-term current use of insulin  -     POCT Glucose, Hand-Held Device  -     Microalbumin/Creatinine Ratio, Urine; Future; Expected date: 04/04/2022    Hyperlipidemia associated with type 2 diabetes mellitus    Hypertension associated with diabetes    Coronary artery disease involving native coronary artery of native heart without angina pectoris    Obstructive sleep apnea    Overweight (BMI 25.0-29.9)      Additional Plan Details:    - POCT Glucose  - Encouraged continuation of lifestyle changes including regular exercise and limiting carbohydrates to 30-45 grams per meal threes times daily and 15 grams per snack with a limit of two daily.   - Encouraged continued monitoring of blood glucose with maintenance of 3 times daily at Fasting, Before Lunch and Before Dinner. CGM declined. Change timing of third check from before lunch to before bed.   - Current DM Medication Regimen: continue Trulicity 4.5 mg weekly. Continue Lantus 52 units qhs. Continue Novolog to 14 units with ss TID wm - see below. Continue Jardiance 25 mg and Metformin 1000 mg BID.   - Health Maintenance standards addressed today: Eye Exam - will be completed within [facility] and scheduled today, Statin therapy - defer to Cardiology, Urine Microalbumin / Creatinine Ratio scheduled and COVID - 19 Vaccine - booster declined  - Nursing Visit: Patient is below goal range for nursing visit for age group and will not need nursing visit at this time .   - Follow up in 3 months with A1c prior and in 2 weeks telephone call for evening BG review .     WITH MEALS:   If blood sugar is below 70 eat first then check your blood sugar 2 hours later and make correction.  If blood pre-meal sugar is  70 -150 take 14 units of Novolog;  If blood pre-meal sugar is 151-200 take +2 units of Novolog;  If blood pre-meal sugar is 201-250 take +4 units of Novolog;  If blood pre-meal sugar is 251-300 take +6 units of  Novolog;  If blood pre-meal sugar is 301-350 take +8 units of Novolog;  If blood pre-meal sugar is 351-400+ take +10 units of Novolog;  Also increase water intake and call for appointment.       Blakeney McKnight, PA-C [facility]    A total of 30 minutes was spent in face to face time, of which over 50 % was spent in counseling patient on disease process, complications, treatment, and side effects of medications.

## 2022-04-04 NOTE — PATIENT INSTRUCTIONS
CURRENT DM MEDICATIONS:   Metformin 1000 mg BID   Lantus 52 units at night   Novolog 14 units w/ ss TID wm - if not checking Blood glucose at lunch will take 14 units before meals   Jardiance 25 mg daily   Trulicity 4.5 mg weekly     WITH MEALS:   If blood sugar is below 70 eat first then check your blood sugar 2 hours later and make correction.  If blood pre-meal sugar is  70 -150 take 14 units of Novolog;  If blood pre-meal sugar is 151-200 take +2 units of Novolog;  If blood pre-meal sugar is 201-250 take +4 units of Novolog;  If blood pre-meal sugar is 251-300 take +6 units of Novolog;  If blood pre-meal sugar is 301-350 take +8 units of Novolog;  If blood pre-meal sugar is 351-400+ take +10 units of Novolog;  Also increase water intake and call for appointment.

## 2022-04-12 ENCOUNTER — PATIENT MESSAGE (OUTPATIENT)
Dept: ADMINISTRATIVE | Facility: OTHER | Age: 72
End: 2022-04-12
Payer: MEDICARE

## 2022-04-13 ENCOUNTER — OFFICE VISIT (OUTPATIENT)
Dept: OPHTHALMOLOGY | Facility: CLINIC | Age: 72
End: 2022-04-13
Payer: MEDICARE

## 2022-04-13 ENCOUNTER — PATIENT MESSAGE (OUTPATIENT)
Dept: OPHTHALMOLOGY | Facility: CLINIC | Age: 72
End: 2022-04-13

## 2022-04-13 DIAGNOSIS — E11.36 DIABETIC CATARACT: ICD-10-CM

## 2022-04-13 DIAGNOSIS — E11.9 DIABETES MELLITUS TYPE 2 WITHOUT RETINOPATHY: Primary | ICD-10-CM

## 2022-04-13 DIAGNOSIS — E11.59 HYPERTENSION ASSOCIATED WITH DIABETES: ICD-10-CM

## 2022-04-13 DIAGNOSIS — E11.8 TYPE 2 DIABETES MELLITUS WITH COMPLICATION, WITH LONG-TERM CURRENT USE OF INSULIN: ICD-10-CM

## 2022-04-13 DIAGNOSIS — I15.2 HYPERTENSION ASSOCIATED WITH DIABETES: ICD-10-CM

## 2022-04-13 DIAGNOSIS — Z79.4 TYPE 2 DIABETES MELLITUS WITH COMPLICATION, WITH LONG-TERM CURRENT USE OF INSULIN: ICD-10-CM

## 2022-04-13 DIAGNOSIS — H52.4 BILATERAL PRESBYOPIA: ICD-10-CM

## 2022-04-13 PROCEDURE — 99999 PR PBB SHADOW E&M-EST. PATIENT-LVL III: ICD-10-PCS | Mod: PBBFAC,,, | Performed by: OPTOMETRIST

## 2022-04-13 PROCEDURE — 99999 PR PBB SHADOW E&M-EST. PATIENT-LVL III: CPT | Mod: PBBFAC,,, | Performed by: OPTOMETRIST

## 2022-04-13 PROCEDURE — 92014 PR EYE EXAM, EST PATIENT,COMPREHESV: ICD-10-PCS | Mod: S$PBB,,, | Performed by: OPTOMETRIST

## 2022-04-13 PROCEDURE — 99213 OFFICE O/P EST LOW 20 MIN: CPT | Mod: PBBFAC | Performed by: OPTOMETRIST

## 2022-04-13 PROCEDURE — 92014 COMPRE OPH EXAM EST PT 1/>: CPT | Mod: S$PBB,,, | Performed by: OPTOMETRIST

## 2022-04-13 PROCEDURE — 92015 PR REFRACTION: ICD-10-PCS | Mod: ,,, | Performed by: OPTOMETRIST

## 2022-04-13 PROCEDURE — 92015 DETERMINE REFRACTIVE STATE: CPT | Mod: ,,, | Performed by: OPTOMETRIST

## 2022-04-13 RX ORDER — INSULIN ASPART 100 [IU]/ML
INJECTION, SOLUTION INTRAVENOUS; SUBCUTANEOUS
Qty: 75 ML | Refills: 3 | Status: CANCELLED | OUTPATIENT
Start: 2022-04-13

## 2022-04-13 NOTE — TELEPHONE ENCOUNTER
Called pharmacy and requested alternative ----- Message from Ave Ortega sent at 4/13/2022 12:13 PM CDT -----  Contact: tiburcio buckner  States the insulin that the pt is on requires a PA today or the change the meds to something that is covered or call for the pharm today with the alternative until the PA gets approved and the pt is waiting at the pharm and can be reached at [phone] /wes/dbw

## 2022-04-13 NOTE — TELEPHONE ENCOUNTER
----- Message from Melania Rowe sent at 4/13/2022 11:39 AM CDT -----  Type:  RX Refill Request    Who Called: Chana Ortega  Refill or New Rx:refill  RX Name and Strength:Novolog flexpen  How is the patient currently taking it? (ex. 1XDay):3XDay  Is this a 30 day or 90 day RX:?  Preferred Pharmacy with phone number:.  [id number] [place], [place] - [address] [address] [facility]  [address] [address]  Phone: [phone] Fax: [phone]  Local or Mail Order:local  Ordering Provider:Ms Daugherty  Would the patient rather a call back or a response via [facility]? Call   Best Call Back Number:[phone]  Additional Information: States he will run out of his insulin tonight.     Thank you

## 2022-04-13 NOTE — PROGRESS NOTES
HPI     NIDDM exam.  Decrease near and distance visual acuity with increases blood sugar.  Last eye exam 04/12/2021 TRF.  Update glasses RX.  Lab Results       Component                Value               Date                       HGBA1C                   7.1 (H)             03/28/2022                Last edited by Padmini Monte MA on 4/13/2022  8:57 AM. (History)            Assessment /Plan     For exam results, see Encounter Report.    Diabetes mellitus type 2 without retinopathy    Hypertension associated with diabetes    Diabetic cataract    Bilateral presbyopia      No Background Diabetic Retinopathy    No HTN Retinopathy    Mild cataracts OU, not surgical.    Dispense Final Rx for glasses.  RTC 1 year  Discussed above and answered questions.

## 2022-04-18 ENCOUNTER — HOSPITAL ENCOUNTER (OUTPATIENT)
Facility: HOSPITAL | Age: 72
Discharge: HOME | End: 2022-04-18
Attending: INTERNAL MEDICINE | Admitting: INTERNAL MEDICINE
Payer: MEDICARE

## 2022-04-18 ENCOUNTER — ANESTHESIA EVENT (OUTPATIENT)
Dept: ENDOSCOPY | Facility: HOSPITAL | Age: 72
End: 2022-04-18
Payer: MEDICARE

## 2022-04-18 ENCOUNTER — ANESTHESIA (OUTPATIENT)
Dept: ENDOSCOPY | Facility: HOSPITAL | Age: 72
End: 2022-04-18
Payer: MEDICARE

## 2022-04-18 ENCOUNTER — PATIENT MESSAGE (OUTPATIENT)
Dept: DIABETES | Facility: CLINIC | Age: 72
End: 2022-04-18
Payer: MEDICARE

## 2022-04-18 VITALS
BODY MASS INDEX: 25.47 KG/M2 | TEMPERATURE: 98 F | HEART RATE: 61 BPM | HEIGHT: 72 IN | OXYGEN SATURATION: 98 % | SYSTOLIC BLOOD PRESSURE: 127 MMHG | DIASTOLIC BLOOD PRESSURE: 74 MMHG | RESPIRATION RATE: 17 BRPM | WEIGHT: 188 LBS

## 2022-04-18 DIAGNOSIS — Z86.010 HISTORY OF COLON POLYPS: Primary | ICD-10-CM

## 2022-04-18 LAB
GLUCOSE SERPL-MCNC: 98 MG/DL (ref 70–110)
POCT GLUCOSE: 98 MG/DL (ref 70–110)

## 2022-04-18 PROCEDURE — 37000008 HC ANESTHESIA 1ST 15 MINUTES: Performed by: INTERNAL MEDICINE

## 2022-04-18 PROCEDURE — 37000009 HC ANESTHESIA EA ADD 15 MINS: Performed by: INTERNAL MEDICINE

## 2022-04-18 PROCEDURE — G0105 COLORECTAL SCRN; HI RISK IND: ICD-10-PCS | Mod: ,,, | Performed by: INTERNAL MEDICINE

## 2022-04-18 PROCEDURE — G0105 COLORECTAL SCRN; HI RISK IND: HCPCS | Performed by: INTERNAL MEDICINE

## 2022-04-18 PROCEDURE — 63600175 PHARM REV CODE 636 W HCPCS: Performed by: NURSE ANESTHETIST, CERTIFIED REGISTERED

## 2022-04-18 PROCEDURE — G0105 COLORECTAL SCRN; HI RISK IND: HCPCS | Mod: ,,, | Performed by: INTERNAL MEDICINE

## 2022-04-18 RX ORDER — PROPOFOL 10 MG/ML
VIAL (ML) INTRAVENOUS
Status: DISCONTINUED | OUTPATIENT
Start: 2022-04-18 | End: 2022-04-18

## 2022-04-18 RX ORDER — SODIUM CHLORIDE, SODIUM LACTATE, POTASSIUM CHLORIDE, CALCIUM CHLORIDE 600; 310; 30; 20 MG/100ML; MG/100ML; MG/100ML; MG/100ML
INJECTION, SOLUTION INTRAVENOUS CONTINUOUS
Status: DISCONTINUED | OUTPATIENT
Start: 2022-04-18 | End: 2022-04-18 | Stop reason: HOSPADM

## 2022-04-18 RX ADMIN — PROPOFOL 20 MG: 10 INJECTION, EMULSION INTRAVENOUS at 09:04

## 2022-04-18 RX ADMIN — PROPOFOL 30 MG: 10 INJECTION, EMULSION INTRAVENOUS at 09:04

## 2022-04-18 RX ADMIN — SODIUM CHLORIDE, POTASSIUM CHLORIDE, SODIUM LACTATE AND CALCIUM CHLORIDE: 600; 310; 30; 20 INJECTION, SOLUTION INTRAVENOUS at 09:04

## 2022-04-18 RX ADMIN — PROPOFOL 100 MG: 10 INJECTION, EMULSION INTRAVENOUS at 09:04

## 2022-04-18 NOTE — ANESTHESIA POSTPROCEDURE EVALUATION
Anesthesia Post Evaluation    Patient: Timothy Ortega    Procedure(s) Performed: Procedure(s) (LRB):  COLONOSCOPY (N/A)    Final Anesthesia Type: MAC      Patient location during evaluation: PACU  Patient participation: Yes- Able to Participate  Level of consciousness: awake and alert, oriented and awake  Post-procedure vital signs: reviewed and stable  Pain management: adequate  Airway patency: patent    PONV status at discharge: No PONV  Anesthetic complications: no      Cardiovascular status: blood pressure returned to baseline  Respiratory status: unassisted, spontaneous ventilation and room air  Hydration status: euvolemic  Follow-up not needed.        \      No case tracking events are documented in the log.      Pain/Erik Score: No data recorded

## 2022-04-18 NOTE — PROVATION PATIENT INSTRUCTIONS
Discharge Summary/Instructions after an Endoscopic Procedure  Patient Name: Timothy Ortega  Patient MRN: [id number]  Patient YOB: 1950 Monday, April 18, 2022 Geneva Serna MD  Dear patient,  As a result of recent federal legislation (The Federal Cures Act), you may   receive lab or pathology results from your procedure in your [facility]   account before your physician is able to contact you. Your physician or   their representative will relay the results to you with their   recommendations at their soonest availability.  Thank you,  RESTRICTIONS:  During your procedure today, you received medications for sedation.  These   medications may affect your judgment, balance and coordination.  Therefore,   for 24 hours, you have the following restrictions:   - DO NOT drive a car, operate machinery, make legal/financial decisions,   sign important papers or drink alcohol.    ACTIVITY:  Today: no heavy lifting, straining or running due to procedural   sedation/anesthesia.  The following day: return to full activity including work.  DIET:  Eat and drink normally unless instructed otherwise.     TREATMENT FOR COMMON SIDE EFFECTS:  - Mild abdominal pain, nausea, belching, bloating or excessive gas:  rest,   eat lightly and use a heating pad.  - Sore Throat: treat with throat lozenges and/or gargle with warm salt   water.  - Because air was used during the procedure, expelling large amounts of air   from your rectum or belching is normal.  - If a bowel prep was taken, you may not have a bowel movement for 1-3 days.    This is normal.  SYMPTOMS TO WATCH FOR AND REPORT TO YOUR PHYSICIAN:  1. Abdominal pain or bloating, other than gas cramps.  2. Chest pain.  3. Back pain.  4. Signs of infection such as: chills or fever occurring within 24 hours   after the procedure.  5. Rectal bleeding, which would show as bright red, maroon, or black stools.   (A tablespoon of blood from the rectum is not serious, especially if    hemorrhoids are present.)  6. Vomiting.  7. Weakness or dizziness.  GO DIRECTLY TO THE NEAREST EMERGENCY ROOM IF YOU HAVE ANY OF THE FOLLOWING:      Difficulty breathing              Chills and/or fever over 101 F   Persistent vomiting and/or vomiting blood   Severe abdominal pain   Severe chest pain   Black, tarry stools   Bleeding- more than one tablespoon   Any other symptom or condition that you feel may need urgent attention  Your doctor recommends these additional instructions:  If any biopsies were taken, your doctors clinic will contact you in 1 to 2   weeks with any results.  - Patient has a contact number available for emergencies.  The signs and   symptoms of potential delayed complications were discussed with the   patient.  Return to normal activities tomorrow.  Written discharge   instructions were provided to the patient.   - Discharge patient to home (via wheelchair).   - Resume previous diet today.   - Continue present medications.   - Repeat colonoscopy in 5 years for surveillance.  For questions, problems or results please call your physician Geneva Serna MD at Work:  [phone]  If you have any questions about the above instructions, call the GI   department at [phone] or call the endoscopy unit at [phone]   from 7am until 3 pm.  [facility], EMERGENCY ROOM PHONE NUMBER:   [phone]  IF A COMPLICATION OR EMERGENCY SITUATION ARISES AND YOU ARE UNABLE TO REACH   YOUR PHYSICIAN - GO DIRECTLY TO THE EMERGENCY ROOM.  I have read or have had read to me these discharge instructions for my   procedure and have received a written copy.  I understand these   instructions and will follow-up with my physician if I have any questions.     __________________________________       _____________________________________  Nurse Signature                                          Patient/Designated   Responsible Party Signature  MD Geneva Doe,  MD  4/18/2022 10:06:46 AM  This report has been verified and signed electronically.  Dear patient,  As a result of recent federal legislation (The Federal Cures Act), you may   receive lab or pathology results from your procedure in your [facility]   account before your physician is able to contact you. Your physician or   their representative will relay the results to you with their   recommendations at their soonest availability.  Thank you,  PROVATION

## 2022-04-18 NOTE — ANESTHESIA PREPROCEDURE EVALUATION
04/18/2022  Timothy Ortega is a 71 y.o., male.      Pre-op Assessment    I have reviewed the Patient Summary Reports.     I have reviewed the Nursing Notes. I have reviewed the NPO Status.   I have reviewed the Medications.     Review of Systems  Anesthesia Hx:  No problems with previous Anesthesia  Denies Family Hx of Anesthesia complications.   Denies Personal Hx of Anesthesia complications.   Social:  Former Smoker, No Alcohol Use    Hematology/Oncology:  Hematology Normal   Oncology Normal     Cardiovascular:   Hypertension Valvular problems/Murmurs Denies MI. CAD    Denies CABG/stent.      hyperlipidemia ECG has been reviewed. ekg 2/2021:  Normal sinus rhythm 70  Inferior infarct ,age undetermined   Abnormal ECG   When compared with ECG of 09-MAR-2020 13:27,   Inferior infarct is now Present   Pulmonary:   Denies COPD.  Denies Asthma. Sleep Apnea    Renal/:   Denies Chronic Renal Disease. renal calculi     Hepatic/GI:   Bowel Prep. Denies GERD. Denies Liver Disease.  Denies Hepatitis.    Musculoskeletal:   Arthritis     Neurological:   Denies CVA. Denies Seizures.   Peripheral Neuropathy    Endocrine:   Diabetes, type 2 Denies Hyperthyroidism.        Physical Exam  General: Well nourished    Airway:  Mallampati: II   Mouth Opening: Normal    Dental:  Intact    Chest/Lungs:  Clear to auscultation, Normal Respiratory Rate    Heart:  Rate: Normal  Rhythm: Regular Rhythm        Anesthesia Plan  Type of Anesthesia, risks & benefits discussed:    Anesthesia Type: MAC  Intra-op Monitoring Plan: Standard ASA Monitors  Induction:  IV  Informed Consent: Informed consent signed with the Patient and all parties understand the risks and agree with anesthesia plan.  All questions answered.   ASA Score: 2  Day of Surgery Review of History & Physical: H&P Update referred to the surgeon/provider.    Ready For Surgery  From Anesthesia Perspective.     .

## 2022-04-18 NOTE — H&P
PRE PROCEDURE H&P    Patient Name: Timothy Ortega  MRN: [id number]  : 1950  Date of Procedure:  2022  Referring Physician: Tanvir oYung NP  Primary Physician: EM Ochoa Jr, MD  Procedure Physician: Geneva Serna MD       Planned Procedure: Colonoscopy  Diagnosis: previous adenomatous polyp  Chief Complaint: Same as above    HPI: Patient is an 71 y.o. male is here for the above.     Last colonoscopy:   Family history: negative   Anticoagulation: none     Past Medical History:   Past Medical History:   Diagnosis Date    Coronary artery disease     [facility] - no stents    DM (diabetes mellitus)      lunch 10/28/2016    DM (diabetes mellitus)      am 2021    DM (diabetes mellitus)     BS 96 am 2022 Insulin for years    Groin pain, left 2021    Hyperlipemia     Hypertension     Kidney stones     Nephrolithiasis 2021    Neuropathy     Type 2 diabetes mellitus 10-12 years     am 10/31/2014        Past Surgical History:  Past Surgical History:   Procedure Laterality Date    CARPAL TUNNEL RELEASE Right 2020    COLONOSCOPY N/A 3/31/2017    Procedure: COLONOSCOPY;  Surgeon: Cornelius Ibarra MD;  Location[facility];  Service: Endoscopy;  Laterality: N/A;    DENTAL SURGERY      Implant    KNEE ARTHROSCOPY Left     ROBOT-ASSISTED LAPAROSCOPIC REPAIR OF INGUINAL HERNIA USING DA JOSE ANGEL XI Left 2021    Procedure: XI ROBOTIC REPAIR, HERNIA, INGUINAL;  Surgeon: Tavon Cruz MD;  Location: [facility];  Service: General;  Laterality: Left;        Home Medications:  Prior to Admission medications    Medication Sig Start Date End Date Taking? Authorizing Provider   amLODIPine (NORVASC) 5 MG tablet TAKE 1 TABLET BY MOUTH EVERY DAY 21  Yes SHANE Ochoa Jr., MD   ASCORBATE CALCIUM (VITAMIN C ORAL) Take by mouth once daily.   Yes Historical Provider   aspirin 81 mg Tab Take 1 tablet by mouth Daily. 12  Yes Historical Provider   dulaglutide  (TRULICITY) 4.5 mg/0.5 mL pen injector Inject 4.5 mg into the skin once a week. 7/6/21  Yes Al Daugherty PA-C   ERGOCALCIFEROL, VITAMIN D2, (VITAMIN D ORAL) Take by mouth once daily.   Yes Historical Provider   FREESTYLE LANCETS 28 gauge lancets USE THREE TIMES DAILY 1/5/19  Yes Lacho Phillip Jr., PA-C   hydrocortisone 2.5 % cream ERROL TO RASH BID PRN 4/29/16  Yes Historical Provider   insulin aspart U-100 (NOVOLOG FLEXPEN U-100 INSULIN) 100 unit/mL (3 mL) InPn pen ADMINISTER 28 UNITS UNDER THE SKIN THREE TIMES DAILY BEFORE MEALS 4/26/21  Yes Al Daugherty PA-C   JARDIANCE 25 mg tablet TAKE 1 TABLET BY MOUTH EVERY DAY 3/23/22  Yes Al Daugherty PA-C   ketoconazole (NIZORAL) 2 % cream APPLY TOPICALLY TO THE AFFECTED AREA TWICE DAILY 5/26/21  Yes Yessi Lynn DPM   KRILL OIL ORAL Take 1 capsule by mouth once daily.   Yes Historical Provider   lancets 33 gauge Misc 1 lancet by Misc.(Non-Drug; Combo Route) route 3 (three) times daily. 2/28/18  Yes Lacho Phillip Jr., PA-C   LANTUS SOLOSTAR U-100 INSULIN glargine 100 units/mL (3mL) SubQ pen ADMINISTER 75 UNITS UNDER THE SKIN EVERY EVENING 7/14/21  Yes Al Daugherty PA-C   losartan (COZAAR) 100 MG tablet TAKE 1 TABLET(100 MG) BY MOUTH EVERY DAY 8/18/21  Yes SHANE Ochoa Jr., MD   meclizine (ANTIVERT) 25 mg tablet Take 1 tablet (25 mg total) by mouth 3 (three) times daily as needed for Dizziness. 9/7/21  Yes Priya Beach MD   meloxicam (MOBIC) 15 MG tablet Take 1 tablet (15 mg total) by mouth once daily. 10/13/21  Yes SHANE Ochoa Jr., MD   metFORMIN (GLUCOPHAGE) 1000 MG tablet TAKE 1 TABLET(1000 MG) BY MOUTH TWICE DAILY WITH MEALS 6/21/21  Yes Al Daugherty PA-C   rosuvastatin (CRESTOR) 20 MG tablet TAKE 1 TABLET(20 MG) BY MOUTH EVERY DAY 4/18/22  Yes Naveen Gr MD   blood-glucose meter (FREESTYLE LITE METER) kit Freestyle meter   Use as instructed 6/16/17 7/6/21  Lacho Phillip Jr., PA-C   mupirocin  "(BACTROBAN) 2 % ointment Apply to affected area 3 times daily 21   Jessica Pruitt PA-C   pen needle, diabetic (BD ULTRA-FINE SHORT PEN NEEDLE) 31 gauge x " Ndle USE AS DIRECTED FIVE TIMES DAILY 21   Al Daugherty PA-C   sildenafil (REVATIO) 20 mg Tab Take 1 tablet (20 mg total) by mouth daily as needed (ED).  Patient not taking: No sig reported 21  Bandar Garcia MD   triamcinolone acetonide 0.1% (KENALOG) 0.1 % cream ERROL TO HAND RASH BID PRN 20   Historical Provider   rosuvastatin (CRESTOR) 20 MG tablet Take 1 tablet (20 mg total) by mouth once daily. 21  Naveen Gr MD        Allergies:  Review of patient's allergies indicates:  No Known Allergies     Social History:   Social History     Socioeconomic History    Marital status:    Tobacco Use    Smoking status: Former Smoker     Packs/day: 1.00     Years: 20.00     Pack years: 20.00     Quit date: 2000     Years since quittin.3    Smokeless tobacco: Never Used   Substance and Sexual Activity    Alcohol use: No     Alcohol/week: 0.0 standard drinks    Drug use: No    Sexual activity: Yes     Partners: Female   Social History Narrative    . Lives with spouse. Has 2 children. Retired. No pets or smokers in household.     Social Determinants of Health     Financial Resource Strain: Low Risk     Difficulty of Paying Living Expenses: Not hard at all   Food Insecurity: No Food Insecurity    Worried About Running Out of Food in the Last Year: Never true    Ran Out of Food in the Last Year: Never true   Transportation Needs: No Transportation Needs    Lack of Transportation (Medical): No    Lack of Transportation (Non-Medical): No   Physical Activity: Insufficiently Active    Days of Exercise per Week: 3 days    Minutes of Exercise per Session: 30 min   Stress: No Stress Concern Present    Feeling of Stress : Only a little   Social Connections: Socially Integrated    Frequency " "of Communication with Friends and Family: More than three times a week    Frequency of Social Gatherings with Friends and Family: More than three times a week    Attends Hinduism Services: 1 to 4 times per year    Active Member of Clubs or Organizations: Yes    Attends Club or Organization Meetings: 1 to 4 times per year    Marital Status:    Housing Stability: Unknown    Unable to Pay for Housing in the Last Year: No    Unstable Housing in the Last Year: No       Family History:  Family History   Problem Relation Age of Onset    Diabetes Mother     Glaucoma Mother     Heart disease Mother 75        CAB    Diabetes Father     Heart attack Father 58        Fatal MI    Diabetes Brother     Diabetes Sister     Diabetes Sister     Cataracts Paternal Uncle     Cataracts Maternal Grandmother        ROS: No acute cardiac events, no acute respiratory complaints.     Physical Exam (all patients):    BP (!) 155/89   Pulse 73   Temp 97.9 °F (36.6 °C)   Resp 18   Ht 5' 11.5" (1.816 m)   Wt 85.3 kg (188 lb)   SpO2 97%   BMI 25.86 kg/m²   Lungs: Clear to auscultation bilaterally, respirations unlabored  Heart: Regular rate and rhythm, S1 and S2 normal, no obvious murmurs  Abdomen:         Soft, non-tender, bowel sounds normal, no masses, no organomegaly    Lab Results   Component Value Date    WBC 7.55 02/22/2021    MCV 97 02/22/2021    RDW 14.4 02/22/2021     02/22/2021    INR 1.0 12/01/2014     (H) 09/23/2021    HGBA1C 7.1 (H) 03/28/2022    BUN 18 09/23/2021     09/23/2021    K 4.4 09/23/2021     09/23/2021        SEDATION PLAN: per anesthesia      History reviewed, vital signs satisfactory, cardiopulmonary status satisfactory, sedation options, risks and plans have been discussed with the patient  All their questions were answered and the patient agrees to the sedation procedures as planned and the patient is deemed an appropriate candidate for the sedation as " planned.    Procedure explained to patient, informed consent obtained and placed in chart.    Geneva Serna  4/18/2022  9:32 AM

## 2022-04-18 NOTE — TRANSFER OF CARE
"Anesthesia Transfer of Care Note    Patient: Timothy Ortega    Procedure(s) Performed: Procedure(s) (LRB):  COLONOSCOPY (N/A)    Patient location: PACU    Anesthesia Type: MAC    Transport from OR: Transported from OR on room air with adequate spontaneous ventilation    Post pain: adequate analgesia    Post assessment: no apparent anesthetic complications    Post vital signs: stable    Level of consciousness: awake and alert    Nausea/Vomiting: no nausea/vomiting    Complications: none          Last vitals:   Visit Vitals  BP (!) 155/89   Pulse 73   Temp 36.6 °C (97.9 °F)   Resp 18   Ht 5' 11.5" (1.816 m)   Wt 85.3 kg (188 lb)   SpO2 97%   BMI 25.86 kg/m²     "

## 2022-04-19 ENCOUNTER — OFFICE VISIT (OUTPATIENT)
Dept: DIABETES | Facility: CLINIC | Age: 72
End: 2022-04-19
Payer: MEDICARE

## 2022-04-19 DIAGNOSIS — E11.69 HYPERLIPIDEMIA ASSOCIATED WITH TYPE 2 DIABETES MELLITUS: ICD-10-CM

## 2022-04-19 DIAGNOSIS — I25.10 CORONARY ARTERY DISEASE INVOLVING NATIVE CORONARY ARTERY OF NATIVE HEART WITHOUT ANGINA PECTORIS: ICD-10-CM

## 2022-04-19 DIAGNOSIS — E11.59 HYPERTENSION ASSOCIATED WITH DIABETES: ICD-10-CM

## 2022-04-19 DIAGNOSIS — E66.3 OVERWEIGHT (BMI 25.0-29.9): ICD-10-CM

## 2022-04-19 DIAGNOSIS — E11.8 TYPE 2 DIABETES MELLITUS WITH COMPLICATION, WITH LONG-TERM CURRENT USE OF INSULIN: Primary | ICD-10-CM

## 2022-04-19 DIAGNOSIS — I15.2 HYPERTENSION ASSOCIATED WITH DIABETES: ICD-10-CM

## 2022-04-19 DIAGNOSIS — Z79.4 TYPE 2 DIABETES MELLITUS WITH COMPLICATION, WITH LONG-TERM CURRENT USE OF INSULIN: Primary | ICD-10-CM

## 2022-04-19 DIAGNOSIS — G47.33 OBSTRUCTIVE SLEEP APNEA: ICD-10-CM

## 2022-04-19 DIAGNOSIS — E78.5 HYPERLIPIDEMIA ASSOCIATED WITH TYPE 2 DIABETES MELLITUS: ICD-10-CM

## 2022-04-19 PROCEDURE — 99442 PR PHYSICIAN TELEPHONE EVALUATION 11-20 MIN: ICD-10-PCS | Mod: 95,,, | Performed by: PHYSICIAN ASSISTANT

## 2022-04-19 PROCEDURE — 99442 PR PHYSICIAN TELEPHONE EVALUATION 11-20 MIN: CPT | Mod: 95,,, | Performed by: PHYSICIAN ASSISTANT

## 2022-04-19 NOTE — PROGRESS NOTES
PCP: EM Ochoa Jr, MD    Subjective:     Chief Complaint: Diabetes - Established Patient    Established Patient - Audio Only Telehealth Visit     The patient location is: Home  The chief complaint leading to consultation is: Diabetes follow up  Visit type: Virtual visit with audio only (telephone)  Total Time Spent with Patient: 17 minutes     The reason for the audio only service rather than synchronous audio and video virtual visit was related to technical difficulties or patient preference/necessity.     Each patient to whom I provide medical services by telemedicine is:  (1) informed of the relationship between the physician and patient and the respective role of any other health care provider with respect to management of the patient; and (2) notified that they may decline to receive medical services by telemedicine and may withdraw from such care at any time. Patient verbally consented to receive this service via voice-only telephone call.    This service was not originating from a related E/M service provided within the previous 7 days nor will  to an E/M service or procedure within the next 24 hours or my soonest available appointment.  Prevailing standard of care was able to be met in this audio-only visit.       HISTORY OF PRESENT ILLNESS: 71 y.o.  male presenting for diabetes management visit.   The patient's last visit with me was on 4/4/2022.  Patient has had Type II diabetes since 1990s.  Pertinent to decision making is the following comorbidities: HTN, HLD, CAD and Overweight by BMI  Patient has the following Diabetes complications: without complications  He has attended diabetes education in the past.     Patient's most recent A1c of 7.1% was completed 1 weeks ago.   Patient states since His last A1c His blood glucose levels have been high  in the evening.   Patient monitors blood glucose 3 times per day with [facility] meter : Fasting, Before Lunch and Before  Dinner. CGM declined.   Patient blood glucose monitoring device data will be reviewed under the Episodes tab today - see below.   Patient endorses the following diabetes related symptoms: None.   Patient is due today for the following diabetes-related health maintenance standards: Urine Microalbumin/creatinine ratio.   He denies any recent hospital admissions or emergency room visits.   He denies having hypoglycemia.  Patient's concerns today include glycemic control. Per patient, hyperglycemia in the evening related to ice cream.     Timothy's recent readings (past 60 days):  Time Taken Time Submitted Glucose (Before Breakfast) (mg/dL) Glucose (After Breakfast) (mg/dL) Glucose (Before Lunch) (mg/dL) Glucose (After Lunch) (mg/dL) Glucose (Before Dinner) (mg/dL) Glucose (After Dinner) (mg/dL) Glucose (Off Schedule) (mg/dL)   4/19/2022  6:57 AM 4/19/2022  6:57          4/18/2022  6:02 PM 4/18/2022  6:02 PM     224     4/18/2022 11:53 AM 4/18/2022 11:53 AM   150       4/18/2022  7:58 AM 4/18/2022  7:58 AM 90         4/17/2022  9:47 PM 4/17/2022  9:47 PM      80    4/16/2022  9:37 PM 4/16/2022  9:37 PM      154    4/16/2022  6:13 PM 4/16/2022  6:54 PM     144     4/16/2022 12:10 PM 4/16/2022 12:10 PM   186       4/16/2022  8:25 AM 4/16/2022  8:25          4/15/2022  8:03 PM 4/15/2022  8:03 PM      108    4/15/2022  6:12 PM 4/15/2022  6:12 PM     80     4/15/2022 12:37 PM 4/15/2022 12:37 PM   88       4/15/2022  7:09 AM 4/15/2022  7:09          4/14/2022 12:07 PM 4/14/2022 12:07 PM   88       4/14/2022  8:15 AM 4/14/2022  8:15          4/13/2022  8:29 PM 4/13/2022  8:29 PM      222    4/13/2022  7:43 AM 4/13/2022  7:43 AM 96         4/12/2022  7:22 AM 4/12/2022  7:22          4/11/2022  8:53 PM 4/11/2022  8:53 PM      185    4/11/2022  7:07 AM 4/11/2022  7:07          4/10/2022  7:47 AM 4/10/2022  7:47          4/9/2022  8:22 PM 4/9/2022  8:22 PM      287    4/9/2022  7:49 AM  4/9/2022  7:49          4/8/2022  8:46 PM 4/8/2022  8:46 PM      214    4/8/2022  7:57 AM 4/8/2022  7:57          4/7/2022  8:33 PM 4/7/2022  8:33 PM      166    4/7/2022  8:03 AM 4/7/2022  8:03          4/6/2022  8:12 PM 4/6/2022  8:12 PM      240    4/6/2022  8:36 AM 4/6/2022  8:36          4/5/2022  8:19 PM 4/5/2022  8:19 PM      112    4/5/2022  6:53 AM 4/5/2022  6:54 AM 99         4/4/2022  8:04 PM 4/4/2022  8:04 PM      217    4/4/2022  8:38 AM 4/4/2022  8:38                Patient medication regimen is as below.     CURRENT DM MEDICATIONS:    Metformin 1000 mg BID    Lantus 52 units qhs    Humalog 14 units w/ ss TID wm    Jardiance 25 mg    Trulicity 4.5 mg weekly     WITH MEALS:   If blood sugar is below 70 eat first then check your blood sugar 2 hours later and make correction.  If blood pre-meal sugar is  70 -150 take 14 units of Novolog;  If blood pre-meal sugar is 151-200 take +2 units of Novolog;  If blood pre-meal sugar is 201-250 take +4 units of Novolog;  If blood pre-meal sugar is 251-300 take +6 units of Novolog;  If blood pre-meal sugar is 301-350 take +8 units of Novolog;  If blood pre-meal sugar is 351-400+ take +10 units of Novolog;  Also increase water intake and call for appointment.       Labs Reviewed.       No results found for: CPEPTIDE  No results found for: GLUTAMICACID       //   , There is no height or weight on file to calculate BMI.  His blood sugar in clinic today is:    Lab Results   Component Value Date    POCGLU 98 04/18/2022       Review of Systems   Constitutional: Negative for activity change, appetite change, chills and fever.   HENT: Negative for dental problem, mouth sores, nosebleeds, sore throat and trouble swallowing.    Eyes: Negative for pain and discharge.   Respiratory: Negative for shortness of breath, wheezing and stridor.    Cardiovascular: Negative for chest pain, palpitations and leg swelling.   Gastrointestinal: Negative for  abdominal pain, diarrhea, nausea and vomiting.   Endocrine: Negative for polydipsia, polyphagia and polyuria.   Genitourinary: Negative for dysuria, frequency and urgency.   Musculoskeletal: Negative for joint swelling and myalgias.   Skin: Negative for rash and wound.   Neurological: Negative for dizziness, syncope, weakness and headaches.   Psychiatric/Behavioral: Negative for behavioral problems and dysphoric mood.         Diabetes Management Status  Statin: Taking  ACE/ARB: Taking    Screening or Prevention Patient's value Goal Complete/Controlled?   HgA1C Testing and Control   Lab Results   Component Value Date    HGBA1C 7.1 (H) 2022      Annually/Less than 8% Yes   Lipid profile : 2021 Annually Yes   LDL control Lab Results   Component Value Date    LDLCALC 51.8 (L) 2021    Annually/Less than 100 mg/dl  Yes   Nephropathy screening Lab Results   Component Value Date    MICALBCREAT 24.3 03/10/2021     No results found for: PROTEINUA Annually Yes   Blood pressure BP Readings from Last 1 Encounters:   22 127/74    Less than 140/90 Yes   Dilated retinal exam : 2022 Annually Yes    Foot exam   : 2022 Annually Yes     ACTIVITY LEVEL: Moderately Active. Discussed activities, benefits, methods, and precautions.  MEAL PLANNING: Patient reports number of meals per day to be 3 and number of snacks per day to be 2.   Patient is encouraged to carb count and consume no more than 30 - 45 grams of carbohydrates in each meal and 15 grams of carbohydrates in each snack.     Social History     Socioeconomic History    Marital status:    Tobacco Use    Smoking status: Former Smoker     Packs/day: 1.00     Years: 20.00     Pack years: 20.00     Quit date: 2000     Years since quittin.3    Smokeless tobacco: Never Used   Substance and Sexual Activity    Alcohol use: No     Alcohol/week: 0.0 standard drinks    Drug use: No    Sexual activity: Yes     Partners: Female   Social  History Narrative    . Lives with spouse. Has 2 children. Retired. No pets or smokers in household.     Social Determinants of Health     Financial Resource Strain: Low Risk     Difficulty of Paying Living Expenses: Not hard at all   Food Insecurity: No Food Insecurity    Worried About Running Out of Food in the Last Year: Never true    Ran Out of Food in the Last Year: Never true   Transportation Needs: No Transportation Needs    Lack of Transportation (Medical): No    Lack of Transportation (Non-Medical): No   Physical Activity: Insufficiently Active    Days of Exercise per Week: 3 days    Minutes of Exercise per Session: 30 min   Stress: No Stress Concern Present    Feeling of Stress : Only a little   Social Connections: Socially Integrated    Frequency of Communication with Friends and Family: More than three times a week    Frequency of Social Gatherings with Friends and Family: More than three times a week    Attends Taoist Services: 1 to 4 times per year    Active Member of Clubs or Organizations: Yes    Attends Club or Organization Meetings: 1 to 4 times per year    Marital Status:    Housing Stability: Unknown    Unable to Pay for Housing in the Last Year: No    Unstable Housing in the Last Year: No     Past Medical History:   Diagnosis Date    Coronary artery disease 2010    [facility] stents    DM (diabetes mellitus) 2002     lunch 10/28/2016    DM (diabetes mellitus)      am 04/12/2021    DM (diabetes mellitus)     BS 96 am 04/13/2022 Insulin for years    Groin pain, left 2/11/2021    Hyperlipemia     Hypertension     Kidney stones     Nephrolithiasis 2/11/2021    Neuropathy     Type 2 diabetes mellitus 10-12 years     am 10/31/2014       Objective:        Physical Exam  Neurological:      Mental Status: He is alert and oriented to person, place, and time. Mental status is at baseline.   Psychiatric:         Mood and Affect: Mood normal.          Behavior: Behavior normal.         Thought Content: Thought content normal.         Judgment: Judgment normal.           Assessment / Plan:     Type 2 diabetes mellitus with complication, with long-term current use of insulin    Hyperlipidemia associated with type 2 diabetes mellitus    Hypertension associated with diabetes    Coronary artery disease involving native coronary artery of native heart without angina pectoris    Obstructive sleep apnea    Overweight (BMI 25.0-29.9)      Additional Plan Details:    - POCT Glucose  - Encouraged continuation of lifestyle changes including regular exercise and limiting carbohydrates to 30-45 grams per meal threes times daily and 15 grams per snack with a limit of two daily.   - Encouraged continued monitoring of blood glucose with maintenance of 3 times daily at Fasting, Before Lunch and Before Dinner. CGM declined. Change timing of third check from before lunch to before bed.   - Current DM Medication Regimen: continue Trulicity 4.5 mg weekly. Continue Lantus 52 units qhs. Continue Novolog to 14 units with ss TID wm - see below and 3 units with ice cream. Continue Jardiance 25 mg and Metformin 1000 mg BID.   - Health Maintenance standards addressed today: Urine Microalbumin / Creatinine Ratio scheduled  - Nursing Visit: Patient is below goal range for nursing visit for age group and will not need nursing visit at this time .   - Follow up in 2 months with A1c prior and in 4 weeks telephone smiley.     WITH MEALS:   If blood sugar is below 70 eat first then check your blood sugar 2 hours later and make correction.  If blood pre-meal sugar is  70 -150 take 14 units of Novolog;  If blood pre-meal sugar is 151-200 take +2 units of Novolog;  If blood pre-meal sugar is 201-250 take +4 units of Novolog;  If blood pre-meal sugar is 251-300 take +6 units of Novolog;  If blood pre-meal sugar is 301-350 take +8 units of Novolog;  If blood pre-meal sugar is 351-400+ take +10 units of  Novolog;  Also increase water intake and call for appointment.       Blakeney McKnight, PA-C [facility]

## 2022-04-25 ENCOUNTER — OFFICE VISIT (OUTPATIENT)
Dept: PULMONOLOGY | Facility: CLINIC | Age: 72
End: 2022-04-25
Payer: MEDICARE

## 2022-04-25 ENCOUNTER — LAB VISIT (OUTPATIENT)
Dept: LAB | Facility: HOSPITAL | Age: 72
End: 2022-04-25
Attending: PEDIATRICS
Payer: MEDICARE

## 2022-04-25 VITALS
SYSTOLIC BLOOD PRESSURE: 122 MMHG | RESPIRATION RATE: 14 BRPM | HEART RATE: 81 BPM | HEIGHT: 72 IN | BODY MASS INDEX: 26.22 KG/M2 | DIASTOLIC BLOOD PRESSURE: 78 MMHG | OXYGEN SATURATION: 99 % | WEIGHT: 193.56 LBS

## 2022-04-25 DIAGNOSIS — G47.33 OBSTRUCTIVE SLEEP APNEA: ICD-10-CM

## 2022-04-25 DIAGNOSIS — Z72.820 LACK OF ADEQUATE SLEEP: ICD-10-CM

## 2022-04-25 DIAGNOSIS — R53.83 FATIGUE, UNSPECIFIED TYPE: ICD-10-CM

## 2022-04-25 DIAGNOSIS — R53.83 FATIGUE, UNSPECIFIED TYPE: Primary | ICD-10-CM

## 2022-04-25 PROCEDURE — 85025 COMPLETE CBC W/AUTO DIFF WBC: CPT | Performed by: NURSE PRACTITIONER

## 2022-04-25 PROCEDURE — 99214 PR OFFICE/OUTPT VISIT, EST, LEVL IV, 30-39 MIN: ICD-10-PCS | Mod: S$PBB,,, | Performed by: NURSE PRACTITIONER

## 2022-04-25 PROCEDURE — 99999 PR PBB SHADOW E&M-EST. PATIENT-LVL V: CPT | Mod: PBBFAC,,, | Performed by: NURSE PRACTITIONER

## 2022-04-25 PROCEDURE — 99215 OFFICE O/P EST HI 40 MIN: CPT | Mod: PBBFAC | Performed by: NURSE PRACTITIONER

## 2022-04-25 PROCEDURE — 99214 OFFICE O/P EST MOD 30 MIN: CPT | Mod: S$PBB,,, | Performed by: NURSE PRACTITIONER

## 2022-04-25 PROCEDURE — 84443 ASSAY THYROID STIM HORMONE: CPT | Performed by: NURSE PRACTITIONER

## 2022-04-25 PROCEDURE — 36415 COLL VENOUS BLD VENIPUNCTURE: CPT | Performed by: NURSE PRACTITIONER

## 2022-04-25 PROCEDURE — 99999 PR PBB SHADOW E&M-EST. PATIENT-LVL V: ICD-10-PCS | Mod: PBBFAC,,, | Performed by: NURSE PRACTITIONER

## 2022-04-25 PROCEDURE — 82040 ASSAY OF SERUM ALBUMIN: CPT | Performed by: NURSE PRACTITIONER

## 2022-04-25 RX ORDER — INSULIN LISPRO 100 [IU]/ML
INJECTION, SOLUTION INTRAVENOUS; SUBCUTANEOUS
COMMUNITY
Start: 2022-04-14 | End: 2022-07-21 | Stop reason: DRUGHIGH

## 2022-04-25 NOTE — PROGRESS NOTES
"Subjective:      Patient ID: Timothy Ortega is a 71 y.o. male.    Chief Complaint: Sleep Apnea    HPI  Presents for increased daytime sleepiness.   He is falling asleep during the day more often.   He retired 2 yrs ago. When he worked he took a 15min nap at lunch but managed well.   He states he is used to sleeping 5-6hr/day.   He wakes up at 4am.   Presently he is falling asleep watching TV around 8pm and goes to bed after he naps. He wakes up around 3AM   He is wearing his CPAP nightly.   [place] Sleepiness scale score: 13       Patient Active Problem List   Diagnosis    Obstructive sleep apnea    Hyperlipidemia associated with type 2 diabetes mellitus    Hypertension associated with diabetes    CAD (coronary artery disease)    Type 2 diabetes mellitus with complication, with long-term current use of insulin    History of colon polyps    Bilateral carpal tunnel syndrome    Primary osteoarthritis of first carpometacarpal joint of right hand    Nonrheumatic mitral valve regurgitation    Erectile dysfunction    Non-recurrent unilateral inguinal hernia without obstruction or gangrene    Tortuous aorta       /78   Pulse 81   Resp 14   Ht 5' 11.5" (1.816 m)   Wt 87.8 kg (193 lb 9 oz)   SpO2 99%   BMI 26.62 kg/m²   Body mass index is 26.62 kg/m².    Review of Systems   Constitutional: Positive for fatigue.   Respiratory: Positive for somnolence.    Musculoskeletal: Positive for arthralgias.   Psychiatric/Behavioral: Positive for sleep disturbance.     Objective:      Physical Exam  Constitutional:       Appearance: Normal appearance. He is well-developed.   HENT:      Head: Normocephalic and atraumatic.      Mouth/Throat:      Comments: Wearing mask due to COVID-19 protocol  Neck:      Thyroid: No thyroid mass or thyromegaly.      Trachea: Trachea normal.   Cardiovascular:      Rate and Rhythm: Normal rate and regular rhythm.      Heart sounds: Normal heart sounds.   Pulmonary:      Effort: " Pulmonary effort is normal.      Breath sounds: Normal breath sounds. No wheezing, rhonchi or rales.   Chest:      Chest wall: There is no dullness to percussion.   Abdominal:      Palpations: Abdomen is soft. There is no splenomegaly or mass.      Tenderness: There is no abdominal tenderness.   Musculoskeletal:         General: Normal range of motion.      Cervical back: Normal range of motion and neck supple.   Skin:     General: Skin is warm and dry.   Neurological:      Mental Status: He is alert and oriented to person, place, and time.   Psychiatric:         Mood and Affect: Mood normal.         Behavior: Behavior normal.       Personal Diagnostic Review    Compliance Summary  3/26/2022 - 4/24/2022 (30 days)  Days with Device Usage 30 days  Days without Device Usage 0 days  Percent Days with Device Usage 100.0%  Cumulative Usage 8 days 4 hrs. 34 mins. 22 secs.  Maximum Usage (1 Day) 7 hrs. 49 mins. 30 secs.  Average Usage (All Days) 6 hrs. 33 mins. 8 secs.  Average Usage (Days Used) 6 hrs. 33 mins. 8 secs.  Minimum Usage (1 Day) 3 hrs. 38 mins. 5 secs.  Percent of Days with Usage >= 4 Hours 96.7%  Percent of Days with Usage < 4 Hours 3.3%  Date Range  Total Blower Time 8 days 5 hrs. 25 mins. 31 secs.  CPAP Summary  Average Time in Large Leak Per Day 1 mins. 2 secs.  Average AHI 2.1  CPAP 11.0 cmH2O    Assessment:       1. Fatigue, unspecified type        Outpatient Encounter Medications as of 4/25/2022   Medication Sig Dispense Refill    amLODIPine (NORVASC) 5 MG tablet TAKE 1 TABLET BY MOUTH EVERY DAY 90 tablet 3    ASCORBATE CALCIUM (VITAMIN C ORAL) Take by mouth once daily.      aspirin 81 mg Tab Take 1 tablet by mouth Daily.      blood-glucose meter (FREESTYLE LITE METER) kit Freestyle meter   Use as instructed 1 each 0    dulaglutide (TRULICITY) 4.5 mg/0.5 mL pen injector Inject 4.5 mg into the skin once a week. 12 pen 3    ERGOCALCIFEROL, VITAMIN D2, (VITAMIN D ORAL) Take by mouth once daily.       "FREESTYLE LANCETS 28 gauge lancets USE THREE TIMES DAILY 100 each 0    HUMALOG KWIKPEN INSULIN 100 unit/mL pen SMARTSI Unit(s) SUB-Q 3 Times Daily      hydrocortisone 2.5 % cream ERROL TO RASH BID PRN  3    insulin aspart U-100 (NOVOLOG FLEXPEN U-100 INSULIN) 100 unit/mL (3 mL) InPn pen ADMINISTER 28 UNITS UNDER THE SKIN THREE TIMES DAILY BEFORE MEALS 75 mL 3    JARDIANCE 25 mg tablet TAKE 1 TABLET BY MOUTH EVERY DAY 90 tablet 3    ketoconazole (NIZORAL) 2 % cream APPLY TOPICALLY TO THE AFFECTED AREA TWICE DAILY 15 g 2    KRILL OIL ORAL Take 1 capsule by mouth once daily.      lancets 33 gauge Misc 1 lancet by Misc.(Non-Drug; Combo Route) route 3 (three) times daily. 100 each 11    LANTUS SOLOSTAR U-100 INSULIN glargine 100 units/mL (3mL) SubQ pen ADMINISTER 75 UNITS UNDER THE SKIN EVERY EVENING 66 mL 3    losartan (COZAAR) 100 MG tablet TAKE 1 TABLET(100 MG) BY MOUTH EVERY DAY 90 tablet 3    meclizine (ANTIVERT) 25 mg tablet Take 1 tablet (25 mg total) by mouth 3 (three) times daily as needed for Dizziness. 30 tablet 0    meloxicam (MOBIC) 15 MG tablet Take 1 tablet (15 mg total) by mouth once daily. 90 tablet 3    metFORMIN (GLUCOPHAGE) 1000 MG tablet TAKE 1 TABLET(1000 MG) BY MOUTH TWICE DAILY WITH MEALS 180 tablet 4    mupirocin (BACTROBAN) 2 % ointment Apply to affected area 3 times daily 22 g 1    pen needle, diabetic (BD ULTRA-FINE SHORT PEN NEEDLE) 31 gauge x 5/16" Ndle USE AS DIRECTED FIVE TIMES DAILY 300 each 3    rosuvastatin (CRESTOR) 20 MG tablet TAKE 1 TABLET(20 MG) BY MOUTH EVERY DAY 90 tablet 3    sildenafil (REVATIO) 20 mg Tab Take 1 tablet (20 mg total) by mouth daily as needed (ED). 45 tablet 11    triamcinolone acetonide 0.1% (KENALOG) 0.1 % cream ERROL TO HAND RASH BID PRN       Facility-Administered Encounter Medications as of 2022   Medication Dose Route Frequency Provider Last Rate Last Admin    EPINEPHrine (EPIPEN) 0.3 mg/0.3 mL pen injection 0.3 mg  0.3 mg " Intramuscular PRN Willy Olson MD        lactated ringers infusion   Intravenous Continuous Lili Garvin MD 10 mL/hr at 02/26/21 0625 Restarted at 02/26/21 0659     Orders Placed This Encounter   Procedures    CBC Auto Differential     Standing Status:   Future     Standing Expiration Date:   6/24/2023    TSH     Standing Status:   Future     Standing Expiration Date:   6/24/2023    TESTOSTERONE PANEL     Standing Status:   Future     Standing Expiration Date:   6/24/2023     Plan:   CPAP download - settings working well. Not enough sleep time but only 20 min average change from last year.   He is trying to stay active.   Order labs today and review.   Try to avoid naps to sleep longer at night.   Discussed provigil an option after reviewing labs.      Problem List Items Addressed This Visit    None     Visit Diagnoses     Fatigue, unspecified type    -  Primary    Relevant Orders    CBC Auto Differential    TSH    TESTOSTERONE PANEL

## 2022-04-26 LAB
BASOPHILS # BLD AUTO: 0.04 K/UL (ref 0–0.2)
BASOPHILS NFR BLD: 0.5 % (ref 0–1.9)
DIFFERENTIAL METHOD: ABNORMAL
EOSINOPHIL # BLD AUTO: 0.1 K/UL (ref 0–0.5)
EOSINOPHIL NFR BLD: 1.3 % (ref 0–8)
ERYTHROCYTE [DISTWIDTH] IN BLOOD BY AUTOMATED COUNT: 13.7 % (ref 11.5–14.5)
HCT VFR BLD AUTO: 44.7 % (ref 40–54)
HGB BLD-MCNC: 14.2 G/DL (ref 14–18)
IMM GRANULOCYTES # BLD AUTO: 0.02 K/UL (ref 0–0.04)
IMM GRANULOCYTES NFR BLD AUTO: 0.3 % (ref 0–0.5)
LYMPHOCYTES # BLD AUTO: 1.9 K/UL (ref 1–4.8)
LYMPHOCYTES NFR BLD: 24.4 % (ref 18–48)
MCH RBC QN AUTO: 31.7 PG (ref 27–31)
MCHC RBC AUTO-ENTMCNC: 31.8 G/DL (ref 32–36)
MCV RBC AUTO: 100 FL (ref 82–98)
MONOCYTES # BLD AUTO: 0.7 K/UL (ref 0.3–1)
MONOCYTES NFR BLD: 8.4 % (ref 4–15)
NEUTROPHILS # BLD AUTO: 5.1 K/UL (ref 1.8–7.7)
NEUTROPHILS NFR BLD: 65.1 % (ref 38–73)
NRBC BLD-RTO: 0 /100 WBC
PLATELET # BLD AUTO: 162 K/UL (ref 150–450)
PMV BLD AUTO: 11.8 FL (ref 9.2–12.9)
RBC # BLD AUTO: 4.48 M/UL (ref 4.6–6.2)
TSH SERPL DL<=0.005 MIU/L-ACNC: 1.23 UIU/ML (ref 0.4–4)
WBC # BLD AUTO: 7.88 K/UL (ref 3.9–12.7)

## 2022-05-01 LAB
ALBUMIN SERPL-MCNC: 4.4 G/DL (ref 3.6–5.1)
SHBG SERPL-SCNC: 18 NMOL/L (ref 22–77)
TESTOST FREE SERPL-MCNC: 44.2 PG/ML (ref 46–224)
TESTOST SERPL-MCNC: 227 NG/DL (ref 250–1100)
TESTOSTERONE.FREE+WB SERPL-MCNC: 89 NG/DL (ref 110–575)

## 2022-05-02 DIAGNOSIS — I25.10 CORONARY ARTERY DISEASE INVOLVING NATIVE CORONARY ARTERY OF NATIVE HEART WITHOUT ANGINA PECTORIS: Primary | ICD-10-CM

## 2022-05-03 ENCOUNTER — OFFICE VISIT (OUTPATIENT)
Dept: CARDIOLOGY | Facility: CLINIC | Age: 72
End: 2022-05-03
Payer: MEDICARE

## 2022-05-03 ENCOUNTER — HOSPITAL ENCOUNTER (OUTPATIENT)
Dept: CARDIOLOGY | Facility: HOSPITAL | Age: 72
Discharge: HOME | End: 2022-05-03
Attending: INTERNAL MEDICINE
Payer: MEDICARE

## 2022-05-03 VITALS
OXYGEN SATURATION: 97 % | WEIGHT: 189 LBS | HEART RATE: 84 BPM | BODY MASS INDEX: 25.99 KG/M2 | SYSTOLIC BLOOD PRESSURE: 122 MMHG | DIASTOLIC BLOOD PRESSURE: 60 MMHG

## 2022-05-03 DIAGNOSIS — E11.69 HYPERLIPIDEMIA ASSOCIATED WITH TYPE 2 DIABETES MELLITUS: ICD-10-CM

## 2022-05-03 DIAGNOSIS — Z79.4 TYPE 2 DIABETES MELLITUS WITH COMPLICATION, WITH LONG-TERM CURRENT USE OF INSULIN: ICD-10-CM

## 2022-05-03 DIAGNOSIS — I34.0 NONRHEUMATIC MITRAL VALVE REGURGITATION: ICD-10-CM

## 2022-05-03 DIAGNOSIS — E11.59 HYPERTENSION ASSOCIATED WITH DIABETES: ICD-10-CM

## 2022-05-03 DIAGNOSIS — I25.10 CORONARY ARTERY DISEASE INVOLVING NATIVE CORONARY ARTERY OF NATIVE HEART WITHOUT ANGINA PECTORIS: ICD-10-CM

## 2022-05-03 DIAGNOSIS — E78.5 HYPERLIPIDEMIA ASSOCIATED WITH TYPE 2 DIABETES MELLITUS: ICD-10-CM

## 2022-05-03 DIAGNOSIS — E11.8 TYPE 2 DIABETES MELLITUS WITH COMPLICATION, WITH LONG-TERM CURRENT USE OF INSULIN: ICD-10-CM

## 2022-05-03 DIAGNOSIS — I15.2 HYPERTENSION ASSOCIATED WITH DIABETES: ICD-10-CM

## 2022-05-03 DIAGNOSIS — I25.10 CORONARY ARTERY DISEASE INVOLVING NATIVE CORONARY ARTERY OF NATIVE HEART WITHOUT ANGINA PECTORIS: Primary | ICD-10-CM

## 2022-05-03 PROCEDURE — 99214 OFFICE O/P EST MOD 30 MIN: CPT | Mod: S$PBB,,, | Performed by: INTERNAL MEDICINE

## 2022-05-03 PROCEDURE — 93010 ELECTROCARDIOGRAM REPORT: CPT | Mod: ,,, | Performed by: INTERNAL MEDICINE

## 2022-05-03 PROCEDURE — 99214 PR OFFICE/OUTPT VISIT, EST, LEVL IV, 30-39 MIN: ICD-10-PCS | Mod: S$PBB,,, | Performed by: INTERNAL MEDICINE

## 2022-05-03 PROCEDURE — 99214 OFFICE O/P EST MOD 30 MIN: CPT | Mod: PBBFAC | Performed by: INTERNAL MEDICINE

## 2022-05-03 PROCEDURE — 99999 PR PBB SHADOW E&M-EST. PATIENT-LVL IV: CPT | Mod: PBBFAC,,, | Performed by: INTERNAL MEDICINE

## 2022-05-03 PROCEDURE — 93005 ELECTROCARDIOGRAM TRACING: CPT

## 2022-05-03 PROCEDURE — 99999 PR PBB SHADOW E&M-EST. PATIENT-LVL IV: ICD-10-PCS | Mod: PBBFAC,,, | Performed by: INTERNAL MEDICINE

## 2022-05-03 PROCEDURE — 93010 EKG 12-LEAD: ICD-10-PCS | Mod: ,,, | Performed by: INTERNAL MEDICINE

## 2022-05-03 RX ORDER — PRAVASTATIN SODIUM 20 MG/1
20 TABLET ORAL DAILY
Qty: 90 TABLET | Refills: 3 | Status: SHIPPED | OUTPATIENT
Start: 2022-05-03 | End: 2023-05-15

## 2022-05-03 NOTE — PROGRESS NOTES
"Subjective:   Patient ID:  Timothy Ortega is a 71 y.o. male who presents for follow up of No chief complaint on file.      72 yo male, came in for 1yr f/u. .  Hx of CAD, had [facility] in 2010 and was told "nonobstructive", HTN, HLP, IDDM > 20 yrs, BRIANA on cpap, ex smoker.  h/o right carpel tunnel syndrome procedure.    No sob, no palpitation, no dizziness, no syncope, no CNS symptoms. Rare;y sharp chest pain atypical pain  Patient has fairly good exercise tolerance. Walks 2 miles a day, slowly down after COVID 19 pandemic  Patient is compliant with medications.  No smoking/drinking  EKG NSR and possible inferior infarct   nuke stress normal EF and no ischemia.  EKG NSR    Interval history  ekg NSR. Taking Lipitor for 12 yrs. C/o fatigue. On CPAP and still has daytime sleep  No chest pain palpitation dizziness and faint  Occasional SOB   echo  · The left ventricle is normal in size with concentric remodeling and normal systolic function. The estimated ejection fraction is 55%  · Normal left ventricular diastolic function.  · Normal right ventricular size with normal right ventricular systolic function.  · Mild mitral regurgitation.  · Mild tricuspid regurgitation.  · Normal central venous pressure (3 mmHg).  · The estimated PA systolic pressure is 33 mmHg.          Past Medical History:   Diagnosis Date    Coronary artery disease 2010    [facility] - no stents    DM (diabetes mellitus) 2002     lunch 10/28/2016    DM (diabetes mellitus)      am 04/12/2021    DM (diabetes mellitus)     BS 96 am 04/13/2022 Insulin for years    Groin pain, left 2/11/2021    Hyperlipemia     Hypertension     Kidney stones     Nephrolithiasis 2/11/2021    Neuropathy     Type 2 diabetes mellitus 10-12 years     am 10/31/2014       Past Surgical History:   Procedure Laterality Date    CARPAL TUNNEL RELEASE Right 07/2020    COLONOSCOPY N/A 3/31/2017    Procedure: COLONOSCOPY;  Surgeon: " Cornelius Ibarra MD;  Location: [facility] ENDO;  Service: Endoscopy;  Laterality: N/A;    COLONOSCOPY N/A 2022    Procedure: COLONOSCOPY;  Surgeon: Geneva Serna MD;  Location: [facility] ENDO;  Service: Endoscopy;  Laterality: N/A;    DENTAL SURGERY      Implant    KNEE ARTHROSCOPY Left     ROBOT-ASSISTED LAPAROSCOPIC REPAIR OF INGUINAL HERNIA USING DA JOSE ANGEL XI Left 2021    Procedure: XI ROBOTIC REPAIR, HERNIA, INGUINAL;  Surgeon: Tavon Cruz MD;  Location: [facility] OR;  Service: General;  Laterality: Left;       Social History     Tobacco Use    Smoking status: Former Smoker     Packs/day: 1.00     Years: 20.00     Pack years: 20.00     Quit date: 2000     Years since quittin.3    Smokeless tobacco: Never Used   Substance Use Topics    Alcohol use: No     Alcohol/week: 0.0 standard drinks    Drug use: No       Family History   Problem Relation Age of Onset    Diabetes Mother     Glaucoma Mother     Heart disease Mother 75        CAB    Diabetes Father     Heart attack Father 58        Fatal MI    Diabetes Brother     Diabetes Sister     Diabetes Sister     Cataracts Paternal Uncle     Cataracts Maternal Grandmother          Review of Systems   Constitutional: Positive for malaise/fatigue. Negative for decreased appetite, diaphoresis, fever and night sweats.   HENT: Negative for nosebleeds.    Eyes: Negative for blurred vision and double vision.   Cardiovascular: Negative for chest pain, claudication, dyspnea on exertion, irregular heartbeat, leg swelling, near-syncope, orthopnea, palpitations, paroxysmal nocturnal dyspnea and syncope.   Respiratory: Negative for cough, shortness of breath, sleep disturbances due to breathing, snoring, sputum production and wheezing.    Endocrine: Negative for cold intolerance and polyuria.   Hematologic/Lymphatic: Does not bruise/bleed easily.   Skin: Negative for rash.   Musculoskeletal: Negative for back pain, falls, joint pain, joint swelling and  neck pain.   Gastrointestinal: Negative for abdominal pain, heartburn, nausea and vomiting.   Genitourinary: Negative for dysuria, frequency and hematuria.   Neurological: Negative for difficulty with concentration, dizziness, focal weakness, headaches, light-headedness, numbness, seizures and weakness.   Psychiatric/Behavioral: Negative for depression, memory loss and substance abuse. The patient does not have insomnia.    Allergic/Immunologic: Negative for HIV exposure and hives.       Objective:   Physical Exam  HENT:      Head: Normocephalic.   Eyes:      Pupils: Pupils are equal, round, and reactive to light.   Neck:      Thyroid: No thyromegaly.      Vascular: Normal carotid pulses. No carotid bruit or JVD.   Cardiovascular:      Rate and Rhythm: Normal rate and regular rhythm.  No extrasystoles are present.     Chest Wall: PMI is not displaced.      Pulses: Normal pulses.      Heart sounds: Normal heart sounds. No murmur heard.    No gallop. No S3 sounds.   Pulmonary:      Effort: No respiratory distress.      Breath sounds: Normal breath sounds. No stridor.   Abdominal:      General: Bowel sounds are normal.      Palpations: Abdomen is soft.      Tenderness: There is no abdominal tenderness. There is no rebound.   Musculoskeletal:         General: Normal range of motion.   Skin:     Findings: No rash.   Neurological:      Mental Status: He is alert and oriented to person, place, and time.   Psychiatric:         Behavior: Behavior normal.         Lab Results   Component Value Date    CHOL 99 (L) 09/23/2021    CHOL 129 03/10/2021    CHOL 120 09/15/2020     Lab Results   Component Value Date    HDL 28 (L) 09/23/2021    HDL 26 (L) 03/10/2021    HDL 26 (L) 09/15/2020     Lab Results   Component Value Date    LDLCALC 51.8 (L) 09/23/2021    LDLCALC 52.4 (L) 03/10/2021    LDLCALC 27.4 (L) 09/15/2020     Lab Results   Component Value Date    TRIG 96 09/23/2021    TRIG 253 (H) 03/10/2021    TRIG 333 (H) 09/15/2020      Lab Results   Component Value Date    CHOLHDL 28.3 09/23/2021    CHOLHDL 20.2 03/10/2021    CHOLHDL 21.7 09/15/2020       Chemistry        Component Value Date/Time     09/23/2021 0717    K 4.4 09/23/2021 0717     09/23/2021 0717    CO2 23 09/23/2021 0717    BUN 18 09/23/2021 0717    CREATININE 1.0 09/23/2021 0717     (H) 09/23/2021 0717        Component Value Date/Time    CALCIUM 9.3 09/23/2021 0717    ALKPHOS 41 (L) 09/23/2021 0717    AST 24 09/23/2021 0717    ALT 22 09/23/2021 0717    BILITOT 0.6 09/23/2021 0717    ESTGFRAFRICA >60.0 09/23/2021 0717    EGFRNONAA >60.0 09/23/2021 0717          Lab Results   Component Value Date    HGBA1C 7.1 (H) 03/28/2022     Lab Results   Component Value Date    TSH 1.225 04/25/2022     Lab Results   Component Value Date    INR 1.0 12/01/2014    INR 1.0 05/06/2011     Lab Results   Component Value Date    WBC 7.88 04/25/2022    HGB 14.2 04/25/2022    HCT 44.7 04/25/2022     (H) 04/25/2022     04/25/2022     BMP  Sodium   Date Value Ref Range Status   09/23/2021 140 136 - 145 mmol/L Final     Potassium   Date Value Ref Range Status   09/23/2021 4.4 3.5 - 5.1 mmol/L Final     Chloride   Date Value Ref Range Status   09/23/2021 107 95 - 110 mmol/L Final     CO2   Date Value Ref Range Status   09/23/2021 23 23 - 29 mmol/L Final     BUN   Date Value Ref Range Status   09/23/2021 18 8 - 23 mg/dL Final     Creatinine   Date Value Ref Range Status   09/23/2021 1.0 0.5 - 1.4 mg/dL Final     Calcium   Date Value Ref Range Status   09/23/2021 9.3 8.7 - 10.5 mg/dL Final     Anion Gap   Date Value Ref Range Status   09/23/2021 10 8 - 16 mmol/L Final     eGFR if    Date Value Ref Range Status   09/23/2021 >60.0 >60 mL/min/1.73 m^2 Final     eGFR if non    Date Value Ref Range Status   09/23/2021 >60.0 >60 mL/min/1.73 m^2 Final     Comment:     Calculation used to obtain the estimated glomerular filtration  rate (eGFR) is the  CKD-EPI equation.        BNP  @LABRCNTIP(BNP,BNPTRIAGEBLO)@  @LABRCNTIP(troponini)@  CrCl cannot be calculated (Patient's most recent lab result is older than the maximum 7 days allowed.).  No results found in the last 24 hours.  No results found in the last 24 hours.  No results found in the last 24 hours.    Assessment:      1. Coronary artery disease involving native coronary artery of native heart without angina pectoris    2. Hypertension associated with diabetes    3. Hyperlipidemia associated with type 2 diabetes mellitus    4. Nonrheumatic mitral valve regurgitation    5. Type 2 diabetes mellitus with complication, with long-term current use of insulin      Dicussed the benefits and side effects of statin  ekg NSr  Plan:   D/c crestor 20 mg  And see if fatigue improves  Add Pravastatin at 20 mg daily  Repeat Lipid profile in 3 months  Continue ASA amlodipine and Losartan  DM Rx per PCP  Counseled DASH  Check Lipid profile in 6 months  Recommend heart-healthy diet, weight control and regular exercise.  Feder[place]. Risk modification.   I have reviewed all pertinent labs and cardiac studies independently. Plans and recommendations have been formulated under my direct supervision. All questions answered and patient voiced understanding.   If symptoms persist go to the ED  RTC in 12 months

## 2022-05-06 ENCOUNTER — HOSPITAL ENCOUNTER (OUTPATIENT)
Dept: RADIOLOGY | Facility: CLINIC | Age: 72
Discharge: HOME | End: 2022-05-06
Attending: PHYSICIAN ASSISTANT

## 2022-05-06 ENCOUNTER — OFFICE VISIT (OUTPATIENT)
Dept: URGENT CARE | Facility: CLINIC | Age: 72
End: 2022-05-06
Payer: MEDICARE

## 2022-05-06 VITALS
RESPIRATION RATE: 18 BRPM | OXYGEN SATURATION: 96 % | BODY MASS INDEX: 26.46 KG/M2 | HEART RATE: 73 BPM | SYSTOLIC BLOOD PRESSURE: 129 MMHG | DIASTOLIC BLOOD PRESSURE: 59 MMHG | HEIGHT: 71 IN | WEIGHT: 189 LBS | TEMPERATURE: 99 F
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
## 2022-05-06 DIAGNOSIS — S49.91XA INJURY OF RIGHT SHOULDER, INITIAL ENCOUNTER: ICD-10-CM

## 2022-05-06 DIAGNOSIS — S49.91XA INJURY OF RIGHT SHOULDER, INITIAL ENCOUNTER: Primary | ICD-10-CM

## 2022-05-06 PROCEDURE — 99213 OFFICE O/P EST LOW 20 MIN: CPT | Mod: S$GLB,,, | Performed by: PHYSICIAN ASSISTANT

## 2022-05-06 PROCEDURE — 99213 PR OFFICE/OUTPT VISIT, EST, LEVL III, 20-29 MIN: ICD-10-PCS | Mod: S$GLB,,, | Performed by: PHYSICIAN ASSISTANT

## 2022-05-06 PROCEDURE — 73030 X-RAY EXAM OF SHOULDER: CPT | Mod: RT,S$GLB,, | Performed by: RADIOLOGY

## 2022-05-06 PROCEDURE — 73030 XR SHOULDER COMPLETE 2 OR MORE VIEWS RIGHT: ICD-10-PCS | Mod: RT,S$GLB,, | Performed by: RADIOLOGY

## 2022-05-06 RX ORDER — IBUPROFEN 800 MG/1
800 TABLET ORAL 3 TIMES DAILY
Qty: 30 TABLET | Refills: 0 | Status: SHIPPED | OUTPATIENT
Start: 2022-05-06 | End: 2022-05-16

## 2022-05-06 NOTE — PROGRESS NOTES
"Subjective:       Patient ID: Timothy Ortega is a 71 y.o. male.    Vitals:  height is 5' 11" (1.803 m) and weight is 85.7 kg (189 lb). His temperature is 98.7 °F (37.1 °C). His blood pressure is 129/59 (abnormal) and his pulse is 73. His respiration is 18 and oxygen saturation is 96%.     Chief Complaint: Shoulder Injury (Right shoulder)    Patient is a 71-year-old male who presents with a 1/2 to 2 hour history of right shoulder injury.  Patient states he was at home when he fell taking a step out of his should.  Patient denies any head injury or neck injury at that time.  Patient denies any popping sensation or popping noise.  Patient states he stood up and had immediate right shoulder pain.  Patient states moving the shoulder causes the most pain.  Patient denies any radiation of the pain, numbness, tingling associated.  Patient states abduction of the arm is most painful.  Patient took 4 Tylenol and used ice without relief pain.  Patient denies any swelling or bruising of note.  Patient denies any other symptoms at this time.    Shoulder Injury   The incident occurred at home. The incident occurred 1 to 3 hours ago. The injury mechanism was a fall. The pain does not radiate. The pain is at a severity of 3/10. The pain is mild. Pertinent negatives include no chest pain, muscle weakness, numbness or tingling. The symptoms are aggravated by movement, palpation and overhead lifting. He has tried ice (tylenol, ice) for the symptoms. The treatment provided no relief.       Constitution: Negative for chills and fever.   Neck: Negative for neck pain and neck stiffness.   Cardiovascular: Negative for chest pain.   Eyes: Negative for vision loss.   Respiratory: Negative for shortness of breath.    Gastrointestinal: Negative for abdominal pain, nausea and vomiting.   Musculoskeletal: Positive for pain, trauma, joint pain and abnormal ROM of joint. Negative for joint swelling, arthritis, gout, back pain, pain with walking, " muscle cramps and muscle ache.   Skin: Negative for rash, erythema and bruising.   Allergic/Immunologic: Negative for itching.   Neurological: Negative for numbness and tingling.   Hematologic/Lymphatic: Negative for easy bruising/bleeding and trouble clotting. Does not bruise/bleed easily.       Objective:      Physical Exam   Constitutional: He is oriented to person, place, and time. He appears well-developed. He is cooperative. No distress.   HENT:   Head: Normocephalic and atraumatic.   Nose: Nose normal.   Mouth/Throat: Oropharynx is clear and moist and mucous membranes are normal.   Eyes: Conjunctivae and lids are normal.   Neck: Trachea normal and phonation normal. Neck supple. No neck rigidity present.   Cardiovascular: Normal rate, regular rhythm, normal heart sounds and normal pulses.   No murmur heard.  Pulmonary/Chest: Effort normal and breath sounds normal. No respiratory distress.   Abdominal: Normal appearance. He exhibits no abdominal bruit and no pulsatile midline mass.   Musculoskeletal:         General: No deformity.      Right shoulder: He exhibits decreased range of motion, tenderness and bony tenderness. He exhibits no swelling, no effusion, no crepitus, no deformity, no laceration, normal pulse and normal strength.      Left shoulder: Normal.      Right upper arm: He exhibits no tenderness, no bony tenderness, no swelling, no edema, no deformity and no laceration.      Left upper arm: Normal.        Arms:       Comments: Sensation intact bilaterally.  Tenderness in indicated areas without overlying skin changes appreciated.  No laceration, bruising, swelling appreciated.  Range of motion decreased secondary to pain.  strength 5/5.  No bony step-off appreciated.   Neurological: He is alert and oriented to person, place, and time. He has normal strength and normal reflexes. No sensory deficit.   Skin: Skin is warm, dry, intact and not diaphoretic. No erythema   Psychiatric: His speech is normal  and behavior is normal. Judgment and thought content normal.   Nursing note and vitals reviewed.    XR SHOULDER COMPLETE 2 OR MORE VIEWS RIGHT    Result Date: 5/6/2022  EXAMINATION: XR SHOULDER COMPLETE 2 OR MORE VIEWS RIGHT CLINICAL HISTORY: Unspecified injury of right shoulder and upper arm, initial encounter TECHNIQUE: Two or three views of the right shoulder were preformed. COMPARISON: None FINDINGS: No acute fracture or dislocation.  Minimal AC joint arthropathy noted.  Mild degenerative change at the glenohumeral joint.     1.  As above Electronically signed by: Lang Morales DO Date:    05/06/2022 Time:    15:51        Assessment:       1. Injury of right shoulder, initial encounter    2. Fall (on) (from) unspecified stairs and steps, initial encounter          Plan:         Injury of right shoulder, initial encounter  -     XR SHOULDER COMPLETE 2 OR MORE VIEWS RIGHT; Future; Expected date: 05/06/2022  -     SLING FOR HOME USE  -     Ambulatory referral/consult to Orthopedics  -     ibuprofen (ADVIL,MOTRIN) 800 MG tablet; Take 1 tablet (800 mg total) by mouth 3 (three) times daily. for 10 days  Dispense: 30 tablet; Refill: 0    Fall (on) (from) unspecified stairs and steps, initial encounter  -     XR SHOULDER COMPLETE 2 OR MORE VIEWS RIGHT; Future; Expected date: 05/06/2022  -     ibuprofen (ADVIL,MOTRIN) 800 MG tablet; Take 1 tablet (800 mg total) by mouth 3 (three) times daily. for 10 days  Dispense: 30 tablet; Refill: 0    Discussed x-rays with patient today.  Discussed rice therapy, sling use, follow-up with Orthopedics as soon as possible.  Referral made today.  Discussed use of oral NSAIDs as prescribed.  Do not mix NSAIDs including meloxicam, ibuprofen, Motrin, naproxen, Aleve, Celebrex.  Patient verbalized understanding and agrees with plan.  return to clinic if symptoms worsen, change, or persist.    Lenora Akhtar PA-C    Patient Instructions                                Orthopedic Follow up  Discharge Instructions    If your condition worsens or fails to improve we recommend that you receive another evaluation at the ER immediately or contact your PCP to discuss your concerns or return here. You must understand that you've received an urgent care treatment only and that you may be released before all your medical problems are known or treated. You the patient will arrange for follouwp care as instructed.   If you were prescribed a narcotic or muscle relaxant do not drive or operate heavy machinery while taking these medications   Tylenol or ibuprofen can also be used as directed for pain unless you have an allergy to them or medical condition such as stomach ulcers, kidney or liver disease or blood thinners etc for which you should not be taking these type of medications.   If you were given a prescription NSAID here do not also take any over the counter NSAID like ibuprofen, aleve, advil, motrin etc   RICE which means rest, ice compression and elevation are helpful.   If you have Low Back Pain and develop bowel or bladder symptoms or increase pain going down your legs go to the ER immediately.   If you were given a splint wear it at all times.   If you were given crutches use them as we instructed. Do not rest your armpits on the foam pad or you risk compressing the nerves and the vessels there   Follow up with the orthopedist in 1 week if you continue with pain.   Sometimes it can take up to 1 week for stress fractures to show up on an X-ray, and may need reimaging or a CT or MRI of the affected area.

## 2022-05-09 ENCOUNTER — TELEPHONE (OUTPATIENT)
Dept: URGENT CARE | Facility: CLINIC | Age: 72
End: 2022-05-09
Payer: MEDICARE

## 2022-05-09 NOTE — TELEPHONE ENCOUNTER
Courtesy call made to patient. Patient says he is doing better.   His orthopedist appointment is not for another month. He asked if he should still see the orthopedist. He also asked about how long to wear the sling. I advised him, per his instructions, to wear the sling for the week like it suggested. Advised him to also keep his orthopedist appointment just incase it starts to feel worse or not any better in the next month. I offered the provider here today can give him a call and better advise him if he would like.

## 2022-05-15 ENCOUNTER — PATIENT MESSAGE (OUTPATIENT)
Dept: INTERNAL MEDICINE | Facility: CLINIC | Age: 72
End: 2022-05-15
Payer: MEDICARE

## 2022-05-16 DIAGNOSIS — G47.10 HYPERSOMNIA: Primary | ICD-10-CM

## 2022-05-16 RX ORDER — MODAFINIL 200 MG/1
200 TABLET ORAL DAILY
Qty: 30 TABLET | Refills: 0 | Status: SHIPPED | OUTPATIENT
Start: 2022-05-16 | End: 2022-06-15

## 2022-05-16 NOTE — PROGRESS NOTES
Discussed with Dr. Ochoa.   Patient not good candidate for Testerone therapy.   Sending in provigil. Pt to follow up 3 months.

## 2022-05-23 ENCOUNTER — PATIENT MESSAGE (OUTPATIENT)
Dept: ADMINISTRATIVE | Facility: OTHER | Age: 72
End: 2022-05-23
Payer: MEDICARE

## 2022-05-23 NOTE — PROGRESS NOTES
PCP: EM Ochoa Jr, MD    Subjective:     Chief Complaint: Diabetes - Established Patient    Established Patient - Audio Only Telehealth Visit     The patient location is: Home  The chief complaint leading to consultation is: Diabetes follow up  Visit type: Virtual visit with audio only (telephone)  Total Time Spent with Patient: 8 minutes     The reason for the audio only service rather than synchronous audio and video virtual visit was related to technical difficulties or patient preference/necessity.     Each patient to whom I provide medical services by telemedicine is:  (1) informed of the relationship between the physician and patient and the respective role of any other health care provider with respect to management of the patient; and (2) notified that they may decline to receive medical services by telemedicine and may withdraw from such care at any time. Patient verbally consented to receive this service via voice-only telephone call.    This service was not originating from a related E/M service provided within the previous 7 days nor will  to an E/M service or procedure within the next 24 hours or my soonest available appointment.  Prevailing standard of care was able to be met in this audio-only visit.       HISTORY OF PRESENT ILLNESS: 71 y.o.  male presenting for diabetes management visit.   The patient's last visit with me was on 4/19/2022.  Patient has had Type II diabetes since 1990s.  Pertinent to decision making is the following comorbidities: HTN, HLD, CAD and Overweight by BMI  Patient has the following Diabetes complications: without complications  He has attended diabetes education in the past.     Patient's most recent A1c of 7.1% was completed 8 weeks ago.   Patient states since His last A1c His blood glucose levels have been high  in the evening.   Patient monitors blood glucose 3 times per day with [facility] meter : Fasting, Before Lunch and Before  Dinner. CGM declined.   Patient blood glucose monitoring device data will be reviewed under the Episodes tab today - see below.   Patient endorses the following diabetes related symptoms: None.   Patient is due today for the following diabetes-related health maintenance standards: Urine Microalbumin/creatinine ratio.   He denies any recent hospital admissions or emergency room visits.   He denies having hypoglycemia.  Patient's concerns today include glycemic control. Per patient, hyperglycemia in the evening related to ice cream or heavier carb intake.     Timothy's recent readings (past 60 days):  Time Taken Glucose (Before Breakfast) (mg/dL) Glucose (After Breakfast) (mg/dL) Glucose (Before Lunch) (mg/dL) Glucose (After Lunch) (mg/dL) Glucose (Before Dinner) (mg/dL) Glucose (After Dinner) (mg/dL) Glucose (Off Schedule) (mg/dL)   5/24/2022  7:55          5/23/2022  9:25 PM      138    5/23/2022  6:10 PM     118     5/23/2022  1:07 PM   90       5/23/2022  7:36          5/22/2022  9:02 PM       173   5/22/2022  4:53 PM       205   5/22/2022 12:40 PM       193   5/22/2022  7:30 AM       133   5/21/2022  9:09 PM       236   5/21/2022  7:47 AM       95   5/20/2022  2:34 PM       87   5/20/2022  6:01 AM       134   5/19/2022  9:42 PM       234   5/19/2022  6:11 PM       186   5/19/2022 12:11 PM       121   5/19/2022  7:11 AM       105   5/18/2022 10:02 PM       131   5/18/2022  6:16 PM       120   5/18/2022 12:17 PM       122   5/18/2022  7:19 AM       130   5/17/2022 10:01 PM       153   5/17/2022  6:08 PM       152   5/17/2022 12:25 PM       118   5/17/2022  7:17 AM       110   5/17/2022 12:00 AM          5/16/2022  9:48 PM       154   5/16/2022  5:17 PM       130   5/16/2022 12:32 PM   150       5/16/2022  8:36          5/15/2022  6:06 PM       137   5/15/2022  1:16 PM   117       5/15/2022  7:55          5/14/2022  9:20 PM      162    5/14/2022  4:52 PM     196     5/14/2022 12:14 PM   121        5/14/2022  7:48          5/13/2022  6:11 PM     73     5/13/2022 12:06 PM   124       5/13/2022  7:20          5/12/2022 10:00 PM       229   5/12/2022  6:10 PM     118     5/12/2022 12:26 PM   143       5/12/2022  8:06          5/11/2022  9:33 PM      209    5/11/2022  6:08 PM     123     5/11/2022 12:52 PM   127       5/11/2022  7:53          5/10/2022  5:47 PM     109     5/10/2022 12:36 PM   144       5/10/2022  8:07            Patient medication regimen is as below.     CURRENT DM MEDICATIONS:    Metformin 1000 mg BID    Lantus 52 units qhs    Humalog 14 units w/ ss TID wm and 3 units with dessert   Jardiance 25 mg    Trulicity 4.5 mg weekly     WITH MEALS:   If blood sugar is below 70 eat first then check your blood sugar 2 hours later and make correction.  If blood pre-meal sugar is  70 -150 take 14 units of Humalog;  If blood pre-meal sugar is 151-200 take +2 units of Humalog;  If blood pre-meal sugar is 201-250 take +4 units of Humalog;  If blood pre-meal sugar is 251-300 take +6 units of Humalog;  If blood pre-meal sugar is 301-350 take +8 units of Humalog;  If blood pre-meal sugar is 351-400+ take +10 units of Humalog;  Also increase water intake and call for appointment.       Labs Reviewed.       No results found for: CPEPTIDE  No results found for: GLUTAMICACID       //   , There is no height or weight on file to calculate BMI.  His blood sugar in clinic today is:    Lab Results   Component Value Date    POCGLU 98 04/18/2022       Review of Systems   Constitutional: Negative for activity change, appetite change, chills and fever.   HENT: Negative for dental problem, mouth sores, nosebleeds, sore throat and trouble swallowing.    Eyes: Negative for pain and discharge.   Respiratory: Negative for shortness of breath, wheezing and stridor.    Cardiovascular: Negative for chest pain, palpitations and leg swelling.   Gastrointestinal: Negative for abdominal pain,  diarrhea, nausea and vomiting.   Endocrine: Negative for polydipsia, polyphagia and polyuria.   Genitourinary: Negative for dysuria, frequency and urgency.   Musculoskeletal: Negative for joint swelling and myalgias.   Skin: Negative for rash and wound.   Neurological: Negative for dizziness, syncope, weakness and headaches.   Psychiatric/Behavioral: Negative for behavioral problems and dysphoric mood.         Diabetes Management Status  Statin: Taking  ACE/ARB: Taking    Screening or Prevention Patient's value Goal Complete/Controlled?   HgA1C Testing and Control   Lab Results   Component Value Date    HGBA1C 7.1 (H) 2022      Annually/Less than 8% Yes   Lipid profile : 2021 Annually Yes   LDL control Lab Results   Component Value Date    LDLCALC 51.8 (L) 2021    Annually/Less than 100 mg/dl  Yes   Nephropathy screening Lab Results   Component Value Date    MICALBCREAT 24.3 03/10/2021     No results found for: PROTEINUA Annually Yes   Blood pressure BP Readings from Last 1 Encounters:   22 (!) 129/59    Less than 140/90 Yes   Dilated retinal exam : 2022 Annually Yes    Foot exam   : 2022 Annually Yes     ACTIVITY LEVEL: Moderately Active. Discussed activities, benefits, methods, and precautions.  MEAL PLANNING: Patient reports number of meals per day to be 3 and number of snacks per day to be 2.   Patient is encouraged to carb count and consume no more than 30 - 45 grams of carbohydrates in each meal and 15 grams of carbohydrates in each snack.     Social History     Socioeconomic History    Marital status:    Tobacco Use    Smoking status: Former Smoker     Packs/day: 1.00     Years: 20.00     Pack years: 20.00     Quit date: 2000     Years since quittin.4    Smokeless tobacco: Never Used   Substance and Sexual Activity    Alcohol use: No     Alcohol/week: 0.0 standard drinks    Drug use: No    Sexual activity: Yes     Partners: Female   Social History  Narrative    . Lives with spouse. Has 2 children. Retired. No pets or smokers in household.     Social Determinants of Health     Financial Resource Strain: Low Risk     Difficulty of Paying Living Expenses: Not hard at all   Food Insecurity: No Food Insecurity    Worried About Running Out of Food in the Last Year: Never true    Ran Out of Food in the Last Year: Never true   Transportation Needs: No Transportation Needs    Lack of Transportation (Medical): No    Lack of Transportation (Non-Medical): No   Physical Activity: Insufficiently Active    Days of Exercise per Week: 3 days    Minutes of Exercise per Session: 30 min   Stress: No Stress Concern Present    Feeling of Stress : Only a little   Social Connections: Socially Integrated    Frequency of Communication with Friends and Family: More than three times a week    Frequency of Social Gatherings with Friends and Family: More than three times a week    Attends Nondenominational Services: 1 to 4 times per year    Active Member of Clubs or Organizations: Yes    Attends Club or Organization Meetings: 1 to 4 times per year    Marital Status:    Housing Stability: Unknown    Unable to Pay for Housing in the Last Year: No    Unstable Housing in the Last Year: No     Past Medical History:   Diagnosis Date    Coronary artery disease 2010    [facility] - no stents    DM (diabetes mellitus) 2002     lunch 10/28/2016    DM (diabetes mellitus)      am 04/12/2021    DM (diabetes mellitus)     BS 96 am 04/13/2022 Insulin for years    Groin pain, left 2/11/2021    Hyperlipemia     Hypertension     Kidney stones     Nephrolithiasis 2/11/2021    Neuropathy     Type 2 diabetes mellitus 10-12 years     am 10/31/2014       Objective:        Physical Exam  Neurological:      Mental Status: He is alert and oriented to person, place, and time. Mental status is at baseline.   Psychiatric:         Mood and Affect: Mood normal.          Behavior: Behavior normal.         Thought Content: Thought content normal.         Judgment: Judgment normal.           Assessment / Plan:     Type 2 diabetes mellitus with complication, with long-term current use of insulin    Hypertension associated with diabetes    Hyperlipidemia associated with type 2 diabetes mellitus    Coronary artery disease involving native coronary artery of native heart without angina pectoris    Obstructive sleep apnea    Overweight (BMI 25.0-29.9)      Additional Plan Details:    - POCT Glucose  - Encouraged continuation of lifestyle changes including regular exercise and limiting carbohydrates to 30-45 grams per meal threes times daily and 15 grams per snack with a limit of two daily.   - Encouraged continued monitoring of blood glucose with maintenance of 3 times daily at Fasting, Before Lunch and Before Dinner. CGM declined.   - Current DM Medication Regimen: continue Trulicity 4.5 mg weekly - will Rx Ozempic 2 mg weekly once out of current 4 week supply. Continue Lantus 52 units qhs. Continue Novolog to 14 units with ss TID wm - see below and 3 units with ice cream or heavy carb meals. Continue Jardiance 25 mg and Metformin 1000 mg BID.   - Health Maintenance standards addressed today: Urine Microalbumin / Creatinine Ratio scheduled  - Nursing Visit: Patient is below goal range for nursing visit for age group and will not need nursing visit at this time .   - Follow up in 2 months with A1c prior and in 4 weeks telephone call.     WITH MEALS:   If blood sugar is below 70 eat first then check your blood sugar 2 hours later and make correction.  If blood pre-meal sugar is  70 -150 take 14 units of Novolog;  If blood pre-meal sugar is 151-200 take +2 units of Novolog;  If blood pre-meal sugar is 201-250 take +4 units of Novolog;  If blood pre-meal sugar is 251-300 take +6 units of Novolog;  If blood pre-meal sugar is 301-350 take +8 units of Novolog;  If blood pre-meal sugar is  351-400+ take +10 units of Novolog;  Also increase water intake and call for appointment.       Blakeney McKnight, PA-C [facility]

## 2022-05-24 ENCOUNTER — PATIENT MESSAGE (OUTPATIENT)
Dept: DIABETES | Facility: CLINIC | Age: 72
End: 2022-05-24

## 2022-05-24 ENCOUNTER — OFFICE VISIT (OUTPATIENT)
Dept: DIABETES | Facility: CLINIC | Age: 72
End: 2022-05-24
Payer: MEDICARE

## 2022-05-24 DIAGNOSIS — E11.69 HYPERLIPIDEMIA ASSOCIATED WITH TYPE 2 DIABETES MELLITUS: ICD-10-CM

## 2022-05-24 DIAGNOSIS — I25.10 CORONARY ARTERY DISEASE INVOLVING NATIVE CORONARY ARTERY OF NATIVE HEART WITHOUT ANGINA PECTORIS: ICD-10-CM

## 2022-05-24 DIAGNOSIS — E11.59 HYPERTENSION ASSOCIATED WITH DIABETES: ICD-10-CM

## 2022-05-24 DIAGNOSIS — Z79.4 TYPE 2 DIABETES MELLITUS WITH COMPLICATION, WITH LONG-TERM CURRENT USE OF INSULIN: Primary | ICD-10-CM

## 2022-05-24 DIAGNOSIS — E66.3 OVERWEIGHT (BMI 25.0-29.9): ICD-10-CM

## 2022-05-24 DIAGNOSIS — I15.2 HYPERTENSION ASSOCIATED WITH DIABETES: ICD-10-CM

## 2022-05-24 DIAGNOSIS — G47.33 OBSTRUCTIVE SLEEP APNEA: ICD-10-CM

## 2022-05-24 DIAGNOSIS — E11.8 TYPE 2 DIABETES MELLITUS WITH COMPLICATION, WITH LONG-TERM CURRENT USE OF INSULIN: Primary | ICD-10-CM

## 2022-05-24 DIAGNOSIS — E78.5 HYPERLIPIDEMIA ASSOCIATED WITH TYPE 2 DIABETES MELLITUS: ICD-10-CM

## 2022-05-24 PROCEDURE — 99441 PR PHYSICIAN TELEPHONE EVALUATION 5-10 MIN: CPT | Mod: 95,,, | Performed by: PHYSICIAN ASSISTANT

## 2022-05-24 PROCEDURE — 99441 PR PHYSICIAN TELEPHONE EVALUATION 5-10 MIN: ICD-10-PCS | Mod: 95,,, | Performed by: PHYSICIAN ASSISTANT

## 2022-06-07 ENCOUNTER — TELEPHONE (OUTPATIENT)
Dept: DIABETES | Facility: CLINIC | Age: 72
End: 2022-06-07
Payer: MEDICARE

## 2022-06-07 DIAGNOSIS — Z79.4 TYPE 2 DIABETES MELLITUS WITH COMPLICATION, WITH LONG-TERM CURRENT USE OF INSULIN: Primary | ICD-10-CM

## 2022-06-07 DIAGNOSIS — E11.8 TYPE 2 DIABETES MELLITUS WITH COMPLICATION, WITH LONG-TERM CURRENT USE OF INSULIN: Primary | ICD-10-CM

## 2022-06-07 RX ORDER — SEMAGLUTIDE 2.68 MG/ML
2 INJECTION, SOLUTION SUBCUTANEOUS
Qty: 9 ML | Refills: 3 | Status: SHIPPED | OUTPATIENT
Start: 2022-06-07 | End: 2022-10-13

## 2022-06-13 ENCOUNTER — OFFICE VISIT (OUTPATIENT)
Dept: UROLOGY | Facility: CLINIC | Age: 72
End: 2022-06-13
Payer: MEDICARE

## 2022-06-13 VITALS
WEIGHT: 192 LBS | SYSTOLIC BLOOD PRESSURE: 125 MMHG | HEIGHT: 71 IN | HEART RATE: 73 BPM | BODY MASS INDEX: 26.88 KG/M2 | DIASTOLIC BLOOD PRESSURE: 66 MMHG

## 2022-06-13 DIAGNOSIS — N52.9 ERECTILE DYSFUNCTION, UNSPECIFIED ERECTILE DYSFUNCTION TYPE: ICD-10-CM

## 2022-06-13 DIAGNOSIS — N20.0 NEPHROLITHIASIS: Primary | ICD-10-CM

## 2022-06-13 DIAGNOSIS — R97.20 ELEVATED PROSTATE SPECIFIC ANTIGEN (PSA): ICD-10-CM

## 2022-06-13 PROCEDURE — 99214 OFFICE O/P EST MOD 30 MIN: CPT | Mod: PBBFAC | Performed by: UROLOGY

## 2022-06-13 PROCEDURE — 99214 OFFICE O/P EST MOD 30 MIN: CPT | Mod: S$PBB,,, | Performed by: UROLOGY

## 2022-06-13 PROCEDURE — 99999 PR PBB SHADOW E&M-EST. PATIENT-LVL IV: CPT | Mod: PBBFAC,,, | Performed by: UROLOGY

## 2022-06-13 PROCEDURE — 99214 PR OFFICE/OUTPT VISIT, EST, LEVL IV, 30-39 MIN: ICD-10-PCS | Mod: S$PBB,,, | Performed by: UROLOGY

## 2022-06-13 PROCEDURE — 99999 PR PBB SHADOW E&M-EST. PATIENT-LVL IV: ICD-10-PCS | Mod: PBBFAC,,, | Performed by: UROLOGY

## 2022-06-13 RX ORDER — SILDENAFIL 100 MG/1
100 TABLET, FILM COATED ORAL DAILY PRN
Qty: 45 TABLET | Refills: 5 | Status: SHIPPED | OUTPATIENT
Start: 2022-06-13 | End: 2024-02-16

## 2022-06-13 NOTE — PROGRESS NOTES
Chief Complaint:   Encounter Diagnoses   Name Primary?    Nephrolithiasis Yes    Erectile dysfunction, unspecified erectile dysfunction type          HPI:    6/13/22- voiding well without any issues, he is due for PSA.  Still having persistent groin pain, knows stones identified no other diagnostic abnormalities.  He has used Viagra with success in the past, could not get it approved last time.  70-year-old gentleman who comes in with a lump in his left groin, previous ultrasound was negative.  He has also been having some burning and discomfort in his testicles for some time now.  Along cannot just few weeks ago, it seems to be intermittent in nature with no discomfort.  He has no redness to the area.  The testicles he states is a burning or discomfort, at best a 2/10 on a pain scale.  Seems to last for about a day and in goes away.  No alleviating or eliciting factors.  He is not taking any medications for this.  He has no lower urinary tract symptoms, he only gets up 1 time per evening to void.  He has a good stream, with no issues in regards to frequency or urgency.  No gross hematuria, does have a history of smoking.  He has passed multiple stones in his life time, the last being 2 years ago.  He has required no surgical management for these.  He has been told that he has 1 stone left on the left side, which is obviously suspicious considering the testicle pain is on the left.  No previous urological history except for some erectile dysfunction, which she relates to his diabetes.  Viagra worked years ago, but the side effects were bothersome and he did not want to take this any longer.  He is currently having no issues with this at this time.  Patient had a grandmother with bladder cancer, his father also had kidney stones, no other urological cancers or stones within the family.  He currently is having little bit of left testicular discomfort, no groin mass today though.    Allergies:  Patient has no known  allergies.    Medications:  has a current medication list which includes the following prescription(s): amlodipine, ascorbic acid, aspirin, blood-glucose meter, ergocalciferol (vitamin d2), freestyle lancets, humalog kwikpen insulin, hydrocortisone, insulin aspart u-100, jardiance, ketoconazole, krill oil, lancets, lantus solostar u-100 insulin, losartan, meclizine, meloxicam, metformin, modafinil, mupirocin, pen needle, diabetic, pravastatin, ozempic, sildenafil, and triamcinolone acetonide 0.1%, and the following Facility-Administered Medications: epinephrine and lactated ringers.    Review of Systems:  General: No fever, chills, fatigability, or weight loss.  Skin: No rashes, itching, or changes in color or texture of skin.  Chest: Denies CHOU, cyanosis, wheezing, cough, and sputum production.  Abdomen: Appetite fine. No weight loss. Denies diarrhea, abdominal pain, hematemesis, or blood in stool.  Musculoskeletal: No joint stiffness or swelling. Denies back pain.  : As above.  All other review of systems negative.    PMH:   has a past medical history of Coronary artery disease (2010), DM (diabetes mellitus) (2002), DM (diabetes mellitus), DM (diabetes mellitus), Groin pain, left (2/11/2021), Hyperlipemia, Hypertension, Kidney stones, Nephrolithiasis (2/11/2021), Neuropathy, and Type 2 diabetes mellitus (10-12 years).    PSH:   has a past surgical history that includes Colonoscopy (N/A, 3/31/2017); Knee arthroscopy (Left); Dental surgery; Carpal tunnel release (Right, 07/2020); Robot-assisted laparoscopic repair of inguinal hernia using da Emeka Xi (Left, 2/26/2021); and Colonoscopy (N/A, 4/18/2022).    FamHx: family history includes Cataracts in his maternal grandmother and paternal uncle; Diabetes in his brother, father, mother, sister, and sister; Glaucoma in his mother; Heart attack (age of onset: 58) in his father; Heart disease (age of onset: 75) in his mother.    SocHx:  reports that he quit smoking about  22 years ago. He has a 20.00 pack-year smoking history. He has never used smokeless tobacco. He reports that he does not drink alcohol and does not use drugs.      Physical Exam:  There were no vitals filed for this visit.  General: A&Ox3, no apparent distress, no deformities  Neck: No masses, normal ROM  Lungs: normal inspiration, no use of accessory muscles  Heart: normal pulse, no arrhythmias  Abdomen: Soft, NT, ND, no masses, no hernias, no hepatosplenomegaly  Skin: The skin is warm and dry. No jaundice.  Ext: No c/c/e.  : 2/21- Test desc lamar, no abnormalities of epididymus. Normal penile and scrotal skin. Meatus normal.    Labs/Studies:   PSA 0.5 9/20  ALEJANDRA negative 5/21  CT left inguinal hernia 2/21  Abdominal ultrasound negative 1/21    Impression/Plan:      1.  Nephrolithiasis- none on recent CT scan, continue to monitor.  See me in 1 year, PSA today.    2.  Erectile dysfunction- proceed with sildenafil 100 mg, may take a half dose.  Please see below in regards to our discussion today.  Call with any complaints.    3. Left groin pain- still persistent, work up normal.  Will attempt ibuprofen, may hold with this as diagnostic modalities for both us and general surgery have come up negative.    He knows that this may cause blue-green vision changes, reflux, flushing, headaches, and to not take this with nitro pills.  He understands this may cause chest pain and if so to report to the emergency department immediately.  Patient understands that this medication can cause death of taken with nitro pills for his heart.  He is understanding of all the side effects, and would still like to proceed.

## 2022-06-20 NOTE — PROGRESS NOTES
PCP: EM Ochoa Jr, MD    Subjective:     Chief Complaint: Diabetes - Established Patient    Established Patient - Audio Only Telehealth Visit     The patient location is: Home  The chief complaint leading to consultation is: Diabetes follow up  Visit type: Virtual visit with audio only (telephone)  Total Time Spent with Patient: 6 minutes     The reason for the audio only service rather than synchronous audio and video virtual visit was related to technical difficulties or patient preference/necessity.     Each patient to whom I provide medical services by telemedicine is:  (1) informed of the relationship between the physician and patient and the respective role of any other health care provider with respect to management of the patient; and (2) notified that they may decline to receive medical services by telemedicine and may withdraw from such care at any time. Patient verbally consented to receive this service via voice-only telephone call.    This service was not originating from a related E/M service provided within the previous 7 days nor will  to an E/M service or procedure within the next 24 hours or my soonest available appointment.  Prevailing standard of care was able to be met in this audio-only visit.       HISTORY OF PRESENT ILLNESS: 71 y.o.  male presenting for diabetes management visit.   The patient's last visit with me was on 5/24/2022.  Patient has had Type II diabetes since 1990s.  Pertinent to decision making is the following comorbidities: HTN, HLD, CAD and Overweight by BMI  Patient has the following Diabetes complications: without complications  He has attended diabetes education in the past.     Patient's most recent A1c of 7.1% was completed 3 months ago.   Patient states since His last A1c His blood glucose levels have been low before lunch.   Patient monitors blood glucose 3 times per day with [facility] meter : Fasting, Before Lunch and Before Dinner.  CGM declined.   Patient blood glucose monitoring device data will be reviewed under the Episodes tab today - see below.   Patient endorses the following diabetes related symptoms: None.   Patient is due today for the following diabetes-related health maintenance standards: Urine Microalbumin/creatinine ratio.   He denies any recent hospital admissions or emergency room visits.   He denies having hypoglycemia.  Patient's concerns today include glycemic control. Per patient, hyperglycemia in the evening related to ice cream or heavier carb intake.     Timothy's recent readings (past 60 days):  Time Taken Glucose (mg/dL) Fasting Glucose (mg/dL) Glucose (Before Breakfast) (mg/dL) Glucose (After Breakfast) (mg/dL) Glucose (Before Lunch) (mg/dL) Glucose (After Lunch) (mg/dL) Glucose (Before Dinner) (mg/dL) Glucose (After Dinner) (mg/dL) Glucose (Off Schedule) (mg/dL)   6/21/2022 12:34            6/21/2022  8:48            6/21/2022 12:00 AM            6/20/2022  5:23            6/20/2022 12:46            6/20/2022  9:10            6/20/2022 12:00 AM            6/19/2022  5:24            6/19/2022 12:02            6/19/2022  7:44            6/18/2022  1:44            6/18/2022  9:46 AM 89           6/18/2022  8:13            6/17/2022  5:27            6/17/2022 12:32            6/17/2022  8:28 AM 85           6/16/2022  5:42            6/16/2022  1:11 PM 89           6/16/2022  7:40 AM 85           6/16/2022  6:24            6/15/2022  6:31 PM 81           6/15/2022  1:00 PM 92           6/15/2022 12:38 PM 67           6/15/2022  8:30 AM 82           6/14/2022  8:25 PM 93           6/14/2022  2:02 PM 71           6/14/2022  1:43 PM 67           6/14/2022  8:27 AM 98           6/13/2022  6:17 PM 90           6/13/2022 12:27            6/13/2022  8:06            6/12/2022  8:19            6/12/2022 12:11 PM 91            6/12/2022  7:36            6/11/2022  6:15 PM 98           6/11/2022 12:37            6/11/2022  8:43 AM 94           6/10/2022  6:53            6/10/2022  8:03            6/9/2022  5:49            6/9/2022 12:42 PM 99           6/9/2022  8:02            6/8/2022  5:35            6/8/2022 12:57            6/8/2022  7:41            6/7/2022  6:11            6/7/2022  2:01            6/7/2022  7:10            6/6/2022  6:34            6/6/2022  1:12            6/6/2022  7:41            6/5/2022  8:31            6/5/2022 12:12 PM 72           6/5/2022  7:39            6/4/2022  6:24            6/4/2022 12:54            6/4/2022  8:39            6/3/2022  6:30            6/3/2022 12:53            6/3/2022  8:19 AM 80           6/2/2022  6:29            6/2/2022 12:52            6/1/2022  6:49       140     6/1/2022  7:25   115         5/31/2022  6:16         155   5/31/2022  2:10     121       5/31/2022  8:40   107         5/30/2022  5:32       114     5/30/2022 12:46     143       5/30/2022  7:46   135         5/29/2022  1:10     138       5/29/2022  7:59   155         5/28/2022  6:10       122     5/28/2022 12:56 PM 98    98       5/28/2022  6:56   131         5/27/2022  7:35       125     5/27/2022 12:43     119       5/27/2022  7:38   142         5/26/2022  6:49         150   5/26/2022 12:54         178   5/26/2022  8:14         142   5/25/2022  6:19         197   5/25/2022 12:44         149   5/25/2022  7:19   146             Patient medication regimen is as below.     CURRENT DM MEDICATIONS:    Metformin 1000 mg BID    Lantus 52 units qhs    Humalog 14 units w/ ss TID wm and 3 units with dessert   Jardiance 25 mg    Ozempic 2 mg weekly      WITH MEALS:   If blood sugar is below 70 eat first then check your blood sugar 2 hours later and make correction.  If blood pre-meal sugar is  70 -150 take 14 units of Humalog;  If blood pre-meal sugar is 151-200 take +2 units of Humalog;  If blood pre-meal sugar is 201-250 take +4 units of Humalog;  If blood pre-meal sugar is 251-300 take +6 units of Humalog;  If blood pre-meal sugar is 301-350 take +8 units of Humalog;  If blood pre-meal sugar is 351-400+ take +10 units of Humalog;  Also increase water intake and call for appointment.       Labs Reviewed.       No results found for: CPEPTIDE  No results found for: GLUTAMICACID       //   , There is no height or weight on file to calculate BMI.  His blood sugar in clinic today is:    Lab Results   Component Value Date    POCGLU 98 04/18/2022       Review of Systems   Constitutional: Negative for activity change, appetite change, chills and fever.   HENT: Negative for dental problem, mouth sores, nosebleeds, sore throat and trouble swallowing.    Eyes: Negative for pain and discharge.   Respiratory: Negative for shortness of breath, wheezing and stridor.    Cardiovascular: Negative for chest pain, palpitations and leg swelling.   Gastrointestinal: Negative for abdominal pain, diarrhea, nausea and vomiting.   Endocrine: Negative for polydipsia, polyphagia and polyuria.   Genitourinary: Negative for dysuria, frequency and urgency.   Musculoskeletal: Negative for joint swelling and myalgias.   Skin: Negative for rash and wound.   Neurological: Negative for dizziness, syncope, weakness and headaches.   Psychiatric/Behavioral: Negative for behavioral problems and dysphoric mood.         Diabetes Management Status  Statin: Taking  ACE/ARB: Taking    Screening or Prevention Patient's value Goal Complete/Controlled?   HgA1C Testing and Control   Lab Results   Component Value Date    HGBA1C 7.1 (H) 03/28/2022      Annually/Less than 8% Yes   Lipid profile : 09/23/2021  Annually Yes   LDL control Lab Results   Component Value Date    LDLCALC 51.8 (L) 2021    Annually/Less than 100 mg/dl  Yes   Nephropathy screening Lab Results   Component Value Date    MICALBCREAT 24.3 03/10/2021     No results found for: PROTEINUA Annually Yes   Blood pressure BP Readings from Last 1 Encounters:   22 125/66    Less than 140/90 Yes   Dilated retinal exam : 2022 Annually Yes    Foot exam   : 2022 Annually Yes     ACTIVITY LEVEL: Moderately Active. Discussed activities, benefits, methods, and precautions.  MEAL PLANNING: Patient reports number of meals per day to be 3 and number of snacks per day to be 2.   Patient is encouraged to carb count and consume no more than 30 - 45 grams of carbohydrates in each meal and 15 grams of carbohydrates in each snack.     Social History     Socioeconomic History    Marital status:    Tobacco Use    Smoking status: Former Smoker     Packs/day: 1.00     Years: 20.00     Pack years: 20.00     Quit date: 2000     Years since quittin.4    Smokeless tobacco: Never Used   Substance and Sexual Activity    Alcohol use: No     Alcohol/week: 0.0 standard drinks    Drug use: No    Sexual activity: Yes     Partners: Female   Social History Narrative    . Lives with spouse. Has 2 children. Retired. No pets or smokers in household.     Social Determinants of Health     Financial Resource Strain: Low Risk     Difficulty of Paying Living Expenses: Not hard at all   Food Insecurity: No Food Insecurity    Worried About Running Out of Food in the Last Year: Never true    Ran Out of Food in the Last Year: Never true   Transportation Needs: No Transportation Needs    Lack of Transportation (Medical): No    Lack of Transportation (Non-Medical): No   Physical Activity: Insufficiently Active    Days of Exercise per Week: 3 days    Minutes of Exercise per Session: 30 min   Stress: No Stress Concern Present    Feeling of Stress :  Only a little   Social Connections: Socially Integrated    Frequency of Communication with Friends and Family: More than three times a week    Frequency of Social Gatherings with Friends and Family: More than three times a week    Attends Buddhist Services: 1 to 4 times per year    Active Member of Clubs or Organizations: Yes    Attends Club or Organization Meetings: 1 to 4 times per year    Marital Status:    Housing Stability: Unknown    Unable to Pay for Housing in the Last Year: No    Unstable Housing in the Last Year: No     Past Medical History:   Diagnosis Date    Coronary artery disease 2010    [facility] - no stents    DM (diabetes mellitus) 2002     lunch 10/28/2016    DM (diabetes mellitus)      am 04/12/2021    DM (diabetes mellitus)     BS 96 am 04/13/2022 Insulin for years    Groin pain, left 2/11/2021    Hyperlipemia     Hypertension     Kidney stones     Nephrolithiasis 2/11/2021    Neuropathy     Type 2 diabetes mellitus 10-12 years     am 10/31/2014       Objective:        Physical Exam  Neurological:      Mental Status: He is alert and oriented to person, place, and time. Mental status is at baseline.   Psychiatric:         Mood and Affect: Mood normal.         Behavior: Behavior normal.         Thought Content: Thought content normal.         Judgment: Judgment normal.           Assessment / Plan:     Type 2 diabetes mellitus with complication, with long-term current use of insulin    Hypertension associated with diabetes    Hyperlipidemia associated with type 2 diabetes mellitus    Coronary artery disease involving native coronary artery of native heart without angina pectoris    Obstructive sleep apnea    Overweight (BMI 25.0-29.9)      Additional Plan Details:    - POCT Glucose  - Encouraged continuation of lifestyle changes including regular exercise and limiting carbohydrates to 30-45 grams per meal threes times daily and 15 grams per snack with a limit  of two daily.   - Encouraged continued monitoring of blood glucose with maintenance of 3 times daily at Fasting, Before Lunch and Before Dinner. CGM declined.   - Current DM Medication Regimen: continue Ozempic 2 mg weekly. Continue Lantus 52 units qhs. Change Novolog to 12 units with ss TID wm - see below and 3 units with ice cream or heavy carb meals. Continue Jardiance 25 mg and Metformin 1000 mg BID.   - Health Maintenance standards addressed today: Urine Microalbumin / Creatinine Ratio scheduled  - Nursing Visit: Patient is below goal range for nursing visit for age group and will not need nursing visit at this time .   - Follow up in 3 weeks with A1c prior.    WITH MEALS:   If blood sugar is below 70 eat first then check your blood sugar 2 hours later and make correction.  If blood pre-meal sugar is  70 -150 take 12 units of Novolog;  If blood pre-meal sugar is 151-200 take +2 units of Novolog;  If blood pre-meal sugar is 201-250 take +4 units of Novolog;  If blood pre-meal sugar is 251-300 take +6 units of Novolog;  If blood pre-meal sugar is 301-350 take +8 units of Novolog;  If blood pre-meal sugar is 351-400+ take +10 units of Novolog;  Also increase water intake and call for appointment.       Blakeney McKnight, PA-C [facility]

## 2022-06-21 ENCOUNTER — PATIENT MESSAGE (OUTPATIENT)
Dept: DIABETES | Facility: CLINIC | Age: 72
End: 2022-06-21

## 2022-06-21 ENCOUNTER — OFFICE VISIT (OUTPATIENT)
Dept: DIABETES | Facility: CLINIC | Age: 72
End: 2022-06-21
Payer: MEDICARE

## 2022-06-21 DIAGNOSIS — G47.33 OBSTRUCTIVE SLEEP APNEA: ICD-10-CM

## 2022-06-21 DIAGNOSIS — E11.69 HYPERLIPIDEMIA ASSOCIATED WITH TYPE 2 DIABETES MELLITUS: ICD-10-CM

## 2022-06-21 DIAGNOSIS — E11.59 HYPERTENSION ASSOCIATED WITH DIABETES: ICD-10-CM

## 2022-06-21 DIAGNOSIS — E11.8 TYPE 2 DIABETES MELLITUS WITH COMPLICATION, WITH LONG-TERM CURRENT USE OF INSULIN: Primary | ICD-10-CM

## 2022-06-21 DIAGNOSIS — E66.3 OVERWEIGHT (BMI 25.0-29.9): ICD-10-CM

## 2022-06-21 DIAGNOSIS — I25.10 CORONARY ARTERY DISEASE INVOLVING NATIVE CORONARY ARTERY OF NATIVE HEART WITHOUT ANGINA PECTORIS: ICD-10-CM

## 2022-06-21 DIAGNOSIS — Z79.4 TYPE 2 DIABETES MELLITUS WITH COMPLICATION, WITH LONG-TERM CURRENT USE OF INSULIN: Primary | ICD-10-CM

## 2022-06-21 DIAGNOSIS — E78.5 HYPERLIPIDEMIA ASSOCIATED WITH TYPE 2 DIABETES MELLITUS: ICD-10-CM

## 2022-06-21 DIAGNOSIS — I15.2 HYPERTENSION ASSOCIATED WITH DIABETES: ICD-10-CM

## 2022-06-21 PROCEDURE — 99441 PR PHYSICIAN TELEPHONE EVALUATION 5-10 MIN: CPT | Mod: 95,,, | Performed by: PHYSICIAN ASSISTANT

## 2022-06-21 PROCEDURE — 99441 PR PHYSICIAN TELEPHONE EVALUATION 5-10 MIN: ICD-10-PCS | Mod: 95,,, | Performed by: PHYSICIAN ASSISTANT

## 2022-06-21 NOTE — PATIENT INSTRUCTIONS
CURRENT DM MEDICATIONS:   Metformin 1000 mg BID   Lantus 52 units at night  Humalog 12 units w/ ss TID wm and 3 units with dessert  Jardiance 25 mg   Ozempic 2 mg weekly     WITH MEALS:   If blood sugar is below 70 eat first then check your blood sugar 2 hours later and make correction.  If blood pre-meal sugar is  70 -150 take 12 units of Humalog;  If blood pre-meal sugar is 151-200 take +2 units of Humalog;  If blood pre-meal sugar is 201-250 take +4 units of Humalog;  If blood pre-meal sugar is 251-300 take +6 units of Humalog;  If blood pre-meal sugar is 301-350 take +8 units of Humalog;  If blood pre-meal sugar is 351-400+ take +10 units of Humalog;  Also increase water intake and call for appointment.

## 2022-06-29 ENCOUNTER — PATIENT MESSAGE (OUTPATIENT)
Dept: OTHER | Facility: OTHER | Age: 72
End: 2022-06-29
Payer: MEDICARE

## 2022-07-03 ENCOUNTER — PATIENT MESSAGE (OUTPATIENT)
Dept: INTERNAL MEDICINE | Facility: CLINIC | Age: 72
End: 2022-07-03
Payer: MEDICARE

## 2022-07-05 ENCOUNTER — LAB VISIT (OUTPATIENT)
Dept: LAB | Facility: HOSPITAL | Age: 72
End: 2022-07-05
Attending: PHYSICIAN ASSISTANT
Payer: MEDICARE

## 2022-07-05 DIAGNOSIS — N52.9 ERECTILE DYSFUNCTION, UNSPECIFIED ERECTILE DYSFUNCTION TYPE: ICD-10-CM

## 2022-07-05 DIAGNOSIS — R97.20 ELEVATED PROSTATE SPECIFIC ANTIGEN (PSA): ICD-10-CM

## 2022-07-05 DIAGNOSIS — Z79.4 TYPE 2 DIABETES MELLITUS WITH COMPLICATION, WITH LONG-TERM CURRENT USE OF INSULIN: ICD-10-CM

## 2022-07-05 DIAGNOSIS — E11.8 TYPE 2 DIABETES MELLITUS WITH COMPLICATION, WITH LONG-TERM CURRENT USE OF INSULIN: ICD-10-CM

## 2022-07-05 LAB
COMPLEXED PSA SERPL-MCNC: 0.77 NG/ML (ref 0–4)
ESTIMATED AVG GLUCOSE: 140 MG/DL (ref 68–131)
HBA1C MFR BLD: 6.5 % (ref 4–5.6)

## 2022-07-05 PROCEDURE — 36415 COLL VENOUS BLD VENIPUNCTURE: CPT | Performed by: UROLOGY

## 2022-07-05 PROCEDURE — 84153 ASSAY OF PSA TOTAL: CPT | Performed by: UROLOGY

## 2022-07-05 PROCEDURE — 83036 HEMOGLOBIN GLYCOSYLATED A1C: CPT | Performed by: PHYSICIAN ASSISTANT

## 2022-07-05 NOTE — PROGRESS NOTES
PCP: EM Ochoa Jr, MD    Subjective:     Chief Complaint: Diabetes - Established Patient    HISTORY OF PRESENT ILLNESS: 71 y.o.  male presenting for diabetes management visit.   The patient's last visit with me was on 6/21/2022.  Patient has had Type II diabetes since 1990s.  Pertinent to decision making is the following comorbidities: HTN, HLD, CAD and Overweight by BMI  Patient has the following Diabetes complications: without complications  He has attended diabetes education in the past.     Patient's most recent A1c of 6.5% was completed 1 days ago.   Patient states since His last A1c His blood glucose levels have been low before lunch.   Patient monitors blood glucose 3 times per day with [facility] meter : Fasting, Before Lunch and Before Dinner. CGM declined.   Patient blood glucose monitoring device data will be reviewed under the Episodes tab today - see below.   Patient endorses the following diabetes related symptoms: None.   Patient is due today for the following diabetes-related health maintenance standards: None - up to date.   He denies any recent hospital admissions or emergency room visits.   He denies having hypoglycemia.  Patient's concerns today include glycemic control. Per patient, hyperglycemia in the evening related to ice cream or heavier carb intake.     Timothy's recent readings (past 60 days):  Time Taken Time Submitted Glucose (mg/dL)   7/6/2022  8:30 AM 7/6/2022  8:30    7/5/2022  5:51 PM 7/5/2022  6:44    7/5/2022 12:46 PM 7/5/2022  6:44 PM 94   7/5/2022  8:31 AM 7/5/2022  6:44 PM 92   7/4/2022  5:42 PM 7/5/2022  6:44    7/4/2022 12:56 PM 7/5/2022  6:44    7/4/2022  9:13 AM 7/5/2022  6:44 PM 82   7/3/2022  7:44 PM 7/5/2022  6:44    7/3/2022 12:38 PM 7/3/2022  7:44    7/3/2022  7:25 AM 7/3/2022  6:50    7/2/2022  5:43 PM 7/3/2022  6:50 PM 99   7/2/2022 12:11 PM 7/2/2022  5:43    7/2/2022  8:17 AM 7/2/2022  5:43  PM 97   7/1/2022  4:58 PM 7/2/2022  5:49 AM 72   7/1/2022 12:25 PM 7/1/2022  4:58    7/1/2022  8:27 AM 7/1/2022  4:58    6/30/2022  5:57 PM 6/30/2022  5:57    6/30/2022 12:28 PM 6/30/2022 12:28    6/30/2022  8:38 AM 6/30/2022  8:38 AM 88   6/29/2022  5:53 PM 6/30/2022  6:13 AM 99   6/29/2022 12:16 PM 6/30/2022  6:13    6/29/2022  9:21 AM 6/30/2022  6:13 AM 96   6/28/2022  6:00 PM 6/29/2022  8:58    6/28/2022  6:59 AM 6/29/2022  8:58    6/27/2022  6:00 PM 6/27/2022  6:00 PM 75   6/27/2022 12:47 PM 6/27/2022 12:47    6/27/2022  9:43 AM 6/27/2022  9:44    6/26/2022  6:11 PM 6/26/2022  6:11    6/26/2022  7:42 AM 6/26/2022  7:42    6/25/2022  5:50 PM 6/25/2022  5:50    6/25/2022 12:59 PM 6/25/2022 12:59    6/25/2022  8:58 AM 6/25/2022  8:58    6/24/2022  6:12 PM 6/24/2022  6:12    6/24/2022  8:07 AM 6/24/2022  6:12    6/23/2022  6:26 PM 6/23/2022  6:26 PM 93   6/23/2022 12:22 PM 6/23/2022 12:22    6/23/2022  7:58 AM 6/23/2022  7:58    6/23/2022 12:00 AM 6/24/2022  5:15 AM    6/22/2022  6:14 PM 6/22/2022  8:44    6/22/2022 12:06 PM 6/22/2022  8:44    6/22/2022  8:17 AM 6/22/2022 12:06        Patient medication regimen is as below.     CURRENT DM MEDICATIONS:    Metformin 1000 mg BID    Lantus 52 units qhs    Humalog 12 units w/ ss TID wm and 3 units with dessert   Jardiance 25 mg    Ozempic 2 mg weekly     WITH MEALS:   If blood sugar is below 70 eat first then check your blood sugar 2 hours later and make correction.  If blood pre-meal sugar is  70 -150 take 12 units of Novolog;  If blood pre-meal sugar is 151-200 take +2 units of Novolog;  If blood pre-meal sugar is 201-250 take +4 units of Novolog;  If blood pre-meal sugar is 251-300 take +6 units of Novolog;  If blood pre-meal sugar is 301-350 take +8 units of Novolog;  If blood pre-meal sugar is 351-400+ take +10 units of Novolog;  Also  increase water intake and call for appointment.       Labs Reviewed.       No results found for: CPEPTIDE  No results found for: GLUTAMICACID       //   , There is no height or weight on file to calculate BMI.  His blood sugar in clinic today is:    Lab Results   Component Value Date    POCGLU 98 04/18/2022       Review of Systems   Constitutional: Negative for activity change, appetite change, chills and fever.   HENT: Negative for dental problem, mouth sores, nosebleeds, sore throat and trouble swallowing.    Eyes: Negative for pain and discharge.   Respiratory: Negative for shortness of breath, wheezing and stridor.    Cardiovascular: Negative for chest pain, palpitations and leg swelling.   Gastrointestinal: Negative for abdominal pain, diarrhea, nausea and vomiting.   Endocrine: Negative for polydipsia, polyphagia and polyuria.   Genitourinary: Negative for dysuria, frequency and urgency.   Musculoskeletal: Negative for joint swelling and myalgias.   Skin: Negative for rash and wound.   Neurological: Negative for dizziness, syncope, weakness and headaches.   Psychiatric/Behavioral: Negative for behavioral problems and dysphoric mood.         Diabetes Management Status  Statin: Taking  ACE/ARB: Taking    Screening or Prevention Patient's value Goal Complete/Controlled?   HgA1C Testing and Control   Lab Results   Component Value Date    HGBA1C 6.5 (H) 07/05/2022      Annually/Less than 8% Yes   Lipid profile : 09/23/2021 Annually Yes   LDL control Lab Results   Component Value Date    LDLCALC 51.8 (L) 09/23/2021    Annually/Less than 100 mg/dl  Yes   Nephropathy screening Lab Results   Component Value Date    MICALBCREAT 24.3 03/10/2021     No results found for: PROTEINUA Annually Yes   Blood pressure BP Readings from Last 1 Encounters:   06/13/22 125/66    Less than 140/90 Yes   Dilated retinal exam : 04/13/2022 Annually Yes    Foot exam   : 01/13/2022 Annually Yes     ACTIVITY LEVEL: Moderately Active.  Discussed activities, benefits, methods, and precautions.  MEAL PLANNING: Patient reports number of meals per day to be 3 and number of snacks per day to be 2.   Patient is encouraged to carb count and consume no more than 30 - 45 grams of carbohydrates in each meal and 15 grams of carbohydrates in each snack.     Social History     Socioeconomic History    Marital status:    Tobacco Use    Smoking status: Former Smoker     Packs/day: 1.00     Years: 20.00     Pack years: 20.00     Quit date: 2000     Years since quittin.5    Smokeless tobacco: Never Used   Substance and Sexual Activity    Alcohol use: No     Alcohol/week: 0.0 standard drinks    Drug use: No    Sexual activity: Yes     Partners: Female   Social History Narrative    . Lives with spouse. Has 2 children. Retired. No pets or smokers in household.     Social Determinants of Health     Financial Resource Strain: Low Risk     Difficulty of Paying Living Expenses: Not hard at all   Food Insecurity: No Food Insecurity    Worried About Running Out of Food in the Last Year: Never true    Ran Out of Food in the Last Year: Never true   Transportation Needs: No Transportation Needs    Lack of Transportation (Medical): No    Lack of Transportation (Non-Medical): No   Physical Activity: Insufficiently Active    Days of Exercise per Week: 3 days    Minutes of Exercise per Session: 30 min   Stress: No Stress Concern Present    Feeling of Stress : Only a little   Social Connections: Socially Integrated    Frequency of Communication with Friends and Family: More than three times a week    Frequency of Social Gatherings with Friends and Family: More than three times a week    Attends Orthodoxy Services: 1 to 4 times per year    Active Member of Clubs or Organizations: Yes    Attends Club or Organization Meetings: 1 to 4 times per year    Marital Status:    Housing Stability: Unknown    Unable to Pay for Housing in the  Last Year: No    Unstable Housing in the Last Year: No     Past Medical History:   Diagnosis Date    Coronary artery disease 2010    [facility] - no stents    DM (diabetes mellitus) 2002     lunch 10/28/2016    DM (diabetes mellitus)      am 04/12/2021    DM (diabetes mellitus)     BS 96 am 04/13/2022 Insulin for years    Groin pain, left 2/11/2021    Hyperlipemia     Hypertension     Kidney stones     Nephrolithiasis 2/11/2021    Neuropathy     Type 2 diabetes mellitus 10-12 years     am 10/31/2014       Objective:        Physical Exam  Constitutional:       General: He is not in acute distress.     Appearance: He is well-developed. He is not diaphoretic.   HENT:      Head: Normocephalic and atraumatic.      Right Ear: External ear normal.      Left Ear: External ear normal.      Nose: Nose normal.   Eyes:      General:         Right eye: No discharge.         Left eye: No discharge.      Pupils: Pupils are equal, round, and reactive to light.   Cardiovascular:      Rate and Rhythm: Normal rate and regular rhythm.      Heart sounds: Normal heart sounds.   Pulmonary:      Effort: Pulmonary effort is normal.      Breath sounds: Normal breath sounds.   Abdominal:      Palpations: Abdomen is soft.   Musculoskeletal:         General: Normal range of motion.      Cervical back: Normal range of motion and neck supple.   Skin:     General: Skin is warm and dry.      Capillary Refill: Capillary refill takes less than 2 seconds.   Neurological:      Mental Status: He is alert and oriented to person, place, and time. Mental status is at baseline.      Motor: No abnormal muscle tone.      Coordination: Coordination normal.   Psychiatric:         Mood and Affect: Mood normal.         Behavior: Behavior normal.         Thought Content: Thought content normal.         Judgment: Judgment normal.           Assessment / Plan:     Type 2 diabetes mellitus with complication, with long-term current use of  insulin    Hypertension associated with diabetes    Hyperlipidemia associated with type 2 diabetes mellitus    Coronary artery disease involving native coronary artery of native heart without angina pectoris    Obstructive sleep apnea    Overweight (BMI 25.0-29.9)      Additional Plan Details:    - POCT Glucose  - Encouraged continuation of lifestyle changes including regular exercise and limiting carbohydrates to 30-45 grams per meal threes times daily and 15 grams per snack with a limit of two daily.   - Encouraged continued monitoring of blood glucose with maintenance of 3 times daily at Fasting, Before Lunch and Before Dinner. CGM declined.   - Current DM Medication Regimen: continue Ozempic 2 mg weekly. Continue Lantus 52 units qhs. Change Novolog to 12 units with ss TID wm - see below and 3 units with ice cream or heavy carb meals. Continue Jardiance 25 mg and Metformin 1000 mg BID.   - Health Maintenance standards addressed today: None - up to date  - Nursing Visit: Patient is below goal range for nursing visit for age group and will not need nursing visit at this time .   - Follow up in 4 months with A1c prior.    WITH MEALS:   If blood sugar is below 70 eat first then check your blood sugar 2 hours later and make correction.  If blood pre-meal sugar is  70 -150 take 12 units of Novolog;  If blood pre-meal sugar is 151-200 take +2 units of Novolog;  If blood pre-meal sugar is 201-250 take +4 units of Novolog;  If blood pre-meal sugar is 251-300 take +6 units of Novolog;  If blood pre-meal sugar is 301-350 take +8 units of Novolog;  If blood pre-meal sugar is 351-400+ take +10 units of Novolog;  Also increase water intake and call for appointment.       Blakeney McKnight, PA-C [facility]    A total of 30 minutes was spent in face to face time, of which over 50 % was spent in counseling patient on disease process, complications, treatment, and side effects of  medications.

## 2022-07-06 ENCOUNTER — OFFICE VISIT (OUTPATIENT)
Dept: DIABETES | Facility: CLINIC | Age: 72
End: 2022-07-06
Payer: MEDICARE

## 2022-07-06 VITALS
RESPIRATION RATE: 20 BRPM | BODY MASS INDEX: 27.27 KG/M2 | DIASTOLIC BLOOD PRESSURE: 70 MMHG | SYSTOLIC BLOOD PRESSURE: 130 MMHG | WEIGHT: 195.56 LBS | HEART RATE: 70 BPM | TEMPERATURE: 98 F

## 2022-07-06 DIAGNOSIS — E66.3 OVERWEIGHT (BMI 25.0-29.9): ICD-10-CM

## 2022-07-06 DIAGNOSIS — I25.10 CORONARY ARTERY DISEASE INVOLVING NATIVE CORONARY ARTERY OF NATIVE HEART WITHOUT ANGINA PECTORIS: ICD-10-CM

## 2022-07-06 DIAGNOSIS — Z79.4 TYPE 2 DIABETES MELLITUS WITH COMPLICATION, WITH LONG-TERM CURRENT USE OF INSULIN: Primary | ICD-10-CM

## 2022-07-06 DIAGNOSIS — E78.5 HYPERLIPIDEMIA ASSOCIATED WITH TYPE 2 DIABETES MELLITUS: ICD-10-CM

## 2022-07-06 DIAGNOSIS — G47.33 OBSTRUCTIVE SLEEP APNEA: ICD-10-CM

## 2022-07-06 DIAGNOSIS — I15.2 HYPERTENSION ASSOCIATED WITH DIABETES: ICD-10-CM

## 2022-07-06 DIAGNOSIS — E11.69 HYPERLIPIDEMIA ASSOCIATED WITH TYPE 2 DIABETES MELLITUS: ICD-10-CM

## 2022-07-06 DIAGNOSIS — E11.8 TYPE 2 DIABETES MELLITUS WITH COMPLICATION, WITH LONG-TERM CURRENT USE OF INSULIN: Primary | ICD-10-CM

## 2022-07-06 DIAGNOSIS — E11.59 HYPERTENSION ASSOCIATED WITH DIABETES: ICD-10-CM

## 2022-07-06 PROCEDURE — 99214 OFFICE O/P EST MOD 30 MIN: CPT | Mod: PBBFAC | Performed by: PHYSICIAN ASSISTANT

## 2022-07-06 PROCEDURE — 99214 PR OFFICE/OUTPT VISIT, EST, LEVL IV, 30-39 MIN: ICD-10-PCS | Mod: S$PBB,,, | Performed by: PHYSICIAN ASSISTANT

## 2022-07-06 PROCEDURE — 99999 PR PBB SHADOW E&M-EST. PATIENT-LVL IV: ICD-10-PCS | Mod: PBBFAC,,, | Performed by: PHYSICIAN ASSISTANT

## 2022-07-06 PROCEDURE — 99214 OFFICE O/P EST MOD 30 MIN: CPT | Mod: S$PBB,,, | Performed by: PHYSICIAN ASSISTANT

## 2022-07-06 PROCEDURE — 99999 PR PBB SHADOW E&M-EST. PATIENT-LVL IV: CPT | Mod: PBBFAC,,, | Performed by: PHYSICIAN ASSISTANT

## 2022-07-25 ENCOUNTER — PATIENT MESSAGE (OUTPATIENT)
Dept: DIABETES | Facility: CLINIC | Age: 72
End: 2022-07-25
Payer: MEDICARE

## 2022-08-13 ENCOUNTER — OFFICE VISIT (OUTPATIENT)
Dept: URGENT CARE | Facility: CLINIC | Age: 72
End: 2022-08-13
Payer: MEDICARE

## 2022-08-13 VITALS
BODY MASS INDEX: 26.6 KG/M2 | OXYGEN SATURATION: 97 % | DIASTOLIC BLOOD PRESSURE: 69 MMHG | RESPIRATION RATE: 15 BRPM | WEIGHT: 190 LBS | SYSTOLIC BLOOD PRESSURE: 123 MMHG | HEIGHT: 71 IN | TEMPERATURE: 98 F | HEART RATE: 77 BPM

## 2022-08-13 DIAGNOSIS — L03.90 CELLULITIS, UNSPECIFIED CELLULITIS SITE: Primary | ICD-10-CM

## 2022-08-13 PROCEDURE — 99213 OFFICE O/P EST LOW 20 MIN: CPT | Mod: S$GLB,,, | Performed by: PHYSICIAN ASSISTANT

## 2022-08-13 PROCEDURE — 99213 PR OFFICE/OUTPT VISIT, EST, LEVL III, 20-29 MIN: ICD-10-PCS | Mod: S$GLB,,, | Performed by: PHYSICIAN ASSISTANT

## 2022-08-13 RX ORDER — CEPHALEXIN 500 MG/1
500 CAPSULE ORAL 3 TIMES DAILY
Qty: 15 CAPSULE | Refills: 0 | Status: SHIPPED | OUTPATIENT
Start: 2022-08-13 | End: 2022-08-18

## 2022-08-13 NOTE — PROGRESS NOTES
"Subjective:       Patient ID: Timothy Ortega is a 71 y.o. male.    Vitals:  height is 5' 11" (1.803 m) and weight is 86.2 kg (190 lb). His oral temperature is 98.3 °F (36.8 °C). His blood pressure is 123/69 and his pulse is 77. His respiration is 15 and oxygen saturation is 97%.     Chief Complaint: Abscess    Pt complaining of worsening abscess on his back for the last week and a half.  Pt states its not very painful it just keeps getting bigger    Abscess  Chronicity:  NewProgression Since Onset: gradually worsening  Location:  Torso  Associated Symptoms: no fever, no chills, no sweats  Characteristics: redness and swelling    Characteristics: not draining, no itching, not painful, no dryness, no scaling, no peeling, no bruising and no blistering    Pain Scale:  0/10  Treatments Tried:  Topical antibiotics      Constitution: Negative for chills and fever.   Skin: Positive for erythema and abscess.       Objective:      Physical Exam   Constitutional: He is oriented to person, place, and time. He appears well-developed.   HENT:   Head: Normocephalic and atraumatic.   Ears:   Right Ear: External ear normal.   Left Ear: External ear normal.   Nose: Nose normal.   Mouth/Throat: Mucous membranes are normal.   Eyes: Conjunctivae and lids are normal.   Neck: Trachea normal. Neck supple.   Cardiovascular: Normal rate, regular rhythm and normal heart sounds.   Pulmonary/Chest: Effort normal and breath sounds normal. No respiratory distress.   Abdominal: Normal appearance and bowel sounds are normal. He exhibits no distension, no abdominal bruit, no pulsatile midline mass and no mass. Soft. There is no abdominal tenderness.   Musculoskeletal: Normal range of motion.         General: Normal range of motion.        Back:       Comments: 5 cm erythematous area to lower back. No area of fluctuance present   Neurological: He is alert and oriented to person, place, and time. He has normal strength.   Skin: Skin is warm, dry, " intact, not diaphoretic and not pale. erythema   Psychiatric: His speech is normal and behavior is normal. Judgment and thought content normal.   Nursing note and vitals reviewed.        Assessment:       1. Cellulitis, unspecified cellulitis site        Here with redness to lower back. He is concerned for infection. He is a diabetic. He denies fever, chills, nausea or vomiting. He has 5 cm area of cellulitis with pus at surface in lower back. I will start patient on keflex and he was instructed to return if area of redness increased or he started to have systemic signs of infection. He was instructed to hydrate and return to the clinic if new or worsening symptoms occur.    Plan:         Cellulitis, unspecified cellulitis site  -     cephALEXin (KEFLEX) 500 MG capsule; Take 1 capsule (500 mg total) by mouth 3 (three) times daily. for 5 days  Dispense: 15 capsule; Refill: 0

## 2022-08-24 DIAGNOSIS — E11.59 HYPERTENSION ASSOCIATED WITH DIABETES: ICD-10-CM

## 2022-08-24 DIAGNOSIS — I15.2 HYPERTENSION ASSOCIATED WITH DIABETES: ICD-10-CM

## 2022-08-24 RX ORDER — LOSARTAN POTASSIUM 100 MG/1
TABLET ORAL
Qty: 90 TABLET | Refills: 0 | Status: SHIPPED | OUTPATIENT
Start: 2022-08-24 | End: 2022-11-23

## 2022-08-24 NOTE — TELEPHONE ENCOUNTER
Care Due:                  Date            Visit Type   Department     Provider  --------------------------------------------------------------------------------                                MYCHART                              FOLLOWUP/OF  [facility]  Last Visit: 10-      [facility] VISIT   MEDICINE       Edhelena Ochoa                              EP -                              PRIMARY      [facility]  Next Visit: 10-      CARE ([facility])   MEDICINE       Edhelena cOhoa                                                            Last  Test          Frequency    Reason                     Performed    Due Date  --------------------------------------------------------------------------------    CMP.........  12 months..  insulin, losartan........  09- 09-    [employer] Embedded Care Gaps. Reference number: [id number]. 8/24/2022   9:43:39 AM CDT

## 2022-08-24 NOTE — PROGRESS NOTES
Bed: Exam 02  Expected date:   Expected time:   Means of arrival:   Comments:   Chart reviewed. Health Maintenance updated.

## 2022-08-25 NOTE — TELEPHONE ENCOUNTER
Refill Decision Note   Timothy Ortega  is requesting a refill authorization.  Brief Assessment and Rationale for Refill:  Approve    -Medication-Related Problems Identified: Requires labs  Medication Therapy Plan:       Medication Reconciliation Completed: No   Comments:     Provider Staff:     Action is required for this patient.   Please see care gap opportunities below in Care Due Message.     Thanks!  [facility]     Appointments      Date Provider   Last Visit   10/13/2021 SHANE Ochoa Jr., MD   Next Visit   10/13/2022 SHANE Ochoa Jr., MD     Note composed:11:39 PM 08/24/2022           Note composed:11:39 PM 08/24/2022

## 2022-09-24 ENCOUNTER — OFFICE VISIT (OUTPATIENT)
Dept: URGENT CARE | Facility: CLINIC | Age: 72
End: 2022-09-24
Payer: MEDICARE

## 2022-09-24 VITALS
BODY MASS INDEX: 26.6 KG/M2 | DIASTOLIC BLOOD PRESSURE: 75 MMHG | RESPIRATION RATE: 18 BRPM | HEIGHT: 71 IN | OXYGEN SATURATION: 97 % | TEMPERATURE: 98 F | HEART RATE: 69 BPM | SYSTOLIC BLOOD PRESSURE: 147 MMHG | WEIGHT: 190 LBS

## 2022-09-24 DIAGNOSIS — J06.9 VIRAL URI WITH COUGH: Primary | ICD-10-CM

## 2022-09-24 DIAGNOSIS — R05.9 COUGH: ICD-10-CM

## 2022-09-24 LAB
CTP QC/QA: YES
CTP QC/QA: YES
MOLECULAR STREP A: NEGATIVE
SARS-COV-2 RDRP RESP QL NAA+PROBE: NEGATIVE

## 2022-09-24 PROCEDURE — 87651 STREP A DNA AMP PROBE: CPT | Mod: QW,S$GLB,,

## 2022-09-24 PROCEDURE — 87651 POCT STREP A MOLECULAR: ICD-10-PCS | Mod: QW,S$GLB,,

## 2022-09-24 PROCEDURE — U0002: ICD-10-PCS | Mod: QW,CR,S$GLB,

## 2022-09-24 PROCEDURE — 99213 PR OFFICE/OUTPT VISIT, EST, LEVL III, 20-29 MIN: ICD-10-PCS | Mod: S$GLB,,,

## 2022-09-24 PROCEDURE — 99213 OFFICE O/P EST LOW 20 MIN: CPT | Mod: S$GLB,,,

## 2022-09-24 PROCEDURE — U0002 COVID-19 LAB TEST NON-CDC: HCPCS | Mod: QW,CR,S$GLB,

## 2022-09-24 NOTE — PATIENT INSTRUCTIONS
- Rest.    - Drink plenty of fluids.  - Viral upper respiratory infections typically run their course in 10-14 days.      - You can take over-the-counter claritin, zyrtec, allegra, or xyzal as directed. These are antihistamines that can help with runny nose, nasal congestion, sneezing, and helps to dry up post-nasal drip, which usually causes sore throat and cough.               - If you do NOT have high blood pressure, you may use a decongestant form (D)  of this medication (ie. Claritin- D, zyrtec-D, allegra-D) or if you do not take the D form, you can take sudafed (pseudoephedrine) over the counter, which is a decongestant. Do NOT take two decongestant (D) medications at the same time (such as mucinex-D and claritin-D or plain sudafed and claritin D). Dextromethorphan (DM) is a cough suppressant over the counter (ie. mucinex DM, robitussin, delsym; dayquil/nyquil has DM as well.)    -If you DO have high blood pressure, you may take Coricidin HBP for sinus congestion and Delsym for cough.      - You can use Flonase (fluticasone) nasal spray as directed for sinus congestion and postnasal drip. This is a steroid nasal spray that works locally over time to decrease the inflammation in your nose/sinuses and help with allergic symptoms. This is not an quick- relief spray like afrin, but it works well if used daily.  Discontinue if you develop nose bleed  - Use nasal saline prior to Flonase.  - Use Ocean Spray Nasal Saline 1-3 puffs each nostril every 2-3 hours then blow out onto tissue. This is to irrigate the nasal passage way to clear the sinus openings. Use until sinus problem resolved.     - You can take plain Mucinex (guaifenesin) 1200 mg twice a day to help loosen mucous.      - Warm salt water gargles can help with sore throat     - Warm tea with honey can help with cough. Honey is a natural cough suppressant.    - Acetaminophen (tylenol) or Ibuprofen (advil,motrin) as directed as needed for fever/pain. Avoid  tylenol if you have a history of liver disease. Do not take ibuprofen if you have a history of GI bleeding, kidney disease, or if you take blood thinners.     - You must understand that you have received an Urgent Care treatment only and that you may be released before all of your medical problems are known or treated.   - You, the patient, will arrange for follow up care as instructed.   - If your condition worsens or fails to improve we recommend that you receive another evaluation at the ER immediately or contact your PCP to discuss your concerns or return here.   - Follow up with your PCP or specialty clinic as directed in the next 1-2 weeks if not improved or as needed.  You can call [phone] to schedule an appointment with the appropriate provider.

## 2022-09-24 NOTE — PROGRESS NOTES
"Subjective:       Patient ID: Timothy Ortega is a 71 y.o. male.    Vitals:  height is 5' 11" (1.803 m) and weight is 86.2 kg (190 lb). His tympanic temperature is 97.6 °F (36.4 °C). His blood pressure is 147/75 (abnormal) and his pulse is 69. His respiration is 18 and oxygen saturation is 97%.     Chief Complaint: Cough (Pt stated cough, congestion, and sinus pressure x 1 day.)    Pt stated cough, congestion, and sinus pressure x 1 day.    Cough  This is a new problem. The current episode started yesterday. The problem has been gradually worsening. The problem occurs constantly. The cough is Productive of sputum. Associated symptoms include ear congestion, ear pain, headaches, nasal congestion, postnasal drip and a sore throat. Pertinent negatives include no chest pain, chills, fever, heartburn, hemoptysis, myalgias, rash, rhinorrhea, shortness of breath, sweats, weight loss or wheezing. Nothing aggravates the symptoms. He has tried rest for the symptoms. The treatment provided no relief. There is no history of asthma, bronchiectasis, bronchitis, COPD, emphysema, environmental allergies or pneumonia.     Constitution: Negative for chills and fever.   HENT:  Positive for ear pain, postnasal drip and sore throat.    Cardiovascular:  Negative for chest pain.   Respiratory:  Positive for cough. Negative for bloody sputum, shortness of breath and wheezing.    Gastrointestinal:  Negative for heartburn.   Musculoskeletal:  Negative for muscle ache.   Skin:  Negative for rash.   Allergic/Immunologic: Negative for environmental allergies.   Neurological:  Positive for headaches.     Objective:      Physical Exam   Constitutional: He is oriented to person, place, and time. He appears well-developed. He is cooperative.  Non-toxic appearance. He does not appear ill. No distress.   HENT:   Head: Normocephalic and atraumatic.   Ears:   Right Ear: Hearing, tympanic membrane, external ear and ear canal normal.   Left Ear: Hearing, " tympanic membrane, external ear and ear canal normal.   Nose: Rhinorrhea and congestion present. No mucosal edema or nasal deformity. No epistaxis. Right sinus exhibits no maxillary sinus tenderness and no frontal sinus tenderness. Left sinus exhibits no maxillary sinus tenderness and no frontal sinus tenderness.   Mouth/Throat: Uvula is midline, oropharynx is clear and moist and mucous membranes are normal. No trismus in the jaw. Normal dentition. No uvula swelling. No oropharyngeal exudate, posterior oropharyngeal edema or posterior oropharyngeal erythema.   Eyes: Conjunctivae and lids are normal. No scleral icterus.   Neck: Trachea normal and phonation normal. Neck supple. No edema present. No erythema present. No neck rigidity present.   Cardiovascular: Normal rate, regular rhythm, normal heart sounds and normal pulses.   Pulmonary/Chest: Effort normal and breath sounds normal. No respiratory distress. He has no decreased breath sounds. He has no rhonchi.   Abdominal: Normal appearance.   Musculoskeletal: Normal range of motion.         General: No deformity. Normal range of motion.   Neurological: He is alert and oriented to person, place, and time. He exhibits normal muscle tone. Coordination normal.   Skin: Skin is warm, dry, intact, not diaphoretic and not pale.   Psychiatric: His speech is normal and behavior is normal. Judgment and thought content normal.   Nursing note and vitals reviewed.      Results for orders placed or performed in visit on 09/24/22   POCT COVID-19 Rapid Screening   Result Value Ref Range    POC Rapid COVID Negative Negative     Acceptable Yes    POCT Strep A, Molecular   Result Value Ref Range    Molecular Strep A, POC Negative Negative     Acceptable Yes        Assessment:       1. Viral URI with cough    2. Cough          Plan:         Labs reviewed with patient. RTC and ED precautions discussed.   Discussed proper use and side effects of prescribed  and OTC medications recommended for symptomatic relief.       Viral URI with cough    Cough  -     POCT COVID-19 Rapid Screening  -     POCT Strep A, Molecular       Patient Instructions   - Rest.    - Drink plenty of fluids.  - Viral upper respiratory infections typically run their course in 10-14 days.      - You can take over-the-counter claritin, zyrtec, allegra, or xyzal as directed. These are antihistamines that can help with runny nose, nasal congestion, sneezing, and helps to dry up post-nasal drip, which usually causes sore throat and cough.               - If you do NOT have high blood pressure, you may use a decongestant form (D)  of this medication (ie. Claritin- D, zyrtec-D, allegra-D) or if you do not take the D form, you can take sudafed (pseudoephedrine) over the counter, which is a decongestant. Do NOT take two decongestant (D) medications at the same time (such as mucinex-D and claritin-D or plain sudafed and claritin D). Dextromethorphan (DM) is a cough suppressant over the counter (ie. mucinex DM, robitussin, delsym; dayquil/nyquil has DM as well.)    -If you DO have high blood pressure, you may take Coricidin HBP for sinus congestion and Delsym for cough.      - You can use Flonase (fluticasone) nasal spray as directed for sinus congestion and postnasal drip. This is a steroid nasal spray that works locally over time to decrease the inflammation in your nose/sinuses and help with allergic symptoms. This is not an quick- relief spray like afrin, but it works well if used daily.  Discontinue if you develop nose bleed  - Use nasal saline prior to Flonase.  - Use Ocean Spray Nasal Saline 1-3 puffs each nostril every 2-3 hours then blow out onto tissue. This is to irrigate the nasal passage way to clear the sinus openings. Use until sinus problem resolved.     - You can take plain Mucinex (guaifenesin) 1200 mg twice a day to help loosen mucous.      - Warm salt water gargles can help with sore  throat     - Warm tea with honey can help with cough. Honey is a natural cough suppressant.    - Acetaminophen (tylenol) or Ibuprofen (advil,motrin) as directed as needed for fever/pain. Avoid tylenol if you have a history of liver disease. Do not take ibuprofen if you have a history of GI bleeding, kidney disease, or if you take blood thinners.     - You must understand that you have received an Urgent Care treatment only and that you may be released before all of your medical problems are known or treated.   - You, the patient, will arrange for follow up care as instructed.   - If your condition worsens or fails to improve we recommend that you receive another evaluation at the ER immediately or contact your PCP to discuss your concerns or return here.   - Follow up with your PCP or specialty clinic as directed in the next 1-2 weeks if not improved or as needed.  You can call [phone] to schedule an appointment with the appropriate provider.

## 2022-09-26 ENCOUNTER — PATIENT MESSAGE (OUTPATIENT)
Dept: OTHER | Facility: OTHER | Age: 72
End: 2022-09-26
Payer: MEDICARE

## 2022-09-27 ENCOUNTER — TELEPHONE (OUTPATIENT)
Dept: URGENT CARE | Facility: CLINIC | Age: 72
End: 2022-09-27
Payer: MEDICARE

## 2022-10-06 ENCOUNTER — LAB VISIT (OUTPATIENT)
Dept: LAB | Facility: HOSPITAL | Age: 72
End: 2022-10-06
Attending: PEDIATRICS
Payer: MEDICARE

## 2022-10-06 DIAGNOSIS — E11.8 TYPE 2 DIABETES MELLITUS WITH COMPLICATION, WITH LONG-TERM CURRENT USE OF INSULIN: ICD-10-CM

## 2022-10-06 DIAGNOSIS — Z79.4 TYPE 2 DIABETES MELLITUS WITH COMPLICATION, WITH LONG-TERM CURRENT USE OF INSULIN: ICD-10-CM

## 2022-10-06 LAB
ALBUMIN SERPL BCP-MCNC: 3.8 G/DL (ref 3.5–5.2)
ALP SERPL-CCNC: 44 U/L (ref 55–135)
ALT SERPL W/O P-5'-P-CCNC: 25 U/L (ref 10–44)
ANION GAP SERPL CALC-SCNC: 9 MMOL/L (ref 8–16)
AST SERPL-CCNC: 18 U/L (ref 10–40)
BILIRUB SERPL-MCNC: 0.4 MG/DL (ref 0.1–1)
BUN SERPL-MCNC: 14 MG/DL (ref 8–23)
CALCIUM SERPL-MCNC: 9.2 MG/DL (ref 8.7–10.5)
CHLORIDE SERPL-SCNC: 105 MMOL/L (ref 95–110)
CHOLEST SERPL-MCNC: 191 MG/DL (ref 120–199)
CHOLEST/HDLC SERPL: 6.2 {RATIO} (ref 2–5)
CO2 SERPL-SCNC: 27 MMOL/L (ref 23–29)
CREAT SERPL-MCNC: 0.8 MG/DL (ref 0.5–1.4)
EST. GFR  (NO RACE VARIABLE): >60 ML/MIN/1.73 M^2
ESTIMATED AVG GLUCOSE: 151 MG/DL (ref 68–131)
GLUCOSE SERPL-MCNC: 181 MG/DL (ref 70–110)
HBA1C MFR BLD: 6.9 % (ref 4–5.6)
HDLC SERPL-MCNC: 31 MG/DL (ref 40–75)
HDLC SERPL: 16.2 % (ref 20–50)
LDLC SERPL CALC-MCNC: 103.4 MG/DL (ref 63–159)
NONHDLC SERPL-MCNC: 160 MG/DL
POTASSIUM SERPL-SCNC: 4.4 MMOL/L (ref 3.5–5.1)
PROT SERPL-MCNC: 6.3 G/DL (ref 6–8.4)
SODIUM SERPL-SCNC: 141 MMOL/L (ref 136–145)
TRIGL SERPL-MCNC: 283 MG/DL (ref 30–150)

## 2022-10-06 PROCEDURE — 36415 COLL VENOUS BLD VENIPUNCTURE: CPT | Performed by: PEDIATRICS

## 2022-10-06 PROCEDURE — 80053 COMPREHEN METABOLIC PANEL: CPT | Performed by: PEDIATRICS

## 2022-10-06 PROCEDURE — 80061 LIPID PANEL: CPT | Performed by: PEDIATRICS

## 2022-10-06 PROCEDURE — 83036 HEMOGLOBIN GLYCOSYLATED A1C: CPT | Performed by: PEDIATRICS

## 2022-10-12 ENCOUNTER — OFFICE VISIT (OUTPATIENT)
Dept: PODIATRY | Facility: CLINIC | Age: 72
End: 2022-10-12
Payer: MEDICARE

## 2022-10-12 VITALS — WEIGHT: 190.06 LBS | HEIGHT: 71 IN | BODY MASS INDEX: 26.61 KG/M2

## 2022-10-12 DIAGNOSIS — B35.1 ONYCHOMYCOSIS DUE TO DERMATOPHYTE: ICD-10-CM

## 2022-10-12 DIAGNOSIS — E11.8 TYPE 2 DIABETES MELLITUS WITH COMPLICATION, WITH LONG-TERM CURRENT USE OF INSULIN: Primary | ICD-10-CM

## 2022-10-12 DIAGNOSIS — Z79.4 TYPE 2 DIABETES MELLITUS WITH COMPLICATION, WITH LONG-TERM CURRENT USE OF INSULIN: Primary | ICD-10-CM

## 2022-10-12 PROCEDURE — 99499 UNLISTED E&M SERVICE: CPT | Mod: S$PBB,,, | Performed by: PODIATRIST

## 2022-10-12 PROCEDURE — 11719 TRIM NAIL(S) ANY NUMBER: CPT | Mod: Q9,S$PBB,, | Performed by: PODIATRIST

## 2022-10-12 PROCEDURE — 11720 DEBRIDE NAIL 1-5: CPT | Mod: Q9,PBBFAC | Performed by: PODIATRIST

## 2022-10-12 PROCEDURE — 11719 TRIM NAIL(S) ANY NUMBER: CPT | Mod: Q9,PBBFAC | Performed by: PODIATRIST

## 2022-10-12 PROCEDURE — 99999 PR PBB SHADOW E&M-EST. PATIENT-LVL IV: ICD-10-PCS | Mod: PBBFAC,,, | Performed by: PODIATRIST

## 2022-10-12 PROCEDURE — 99214 OFFICE O/P EST MOD 30 MIN: CPT | Mod: PBBFAC | Performed by: PODIATRIST

## 2022-10-12 PROCEDURE — 99999 PR PBB SHADOW E&M-EST. PATIENT-LVL IV: CPT | Mod: PBBFAC,,, | Performed by: PODIATRIST

## 2022-10-12 PROCEDURE — 99499 NO LOS: ICD-10-PCS | Mod: S$PBB,,, | Performed by: PODIATRIST

## 2022-10-12 PROCEDURE — 11719 PR TRIM NAIL(S): ICD-10-PCS | Mod: Q9,S$PBB,, | Performed by: PODIATRIST

## 2022-10-12 PROCEDURE — 11720 DEBRIDE NAIL 1-5: CPT | Mod: 59,Q9,S$PBB, | Performed by: PODIATRIST

## 2022-10-12 PROCEDURE — 11720 PR DEBRIDEMENT OF NAIL(S), 1-5: ICD-10-PCS | Mod: 59,Q9,S$PBB, | Performed by: PODIATRIST

## 2022-10-12 NOTE — PROGRESS NOTES
Subjective:       Patient ID: Timothy Ortega is a 71 y.o. male.    Chief Complaint: Follow-up (Coming in for foot check, rates pain 0/10, diabetic, wearing socks and shoes, last seen PCP Dr. Ochoa on 02/19/2022.)      HPI: Patient presents to the office today with the chief complaint of elongated, thickened and dystrophic nail plates to the B/L foot. This patient is a Diabetic Type II. Patient does follow with Primary Care and/or Endocrinology for management of Diabetes Mellitus. This patient's PMD is EM Ochoa Jr, MD. This patient last saw his/her primary care provider on 02/19/2022.  He continues to follow with LISA Villalpando with diabetic education with last appointment on 07/06/2022.    Hemoglobin A1C   Date Value Ref Range Status   10/06/2022 6.9 (H) 4.0 - 5.6 % Final     Comment:     ADA Screening Guidelines:  5.7-6.4%  Consistent with prediabetes  >or=6.5%  Consistent with diabetes    High levels of fetal hemoglobin interfere with the HbA1C  assay. Heterozygous hemoglobin variants (HbS, HgC, etc)do  not significantly interfere with this assay.   However, presence of multiple variants may affect accuracy.     07/05/2022 6.5 (H) 4.0 - 5.6 % Final     Comment:     ADA Screening Guidelines:  5.7-6.4%  Consistent with prediabetes  >or=6.5%  Consistent with diabetes    High levels of fetal hemoglobin interfere with the HbA1C  assay. Heterozygous hemoglobin variants (HbS, HgC, etc)do  not significantly interfere with this assay.   However, presence of multiple variants may affect accuracy.     03/28/2022 7.1 (H) 4.0 - 5.6 % Final     Comment:     ADA Screening Guidelines:  5.7-6.4%  Consistent with prediabetes  >or=6.5%  Consistent with diabetes    High levels of fetal hemoglobin interfere with the HbA1C  assay. Heterozygous hemoglobin variants (HbS, HgC, etc)do  not significantly interfere with this assay.   However, presence of multiple variants may affect accuracy.     .     Review of patient's  allergies indicates:  No Known Allergies    Past Medical History:   Diagnosis Date    Coronary artery disease     [facility] - no stents    DM (diabetes mellitus) 2002     lunch 10/28/2016    DM (diabetes mellitus)      am 2021    DM (diabetes mellitus)     BS 96 am 2022 Insulin for years    Groin pain, left 2021    Hyperlipemia     Hypertension     Kidney stones     Nephrolithiasis 2021    Neuropathy     Type 2 diabetes mellitus 10-12 years     am 10/31/2014       Family History   Problem Relation Age of Onset    Diabetes Mother     Glaucoma Mother     Heart disease Mother 75        CAB    Diabetes Father     Heart attack Father 58        Fatal MI    Diabetes Brother     Diabetes Sister     Diabetes Sister     Cataracts Paternal Uncle     Cataracts Maternal Grandmother        Social History     Socioeconomic History    Marital status:    Tobacco Use    Smoking status: Former     Packs/day: 1.00     Years: 20.00     Pack years: 20.00     Types: Cigarettes     Quit date: 2000     Years since quittin.7     Passive exposure: Never    Smokeless tobacco: Never   Substance and Sexual Activity    Alcohol use: No     Alcohol/week: 0.0 standard drinks    Drug use: No    Sexual activity: Yes     Partners: Female   Social History Narrative    . Lives with spouse. Has 2 children. Retired. No pets or smokers in household.     Social Determinants of Health     Financial Resource Strain: Low Risk     Difficulty of Paying Living Expenses: Not hard at all   Food Insecurity: No Food Insecurity    Worried About Running Out of Food in the Last Year: Never true    Ran Out of Food in the Last Year: Never true   Transportation Needs: No Transportation Needs    Lack of Transportation (Medical): No    Lack of Transportation (Non-Medical): No   Physical Activity: Insufficiently Active    Days of Exercise per Week: 3 days    Minutes of Exercise per Session: 30 min   Stress: No Stress  "Concern Present    Feeling of Stress : Only a little   Social Connections: Socially Integrated    Frequency of Communication with Friends and Family: More than three times a week    Frequency of Social Gatherings with Friends and Family: More than three times a week    Attends [employer] Services: 1 to 4 times per year    Active Member of Clubs or Organizations: Yes    Attends Club or Organization Meetings: 1 to 4 times per year    Marital Status:    Housing Stability: Unknown    Unable to Pay for Housing in the Last Year: No    Unstable Housing in the Last Year: No       Past Surgical History:   Procedure Laterality Date    CARPAL TUNNEL RELEASE Right 07/2020    COLONOSCOPY N/A 3/31/2017    Procedure: COLONOSCOPY;  Surgeon: Cornelius Ibarra MD;  Location: [facility] ENDO;  Service: Endoscopy;  Laterality: N/A;    COLONOSCOPY N/A 4/18/2022    Procedure: COLONOSCOPY;  Surgeon: Geneva Serna MD;  Location: [facility] ENDO;  Service: Endoscopy;  Laterality: N/A;    DENTAL SURGERY      Implant    KNEE ARTHROSCOPY Left     ROBOT-ASSISTED LAPAROSCOPIC REPAIR OF INGUINAL HERNIA USING DA JOSE ANGEL XI Left 2/26/2021    Procedure: XI ROBOTIC REPAIR, HERNIA, INGUINAL;  Surgeon: Tavon Cruz MD;  Location: [facility] OR;  Service: General;  Laterality: Left;       Review of Systems       Objective:   Ht 5' 11" (1.803 m)   Wt 86.2 kg (190 lb 0.6 oz)   BMI 26.50 kg/m²     Physical Exam  LOWER EXTREMITY PHYSICAL EXAMINATION    VASCULAR:  The right dorsalis pedis pulse 2/4 and the right posterior tibial pulse 1/4.  The left dorsalis pedis pulse 2/4 and posterior tibial pulse on the left is 1/4.  Capillary refill is intact.  Pedal hair growth decreased.     NEUROLOGY:  Protective sensation is intact with [place] Scarlett monofilament. Proprioception is intact. Intact sensation to light touch.  Vibratory sensation is diminished to the 1st metatarsal phalangeal joint.     DERMATOLOGY:  Skin is supple, moist, intact.  There is no signs of " callusing, ulcerations, other lesions identified to the dorsal or plantar aspect of the right or left foot.  The right hallux and left hallux nails are thickened, discolored dystrophic.  There is subungual debris.  Nail plates have area of dark discoloration.  The remaining nails 2-5 on the right foot and the left foot are elongated but of normal color, thickness, and texture.   There is no signs of ingrowing into the medial or lateral borders.  There is no evidence of wounds or skin breakdown.  No edema or erythema.  No obvious lacerations or fissuring.  Interdigital spaces are clean, dry, intact.  No rashes or scars appreciated.    ORTHOPEDIC: Manual Muscle Testing is 5/5 in all planes on the left and right, without pains, with and without resistance. Gait pattern is non-antalgic.    Assessment:     1. Type 2 diabetes mellitus with complication, with long-term current use of insulin    2. Onychomycosis due to dermatophyte        Plan:     Type 2 diabetes mellitus with complication, with long-term current use of insulin    Onychomycosis due to dermatophyte      Thorough discussion is had with the patient this afternoon, concerning the diagnosis, its etiology, and the treatment algorithm at present.  Greater than 50% of this visit spent on counseling and coordination of care. Greater than 15 minutes of a 20 minute appointment spent on education about the diabetic foot, neuropathy, and prevention of limb loss.  Shoe inspection. Diabetic Foot Education. Patient reminded of the importance of good nutrition and blood sugar control to help prevent podiatric complications of diabetes. Patient instructed on proper foot hygeine. We discussed wearing proper and supportive shoe gear, daily foot inspections, never walking barefooted or sock footed, never putting sharp instruments to feet which can cause major complications associated with infection, ulcers, lacerations.      Dystrophic nail plates, as outlined above (R#1  ;  L#1 ), are sharply debrided with double action nail nipper, and/or with the assistance of a mechanical rotary blayne, with removal of all offending nail and nail border(s), for reduction of pains. Nails are reduced in terms of length, width and girth with removal of subungual debris to facilitate pain free weight bearing and ambulation. The elongated nails as outlined in the objective portion of this note, were trimmed to appropriate length, with a double action nail nipper, for alleviation/reduction of pains as well. Follow up in approx. 3-4 months.        Future Appointments   Date Time Provider Department Center   10/13/2022 10:20 AM SHANE Ochoa Jr., MD [facility] IM [facility]   11/7/2022  8:00 AM Al Daugherty PA-C [facility] DIABETE [facility]   5/4/2023  8:40 AM Naveen Gr MD [facility] CARDIO [facility]   6/12/2023  1:15 PM Bandar Garcia MD [facility] UROLOGY [facility]

## 2022-10-13 ENCOUNTER — OFFICE VISIT (OUTPATIENT)
Dept: INTERNAL MEDICINE | Facility: CLINIC | Age: 72
End: 2022-10-13
Payer: MEDICARE

## 2022-10-13 VITALS
BODY MASS INDEX: 26.6 KG/M2 | DIASTOLIC BLOOD PRESSURE: 62 MMHG | HEART RATE: 68 BPM | SYSTOLIC BLOOD PRESSURE: 126 MMHG | OXYGEN SATURATION: 97 % | WEIGHT: 190.69 LBS

## 2022-10-13 DIAGNOSIS — I77.1 TORTUOUS AORTA: ICD-10-CM

## 2022-10-13 DIAGNOSIS — E11.69 HYPERLIPIDEMIA ASSOCIATED WITH TYPE 2 DIABETES MELLITUS: ICD-10-CM

## 2022-10-13 DIAGNOSIS — I25.10 CORONARY ARTERY DISEASE INVOLVING NATIVE CORONARY ARTERY OF NATIVE HEART WITHOUT ANGINA PECTORIS: ICD-10-CM

## 2022-10-13 DIAGNOSIS — E11.59 HYPERTENSION ASSOCIATED WITH DIABETES: ICD-10-CM

## 2022-10-13 DIAGNOSIS — G47.33 OBSTRUCTIVE SLEEP APNEA: ICD-10-CM

## 2022-10-13 DIAGNOSIS — E78.5 HYPERLIPIDEMIA ASSOCIATED WITH TYPE 2 DIABETES MELLITUS: ICD-10-CM

## 2022-10-13 DIAGNOSIS — I15.2 HYPERTENSION ASSOCIATED WITH DIABETES: ICD-10-CM

## 2022-10-13 DIAGNOSIS — E11.8 TYPE 2 DIABETES MELLITUS WITH COMPLICATION, WITH LONG-TERM CURRENT USE OF INSULIN: Primary | ICD-10-CM

## 2022-10-13 DIAGNOSIS — Z79.4 TYPE 2 DIABETES MELLITUS WITH COMPLICATION, WITH LONG-TERM CURRENT USE OF INSULIN: Primary | ICD-10-CM

## 2022-10-13 PROCEDURE — 99999 PR PBB SHADOW E&M-EST. PATIENT-LVL V: ICD-10-PCS | Mod: PBBFAC,,, | Performed by: PEDIATRICS

## 2022-10-13 PROCEDURE — 99214 OFFICE O/P EST MOD 30 MIN: CPT | Mod: S$PBB,,, | Performed by: PEDIATRICS

## 2022-10-13 PROCEDURE — 99215 OFFICE O/P EST HI 40 MIN: CPT | Mod: PBBFAC,25 | Performed by: PEDIATRICS

## 2022-10-13 PROCEDURE — 99214 PR OFFICE/OUTPT VISIT, EST, LEVL IV, 30-39 MIN: ICD-10-PCS | Mod: S$PBB,,, | Performed by: PEDIATRICS

## 2022-10-13 PROCEDURE — G0008 ADMIN INFLUENZA VIRUS VAC: HCPCS | Mod: PBBFAC

## 2022-10-13 PROCEDURE — 99999 PR PBB SHADOW E&M-EST. PATIENT-LVL V: CPT | Mod: PBBFAC,,, | Performed by: PEDIATRICS

## 2022-10-13 RX ORDER — TIRZEPATIDE 7.5 MG/.5ML
7.5 INJECTION, SOLUTION SUBCUTANEOUS
Qty: 4 PEN | Refills: 11 | Status: SHIPPED | OUTPATIENT
Start: 2022-10-13 | End: 2022-11-07

## 2022-10-13 NOTE — PROGRESS NOTES
Subjective:       Patient ID: Timothy Ortega is a 71 y.o. male.    Chief Complaint: Annual Exam    Timothy Ortega is a 71 y.o. male who presents to clinic for a follow up    1) HTN: B/P good, no HTNive symptoms. in [facility] medicine  2) LIPIDS: following caloric intake but frequently splurges in the evening, tolerating and compliant with med(s). Crestor dose decreased per cards. Lost a few pounds.   3) DM: no hyper/hypoglycemic symptoms. Self monitoring normal-slightly elevated. Compliant with meds (metformin 1000 mg bid, Ozempic, jardiance 25, lantus 50, humalog 14 with meals) Working with DM program. See #2 for dietary indiscretions. Currently cant get Ozempic because the pharmacy cant get it  4) CAD: no CP, SOB, CHOU. Followed by cards.    LABS REVIEWED AND DISCUSSED WITH PATIENT: A1C, CMP, lipids, microalbumin/creatinine      Review of Systems   Constitutional:  Negative for activity change, appetite change, chills, diaphoresis, fatigue, fever and unexpected weight change.   HENT:  Negative for nasal congestion, ear pain, mouth sores, nosebleeds, postnasal drip, rhinorrhea, sneezing and sore throat.    Eyes:  Negative for photophobia, pain, discharge, redness and visual disturbance.   Respiratory:  Negative for cough, chest tightness, shortness of breath, wheezing and stridor.    Cardiovascular:  Negative for chest pain, palpitations and leg swelling.   Gastrointestinal:  Negative for abdominal distention, blood in stool, constipation, diarrhea, nausea and vomiting.   Genitourinary:  Negative for decreased urine volume, difficulty urinating, dysuria, flank pain, frequency, genital sores, hematuria and urgency.   Musculoskeletal:  Negative for arthralgias, back pain, joint swelling, neck pain and neck stiffness.   Integumentary:  Negative for color change, pallor, rash and wound.   Neurological:  Negative for dizziness, syncope, speech difficulty, weakness, light-headedness and headaches.   Hematological:  Negative  for adenopathy. Does not bruise/bleed easily.   Psychiatric/Behavioral:  Negative for confusion, decreased concentration, dysphoric mood, hallucinations, sleep disturbance and suicidal ideas. The patient is not nervous/anxious.    All other systems reviewed and are negative.      Objective:      Physical Exam  Vitals and nursing note reviewed.   Constitutional:       General: He is not in acute distress.     Appearance: He is well-developed.   Neck:      Thyroid: No thyromegaly.      Vascular: No JVD.   Cardiovascular:      Rate and Rhythm: Normal rate and regular rhythm.      Heart sounds: Normal heart sounds. No murmur heard.  Pulmonary:      Effort: Pulmonary effort is normal. No respiratory distress.      Breath sounds: Normal breath sounds. No wheezing or rales.   Abdominal:      General: There is no distension.      Palpations: Abdomen is soft. There is no mass.      Tenderness: There is no abdominal tenderness. There is no guarding.   Musculoskeletal:      Right lower leg: No edema.      Left lower leg: No edema.   Lymphadenopathy:      Cervical: No cervical adenopathy.   Skin:     Capillary Refill: Capillary refill takes less than 2 seconds.      Findings: No rash.   Neurological:      General: No focal deficit present.      Mental Status: He is alert and oriented to person, place, and time.      Cranial Nerves: No cranial nerve deficit.      Coordination: Coordination normal.   Psychiatric:         Mood and Affect: Mood normal.         Behavior: Behavior normal.         Thought Content: Thought content normal.         Judgment: Judgment normal.       Assessment:       Problem List Items Addressed This Visit       CAD (coronary artery disease)    Hyperlipidemia associated with type 2 diabetes mellitus    Hypertension associated with diabetes    Obstructive sleep apnea    Tortuous aorta    Type 2 diabetes mellitus with complication, with long-term current use of insulin - Primary    Relevant Orders     Comprehensive Metabolic Panel    Lipid Panel    Hemoglobin A1C       Plan:     Type 2 diabetes mellitus with complication, with long-term current use of insulin  -     Comprehensive Metabolic Panel; Future; Expected date: 10/13/2022  -     Lipid Panel; Future; Expected date: 10/13/2022  -     Hemoglobin A1C; Future; Expected date: 10/13/2022    Tortuous aorta    Obstructive sleep apnea    Hypertension associated with diabetes    Hyperlipidemia associated with type 2 diabetes mellitus    Coronary artery disease involving native coronary artery of native heart without angina pectoris    Other orders  -     Influenza - Quadrivalent (Adjuvanted)  -     tirzepatide (MOUNJARO) 7.5 mg/0.5 mL PnIj; Inject 7.5 mg into the skin every 7 days.  Dispense: 4 pen; Refill: 11      Lipids not quiet at goal. A1C is slightly higher due to being off Ozempic. He did not respond to full dose Trulicity, try Mounjaro 7.5mg and report BS to Mrs. Daugherty in 2-3 weeks. Follow up in 6mo with other meds remaining unchanged. May need pravastatin increased if Lipids do not improvedwith BS control.     Scribe Attestation:   I, Collin Ritchie, am scribing for, and in the presence of, Dr. Shlomo Ochoa Jr. I performed the above scribed service and the documentation accurately describes the services I performed. I attest to the accuracy of the note.    I, Dr. Shlomo Ochoa Jr, reviewed documentation as scribed above. I personally performed the services described in this documentation.  I agree that the record reflects my personal performance and is accurate and complete. Shlomo Ochoa Jr., MD.  10/13/2022

## 2022-10-13 NOTE — Clinical Note
Ladies, I am starting midrange mounjaro as ozempic high dose not available, I asked him to follow up on with y'all.

## 2022-10-30 ENCOUNTER — PATIENT MESSAGE (OUTPATIENT)
Dept: PULMONOLOGY | Facility: CLINIC | Age: 72
End: 2022-10-30
Payer: MEDICARE

## 2022-10-30 DIAGNOSIS — G47.33 OBSTRUCTIVE SLEEP APNEA: Primary | ICD-10-CM

## 2022-11-07 ENCOUNTER — OFFICE VISIT (OUTPATIENT)
Dept: PODIATRY | Facility: CLINIC | Age: 72
End: 2022-11-07
Payer: MEDICARE

## 2022-11-07 ENCOUNTER — PATIENT MESSAGE (OUTPATIENT)
Dept: DIABETES | Facility: CLINIC | Age: 72
End: 2022-11-07

## 2022-11-07 ENCOUNTER — OFFICE VISIT (OUTPATIENT)
Dept: DIABETES | Facility: CLINIC | Age: 72
End: 2022-11-07
Payer: MEDICARE

## 2022-11-07 VITALS — BODY MASS INDEX: 26.7 KG/M2 | WEIGHT: 190.69 LBS | HEIGHT: 71 IN

## 2022-11-07 DIAGNOSIS — E78.5 HYPERLIPIDEMIA ASSOCIATED WITH TYPE 2 DIABETES MELLITUS: ICD-10-CM

## 2022-11-07 DIAGNOSIS — I25.10 CORONARY ARTERY DISEASE INVOLVING NATIVE CORONARY ARTERY OF NATIVE HEART WITHOUT ANGINA PECTORIS: ICD-10-CM

## 2022-11-07 DIAGNOSIS — Q82.8 POROKERATOSIS: ICD-10-CM

## 2022-11-07 DIAGNOSIS — E11.49 TYPE 2 DIABETES MELLITUS WITH OTHER NEUROLOGIC COMPLICATION, WITH LONG-TERM CURRENT USE OF INSULIN: Primary | ICD-10-CM

## 2022-11-07 DIAGNOSIS — E66.3 OVERWEIGHT (BMI 25.0-29.9): ICD-10-CM

## 2022-11-07 DIAGNOSIS — I15.2 HYPERTENSION ASSOCIATED WITH DIABETES: ICD-10-CM

## 2022-11-07 DIAGNOSIS — E11.59 HYPERTENSION ASSOCIATED WITH DIABETES: ICD-10-CM

## 2022-11-07 DIAGNOSIS — Z79.4 TYPE 2 DIABETES MELLITUS WITH COMPLICATION, WITH LONG-TERM CURRENT USE OF INSULIN: Primary | ICD-10-CM

## 2022-11-07 DIAGNOSIS — G47.33 OBSTRUCTIVE SLEEP APNEA: ICD-10-CM

## 2022-11-07 DIAGNOSIS — E11.8 TYPE 2 DIABETES MELLITUS WITH COMPLICATION, WITH LONG-TERM CURRENT USE OF INSULIN: Primary | ICD-10-CM

## 2022-11-07 DIAGNOSIS — E11.69 HYPERLIPIDEMIA ASSOCIATED WITH TYPE 2 DIABETES MELLITUS: ICD-10-CM

## 2022-11-07 DIAGNOSIS — Z79.4 TYPE 2 DIABETES MELLITUS WITH OTHER NEUROLOGIC COMPLICATION, WITH LONG-TERM CURRENT USE OF INSULIN: Primary | ICD-10-CM

## 2022-11-07 DIAGNOSIS — M79.2 NERVE PAIN: ICD-10-CM

## 2022-11-07 PROCEDURE — 99441 PR PHYSICIAN TELEPHONE EVALUATION 5-10 MIN: CPT | Mod: 95,,, | Performed by: PHYSICIAN ASSISTANT

## 2022-11-07 PROCEDURE — 99999 PR PBB SHADOW E&M-EST. PATIENT-LVL III: ICD-10-PCS | Mod: PBBFAC,,, | Performed by: PODIATRIST

## 2022-11-07 PROCEDURE — 11055 PARING/CUTG B9 HYPRKER LES 1: CPT | Mod: Q9,S$PBB,, | Performed by: PODIATRIST

## 2022-11-07 PROCEDURE — 99441 PR PHYSICIAN TELEPHONE EVALUATION 5-10 MIN: ICD-10-PCS | Mod: 95,,, | Performed by: PHYSICIAN ASSISTANT

## 2022-11-07 PROCEDURE — 99213 OFFICE O/P EST LOW 20 MIN: CPT | Mod: PBBFAC | Performed by: PODIATRIST

## 2022-11-07 PROCEDURE — 11055 PARING/CUTG B9 HYPRKER LES 1: CPT | Mod: Q9,PBBFAC | Performed by: PODIATRIST

## 2022-11-07 PROCEDURE — 99213 OFFICE O/P EST LOW 20 MIN: CPT | Mod: 25,S$PBB,, | Performed by: PODIATRIST

## 2022-11-07 PROCEDURE — 99213 PR OFFICE/OUTPT VISIT, EST, LEVL III, 20-29 MIN: ICD-10-PCS | Mod: 25,S$PBB,, | Performed by: PODIATRIST

## 2022-11-07 PROCEDURE — 11055 PR TRIM HYPERKERATOTIC SKIN LESION, ONE: ICD-10-PCS | Mod: Q9,S$PBB,, | Performed by: PODIATRIST

## 2022-11-07 PROCEDURE — 99999 PR PBB SHADOW E&M-EST. PATIENT-LVL III: CPT | Mod: PBBFAC,,, | Performed by: PODIATRIST

## 2022-11-07 RX ORDER — TIRZEPATIDE 10 MG/.5ML
10 INJECTION, SOLUTION SUBCUTANEOUS
Qty: 12 PEN | Refills: 3 | Status: SHIPPED | OUTPATIENT
Start: 2022-11-07 | End: 2023-02-08

## 2022-11-07 NOTE — PATIENT INSTRUCTIONS
CURRENT DM MEDICATIONS:   Metformin 1000 mg BID   Lantus 36 units at night   Humalog 10 units w/ ss TID wm and 3 units with dessert  Jardiance 25 mg   Mounjaro 10 mg weekly     WITH MEALS:   If blood sugar is below 70 eat first then check your blood sugar 2 hours later and make correction.  If blood pre-meal sugar is  70 -150 take 10 units of Novolog;  If blood pre-meal sugar is 151-200 take +2 units of Novolog;  If blood pre-meal sugar is 201-250 take +4 units of Novolog;  If blood pre-meal sugar is 251-300 take +6 units of Novolog;  If blood pre-meal sugar is 301-350 take +8 units of Novolog;  If blood pre-meal sugar is 351-400+ take +10 units of Novolog;  Also increase water intake and call for appointment.

## 2022-11-07 NOTE — Clinical Note
Staff - 6 week virtual   Hi Dig Team,  Saw our mutual pt this am and escalated Mounjaro to 10 mg with slight adjustment of insulin to prevent hypo. He sounds like he is doing great!

## 2022-11-07 NOTE — PROGRESS NOTES
Subjective:       Patient ID: Timothy Ortega is a 71 y.o. male.    Chief Complaint: Foot Pain (C/o bilateral foot pain, pt explains he's experiencing burning sensations in his feet, rates pain 3/10, diabetic, wearing socks and shoes, last seen PCP Dr. Ochoa on 10/13/2022.)    HPI: Timothy Ortega presents to the office today, with areas of concern to the plantar aspect of the right left foot.  States that it feels as if he is walking on rocks.  Denies any puncture wounds or injuries.  States this pain is been ongoing for some time without significant improvement.  No specific memory of walking barefoot or causing injury.  States visualization of new developed lesions to the foot.  No signs of acute infection. States 3/10 pain.  Does have history of diabetes mellitus with new onset burning and stinging sensation to his feet.  Does have history of back pain as well which is chronic.    Review of patient's allergies indicates:  No Known Allergies    Past Medical History:   Diagnosis Date    Coronary artery disease 2010    [facility] - no stents    DM (diabetes mellitus) 2002     lunch 10/28/2016    DM (diabetes mellitus)      am 04/12/2021    DM (diabetes mellitus)     BS 96 am 04/13/2022 Insulin for years    Groin pain, left 2/11/2021    Hyperlipemia     Hypertension     Kidney stones     Nephrolithiasis 2/11/2021    Neuropathy     Type 2 diabetes mellitus 10-12 years     am 10/31/2014       Family History   Problem Relation Age of Onset    Diabetes Mother     Glaucoma Mother     Heart disease Mother 75        CAB    Diabetes Father     Heart attack Father 58        Fatal MI    Diabetes Brother     Diabetes Sister     Diabetes Sister     Cataracts Paternal Uncle     Cataracts Maternal Grandmother        Social History     Socioeconomic History    Marital status:    Tobacco Use    Smoking status: Former     Packs/day: 1.00     Years: 20.00     Pack years: 20.00     Types: Cigarettes     Quit date:  2000     Years since quittin.8     Passive exposure: Never    Smokeless tobacco: Never   Substance and Sexual Activity    Alcohol use: No     Alcohol/week: 0.0 standard drinks    Drug use: No    Sexual activity: Yes     Partners: Female   Social History Narrative    . Lives with spouse. Has 2 children. Retired. No pets or smokers in household.     Social Determinants of Health     Financial Resource Strain: Low Risk     Difficulty of Paying Living Expenses: Not hard at all   Food Insecurity: No Food Insecurity    Worried About Running Out of Food in the Last Year: Never true    Ran Out of Food in the Last Year: Never true   Transportation Needs: No Transportation Needs    Lack of Transportation (Medical): No    Lack of Transportation (Non-Medical): No   Physical Activity: Insufficiently Active    Days of Exercise per Week: 3 days    Minutes of Exercise per Session: 30 min   Stress: No Stress Concern Present    Feeling of Stress : Only a little   Social Connections: Socially Integrated    Frequency of Communication with Friends and Family: More than three times a week    Frequency of Social Gatherings with Friends and Family: More than three times a week    Attends Congregation Services: 1 to 4 times per year    Active Member of Clubs or Organizations: Yes    Attends Club or Organization Meetings: 1 to 4 times per year    Marital Status:    Housing Stability: Unknown    Unable to Pay for Housing in the Last Year: No    Unstable Housing in the Last Year: No       Past Surgical History:   Procedure Laterality Date    CARPAL TUNNEL RELEASE Right 2020    COLONOSCOPY N/A 3/31/2017    Procedure: COLONOSCOPY;  Surgeon: Cornelius Ibarra MD;  Location: [facility];  Service: Endoscopy;  Laterality: N/A;    COLONOSCOPY N/A 2022    Procedure: COLONOSCOPY;  Surgeon: Geneva Serna MD;  Location: [facility] ENDO;  Service: Endoscopy;  Laterality: N/A;    DENTAL SURGERY      Implant    KNEE ARTHROSCOPY  "Left     ROBOT-ASSISTED LAPAROSCOPIC REPAIR OF INGUINAL HERNIA USING DA JOSE ANGEL XI Left 2/26/2021    Procedure: XI ROBOTIC REPAIR, HERNIA, INGUINAL;  Surgeon: Tavon Cruz MD;  Location: [facility];  Service: General;  Laterality: Left;       Review of Systems       Objective:   Ht 5' 11" (1.803 m)   Wt 86.5 kg (190 lb 11.2 oz)   BMI 26.60 kg/m²     XR SHOULDER COMPLETE 2 OR MORE VIEWS RIGHT  Narrative: EXAMINATION:  XR SHOULDER COMPLETE 2 OR MORE VIEWS RIGHT    CLINICAL HISTORY:  Unspecified injury of right shoulder and upper arm, initial encounter    TECHNIQUE:  Two or three views of the right shoulder were preformed.    COMPARISON:  None    FINDINGS:  No acute fracture or dislocation.  Minimal AC joint arthropathy noted.  Mild degenerative change at the glenohumeral joint.  Impression: 1.  As above    Electronically signed by: Lang Morales DO  Date:    05/06/2022  Time:    15:51       Physical Exam   LOWER EXTREMITY PHYSICAL EXAMINATION    Vascular:  The right dorsalis pedis pulse 2/4 and the right posterior tibial pulse 2/4.  The left dorsalis pedis pulse 2/4 and posterior tibial pulse on the left is 2/4.  Capillary refill is intact.  Pedal hair growth intact    Neurological:  Protective sensation is intact with [place] monofilament. Proprioception is intact. Intact sensation to light touch.  Vibratory sensation is diminished to the 1st metatarsal phalangeal joint.     Musculoskeletal: Manual Muscle Testing is 5/5 with dorsiflexion, plantar flexion, abduction, and adduction.   There is normal range of motion in the forefoot, hindfoot, and Ankle joint.      Dermatological:  Skin is supple and moist.  There is 1 lesion present to the plantar aspect of the foot which have a resemblance to a plantar porokeratosis.  Centralize core is noted.  There is no acute signs of infection.  No signs of underlying ulceration.  There is no absence of skin line.  No obvious capillary hemorrhaging.      Imaging:     X-Ray " Foot Complete Right    Narrative  DATE OF EXAM: Jun 7 2012    BOC   0106  -  FOOT 3 VIEWS MINIMUM RIGHT:   \  [id number]    CLINICAL HISTORY:   \729.5 0 PAIN IN LIMB    PROCEDURE COMMENT:   \    ICD 9 CODE(S):   (\)    CPT 4 CODE(S)/MODIFIER(S):   (\)    Findings: Osseous structures do not demonstrate any evidence of acute  fracture.  There are calcaneal enthesophytes noted.    Impression  Calcaneal enthesophytes.  ______________________________________    Electronically signed by: APOLONIA MURRAY MD  Date:     06/07/12  Time:    11:20        : NAVIN  Transcribe Date/Time: Jun 7 2012 11:20A  Dictated by : APOLONIA MURRAY MD  Report reviewed by:  Read On:  \  Images were reviewed, findings were verified and document was  electronically  SIGNED BY: APOLONIA MURRAY MD On: Jun 7 2012 11:20A          Assessment:     1. Type 2 diabetes mellitus with other neurologic complication, with long-term current use of insulin    2. Nerve pain    3. Porokeratosis        Plan:     Type 2 diabetes mellitus with other neurologic complication, with long-term current use of insulin    Nerve pain    Porokeratosis    Foot counseling and education is provided at this visit. Patient is advised to wear socks and shoes at all times.  Do not walk barefoot, or with just socks, even when indoors.  Be sure to check and inspect the inside of the shoe before putting them on her feet.  Protect your feet at all times.  Walking shoes and/or athletic shoes are the best types of shoe gear. Do not wear vinyl or plastic type shoe gear, as they do not stretch and/or breathe.  Protect your feet from hot and/or cold. Elevate the extremities when sitting.  Do not wear excessively tight socks and/or shoe gear. Wiggle your toes for a few minutes throughout the day. Move your ankles up and down, in and out, to help blood flow in your lower extremity.    Porokeratotic skin formation, as outlined within the examination portion of this note, is  surgically debrided with sharp #10/#15 blade, to alleviate discomfort with weight bearing and ambulation, and to lessen the possibility of skin complications, e.g., ulceration due to pressure. No ulceration(s) is are noted with/post debridement. The lesion is completed healed and resolved. No evidence of infection.     Did discuss utilization of topical nerve pain medication which patient currently does have.  Do recommend patient apply this medication directly to his feet.  We also discussed possibility of initiating gabapentin therapy but patient would like to initiate utilization of topical treatment before any oral additional medication prescribed.    Future Appointments   Date Time Provider Department Center   12/19/2022  1:00 PM Al Daugherty PA-C [facility] DIABETE [facility]   4/6/2023  7:40 AM LABORATORY, [facility] LAB [facility]   4/13/2023 11:00 AM Samantha Augustine NP [facility] IM [facility]   5/4/2023  8:40 AM Naveen Gr MD [facility] CARDIO [facility]   6/12/2023  1:15 PM Bandar Garcia MD [facility] UROLOGY [facility]

## 2022-11-07 NOTE — PROGRESS NOTES
PCP: EM Ochoa Jr, MD    Subjective:     Chief Complaint: Diabetes - Established Patient    Established Patient - Audio Only Telehealth Visit     The patient location is: Home  The chief complaint leading to consultation is: Diabetes follow up  Visit type: Virtual visit with audio only (telephone)  Total Time Spent with Patient: 9 minutes     The reason for the audio only service rather than synchronous audio and video virtual visit was related to technical difficulties or patient preference/necessity.     Each patient to whom I provide medical services by telemedicine is:  (1) informed of the relationship between the physician and patient and the respective role of any other health care provider with respect to management of the patient; and (2) notified that they may decline to receive medical services by telemedicine and may withdraw from such care at any time. Patient verbally consented to receive this service via voice-only telephone call.    This service was not originating from a related E/M service provided within the previous 7 days nor will  to an E/M service or procedure within the next 24 hours or my soonest available appointment.  Prevailing standard of care was able to be met in this audio-only visit.       HISTORY OF PRESENT ILLNESS: 71 y.o.  male presenting for diabetes management visit.   The patient's last visit with me was on 7/6/2022.  Patient has had Type II diabetes since 1990s.  Pertinent to decision making is the following comorbidities: HTN, HLD, CAD and Overweight by BMI  Patient has the following Diabetes complications: without complications  He has attended diabetes education in the past.     Patient's most recent A1c of 6.9% was completed 1 months ago.   Patient states since His last A1c His blood glucose levels have been within range throughout the day .   Patient monitors blood glucose 3 times per day with [facility] meter : Fasting, Before Lunch and  Before Dinner. CGM declined.   Patient blood glucose monitoring device data will be reviewed under the Episodes tab today - see below.   Patient endorses the following diabetes related symptoms: None.   Patient is due today for the following diabetes-related health maintenance standards: None - up to date.   He denies any recent hospital admissions or emergency room visits.   He denies having hypoglycemia.  Patient's concerns today include glycemic control. Of note, patient was changed from Ozempic to Mounjaro due to backorder.     Timothy's recent readings (past 60 days):  Time Taken Glucose (mg/dL)   11/7/2022  7:46    11/6/2022  6:33    11/6/2022 12:08    11/6/2022  8:00    11/5/2022  6:57    11/5/2022  1:46 PM 98   11/5/2022  8:21    11/4/2022  7:16    11/4/2022 12:43 PM 91   11/4/2022  7:37    11/3/2022  5:57    11/3/2022  1:12 PM 75   11/3/2022  8:23    11/2/2022  6:08    11/2/2022 12:11    11/2/2022  8:54    11/1/2022  5:56    11/1/2022 12:26    11/1/2022  8:52    10/31/2022  6:15    10/31/2022 12:09    10/31/2022  9:02    10/30/2022  7:33    10/30/2022 11:56    10/30/2022  8:09    10/29/2022  6:33    10/29/2022  9:45    10/28/2022  5:12    10/28/2022  1:28 PM 77   10/28/2022  8:27 AM 88   10/27/2022  6:20    10/27/2022  8:43    10/26/2022  7:01    10/26/2022  3:02 PM 87   10/26/2022  8:07 AM 89   10/25/2022  6:54    10/25/2022 12:08    10/25/2022  8:57    10/24/2022  6:00    10/24/2022 12:28    10/24/2022  8:56    10/23/2022  7:11    10/23/2022 12:48    10/23/2022  8:02    10/22/2022  6:35    10/22/2022  9:06    10/21/2022  5:54    10/21/2022 12:24    10/21/2022  8:07    10/20/2022  5:56    10/20/2022  1:07    10/20/2022  7:01    10/19/2022   1:04    10/19/2022  7:57    10/18/2022  6:46    10/18/2022  1:59    10/18/2022  7:36    10/17/2022  6:16    10/17/2022 12:57    10/17/2022  9:00    10/16/2022  5:40    10/16/2022  1:01    10/16/2022  8:31    10/15/2022 12:22    10/15/2022  7:42    10/14/2022  6:02    10/14/2022 12:20    10/14/2022  8:40    10/13/2022  6:22    10/13/2022 12:40    10/13/2022  8:34    10/12/2022  6:00    10/12/2022  8:58    10/11/2022  6:00    10/11/2022 12:30    10/11/2022  8:33    10/10/2022  6:02    10/10/2022  1:06    10/10/2022  9:41    10/9/2022  7:11    10/9/2022  1:02    10/9/2022  8:10    10/8/2022  8:37    10/7/2022  9:20        Patient medication regimen is as below.     CURRENT DM MEDICATIONS:   Metformin 1000 mg BID   Lantus 47 units qhs   Humalog 12 units w/ ss TID wm and 3 units with dessert  Jardiance 25 mg   Mounjaro 7.5 mg weekly     WITH MEALS:   If blood sugar is below 70 eat first then check your blood sugar 2 hours later and make correction.  If blood pre-meal sugar is  70 -150 take 12 units of Novolog;  If blood pre-meal sugar is 151-200 take +2 units of Novolog;  If blood pre-meal sugar is 201-250 take +4 units of Novolog;  If blood pre-meal sugar is 251-300 take +6 units of Novolog;  If blood pre-meal sugar is 301-350 take +8 units of Novolog;  If blood pre-meal sugar is 351-400+ take +10 units of Novolog;  Also increase water intake and call for appointment.    Labs Reviewed.       No results found for: CPEPTIDE  No results found for: GLUTAMICACID       //   , There is no height or weight on file to calculate BMI.  His blood sugar in clinic today is:    Lab Results   Component Value Date    POCGLU 98 04/18/2022       Review of Systems   Constitutional:  Negative for activity change, appetite change, chills and fever.    HENT:  Negative for dental problem, mouth sores, nosebleeds, sore throat and trouble swallowing.    Eyes:  Negative for pain and discharge.   Respiratory:  Negative for shortness of breath, wheezing and stridor.    Cardiovascular:  Negative for chest pain, palpitations and leg swelling.   Gastrointestinal:  Negative for abdominal pain, diarrhea, nausea and vomiting.   Endocrine: Negative for polydipsia, polyphagia and polyuria.   Genitourinary:  Negative for dysuria, frequency and urgency.   Musculoskeletal:  Negative for joint swelling and myalgias.   Skin:  Negative for rash and wound.   Neurological:  Negative for dizziness, syncope, weakness and headaches.   Psychiatric/Behavioral:  Negative for behavioral problems and dysphoric mood.        Diabetes Management Status  Statin: Taking  ACE/ARB: Taking    Screening or Prevention Patient's value Goal Complete/Controlled?   HgA1C Testing and Control   Lab Results   Component Value Date    HGBA1C 6.9 (H) 10/06/2022      Annually/Less than 8% Yes   Lipid profile : 10/06/2022 Annually Yes   LDL control Lab Results   Component Value Date    LDLCALC 103.4 10/06/2022    Annually/Less than 100 mg/dl  Yes   Nephropathy screening Lab Results   Component Value Date    MICALBCREAT 11.3 10/06/2022     No results found for: PROTEINUA Annually Yes   Blood pressure BP Readings from Last 1 Encounters:   10/13/22 126/62    Less than 140/90 Yes   Dilated retinal exam : 04/13/2022 Annually Yes    Foot exam   : 01/13/2022 Annually Yes     ACTIVITY LEVEL: Moderately Active. Discussed activities, benefits, methods, and precautions.  MEAL PLANNING: Patient reports number of meals per day to be 3 and number of snacks per day to be 2.   Patient is encouraged to carb count and consume no more than 30 - 45 grams of carbohydrates in each meal and 15 grams of carbohydrates in each snack.     Social History     Socioeconomic History    Marital status:    Tobacco Use    Smoking status:  Former     Packs/day: 1.00     Years: 20.00     Pack years: 20.00     Types: Cigarettes     Quit date: 2000     Years since quittin.8     Passive exposure: Never    Smokeless tobacco: Never   Substance and Sexual Activity    Alcohol use: No     Alcohol/week: 0.0 standard drinks    Drug use: No    Sexual activity: Yes     Partners: Female   Social History Narrative    . Lives with spouse. Has 2 children. Retired. No pets or smokers in household.     Social Determinants of Health     Financial Resource Strain: Low Risk     Difficulty of Paying Living Expenses: Not hard at all   Food Insecurity: No Food Insecurity    Worried About Running Out of Food in the Last Year: Never true    Ran Out of Food in the Last Year: Never true   Transportation Needs: No Transportation Needs    Lack of Transportation (Medical): No    Lack of Transportation (Non-Medical): No   Physical Activity: Insufficiently Active    Days of Exercise per Week: 3 days    Minutes of Exercise per Session: 30 min   Stress: No Stress Concern Present    Feeling of Stress : Only a little   Social Connections: Socially Integrated    Frequency of Communication with Friends and Family: More than three times a week    Frequency of Social Gatherings with Friends and Family: More than three times a week    Attends Denominational Services: 1 to 4 times per year    Active Member of Clubs or Organizations: Yes    Attends Club or Organization Meetings: 1 to 4 times per year    Marital Status:    Housing Stability: Unknown    Unable to Pay for Housing in the Last Year: No    Unstable Housing in the Last Year: No     Past Medical History:   Diagnosis Date    Coronary artery disease     [facility] - no stents    DM (diabetes mellitus)      lunch 10/28/2016    DM (diabetes mellitus)      am 2021    DM (diabetes mellitus)     BS 96 am 2022 Insulin for years    Groin pain, left 2021    Hyperlipemia     Hypertension     Kidney  stones     Nephrolithiasis 2/11/2021    Neuropathy     Type 2 diabetes mellitus 10-12 years     am 10/31/2014       Objective:        Physical Exam  Neurological:      Mental Status: He is alert and oriented to person, place, and time. Mental status is at baseline.   Psychiatric:         Mood and Affect: Mood normal.         Behavior: Behavior normal.         Thought Content: Thought content normal.         Judgment: Judgment normal.         Assessment / Plan:     Type 2 diabetes mellitus with complication, with long-term current use of insulin  -     tirzepatide (MOUNJARO) 10 mg/0.5 mL PnIj; Inject 10 mg into the skin every 7 days.  Dispense: 12 pen; Refill: 3    Hypertension associated with diabetes    Hyperlipidemia associated with type 2 diabetes mellitus    Coronary artery disease involving native coronary artery of native heart without angina pectoris    Obstructive sleep apnea    Overweight (BMI 25.0-29.9)    Additional Plan Details:    - POCT Glucose  - Encouraged continuation of lifestyle changes including regular exercise and limiting carbohydrates to 30-45 grams per meal threes times daily and 15 grams per snack with a limit of two daily.   - Encouraged continued monitoring of blood glucose with maintenance of 3 times daily at Fasting, Before Lunch and Before Dinner. CGM declined.   - Current DM Medication Regimen: change Mounjaro 10 mg weekly. Change Lantus 36 units qhs. Change Novolog to 10 units with ss TID wm - see below and 3 units with ice cream or heavy carb meals. Continue Jardiance 25 mg and Metformin 1000 mg BID.   - Health Maintenance standards addressed today: None - up to date  - Nursing Visit: Patient is below goal range for nursing visit for age group and will not need nursing visit at this time .   - Follow up in 6 weeks with A1c prior.    WITH MEALS:   If blood sugar is below 70 eat first then check your blood sugar 2 hours later and make correction.  If blood pre-meal sugar is  70  -150 take 10 units of Novolog;  If blood pre-meal sugar is 151-200 take +2 units of Novolog;  If blood pre-meal sugar is 201-250 take +4 units of Novolog;  If blood pre-meal sugar is 251-300 take +6 units of Novolog;  If blood pre-meal sugar is 301-350 take +8 units of Novolog;  If blood pre-meal sugar is 351-400+ take +10 units of Novolog;  Also increase water intake and call for appointment.       Blakeney McKnight, PA-C [facility]

## 2023-01-05 ENCOUNTER — PATIENT MESSAGE (OUTPATIENT)
Dept: PODIATRY | Facility: CLINIC | Age: 73
End: 2023-01-05

## 2023-01-05 ENCOUNTER — OFFICE VISIT (OUTPATIENT)
Dept: PODIATRY | Facility: CLINIC | Age: 73
End: 2023-01-05
Payer: MEDICARE

## 2023-01-05 VITALS — HEIGHT: 71 IN | WEIGHT: 190.69 LBS | BODY MASS INDEX: 26.7 KG/M2

## 2023-01-05 DIAGNOSIS — E11.49 TYPE 2 DIABETES MELLITUS WITH OTHER NEUROLOGIC COMPLICATION, WITH LONG-TERM CURRENT USE OF INSULIN: ICD-10-CM

## 2023-01-05 DIAGNOSIS — Z79.4 TYPE 2 DIABETES MELLITUS WITH OTHER NEUROLOGIC COMPLICATION, WITH LONG-TERM CURRENT USE OF INSULIN: ICD-10-CM

## 2023-01-05 DIAGNOSIS — L84 CORN OR CALLUS: Primary | ICD-10-CM

## 2023-01-05 DIAGNOSIS — Q82.8 POROKERATOSIS: ICD-10-CM

## 2023-01-05 PROCEDURE — 99213 PR OFFICE/OUTPT VISIT, EST, LEVL III, 20-29 MIN: ICD-10-PCS | Mod: S$PBB,,, | Performed by: PODIATRIST

## 2023-01-05 PROCEDURE — 99999 PR PBB SHADOW E&M-EST. PATIENT-LVL IV: CPT | Mod: PBBFAC,,, | Performed by: PODIATRIST

## 2023-01-05 PROCEDURE — 99214 OFFICE O/P EST MOD 30 MIN: CPT | Mod: PBBFAC | Performed by: PODIATRIST

## 2023-01-05 PROCEDURE — 99999 PR PBB SHADOW E&M-EST. PATIENT-LVL IV: ICD-10-PCS | Mod: PBBFAC,,, | Performed by: PODIATRIST

## 2023-01-05 PROCEDURE — 99213 OFFICE O/P EST LOW 20 MIN: CPT | Mod: S$PBB,,, | Performed by: PODIATRIST

## 2023-01-05 NOTE — PROGRESS NOTES
Subjective:       Patient ID: Timothy Ortega is a 72 y.o. male.    Chief Complaint: Callouses (Coming in for callus on left foot to be removed, pt has an area on the 5th toe right foot that is bothering him and wants you to check it out, rates pain 5/10, diabetic, wearing socks and shoes, last seen PCP Dr. Ochoa on 10/13/2022.)      HPI: Patient presents to the office today with the chief complaint of pain associated to a new full corn formation on the medial aspect of the right 5th toe.  Reports this is been bothering him some some time.  States having 5/10 pain.  Also has recurrent porokeratotic lesion present to the plantar aspect the left foot.  Not currently performing any topical medications.  This patient is a Diabetic Type II, complicated with Peripheral Neuropathy. Patient does follow with Primary Care and/or Endocrinology for management of Diabetes Mellitus. This patient's PMD is EM Ochoa Jr, MD. This patient last saw his/her primary care provider on 10/13/2022.     Hemoglobin A1C   Date Value Ref Range Status   10/06/2022 6.9 (H) 4.0 - 5.6 % Final     Comment:     ADA Screening Guidelines:  5.7-6.4%  Consistent with prediabetes  >or=6.5%  Consistent with diabetes    High levels of fetal hemoglobin interfere with the HbA1C  assay. Heterozygous hemoglobin variants (HbS, HgC, etc)do  not significantly interfere with this assay.   However, presence of multiple variants may affect accuracy.     07/05/2022 6.5 (H) 4.0 - 5.6 % Final     Comment:     ADA Screening Guidelines:  5.7-6.4%  Consistent with prediabetes  >or=6.5%  Consistent with diabetes    High levels of fetal hemoglobin interfere with the HbA1C  assay. Heterozygous hemoglobin variants (HbS, HgC, etc)do  not significantly interfere with this assay.   However, presence of multiple variants may affect accuracy.     03/28/2022 7.1 (H) 4.0 - 5.6 % Final     Comment:     ADA Screening Guidelines:  5.7-6.4%  Consistent with prediabetes  >or=6.5%   Consistent with diabetes    High levels of fetal hemoglobin interfere with the HbA1C  assay. Heterozygous hemoglobin variants (HbS, HgC, etc)do  not significantly interfere with this assay.   However, presence of multiple variants may affect accuracy.     .     Review of patient's allergies indicates:  No Known Allergies    Past Medical History:   Diagnosis Date    Coronary artery disease     [facility] - no stents    DM (diabetes mellitus)      lunch 10/28/2016    DM (diabetes mellitus)      am 2021    DM (diabetes mellitus)     BS 96 am 2022 Insulin for years    Groin pain, left 2021    Hyperlipemia     Hypertension     Kidney stones     Nephrolithiasis 2021    Neuropathy     Type 2 diabetes mellitus 10-12 years     am 10/31/2014       Family History   Problem Relation Age of Onset    Diabetes Mother     Glaucoma Mother     Heart disease Mother 75        CAB    Diabetes Father     Heart attack Father 58        Fatal MI    Diabetes Brother     Diabetes Sister     Diabetes Sister     Cataracts Paternal Uncle     Cataracts Maternal Grandmother        Social History     Socioeconomic History    Marital status:    Tobacco Use    Smoking status: Former     Packs/day: 1.00     Years: 20.00     Pack years: 20.00     Types: Cigarettes     Quit date: 2000     Years since quittin.0     Passive exposure: Never    Smokeless tobacco: Never   Substance and Sexual Activity    Alcohol use: No     Alcohol/week: 0.0 standard drinks    Drug use: No    Sexual activity: Yes     Partners: Female   Social History Narrative    . Lives with spouse. Has 2 children. Retired. No pets or smokers in household.     Social Determinants of Health     Financial Resource Strain: Low Risk     Difficulty of Paying Living Expenses: Not hard at all   Food Insecurity: No Food Insecurity    Worried About Running Out of Food in the Last Year: Never true    Ran Out of Food in the Last Year:  "Never true   Transportation Needs: No Transportation Needs    Lack of Transportation (Medical): No    Lack of Transportation (Non-Medical): No   Physical Activity: Insufficiently Active    Days of Exercise per Week: 3 days    Minutes of Exercise per Session: 30 min   Stress: No Stress Concern Present    Feeling of Stress : Only a little   Social Connections: Socially Integrated    Frequency of Communication with Friends and Family: More than three times a week    Frequency of Social Gatherings with Friends and Family: More than three times a week    Attends Christianity Services: 1 to 4 times per year    Active Member of Clubs or Organizations: Yes    Attends Club or Organization Meetings: 1 to 4 times per year    Marital Status:    Housing Stability: Unknown    Unable to Pay for Housing in the Last Year: No    Unstable Housing in the Last Year: No       Past Surgical History:   Procedure Laterality Date    CARPAL TUNNEL RELEASE Right 07/2020    COLONOSCOPY N/A 3/31/2017    Procedure: COLONOSCOPY;  Surgeon: Cornelius Ibarra MD;  Location: [facility];  Service: Endoscopy;  Laterality: N/A;    COLONOSCOPY N/A 4/18/2022    Procedure: COLONOSCOPY;  Surgeon: Geneva Serna MD;  Location: [facility];  Service: Endoscopy;  Laterality: N/A;    DENTAL SURGERY      Implant    KNEE ARTHROSCOPY Left     ROBOT-ASSISTED LAPAROSCOPIC REPAIR OF INGUINAL HERNIA USING DA JOSE ANGEL XI Left 2/26/2021    Procedure: XI ROBOTIC REPAIR, HERNIA, INGUINAL;  Surgeon: Tavon Cruz MD;  Location: [facility];  Service: General;  Laterality: Left;       Review of Systems       Objective:   Ht 5' 11" (1.803 m)   Wt 86.5 kg (190 lb 11.2 oz)   BMI 26.60 kg/m²     Physical Exam  LOWER EXTREMITY PHYSICAL EXAMINATION    VASCULAR:  The right dorsalis pedis pulse 2/4 and the right posterior tibial pulse 2/4.  The left dorsalis pedis pulse 2/4 and posterior tibial pulse on the left is 2/4.  Capillary refill is intact.  Pedal hair growth " intact    DERMATOLOGY:  Porokeratosis present to the plantar aspect of the left 5th metatarsal head.  New corn formation present to the medial aspect of the right 5th toe.  No signs of underlying ulceration present.  No signs of acute infection.  No underlying bony prominences    ORTHOPEDIC: Manual Muscle Testing is 5/5 in all planes on the left and right, without pains, with and without resistance. Gait pattern is non-antalgic.    Assessment:     1. Corn or callus    2. Porokeratosis    3. Type 2 diabetes mellitus with other neurologic complication, with long-term current use of insulin        Plan:     Corn or callus    Porokeratosis    Type 2 diabetes mellitus with other neurologic complication, with long-term current use of insulin      Thorough discussion is had with the patient today, concerning the diagnosis, its etiology, and the treatment algorithm at present.    Patient does have a corn present to the medial aspect of the right 5th toe.  Corn was removed without complications.  Recommend patient to apply silicone toe sleeve/spacer to the 4th digit adjacent to the 5th.  This will prevent friction to the 4th digit and reduce risk of recurrent corn to the medial aspect of the 5th digit.    Did discuss the recurrent porokeratotic lesion present to the plantar aspect the left 5th metatarsal head.  We did discussed utilizing a topical compound W wart cream/gel to this area every 2-3 days for approximately 2 weeks.  This will help remove the remaining porokeratosis and allow for the patient have considerable improvement in healing and reduce risk of recurrence.        Future Appointments   Date Time Provider Department Center   1/31/2023  7:30 AM Al Daugherty PA-C [facility] DIABETE [facility]   4/6/2023  7:40 AM LABORATORY, [facility] LAB [facility]   4/13/2023 11:00 AM Samantha Augustine NP [facility] IM [facility]   5/4/2023  8:40 AM Naveen Gr MD [facility] CARDIO [facility]   6/12/2023  1:15 PM Bandar Garcia,  MD [facility] UROLOGY [facility]

## 2023-01-23 ENCOUNTER — PES CALL (OUTPATIENT)
Dept: ADMINISTRATIVE | Facility: CLINIC | Age: 73
End: 2023-01-23
Payer: MEDICARE

## 2023-01-31 ENCOUNTER — PATIENT MESSAGE (OUTPATIENT)
Dept: DIABETES | Facility: CLINIC | Age: 73
End: 2023-01-31

## 2023-02-08 ENCOUNTER — PATIENT MESSAGE (OUTPATIENT)
Dept: DIABETES | Facility: CLINIC | Age: 73
End: 2023-02-08
Payer: MEDICARE

## 2023-02-08 DIAGNOSIS — E11.59 HYPERTENSION ASSOCIATED WITH DIABETES: Primary | ICD-10-CM

## 2023-02-08 DIAGNOSIS — I15.2 HYPERTENSION ASSOCIATED WITH DIABETES: Primary | ICD-10-CM

## 2023-02-08 RX ORDER — TIRZEPATIDE 12.5 MG/.5ML
12.5 INJECTION, SOLUTION SUBCUTANEOUS
Qty: 4 PEN | Refills: 11 | Status: SHIPPED | OUTPATIENT
Start: 2023-02-08 | End: 2023-03-08

## 2023-02-08 NOTE — TELEPHONE ENCOUNTER
Pt cannot find mounjaro 10. Will call [facility] to see if they have it.     [facility] has 5, 12.5, and 15 mg's available.

## 2023-02-10 ENCOUNTER — PES CALL (OUTPATIENT)
Dept: ADMINISTRATIVE | Facility: CLINIC | Age: 73
End: 2023-02-10
Payer: MEDICARE

## 2023-02-14 ENCOUNTER — TELEPHONE (OUTPATIENT)
Dept: PHARMACY | Facility: CLINIC | Age: 73
End: 2023-02-14
Payer: MEDICARE

## 2023-03-01 ENCOUNTER — OFFICE VISIT (OUTPATIENT)
Dept: INTERNAL MEDICINE | Facility: CLINIC | Age: 73
End: 2023-03-01
Payer: MEDICARE

## 2023-03-01 VITALS
WEIGHT: 187.19 LBS | HEIGHT: 71 IN | DIASTOLIC BLOOD PRESSURE: 64 MMHG | RESPIRATION RATE: 18 BRPM | SYSTOLIC BLOOD PRESSURE: 136 MMHG | BODY MASS INDEX: 26.21 KG/M2 | OXYGEN SATURATION: 97 % | HEART RATE: 74 BPM

## 2023-03-01 DIAGNOSIS — E11.59 HYPERTENSION ASSOCIATED WITH DIABETES: ICD-10-CM

## 2023-03-01 DIAGNOSIS — E11.8 TYPE 2 DIABETES MELLITUS WITH COMPLICATION, WITH LONG-TERM CURRENT USE OF INSULIN: ICD-10-CM

## 2023-03-01 DIAGNOSIS — E11.69 HYPERLIPIDEMIA ASSOCIATED WITH TYPE 2 DIABETES MELLITUS: ICD-10-CM

## 2023-03-01 DIAGNOSIS — I15.2 HYPERTENSION ASSOCIATED WITH DIABETES: ICD-10-CM

## 2023-03-01 DIAGNOSIS — E78.5 HYPERLIPIDEMIA ASSOCIATED WITH TYPE 2 DIABETES MELLITUS: ICD-10-CM

## 2023-03-01 DIAGNOSIS — I34.0 NONRHEUMATIC MITRAL VALVE REGURGITATION: ICD-10-CM

## 2023-03-01 DIAGNOSIS — G47.33 OBSTRUCTIVE SLEEP APNEA: ICD-10-CM

## 2023-03-01 DIAGNOSIS — I25.10 CORONARY ARTERY DISEASE INVOLVING NATIVE CORONARY ARTERY OF NATIVE HEART WITHOUT ANGINA PECTORIS: ICD-10-CM

## 2023-03-01 DIAGNOSIS — Z00.00 ENCOUNTER FOR PREVENTIVE HEALTH EXAMINATION: Primary | ICD-10-CM

## 2023-03-01 DIAGNOSIS — Z79.4 TYPE 2 DIABETES MELLITUS WITH COMPLICATION, WITH LONG-TERM CURRENT USE OF INSULIN: ICD-10-CM

## 2023-03-01 PROBLEM — I77.1 TORTUOUS AORTA: Status: RESOLVED | Noted: 2022-02-22 | Resolved: 2023-03-01

## 2023-03-01 PROCEDURE — 99999 PR PBB SHADOW E&M-EST. PATIENT-LVL III: ICD-10-PCS | Mod: PBBFAC,,, | Performed by: NURSE PRACTITIONER

## 2023-03-01 PROCEDURE — 99213 OFFICE O/P EST LOW 20 MIN: CPT | Mod: PBBFAC,PO | Performed by: NURSE PRACTITIONER

## 2023-03-01 PROCEDURE — G0439 PPPS, SUBSEQ VISIT: HCPCS | Mod: ,,, | Performed by: NURSE PRACTITIONER

## 2023-03-01 PROCEDURE — G0439 PR MEDICARE ANNUAL WELLNESS SUBSEQUENT VISIT: ICD-10-PCS | Mod: ,,, | Performed by: NURSE PRACTITIONER

## 2023-03-01 PROCEDURE — 99999 PR PBB SHADOW E&M-EST. PATIENT-LVL III: CPT | Mod: PBBFAC,,, | Performed by: NURSE PRACTITIONER

## 2023-03-01 RX ORDER — DICLOFENAC SODIUM 50 MG/1
50 TABLET, DELAYED RELEASE ORAL 2 TIMES DAILY
COMMUNITY
Start: 2023-02-22 | End: 2024-02-16

## 2023-03-01 NOTE — PROGRESS NOTES
"  Timothy Ortega presented for a  Medicare AWV and comprehensive Health Risk Assessment today. The following components were reviewed and updated:    Medical history  Family History  Social history  Allergies and Current Medications  Health Risk Assessment  Health Maintenance  Care Team         ** See Completed Assessments for Annual Wellness Visit within the encounter summary.**         The following assessments were completed:  Living Situation  CAGE  Depression Screening  Timed Get Up and Go  Whisper Test  Cognitive Function Screening  Nutrition Screening  ADL Screening  PAQ Screening        Vitals:    03/01/23 1454   BP: 136/64   Pulse: 74   Resp: 18   SpO2: 97%   Weight: 84.9 kg (187 lb 2.7 oz)   Height: 5' 11" (1.803 m)     Body mass index is 26.11 kg/m².  Physical Exam  Constitutional:       General: He is not in acute distress.     Appearance: Normal appearance. He is well-developed. He is not ill-appearing, toxic-appearing or diaphoretic.   HENT:      Head: Normocephalic and atraumatic.      Right Ear: External ear normal.      Left Ear: External ear normal.      Mouth/Throat:      Mouth: Mucous membranes are moist.   Eyes:      Extraocular Movements: Extraocular movements intact.      Conjunctiva/sclera: Conjunctivae normal.   Neck:      Vascular: No carotid bruit.   Cardiovascular:      Rate and Rhythm: Normal rate and regular rhythm.      Heart sounds: Normal heart sounds. No murmur heard.    No friction rub. No gallop.   Pulmonary:      Effort: Pulmonary effort is normal. No respiratory distress.      Breath sounds: Normal breath sounds. No stridor. No wheezing, rhonchi or rales.   Chest:      Chest wall: No tenderness.   Abdominal:      General: Bowel sounds are normal. There is no distension.      Palpations: There is no mass.      Tenderness: There is no abdominal tenderness.      Hernia: No hernia is present.   Musculoskeletal:         General: No tenderness. Normal range of motion.      Cervical " back: Normal range of motion and neck supple. No tenderness.   Lymphadenopathy:      Cervical: No cervical adenopathy.   Skin:     General: Skin is warm and dry.   Neurological:      Mental Status: He is alert and oriented to person, place, and time.      Cranial Nerves: No cranial nerve deficit.   Psychiatric:         Behavior: Behavior normal.             Diagnoses and health risks identified today and associated recommendations/orders:    1. Encounter for preventive health examination  Screenings performed, as noted above.  Personal preventative testing needs reviewed.      2. Hypertension associated with diabetes  Treated with amlodipine, losartan, stable, cont tx    3. Hyperlipidemia associated with type 2 diabetes mellitus  Treated with pravastatin, stable, cont tx    4. Coronary artery disease involving native coronary artery of native heart without angina pectoris  Monitored, stable, follow up with pcp    5. Nonrheumatic mitral valve regurgitation  Monitored, stable, follow up with pcp and cardiology prn    6. Type 2 diabetes mellitus with complication, with long-term current use of insulin  Treated with insulins, monjuno, jardiance, stable, follow up with diabetes provider    7. Obstructive sleep apnea  Treated with cpap, uses nightly, cont tx    Review for Substance Use Disorders: patient does not use substances per chart    Review for opioid screening: Patient is not prescribed opioids       Provided Timothy with a 5-10 year written screening schedule and personal prevention plan. Recommendations were developed using the USPSTF age appropriate recommendations. Education, counseling, and referrals were provided as needed. After Visit Summary printed and given to patient which includes a list of additional screenings\tests needed.    No follow-ups on file.    Tanvir Young NP  I offered to discuss advanced care planning, including how to pick a person who would make decisions for you if you were unable to  make them for yourself, called a health care power of , and what kind of decisions you might make such as use of life sustaining treatments such as ventilators and tube feeding when faced with a life limiting illness recorded on a living will that they will need to know. (How you want to be cared for as you near the end of your natural life)     X Patient is interested in learning more about how to make advanced directives.  I provided them paperwork and offered to discuss this with them.

## 2023-03-01 NOTE — PATIENT INSTRUCTIONS
Counseling and Referral of Other Preventative  (Italic type indicates deductible and co-insurance are waived)    Patient Name: Timothy Ortega  Today's Date: 3/1/2023    Health Maintenance       Date Due Completion Date    High Dose Statin Never done ---    Foot Exam 01/13/2023 1/13/2022    Override on 1/13/2022: Done    Override on 2/16/2017: Done    Override on 11/20/2015: Done    Override on 5/28/2014: Done    Override on 3/11/2014: Done    Override on 12/10/2013: Done    Override on 10/22/2013: Done    Override on 9/16/2013: Done    COVID-19 Vaccine (3 - Booster for Pfizer series) 04/04/2023 (Originally 5/19/2021) 3/24/2021    Hemoglobin A1c 04/06/2023 10/6/2022    Eye Exam 04/13/2023 4/13/2022    PROSTATE-SPECIFIC ANTIGEN 07/05/2023 7/5/2022    Diabetes Urine Screening 10/06/2023 10/6/2022    Lipid Panel 10/06/2023 10/6/2022    Aspirin/Antiplatelet Therapy 01/05/2024 1/5/2023    Colorectal Cancer Screening 04/18/2027 4/18/2022    TETANUS VACCINE 07/20/2028 7/20/2018        No orders of the defined types were placed in this encounter.      The following information is provided to all patients.  This information is to help you find resources for any of the problems found today that may be affecting your health:                Living healthy guide: www.[facility].[place].gov      Understanding Diabetes: www.diabetes.org      Eating healthy: www.cdc.gov/healthyweight      CDC home safety checklist: www.cdc.gov/steadi/patient.html      Agency on Aging: www.goea.[place].[facility]      Alcoholics anonymous (AA): www.aa.org      Physical Activity: www.alex.nih.gov/ci5lqop      Tobacco use: www.quitwithusla.org

## 2023-03-08 ENCOUNTER — PATIENT MESSAGE (OUTPATIENT)
Dept: DIABETES | Facility: CLINIC | Age: 73
End: 2023-03-08
Payer: MEDICARE

## 2023-03-08 DIAGNOSIS — Z79.4 TYPE 2 DIABETES MELLITUS WITH COMPLICATION, WITH LONG-TERM CURRENT USE OF INSULIN: Primary | ICD-10-CM

## 2023-03-08 DIAGNOSIS — E11.8 TYPE 2 DIABETES MELLITUS WITH COMPLICATION, WITH LONG-TERM CURRENT USE OF INSULIN: Primary | ICD-10-CM

## 2023-03-08 RX ORDER — TIRZEPATIDE 15 MG/.5ML
15 INJECTION, SOLUTION SUBCUTANEOUS
Qty: 12 PEN | Refills: 3 | Status: SHIPPED | OUTPATIENT
Start: 2023-03-08 | End: 2023-07-03

## 2023-03-08 NOTE — TELEPHONE ENCOUNTER
Mounjaro 15 mg Rx sent to [facility].     Will recommend the following insulin changes with new dose:     Metformin 1000 mg BID   Lantus 24 units at night   Humalog 6 units w/ ss TID wm and 3 units with dessert  Jardiance 25 mg   Mounjaro 15 mg weekly      WITH MEALS:   If blood sugar is below 70 eat first then check your blood sugar 2 hours later and make correction.  If blood pre-meal sugar is  70 -150 take 6 units of Novolog;  If blood pre-meal sugar is 151-200 take +2 units of Novolog;  If blood pre-meal sugar is 201-250 take +4 units of Novolog;  If blood pre-meal sugar is 251-300 take +6 units of Novolog;  If blood pre-meal sugar is 301-350 take +8 units of Novolog;  If blood pre-meal sugar is 351-400+ take +10 units of Novolog      Thank you!     Al Daugherty PA-C  Diabetes Management

## 2023-03-14 ENCOUNTER — PATIENT MESSAGE (OUTPATIENT)
Dept: INTERNAL MEDICINE | Facility: CLINIC | Age: 73
End: 2023-03-14
Payer: MEDICARE

## 2023-03-21 ENCOUNTER — LAB VISIT (OUTPATIENT)
Dept: LAB | Facility: HOSPITAL | Age: 73
End: 2023-03-21
Attending: PEDIATRICS
Payer: MEDICARE

## 2023-03-21 DIAGNOSIS — E11.8 TYPE 2 DIABETES MELLITUS WITH COMPLICATION, WITH LONG-TERM CURRENT USE OF INSULIN: ICD-10-CM

## 2023-03-21 DIAGNOSIS — Z79.4 TYPE 2 DIABETES MELLITUS WITH COMPLICATION, WITH LONG-TERM CURRENT USE OF INSULIN: ICD-10-CM

## 2023-03-21 LAB
ALBUMIN SERPL BCP-MCNC: 4.2 G/DL (ref 3.5–5.2)
ALP SERPL-CCNC: 46 U/L (ref 55–135)
ALT SERPL W/O P-5'-P-CCNC: 28 U/L (ref 10–44)
ANION GAP SERPL CALC-SCNC: 12 MMOL/L (ref 8–16)
AST SERPL-CCNC: 26 U/L (ref 10–40)
BILIRUB SERPL-MCNC: 0.7 MG/DL (ref 0.1–1)
BUN SERPL-MCNC: 16 MG/DL (ref 8–23)
CALCIUM SERPL-MCNC: 9.8 MG/DL (ref 8.7–10.5)
CHLORIDE SERPL-SCNC: 103 MMOL/L (ref 95–110)
CHOLEST SERPL-MCNC: 181 MG/DL (ref 120–199)
CHOLEST/HDLC SERPL: 5.8 {RATIO} (ref 2–5)
CO2 SERPL-SCNC: 24 MMOL/L (ref 23–29)
CREAT SERPL-MCNC: 0.9 MG/DL (ref 0.5–1.4)
EST. GFR  (NO RACE VARIABLE): >60 ML/MIN/1.73 M^2
ESTIMATED AVG GLUCOSE: 143 MG/DL (ref 68–131)
GLUCOSE SERPL-MCNC: 137 MG/DL (ref 70–110)
HBA1C MFR BLD: 6.6 % (ref 4–5.6)
HDLC SERPL-MCNC: 31 MG/DL (ref 40–75)
HDLC SERPL: 17.1 % (ref 20–50)
LDLC SERPL CALC-MCNC: 107.8 MG/DL (ref 63–159)
NONHDLC SERPL-MCNC: 150 MG/DL
POTASSIUM SERPL-SCNC: 4.7 MMOL/L (ref 3.5–5.1)
PROT SERPL-MCNC: 6.9 G/DL (ref 6–8.4)
SODIUM SERPL-SCNC: 139 MMOL/L (ref 136–145)
TRIGL SERPL-MCNC: 211 MG/DL (ref 30–150)

## 2023-03-21 PROCEDURE — 80053 COMPREHEN METABOLIC PANEL: CPT | Performed by: PEDIATRICS

## 2023-03-21 PROCEDURE — 80061 LIPID PANEL: CPT | Performed by: PEDIATRICS

## 2023-03-21 PROCEDURE — 36415 COLL VENOUS BLD VENIPUNCTURE: CPT | Performed by: PEDIATRICS

## 2023-03-21 PROCEDURE — 83036 HEMOGLOBIN GLYCOSYLATED A1C: CPT | Performed by: PEDIATRICS

## 2023-03-28 ENCOUNTER — OFFICE VISIT (OUTPATIENT)
Dept: INTERNAL MEDICINE | Facility: CLINIC | Age: 73
End: 2023-03-28
Payer: MEDICARE

## 2023-03-28 VITALS
DIASTOLIC BLOOD PRESSURE: 64 MMHG | BODY MASS INDEX: 25.06 KG/M2 | TEMPERATURE: 98 F | SYSTOLIC BLOOD PRESSURE: 118 MMHG | HEART RATE: 71 BPM | HEIGHT: 71 IN | OXYGEN SATURATION: 99 % | WEIGHT: 179 LBS

## 2023-03-28 DIAGNOSIS — E78.5 HYPERLIPIDEMIA ASSOCIATED WITH TYPE 2 DIABETES MELLITUS: ICD-10-CM

## 2023-03-28 DIAGNOSIS — G47.33 OBSTRUCTIVE SLEEP APNEA: ICD-10-CM

## 2023-03-28 DIAGNOSIS — E11.8 TYPE 2 DIABETES MELLITUS WITH COMPLICATION, WITH LONG-TERM CURRENT USE OF INSULIN: Primary | ICD-10-CM

## 2023-03-28 DIAGNOSIS — I15.2 HYPERTENSION ASSOCIATED WITH DIABETES: ICD-10-CM

## 2023-03-28 DIAGNOSIS — I25.10 CORONARY ARTERY DISEASE INVOLVING NATIVE CORONARY ARTERY OF NATIVE HEART WITHOUT ANGINA PECTORIS: ICD-10-CM

## 2023-03-28 DIAGNOSIS — I34.0 NONRHEUMATIC MITRAL VALVE REGURGITATION: ICD-10-CM

## 2023-03-28 DIAGNOSIS — E11.69 HYPERLIPIDEMIA ASSOCIATED WITH TYPE 2 DIABETES MELLITUS: ICD-10-CM

## 2023-03-28 DIAGNOSIS — N63.10 MASS OF RIGHT BREAST, UNSPECIFIED QUADRANT: ICD-10-CM

## 2023-03-28 DIAGNOSIS — E11.59 HYPERTENSION ASSOCIATED WITH DIABETES: ICD-10-CM

## 2023-03-28 DIAGNOSIS — N62 HYPERTROPHY OF BREAST: ICD-10-CM

## 2023-03-28 DIAGNOSIS — Z86.010 HISTORY OF COLON POLYPS: ICD-10-CM

## 2023-03-28 DIAGNOSIS — N20.0 NEPHROLITHIASIS: ICD-10-CM

## 2023-03-28 DIAGNOSIS — Z79.4 TYPE 2 DIABETES MELLITUS WITH COMPLICATION, WITH LONG-TERM CURRENT USE OF INSULIN: Primary | ICD-10-CM

## 2023-03-28 DIAGNOSIS — N52.9 ERECTILE DYSFUNCTION, UNSPECIFIED ERECTILE DYSFUNCTION TYPE: ICD-10-CM

## 2023-03-28 PROCEDURE — 99215 OFFICE O/P EST HI 40 MIN: CPT | Mod: PBBFAC | Performed by: NURSE PRACTITIONER

## 2023-03-28 PROCEDURE — 99214 OFFICE O/P EST MOD 30 MIN: CPT | Mod: S$PBB,,, | Performed by: NURSE PRACTITIONER

## 2023-03-28 PROCEDURE — 99999 PR PBB SHADOW E&M-EST. PATIENT-LVL V: ICD-10-PCS | Mod: PBBFAC,,, | Performed by: NURSE PRACTITIONER

## 2023-03-28 PROCEDURE — 99999 PR PBB SHADOW E&M-EST. PATIENT-LVL V: CPT | Mod: PBBFAC,,, | Performed by: NURSE PRACTITIONER

## 2023-03-28 PROCEDURE — 99214 PR OFFICE/OUTPT VISIT, EST, LEVL IV, 30-39 MIN: ICD-10-PCS | Mod: S$PBB,,, | Performed by: NURSE PRACTITIONER

## 2023-03-28 NOTE — PROGRESS NOTES
Subjective:       Patient ID: Timothy Ortega is a 72 y.o. male.    Chief Complaint: Follow-up    HPI    1) HTN: B/P good, no HTNive symptoms. in Dig medicine  2) LIPIDS: following caloric intake but frequently splurges in the evening, tolerating and compliant with med(s). Crestor dose decreased per cards. Lost a few pounds.   3) DM: no hyper/hypoglycemic symptoms. Self monitoring normal-slightly elevated. Compliant with meds (metformin 1000 mg bid, Ozempic, jardiance 25, lantus 50, humalog 14 with meals) Working with DM program. See #2 for dietary indiscretions. Currently cant get Ozempic because the pharmacy cant get it-stopped metformin a couple of weeks ago.   4) CAD: no CP, SOB, CHOU. Followed by cards.            Review of Systems   Constitutional:  Negative for activity change, appetite change, chills, diaphoresis, fatigue, fever and unexpected weight change.   HENT:  Negative for congestion, ear pain, postnasal drip, rhinorrhea, sinus pressure, sinus pain, sneezing, sore throat, tinnitus, trouble swallowing and voice change.    Eyes:  Negative for photophobia, pain and visual disturbance.   Respiratory:  Negative for cough, chest tightness, shortness of breath and wheezing.    Cardiovascular:  Negative for chest pain, palpitations and leg swelling.   Gastrointestinal:  Negative for abdominal distention, abdominal pain, constipation, diarrhea, nausea and vomiting.   Genitourinary:  Negative for decreased urine volume, difficulty urinating, dysuria, flank pain, frequency, hematuria and urgency.   Musculoskeletal:  Negative for arthralgias, back pain, joint swelling, neck pain and neck stiffness.   Skin:         Reports lump noted in R breast   Allergic/Immunologic: Negative for immunocompromised state.   Neurological:  Negative for dizziness, tremors, seizures, syncope, facial asymmetry, speech difficulty, weakness, light-headedness, numbness and headaches.   Hematological:  Negative for adenopathy. Does not  bruise/bleed easily.   Psychiatric/Behavioral:  Negative for confusion and sleep disturbance.      Objective:      Physical Exam  HENT:      Head: Normocephalic and atraumatic.      Right Ear: Tympanic membrane normal.      Left Ear: Tympanic membrane normal.   Eyes:      Conjunctiva/sclera: Conjunctivae normal.   Cardiovascular:      Rate and Rhythm: Normal rate and regular rhythm.      Heart sounds: Normal heart sounds.   Pulmonary:      Effort: Pulmonary effort is normal.      Breath sounds: Normal breath sounds.   Abdominal:      General: Bowel sounds are normal.      Palpations: Abdomen is soft.   Musculoskeletal:         General: Normal range of motion.      Cervical back: Normal range of motion and neck supple.   Skin:     General: Skin is warm and dry.      Comments: Nontender nodule palpated R breast right inner quadrant   Neurological:      Mental Status: He is alert and oriented to person, place, and time.       Assessment:     Vitals:    03/28/23 1003   BP: 118/64   Pulse: 71   Temp: 97.9 °F (36.6 °C)         1. Type 2 diabetes mellitus with complication, with long-term current use of insulin    2. Hypertension associated with diabetes    3. Hyperlipidemia associated with type 2 diabetes mellitus    4. Coronary artery disease involving native coronary artery of native heart without angina pectoris    5. Nonrheumatic mitral valve regurgitation    6. Nephrolithiasis    7. Erectile dysfunction, unspecified erectile dysfunction type    8. History of colon polyps    9. Obstructive sleep apnea    10. Mass of right breast, unspecified quadrant    11. Hypertrophy of breast          Plan:   Type 2 diabetes mellitus with complication, with long-term current use of insulin  -     Hemoglobin A1C; Future; Expected date: 09/24/2023  -     Microalbumin/Creatinine Ratio, Urine; Future; Expected date: 09/28/2023    Hypertension associated with diabetes    Hyperlipidemia associated with type 2 diabetes mellitus  -      Lipid Panel; Future; Expected date: 09/24/2023  -     Comprehensive Metabolic Panel; Future; Expected date: 09/24/2023    Coronary artery disease involving native coronary artery of native heart without angina pectoris    Nonrheumatic mitral valve regurgitation    Nephrolithiasis    Erectile dysfunction, unspecified erectile dysfunction type    History of colon polyps    Obstructive sleep apnea    Mass of right breast, unspecified quadrant  -     US Breast Right Limited; Future; Expected date: 03/28/2023  -     Mammo Digital Diagnostic Right; Future; Expected date: 03/28/2023    Hypertrophy of breast  -     Mammo Digital Diagnostic Right; Future; Expected date: 03/28/2023    Eye exam upcoming  Check R breast  Continue current meds  Low fat diet, increase activity  Return 6 mo w lab

## 2023-03-29 ENCOUNTER — PATIENT MESSAGE (OUTPATIENT)
Dept: ADMINISTRATIVE | Facility: OTHER | Age: 73
End: 2023-03-29
Payer: MEDICARE

## 2023-04-13 ENCOUNTER — HOSPITAL ENCOUNTER (OUTPATIENT)
Dept: RADIOLOGY | Facility: HOSPITAL | Age: 73
Discharge: HOME | End: 2023-04-13
Attending: NURSE PRACTITIONER
Payer: MEDICARE

## 2023-04-13 DIAGNOSIS — N63.10 MASS OF RIGHT BREAST, UNSPECIFIED QUADRANT: ICD-10-CM

## 2023-04-13 DIAGNOSIS — N62 HYPERTROPHY OF BREAST: ICD-10-CM

## 2023-04-13 PROCEDURE — 76642 ULTRASOUND BREAST LIMITED: CPT | Mod: TC,RT

## 2023-04-13 PROCEDURE — 76642 US BREAST RIGHT LIMITED: ICD-10-PCS | Mod: 26,RT,, | Performed by: STUDENT IN AN ORGANIZED HEALTH CARE EDUCATION/TRAINING PROGRAM

## 2023-04-13 PROCEDURE — 77062 BREAST TOMOSYNTHESIS BI: CPT | Mod: 26,,, | Performed by: STUDENT IN AN ORGANIZED HEALTH CARE EDUCATION/TRAINING PROGRAM

## 2023-04-13 PROCEDURE — 77066 MAMMO DIGITAL DIAGNOSTIC BILAT WITH TOMO: ICD-10-PCS | Mod: 26,,, | Performed by: STUDENT IN AN ORGANIZED HEALTH CARE EDUCATION/TRAINING PROGRAM

## 2023-04-13 PROCEDURE — 76642 ULTRASOUND BREAST LIMITED: CPT | Mod: 26,RT,, | Performed by: STUDENT IN AN ORGANIZED HEALTH CARE EDUCATION/TRAINING PROGRAM

## 2023-04-13 PROCEDURE — 77062 MAMMO DIGITAL DIAGNOSTIC BILAT WITH TOMO: ICD-10-PCS | Mod: 26,,, | Performed by: STUDENT IN AN ORGANIZED HEALTH CARE EDUCATION/TRAINING PROGRAM

## 2023-04-13 PROCEDURE — 77066 DX MAMMO INCL CAD BI: CPT | Mod: 26,,, | Performed by: STUDENT IN AN ORGANIZED HEALTH CARE EDUCATION/TRAINING PROGRAM

## 2023-04-13 PROCEDURE — 77062 BREAST TOMOSYNTHESIS BI: CPT | Mod: TC

## 2023-04-21 ENCOUNTER — TELEPHONE (OUTPATIENT)
Dept: INTERNAL MEDICINE | Facility: CLINIC | Age: 73
End: 2023-04-21
Payer: MEDICARE

## 2023-04-21 NOTE — TELEPHONE ENCOUNTER
----- Message from Samantha Augustine NP sent at 4/14/2023  1:12 PM CDT -----  No concerning findings on imaging of breast

## 2023-05-02 DIAGNOSIS — I10 PRIMARY HYPERTENSION: Primary | ICD-10-CM

## 2023-05-04 ENCOUNTER — OFFICE VISIT (OUTPATIENT)
Dept: CARDIOLOGY | Facility: CLINIC | Age: 73
End: 2023-05-04
Payer: MEDICARE

## 2023-05-04 ENCOUNTER — HOSPITAL ENCOUNTER (OUTPATIENT)
Dept: CARDIOLOGY | Facility: HOSPITAL | Age: 73
Discharge: HOME | End: 2023-05-04
Attending: INTERNAL MEDICINE
Payer: MEDICARE

## 2023-05-04 VITALS
BODY MASS INDEX: 24.48 KG/M2 | OXYGEN SATURATION: 98 % | BODY MASS INDEX: 24.57 KG/M2 | WEIGHT: 175.5 LBS | SYSTOLIC BLOOD PRESSURE: 118 MMHG | DIASTOLIC BLOOD PRESSURE: 68 MMHG | HEART RATE: 69 BPM | WEIGHT: 175.5 LBS | HEIGHT: 71 IN

## 2023-05-04 DIAGNOSIS — I15.2 HYPERTENSION ASSOCIATED WITH DIABETES: ICD-10-CM

## 2023-05-04 DIAGNOSIS — E78.5 HYPERLIPIDEMIA ASSOCIATED WITH TYPE 2 DIABETES MELLITUS: ICD-10-CM

## 2023-05-04 DIAGNOSIS — E11.69 HYPERLIPIDEMIA ASSOCIATED WITH TYPE 2 DIABETES MELLITUS: ICD-10-CM

## 2023-05-04 DIAGNOSIS — I25.10 CORONARY ARTERY DISEASE INVOLVING NATIVE CORONARY ARTERY OF NATIVE HEART WITHOUT ANGINA PECTORIS: Primary | ICD-10-CM

## 2023-05-04 DIAGNOSIS — I34.0 NONRHEUMATIC MITRAL VALVE REGURGITATION: ICD-10-CM

## 2023-05-04 DIAGNOSIS — E11.8 TYPE 2 DIABETES MELLITUS WITH COMPLICATION, WITH LONG-TERM CURRENT USE OF INSULIN: ICD-10-CM

## 2023-05-04 DIAGNOSIS — E11.59 HYPERTENSION ASSOCIATED WITH DIABETES: ICD-10-CM

## 2023-05-04 DIAGNOSIS — Z79.4 TYPE 2 DIABETES MELLITUS WITH COMPLICATION, WITH LONG-TERM CURRENT USE OF INSULIN: ICD-10-CM

## 2023-05-04 DIAGNOSIS — I10 PRIMARY HYPERTENSION: ICD-10-CM

## 2023-05-04 PROCEDURE — 93005 ELECTROCARDIOGRAM TRACING: CPT

## 2023-05-04 PROCEDURE — 99214 PR OFFICE/OUTPT VISIT, EST, LEVL IV, 30-39 MIN: ICD-10-PCS | Mod: S$PBB,,, | Performed by: INTERNAL MEDICINE

## 2023-05-04 PROCEDURE — 99214 OFFICE O/P EST MOD 30 MIN: CPT | Mod: PBBFAC | Performed by: INTERNAL MEDICINE

## 2023-05-04 PROCEDURE — 99999 PR PBB SHADOW E&M-EST. PATIENT-LVL IV: ICD-10-PCS | Mod: PBBFAC,,, | Performed by: INTERNAL MEDICINE

## 2023-05-04 PROCEDURE — 93010 ELECTROCARDIOGRAM REPORT: CPT | Mod: ,,, | Performed by: INTERNAL MEDICINE

## 2023-05-04 PROCEDURE — 93010 EKG 12-LEAD: ICD-10-PCS | Mod: ,,, | Performed by: INTERNAL MEDICINE

## 2023-05-04 PROCEDURE — 99999 PR PBB SHADOW E&M-EST. PATIENT-LVL IV: CPT | Mod: PBBFAC,,, | Performed by: INTERNAL MEDICINE

## 2023-05-04 PROCEDURE — 99214 OFFICE O/P EST MOD 30 MIN: CPT | Mod: S$PBB,,, | Performed by: INTERNAL MEDICINE

## 2023-05-04 NOTE — PROGRESS NOTES
"Subjective:   Patient ID:  Timothy Ortega is a 72 y.o. male who presents for follow up of No chief complaint on file.      71 yo male, came in for 1yr f/u. .  Hx of CAD, had [facility] in 2010 and was told "nonobstructive", HTN, HLP, IDDM > 20 yrs, BRIANA on cpap, ex smoker.  h/o right carpel tunnel syndrome procedure.    No sob, no palpitation, no dizziness, no syncope, no CNS symptoms. Rare;y sharp chest pain atypical pain  Patient has fairly good exercise tolerance. Walks 2 miles a day, slowly down after COVID 19 pandemic  Patient is compliant with medications.  No smoking/drinking  EKG NSR and possible inferior infarct   nuke stress normal EF and no ischemia.  EKG NSR     visit  ekg NSR. Taking Lipitor for 12 yrs. C/o fatigue. On CPAP and still has daytime sleep  No chest pain palpitation dizziness and faint  Occasional SOB   echo  · The left ventricle is normal in size with concentric remodeling and normal systolic function. The estimated ejection fraction is 55%  · Normal left ventricular diastolic function.  · Normal right ventricular size with normal right ventricular systolic function.  · Mild mitral regurgitation.  · Mild tricuspid regurgitation.  · Normal central venous pressure (3 mmHg).  · The estimated PA systolic pressure is 33 mmHg.    Interval history  Yard work, lives with wife.  He denied chest pain, dyspnea on exertion, palpitation, fainting, PND, orthopnea, syncope and claudication.   No active bleeding  Med compliance  Improved fatigue after d/c metformin and switched crestor to pravastatin                Past Medical History:   Diagnosis Date    Coronary artery disease 2010    [facility] - no stents    DM (diabetes mellitus) 2002     lunch 10/28/2016    DM (diabetes mellitus)      am 04/12/2021    DM (diabetes mellitus)     BS 96 am 04/13/2022 Insulin for years    Groin pain, left 2/11/2021    Hyperlipemia     Hypertension     Kidney stones     " Nephrolithiasis 2021    Neuropathy     Type 2 diabetes mellitus 10-12 years     am 10/31/2014       Past Surgical History:   Procedure Laterality Date    CARPAL TUNNEL RELEASE Right 2020    COLONOSCOPY N/A 3/31/2017    Procedure: COLONOSCOPY;  Surgeon: Cornelius Ibarra MD;  Location: [facility] ENDO;  Service: Endoscopy;  Laterality: N/A;    COLONOSCOPY N/A 2022    Procedure: COLONOSCOPY;  Surgeon: Geneva Serna MD;  Location: [facility] ENDO;  Service: Endoscopy;  Laterality: N/A;    DENTAL SURGERY      Implant    KNEE ARTHROSCOPY Left     ROBOT-ASSISTED LAPAROSCOPIC REPAIR OF INGUINAL HERNIA USING DA JOSE ANGEL XI Left 2021    Procedure: XI ROBOTIC REPAIR, HERNIA, INGUINAL;  Surgeon: Tavon Cruz MD;  Location: [facility];  Service: General;  Laterality: Left;       Social History     Tobacco Use    Smoking status: Former     Packs/day: 1.00     Years: 20.00     Pack years: 20.00     Types: Cigarettes     Quit date: 2000     Years since quittin.3     Passive exposure: Never    Smokeless tobacco: Never   Substance Use Topics    Alcohol use: No     Alcohol/week: 0.0 standard drinks    Drug use: No       Family History   Problem Relation Age of Onset    Diabetes Mother     Glaucoma Mother     Heart disease Mother 75        CAB    Diabetes Father     Heart attack Father 58        Fatal MI    Diabetes Brother     Diabetes Sister     Diabetes Sister     Cataracts Paternal Uncle     Cataracts Maternal Grandmother          Review of Systems   Constitutional: Negative for decreased appetite, diaphoresis, fever, malaise/fatigue and night sweats.   HENT:  Negative for nosebleeds.    Eyes:  Negative for blurred vision and double vision.   Cardiovascular:  Negative for chest pain, claudication, dyspnea on exertion, irregular heartbeat, leg swelling, near-syncope, orthopnea, palpitations, paroxysmal nocturnal dyspnea and syncope.   Respiratory:  Negative for cough, shortness of breath, sleep disturbances due  to breathing, snoring, sputum production and wheezing.    Endocrine: Negative for cold intolerance and polyuria.   Hematologic/Lymphatic: Does not bruise/bleed easily.   Skin:  Negative for rash.   Musculoskeletal:  Negative for back pain, falls, joint pain, joint swelling and neck pain.   Gastrointestinal:  Negative for abdominal pain, heartburn, nausea and vomiting.   Genitourinary:  Negative for dysuria, frequency and hematuria.   Neurological:  Negative for difficulty with concentration, dizziness, focal weakness, headaches, light-headedness, numbness, seizures and weakness.   Psychiatric/Behavioral:  Negative for depression, memory loss and substance abuse. The patient does not have insomnia.    Allergic/Immunologic: Negative for HIV exposure and hives.     Objective:   Physical Exam  HENT:      Head: Normocephalic.   Eyes:      Pupils: Pupils are equal, round, and reactive to light.   Neck:      Thyroid: No thyromegaly.      Vascular: Normal carotid pulses. No carotid bruit or JVD.   Cardiovascular:      Rate and Rhythm: Normal rate and regular rhythm. No extrasystoles are present.     Chest Wall: PMI is not displaced.      Pulses: Normal pulses.      Heart sounds: Normal heart sounds. No murmur heard.    No gallop. No S3 sounds.   Pulmonary:      Effort: No respiratory distress.      Breath sounds: Normal breath sounds. No stridor.   Abdominal:      General: Bowel sounds are normal.      Palpations: Abdomen is soft.      Tenderness: There is no abdominal tenderness. There is no rebound.   Musculoskeletal:         General: Normal range of motion.   Skin:     Findings: No rash.   Neurological:      Mental Status: He is alert and oriented to person, place, and time.   Psychiatric:         Behavior: Behavior normal.       Lab Results   Component Value Date    CHOL 181 03/21/2023    CHOL 191 10/06/2022    CHOL 99 (L) 09/23/2021     Lab Results   Component Value Date    HDL 31 (L) 03/21/2023    HDL 31 (L)  10/06/2022    HDL 28 (L) 09/23/2021     Lab Results   Component Value Date    LDLCALC 107.8 03/21/2023    LDLCALC 103.4 10/06/2022    LDLCALC 51.8 (L) 09/23/2021     Lab Results   Component Value Date    TRIG 211 (H) 03/21/2023    TRIG 283 (H) 10/06/2022    TRIG 96 09/23/2021     Lab Results   Component Value Date    CHOLHDL 17.1 (L) 03/21/2023    CHOLHDL 16.2 (L) 10/06/2022    CHOLHDL 28.3 09/23/2021       Chemistry        Component Value Date/Time     03/21/2023 0742    K 4.7 03/21/2023 0742     03/21/2023 0742    CO2 24 03/21/2023 0742    BUN 16 03/21/2023 0742    CREATININE 0.9 03/21/2023 0742     (H) 03/21/2023 0742        Component Value Date/Time    CALCIUM 9.8 03/21/2023 0742    ALKPHOS 46 (L) 03/21/2023 0742    AST 26 03/21/2023 0742    ALT 28 03/21/2023 0742    BILITOT 0.7 03/21/2023 0742    ESTGFRAFRICA >60.0 09/23/2021 0717    EGFRNONAA >60.0 09/23/2021 0717          Lab Results   Component Value Date    HGBA1C 6.6 (H) 03/21/2023     Lab Results   Component Value Date    TSH 1.225 04/25/2022     Lab Results   Component Value Date    INR 1.0 12/01/2014    INR 1.0 05/06/2011     Lab Results   Component Value Date    WBC 7.88 04/25/2022    HGB 14.2 04/25/2022    HCT 44.7 04/25/2022     (H) 04/25/2022     04/25/2022     BMP  Sodium   Date Value Ref Range Status   03/21/2023 139 136 - 145 mmol/L Final     Potassium   Date Value Ref Range Status   03/21/2023 4.7 3.5 - 5.1 mmol/L Final     Chloride   Date Value Ref Range Status   03/21/2023 103 95 - 110 mmol/L Final     CO2   Date Value Ref Range Status   03/21/2023 24 23 - 29 mmol/L Final     BUN   Date Value Ref Range Status   03/21/2023 16 8 - 23 mg/dL Final     Creatinine   Date Value Ref Range Status   03/21/2023 0.9 0.5 - 1.4 mg/dL Final     Calcium   Date Value Ref Range Status   03/21/2023 9.8 8.7 - 10.5 mg/dL Final     Anion Gap   Date Value Ref Range Status   03/21/2023 12 8 - 16 mmol/L Final     eGFR if African  American   Date Value Ref Range Status   09/23/2021 >60.0 >60 mL/min/1.73 m^2 Final     eGFR if non    Date Value Ref Range Status   09/23/2021 >60.0 >60 mL/min/1.73 m^2 Final     Comment:     Calculation used to obtain the estimated glomerular filtration  rate (eGFR) is the CKD-EPI equation.        BNP  @LABRCNTIP(BNP,BNPTRIAGEBLO)@  @LABRCNTIP(troponini)@  CrCl cannot be calculated (Patient's most recent lab result is older than the maximum 7 days allowed.).  No results found in the last 24 hours.  No results found in the last 24 hours.  No results found in the last 24 hours.    Assessment:      1. Coronary artery disease involving native coronary artery of native heart without angina pectoris    2. Hypertension associated with diabetes    3. Hyperlipidemia associated with type 2 diabetes mellitus    4. Nonrheumatic mitral valve regurgitation    5. Type 2 diabetes mellitus with complication, with long-term current use of insulin        Plan:   Continue Pravastatin at 20 mg daily  Continue ASA amlodipine and Losartan  DM Rx per PCP  Counseled DASH  Check Lipid profile with PCP in 6 months  Recommend heart-healthy diet, weight control and regular exercise.  Federica. Risk modification.   I have reviewed all pertinent labs and cardiac studies independently. Plans and recommendations have been formulated under my direct supervision. All questions answered and patient voiced understanding.   If symptoms persist go to the ED  RTC in 12 months

## 2023-05-05 ENCOUNTER — OFFICE VISIT (OUTPATIENT)
Dept: OPHTHALMOLOGY | Facility: CLINIC | Age: 73
End: 2023-05-05
Payer: MEDICARE

## 2023-05-05 DIAGNOSIS — I15.2 HYPERTENSION ASSOCIATED WITH DIABETES: ICD-10-CM

## 2023-05-05 DIAGNOSIS — E11.36 DIABETIC CATARACT: ICD-10-CM

## 2023-05-05 DIAGNOSIS — H52.4 BILATERAL PRESBYOPIA: ICD-10-CM

## 2023-05-05 DIAGNOSIS — E11.9 DIABETES MELLITUS TYPE 2 WITHOUT RETINOPATHY: Primary | ICD-10-CM

## 2023-05-05 DIAGNOSIS — E11.59 HYPERTENSION ASSOCIATED WITH DIABETES: ICD-10-CM

## 2023-05-05 PROCEDURE — 92015 DETERMINE REFRACTIVE STATE: CPT | Mod: ,,, | Performed by: OPTOMETRIST

## 2023-05-05 PROCEDURE — 99999 PR PBB SHADOW E&M-EST. PATIENT-LVL III: ICD-10-PCS | Mod: PBBFAC,,, | Performed by: OPTOMETRIST

## 2023-05-05 PROCEDURE — 92014 PR EYE EXAM, EST PATIENT,COMPREHESV: ICD-10-PCS | Mod: S$PBB,,, | Performed by: OPTOMETRIST

## 2023-05-05 PROCEDURE — 92014 COMPRE OPH EXAM EST PT 1/>: CPT | Mod: S$PBB,,, | Performed by: OPTOMETRIST

## 2023-05-05 PROCEDURE — 92015 PR REFRACTION: ICD-10-PCS | Mod: ,,, | Performed by: OPTOMETRIST

## 2023-05-05 PROCEDURE — 99999 PR PBB SHADOW E&M-EST. PATIENT-LVL III: CPT | Mod: PBBFAC,,, | Performed by: OPTOMETRIST

## 2023-05-05 PROCEDURE — 99213 OFFICE O/P EST LOW 20 MIN: CPT | Mod: PBBFAC | Performed by: OPTOMETRIST

## 2023-05-05 NOTE — PROGRESS NOTES
HPI    Last visit with TRF on 04/13/2022.    Lab Results       Component                Value               Date                       HGBA1C                   6.6 (H)             03/21/2023              Patient states no visual complaints.   No ocular pain/discomfort.  Wear otc readers   Last edited by Lolis Betancourt on 5/5/2023  9:06 AM.            Assessment /Plan     For exam results, see Encounter Report.    Diabetes mellitus type 2 without retinopathy    Diabetic cataract    Hypertension associated with diabetes    Bilateral presbyopia      No Background Diabetic Retinopathy  Strict BG control, f/u w/ PCP, and annual DFE  Stressed importance of DM control to preserve vision    Moderate cataracts OU, not surgical  Follow annually.    No HTN Retinopathy    Dispense Final Rx for glasses.  RTC 1 year  Discussed above and answered questions.

## 2023-05-08 ENCOUNTER — PATIENT MESSAGE (OUTPATIENT)
Dept: OPHTHALMOLOGY | Facility: CLINIC | Age: 73
End: 2023-05-08
Payer: MEDICARE

## 2023-05-15 RX ORDER — PRAVASTATIN SODIUM 20 MG/1
TABLET ORAL
Qty: 90 TABLET | Refills: 3 | Status: SHIPPED | OUTPATIENT
Start: 2023-05-15 | End: 2023-06-01 | Stop reason: SDUPTHER

## 2023-05-18 ENCOUNTER — PATIENT MESSAGE (OUTPATIENT)
Dept: ADMINISTRATIVE | Facility: OTHER | Age: 73
End: 2023-05-18
Payer: MEDICARE

## 2023-06-01 RX ORDER — PRAVASTATIN SODIUM 20 MG/1
20 TABLET ORAL DAILY
Qty: 90 TABLET | Refills: 3 | Status: SHIPPED | OUTPATIENT
Start: 2023-06-01 | End: 2023-09-22 | Stop reason: DRUGHIGH

## 2023-06-12 ENCOUNTER — OFFICE VISIT (OUTPATIENT)
Dept: UROLOGY | Facility: CLINIC | Age: 73
End: 2023-06-12
Payer: MEDICARE

## 2023-06-12 VITALS
WEIGHT: 175 LBS | DIASTOLIC BLOOD PRESSURE: 66 MMHG | SYSTOLIC BLOOD PRESSURE: 116 MMHG | HEIGHT: 71 IN | BODY MASS INDEX: 24.5 KG/M2 | HEART RATE: 72 BPM

## 2023-06-12 DIAGNOSIS — N52.9 ERECTILE DYSFUNCTION, UNSPECIFIED ERECTILE DYSFUNCTION TYPE: ICD-10-CM

## 2023-06-12 DIAGNOSIS — R97.20 ELEVATED PROSTATE SPECIFIC ANTIGEN (PSA): ICD-10-CM

## 2023-06-12 DIAGNOSIS — N20.0 NEPHROLITHIASIS: Primary | ICD-10-CM

## 2023-06-12 PROCEDURE — 99999 PR PBB SHADOW E&M-EST. PATIENT-LVL IV: CPT | Mod: PBBFAC,,, | Performed by: UROLOGY

## 2023-06-12 PROCEDURE — 99214 OFFICE O/P EST MOD 30 MIN: CPT | Mod: PBBFAC | Performed by: UROLOGY

## 2023-06-12 PROCEDURE — 99214 OFFICE O/P EST MOD 30 MIN: CPT | Mod: S$PBB,,, | Performed by: UROLOGY

## 2023-06-12 PROCEDURE — 99214 PR OFFICE/OUTPT VISIT, EST, LEVL IV, 30-39 MIN: ICD-10-PCS | Mod: S$PBB,,, | Performed by: UROLOGY

## 2023-06-12 PROCEDURE — 99999 PR PBB SHADOW E&M-EST. PATIENT-LVL IV: ICD-10-PCS | Mod: PBBFAC,,, | Performed by: UROLOGY

## 2023-06-12 RX ORDER — DOXYCYCLINE HYCLATE 100 MG
100 TABLET ORAL 2 TIMES DAILY
Qty: 28 TABLET | Refills: 0 | Status: SHIPPED | OUTPATIENT
Start: 2023-06-12 | End: 2023-06-29 | Stop reason: SDUPTHER

## 2023-06-12 NOTE — PROGRESS NOTES
Chief Complaint:   Encounter Diagnoses   Name Primary?    Nephrolithiasis Yes    Elevated prostate specific antigen (PSA)      Erectile dysfunction, unspecified erectile dysfunction type          HPI:    6/12/23- here today for his annual appointment, still having some groin pain but also some hematospermia recently.  Otherwise no voiding complaints, no stone pain.    70-year-old gentleman who comes in with a lump in his left groin, previous ultrasound was negative.  He has also been having some burning and discomfort in his testicles for some time now.  Along cannot just few weeks ago, it seems to be intermittent in nature with no discomfort.  He has no redness to the area.  The testicles he states is a burning or discomfort, at best a 2/10 on a pain scale.  Seems to last for about a day and in goes away.  No alleviating or eliciting factors.  He is not taking any medications for this.  He has no lower urinary tract symptoms, he only gets up 1 time per evening to void.  He has a good stream, with no issues in regards to frequency or urgency.  No gross hematuria, does have a history of smoking.  He has passed multiple stones in his life time, the last being 2 years ago.  He has required no surgical management for these.  He has been told that he has 1 stone left on the left side, which is obviously suspicious considering the testicle pain is on the left.  No previous urological history except for some erectile dysfunction, which she relates to his diabetes.  Viagra worked years ago, but the side effects were bothersome and he did not want to take this any longer.  He is currently having no issues with this at this time.  Patient had a grandmother with bladder cancer, his father also had kidney stones, no other urological cancers or stones within the family.  He currently is having little bit of left testicular discomfort, no groin mass today though.    Allergies:  Patient has no known allergies.    Medications:  has  a current medication list which includes the following prescription(s): amlodipine, ascorbic acid, aspirin, diclofenac, jardiance, ergocalciferol (vitamin d2), freestyle lancets, insulin aspart u-100, krill oil, lancets, lantus solostar u-100 insulin, losartan, mupirocin, pen needle, diabetic, pravastatin, sildenafil, and mounjaro, and the following Facility-Administered Medications: epinephrine and lactated ringers.    Review of Systems:  General: No fever, chills, fatigability, or weight loss.  Skin: No rashes, itching, or changes in color or texture of skin.  Chest: Denies CHOU, cyanosis, wheezing, cough, and sputum production.  Abdomen: Appetite fine. No weight loss. Denies diarrhea, abdominal pain, hematemesis, or blood in stool.  Musculoskeletal: No joint stiffness or swelling. Denies back pain.  : As above.  All other review of systems negative.    PMH:   has a past medical history of Coronary artery disease (2010), DM (diabetes mellitus) (2002), DM (diabetes mellitus), DM (diabetes mellitus), Groin pain, left (2/11/2021), Hyperlipemia, Hypertension, Kidney stones, Nephrolithiasis (2/11/2021), Neuropathy, and Type 2 diabetes mellitus (10-12 years).    PSH:   has a past surgical history that includes Colonoscopy (N/A, 3/31/2017); Knee arthroscopy (Left); Dental surgery; Carpal tunnel release (Right, 07/2020); Robot-assisted laparoscopic repair of inguinal hernia using da Emeka Xi (Left, 2/26/2021); and Colonoscopy (N/A, 4/18/2022).    FamHx: family history includes Cataracts in his maternal grandmother and paternal uncle; Diabetes in his brother, father, mother, sister, and sister; Glaucoma in his mother; Heart attack (age of onset: 58) in his father; Heart disease (age of onset: 75) in his mother.    SocHx:  reports that he quit smoking about 23 years ago. His smoking use included cigarettes. He has a 20.00 pack-year smoking history. He has never been exposed to tobacco smoke. He has never used smokeless  tobacco. He reports that he does not drink alcohol and does not use drugs.      Physical Exam:  There were no vitals filed for this visit.  General: A&Ox3, no apparent distress, no deformities  Neck: No masses, normal ROM  Lungs: normal inspiration, no use of accessory muscles  Heart: normal pulse, no arrhythmias  Abdomen: Soft, NT, ND, no masses, no hernias, no hepatosplenomegaly  Skin: The skin is warm and dry. No jaundice.  Ext: No c/c/e.  : 2/21- Test desc lamar, no abnormalities of epididymus. Normal penile and scrotal skin. Meatus normal.    Labs/Studies:   PSA 0.77 7/22  ALEJANDRA negative 5/21  CT left inguinal hernia 2/21  Abdominal ultrasound negative 1/21    Impression/Plan:        1. Nephrolithiasis- no evidence of stone pain, due for annual KUB and renal ultrasound and proceed as appropriate.  Call with any stone pain in the meantime, otherwise see me in a year with a KUB, renal ultrasound and a PSA.    2. Erectile dysfunction- proceed with sildenafil 100 mg, may take a half dose, re-evaluate at future appointments.    3. Left groin pain- still persistent, work up normal though.  Due to the hematospermia will attempt 2 weeks of doxycycline, and he will let me know of any changes or the need to take more than 2 weeks of antibiotics, otherwise re-evaluate with future appointments.  Call with any complaints in the meantime.  PSA today and proceed as appropriate.

## 2023-06-13 ENCOUNTER — HOSPITAL ENCOUNTER (OUTPATIENT)
Dept: RADIOLOGY | Facility: HOSPITAL | Age: 73
Discharge: HOME | End: 2023-06-13
Attending: UROLOGY
Payer: MEDICARE

## 2023-06-13 DIAGNOSIS — N20.0 NEPHROLITHIASIS: ICD-10-CM

## 2023-06-13 PROCEDURE — 74018 RADEX ABDOMEN 1 VIEW: CPT | Mod: TC

## 2023-06-13 PROCEDURE — 74018 RADEX ABDOMEN 1 VIEW: CPT | Mod: 26,,, | Performed by: RADIOLOGY

## 2023-06-13 PROCEDURE — 74018 XR ABDOMEN AP 1 VIEW: ICD-10-PCS | Mod: 26,,, | Performed by: RADIOLOGY

## 2023-06-19 ENCOUNTER — OFFICE VISIT (OUTPATIENT)
Dept: DIABETES | Facility: CLINIC | Age: 73
End: 2023-06-19
Payer: MEDICARE

## 2023-06-19 DIAGNOSIS — I25.10 CORONARY ARTERY DISEASE INVOLVING NATIVE CORONARY ARTERY OF NATIVE HEART WITHOUT ANGINA PECTORIS: ICD-10-CM

## 2023-06-19 DIAGNOSIS — E11.8 TYPE 2 DIABETES MELLITUS WITH COMPLICATION, WITH LONG-TERM CURRENT USE OF INSULIN: Primary | ICD-10-CM

## 2023-06-19 DIAGNOSIS — Z79.4 TYPE 2 DIABETES MELLITUS WITH COMPLICATION, WITH LONG-TERM CURRENT USE OF INSULIN: Primary | ICD-10-CM

## 2023-06-19 DIAGNOSIS — E78.5 HYPERLIPIDEMIA ASSOCIATED WITH TYPE 2 DIABETES MELLITUS: ICD-10-CM

## 2023-06-19 DIAGNOSIS — E11.69 HYPERLIPIDEMIA ASSOCIATED WITH TYPE 2 DIABETES MELLITUS: ICD-10-CM

## 2023-06-19 DIAGNOSIS — I15.2 HYPERTENSION ASSOCIATED WITH DIABETES: ICD-10-CM

## 2023-06-19 DIAGNOSIS — E11.59 HYPERTENSION ASSOCIATED WITH DIABETES: ICD-10-CM

## 2023-06-19 DIAGNOSIS — G47.33 OBSTRUCTIVE SLEEP APNEA: ICD-10-CM

## 2023-06-19 PROCEDURE — 99213 PR OFFICE/OUTPT VISIT, EST, LEVL III, 20-29 MIN: ICD-10-PCS | Mod: 95,,, | Performed by: PHYSICIAN ASSISTANT

## 2023-06-19 PROCEDURE — 99213 OFFICE O/P EST LOW 20 MIN: CPT | Mod: 95,,, | Performed by: PHYSICIAN ASSISTANT

## 2023-06-19 NOTE — PROGRESS NOTES
PCP: EM Ochoa Jr, MD    Subjective:     Chief Complaint: Diabetes - Established Patient    TELEMEDICINE VISIT:     The patient location is: Home  The chief complaint leading to consultation is: Diabetes Follow up  Visit type: Virtual visit with synchronous audio and video  Total time spent with patient: 7  Each patient to whom he or she provides medical services by telemedicine is:  (1) informed of the relationship between the physician and patient and the respective role of any other health care provider with respect to management of the patient; and (2) notified that he or she may decline to receive medical services by telemedicine and may withdraw from such care at any time.    Notes: N/A      HISTORY OF PRESENT ILLNESS: 72 y.o.  male presenting for diabetes management visit.   The patient's last visit with me was on 11/7/2022.  Patient has had Type II diabetes since 1990s.  Pertinent to decision making is the following comorbidities: HTN, HLD, CAD and Overweight by BMI  Patient has the following Diabetes complications: without complications  He has attended diabetes education in the past.     Patient's most recent A1c of 6.6% was completed 1 weeks ago.   Patient states since His last A1c His blood glucose levels have been within range throughout the day .   Patient monitors blood glucose 3 times per day with [facility] meter : Fasting, Before Lunch and Before Dinner. CGM declined.   Patient blood glucose monitoring device data will be reviewed under the Episodes tab today - see below.   Patient endorses the following diabetes related symptoms: None.   Patient is due today for the following diabetes-related health maintenance standards: None - up to date.   He denies any recent hospital admissions or emergency room visits.   He denies having hypoglycemia.  Patient's concerns today include glycemic control.     Timothy's recent readings (past 60 days):  Time Taken Glucose (mg/dL)    6/19/2023  8:18    6/18/2023  5:53    6/18/2023 12:24    6/18/2023  7:52    6/17/2023  4:50    6/17/2023  1:48 PM 79   6/17/2023  9:04    6/16/2023  6:03 PM 81   6/16/2023  1:06    6/16/2023  8:17    6/15/2023  6:07    6/15/2023  1:10    6/15/2023  9:03    6/14/2023  5:51    6/14/2023  1:19    6/14/2023  8:07    6/13/2023  5:58    6/13/2023  1:22    6/13/2023  9:53    6/12/2023  6:07 PM 88   6/12/2023 11:56    6/12/2023  8:03    6/11/2023  7:30    6/11/2023 12:06    6/11/2023  9:08    6/10/2023  6:17 PM 94   6/10/2023  1:03    6/10/2023  7:37    6/9/2023  8:41    6/8/2023  6:06    6/8/2023  1:07 PM 78   6/8/2023  8:04    6/7/2023  5:31    6/7/2023  1:41    6/7/2023  8:07    6/6/2023  5:52    6/6/2023  1:10    6/6/2023  7:26    6/5/2023  5:59 PM 94   6/5/2023 12:32    6/5/2023  8:00    6/4/2023  6:39    6/4/2023 12:20 PM 88   6/4/2023  8:01    6/3/2023  6:49    6/3/2023 12:35    6/3/2023  8:38    6/2/2023  5:27    6/2/2023  1:47    6/2/2023  7:37    6/1/2023  5:56    6/1/2023  9:25    5/31/2023  5:55    5/31/2023  1:59    5/31/2023  9:00    5/30/2023  5:46    5/30/2023  1:07 PM 75   5/30/2023  8:20    5/29/2023  6:08 PM 98   5/29/2023 12:58    5/29/2023  9:53    5/28/2023  7:24    5/28/2023 12:02    5/28/2023  8:15    5/27/2023  4:57    5/27/2023  1:07    5/27/2023  8:06    5/26/2023  6:50 PM 92   5/26/2023 12:42 PM 88   5/26/2023  6:53    5/25/2023  5:40    5/25/2023 12:37 PM 98   5/25/2023  8:09    5/24/2023  6:13 PM 85   5/24/2023  1:15    5/24/2023  7:53    5/23/2023  6:04    5/23/2023  8:46    5/22/2023  6:03    5/22/2023   2:06    5/22/2023  8:37    5/21/2023  7:32    5/21/2023 11:42 AM 77   5/21/2023  8:00    5/20/2023  6:09 PM 90   5/20/2023  6:49    5/19/2023  5:38 PM 97   5/19/2023 11:46    5/19/2023  7:29        Patient medication regimen is as below.     CURRENT DM MEDICATIONS:   Metformin - holding  Lantus 30 units at night   Humalog 6 units w/ ss TID wm and 3 units with dessert  Jardiance 25 mg daily  Mounjaro 15 mg weekly     WITH MEALS:   If blood sugar is below 70 eat first then check your blood sugar 2 hours later and make correction.  If blood pre-meal sugar is  70 -150 take 6 units of Novolog;  If blood pre-meal sugar is 151-200 take +2 units of Novolog;  If blood pre-meal sugar is 201-250 take +4 units of Novolog;  If blood pre-meal sugar is 251-300 take +6 units of Novolog;  If blood pre-meal sugar is 301-350 take +8 units of Novolog;  If blood pre-meal sugar is 351-400+ take +10 units of Novolog    Labs Reviewed.       No results found for: CPEPTIDE  No results found for: GLUTAMICACID       //   , There is no height or weight on file to calculate BMI.  His blood sugar in clinic today is:    Lab Results   Component Value Date    POCGLU 98 04/18/2022       Review of Systems   Constitutional:  Negative for activity change, appetite change, chills and fever.   HENT:  Negative for dental problem, mouth sores, nosebleeds, sore throat and trouble swallowing.    Eyes:  Negative for pain and discharge.   Respiratory:  Negative for shortness of breath, wheezing and stridor.    Cardiovascular:  Negative for chest pain, palpitations and leg swelling.   Gastrointestinal:  Negative for abdominal pain, diarrhea, nausea and vomiting.   Endocrine: Negative for polydipsia, polyphagia and polyuria.   Genitourinary:  Negative for dysuria, frequency and urgency.   Musculoskeletal:  Negative for joint swelling and myalgias.   Skin:  Negative for rash and wound.   Neurological:  Negative for  dizziness, syncope, weakness and headaches.   Psychiatric/Behavioral:  Negative for behavioral problems and dysphoric mood.        Diabetes Management Status  Statin: Taking  ACE/ARB: Taking    Screening or Prevention Patient's value Goal Complete/Controlled?   HgA1C Testing and Control   Lab Results   Component Value Date    HGBA1C 6.6 (H) 2023      Annually/Less than 8% Yes   Lipid profile : 2023 Annually Yes   LDL control Lab Results   Component Value Date    LDLCALC 107.8 2023    Annually/Less than 100 mg/dl  Yes   Nephropathy screening Lab Results   Component Value Date    MICALBCREAT 11.3 10/06/2022     No results found for: PROTEINUA Annually Yes   Blood pressure BP Readings from Last 1 Encounters:   23 116/66    Less than 140/90 Yes   Dilated retinal exam : 2023 Annually Yes    Foot exam   : 2023 Annually Yes     ACTIVITY LEVEL: Moderately Active. Discussed activities, benefits, methods, and precautions.  MEAL PLANNING: Patient reports number of meals per day to be 3 and number of snacks per day to be 2.   Patient is encouraged to carb count and consume no more than 30 - 45 grams of carbohydrates in each meal and 15 grams of carbohydrates in each snack.     Social History     Socioeconomic History    Marital status:    Tobacco Use    Smoking status: Former     Packs/day: 1.00     Years: 20.00     Pack years: 20.00     Types: Cigarettes     Quit date: 2000     Years since quittin.4     Passive exposure: Never    Smokeless tobacco: Never   Substance and Sexual Activity    Alcohol use: No     Alcohol/week: 0.0 standard drinks    Drug use: No    Sexual activity: Yes     Partners: Female   Social History Narrative    . Lives with spouse. Has 2 children. Retired. No pets or smokers in household.     Social Determinants of Health     Financial Resource Strain: Low Risk     Difficulty of Paying Living Expenses: Not hard at all   Food Insecurity: No Food  Insecurity    Worried About Running Out of Food in the Last Year: Never true    Ran Out of Food in the Last Year: Never true   Transportation Needs: No Transportation Needs    Lack of Transportation (Medical): No    Lack of Transportation (Non-Medical): No   Physical Activity: Inactive    Days of Exercise per Week: 0 days    Minutes of Exercise per Session: 0 min   Stress: No Stress Concern Present    Feeling of Stress : Only a little   Social Connections: Socially Integrated    Frequency of Communication with Friends and Family: More than three times a week    Frequency of Social Gatherings with Friends and Family: More than three times a week    Attends Sabianist Services: More than 4 times per year    Active Member of Clubs or Organizations: Yes    Attends Club or Organization Meetings: More than 4 times per year    Marital Status:    Housing Stability: Low Risk     Unable to Pay for Housing in the Last Year: No    Number of Places Lived in the Last Year: 1    Unstable Housing in the Last Year: No     Past Medical History:   Diagnosis Date    Coronary artery disease 2010    [facility] - no stents    DM (diabetes mellitus) 2002     lunch 10/28/2016    DM (diabetes mellitus)      am 04/12/2021    DM (diabetes mellitus)     BS 96 am 04/13/2022 Insulin for years    Groin pain, left 2/11/2021    Hyperlipemia     Hypertension     Kidney stones     Nephrolithiasis 2/11/2021    Neuropathy     Type 2 diabetes mellitus 10-12 years     am 10/31/2014       Objective:        Physical Exam  Constitutional:       General: He is not in acute distress.     Appearance: He is well-developed. He is not diaphoretic.   HENT:      Head: Normocephalic and atraumatic.      Right Ear: External ear normal.      Left Ear: External ear normal.      Nose: Nose normal.   Eyes:      General:         Right eye: No discharge.         Left eye: No discharge.   Pulmonary:      Effort: Pulmonary effort is normal. No respiratory  distress.      Breath sounds: No stridor.   Musculoskeletal:         General: Normal range of motion.      Cervical back: Normal range of motion.   Skin:     Coloration: Skin is not pale.   Neurological:      Mental Status: He is alert and oriented to person, place, and time. Mental status is at baseline.      Motor: No abnormal muscle tone.      Coordination: Coordination normal.   Psychiatric:         Mood and Affect: Mood normal.         Behavior: Behavior normal.         Thought Content: Thought content normal.         Judgment: Judgment normal.         Assessment / Plan:     Type 2 diabetes mellitus with complication, with long-term current use of insulin    Hypertension associated with diabetes    Hyperlipidemia associated with type 2 diabetes mellitus    Coronary artery disease involving native coronary artery of native heart without angina pectoris    Obstructive sleep apnea    BMI 24.0-24.9, adult      Additional Plan Details:    - POCT Glucose  - Encouraged continuation of lifestyle changes including regular exercise and limiting carbohydrates to 30-45 grams per meal threes times daily and 15 grams per snack with a limit of two daily.   - Encouraged continued monitoring of blood glucose with maintenance of 3 times daily at Fasting, Before Lunch and Before Dinner. CGM declined.   - Current DM Medication Regimen: continue Mounjaro 15 mg weekly. Continue Lantus 30 units qhs. Continue Novolog 6 units with ss TID wm - see below and 3 units with ice cream or heavy carb meals. Continue Jardiance 25 mg daily. Stop Metformin.    - Health Maintenance standards addressed today: None - up to date  - Nursing Visit: Patient is below goal range for nursing visit for age group and will not need nursing visit at this time .   - Follow up in  12  weeks with A1c prior.    CURRENT DM MEDICATIONS:   Lantus 30 units at night   Humalog 6 units w/ ss TID wm and 3 units with dessert  Jardiance 25 mg daily  Mounjaro 15 mg weekly      WITH MEALS:   If blood sugar is below 70 eat first then check your blood sugar 2 hours later and make correction.  If blood pre-meal sugar is  70 -150 take 6 units of Novolog;  If blood pre-meal sugar is 151-200 take +2 units of Novolog;  If blood pre-meal sugar is 201-250 take +4 units of Novolog;  If blood pre-meal sugar is 251-300 take +6 units of Novolog;  If blood pre-meal sugar is 301-350 take +8 units of Novolog;  If blood pre-meal sugar is 351-400+ take +10 units of Novolog       Blakeney McKnight, PA-C [facility]

## 2023-06-23 ENCOUNTER — HOSPITAL ENCOUNTER (OUTPATIENT)
Dept: RADIOLOGY | Facility: HOSPITAL | Age: 73
Discharge: HOME | End: 2023-06-23
Attending: UROLOGY
Payer: MEDICARE

## 2023-06-23 DIAGNOSIS — N20.0 NEPHROLITHIASIS: ICD-10-CM

## 2023-06-23 PROCEDURE — 76770 US EXAM ABDO BACK WALL COMP: CPT | Mod: TC

## 2023-06-23 PROCEDURE — 76770 US EXAM ABDO BACK WALL COMP: CPT | Mod: 26,,, | Performed by: RADIOLOGY

## 2023-06-23 PROCEDURE — 76770 US RETROPERITONEAL COMPLETE: ICD-10-PCS | Mod: 26,,, | Performed by: RADIOLOGY

## 2023-06-29 RX ORDER — DOXYCYCLINE HYCLATE 100 MG
100 TABLET ORAL 2 TIMES DAILY
Qty: 28 TABLET | Refills: 0 | Status: SHIPPED | OUTPATIENT
Start: 2023-06-29 | End: 2023-08-04 | Stop reason: SDUPTHER

## 2023-06-29 RX ORDER — DOXYCYCLINE HYCLATE 100 MG
100 TABLET ORAL 2 TIMES DAILY
Qty: 28 TABLET | Refills: 0 | Status: CANCELLED | OUTPATIENT
Start: 2023-06-29

## 2023-07-03 ENCOUNTER — PATIENT MESSAGE (OUTPATIENT)
Dept: DIABETES | Facility: CLINIC | Age: 73
End: 2023-07-03
Payer: MEDICARE

## 2023-07-03 DIAGNOSIS — E11.8 TYPE 2 DIABETES MELLITUS WITH COMPLICATION, WITH LONG-TERM CURRENT USE OF INSULIN: Primary | ICD-10-CM

## 2023-07-03 DIAGNOSIS — Z79.4 TYPE 2 DIABETES MELLITUS WITH COMPLICATION, WITH LONG-TERM CURRENT USE OF INSULIN: Primary | ICD-10-CM

## 2023-07-03 RX ORDER — TIRZEPATIDE 12.5 MG/.5ML
12.5 INJECTION, SOLUTION SUBCUTANEOUS
Qty: 4 PEN | Refills: 11 | Status: SHIPPED | OUTPATIENT
Start: 2023-07-03 | End: 2023-10-06 | Stop reason: SDUPTHER

## 2023-07-03 NOTE — TELEPHONE ENCOUNTER
Mounjaro 12.5 mg weekly as alternative to 15 mg due to backorder. Will have staff call and let him know we will call in closest dose to [facility] for him and wife and to send me a message 1 week after they start doses so I can adjust their other meds.     Al Daugherty PA-C  Diabetes Management

## 2023-07-31 RX ORDER — INSULIN LISPRO 100 [IU]/ML
INJECTION, SOLUTION INTRAVENOUS; SUBCUTANEOUS
Qty: 75 ML | Refills: 3 | Status: SHIPPED | OUTPATIENT
Start: 2023-07-31 | End: 2023-08-04 | Stop reason: CLARIF

## 2023-08-04 ENCOUNTER — PATIENT MESSAGE (OUTPATIENT)
Dept: UROLOGY | Facility: CLINIC | Age: 73
End: 2023-08-04
Payer: MEDICARE

## 2023-08-04 ENCOUNTER — TELEPHONE (OUTPATIENT)
Dept: UROLOGY | Facility: CLINIC | Age: 73
End: 2023-08-04
Payer: MEDICARE

## 2023-08-04 ENCOUNTER — PATIENT MESSAGE (OUTPATIENT)
Dept: DIABETES | Facility: CLINIC | Age: 73
End: 2023-08-04
Payer: MEDICARE

## 2023-08-04 DIAGNOSIS — N39.0 RECURRENT UTI: Primary | ICD-10-CM

## 2023-08-04 RX ORDER — INSULIN ASPART 100 [IU]/ML
INJECTION, SOLUTION INTRAVENOUS; SUBCUTANEOUS
Qty: 75 ML | Refills: 3 | Status: SHIPPED | OUTPATIENT
Start: 2023-08-04 | End: 2023-10-06 | Stop reason: SDUPTHER

## 2023-08-04 RX ORDER — DOXYCYCLINE HYCLATE 100 MG
100 TABLET ORAL 2 TIMES DAILY
Qty: 28 TABLET | Refills: 0 | Status: SHIPPED | OUTPATIENT
Start: 2023-08-04 | End: 2024-02-16

## 2023-08-16 ENCOUNTER — OFFICE VISIT (OUTPATIENT)
Dept: URGENT CARE | Facility: CLINIC | Age: 73
End: 2023-08-16
Payer: MEDICARE

## 2023-08-16 VITALS
DIASTOLIC BLOOD PRESSURE: 63 MMHG | BODY MASS INDEX: 24.5 KG/M2 | WEIGHT: 175 LBS | TEMPERATURE: 98 F | SYSTOLIC BLOOD PRESSURE: 127 MMHG | HEIGHT: 71 IN | HEART RATE: 69 BPM | OXYGEN SATURATION: 97 % | RESPIRATION RATE: 18 BRPM

## 2023-08-16 DIAGNOSIS — J02.9 SORE THROAT: ICD-10-CM

## 2023-08-16 DIAGNOSIS — J34.89 SINUS PRESSURE: ICD-10-CM

## 2023-08-16 DIAGNOSIS — R09.81 NASAL CONGESTION: ICD-10-CM

## 2023-08-16 DIAGNOSIS — J06.9 VIRAL URI WITH COUGH: Primary | ICD-10-CM

## 2023-08-16 LAB
CTP QC/QA: YES
SARS-COV-2 AG RESP QL IA.RAPID: NEGATIVE

## 2023-08-16 PROCEDURE — 87811 SARS-COV-2 COVID19 W/OPTIC: CPT | Mod: QW,S$GLB,, | Performed by: NURSE PRACTITIONER

## 2023-08-16 PROCEDURE — 87811 SARS CORONAVIRUS 2 ANTIGEN POCT, MANUAL READ: ICD-10-PCS | Mod: QW,S$GLB,, | Performed by: NURSE PRACTITIONER

## 2023-08-16 PROCEDURE — 99213 PR OFFICE/OUTPT VISIT, EST, LEVL III, 20-29 MIN: ICD-10-PCS | Mod: S$GLB,,, | Performed by: NURSE PRACTITIONER

## 2023-08-16 PROCEDURE — 99213 OFFICE O/P EST LOW 20 MIN: CPT | Mod: S$GLB,,, | Performed by: NURSE PRACTITIONER

## 2023-08-16 NOTE — PROGRESS NOTES
"restSubjective:      Patient ID: Timothy Ortega is a 72 y.o. male.    Vitals:  height is 5' 11" (1.803 m) and weight is 79.4 kg (175 lb). His oral temperature is 98 °F (36.7 °C). His blood pressure is 127/63 and his pulse is 69. His respiration is 18 and oxygen saturation is 97%.     Chief Complaint: Sore Throat    71 y/o male presents for evaluation of dry cough, runny nose, nasal congestion, sinus pressure, and sore throat x 3 days. Reports symptom onset Monday with sore throat that progressed to bilateral ear fullness, headache, and body aches. OTC antihistamine without relief. Denies sick contacts.    Sore Throat   This is a new problem. The current episode started in the past 7 days. The problem has been unchanged. Neither side of throat is experiencing more pain than the other. There has been no fever. The pain is at a severity of 3/10. The pain is mild. Associated symptoms include congestion, coughing, ear pain (fullness) and headaches. Pertinent negatives include no abdominal pain, diarrhea, drooling, ear discharge, hoarse voice, plugged ear sensation, neck pain, shortness of breath, stridor, swollen glands, trouble swallowing or vomiting.       Constitution: Negative. Negative for appetite change, chills, sweating, fatigue and fever.   HENT:  Positive for ear pain (fullness), congestion, postnasal drip, sinus pressure and sore throat. Negative for ear discharge, drooling, sinus pain and trouble swallowing.    Neck: neck negative. Negative for neck pain.   Cardiovascular: Negative.    Eyes: Negative.    Respiratory:  Positive for cough. Negative for chest tightness, sputum production, shortness of breath, stridor and wheezing.    Gastrointestinal: Negative.  Negative for abdominal pain, nausea, vomiting and diarrhea.   Endocrine: negative.   Genitourinary: Negative.    Musculoskeletal:  Negative for muscle ache.   Skin: Negative.    Allergic/Immunologic: Negative.  Negative for sneezing.   Neurological:  " Positive for headaches.   Hematologic/Lymphatic: Negative.    Psychiatric/Behavioral: Negative.        Objective:     Physical Exam   Constitutional: He is oriented to person, place, and time. He is cooperative.  Non-toxic appearance. He does not appear ill. No distress. normalawake  HENT:   Head: Normocephalic and atraumatic.   Ears:   Right Ear: Hearing, tympanic membrane, external ear and ear canal normal. No cerumen not present. Tympanic membrane is not injected, not scarred, not perforated, not erythematous, not retracted and not bulging. impacted cerumen  Left Ear: Hearing, tympanic membrane, external ear and ear canal normal. No cerumen not present. Tympanic membrane is not injected, not scarred, not perforated, not erythematous, not retracted and not bulging. impacted cerumen  Nose: Mucosal edema and congestion present. No rhinorrhea or purulent discharge. Right sinus exhibits no maxillary sinus tenderness and no frontal sinus tenderness. Left sinus exhibits no maxillary sinus tenderness and no frontal sinus tenderness.   Mouth/Throat: Uvula is midline and mucous membranes are normal. Mucous membranes are moist. Posterior oropharyngeal erythema and cobblestoning present. No oropharyngeal exudate, posterior oropharyngeal edema or tonsillar abscesses. Tonsils are 0 on the right. Tonsils are 0 on the left. No tonsillar exudate.   Eyes: Conjunctivae are normal. Pupils are equal, round, and reactive to light. Right eye exhibits no discharge. Left eye exhibits no discharge. No scleral icterus. Extraocular movement intact   Neck: Neck supple.   Cardiovascular: Normal rate, regular rhythm and normal heart sounds.   Pulmonary/Chest: Effort normal and breath sounds normal. No stridor. No respiratory distress. He has no decreased breath sounds. He has no wheezes. He has no rhonchi. He has no rales. He exhibits no tenderness.   Abdominal: Normal appearance.   Musculoskeletal: Normal range of motion.         General:  Normal range of motion.   Neurological: no focal deficit. He is alert and oriented to person, place, and time.   Skin: Skin is warm, dry and not diaphoretic.   Psychiatric: His behavior is normal. Mood, judgment and thought content normal.   Vitals reviewed.    Results for orders placed or performed in visit on 08/16/23   SARS Coronavirus 2 Antigen, POCT Manual Read   Result Value Ref Range    SARS Coronavirus 2 Antigen Negative Negative     Acceptable Yes      *Note: Due to a large number of results and/or encounters for the requested time period, some results have not been displayed. A complete set of results can be found in Results Review.       Assessment:     1. Viral URI with cough    2. Sore throat    3. Nasal congestion    4. Sinus pressure        Plan:     Patient presents with symptoms that are consistent with acute viral URI. Decision to perform Covid swab to rule out infection. (-) result discussed with patient. Exam negative for sinusitis/otitis media/bacterial tonsillitis/bronchitis/pneumonia. Plan is to manage symptoms and prevent worsening. Discussed with patient who verbalizes understanding.      Viral URI with cough    Sore throat  -     SARS Coronavirus 2 Antigen, POCT Manual Read    Nasal congestion    Sinus pressure             Patient Instructions   Rest  Hydration/increase fluids  Steam/hot showers  Warm salt water gargles  Warm tea with lemon and honey  Claritin D OTC as directed for nasal congestion  Typical course and duration of illness discussed  Signs and symptoms of worsening discussed  Follow up as needed/with worsening

## 2023-08-16 NOTE — PATIENT INSTRUCTIONS
Rest  Hydration/increase fluids  Steam/hot showers  Warm salt water gargles  Warm tea with lemon and honey  Claritin D OTC as directed for nasal congestion  Typical course and duration of illness discussed  Signs and symptoms of worsening discussed  Follow up as needed/with worsening

## 2023-08-19 ENCOUNTER — TELEPHONE (OUTPATIENT)
Dept: URGENT CARE | Facility: CLINIC | Age: 73
End: 2023-08-19
Payer: MEDICARE

## 2023-08-30 ENCOUNTER — OFFICE VISIT (OUTPATIENT)
Dept: PODIATRY | Facility: CLINIC | Age: 73
End: 2023-08-30
Payer: MEDICARE

## 2023-08-30 DIAGNOSIS — E11.49 TYPE 2 DIABETES MELLITUS WITH OTHER NEUROLOGIC COMPLICATION, WITH LONG-TERM CURRENT USE OF INSULIN: ICD-10-CM

## 2023-08-30 DIAGNOSIS — Z79.4 TYPE 2 DIABETES MELLITUS WITH OTHER NEUROLOGIC COMPLICATION, WITH LONG-TERM CURRENT USE OF INSULIN: ICD-10-CM

## 2023-08-30 DIAGNOSIS — B35.1 ONYCHOMYCOSIS DUE TO DERMATOPHYTE: ICD-10-CM

## 2023-08-30 DIAGNOSIS — Q82.8 POROKERATOSIS: ICD-10-CM

## 2023-08-30 DIAGNOSIS — E11.9 ENCOUNTER FOR COMPREHENSIVE DIABETIC FOOT EXAMINATION, TYPE 2 DIABETES MELLITUS: Primary | ICD-10-CM

## 2023-08-30 PROCEDURE — 11056 PR TRIM BENIGN HYPERKERATOTIC SKIN LESION,2-4: ICD-10-PCS | Mod: Q9,S$PBB,, | Performed by: PODIATRIST

## 2023-08-30 PROCEDURE — 11720 DEBRIDE NAIL 1-5: CPT | Mod: 59,Q9,PBBFAC | Performed by: PODIATRIST

## 2023-08-30 PROCEDURE — 11719 PR TRIM NAIL(S): ICD-10-PCS | Mod: 51,Q9,S$PBB, | Performed by: PODIATRIST

## 2023-08-30 PROCEDURE — 11720 DEBRIDE NAIL 1-5: CPT | Mod: 59,Q9,S$PBB, | Performed by: PODIATRIST

## 2023-08-30 PROCEDURE — 11719 TRIM NAIL(S) ANY NUMBER: CPT | Mod: Q9,PBBFAC | Performed by: PODIATRIST

## 2023-08-30 PROCEDURE — 99213 OFFICE O/P EST LOW 20 MIN: CPT | Mod: 25,S$PBB,, | Performed by: PODIATRIST

## 2023-08-30 PROCEDURE — 11719 TRIM NAIL(S) ANY NUMBER: CPT | Mod: 51,Q9,S$PBB, | Performed by: PODIATRIST

## 2023-08-30 PROCEDURE — 99213 PR OFFICE/OUTPT VISIT, EST, LEVL III, 20-29 MIN: ICD-10-PCS | Mod: 25,S$PBB,, | Performed by: PODIATRIST

## 2023-08-30 PROCEDURE — 99999 PR PBB SHADOW E&M-EST. PATIENT-LVL III: ICD-10-PCS | Mod: PBBFAC,,, | Performed by: PODIATRIST

## 2023-08-30 PROCEDURE — 11056 PARNG/CUTG B9 HYPRKR LES 2-4: CPT | Mod: Q9,PBBFAC | Performed by: PODIATRIST

## 2023-08-30 PROCEDURE — 99999 PR PBB SHADOW E&M-EST. PATIENT-LVL III: CPT | Mod: PBBFAC,,, | Performed by: PODIATRIST

## 2023-08-30 PROCEDURE — 99213 OFFICE O/P EST LOW 20 MIN: CPT | Mod: 25,PBBFAC | Performed by: PODIATRIST

## 2023-08-30 PROCEDURE — 11720 PR DEBRIDEMENT OF NAIL(S), 1-5: ICD-10-PCS | Mod: 59,Q9,S$PBB, | Performed by: PODIATRIST

## 2023-08-30 PROCEDURE — 11056 PARNG/CUTG B9 HYPRKR LES 2-4: CPT | Mod: Q9,S$PBB,, | Performed by: PODIATRIST

## 2023-08-30 NOTE — PROGRESS NOTES
Subjective:       Patient ID: Timothy Ortega is a 72 y.o. male.    Chief Complaint: Diabetic Foot Exam (Patient is a diabetic and next appointment with PCP Dr. Shlomo Ochoa is on 9.28.23. Denies pain at present and is wearing socks and tennis shoes;)      HPI: Timothy Ortega presents to the office today, under referral by , Shlomo Ochoa MD, for his annual diabetic foot assessment and risk evaluation.  Patient is a DMII. This patient last saw his/her internal/family medicine physician on 09/28/2023.  Relates 0/10 pain to the right left foot..     Hemoglobin A1C   Date Value Ref Range Status   06/13/2023 6.6 (H) 4.0 - 5.6 % Final     Comment:     ADA Screening Guidelines:  5.7-6.4%  Consistent with prediabetes  >or=6.5%  Consistent with diabetes    High levels of fetal hemoglobin interfere with the HbA1C  assay. Heterozygous hemoglobin variants (HbS, HgC, etc)do  not significantly interfere with this assay.   However, presence of multiple variants may affect accuracy.     03/21/2023 6.6 (H) 4.0 - 5.6 % Final     Comment:     ADA Screening Guidelines:  5.7-6.4%  Consistent with prediabetes  >or=6.5%  Consistent with diabetes    High levels of fetal hemoglobin interfere with the HbA1C  assay. Heterozygous hemoglobin variants (HbS, HgC, etc)do  not significantly interfere with this assay.   However, presence of multiple variants may affect accuracy.     10/06/2022 6.9 (H) 4.0 - 5.6 % Final     Comment:     ADA Screening Guidelines:  5.7-6.4%  Consistent with prediabetes  >or=6.5%  Consistent with diabetes    High levels of fetal hemoglobin interfere with the HbA1C  assay. Heterozygous hemoglobin variants (HbS, HgC, etc)do  not significantly interfere with this assay.   However, presence of multiple variants may affect accuracy.     .    Review of patient's allergies indicates:  No Known Allergies    Past Medical History:   Diagnosis Date    Coronary artery disease 2010    [facility] - no stents    DM  (diabetes mellitus)      lunch 10/28/2016    DM (diabetes mellitus)      am 2021    DM (diabetes mellitus)     BS 96 am 2022 Insulin for years    Groin pain, left 2021    Hyperlipemia     Hypertension     Kidney stones     Nephrolithiasis 2021    Neuropathy     Type 2 diabetes mellitus 10-12 years     am 10/31/2014       Family History   Problem Relation Age of Onset    Diabetes Mother     Glaucoma Mother     Heart disease Mother 75        CAB    Diabetes Father     Heart attack Father 58        Fatal MI    Diabetes Brother     Diabetes Sister     Diabetes Sister     Cataracts Paternal Uncle     Cataracts Maternal Grandmother        Social History     Socioeconomic History    Marital status:    Tobacco Use    Smoking status: Former     Current packs/day: 0.00     Average packs/day: 1 pack/day for 20.0 years (20.0 ttl pk-yrs)     Types: Cigarettes     Start date: 1980     Quit date: 2000     Years since quittin.6     Passive exposure: Never    Smokeless tobacco: Never   Substance and Sexual Activity    Alcohol use: No     Alcohol/week: 0.0 standard drinks of alcohol    Drug use: No    Sexual activity: Yes     Partners: Female   Social History Narrative    . Lives with spouse. Has 2 children. Retired. No pets or smokers in household.     Social Determinants of Health     Financial Resource Strain: Low Risk  (3/1/2023)    Overall Financial Resource Strain (CARDIA)     Difficulty of Paying Living Expenses: Not hard at all   Food Insecurity: No Food Insecurity (3/1/2023)    Hunger Vital Sign     Worried About Running Out of Food in the Last Year: Never true     Ran Out of Food in the Last Year: Never true   Transportation Needs: No Transportation Needs (3/1/2023)    PRAPARE - Transportation     Lack of Transportation (Medical): No     Lack of Transportation (Non-Medical): No   Physical Activity: Inactive (3/1/2023)    Exercise Vital Sign     Days of  Exercise per Week: 0 days     Minutes of Exercise per Session: 0 min   Stress: No Stress Concern Present (3/1/2023)    [place] [employer] - Occupational Stress Questionnaire     Feeling of Stress : Only a little   Social Connections: Socially Integrated (3/1/2023)    Social Connection and Isolation Panel [NHANES]     Frequency of Communication with Friends and Family: More than three times a week     Frequency of Social Gatherings with Friends and Family: More than three times a week     Attends [employer] Services: More than 4 times per year     Active Member of Clubs or Organizations: Yes     Attends Club or Organization Meetings: More than 4 times per year     Marital Status:    Housing Stability: Low Risk  (3/1/2023)    Housing Stability Vital Sign     Unable to Pay for Housing in the Last Year: No     Number of Places Lived in the Last Year: 1     Unstable Housing in the Last Year: No       Past Surgical History:   Procedure Laterality Date    CARPAL TUNNEL RELEASE Right 07/2020    COLONOSCOPY N/A 3/31/2017    Procedure: COLONOSCOPY;  Surgeon: Cornelius Ibarra MD;  Location: [facility];  Service: Endoscopy;  Laterality: N/A;    COLONOSCOPY N/A 4/18/2022    Procedure: COLONOSCOPY;  Surgeon: Geneva Serna MD;  Location: [facility];  Service: Endoscopy;  Laterality: N/A;    DENTAL SURGERY      Implant    KNEE ARTHROSCOPY Left     ROBOT-ASSISTED LAPAROSCOPIC REPAIR OF INGUINAL HERNIA USING DA JOSE ANGEL XI Left 2/26/2021    Procedure: XI ROBOTIC REPAIR, HERNIA, INGUINAL;  Surgeon: Tavon Cruz MD;  Location: [facility];  Service: General;  Laterality: Left;       Review of Systems        Objective:   There were no vitals taken for this visit.    Physical Exam  LOWER EXTREMITY PHYSICAL EXAMINATION    ORTHOPEDIC:  No pain on palpation of the foot or ankle.   Range of motion within normal limits of the ankle joint, rearfoot, and forefoot.  Manual muscle strength testing is 5/5 with  dorsiflexion, plantar flexion, abduction and abduction of the lower extremity.  No pain with or without resistance.  The patient is a full to ambulate without pain or discomfort.  The patient's gait is non antalgic.  The patient does not utilize any assistive device for ambulation.    VASCULAR:  The right dorsalis pedis pulse 2/4 and the right posterior tibial pulse 2/4.  The left dorsalis pedis pulse 2/4 and posterior tibial pulse on the left is 2/4.  Capillary refill is intact.  Pedal hair growth intact    NEUROLOGY:  Protective sensation is intact with Custer Scarlett monofilament. Proprioception is intact. Intact sensation to light touch.  Vibratory sensation is diminished to the 1st metatarsal phalangeal joint.     DERMATOLOGY:  Skin is supple, moist, intact.  Porokeratosis present x1 to the right foot and x1 to the left foot.  No signs of underlying ulceration  The R1, 5 and left L1,5 are thickened, discolored dystrophic.  There is subungual debris.  Nail plates have area of dark discoloration.  The remaining nails 2-4 on the right foot and the left foot are elongated but of normal color, thickness, and texture.   There is no signs of ingrowing into the medial or lateral borders.  There is no evidence of wounds or skin breakdown.  No edema or erythema.  No obvious lacerations or fissuring.  Interdigital spaces are clean, dry, intact.  No rashes or scars appreciated.    Assessment:     1. Encounter for comprehensive diabetic foot examination, type 2 diabetes mellitus    2. Type 2 diabetes mellitus with other neurologic complication, with long-term current use of insulin    3. Onychomycosis due to dermatophyte    4. Porokeratosis        Plan:     Encounter for comprehensive diabetic foot examination, type 2 diabetes mellitus    Type 2 diabetes mellitus with other neurologic complication, with long-term current use of insulin    Onychomycosis due to dermatophyte    Porokeratosis      I counseled the patient on  his/her Diabetic Mellitus regarding today's clinical examination and objection findings. We did also discuss recent medication changes, pertinent labs and imaging evaluations and other medical consultation notes and progress notes. Greater than 50% of this visit was spent on counseling and coordination of care. Greater than 20 minutes of this appt. was spent on education about the diabetic foot, in relation to PVD and/or neuropathy, and the prevention of limb loss.     Shoe gear is inspected and wear and proper fit/type. Patient is reminded of the importance of good nutrition and blood sugar control to help prevent podiatric complications of diabetes. Patient instructed on proper foot hygeine. We discussed wearing proper shoe gear, daily foot inspections, never walking without protective shoe gear, never putting sharp instruments to feet.  Patient  will continue to monitor the areas daily, inspect feet, wear protective shoe gear when ambulatory, moisturizer to maintain skin integrity.     Patient's DMI/DMII is managed by Internal/Family Medicine Physician and/or Endocrinology Advanced Practice Provider.    Dystrophic nail plates, as outlined above (R#1,5  ; L#1,5 ), are sharply debrided with double action nail nipper, and/or with the assistance of a mechanical rotary blayne, with removal of all offending nail and nail border(s), for reduction of pains. Nails are reduced in terms of length, width and girth with removal of subungual debris to facilitate pain free weight bearing and ambulation. The elongated nails as outlined in the objective portion of this note, were trimmed to appropriate length, with a double action nail nipper, for alleviation/reduction of pains as well. Follow up in approx. 3-4 months.    Porokeratotic skin formation x2, as outlined within the examination portion of this note, is surgically debrided with sharp #10/#15 blade, to alleviate discomfort with weight bearing and ambulation, and to lessen  the possibility of skin complications, e.g., ulceration due to pressure. No ulceration(s) is are noted with/post debridement. The lesion is completed healed and resolved. No evidence of infection.

## 2023-09-02 DIAGNOSIS — Z79.4 TYPE 2 DIABETES MELLITUS WITH COMPLICATION, WITH LONG-TERM CURRENT USE OF INSULIN: ICD-10-CM

## 2023-09-02 DIAGNOSIS — E11.8 TYPE 2 DIABETES MELLITUS WITH COMPLICATION, WITH LONG-TERM CURRENT USE OF INSULIN: ICD-10-CM

## 2023-09-05 RX ORDER — INSULIN GLARGINE 100 [IU]/ML
INJECTION, SOLUTION SUBCUTANEOUS
Qty: 66 ML | Refills: 3 | Status: SHIPPED | OUTPATIENT
Start: 2023-09-05 | End: 2023-10-06 | Stop reason: SDUPTHER

## 2023-09-08 ENCOUNTER — PATIENT MESSAGE (OUTPATIENT)
Dept: ADMINISTRATIVE | Facility: OTHER | Age: 73
End: 2023-09-08
Payer: MEDICARE

## 2023-09-21 ENCOUNTER — LAB VISIT (OUTPATIENT)
Dept: LAB | Facility: HOSPITAL | Age: 73
End: 2023-09-21
Attending: NURSE PRACTITIONER
Payer: MEDICARE

## 2023-09-21 DIAGNOSIS — E11.8 TYPE 2 DIABETES MELLITUS WITH COMPLICATION, WITH LONG-TERM CURRENT USE OF INSULIN: ICD-10-CM

## 2023-09-21 DIAGNOSIS — Z79.4 TYPE 2 DIABETES MELLITUS WITH COMPLICATION, WITH LONG-TERM CURRENT USE OF INSULIN: ICD-10-CM

## 2023-09-21 DIAGNOSIS — E78.5 HYPERLIPIDEMIA ASSOCIATED WITH TYPE 2 DIABETES MELLITUS: ICD-10-CM

## 2023-09-21 DIAGNOSIS — E11.69 HYPERLIPIDEMIA ASSOCIATED WITH TYPE 2 DIABETES MELLITUS: ICD-10-CM

## 2023-09-21 LAB
ALBUMIN SERPL BCP-MCNC: 3.9 G/DL (ref 3.5–5.2)
ALP SERPL-CCNC: 45 U/L (ref 55–135)
ALT SERPL W/O P-5'-P-CCNC: 23 U/L (ref 10–44)
ANION GAP SERPL CALC-SCNC: 10 MMOL/L (ref 8–16)
AST SERPL-CCNC: 21 U/L (ref 10–40)
BILIRUB SERPL-MCNC: 0.8 MG/DL (ref 0.1–1)
BUN SERPL-MCNC: 15 MG/DL (ref 8–23)
CALCIUM SERPL-MCNC: 9.2 MG/DL (ref 8.7–10.5)
CHLORIDE SERPL-SCNC: 104 MMOL/L (ref 95–110)
CHOLEST SERPL-MCNC: 184 MG/DL (ref 120–199)
CHOLEST/HDLC SERPL: 6.3 {RATIO} (ref 2–5)
CO2 SERPL-SCNC: 24 MMOL/L (ref 23–29)
CREAT SERPL-MCNC: 0.9 MG/DL (ref 0.5–1.4)
EST. GFR  (NO RACE VARIABLE): >60 ML/MIN/1.73 M^2
ESTIMATED AVG GLUCOSE: 146 MG/DL (ref 68–131)
GLUCOSE SERPL-MCNC: 119 MG/DL (ref 70–110)
HBA1C MFR BLD: 6.7 % (ref 4–5.6)
HDLC SERPL-MCNC: 29 MG/DL (ref 40–75)
HDLC SERPL: 15.8 % (ref 20–50)
LDLC SERPL CALC-MCNC: 121.4 MG/DL (ref 63–159)
NONHDLC SERPL-MCNC: 155 MG/DL
POTASSIUM SERPL-SCNC: 4 MMOL/L (ref 3.5–5.1)
PROT SERPL-MCNC: 6.6 G/DL (ref 6–8.4)
SODIUM SERPL-SCNC: 138 MMOL/L (ref 136–145)
TRIGL SERPL-MCNC: 168 MG/DL (ref 30–150)

## 2023-09-21 PROCEDURE — 36415 COLL VENOUS BLD VENIPUNCTURE: CPT | Performed by: NURSE PRACTITIONER

## 2023-09-21 PROCEDURE — 80053 COMPREHEN METABOLIC PANEL: CPT | Performed by: NURSE PRACTITIONER

## 2023-09-21 PROCEDURE — 83036 HEMOGLOBIN GLYCOSYLATED A1C: CPT | Performed by: NURSE PRACTITIONER

## 2023-09-21 PROCEDURE — 80061 LIPID PANEL: CPT | Performed by: NURSE PRACTITIONER

## 2023-09-28 ENCOUNTER — OFFICE VISIT (OUTPATIENT)
Dept: INTERNAL MEDICINE | Facility: CLINIC | Age: 73
End: 2023-09-28
Payer: MEDICARE

## 2023-09-28 VITALS
DIASTOLIC BLOOD PRESSURE: 72 MMHG | HEART RATE: 63 BPM | BODY MASS INDEX: 24.85 KG/M2 | WEIGHT: 177.5 LBS | TEMPERATURE: 98 F | OXYGEN SATURATION: 99 % | HEIGHT: 71 IN | SYSTOLIC BLOOD PRESSURE: 120 MMHG | RESPIRATION RATE: 16 BRPM

## 2023-09-28 DIAGNOSIS — N20.0 NEPHROLITHIASIS: ICD-10-CM

## 2023-09-28 DIAGNOSIS — I25.10 CORONARY ARTERY DISEASE INVOLVING NATIVE CORONARY ARTERY OF NATIVE HEART WITHOUT ANGINA PECTORIS: ICD-10-CM

## 2023-09-28 DIAGNOSIS — E11.69 HYPERLIPIDEMIA ASSOCIATED WITH TYPE 2 DIABETES MELLITUS: ICD-10-CM

## 2023-09-28 DIAGNOSIS — E11.59 HYPERTENSION ASSOCIATED WITH DIABETES: ICD-10-CM

## 2023-09-28 DIAGNOSIS — E78.49 OTHER HYPERLIPIDEMIA: ICD-10-CM

## 2023-09-28 DIAGNOSIS — Z86.010 HISTORY OF COLON POLYPS: ICD-10-CM

## 2023-09-28 DIAGNOSIS — E11.8 TYPE 2 DIABETES MELLITUS WITH COMPLICATION, WITH LONG-TERM CURRENT USE OF INSULIN: ICD-10-CM

## 2023-09-28 DIAGNOSIS — R97.20 ELEVATED PROSTATE SPECIFIC ANTIGEN (PSA): ICD-10-CM

## 2023-09-28 DIAGNOSIS — Z79.4 TYPE 2 DIABETES MELLITUS WITH COMPLICATION, WITH LONG-TERM CURRENT USE OF INSULIN: ICD-10-CM

## 2023-09-28 DIAGNOSIS — E78.5 HYPERLIPIDEMIA ASSOCIATED WITH TYPE 2 DIABETES MELLITUS: ICD-10-CM

## 2023-09-28 DIAGNOSIS — I15.2 HYPERTENSION ASSOCIATED WITH DIABETES: ICD-10-CM

## 2023-09-28 DIAGNOSIS — Z00.00 WELL ADULT EXAM: Primary | ICD-10-CM

## 2023-09-28 DIAGNOSIS — G47.33 OBSTRUCTIVE SLEEP APNEA: ICD-10-CM

## 2023-09-28 PROCEDURE — 99999PBSHW FLU VACCINE - QUADRIVALENT - ADJUVANTED: ICD-10-PCS | Mod: PBBFAC,,,

## 2023-09-28 PROCEDURE — 99999 PR PBB SHADOW E&M-EST. PATIENT-LVL V: ICD-10-PCS | Mod: PBBFAC,,, | Performed by: PEDIATRICS

## 2023-09-28 PROCEDURE — 99999PBSHW FLU VACCINE - QUADRIVALENT - ADJUVANTED: Mod: PBBFAC,,,

## 2023-09-28 PROCEDURE — 99215 OFFICE O/P EST HI 40 MIN: CPT | Mod: PBBFAC | Performed by: PEDIATRICS

## 2023-09-28 PROCEDURE — 99999 PR PBB SHADOW E&M-EST. PATIENT-LVL V: CPT | Mod: PBBFAC,,, | Performed by: PEDIATRICS

## 2023-09-28 PROCEDURE — G0008 ADMIN INFLUENZA VIRUS VAC: HCPCS | Mod: PBBFAC

## 2023-09-28 PROCEDURE — 99397 PR PREVENTIVE VISIT,EST,65 & OVER: ICD-10-PCS | Mod: S$PBB,GZ,, | Performed by: PEDIATRICS

## 2023-09-28 PROCEDURE — 99397 PER PM REEVAL EST PAT 65+ YR: CPT | Mod: S$PBB,GZ,, | Performed by: PEDIATRICS

## 2023-09-28 NOTE — PROGRESS NOTES
Subjective     Patient ID: Timothy Ortega is a 72 y.o. male.    Chief Complaint: Follow-up    Timothy Ortega is a 72 y.o. male who presents to clinic for an annual follow up     1) HTN: B/P good, no HTNive symptoms. in Dig medicine  2) LIPIDS: Improved D&E, tolerating and compliant with med(s). statin change to crestor by digital medicine Weight steady.   3) DM: no hyper/hypoglycemic symptoms. Self monitoring normal-slightly elevated. Compliant with meds (metformin 1000 mg bid, Mounjaro, jardiance 25, lantus 30, humalog 6 with meals) Working with DM program.   4) CAD: no CP, SOB, CHOU. Followed by cards.   5) Prostate followed by urology still having hematospermia.    LABS REVIEWED AND DISCUSSED WITH PATIENT: A1C, CMP, lipids, microalbumin/creatinine    PMHx, PSHx, SocHx, and FHx reviewed and discussed with patient.       Review of Systems   Constitutional:  Negative for chills, diaphoresis, fatigue and fever.   HENT:  Negative for sore throat and trouble swallowing.    Respiratory:  Negative for cough and shortness of breath.    Cardiovascular:  Negative for chest pain.   Gastrointestinal:  Negative for abdominal pain, anal bleeding, constipation, diarrhea, nausea and vomiting.   Endocrine: Negative for cold intolerance, heat intolerance, polydipsia, polyphagia and polyuria.   Genitourinary:  Positive for erectile dysfunction.        Hematospermia  (+) Groin pain     Psychiatric/Behavioral:  Positive for sleep disturbance.    All other systems reviewed and are negative.         Objective     Physical Exam  Constitutional:       General: He is not in acute distress.     Appearance: Normal appearance. He is not ill-appearing or toxic-appearing.   Cardiovascular:      Rate and Rhythm: Normal rate and regular rhythm.      Pulses: Normal pulses.      Heart sounds: Normal heart sounds. No murmur heard.     No friction rub. No gallop.   Pulmonary:      Effort: Pulmonary effort is normal.      Breath sounds: Normal breath  sounds.   Abdominal:      General: There is no distension.      Palpations: There is no mass.      Tenderness: There is no abdominal tenderness. There is no guarding or rebound.      Hernia: No hernia is present.   Neurological:      Mental Status: He is alert and oriented to person, place, and time.   Psychiatric:         Mood and Affect: Mood normal.         Behavior: Behavior normal.         Thought Content: Thought content normal.         Judgment: Judgment normal.            Assessment and Plan     1. Well adult exam    2. Type 2 diabetes mellitus with complication, with long-term current use of insulin  -     Comprehensive Metabolic Panel; Future; Expected date: 09/28/2023  -     Lipid Panel; Future; Expected date: 09/28/2023  -     Hemoglobin A1C; Future; Expected date: 09/28/2023    3. Hyperlipidemia associated with type 2 diabetes mellitus    4. Hypertension associated with diabetes    5. Nephrolithiasis    6. Obstructive sleep apnea  Overview:  CPAP 11cm H20. [employer] [facility]. FFM.       7. Coronary artery disease involving native coronary artery of native heart without angina pectoris    8. History of colon polyps    9. Elevated prostate specific antigen (PSA)     10. Other hyperlipidemia    Other orders  -     Influenza - Quadrivalent (Adjuvanted)        At goal for B/P and A1c. Lipids not at goal begin taking  20mg of crestor. Will see Urology for groin pain and hematospermia. Maintain meds, self monitoring D&E, weight moderation.HMI discussed. F/U 6 months with labs. Continue with digital medicine. Vaccines discussed and reviewed.         Follow up in about 6 months (around 3/28/2024).

## 2023-10-06 ENCOUNTER — OFFICE VISIT (OUTPATIENT)
Dept: DIABETES | Facility: CLINIC | Age: 73
End: 2023-10-06
Payer: MEDICARE

## 2023-10-06 VITALS
HEIGHT: 71 IN | DIASTOLIC BLOOD PRESSURE: 69 MMHG | BODY MASS INDEX: 24.94 KG/M2 | HEART RATE: 63 BPM | SYSTOLIC BLOOD PRESSURE: 116 MMHG | WEIGHT: 178.13 LBS

## 2023-10-06 DIAGNOSIS — E11.59 HYPERTENSION ASSOCIATED WITH DIABETES: ICD-10-CM

## 2023-10-06 DIAGNOSIS — E11.8 TYPE 2 DIABETES MELLITUS WITH COMPLICATION, WITH LONG-TERM CURRENT USE OF INSULIN: Primary | ICD-10-CM

## 2023-10-06 DIAGNOSIS — G47.33 OBSTRUCTIVE SLEEP APNEA: ICD-10-CM

## 2023-10-06 DIAGNOSIS — Z79.4 TYPE 2 DIABETES MELLITUS WITH COMPLICATION, WITH LONG-TERM CURRENT USE OF INSULIN: Primary | ICD-10-CM

## 2023-10-06 DIAGNOSIS — I25.10 CORONARY ARTERY DISEASE INVOLVING NATIVE CORONARY ARTERY OF NATIVE HEART WITHOUT ANGINA PECTORIS: ICD-10-CM

## 2023-10-06 DIAGNOSIS — I15.2 HYPERTENSION ASSOCIATED WITH DIABETES: ICD-10-CM

## 2023-10-06 DIAGNOSIS — E11.69 HYPERLIPIDEMIA ASSOCIATED WITH TYPE 2 DIABETES MELLITUS: ICD-10-CM

## 2023-10-06 DIAGNOSIS — E78.5 HYPERLIPIDEMIA ASSOCIATED WITH TYPE 2 DIABETES MELLITUS: ICD-10-CM

## 2023-10-06 LAB — GLUCOSE SERPL-MCNC: 108 MG/DL (ref 70–110)

## 2023-10-06 PROCEDURE — 99999PBSHW POCT GLUCOSE, HAND-HELD DEVICE: Mod: PBBFAC,,,

## 2023-10-06 PROCEDURE — 99999 PR PBB SHADOW E&M-EST. PATIENT-LVL IV: ICD-10-PCS | Mod: PBBFAC,,, | Performed by: PHYSICIAN ASSISTANT

## 2023-10-06 PROCEDURE — 99999 PR PBB SHADOW E&M-EST. PATIENT-LVL IV: CPT | Mod: PBBFAC,,, | Performed by: PHYSICIAN ASSISTANT

## 2023-10-06 PROCEDURE — 99214 OFFICE O/P EST MOD 30 MIN: CPT | Mod: S$PBB,,, | Performed by: PHYSICIAN ASSISTANT

## 2023-10-06 PROCEDURE — 99214 PR OFFICE/OUTPT VISIT, EST, LEVL IV, 30-39 MIN: ICD-10-PCS | Mod: S$PBB,,, | Performed by: PHYSICIAN ASSISTANT

## 2023-10-06 PROCEDURE — 82962 GLUCOSE BLOOD TEST: CPT | Mod: PBBFAC | Performed by: PHYSICIAN ASSISTANT

## 2023-10-06 PROCEDURE — 99214 OFFICE O/P EST MOD 30 MIN: CPT | Mod: PBBFAC | Performed by: PHYSICIAN ASSISTANT

## 2023-10-06 PROCEDURE — 99999PBSHW POCT GLUCOSE, HAND-HELD DEVICE: ICD-10-PCS | Mod: PBBFAC,,,

## 2023-10-06 RX ORDER — INSULIN ASPART 100 [IU]/ML
INJECTION, SOLUTION INTRAVENOUS; SUBCUTANEOUS
Qty: 75 ML | Refills: 3 | Status: SHIPPED | OUTPATIENT
Start: 2023-10-06

## 2023-10-06 RX ORDER — TIRZEPATIDE 12.5 MG/.5ML
12.5 INJECTION, SOLUTION SUBCUTANEOUS
Qty: 4 PEN | Refills: 11 | Status: SHIPPED | OUTPATIENT
Start: 2023-10-06 | End: 2024-01-02 | Stop reason: SDUPTHER

## 2023-10-06 RX ORDER — INSULIN GLARGINE 100 [IU]/ML
INJECTION, SOLUTION SUBCUTANEOUS
Qty: 27 ML | Refills: 3 | Status: SHIPPED | OUTPATIENT
Start: 2023-10-06

## 2023-10-06 RX ORDER — PEN NEEDLE, DIABETIC 30 GX3/16"
NEEDLE, DISPOSABLE MISCELLANEOUS
Qty: 300 EACH | Refills: 3 | Status: SHIPPED | OUTPATIENT
Start: 2023-10-06

## 2023-10-06 NOTE — PROGRESS NOTES
PCP: Shlomo Ochoa MD    Subjective:     Chief Complaint: Diabetes - Established Patient    HISTORY OF PRESENT ILLNESS: 72 y.o.  male presenting for diabetes management visit.   The patient's last visit with me was on 6/19/2023.  Patient has had Type II diabetes since 1990s.  Pertinent to decision making is the following comorbidities: HTN, HLD, CAD and Overweight by BMI  Patient has the following Diabetes complications: without complications  He has attended diabetes education in the past.     Patient's most recent A1c of 6.7% was completed 3 weeks ago.   Patient states since His last A1c His blood glucose levels have been within range throughout the day .   Patient monitors blood glucose 3 times per day with [facility] meter : Fasting, Before Lunch and Before Dinner. CGM declined.   Patient blood glucose monitoring device data will be reviewed under the Episodes tab today - see below.   Patient endorses the following diabetes related symptoms: None.   Patient is due today for the following diabetes-related health maintenance standards: COVID-19 Vaccine .   He denies any recent hospital admissions or emergency room visits.   He denies having hypoglycemia.  Patient's concerns today include glycemic control.     Timothy's recent readings (past 60 days):  Time Taken Glucose (mg/dL)   10/6/2023  8:28    10/5/2023  6:01    10/5/2023  1:00 PM 96   10/5/2023  7:54    10/4/2023  4:47    10/4/2023 12:55    10/4/2023  8:27    10/3/2023  6:04    10/3/2023 12:33 PM 96   10/3/2023  8:43    10/2/2023  6:03 PM 98   10/2/2023  7:10    10/1/2023  7:36    10/1/2023 11:49    9/30/2023  1:56    9/30/2023  9:09    9/29/2023  6:26    9/29/2023 12:33    9/29/2023  8:01    9/28/2023  6:17    9/28/2023  1:24    9/28/2023  9:01    9/27/2023  4:47 PM 91   9/27/2023 12:02    9/27/2023  8:38 AM  125   9/26/2023  6:09 PM 82   9/26/2023 12:42    9/26/2023  8:20    9/25/2023  6:25    9/25/2023  9:18    9/24/2023  6:52    9/24/2023  7:46    9/23/2023  6:22    9/23/2023  2:54    9/23/2023  7:46    9/22/2023  6:07    9/21/2023 12:00    9/21/2023  7:46    9/20/2023  6:53 PM 95   9/20/2023 12:31    9/20/2023  7:53    9/19/2023  6:03    9/19/2023 12:33    9/19/2023  9:06    9/18/2023  5:55    9/18/2023 12:27    9/18/2023  8:57    9/17/2023  7:07    9/17/2023 12:01    9/17/2023  7:52    9/16/2023  6:15    9/16/2023  9:10    9/15/2023  6:37    9/15/2023  7:45    9/14/2023  5:38    9/14/2023  9:21    9/13/2023  4:59    9/13/2023 12:02    9/13/2023  8:12    9/12/2023  6:06    9/12/2023  7:46    9/11/2023  6:42    9/11/2023 12:24    9/11/2023  9:20    9/10/2023  6:33    9/10/2023 12:13    9/10/2023  7:39    9/9/2023  6:03    9/9/2023 12:51    9/9/2023  8:33    9/8/2023  5:27    9/8/2023  1:42    9/8/2023  8:42    9/7/2023  6:43    9/7/2023 12:16    9/7/2023  8:22    9/6/2023  4:42 PM 90   9/6/2023 12:37    9/6/2023  8:13        Patient medication regimen is as below.     CURRENT DM MEDICATIONS:   Lantus 30 units at night   Humalog 6 units w/ ss TID wm and 3 units with dessert  Jardiance 25 mg daily  Mounjaro 12.5 mg weekly     WITH MEALS:   If blood sugar is below 70 eat first then check your blood sugar 2 hours later and make correction.  If blood pre-meal sugar is  70 -150 take 6 units of Novolog;  If blood pre-meal sugar is 151-200 take +2 units of Novolog;  If blood pre-meal sugar is 201-250 take +4 units of Novolog;  If blood pre-meal sugar is 251-300 take +6 units of Novolog;  If blood pre-meal sugar is 301-350  "take +8 units of Novolog;  If blood pre-meal sugar is 351-400+ take +10 units of Novolog    Labs Reviewed.       No results found for: "CPEPTIDE"  No results found for: "GLUTAMICACID"       //   , There is no height or weight on file to calculate BMI.  His blood sugar in clinic today is:    Lab Results   Component Value Date    POCGLU 98 04/18/2022       Review of Systems   Constitutional:  Negative for activity change, appetite change, chills and fever.   HENT:  Negative for dental problem, mouth sores, nosebleeds, sore throat and trouble swallowing.    Eyes:  Negative for pain and discharge.   Respiratory:  Negative for shortness of breath, wheezing and stridor.    Cardiovascular:  Negative for chest pain, palpitations and leg swelling.   Gastrointestinal:  Negative for abdominal pain, diarrhea, nausea and vomiting.   Endocrine: Negative for polydipsia, polyphagia and polyuria.   Genitourinary:  Negative for dysuria, frequency and urgency.   Musculoskeletal:  Negative for joint swelling and myalgias.   Skin:  Negative for rash and wound.   Neurological:  Negative for dizziness, syncope, weakness and headaches.   Psychiatric/Behavioral:  Negative for behavioral problems and dysphoric mood.          Diabetes Management Status  Statin: Taking  ACE/ARB: Taking    Screening or Prevention Patient's value Goal Complete/Controlled?   HgA1C Testing and Control   Lab Results   Component Value Date    HGBA1C 6.7 (H) 09/21/2023      Annually/Less than 8% Yes   Lipid profile : 09/21/2023 Annually Yes   LDL control Lab Results   Component Value Date    LDLCALC 121.4 09/21/2023    Annually/Less than 100 mg/dl  Yes   Nephropathy screening Lab Results   Component Value Date    MICALBCREAT Unable to calculate 09/21/2023     No results found for: "PROTEINUA" Annually Yes   Blood pressure BP Readings from Last 1 Encounters:   09/28/23 120/72    Less than 140/90 Yes   Dilated retinal exam : 05/05/2023 Annually Yes    Foot exam   : " 2023 Annually Yes     ACTIVITY LEVEL: Moderately Active. Discussed activities, benefits, methods, and precautions.  MEAL PLANNING: Patient reports number of meals per day to be 3 and number of snacks per day to be 2.   Patient is encouraged to carb count and consume no more than 30 - 45 grams of carbohydrates in each meal and 15 grams of carbohydrates in each snack.     Social History     Socioeconomic History    Marital status:    Tobacco Use    Smoking status: Former     Current packs/day: 0.00     Average packs/day: 1 pack/day for 20.0 years (20.0 ttl pk-yrs)     Types: Cigarettes     Start date: 1980     Quit date: 2000     Years since quittin.7     Passive exposure: Never    Smokeless tobacco: Never   Substance and Sexual Activity    Alcohol use: No     Alcohol/week: 0.0 standard drinks of alcohol    Drug use: No    Sexual activity: Yes     Partners: Female   Social History Narrative    . Lives with spouse. Has 2 children. Retired. No pets or smokers in household.     Social Determinants of Health     Financial Resource Strain: Low Risk  (3/1/2023)    Overall Financial Resource Strain (CARDIA)     Difficulty of Paying Living Expenses: Not hard at all   Food Insecurity: No Food Insecurity (3/1/2023)    Hunger Vital Sign     Worried About Running Out of Food in the Last Year: Never true     Ran Out of Food in the Last Year: Never true   Transportation Needs: No Transportation Needs (3/1/2023)    PRAPARE - Transportation     Lack of Transportation (Medical): No     Lack of Transportation (Non-Medical): No   Physical Activity: Inactive (3/1/2023)    Exercise Vital Sign     Days of Exercise per Week: 0 days     Minutes of Exercise per Session: 0 min   Stress: No Stress Concern Present (3/1/2023)    [employer] - Occupational Stress Questionnaire     Feeling of Stress : Only a little   Social Connections: Socially Integrated (3/1/2023)    Social Connection  and Isolation Panel [NHANES]     Frequency of Communication with Friends and Family: More than three times a week     Frequency of Social Gatherings with Friends and Family: More than three times a week     Attends [employer] Services: More than 4 times per year     Active Member of Clubs or Organizations: Yes     Attends Club or Organization Meetings: More than 4 times per year     Marital Status:    Housing Stability: Low Risk  (3/1/2023)    Housing Stability Vital Sign     Unable to Pay for Housing in the Last Year: No     Number of Places Lived in the Last Year: 1     Unstable Housing in the Last Year: No     Past Medical History:   Diagnosis Date    Coronary artery disease 2010    [facility] - no stents    DM (diabetes mellitus) 2002     lunch 10/28/2016    DM (diabetes mellitus)      am 04/12/2021    DM (diabetes mellitus)     BS 96 am 04/13/2022 Insulin for years    Groin pain, left 2/11/2021    Hyperlipemia     Hypertension     Kidney stones     Nephrolithiasis 2/11/2021    Neuropathy     Type 2 diabetes mellitus 10-12 years     am 10/31/2014       Objective:        Physical Exam  Constitutional:       General: He is not in acute distress.     Appearance: He is well-developed. He is not diaphoretic.   HENT:      Head: Normocephalic and atraumatic.      Right Ear: External ear normal.      Left Ear: External ear normal.      Nose: Nose normal.   Eyes:      General:         Right eye: No discharge.         Left eye: No discharge.      Pupils: Pupils are equal, round, and reactive to light.   Cardiovascular:      Rate and Rhythm: Normal rate and regular rhythm.      Heart sounds: Normal heart sounds.   Pulmonary:      Effort: Pulmonary effort is normal. No respiratory distress.      Breath sounds: Normal breath sounds. No stridor.   Abdominal:      Palpations: Abdomen is soft.   Musculoskeletal:         General: Normal range of motion.      Cervical back: Normal range of motion and neck supple.  "  Skin:     General: Skin is warm and dry.      Capillary Refill: Capillary refill takes less than 2 seconds.      Coloration: Skin is not pale.   Neurological:      Mental Status: He is alert and oriented to person, place, and time. Mental status is at baseline.      Motor: No abnormal muscle tone.      Coordination: Coordination normal.   Psychiatric:         Mood and Affect: Mood normal.         Behavior: Behavior normal.         Thought Content: Thought content normal.         Judgment: Judgment normal.           Assessment / Plan:     Type 2 diabetes mellitus with complication, with long-term current use of insulin  -     POCT Glucose, Hand-Held Device  -     Hemoglobin A1C; Standing  -     empagliflozin (JARDIANCE) 25 mg tablet; Take 1 tablet (25 mg total) by mouth once daily.  Dispense: 90 tablet; Refill: 3  -     insulin (LANTUS SOLOSTAR U-100 INSULIN) glargine 100 units/mL SubQ pen; ADMINISTER 30 UNITS UNDER THE SKIN EVERY EVENING  Dispense: 27 mL; Refill: 3  -     pen needle, diabetic (BD ULTRA-FINE SHORT PEN NEEDLE) 31 gauge x 5/16" Ndle; USE 5 TIMES A DAY  Dispense: 300 each; Refill: 3  -     insulin aspart U-100 (NOVOLOG FLEXPEN U-100 INSULIN) 100 unit/mL (3 mL) InPn pen; INJECT 28 UNITS UNDER THE SKIN THREE TIMES DAILY BEFORE MEALS  Dispense: 75 mL; Refill: 3  -     tirzepatide (MOUNJARO) 12.5 mg/0.5 mL PnIj; Inject 12.5 mg into the skin every 7 days.  Dispense: 4 pen ; Refill: 11    Hypertension associated with diabetes    Hyperlipidemia associated with type 2 diabetes mellitus    Coronary artery disease involving native coronary artery of native heart without angina pectoris    Obstructive sleep apnea    BMI 24.0-24.9, adult      Additional Plan Details:    - POCT Glucose  - Encouraged continuation of lifestyle changes including regular exercise and limiting carbohydrates to 30-45 grams per meal threes times daily and 15 grams per snack with a limit of two daily.   - Encouraged continued monitoring " of blood glucose with maintenance of 3 times daily at Fasting, Before Lunch and Before Dinner. CGM declined.   - Current DM Medication Regimen: continue Mounjaro 12.5 mg weekly. Continue Lantus 30 units qhs. Continue Novolog 6 units with ss TID wm - see below and 3 units with ice cream or heavy carb meals. Continue Jardiance 25 mg daily.    - Health Maintenance standards addressed today: COVID - 19 Vaccine - Declined by patient  - Nursing Visit: Patient is below goal range for nursing visit for age group and will not need nursing visit at this time .   - Follow up in  6  months with A1c prior.    CURRENT DM MEDICATIONS:   Lantus 30 units at night   Humalog 6 units w/ ss TID wm and 3 units with dessert  Jardiance 25 mg daily  Mounjaro 12.5 mg weekly     WITH MEALS:   If blood sugar is below 70 eat first then check your blood sugar 2 hours later and make correction.  If blood pre-meal sugar is  70 -150 take 6 units of Novolog;  If blood pre-meal sugar is 151-200 take +2 units of Novolog;  If blood pre-meal sugar is 201-250 take +4 units of Novolog;  If blood pre-meal sugar is 251-300 take +6 units of Novolog;  If blood pre-meal sugar is 301-350 take +8 units of Novolog;  If blood pre-meal sugar is 351-400+ take +10 units of Novolog    Blakeney McKnight, PA-C [facility]    A total of 30 minutes was spent in face to face time, of which over 50 % was spent in counseling patient on disease process, complications, treatment, and side effects of medications.

## 2023-10-06 NOTE — PATIENT INSTRUCTIONS
CURRENT DM MEDICATIONS:   Lantus 30 units at night   Humalog 6 units w/ ss TID wm and 3 units with dessert  Jardiance 25 mg daily  Mounjaro 12.5 mg weekly     WITH MEALS:   If blood sugar is below 70 eat first then check your blood sugar 2 hours later and make correction.  If blood pre-meal sugar is  70 -150 take 6 units of Novolog;  If blood pre-meal sugar is 151-200 take +2 units of Novolog;  If blood pre-meal sugar is 201-250 take +4 units of Novolog;  If blood pre-meal sugar is 251-300 take +6 units of Novolog;  If blood pre-meal sugar is 301-350 take +8 units of Novolog;  If blood pre-meal sugar is 351-400+ take +10 units of Novolog

## 2023-11-22 ENCOUNTER — TELEPHONE (OUTPATIENT)
Dept: DIABETES | Facility: CLINIC | Age: 73
End: 2023-11-22
Payer: MEDICARE

## 2023-11-22 NOTE — TELEPHONE ENCOUNTER
Notice of prescription Drug coverage change received and uploaded into media    Mounjaro 12.5 mg and Mounjaro 15 mg will need a PA as of 12/31/2023  Novolog F/P Pen 100U/ML will not be covered in 2024. He will need to be switched to Fiasp or Admelog

## 2023-12-11 ENCOUNTER — OFFICE VISIT (OUTPATIENT)
Dept: PODIATRY | Facility: CLINIC | Age: 73
End: 2023-12-11
Payer: MEDICARE

## 2023-12-11 VITALS — WEIGHT: 178 LBS | HEIGHT: 71 IN | BODY MASS INDEX: 24.92 KG/M2

## 2023-12-11 DIAGNOSIS — Q82.8 POROKERATOSIS: ICD-10-CM

## 2023-12-11 DIAGNOSIS — B35.1 ONYCHOMYCOSIS DUE TO DERMATOPHYTE: ICD-10-CM

## 2023-12-11 DIAGNOSIS — E11.49 TYPE 2 DIABETES MELLITUS WITH OTHER NEUROLOGIC COMPLICATION, WITH LONG-TERM CURRENT USE OF INSULIN: Primary | ICD-10-CM

## 2023-12-11 DIAGNOSIS — Z79.4 TYPE 2 DIABETES MELLITUS WITH OTHER NEUROLOGIC COMPLICATION, WITH LONG-TERM CURRENT USE OF INSULIN: Primary | ICD-10-CM

## 2023-12-11 PROCEDURE — 99213 PR OFFICE/OUTPT VISIT, EST, LEVL III, 20-29 MIN: ICD-10-PCS | Mod: 25,S$PBB,, | Performed by: PODIATRIST

## 2023-12-11 PROCEDURE — 11056 PARNG/CUTG B9 HYPRKR LES 2-4: CPT | Mod: Q9,PBBFAC | Performed by: PODIATRIST

## 2023-12-11 PROCEDURE — 99999 PR PBB SHADOW E&M-EST. PATIENT-LVL III: CPT | Mod: PBBFAC,,, | Performed by: PODIATRIST

## 2023-12-11 PROCEDURE — 11056 PR TRIM BENIGN HYPERKERATOTIC SKIN LESION,2-4: ICD-10-PCS | Mod: Q9,S$PBB,, | Performed by: PODIATRIST

## 2023-12-11 PROCEDURE — 99999 PR PBB SHADOW E&M-EST. PATIENT-LVL III: ICD-10-PCS | Mod: PBBFAC,,, | Performed by: PODIATRIST

## 2023-12-11 PROCEDURE — 11056 PARNG/CUTG B9 HYPRKR LES 2-4: CPT | Mod: Q9,S$PBB,, | Performed by: PODIATRIST

## 2023-12-11 PROCEDURE — 99213 OFFICE O/P EST LOW 20 MIN: CPT | Mod: 25,S$PBB,, | Performed by: PODIATRIST

## 2023-12-11 PROCEDURE — 99213 OFFICE O/P EST LOW 20 MIN: CPT | Mod: PBBFAC | Performed by: PODIATRIST

## 2023-12-11 NOTE — PROGRESS NOTES
Subjective:       Patient ID: Timothy Ortega is a 73 y.o. male.    Chief Complaint: Callouses (C/o calluses b/l, plantar aspect of both feet, rates pain 7/10, diabetic, wears tennis and socks, last seen PCP Dr. Ochoa on 2023)    HPI: Timothy Ortega presents to the office today, with areas of concern to the plantar aspect of the right and left foot.  States that it feels as if he is walking on rocks.  Denies any puncture wounds or injuries. Reporting 7/10 pain.  Does have history of diabetes mellitus. Follows with Dr. Ochoa with last appointment on 2023    Review of patient's allergies indicates:  No Known Allergies    Past Medical History:   Diagnosis Date    Coronary artery disease     [facility] - no stents    DM (diabetes mellitus) 2002     lunch 10/28/2016    DM (diabetes mellitus)      am 2021    DM (diabetes mellitus)     BS 96 am 2022 Insulin for years    Groin pain, left 2021    Hyperlipemia     Hypertension     Kidney stones     Nephrolithiasis 2021    Neuropathy     Type 2 diabetes mellitus 10-12 years     am 10/31/2014       Family History   Problem Relation Age of Onset    Diabetes Mother     Glaucoma Mother     Heart disease Mother 75        CAB    Diabetes Father     Heart attack Father 58        Fatal MI    Diabetes Brother     Diabetes Sister     Diabetes Sister     Cataracts Paternal Uncle     Cataracts Maternal Grandmother        Social History     Socioeconomic History    Marital status:    Tobacco Use    Smoking status: Former     Current packs/day: 0.00     Average packs/day: 1 pack/day for 20.0 years (20.0 ttl pk-yrs)     Types: Cigarettes     Start date: 1980     Quit date: 2000     Years since quittin.9     Passive exposure: Never    Smokeless tobacco: Never   Substance and Sexual Activity    Alcohol use: No     Alcohol/week: 0.0 standard drinks of alcohol    Drug use: No    Sexual activity: Yes     Partners: Female    Social History Narrative    . Lives with spouse. Has 2 children. Retired. No pets or smokers in household.     Social Determinants of Health     Financial Resource Strain: Low Risk  (3/1/2023)    Overall Financial Resource Strain (CARDIA)     Difficulty of Paying Living Expenses: Not hard at all   Food Insecurity: No Food Insecurity (3/1/2023)    Hunger Vital Sign     Worried About Running Out of Food in the Last Year: Never true     Ran Out of Food in the Last Year: Never true   Transportation Needs: No Transportation Needs (3/1/2023)    PRAPARE - Transportation     Lack of Transportation (Medical): No     Lack of Transportation (Non-Medical): No   Physical Activity: Inactive (3/1/2023)    Exercise Vital Sign     Days of Exercise per Week: 0 days     Minutes of Exercise per Session: 0 min   Stress: No Stress Concern Present (3/1/2023)    [employer] - Occupational Stress Questionnaire     Feeling of Stress : Only a little   Social Connections: Socially Integrated (3/1/2023)    Social Connection and Isolation Panel [NHANES]     Frequency of Communication with Friends and Family: More than three times a week     Frequency of Social Gatherings with Friends and Family: More than three times a week     Attends [employer] Services: More than 4 times per year     Active Member of Clubs or Organizations: Yes     Attends Club or Organization Meetings: More than 4 times per year     Marital Status:    Housing Stability: Low Risk  (3/1/2023)    Housing Stability Vital Sign     Unable to Pay for Housing in the Last Year: No     Number of Places Lived in the Last Year: 1     Unstable Housing in the Last Year: No       Past Surgical History:   Procedure Laterality Date    CARPAL TUNNEL RELEASE Right 07/2020    COLONOSCOPY N/A 3/31/2017    Procedure: COLONOSCOPY;  Surgeon: Cornelius Ibarra MD;  Location: [facility];  Service: Endoscopy;  Laterality: N/A;    COLONOSCOPY N/A 4/18/2022  "   Procedure: COLONOSCOPY;  Surgeon: Geneva Serna MD;  Location: [facility];  Service: Endoscopy;  Laterality: N/A;    DENTAL SURGERY      Implant    KNEE ARTHROSCOPY Left     ROBOT-ASSISTED LAPAROSCOPIC REPAIR OF INGUINAL HERNIA USING DA JOSE ANGEL XI Left 2/26/2021    Procedure: XI ROBOTIC REPAIR, HERNIA, INGUINAL;  Surgeon: Tavon Cruz MD;  Location: [facility];  Service: General;  Laterality: Left;       Review of Systems       Objective:   Ht 5' 11" (1.803 m)   Wt 80.7 kg (178 lb)   BMI 24.83 kg/m²     US Retroperitoneal Complete  Narrative: EXAMINATION:  US RETROPERITONEAL COMPLETE    CLINICAL HISTORY:  Calculus of kidney    TECHNIQUE:  Ultrasound of the kidneys and urinary bladder was performed including color flow and Doppler evaluation of the kidneys.    COMPARISON:  CT dated 02/18/2021    FINDINGS:  Right kidney: The right kidney measures 11.9 cm. No cortical thinning. No loss of corticomedullary distinction. No mass. No stone visualized.  No hydronephrosis.    Left kidney: The left kidney measures 12.9 cm. No cortical thinning. No loss of corticomedullary distinction. No mass. There is a punctate echogenic focus at the lower pole of the left kidney which is equivocal for small stone.  No correlate seen on previous CT.  No hydronephrosis.    The bladder is partially distended at the time of scanning and has an unremarkable appearance.  Impression: Equivocal left-sided nephrolithiasis as above.  Further evaluation could be obtained with CT as clinically warranted.    No hydronephrosis    Electronically signed by: Ulysses Campos MD  Date:    06/23/2023  Time:    09:06         Physical Exam   LOWER EXTREMITY PHYSICAL EXAMINATION    Vascular:  The right dorsalis pedis pulse 2/4 and the right posterior tibial pulse 2/4.  The left dorsalis pedis pulse 2/4 and posterior tibial pulse on the left is 2/4.  Capillary refill is intact.  Pedal hair growth intact    Neurological:  Protective sensation is intact with " [place] monofilament. Proprioception is intact. Intact sensation to light touch.  Vibratory sensation is diminished to the 1st metatarsal phalangeal joint.     Musculoskeletal: Manual Muscle Testing is 5/5 with dorsiflexion, plantar flexion, abduction, and adduction.   There is normal range of motion in the forefoot, hindfoot, and Ankle joint.      Dermatological:  Skin is supple and moist.  There are 3 lesions present to the plantar aspect of the foot which have a resemblance to a plantar porokeratosis.  Centralize core is noted.  There is no acute signs of infection.  No signs of underlying ulceration.  There is no absence of skin line.  No obvious capillary hemorrhaging.      Imaging:     X-Ray Foot Complete Right    Narrative  DATE OF EXAM: Jun 7 2012    BOC   0106  -  FOOT 3 VIEWS MINIMUM RIGHT:   \  [id number]    CLINICAL HISTORY:   \729.5 0 PAIN IN LIMB    PROCEDURE COMMENT:   \    ICD 9 CODE(S):   (\)    CPT 4 CODE(S)/MODIFIER(S):   (\)    Findings: Osseous structures do not demonstrate any evidence of acute  fracture.  There are calcaneal enthesophytes noted.    Impression  Calcaneal enthesophytes.  ______________________________________    Electronically signed by: APOLONIA MURRAY MD  Date:     06/07/12  Time:    11:20        : NAVIN  Transcribe Date/Time: Jun 7 2012 11:20A  Dictated by : APOLONIA MURRAY MD  Report reviewed by:  Read On:  \  Images were reviewed, findings were verified and document was  electronically  SIGNED BY: APOLONIA MURRAY MD On: Jun 7 2012 11:20A          Assessment:     1. Type 2 diabetes mellitus with other neurologic complication, with long-term current use of insulin    2. Onychomycosis due to dermatophyte    3. Porokeratosis        Plan:     Type 2 diabetes mellitus with other neurologic complication, with long-term current use of insulin    Onychomycosis due to dermatophyte    Porokeratosis      Foot counseling and education is provided at this visit.  Patient is advised to wear socks and shoes at all times.  Do not walk barefoot, or with just socks, even when indoors.  Be sure to check and inspect the inside of the shoe before putting them on her feet.  Protect your feet at all times.  Walking shoes and/or athletic shoes are the best types of shoe gear. Do not wear vinyl or plastic type shoe gear, as they do not stretch and/or breathe.  Protect your feet from hot and/or cold. Elevate the extremities when sitting.  Do not wear excessively tight socks and/or shoe gear. Wiggle your toes for a few minutes throughout the day. Move your ankles up and down, in and out, to help blood flow in your lower extremity.    Porokeratotic skin formation x3, as outlined within the examination portion of this note, is surgically debrided with sharp #10/#15 blade, to alleviate discomfort with weight bearing and ambulation, and to lessen the possibility of skin complications, e.g., ulceration due to pressure. No ulceration(s) is are noted with/post debridement. The lesion is completed healed and resolved. No evidence of infection.         Future Appointments   Date Time Provider Department Center   3/21/2024  8:25 AM LABORATORY, V [facility] LAB [facility]   3/28/2024  8:20 AM Samantha Augustine NP [facility] IM [facility]   4/17/2024 10:30 AM Al Daugherty PA-C [facility] DIABETE [facility]   5/7/2024  8:40 AM Naveen Gr MD [facility] CARDIO [facility]   6/20/2024 11:15 AM Bandar Garcia MD [facility] UROLOGY [facility]

## 2023-12-28 ENCOUNTER — PATIENT MESSAGE (OUTPATIENT)
Dept: INTERNAL MEDICINE | Facility: CLINIC | Age: 73
End: 2023-12-28
Payer: MEDICARE

## 2023-12-28 DIAGNOSIS — I15.2 HYPERTENSION ASSOCIATED WITH DIABETES: ICD-10-CM

## 2023-12-28 DIAGNOSIS — E11.59 HYPERTENSION ASSOCIATED WITH DIABETES: ICD-10-CM

## 2023-12-28 NOTE — TELEPHONE ENCOUNTER
No care due was identified.  Health [facility] Embedded Care Due Messages. Reference number: [id number].   12/28/2023 4:38:04 PM CST

## 2023-12-29 RX ORDER — AMLODIPINE BESYLATE 5 MG/1
5 TABLET ORAL DAILY
Qty: 90 TABLET | Refills: 2 | Status: SHIPPED | OUTPATIENT
Start: 2023-12-29 | End: 2024-03-28 | Stop reason: SDUPTHER

## 2023-12-29 NOTE — TELEPHONE ENCOUNTER
Refill Decision Note   Timothy Jordan  is requesting a refill authorization.  Brief Assessment and Rationale for Refill:  Approve     Medication Therapy Plan:         Comments:     Note composed:12:01 AM 12/29/2023

## 2024-01-02 DIAGNOSIS — Z79.4 TYPE 2 DIABETES MELLITUS WITH COMPLICATION, WITH LONG-TERM CURRENT USE OF INSULIN: ICD-10-CM

## 2024-01-02 DIAGNOSIS — E11.8 TYPE 2 DIABETES MELLITUS WITH COMPLICATION, WITH LONG-TERM CURRENT USE OF INSULIN: ICD-10-CM

## 2024-01-02 RX ORDER — TIRZEPATIDE 12.5 MG/.5ML
12.5 INJECTION, SOLUTION SUBCUTANEOUS
Qty: 4 PEN | Refills: 2 | Status: SHIPPED | OUTPATIENT
Start: 2024-01-02 | End: 2024-01-11 | Stop reason: SDUPTHER

## 2024-01-02 NOTE — TELEPHONE ENCOUNTER
----- Message from Al Daugherty PA-C sent at 11/26/2023  2:15 PM CST -----  This may be a repeat message - pt received notification will need prior auths on Mounjaro for new year       STAFF:   please pend MOUNJARO 12.5 MG WEEKLY [facility] message covering provider.   Please let pt know this message will be covered by one of my partners while I am on maternity leave and that a prior auth will be started once the Rx is sent.   Since being sent to [facility], their prior auth team will be completing and let us know coverage decision. If denied, we will reach out to them about next steps.       DM PROVIDER:   Need to sign Rx for MOUNJARO 12.5 mg weekly to [facility]. [facility] Prior [facility] team will take care of prior auth.     Current Regimen:   · Lantus 30 units at night   · Humalog 6 units w/ ss TID wm and 3 units with dessert  · Jardiance 25 mg daily  · Mounjaro 12.5 mg weekly      WITH MEALS:   · If blood sugar is below 70 eat first then check your blood sugar 2 hours later and make correction.  · If blood pre-meal sugar is  70 -150 take 6 units of Novolog;  · If blood pre-meal sugar is 151-200 take +2 units of Novolog;  · If blood pre-meal sugar is 201-250 take +4 units of Novolog;  · If blood pre-meal sugar is 251-300 take +6 units of Novolog;  · If blood pre-meal sugar is 301-350 take +8 units of Novolog;  · If blood pre-meal sugar is 351-400+ take +10 units of Novolog

## 2024-01-05 ENCOUNTER — OFFICE VISIT (OUTPATIENT)
Dept: URGENT CARE | Facility: CLINIC | Age: 74
End: 2024-01-05
Payer: MEDICARE

## 2024-01-05 VITALS
TEMPERATURE: 99 F | BODY MASS INDEX: 24.21 KG/M2 | DIASTOLIC BLOOD PRESSURE: 68 MMHG | OXYGEN SATURATION: 99 % | WEIGHT: 172.94 LBS | HEART RATE: 69 BPM | HEIGHT: 71 IN | RESPIRATION RATE: 16 BRPM | SYSTOLIC BLOOD PRESSURE: 118 MMHG

## 2024-01-05 DIAGNOSIS — R43.2 DYSGEUSIA: ICD-10-CM

## 2024-01-05 DIAGNOSIS — M54.31 RIGHT SIDED SCIATICA: Primary | ICD-10-CM

## 2024-01-05 PROCEDURE — 99213 OFFICE O/P EST LOW 20 MIN: CPT | Mod: 25,S$GLB,, | Performed by: NURSE PRACTITIONER

## 2024-01-05 PROCEDURE — 96372 THER/PROPH/DIAG INJ SC/IM: CPT | Mod: S$GLB,,, | Performed by: NURSE PRACTITIONER

## 2024-01-05 RX ORDER — KETOROLAC TROMETHAMINE 30 MG/ML
30 INJECTION, SOLUTION INTRAMUSCULAR; INTRAVENOUS
Status: COMPLETED | OUTPATIENT
Start: 2024-01-05 | End: 2024-01-05

## 2024-01-05 RX ADMIN — KETOROLAC TROMETHAMINE 30 MG: 30 INJECTION, SOLUTION INTRAMUSCULAR; INTRAVENOUS at 11:01

## 2024-01-05 NOTE — PATIENT INSTRUCTIONS
Sciatica is pain, weakness, numbness or tingling along the path of the sciatic nerve. The nerve starts in the lower back and runs  down the back of each leg. The nerve controls the muscles in the lower leg and in the back of the knee, while also providing  sensation to the back of the thigh, lower leg and the sole of your foot.       SYMPTOMS   Mild tingling or dull ache in the lower back, leg or hip   Numbness in the back of the calf or sole of the foot   Burning sensations in the lower back, leg or hip   Sharp pains in the lower back, leg or hip   Leg weakness   Severe back pain preventing movement  TREATMENT   Heat or ice to the affected area  Avoidance of inciting activities or prolonged sitting/standing  Practicing good, erect posture  Engaging in exercises to increase core strength  Gentle stretching of the lumbar spine and hamstrings  Regular light exercises such as walking, swimming, or aquatherapy  Use of proper lifting techniques  Physical Therapy  Medication  - NSAIDS help decrease swelling, pain and fever. ALWAYS ASK YOUR PROVIDER IF NSAIDS ARE SAFE.  - Acetaminophen to decrease pain  - Muscle relaxant to control spasms  - Steroid injections to lessen pain, irritation and in?ammation around the nerve    SEEK MEDICAL CARE IF:   Symptoms get worse or do not improve in two to four weeks, despite treatment.   You develop new symptoms.   You are generally not feeling well.  SEEK IMMEDIATE MEDICAL CARE IF:   You develop numbness or weakness in either leg.   You lose bowel or bladder function.       Please arrange follow up with your primary medical clinic as soon as possible. You must understand that you've received an Urgent Care treatment only and that you may be released before all of your medical problems are known or treated. You, the patient, will arrange for follow up as instructed. If your symptoms worsen or fail to improve you should go to the Emergency Room.

## 2024-01-05 NOTE — PROGRESS NOTES
"Subjective:      Patient ID: Timothy Ortega is a 73 y.o. male.    Vitals:  height is 5' 10.67" (1.795 m) and weight is 78.4 kg (172 lb 15.2 oz). His tympanic temperature is 98.7 °F (37.1 °C). His blood pressure is 118/68 and his pulse is 69. His respiration is 16 and oxygen saturation is 99%.     Chief Complaint: Hip Pain    Timothy Ortega is a 74 y/o male whom presents to clinic with c/o right hip and lower back pain. Patient states pain started approximately  4 days ago. Patient states he can't think of any bumps or possible causes to the hip or back. He states he woke up and was really sore and pain never went away. Patient states he has been taking otc Advil dual action, and it takes the edge off the pain, but doesn't completley knock it out. He reports the pain is exacerbated when standing and walking.  Patient reports he has also noticed a sweet/minty taste to his burps. He denies any new medications. Patient denies any exacerbating/ alleviating factors. Patient reports his glucose has been WNL.    Hip Pain   The incident occurred 3 to 5 days ago. There was no injury mechanism. The pain is present in the right hip. The quality of the pain is described as stabbing and aching. The pain is at a severity of 6/10. The pain is moderate. The pain has been Fluctuating since onset. Pertinent negatives include no inability to bear weight, loss of motion, loss of sensation, muscle weakness, numbness or tingling. He reports no foreign bodies present. The symptoms are aggravated by movement and weight bearing. He has tried acetaminophen and NSAIDs for the symptoms. The treatment provided mild relief.       Neurological:  Negative for numbness.      Objective:     Physical Exam   HENT:   Head: Normocephalic and atraumatic.   Nose: Nose normal.   Eyes: Pupils are equal, round, and reactive to light. Extraocular movement intact   Cardiovascular: Normal rate.   Pulmonary/Chest: Effort normal.   Abdominal: Normal appearance. "   Musculoskeletal:        Legs:       Comments: Pain radiates from right hip down right leg. Pain with deep palpation to the area.    Neurological: He is alert.   Nursing note reviewed.    Results for orders placed or performed in visit on 10/06/23   POCT Glucose, Hand-Held Device   Result Value Ref Range    POC Glucose 108 70 - 110 MG/DL     *Note: Due to a large number of results and/or encounters for the requested time period, some results have not been displayed. A complete set of results can be found in Results Review.       Assessment:     1. Right sided sciatica    2. Dysgeusia        Plan:       Right sided sciatica  -     ketorolac injection 30 mg    Dysgeusia          Medical Decision Making:   Urgent Care Management:  - Pt has no red flag sxs such as saddle anesthesia, bowel/bladder incontinence, no personal hx of cancer, no unexplained weight loss, fevers or chills.  - If back pain persists, may discuss PT referral.   - No need for MRI or other imaging at this time as pt does not have concerning neurologic findings on exam and reports pain is similar to previous sciatic pain.   Previous encounters and labs were independently reviewed. Additional plan of care as outlined above. Ketorolac injection administered in clinic today. Patient instructed to avoid all other NSAIDs for 12 hours. All of the patients questions and concerns were addressed. Patient instructed to follow up with PCP if no improvement in symptoms within the next 5-7 day. Patient was instructed to report to the nearest ER with and new/ worsening symptom, numbness/ tingling or loss of bowel/ bladder. Patient verbalized understanding and agrees with the discussed plan of care. Patient remained stable throughout the visit and exited the exam room in NAD.               Patient Instructions   Sciatica is pain, weakness, numbness or tingling along the path of the sciatic nerve. The nerve starts in the lower back and runs  down the back of each  leg. The nerve controls the muscles in the lower leg and in the back of the knee, while also providing  sensation to the back of the thigh, lower leg and the sole of your foot.       SYMPTOMS   Mild tingling or dull ache in the lower back, leg or hip   Numbness in the back of the calf or sole of the foot   Burning sensations in the lower back, leg or hip   Sharp pains in the lower back, leg or hip   Leg weakness   Severe back pain preventing movement  TREATMENT   Heat or ice to the affected area  Avoidance of inciting activities or prolonged sitting/standing  Practicing good, erect posture  Engaging in exercises to increase core strength  Gentle stretching of the lumbar spine and hamstrings  Regular light exercises such as walking, swimming, or aquatherapy  Use of proper lifting techniques  Physical Therapy  Medication  - NSAIDS help decrease swelling, pain and fever. ALWAYS ASK YOUR PROVIDER IF NSAIDS ARE SAFE.  - Acetaminophen to decrease pain  - Muscle relaxant to control spasms  - Steroid injections to lessen pain, irritation and in?ammation around the nerve    SEEK MEDICAL CARE IF:   Symptoms get worse or do not improve in two to four weeks, despite treatment.   You develop new symptoms.   You are generally not feeling well.  SEEK IMMEDIATE MEDICAL CARE IF:   You develop numbness or weakness in either leg.   You lose bowel or bladder function.       Please arrange follow up with your primary medical clinic as soon as possible. You must understand that you've received an Urgent Care treatment only and that you may be released before all of your medical problems are known or treated. You, the patient, will arrange for follow up as instructed. If your symptoms worsen or fail to improve you should go to the Emergency Room.

## 2024-01-11 ENCOUNTER — PATIENT MESSAGE (OUTPATIENT)
Dept: DIABETES | Facility: CLINIC | Age: 74
End: 2024-01-11
Payer: MEDICARE

## 2024-01-11 DIAGNOSIS — Z79.4 TYPE 2 DIABETES MELLITUS WITH COMPLICATION, WITH LONG-TERM CURRENT USE OF INSULIN: ICD-10-CM

## 2024-01-11 DIAGNOSIS — E11.8 TYPE 2 DIABETES MELLITUS WITH COMPLICATION, WITH LONG-TERM CURRENT USE OF INSULIN: ICD-10-CM

## 2024-01-11 RX ORDER — TIRZEPATIDE 12.5 MG/.5ML
12.5 INJECTION, SOLUTION SUBCUTANEOUS
Qty: 4 PEN | Refills: 2 | OUTPATIENT
Start: 2024-01-11

## 2024-01-11 RX ORDER — TIRZEPATIDE 12.5 MG/.5ML
12.5 INJECTION, SOLUTION SUBCUTANEOUS
Qty: 4 PEN | Refills: 2 | Status: SHIPPED | OUTPATIENT
Start: 2024-01-11 | End: 2024-02-16

## 2024-02-12 ENCOUNTER — PATIENT MESSAGE (OUTPATIENT)
Dept: DIABETES | Facility: CLINIC | Age: 74
End: 2024-02-12
Payer: MEDICARE

## 2024-02-12 ENCOUNTER — OFFICE VISIT (OUTPATIENT)
Dept: PODIATRY | Facility: CLINIC | Age: 74
End: 2024-02-12
Payer: MEDICARE

## 2024-02-12 DIAGNOSIS — E11.49 TYPE 2 DIABETES MELLITUS WITH OTHER NEUROLOGIC COMPLICATION, WITH LONG-TERM CURRENT USE OF INSULIN: Primary | ICD-10-CM

## 2024-02-12 DIAGNOSIS — Z79.4 TYPE 2 DIABETES MELLITUS WITH COMPLICATION, WITH LONG-TERM CURRENT USE OF INSULIN: Primary | ICD-10-CM

## 2024-02-12 DIAGNOSIS — B35.1 ONYCHOMYCOSIS DUE TO DERMATOPHYTE: ICD-10-CM

## 2024-02-12 DIAGNOSIS — Z79.4 TYPE 2 DIABETES MELLITUS WITH OTHER NEUROLOGIC COMPLICATION, WITH LONG-TERM CURRENT USE OF INSULIN: Primary | ICD-10-CM

## 2024-02-12 DIAGNOSIS — E11.8 TYPE 2 DIABETES MELLITUS WITH COMPLICATION, WITH LONG-TERM CURRENT USE OF INSULIN: Primary | ICD-10-CM

## 2024-02-12 DIAGNOSIS — Q82.8 POROKERATOSIS: ICD-10-CM

## 2024-02-12 PROCEDURE — 99499 UNLISTED E&M SERVICE: CPT | Mod: S$PBB,,, | Performed by: PODIATRIST

## 2024-02-12 PROCEDURE — 11721 DEBRIDE NAIL 6 OR MORE: CPT | Mod: 59,Q9,PBBFAC | Performed by: PODIATRIST

## 2024-02-12 PROCEDURE — 11721 DEBRIDE NAIL 6 OR MORE: CPT | Mod: 59,Q9,S$PBB, | Performed by: PODIATRIST

## 2024-02-12 PROCEDURE — 99213 OFFICE O/P EST LOW 20 MIN: CPT | Mod: PBBFAC | Performed by: PODIATRIST

## 2024-02-12 PROCEDURE — 99999 PR PBB SHADOW E&M-EST. PATIENT-LVL III: CPT | Mod: PBBFAC,,, | Performed by: PODIATRIST

## 2024-02-12 PROCEDURE — 11056 PARNG/CUTG B9 HYPRKR LES 2-4: CPT | Mod: Q9,S$PBB,, | Performed by: PODIATRIST

## 2024-02-12 PROCEDURE — 11056 PARNG/CUTG B9 HYPRKR LES 2-4: CPT | Mod: Q9,PBBFAC | Performed by: PODIATRIST

## 2024-02-12 NOTE — PROGRESS NOTES
Subjective:       Patient ID: Timothy Ortega is a 73 y.o. male.    Chief Complaint: Callouses (Patient complains of 7/10 pain at present to bilateral plantar foot callous. He is a diabetic. )    HPI: Timothy Ortega presents to the office today, with areas of concern to the plantar aspect of the right and left foot.  He does have sensations of neuropathy which he does utilize topical compound pharmacy in pain cream for.  States this has been significantly helpful.  He is currently starting his last 2 by medication.  States that he will have this requested once he finishes the 2.  Continues to have the sensation of walking on rocks albeit denies puncture wounds or injuries. Reporting 7/10 pain.  Does have history of diabetes mellitus. Follows with Dr. Ochoa with last appointment on 9/28/2023    Review of patient's allergies indicates:  No Known Allergies    Past Medical History:   Diagnosis Date    Coronary artery disease 2010    [facility] - no stents    DM (diabetes mellitus) 2002     lunch 10/28/2016    DM (diabetes mellitus)      am 04/12/2021    DM (diabetes mellitus)     BS 96 am 04/13/2022 Insulin for years    Groin pain, left 2/11/2021    Hyperlipemia     Hypertension     Kidney stones     Nephrolithiasis 2/11/2021    Neuropathy     Type 2 diabetes mellitus 10-12 years     am 10/31/2014       Family History   Problem Relation Age of Onset    Diabetes Mother     Glaucoma Mother     Heart disease Mother 75        CAB    Diabetes Father     Heart attack Father 58        Fatal MI    Diabetes Brother     Diabetes Sister     Diabetes Sister     Cataracts Paternal Uncle     Cataracts Maternal Grandmother        Social History     Socioeconomic History    Marital status:    Tobacco Use    Smoking status: Former     Current packs/day: 0.00     Average packs/day: 1 pack/day for 20.0 years (20.0 ttl pk-yrs)     Types: Cigarettes     Start date: 1/1/1980     Quit date: 1/1/2000     Years since  quittin.1     Passive exposure: Past    Smokeless tobacco: Never   Substance and Sexual Activity    Alcohol use: No     Alcohol/week: 0.0 standard drinks of alcohol    Drug use: No    Sexual activity: Yes     Partners: Female   Social History Narrative    . Lives with spouse. Has 2 children. Retired. No pets or smokers in household.     Social Determinants of Health     Financial Resource Strain: Low Risk  (3/1/2023)    Overall Financial Resource Strain (CARDIA)     Difficulty of Paying Living Expenses: Not hard at all   Food Insecurity: No Food Insecurity (3/1/2023)    Hunger Vital Sign     Worried About Running Out of Food in the Last Year: Never true     Ran Out of Food in the Last Year: Never true   Transportation Needs: No Transportation Needs (3/1/2023)    PRAPARE - Transportation     Lack of Transportation (Medical): No     Lack of Transportation (Non-Medical): No   Physical Activity: Inactive (3/1/2023)    Exercise Vital Sign     Days of Exercise per Week: 0 days     Minutes of Exercise per Session: 0 min   Stress: No Stress Concern Present (3/1/2023)    [employer] [place] [employer] - Occupational Stress Questionnaire     Feeling of Stress : Only a little   Social Connections: Socially Integrated (3/1/2023)    Social Connection and Isolation Panel [NHANES]     Frequency of Communication with Friends and Family: More than three times a week     Frequency of Social Gatherings with Friends and Family: More than three times a week     Attends [employer] Services: More than 4 times per year     Active Member of Clubs or Organizations: Yes     Attends Club or Organization Meetings: More than 4 times per year     Marital Status:    Housing Stability: Low Risk  (3/1/2023)    Housing Stability Vital Sign     Unable to Pay for Housing in the Last Year: No     Number of Places Lived in the Last Year: 1     Unstable Housing in the Last Year: No       Past Surgical History:   Procedure  Laterality Date    CARPAL TUNNEL RELEASE Right 07/2020    COLONOSCOPY N/A 03/31/2017    Procedure: COLONOSCOPY;  Surgeon: Cornelius Ibarra MD;  Location: [facility] ENDO;  Service: Endoscopy;  Laterality: N/A;    COLONOSCOPY N/A 04/18/2022    Procedure: COLONOSCOPY;  Surgeon: Genvea Serna MD;  Location: [facility] ENDO;  Service: Endoscopy;  Laterality: N/A;    DENTAL SURGERY      Implant    HAND SURGERY      KNEE ARTHROSCOPY Left     ROBOT-ASSISTED LAPAROSCOPIC REPAIR OF INGUINAL HERNIA USING DA JOSE ANGEL XI Left 02/26/2021    Procedure: XI ROBOTIC REPAIR, HERNIA, INGUINAL;  Surgeon: Tavon Cruz MD;  Location: [facility] OR;  Service: General;  Laterality: Left;       Review of Systems       Objective:   There were no vitals taken for this visit.    US Retroperitoneal Complete  Narrative: EXAMINATION:  US RETROPERITONEAL COMPLETE    CLINICAL HISTORY:  Calculus of kidney    TECHNIQUE:  Ultrasound of the kidneys and urinary bladder was performed including color flow and Doppler evaluation of the kidneys.    COMPARISON:  CT dated 02/18/2021    FINDINGS:  Right kidney: The right kidney measures 11.9 cm. No cortical thinning. No loss of corticomedullary distinction. No mass. No stone visualized.  No hydronephrosis.    Left kidney: The left kidney measures 12.9 cm. No cortical thinning. No loss of corticomedullary distinction. No mass. There is a punctate echogenic focus at the lower pole of the left kidney which is equivocal for small stone.  No correlate seen on previous CT.  No hydronephrosis.    The bladder is partially distended at the time of scanning and has an unremarkable appearance.  Impression: Equivocal left-sided nephrolithiasis as above.  Further evaluation could be obtained with CT as clinically warranted.    No hydronephrosis    Electronically signed by: Ulysses Campos MD  Date:    06/23/2023  Time:    09:06         Physical Exam   LOWER EXTREMITY PHYSICAL EXAMINATION    Vascular:  The right dorsalis pedis pulse 2/4  and the right posterior tibial pulse 2/4.  The left dorsalis pedis pulse 2/4 and posterior tibial pulse on the left is 2/4.  Capillary refill is intact.  Pedal hair growth intact    Neurological:  Protective sensation is intact with [place] monofilament. Proprioception is intact. Intact sensation to light touch.  Vibratory sensation is diminished to the 1st metatarsal phalangeal joint.     Musculoskeletal: Manual Muscle Testing is 5/5 with dorsiflexion, plantar flexion, abduction, and adduction.   There is normal range of motion in the forefoot, hindfoot, and Ankle joint.      Dermatological:  Skin is supple and moist.  There are 3 lesions present to the plantar aspect of the foot which have a resemblance to a plantar porokeratosis.  Centralize core is noted.  There is no acute signs of infection.  No signs of underlying ulceration.  There is no absence of skin line.  No obvious capillary hemorrhaging.  Thickening and slight discoloration of the 1, 2, 5 nail on the right foot and 1, 2, 5 nail on the left foot.  There is no signs of acute infection.  There is no longitudinal melanonychia.  No evidence of ingrowing toenails.    Imaging:     X-Ray Foot Complete Right    Narrative  DATE OF EXAM: Jun 7 2012    BOC   0106  -  FOOT 3 VIEWS MINIMUM RIGHT:   \  [id number]    CLINICAL HISTORY:   \729.5 0 PAIN IN LIMB    PROCEDURE COMMENT:   \    ICD 9 CODE(S):   (\)    CPT 4 CODE(S)/MODIFIER(S):   (\)    Findings: Osseous structures do not demonstrate any evidence of acute  fracture.  There are calcaneal enthesophytes noted.    Impression  Calcaneal enthesophytes.  ______________________________________    Electronically signed by: APOLONIA MURRAY MD  Date:     06/07/12  Time:    11:20        : NAVIN  Transcribe Date/Time: Jun 7 2012 11:20A  Dictated by : APOLONIA MURRAY MD  Report reviewed by:  Read On:  \  Images were reviewed, findings were verified and document was  electronically  SIGNED BY: APOLONIA  MD TREVOR On: Jun 7 2012 11:20A          Assessment:     1. Type 2 diabetes mellitus with other neurologic complication, with long-term current use of insulin    2. Porokeratosis    3. Onychomycosis due to dermatophyte        Plan:     Type 2 diabetes mellitus with other neurologic complication, with long-term current use of insulin    Porokeratosis    Onychomycosis due to dermatophyte      Foot counseling and education is provided at this visit. Patient is advised to wear socks and shoes at all times.  Do not walk barefoot, or with just socks, even when indoors.  Be sure to check and inspect the inside of the shoe before putting them on her feet.  Protect your feet at all times.  Walking shoes and/or athletic shoes are the best types of shoe gear. Do not wear vinyl or plastic type shoe gear, as they do not stretch and/or breathe.  Protect your feet from hot and/or cold. Elevate the extremities when sitting.  Do not wear excessively tight socks and/or shoe gear. Wiggle your toes for a few minutes throughout the day. Move your ankles up and down, in and out, to help blood flow in your lower extremity.    Porokeratotic skin formation x3, as outlined within the examination portion of this note, is surgically debrided with sharp #10/#15 blade, to alleviate discomfort with weight bearing and ambulation, and to lessen the possibility of skin complications, e.g., ulceration due to pressure. No ulceration(s) is are noted with/post debridement. The lesion is completed healed and resolved. No evidence of infection.     Dystrophic nail plates, as outlined above (R#1,2,5  ; L#1,2,5 ), are sharply debrided with double action nail nipper, and/or with the assistance of a mechanical rotary blayne, with removal of all offending nail and nail border(s), for reduction of pains. Nails are reduced in terms of length, width and girth with removal of subungual debris to facilitate pain free weight bearing and ambulation. The elongated  nails as outlined in the objective portion of this note, were trimmed to appropriate length, with a double action nail nipper, for alleviation/reduction of pains as well. Follow up in approx. 3-4 months.    Future Appointments   Date Time Provider Department Center   2/26/2024  8:15 AM [facility]    3/5/2024  8:00 AM Tanvir Young NP [facility]   3/21/2024  8:25 AM LABORATORY, [facility] HGV LAB [facility]   3/28/2024  8:20 AM Samantha Augustine NP [facility]   4/17/2024 10:30 AM Al Daugherty, PATIGRE [facility] DIABETE [facility]   5/7/2024  8:40 AM Naveen Gr MD [facility] CARDIO [facility]   6/20/2024 11:15 AM Bandar Garcia MD HG UROLOGY [facility]

## 2024-02-16 ENCOUNTER — OFFICE VISIT (OUTPATIENT)
Dept: URGENT CARE | Facility: CLINIC | Age: 74
End: 2024-02-16
Payer: MEDICARE

## 2024-02-16 ENCOUNTER — TELEPHONE (OUTPATIENT)
Dept: DIABETES | Facility: CLINIC | Age: 74
End: 2024-02-16
Payer: MEDICARE

## 2024-02-16 VITALS
TEMPERATURE: 100 F | BODY MASS INDEX: 25.04 KG/M2 | WEIGHT: 174.94 LBS | RESPIRATION RATE: 16 BRPM | OXYGEN SATURATION: 97 % | HEART RATE: 75 BPM | HEIGHT: 70 IN | SYSTOLIC BLOOD PRESSURE: 114 MMHG | DIASTOLIC BLOOD PRESSURE: 57 MMHG

## 2024-02-16 DIAGNOSIS — E11.8 TYPE 2 DIABETES MELLITUS WITH COMPLICATION, WITH LONG-TERM CURRENT USE OF INSULIN: Primary | ICD-10-CM

## 2024-02-16 DIAGNOSIS — Z79.4 TYPE 2 DIABETES MELLITUS WITH COMPLICATION, WITH LONG-TERM CURRENT USE OF INSULIN: Primary | ICD-10-CM

## 2024-02-16 DIAGNOSIS — U07.1 COVID-19 VIRUS DETECTED: ICD-10-CM

## 2024-02-16 DIAGNOSIS — U07.1 COVID-19 VIRUS INFECTION: Primary | ICD-10-CM

## 2024-02-16 DIAGNOSIS — R05.9 COUGH, UNSPECIFIED TYPE: ICD-10-CM

## 2024-02-16 LAB
CTP QC/QA: YES
SARS-COV-2 AG RESP QL IA.RAPID: POSITIVE

## 2024-02-16 PROCEDURE — 87811 SARS-COV-2 COVID19 W/OPTIC: CPT | Mod: QW,S$GLB,, | Performed by: PHYSICIAN ASSISTANT

## 2024-02-16 PROCEDURE — 99213 OFFICE O/P EST LOW 20 MIN: CPT | Mod: S$GLB,,, | Performed by: PHYSICIAN ASSISTANT

## 2024-02-16 RX ORDER — TIRZEPATIDE 15 MG/.5ML
15 INJECTION, SOLUTION SUBCUTANEOUS
Qty: 4 PEN | Refills: 5 | Status: SHIPPED | OUTPATIENT
Start: 2024-02-16

## 2024-02-16 NOTE — PATIENT INSTRUCTIONS
You have tested positive for COVID-19 today.      Please note that patients who test positive for COVID-19 are required by the CDC to undergo isolation for 5 days after their symptoms first began.   - If you tested positive and do NOT have symptoms, you must isolate for 5 days starting on the day of the positive test.   - If you tested positive and have symptoms, you must isolate for 5 days starting on the day of the first symptoms,  not the day of the positive test.    This is the most important part, both the CDC and the [facility] emphasize that you do not test out of isolation.  In fact, we do not retest if you were positive in the last 90 days.    After 5 days, if your symptoms have improved and you have not had fever on day 5, you can return to the community on day 6- NO TESTING REQUIRED!     After your 5 days of isolation are completed, the CDC recommends strict mask use for the first 5 days that you come out of isolation.     During quarantine:   Separate yourself from other people and animals in your home.  Call ahead before visiting your doctor.  Wear a facemask.  Cover your coughs and sneezes.  Wash your hands often with soap and water; hand  can be used, too.  Avoid sharing personal household items.  Wipe down surfaces used daily.  Monitor your symptoms. Seek prompt medical attention if your illness is worsening (e.g., difficulty breathing).   Before seeking care, call your healthcare provider.  If you have a medical emergency and need to call 911, notify the dispatch personnel that you have, or are being evaluated for COVID-19. If possible, put on a facemask before emergency medical services arrive.         CDC Testing and Quarantine Guidelines for household members, intimate partners, and caregivers in a home setting of a known-positive COVID-19 person:    ·     A 'close exposure' is defined as anyone who has had an exposure (masked or unmasked) to a known COVID -19 positive person within 6 feet of  someone for a cumulative total of 15 minutes or more over a 24-hour period.    ·     Vaccinated: patients who have been boosted or completed the primary series of Pfizer or Moderna vaccine within the last 6 months or completed the primary series of J&J vaccine within the last 2 months and/or had a positive test within 90 days   - do NOT need to quarantine after contact with someone who had COVID-19 unless they have symptoms.   - should get tested 3-5 days after their exposure (if they have not had a positive test within the last 90 days), even if they don't have symptoms and wear a mask indoors in public for 10 days following exposure or until their test result is negative on day 5.  - If you develop symptoms test and quarantine.    ·     Unvaccinated, or are more than six months out from their second mRNA dose (or more than 2 months after the J&J vaccine) and not yet boosted,  and/or NOT had a positive test within 90 days and meet 'close exposure'  - you are required by CDC guidelines to quarantine for at least 5 days from time of exposure followed by 5 days of strict masking. It is recommended, but not required to test after 5 days, unless you develop symptoms, in which case you should test at that time.  - If you do decide to test at 5 days and are asymptomatic, the risk is that if you test without symptoms (on Day 5 for example) and you are positive, your 5 day isolation begins on that day, and you turned your 5 day quarantine into 10 days.  - If your exposure does not meet the above definition, you can return to your normal daily activities to include social distancing, wearing a mask and frequent handwashing.       Close contacts should also follow these recommendations:  Make sure that you understand and can help the patient follow their provider's instructions for medication(s) and care. You should help the patient with basic needs in the home and provide support for getting groceries, prescriptions, and  other personal needs.  Monitor the patient's symptoms. If the patient is getting sicker, call his or her healthcare provider and tell them that the patient has laboratory-confirmed COVID-19. If the patient has a medical emergency and you need to call 911, notify the dispatch personnel that the patient has, or is being evaluated for COVID-19.  Household members should stay in another room or be  from the patient. Household members should use a separate bedroom and bathroom, if available.  Prohibit visitors.  Household members should care for any pets in the home.  Make sure that shared spaces in the home have good air flow, such as by an air conditioner or an opened window, weather permitting.  Perform hand hygiene frequently. Wash your hands often with soap and water for at least 20 seconds or use an alcohol-based hand  (that contains > 60% alcohol) covering all surfaces of your hands and rubbing them together until they feel dry. Soap and water should be used preferentially.  Avoid touching your eyes, nose, and mouth.  The patient should wear a facemask. If the patient is not able to wear a facemask (for example, because it causes trouble breathing), caregivers should wear a mask when they are in the same room as the patient.  Wear a disposable facemask and gloves when you touch or have contact with the patient's blood, stool, or body fluids, such as saliva, sputum, nasal mucus, vomit, urine.  Throw out disposable facemasks and gloves after using them. Do not reuse.  When removing personal protective equipment, first remove and dispose of gloves. Then, immediately clean your hands with soap and water or alcohol-based hand . Next, remove and dispose of facemask, and immediately clean your hands again with soap and water or alcohol-based hand .  You should not share dishes, drinking glasses, cups, eating utensils, towels, bedding, or other items with the patient. After the patient  uses these items, you should wash them thoroughly (see below Wash laundry thoroughly).  Clean all high-touch surfaces, such as counters, tabletops, doorknobs, bathroom fixtures, toilets, phones, keyboards, tablets, and bedside tables, every day. Also, clean any surfaces that may have blood, stool, or body fluids on them.  Use a household cleaning spray or wipe, according to the label instructions. Labels contain instructions for safe and effective use of the cleaning product including precautions you should take when applying the product, such as wearing gloves and making sure you have good ventilation during use of the product.  Wash laundry thoroughly.  Immediately remove and wash clothes or bedding that have blood, stool, or body fluids on them.  Wear disposable gloves while handling soiled items and keep soiled items away from your body. Clean your hands (with soap and water or an alcohol-based hand ) immediately after removing your gloves.  Read and follow directions on labels of laundry or clothing items and detergent. In general, using a normal laundry detergent according to washing machine instructions and dry thoroughly using the warmest temperatures recommended on the clothing label.  Place all used disposable gloves, facemasks, and other contaminated items in a lined container before disposing of them with other household waste. Clean your hands (with soap and water or an alcohol-based hand ) immediately after handling these items. Soap and water should be used preferentially if hands are visibly dirty.  Discuss any additional questions with your state or local health department or healthcare provider. Check available hours when contacting your local health department.     You must understand that you've received an Urgent Care treatment only and that you may be released before all your medical problems are known or treated. You, the patient, will arrange for follow up care as  instructed. Follow up with your PCP or specialty clinic as directed within 2-5 days if not improved or as needed.  You can call [phone] to schedule an appointment with the appropriate provider.  If your condition worsens we recommend that you receive another evaluation at the emergency room immediately or contact your primary medical clinics after hours call service to discuss your concerns.  Please return here or go to the Emergency Department for any concerns or worsening of condition.

## 2024-02-16 NOTE — PROGRESS NOTES
"Subjective:      Patient ID: Timothy Ortega is a 73 y.o. male.    Vitals:  height is 5' 10" (1.778 m) and weight is 79.3 kg (174 lb 15 oz). His tympanic temperature is 99.5 °F (37.5 °C). His blood pressure is 114/57 (abnormal) and his pulse is 75. His respiration is 16 and oxygen saturation is 97%.     Chief Complaint: Otalgia    Pt presented here today with cough, ear pain, chest congestion and runny nose x2days. Otc Nyquil and Dayquil with good relief. Mild pain in ears only when coughing and swallowing. Pt states he has slight chest discomfort only with deep coughing. No fever, sob, wheezing. Cough not productive. No known sick contact.    Cough  This is a new problem. The current episode started yesterday. The problem has been unchanged. The problem occurs hourly. The cough is Non-productive. Associated symptoms include chills, ear pain, myalgias, nasal congestion and postnasal drip. Pertinent negatives include no chest pain, ear congestion, fever, headaches, hemoptysis, rash, rhinorrhea, sore throat, shortness of breath, sweats, weight loss or wheezing. Nothing aggravates the symptoms. He has tried OTC cough suppressant for the symptoms. The treatment provided no relief. There is no history of asthma, bronchiectasis, bronchitis, COPD, emphysema, environmental allergies or pneumonia.   Sinus Problem  This is a new problem. The current episode started yesterday. The problem is unchanged. There has been no fever. His pain is at a severity of 3/10. The pain is mild. Associated symptoms include chills, coughing and ear pain. Pertinent negatives include no congestion, diaphoresis, headaches, shortness of breath, sore throat or swollen glands. Past treatments include acetaminophen. The treatment provided no relief.       Constitution: Positive for chills. Negative for sweating and fever.   HENT:  Positive for ear pain and postnasal drip. Negative for congestion and sore throat.    Cardiovascular:  Negative for chest " pain.   Respiratory:  Positive for cough. Negative for sputum production, bloody sputum, shortness of breath and wheezing.    Gastrointestinal: Negative.    Musculoskeletal:  Positive for muscle ache.   Skin:  Negative for rash.   Allergic/Immunologic: Negative for environmental allergies.   Neurological:  Negative for dizziness and headaches.      Objective:     Physical Exam   Constitutional: He appears well-developed.  Non-toxic appearance. He does not appear ill. No distress.   HENT:   Head: Normocephalic and atraumatic.   Ears:   Right Ear: Tympanic membrane, external ear and ear canal normal.   Left Ear: Tympanic membrane, external ear and ear canal normal.   Nose: Nose normal.   Eyes: Conjunctivae and EOM are normal.   Neck: Neck supple.   Cardiovascular: Normal rate, regular rhythm and normal heart sounds.   Pulmonary/Chest: Effort normal and breath sounds normal. No respiratory distress. He has no wheezes.   Abdominal: Normal appearance.   Musculoskeletal: Normal range of motion.         General: Normal range of motion.   Neurological: no focal deficit. He is alert. He displays no weakness. Gait normal.   Skin: Skin is warm, dry, not diaphoretic, not pale and no rash.   Psychiatric: His behavior is normal.       Results for orders placed or performed in visit on 02/16/24   SARS Coronavirus 2 Antigen, POCT Manual Read   Result Value Ref Range    SARS Coronavirus 2 Antigen Positive (A) Negative     Acceptable Yes      *Note: Due to a large number of results and/or encounters for the requested time period, some results have not been displayed. A complete set of results can be found in Results Review.       Assessment:     1. COVID-19 virus infection    2. Cough, unspecified type        Plan:     - Rapid COVID-19 positive    - Covid Risk Score:  6  Pt considered moderate/high risk (score>3) and therefor qualifies for Paxlovid. Discussed with pt that this treatment is optional, but should be  started within first 5 days of symptoms if taken. Also discussed common side effects of medication. Pt was advised to discontinue Rosuvastatin while taking Paxlovid due to drug-to-drug interactions. Pt voiced understanding.   - Advised patient to stay home and self quarantine for 5 days from onset of symptoms. Advised must be fever free for at least 24 hours to discontinue isolation.  - Tylenol as needed for fever/pain control.   - Continue OTC medications prn for cold symptoms.   - Rest and drink plenty of fluids.  - Strict ED precautions given for any emergent symptoms.    COVID-19 virus infection  -     nirmatrelvir-ritonavir 300 mg (150 mg x 2)-100 mg copackaged tablets (EUA); Take 3 tablets by mouth 2 (two) times daily for 5 days. Each dose contains 2 nirmatrelvir (pink tablets) and 1 ritonavir (white tablet). Take all 3 tablets together  Dispense: 30 tablet; Refill: 0    Cough, unspecified type  -     SARS Coronavirus 2 Antigen, POCT Manual Read  -     Cancel: POCT Influenza A/B Molecular

## 2024-02-16 NOTE — TELEPHONE ENCOUNTER
[facility] didn't have the Mounjaro 12.5mg and also does not have the Mounjaro 10mg on hand, they have 2.5, 5, 7.5 and 15mg

## 2024-02-22 ENCOUNTER — PATIENT MESSAGE (OUTPATIENT)
Dept: ADMINISTRATIVE | Facility: OTHER | Age: 74
End: 2024-02-22
Payer: MEDICARE

## 2024-02-26 ENCOUNTER — PATIENT MESSAGE (OUTPATIENT)
Dept: OPHTHALMOLOGY | Facility: CLINIC | Age: 74
End: 2024-02-26

## 2024-02-26 ENCOUNTER — OFFICE VISIT (OUTPATIENT)
Dept: OPHTHALMOLOGY | Facility: CLINIC | Age: 74
End: 2024-02-26
Payer: MEDICARE

## 2024-02-26 DIAGNOSIS — I15.2 HYPERTENSION ASSOCIATED WITH DIABETES: ICD-10-CM

## 2024-02-26 DIAGNOSIS — E11.9 DIABETES MELLITUS TYPE 2 WITHOUT RETINOPATHY: Primary | ICD-10-CM

## 2024-02-26 DIAGNOSIS — E11.59 HYPERTENSION ASSOCIATED WITH DIABETES: ICD-10-CM

## 2024-02-26 DIAGNOSIS — H52.7 REFRACTIVE ERRORS: ICD-10-CM

## 2024-02-26 DIAGNOSIS — E11.36 DIABETIC CATARACT: ICD-10-CM

## 2024-02-26 PROCEDURE — 99999 PR PBB SHADOW E&M-EST. PATIENT-LVL III: CPT | Mod: PBBFAC,,, | Performed by: OPTOMETRIST

## 2024-02-26 PROCEDURE — 92014 COMPRE OPH EXAM EST PT 1/>: CPT | Mod: S$PBB,,, | Performed by: OPTOMETRIST

## 2024-02-26 PROCEDURE — 92015 DETERMINE REFRACTIVE STATE: CPT | Mod: ,,, | Performed by: OPTOMETRIST

## 2024-02-26 PROCEDURE — 99213 OFFICE O/P EST LOW 20 MIN: CPT | Mod: PBBFAC | Performed by: OPTOMETRIST

## 2024-02-26 NOTE — PROGRESS NOTES
SUBJECTIVE  Timothy Ortega is 73 y.o. male  Uncorrected distance visual acuity was 20/25 -1 in the right eye and 20/25 -1 in the left eye.   Chief Complaint   Patient presents with    Diabetic Eye Exam     Lab Results       Component                Value               Date                       HGBA1C                   6.7 (H)             09/21/2023                        HPI     Diabetic Eye Exam     Additional comments: Lab Results       Component                Value               Date                       HGBA1C                   6.7 (H)             09/21/2023                         Comments    Pt states his blood sugar fluctuates.  Vision unchanged since last year.          Last edited by Lottie Espana MA on 2/26/2024  8:14 AM.         Assessment /Plan :  No Backgrund Diabetic Retinopathy  Strict BG control, f/u w/ PCP, and annual DFE  Stressed importance of DM control to preserve vision   1. Diabetes mellitus type 2 without retinopathy    2. Diabetic cataract    3. Hypertension associated with diabetes    4. Refractive errors

## 2024-02-26 NOTE — PROGRESS NOTES
SUBJECTIVE  Timothy Ortega is 73 y.o. male  Uncorrected distance visual acuity was 20/25 -1 in the right eye and 20/25 -1 in the left eye.   Chief Complaint   Patient presents with    Diabetic Eye Exam     Lab Results       Component                Value               Date                       HGBA1C                   6.7 (H)             09/21/2023                        HPI     Diabetic Eye Exam     Additional comments: Lab Results       Component                Value               Date                       HGBA1C                   6.7 (H)             09/21/2023                         Comments    Pt states his blood sugar fluctuates.  Vision unchanged since last year.          Last edited by Lottie Espana MA on 2/26/2024  8:14 AM.         Assessment /Plan :  1. Diabetes mellitus type 2 without retinopathy   No Background Diabetic Retinopathy  Strict BG control, f/u w/ PCP, and annual DFE  Stressed importance of DM control to preserve vision      2. Diabetic cataract   Cataracts are not visually significant and not affecting activities of daily living. Annual observation is recommended at this time. Patient to call or return to clinic with any significant change in vision prior to next visit.     3. Hypertension associated with diabetes   No HTN Retinopathy, monitor annually.      4. Refractive errors   Dispense Final Rx for glasses.  RTC 1 year  Discussed above and answered questions.

## 2024-02-27 ENCOUNTER — PATIENT MESSAGE (OUTPATIENT)
Dept: OTHER | Facility: OTHER | Age: 74
End: 2024-02-27
Payer: MEDICARE

## 2024-03-04 ENCOUNTER — OFFICE VISIT (OUTPATIENT)
Dept: PODIATRY | Facility: CLINIC | Age: 74
End: 2024-03-04
Payer: MEDICARE

## 2024-03-04 VITALS — HEIGHT: 70 IN | BODY MASS INDEX: 24.91 KG/M2 | WEIGHT: 174 LBS

## 2024-03-04 DIAGNOSIS — L90.9 PLANTAR FAT PAD ATROPHY: ICD-10-CM

## 2024-03-04 DIAGNOSIS — L97.511 ULCERATED, FOOT, RIGHT, LIMITED TO BREAKDOWN OF SKIN: Primary | ICD-10-CM

## 2024-03-04 DIAGNOSIS — E11.49 TYPE 2 DIABETES MELLITUS WITH OTHER NEUROLOGIC COMPLICATION, WITH LONG-TERM CURRENT USE OF INSULIN: ICD-10-CM

## 2024-03-04 DIAGNOSIS — Z79.4 TYPE 2 DIABETES MELLITUS WITH OTHER NEUROLOGIC COMPLICATION, WITH LONG-TERM CURRENT USE OF INSULIN: ICD-10-CM

## 2024-03-04 PROCEDURE — 99999 PR PBB SHADOW E&M-EST. PATIENT-LVL III: CPT | Mod: PBBFAC,,, | Performed by: PODIATRIST

## 2024-03-04 PROCEDURE — 99213 OFFICE O/P EST LOW 20 MIN: CPT | Mod: S$PBB,,, | Performed by: PODIATRIST

## 2024-03-04 PROCEDURE — 99213 OFFICE O/P EST LOW 20 MIN: CPT | Mod: PBBFAC | Performed by: PODIATRIST

## 2024-03-04 RX ORDER — MUPIROCIN 20 MG/G
OINTMENT TOPICAL 2 TIMES DAILY
Qty: 30 G | Refills: 2 | Status: SHIPPED | OUTPATIENT
Start: 2024-03-04

## 2024-03-04 NOTE — PROGRESS NOTES
Subjective:       Patient ID: Timothy Ortega is a 73 y.o. male.    Chief Complaint: Recurrent Skin Infections (Coming in for Porkeratosis on the bottom of the right foot, pt rates pain 4/10, she states the pain occurs when he walks and stand on it , pt is diabetic, pt last seen pcp 23)    HPI: Timothy Ortega presents to the office today, with areas of concern to the plantar aspect of the right foot.  Reports 4/10 pain.  Continues to have the sensation of walking on rocks albeit denies puncture wounds or injuries.  Does have history of diabetes mellitus. Follows with Dr. Ochoa with last appointment on 2023    Review of patient's allergies indicates:  No Known Allergies    Past Medical History:   Diagnosis Date    Coronary artery disease     [facility] - no stents    DM (diabetes mellitus) 2002     lunch 10/28/2016    DM (diabetes mellitus)      am 2021    DM (diabetes mellitus)     BS 96 am 2022 Insulin for years    Groin pain, left 2021    Hyperlipemia     Hypertension     Kidney stones     Nephrolithiasis 2021    Neuropathy     Type 2 diabetes mellitus 10-12 years     am 10/31/2014       Family History   Problem Relation Age of Onset    Diabetes Mother     Glaucoma Mother     Heart disease Mother 75        CAB    Diabetes Father     Heart attack Father 58        Fatal MI    Diabetes Brother     Diabetes Sister     Diabetes Sister     Cataracts Paternal Uncle     Cataracts Maternal Grandmother        Social History     Socioeconomic History    Marital status:    Tobacco Use    Smoking status: Former     Current packs/day: 0.00     Average packs/day: 1 pack/day for 20.0 years (20.0 ttl pk-yrs)     Types: Cigarettes     Start date: 1980     Quit date: 2000     Years since quittin.1     Passive exposure: Past    Smokeless tobacco: Never   Substance and Sexual Activity    Alcohol use: No     Alcohol/week: 0.0 standard drinks of alcohol    Drug  use: No    Sexual activity: Yes     Partners: Female   Social History Narrative    . Lives with spouse. Has 2 children. Retired. No pets or smokers in household.     Social Determinants of Health     Financial Resource Strain: Low Risk  (3/1/2023)    Overall Financial Resource Strain (CARDIA)     Difficulty of Paying Living Expenses: Not hard at all   Food Insecurity: No Food Insecurity (3/1/2023)    Hunger Vital Sign     Worried About Running Out of Food in the Last Year: Never true     Ran Out of Food in the Last Year: Never true   Transportation Needs: No Transportation Needs (3/1/2023)    PRAPARE - Transportation     Lack of Transportation (Medical): No     Lack of Transportation (Non-Medical): No   Physical Activity: Inactive (3/1/2023)    Exercise Vital Sign     Days of Exercise per Week: 0 days     Minutes of Exercise per Session: 0 min   Stress: No Stress Concern Present (3/1/2023)    [employer] - Occupational Stress Questionnaire     Feeling of Stress : Only a little   Social Connections: Unknown (3/1/2023)    Social Connection and Isolation Panel [NHANES]     Frequency of Communication with Friends and Family: More than three times a week   Housing Stability: Low Risk  (3/1/2023)    Housing Stability Vital Sign     Unable to Pay for Housing in the Last Year: No     Number of Places Lived in the Last Year: 1     Unstable Housing in the Last Year: No       Past Surgical History:   Procedure Laterality Date    CARPAL TUNNEL RELEASE Right 07/2020    COLONOSCOPY N/A 03/31/2017    Procedure: COLONOSCOPY;  Surgeon: Cornelius Ibarra MD;  Location: [facility];  Service: Endoscopy;  Laterality: N/A;    COLONOSCOPY N/A 04/18/2022    Procedure: COLONOSCOPY;  Surgeon: Geneva Serna MD;  Location: [facility];  Service: Endoscopy;  Laterality: N/A;    DENTAL SURGERY      Implant    HAND SURGERY      KNEE ARTHROSCOPY Left     ROBOT-ASSISTED LAPAROSCOPIC REPAIR OF INGUINAL HERNIA  "USING DA JOSE ANGEL XI Left 02/26/2021    Procedure: XI ROBOTIC REPAIR, HERNIA, INGUINAL;  Surgeon: Tavon Cruz MD;  Location: [facility] OR;  Service: General;  Laterality: Left;       Review of Systems       Objective:   Ht 5' 10" (1.778 m)   Wt 78.9 kg (174 lb)   BMI 24.97 kg/m²     US Retroperitoneal Complete  Narrative: EXAMINATION:  US RETROPERITONEAL COMPLETE    CLINICAL HISTORY:  Calculus of kidney    TECHNIQUE:  Ultrasound of the kidneys and urinary bladder was performed including color flow and Doppler evaluation of the kidneys.    COMPARISON:  CT dated 02/18/2021    FINDINGS:  Right kidney: The right kidney measures 11.9 cm. No cortical thinning. No loss of corticomedullary distinction. No mass. No stone visualized.  No hydronephrosis.    Left kidney: The left kidney measures 12.9 cm. No cortical thinning. No loss of corticomedullary distinction. No mass. There is a punctate echogenic focus at the lower pole of the left kidney which is equivocal for small stone.  No correlate seen on previous CT.  No hydronephrosis.    The bladder is partially distended at the time of scanning and has an unremarkable appearance.  Impression: Equivocal left-sided nephrolithiasis as above.  Further evaluation could be obtained with CT as clinically warranted.    No hydronephrosis    Electronically signed by: Ulysses Campos MD  Date:    06/23/2023  Time:    09:06         Physical Exam   LOWER EXTREMITY PHYSICAL EXAMINATION    Vascular:  The right dorsalis pedis pulse 2/4 and the right posterior tibial pulse 2/4.  The left dorsalis pedis pulse 2/4 and posterior tibial pulse on the left is 2/4.  Capillary refill is intact.  Pedal hair growth intact    Neurological:  Protective sensation is intact with Casmalia Scarlett monofilament. Proprioception is intact. Intact sensation to light touch.  Vibratory sensation is diminished to the 1st metatarsal phalangeal joint.     Dermatological:  Skin is supple and moist.  Single lesion present " sub right 5th metatarsal head.  Small 0.1 cm wound is noted.  There is no deep tissue exposure.  Appears to be limited to the skin.  No acute signs of infection.  Plantar fat pad atrophy is noted.    Imaging:     X-Ray Foot Complete Right    Narrative  DATE OF EXAM: Jun 7 2012    BOC   0106  -  FOOT 3 VIEWS MINIMUM RIGHT:   \  [id number]    CLINICAL HISTORY:   \729.5 0 PAIN IN LIMB    PROCEDURE COMMENT:   \    ICD 9 CODE(S):   (\)    CPT 4 CODE(S)/MODIFIER(S):   (\)    Findings: Osseous structures do not demonstrate any evidence of acute  fracture.  There are calcaneal enthesophytes noted.    Impression  Calcaneal enthesophytes.  ______________________________________    Electronically signed by: APOLONIA MURRAY MD  Date:     06/07/12  Time:    11:20        : NAVIN  Transcribe Date/Time: Jun 7 2012 11:20A  Dictated by : APOLONIA MURRAY MD  Report reviewed by:  Read On:  \  Images were reviewed, findings were verified and document was  electronically  SIGNED BY: APOLONIA MURRAY MD On: Jun 7 2012 11:20A    Assessment:     1. Ulcerated, foot, right, limited to breakdown of skin    2. Plantar fat pad atrophy    3. Type 2 diabetes mellitus with other neurologic complication, with long-term current use of insulin        Plan:     Ulcerated, foot, right, limited to breakdown of skin    Plantar fat pad atrophy    Type 2 diabetes mellitus with other neurologic complication, with long-term current use of insulin    Other orders  -     mupirocin (BACTROBAN) 2 % ointment; Apply topically 2 (two) times daily.  Dispense: 30 g; Refill: 2      Foot counseling and education is provided at this visit. Patient is advised to wear socks and shoes at all times.  Do not walk barefoot, or with just socks, even when indoors.  Be sure to check and inspect the inside of the shoe before putting them on her feet.  Protect your feet at all times.  Walking shoes and/or athletic shoes are the best types of shoe gear. Do not wear  vinyl or plastic type shoe gear, as they do not stretch and/or breathe.  Protect your feet from hot and/or cold. Elevate the extremities when sitting.  Do not wear excessively tight socks and/or shoe gear. Wiggle your toes for a few minutes throughout the day. Move your ankles up and down, in and out, to help blood flow in your lower extremity.    No acute signs of infection to the wound/lesion sub right 5th metatarsal head.    Offload in regular shoe gear with 1/4 inch foam padding with centralize cut-out at the 5th metatarsal head placed in patient's shoes to prevent recurrent pressure and pain associated with recurrent lesion.    Recommend patient to apply daily dressings.    Follow-up in 2 weeks    Future Appointments   Date Time Provider Department Center   3/5/2024  8:00 AM Tanvir Young, NP [facility]   3/18/2024 11:15 AM Yessi Lynn DPM [facility]   3/21/2024  8:25 AM LABORATORY, [facility]   3/28/2024  8:20 AM Samantha Augustine NP [facility] IM [facility]   5/7/2024  8:40 AM Naveen Gr MD [facility] CARDIO [facility]   5/16/2024  8:00 AM Anay Rubin NP [facility] DIABETE [facility]   6/20/2024 11:15 AM Bandar Garcia MD [facility] UROLOGY [facility]

## 2024-03-05 ENCOUNTER — OFFICE VISIT (OUTPATIENT)
Dept: INTERNAL MEDICINE | Facility: CLINIC | Age: 74
End: 2024-03-05
Payer: MEDICARE

## 2024-03-05 VITALS
RESPIRATION RATE: 18 BRPM | WEIGHT: 172.81 LBS | HEIGHT: 72 IN | HEART RATE: 70 BPM | OXYGEN SATURATION: 99 % | BODY MASS INDEX: 23.41 KG/M2 | SYSTOLIC BLOOD PRESSURE: 122 MMHG | DIASTOLIC BLOOD PRESSURE: 72 MMHG

## 2024-03-05 DIAGNOSIS — I15.2 HYPERTENSION ASSOCIATED WITH DIABETES: ICD-10-CM

## 2024-03-05 DIAGNOSIS — I70.0 AORTIC ATHEROSCLEROSIS: ICD-10-CM

## 2024-03-05 DIAGNOSIS — G56.03 BILATERAL CARPAL TUNNEL SYNDROME: ICD-10-CM

## 2024-03-05 DIAGNOSIS — E11.69 HYPERLIPIDEMIA ASSOCIATED WITH TYPE 2 DIABETES MELLITUS: ICD-10-CM

## 2024-03-05 DIAGNOSIS — K40.90 NON-RECURRENT UNILATERAL INGUINAL HERNIA WITHOUT OBSTRUCTION OR GANGRENE: ICD-10-CM

## 2024-03-05 DIAGNOSIS — E11.8 TYPE 2 DIABETES MELLITUS WITH COMPLICATION, WITH LONG-TERM CURRENT USE OF INSULIN: ICD-10-CM

## 2024-03-05 DIAGNOSIS — E11.42 DIABETIC POLYNEUROPATHY ASSOCIATED WITH TYPE 2 DIABETES MELLITUS: ICD-10-CM

## 2024-03-05 DIAGNOSIS — E78.5 HYPERLIPIDEMIA ASSOCIATED WITH TYPE 2 DIABETES MELLITUS: ICD-10-CM

## 2024-03-05 DIAGNOSIS — Z79.4 TYPE 2 DIABETES MELLITUS WITH COMPLICATION, WITH LONG-TERM CURRENT USE OF INSULIN: ICD-10-CM

## 2024-03-05 DIAGNOSIS — G47.33 OBSTRUCTIVE SLEEP APNEA: ICD-10-CM

## 2024-03-05 DIAGNOSIS — Z00.00 ENCOUNTER FOR PREVENTIVE HEALTH EXAMINATION: Primary | ICD-10-CM

## 2024-03-05 DIAGNOSIS — R97.20 ELEVATED PROSTATE SPECIFIC ANTIGEN (PSA): ICD-10-CM

## 2024-03-05 DIAGNOSIS — E11.59 HYPERTENSION ASSOCIATED WITH DIABETES: ICD-10-CM

## 2024-03-05 PROCEDURE — 99215 OFFICE O/P EST HI 40 MIN: CPT | Mod: PBBFAC,PO | Performed by: NURSE PRACTITIONER

## 2024-03-05 PROCEDURE — G0439 PPPS, SUBSEQ VISIT: HCPCS | Mod: ,,, | Performed by: NURSE PRACTITIONER

## 2024-03-05 PROCEDURE — 99999 PR PBB SHADOW E&M-EST. PATIENT-LVL V: CPT | Mod: PBBFAC,,, | Performed by: NURSE PRACTITIONER

## 2024-03-05 RX ORDER — MELOXICAM 15 MG/1
15 TABLET ORAL
COMMUNITY
Start: 2024-01-09 | End: 2024-03-28

## 2024-03-05 NOTE — PROGRESS NOTES
"  Timothy Orteag presented for a  Medicare AWV and comprehensive Health Risk Assessment today. The following components were reviewed and updated:    Medical history  Family History  Social history  Allergies and Current Medications  Health Risk Assessment  Health Maintenance  Care Team         ** See Completed Assessments for Annual Wellness Visit within the encounter summary.**         The following assessments were completed:  Living Situation  CAGE  Depression Screening  Timed Get Up and Go  Whisper Test  Cognitive Function Screening    Nutrition Screening  ADL Screening  PAQ Screening      Opioid documentation:      Patient does not have a current opioid prescription.        Vitals:    03/05/24 0807   BP: 122/72   Pulse: 70   Resp: 18   SpO2: 99%   Weight: 78.4 kg (172 lb 13.5 oz)   Height: 5' 11.75" (1.822 m)     Body mass index is 23.61 kg/m².  Physical Exam  Constitutional:       General: He is not in acute distress.     Appearance: Normal appearance. He is well-developed. He is not ill-appearing, toxic-appearing or diaphoretic.   HENT:      Head: Normocephalic and atraumatic.      Right Ear: External ear normal.      Left Ear: External ear normal.      Mouth/Throat:      Mouth: Mucous membranes are moist.   Eyes:      Conjunctiva/sclera: Conjunctivae normal.   Neck:      Vascular: No carotid bruit.   Cardiovascular:      Rate and Rhythm: Normal rate and regular rhythm.      Pulses: Normal pulses.      Heart sounds: Normal heart sounds. No murmur heard.     No friction rub. No gallop.   Pulmonary:      Effort: Pulmonary effort is normal. No respiratory distress.      Breath sounds: Normal breath sounds. No stridor. No wheezing, rhonchi or rales.   Chest:      Chest wall: No tenderness.   Abdominal:      General: Bowel sounds are normal. There is no distension.      Palpations: Abdomen is soft. There is no mass.      Tenderness: There is no abdominal tenderness.      Hernia: No hernia is present. "   Musculoskeletal:         General: No tenderness. Normal range of motion.      Cervical back: Neck supple. No tenderness.   Lymphadenopathy:      Cervical: No cervical adenopathy.   Skin:     General: Skin is warm and dry.   Neurological:      Mental Status: He is alert and oriented to person, place, and time.      Cranial Nerves: No cranial nerve deficit.      Comments: Protective Sensation (w/ 10 gram monofilament):  Right: Intact  Left: Intact    Visual Inspection:  Normal -  Bilateral    Pedal Pulses:   Right: Present  Left: Present    Posterior Tibialis Pulses:   Right:Present  Left: Present      Psychiatric:         Mood and Affect: Mood normal.         Behavior: Behavior normal.               Diagnoses and health risks identified today and associated recommendations/orders:    1. Encounter for preventive health examination  Screenings performed, as noted above.  Personal preventative testing needs reviewed.      2. Hypertension associated with diabetes  Treated with amlodipine, losartan, stable, cont tx    3. Hyperlipidemia associated with type 2 diabetes mellitus  Treated with crestor, stable, cont tx    4. Obstructive sleep apnea  Uses Cpap nightly, stable, cont tx, follow up with pulmonary    5. Type 2 diabetes mellitus with complication, with long-term current use of insulin  Treated with insulin, mounjaro, jardiance, stable, cont tx    6. Elevated prostate specific antigen (PSA)   Monitored by Dr Garcia, cont to follow up as directed    7. Diabetic polyneuropathy associated with type 2 diabetes mellitus  Monitored, stable, inspects his feet daily, saw podiatry for foot infection, using Bactroban, improving    8. Aortic atherosclerosis  Monitored, stable, on a statin, cont tx    9. Bilateral carpal tunnel syndrome  Monitored, stable, follow up as needed    10. Non-recurrent unilateral inguinal hernia without obstruction or gangrene  Monitored, stable, will discuss with his pcp      Fang Aguirre with  a 5-10 year written screening schedule and personal prevention plan. Recommendations were developed using the USPSTF age appropriate recommendations. Education, counseling, and referrals were provided as needed. After Visit Summary printed and given to patient which includes a list of additional screenings\tests needed.    No follow-ups on file.    Tanvir Young, NP  I offered to discuss advanced care planning, including how to pick a person who would make decisions for you if you were unable to make them for yourself, called a health care power of , and what kind of decisions you might make such as use of life sustaining treatments such as ventilators and tube feeding when faced with a life limiting illness recorded on a living will that they will need to know. (How you want to be cared for as you near the end of your natural life)     X Patient is interested in learning more about how to make advanced directives.  I provided them paperwork and offered to discuss this with them.

## 2024-03-05 NOTE — PATIENT INSTRUCTIONS
Counseling and Referral of Other Preventative  (Italic type indicates deductible and co-insurance are waived)    Patient Name: Timothy Ortega  Today's Date: 3/5/2024    Health Maintenance       Date Due Completion Date    Hemoglobin A1c 03/21/2024 9/21/2023    COVID-19 Vaccine (3 - 2023-24 season) 12/12/2112 (Originally 9/1/2023) 3/24/2021    PROSTATE-SPECIFIC ANTIGEN 06/13/2024 6/13/2023    Foot Exam 08/30/2024 8/30/2023    Override on 3/28/2023: Done    Override on 1/13/2022: Done    Override on 2/16/2017: Done    Override on 11/20/2015: Done    Override on 5/28/2014: Done    Override on 3/11/2014: Done    Override on 12/10/2013: Done    Override on 10/22/2013: Done    Override on 9/16/2013: Done    Diabetes Urine Screening 09/21/2024 9/21/2023    Lipid Panel 09/21/2024 9/21/2023    Eye Exam 02/26/2025 2/26/2024    High Dose Statin 03/04/2025 3/4/2024    Aspirin/Antiplatelet Therapy 03/04/2025 3/4/2024    Colorectal Cancer Screening 04/18/2027 4/18/2022    TETANUS VACCINE 07/20/2028 7/20/2018        No orders of the defined types were placed in this encounter.      The following information is provided to all patients.  This information is to help you find resources for any of the problems found today that may be affecting your health:                  Living healthy guide: www.[facility].[place].gov      Understanding Diabetes: www.diabetes.org      Eating healthy: www.cdc.gov/healthyweight      CDC home safety checklist: www.cdc.gov/steadi/patient.html      Agency on Aging: www.goea.[place].gov      Alcoholics anonymous (AA): www.aa.org      Physical Activity: www.alex.nih.gov/js6vsky      Tobacco use: www.quitwithusla.org

## 2024-03-18 ENCOUNTER — OFFICE VISIT (OUTPATIENT)
Dept: PODIATRY | Facility: CLINIC | Age: 74
End: 2024-03-18
Payer: MEDICARE

## 2024-03-18 DIAGNOSIS — E11.49 TYPE 2 DIABETES MELLITUS WITH OTHER NEUROLOGIC COMPLICATION, WITH LONG-TERM CURRENT USE OF INSULIN: ICD-10-CM

## 2024-03-18 DIAGNOSIS — Z87.2 HISTORY OF FOOT ULCER: Primary | ICD-10-CM

## 2024-03-18 DIAGNOSIS — L90.9 PLANTAR FAT PAD ATROPHY: ICD-10-CM

## 2024-03-18 DIAGNOSIS — Z79.4 TYPE 2 DIABETES MELLITUS WITH OTHER NEUROLOGIC COMPLICATION, WITH LONG-TERM CURRENT USE OF INSULIN: ICD-10-CM

## 2024-03-18 PROCEDURE — 99213 OFFICE O/P EST LOW 20 MIN: CPT | Mod: S$PBB,,, | Performed by: PODIATRIST

## 2024-03-18 PROCEDURE — 99211 OFF/OP EST MAY X REQ PHY/QHP: CPT | Mod: PBBFAC | Performed by: PODIATRIST

## 2024-03-18 PROCEDURE — 99999 PR PBB SHADOW E&M-EST. PATIENT-LVL I: CPT | Mod: PBBFAC,,, | Performed by: PODIATRIST

## 2024-03-18 NOTE — PROGRESS NOTES
Subjective:       Patient ID: Timothy Ortega is a 73 y.o. male.    Chief Complaint: Foot Problem (Patient denies pain at present.)    HPI: Timothy Ortega presents to the office today, To follow-up on wound present to the plantar aspect of the right foot.  States 0/10 pain.  Has noticed complete healing of the wound.  Does have history of diabetes mellitus. Follows with Dr. Ochoa with last appointment on 2023    Review of patient's allergies indicates:  No Known Allergies    Past Medical History:   Diagnosis Date    Coronary artery disease     [facility] - no stents    DM (diabetes mellitus)      lunch 10/28/2016    DM (diabetes mellitus)      am 2021    DM (diabetes mellitus)     BS 96 am 2022 Insulin for years    Groin pain, left 2021    Hyperlipemia     Hypertension     Kidney stones     Nephrolithiasis 2021    Neuropathy     Type 2 diabetes mellitus 10-12 years     am 10/31/2014       Family History   Problem Relation Age of Onset    Diabetes Mother     Glaucoma Mother     Heart disease Mother 75        CAB    Diabetes Father     Heart attack Father 58        Fatal MI    Diabetes Brother     Diabetes Sister     Diabetes Sister     Cataracts Paternal Uncle     Cataracts Maternal Grandmother        Social History     Socioeconomic History    Marital status:    Tobacco Use    Smoking status: Former     Current packs/day: 0.00     Average packs/day: 1 pack/day for 20.0 years (20.0 ttl pk-yrs)     Types: Cigarettes     Start date: 1980     Quit date: 2000     Years since quittin.2     Passive exposure: Past    Smokeless tobacco: Never   Substance and Sexual Activity    Alcohol use: No     Alcohol/week: 0.0 standard drinks of alcohol    Drug use: No    Sexual activity: Yes     Partners: Female   Social History Narrative    . Lives with spouse. Has 2 children. Retired. No pets or smokers in household.     Social Determinants of Health      Financial Resource Strain: Low Risk  (3/5/2024)    Overall Financial Resource Strain (CARDIA)     Difficulty of Paying Living Expenses: Not hard at all   Food Insecurity: No Food Insecurity (3/5/2024)    Hunger Vital Sign     Worried About Running Out of Food in the Last Year: Never true     Ran Out of Food in the Last Year: Never true   Transportation Needs: No Transportation Needs (3/5/2024)    PRAPARE - Transportation     Lack of Transportation (Medical): No     Lack of Transportation (Non-Medical): No   Physical Activity: Inactive (3/5/2024)    Exercise Vital Sign     Days of Exercise per Week: 0 days     Minutes of Exercise per Session: 0 min   Stress: No Stress Concern Present (3/5/2024)    [place] [place] [employer] - Occupational Stress Questionnaire     Feeling of Stress : Only a little   Social Connections: Socially Integrated (3/5/2024)    Social Connection and Isolation Panel [NHANES]     Frequency of Communication with Friends and Family: More than three times a week     Frequency of Social Gatherings with Friends and Family: More than three times a week     Attends Restoration Services: More than 4 times per year     Active Member of Clubs or Organizations: Yes     Attends Club or Organization Meetings: More than 4 times per year     Marital Status:    Housing Stability: Low Risk  (3/5/2024)    Housing Stability Vital Sign     Unable to Pay for Housing in the Last Year: No     Number of Places Lived in the Last Year: 1     Unstable Housing in the Last Year: No       Past Surgical History:   Procedure Laterality Date    CARPAL TUNNEL RELEASE Right 07/2020    COLONOSCOPY N/A 03/31/2017    Procedure: COLONOSCOPY;  Surgeon: Cornelius Ibarra MD;  Location: [facility] ENDO;  Service: Endoscopy;  Laterality: N/A;    COLONOSCOPY N/A 04/18/2022    Procedure: COLONOSCOPY;  Surgeon: Geneva Serna MD;  Location: [facility] ENDO;  Service: Endoscopy;  Laterality: N/A;    DENTAL SURGERY      Implant     HAND SURGERY      KNEE ARTHROSCOPY Left     ROBOT-ASSISTED LAPAROSCOPIC REPAIR OF INGUINAL HERNIA USING DA JOSE ANGEL XI Left 02/26/2021    Procedure: XI ROBOTIC REPAIR, HERNIA, INGUINAL;  Surgeon: Tavon Cruz MD;  Location: [facility];  Service: General;  Laterality: Left;       Review of Systems       Objective:   There were no vitals taken for this visit.    US Retroperitoneal Complete  Narrative: EXAMINATION:  US RETROPERITONEAL COMPLETE    CLINICAL HISTORY:  Calculus of kidney    TECHNIQUE:  Ultrasound of the kidneys and urinary bladder was performed including color flow and Doppler evaluation of the kidneys.    COMPARISON:  CT dated 02/18/2021    FINDINGS:  Right kidney: The right kidney measures 11.9 cm. No cortical thinning. No loss of corticomedullary distinction. No mass. No stone visualized.  No hydronephrosis.    Left kidney: The left kidney measures 12.9 cm. No cortical thinning. No loss of corticomedullary distinction. No mass. There is a punctate echogenic focus at the lower pole of the left kidney which is equivocal for small stone.  No correlate seen on previous CT.  No hydronephrosis.    The bladder is partially distended at the time of scanning and has an unremarkable appearance.  Impression: Equivocal left-sided nephrolithiasis as above.  Further evaluation could be obtained with CT as clinically warranted.    No hydronephrosis    Electronically signed by: Ulysses Campos MD  Date:    06/23/2023  Time:    09:06         Physical Exam   LOWER EXTREMITY PHYSICAL EXAMINATION    Vascular:  The right dorsalis pedis pulse 2/4 and the right posterior tibial pulse 2/4.  The left dorsalis pedis pulse 2/4 and posterior tibial pulse on the left is 2/4.  Capillary refill is intact.  Pedal hair growth intact    Neurological:  Protective sensation is intact with [place] monofilament. Proprioception is intact. Intact sensation to light touch.  Vibratory sensation is diminished to the 1st metatarsal  phalangeal joint.     Dermatological:  Skin is supple and moist.  Fat pad atrophy noted.  No signs of underlying ulceration.  Previous ulcer has appropriately healed.    Imaging:     X-Ray Foot Complete Right    Narrative  DATE OF EXAM: Jun 7 2012    BOC   0106  -  FOOT 3 VIEWS MINIMUM RIGHT:   \  [id number]    CLINICAL HISTORY:   \729.5 0 PAIN IN LIMB    PROCEDURE COMMENT:   \    ICD 9 CODE(S):   (\)    CPT 4 CODE(S)/MODIFIER(S):   (\)    Findings: Osseous structures do not demonstrate any evidence of acute  fracture.  There are calcaneal enthesophytes noted.    Impression  Calcaneal enthesophytes.  ______________________________________    Electronically signed by: APOLONIA MURRAY MD  Date:     06/07/12  Time:    11:20        : NAVIN  Transcribe Date/Time: Jun 7 2012 11:20A  Dictated by : APOLONIA MURRAY MD  Report reviewed by:  Read On:  \  Images were reviewed, findings were verified and document was  electronically  SIGNED BY: APOLONIA MURRAY MD On: Jun 7 2012 11:20A    Assessment:     1. History of foot ulcer    2. Plantar fat pad atrophy    3. Type 2 diabetes mellitus with other neurologic complication, with long-term current use of insulin          Plan:     History of foot ulcer    Plantar fat pad atrophy    Type 2 diabetes mellitus with other neurologic complication, with long-term current use of insulin        Foot counseling and education is provided at this visit. Patient is advised to wear socks and shoes at all times.  Do not walk barefoot, or with just socks, even when indoors.  Be sure to check and inspect the inside of the shoe before putting them on her feet.  Protect your feet at all times.  Walking shoes and/or athletic shoes are the best types of shoe gear. Do not wear vinyl or plastic type shoe gear, as they do not stretch and/or breathe.  Protect your feet from hot and/or cold. Elevate the extremities when sitting.  Do not wear excessively tight socks and/or shoe gear. Wiggle  your toes for a few minutes throughout the day. Move your ankles up and down, in and out, to help blood flow in your lower extremity.    Wound appears to be completely healed with normal healthy skin.  Continue to pad the forefoot and this area due to fat pad atrophy.    Future Appointments   Date Time Provider Department Center   3/21/2024  8:25 AM LABORATORY, [facility] LAB [facility]   3/21/2024  9:30 AM Lejeune, Elizabeth B, NP [facility]   3/28/2024  8:20 AM Samantha Augustine NP [facility] IM [facility]   4/4/2024  3:00 PM [facility]je[facility] B[facility]   5/7/2024  8:40 AM Naveen Gr MD [facility] CARDIO [facility]   5/16/2024  8:00 AM Anay Rubin NP [facility] DIABETE [facility]   6/20/2024 11:15 AM Bandar Garcia MD [facility] UROLOGY [facility]

## 2024-03-21 ENCOUNTER — OFFICE VISIT (OUTPATIENT)
Dept: PULMONOLOGY | Facility: CLINIC | Age: 74
End: 2024-03-21
Payer: MEDICARE

## 2024-03-21 ENCOUNTER — LAB VISIT (OUTPATIENT)
Dept: LAB | Facility: HOSPITAL | Age: 74
End: 2024-03-21
Attending: PEDIATRICS
Payer: MEDICARE

## 2024-03-21 VITALS
OXYGEN SATURATION: 99 % | RESPIRATION RATE: 19 BRPM | HEART RATE: 75 BPM | DIASTOLIC BLOOD PRESSURE: 70 MMHG | SYSTOLIC BLOOD PRESSURE: 124 MMHG | WEIGHT: 170.19 LBS | HEIGHT: 72 IN | BODY MASS INDEX: 23.05 KG/M2

## 2024-03-21 DIAGNOSIS — Z72.820 LACK OF ADEQUATE SLEEP: ICD-10-CM

## 2024-03-21 DIAGNOSIS — G47.33 OBSTRUCTIVE SLEEP APNEA: Primary | ICD-10-CM

## 2024-03-21 DIAGNOSIS — R53.83 FATIGUE, UNSPECIFIED TYPE: ICD-10-CM

## 2024-03-21 DIAGNOSIS — E11.8 TYPE 2 DIABETES MELLITUS WITH COMPLICATION, WITH LONG-TERM CURRENT USE OF INSULIN: ICD-10-CM

## 2024-03-21 DIAGNOSIS — Z79.4 TYPE 2 DIABETES MELLITUS WITH COMPLICATION, WITH LONG-TERM CURRENT USE OF INSULIN: ICD-10-CM

## 2024-03-21 DIAGNOSIS — E78.49 OTHER HYPERLIPIDEMIA: ICD-10-CM

## 2024-03-21 LAB
ALBUMIN SERPL BCP-MCNC: 4.2 G/DL (ref 3.5–5.2)
ALP SERPL-CCNC: 52 U/L (ref 55–135)
ALT SERPL W/O P-5'-P-CCNC: 29 U/L (ref 10–44)
ANION GAP SERPL CALC-SCNC: 8 MMOL/L (ref 8–16)
AST SERPL-CCNC: 21 U/L (ref 10–40)
BILIRUB SERPL-MCNC: 0.6 MG/DL (ref 0.1–1)
BUN SERPL-MCNC: 16 MG/DL (ref 8–23)
CALCIUM SERPL-MCNC: 10 MG/DL (ref 8.7–10.5)
CHLORIDE SERPL-SCNC: 107 MMOL/L (ref 95–110)
CHOLEST SERPL-MCNC: 122 MG/DL (ref 120–199)
CHOLEST/HDLC SERPL: 3.5 {RATIO} (ref 2–5)
CO2 SERPL-SCNC: 27 MMOL/L (ref 23–29)
CREAT SERPL-MCNC: 1.1 MG/DL (ref 0.5–1.4)
EST. GFR  (NO RACE VARIABLE): >60 ML/MIN/1.73 M^2
ESTIMATED AVG GLUCOSE: 171 MG/DL (ref 68–131)
GLUCOSE SERPL-MCNC: 134 MG/DL (ref 70–110)
HBA1C MFR BLD: 7.6 % (ref 4–5.6)
HDLC SERPL-MCNC: 35 MG/DL (ref 40–75)
HDLC SERPL: 28.7 % (ref 20–50)
LDLC SERPL CALC-MCNC: 58.6 MG/DL (ref 63–159)
NONHDLC SERPL-MCNC: 87 MG/DL
POTASSIUM SERPL-SCNC: 4.7 MMOL/L (ref 3.5–5.1)
PROT SERPL-MCNC: 6.7 G/DL (ref 6–8.4)
SODIUM SERPL-SCNC: 142 MMOL/L (ref 136–145)
TRIGL SERPL-MCNC: 142 MG/DL (ref 30–150)
TSH SERPL DL<=0.005 MIU/L-ACNC: 1.39 UIU/ML (ref 0.4–4)

## 2024-03-21 PROCEDURE — 80061 LIPID PANEL: CPT | Performed by: PEDIATRICS

## 2024-03-21 PROCEDURE — 99215 OFFICE O/P EST HI 40 MIN: CPT | Mod: PBBFAC,PO | Performed by: NURSE PRACTITIONER

## 2024-03-21 PROCEDURE — 36415 COLL VENOUS BLD VENIPUNCTURE: CPT | Performed by: PEDIATRICS

## 2024-03-21 PROCEDURE — 83036 HEMOGLOBIN GLYCOSYLATED A1C: CPT | Performed by: PEDIATRICS

## 2024-03-21 PROCEDURE — 99999 PR PBB SHADOW E&M-EST. PATIENT-LVL V: CPT | Mod: PBBFAC,,, | Performed by: NURSE PRACTITIONER

## 2024-03-21 PROCEDURE — 80053 COMPREHEN METABOLIC PANEL: CPT | Performed by: PEDIATRICS

## 2024-03-21 PROCEDURE — 99214 OFFICE O/P EST MOD 30 MIN: CPT | Mod: S$PBB,,, | Performed by: NURSE PRACTITIONER

## 2024-03-21 PROCEDURE — 84443 ASSAY THYROID STIM HORMONE: CPT | Performed by: PEDIATRICS

## 2024-03-21 NOTE — PROGRESS NOTES
"Subjective:      Patient ID: Timothy Ortega is a 73 y.o. male.    Chief Complaint: Sleep Apnea    HPI  Presents for fatigue and BRIANA. He wears his CPAP nightly. No snoring. He is not waking up as refreshed and naps in the afternoon. He is drink coffee until the evening time.  No insomnia.   Bedtime 10:30 PM  Wake Time 4:30 AM  Patient Active Problem List   Diagnosis    Obstructive sleep apnea    Hyperlipidemia associated with type 2 diabetes mellitus    Hypertension associated with diabetes    CAD (coronary artery disease)    Type 2 diabetes mellitus with complication, with long-term current use of insulin    History of colon polyps    Bilateral carpal tunnel syndrome    Primary osteoarthritis of first carpometacarpal joint of right hand    Nephrolithiasis    Nonrheumatic mitral valve regurgitation    Erectile dysfunction    Non-recurrent unilateral inguinal hernia without obstruction or gangrene    Elevated prostate specific antigen (PSA)     Diabetic polyneuropathy associated with type 2 diabetes mellitus    Aortic atherosclerosis     /70   Pulse 75   Resp 19   Ht 5' 11.75" (1.822 m)   Wt 77.2 kg (170 lb 3.1 oz)   SpO2 99%   BMI 23.24 kg/m²   Body mass index is 23.24 kg/m².    Review of Systems  Objective:      Physical Exam  Personal Diagnostic Review      3/21/2024     9:38 AM   EPWORTH SLEEPINESS SCALE   Sitting and reading 1   Watching TV 2   Sitting, inactive in a public place (e.g. a theatre or a meeting) 0   As a passenger in a car for an hour without a break 1   Lying down to rest in the afternoon when circumstances permit 0   Sitting and talking to someone 0   Sitting quietly after a lunch without alcohol 1   In a car, while stopped for a few minutes in traffic 0   Total score 5      Compliance Summary  2/20/2024 - 3/20/2024 (30 days)  Days with Device Usage 30 days  Days without Device Usage 0 days  Percent Days with Device Usage 100.0%  Cumulative Usage 6 days 18 hrs. 42 mins. 54 " "secs.  Maximum Usage (1 Day) 7 hrs. 36 mins. 1 secs.  Average Usage (All Days) 5 hrs. 25 mins. 25 secs.  Average Usage (Days Used) 5 hrs. 25 mins. 25 secs.  Minimum Usage (1 Day) 2 hrs. 1 mins. 4 secs.  Percent of Days with Usage >= 4 Hours 83.3%  Percent of Days with Usage < 4 Hours 16.7%  Date Range  Total Blower Time 6 days 18 hrs. 57 mins. 55 secs.  CPAP Summary  Average Time in Large Leak Per Day 4 secs.  Average AHI 0.5  CPAP 11.0 cmH2O    Assessment:       1. Obstructive sleep apnea    2. Fatigue, unspecified type    3. Lack of adequate sleep        Outpatient Encounter Medications as of 3/21/2024   Medication Sig Dispense Refill    amLODIPine (NORVASC) 5 MG tablet Take 1 tablet (5 mg total) by mouth once daily. 90 tablet 2    ASCORBATE CALCIUM (VITAMIN C ORAL) Take by mouth once daily.      aspirin 81 mg Tab Take 1 tablet by mouth Daily.      empagliflozin (JARDIANCE) 25 mg tablet Take 1 tablet (25 mg total) by mouth once daily. 90 tablet 3    ERGOCALCIFEROL, VITAMIN D2, (VITAMIN D ORAL) Take by mouth once daily.      FREESTYLE LANCETS 28 gauge lancets USE THREE TIMES DAILY 100 each 0    insulin (LANTUS SOLOSTAR U-100 INSULIN) glargine 100 units/mL SubQ pen ADMINISTER 30 UNITS UNDER THE SKIN EVERY EVENING 27 mL 3    insulin aspart U-100 (NOVOLOG FLEXPEN U-100 INSULIN) 100 unit/mL (3 mL) InPn pen INJECT 28 UNITS UNDER THE SKIN THREE TIMES DAILY BEFORE MEALS 75 mL 3    KRILL OIL ORAL Take 1 capsule by mouth once daily.      lancets 33 gauge Misc 1 lancet by Misc.(Non-Drug; Combo Route) route 3 (three) times daily. 100 each 11    losartan (COZAAR) 100 MG tablet TAKE 1 TABLET(100 MG) BY MOUTH EVERY DAY 90 tablet 3    meloxicam (MOBIC) 15 MG tablet Take 15 mg by mouth.      mupirocin (BACTROBAN) 2 % ointment Apply to affected area 3 times daily 22 g 1    mupirocin (BACTROBAN) 2 % ointment Apply topically 2 (two) times daily. 30 g 2    pen needle, diabetic (BD ULTRA-FINE SHORT PEN NEEDLE) 31 gauge x 5/16" Ndle USE " 5 TIMES A  each 3    rosuvastatin (CRESTOR) 20 MG tablet Take 1 tablet (20 mg total) by mouth once daily. 90 tablet 1    tirzepatide (MOUNJARO) 15 mg/0.5 mL PnIj Inject 15 mg into the skin every 7 days. 4 pen 5     Facility-Administered Encounter Medications as of 3/21/2024   Medication Dose Route Frequency Provider Last Rate Last Admin    lactated ringers infusion   Intravenous Continuous Lili Garvin MD 10 mL/hr at 02/26/21 0625 Restarted at 02/26/21 0659     Orders Placed This Encounter   Procedures    CPAP/BIPAP SUPPLIES     Order Specific Question:   Length of need (1-99 months):     Answer:   99     Order Specific Question:   Choose ONE mask type and its corresponding cushions and/or pillows:     Answer:    Full Face Mask, 1 per 90 days:  Full Face Cushion, (3 per 90 days)     Order Specific Question:   Choose EITHER Heated or Non-Heated Tubjing     Answer:    Non-Heated Tubing, 1 per 90 days     Order Specific Question:   All other supplies as needed as listed below:     Answer:    Headgear, 1 per 180 days     Order Specific Question:   All other supplies as needed as listed below:     Answer:    Disposable Filter, 6 per 90 days     Order Specific Question:   All other supplies as needed as listed below:     Answer:    Non-Disposable Filter, 1 per 180 days     Order Specific Question:   All other supplies as needed as listed below:     Answer:    Humidifier Chamber, 1 per 180 days     Plan:     Discussed caffeine- You may be able to fall asleep with caffeine, but you do not have the same level of sleep depth with caffeine.   Try to add more sleep time  Compliant with PAP and benefits from use. Follow up annually in the sleep clinic.  Low testosterone. Not a good candidate for T therapy    1. Obstructive sleep apnea  Overview:  CPAP 11cm H20. [employer] [facility]. [facility].     Orders:  -     CPAP/BIPAP SUPPLIES    2. Fatigue, unspecified type    3. Lack of adequate sleep                         Elizabeth LeJeune, ACNP, ANP

## 2024-03-21 NOTE — PATIENT INSTRUCTIONS
Why quality sleep is important:  Lack of quality sleep affects your health. After just one night of only four or five hours' sleep, your natural killer cells - the ones that attack the cancer cells that appear in your body every day - drop by 70%, and that a lack of sleep is linked to cancer of the bowel, prostate and breast.  People who are chronically sleep deprived are more likely to be overweight, have strokes and cardiovascular disease, infections, and certain types of cancer than those who get enough sleep.    An adult sleeping only 6.75 hours a night would be predicted to live only to their early 60s without medical intervention.  If we sleep too little, we become unable to process what we've learned during the day and we have more trouble remembering it in the future. As mentioned, researchers also believe that sleep may promote the removal of waste products from brain cells.  Sleep is vital to the rest of the body too. When people don't get enough sleep, their health risks rise. Symptoms of depression, seizures, high blood pressure and migraines worsen. Immunity is compromised, increasing the likelihood of illness and infection.   Sleep also plays a role in metabolism: Even one night of missed sleep can create a prediabetic state in an otherwise healthy person.  Sleep helps us thrive by contributing to a healthy immune system, and can also balance our appetites by helping to regulate levels of the hormones ghrelin and leptin, which play a role in our feelings of hunger and fullness. So when we're sleep deprived, we may feel the need to eat more, which can lead to weight gain.    Best way to obtain adequate sleep:  If you have sleep apnea, use prescribed CPAP any time you are sleeping.  Avoid caffeine in the afternoon/evening time.  Avoid alcohol. Alcohol is a sedative that blocks your REM sleep    Dim lights an hour before bedtime.  Meditation and warm bath helps with preparing for sleep.  Make bedroom  cool and dark. White noise helps some people sleep more soundly.  Sleep should be nonnegotiable . Give yourself at least 8 hours of uninterrupted sleep nightly for your health and well-being.

## 2024-03-28 ENCOUNTER — OFFICE VISIT (OUTPATIENT)
Dept: INTERNAL MEDICINE | Facility: CLINIC | Age: 74
End: 2024-03-28
Payer: MEDICARE

## 2024-03-28 VITALS
WEIGHT: 171.06 LBS | BODY MASS INDEX: 23.95 KG/M2 | TEMPERATURE: 98 F | SYSTOLIC BLOOD PRESSURE: 122 MMHG | OXYGEN SATURATION: 100 % | HEART RATE: 74 BPM | HEIGHT: 71 IN | DIASTOLIC BLOOD PRESSURE: 68 MMHG

## 2024-03-28 DIAGNOSIS — Z79.4 TYPE 2 DIABETES MELLITUS WITH COMPLICATION, WITH LONG-TERM CURRENT USE OF INSULIN: Primary | ICD-10-CM

## 2024-03-28 DIAGNOSIS — E11.42 DIABETIC POLYNEUROPATHY ASSOCIATED WITH TYPE 2 DIABETES MELLITUS: ICD-10-CM

## 2024-03-28 DIAGNOSIS — G56.03 BILATERAL CARPAL TUNNEL SYNDROME: ICD-10-CM

## 2024-03-28 DIAGNOSIS — E78.5 HYPERLIPIDEMIA ASSOCIATED WITH TYPE 2 DIABETES MELLITUS: ICD-10-CM

## 2024-03-28 DIAGNOSIS — N52.9 ERECTILE DYSFUNCTION, UNSPECIFIED ERECTILE DYSFUNCTION TYPE: ICD-10-CM

## 2024-03-28 DIAGNOSIS — I15.2 HYPERTENSION ASSOCIATED WITH DIABETES: ICD-10-CM

## 2024-03-28 DIAGNOSIS — I25.10 CORONARY ARTERY DISEASE INVOLVING NATIVE CORONARY ARTERY OF NATIVE HEART WITHOUT ANGINA PECTORIS: ICD-10-CM

## 2024-03-28 DIAGNOSIS — G47.33 OBSTRUCTIVE SLEEP APNEA: ICD-10-CM

## 2024-03-28 DIAGNOSIS — E11.8 TYPE 2 DIABETES MELLITUS WITH COMPLICATION, WITH LONG-TERM CURRENT USE OF INSULIN: Primary | ICD-10-CM

## 2024-03-28 DIAGNOSIS — E11.59 HYPERTENSION ASSOCIATED WITH DIABETES: ICD-10-CM

## 2024-03-28 DIAGNOSIS — E11.69 HYPERLIPIDEMIA ASSOCIATED WITH TYPE 2 DIABETES MELLITUS: ICD-10-CM

## 2024-03-28 DIAGNOSIS — I70.0 AORTIC ATHEROSCLEROSIS: ICD-10-CM

## 2024-03-28 DIAGNOSIS — K40.90 NON-RECURRENT UNILATERAL INGUINAL HERNIA WITHOUT OBSTRUCTION OR GANGRENE: ICD-10-CM

## 2024-03-28 DIAGNOSIS — I34.0 NONRHEUMATIC MITRAL VALVE REGURGITATION: ICD-10-CM

## 2024-03-28 DIAGNOSIS — E78.49 OTHER HYPERLIPIDEMIA: ICD-10-CM

## 2024-03-28 DIAGNOSIS — M18.11 PRIMARY OSTEOARTHRITIS OF FIRST CARPOMETACARPAL JOINT OF RIGHT HAND: ICD-10-CM

## 2024-03-28 PROCEDURE — 99999 PR PBB SHADOW E&M-EST. PATIENT-LVL V: CPT | Mod: PBBFAC,,, | Performed by: NURSE PRACTITIONER

## 2024-03-28 PROCEDURE — 99214 OFFICE O/P EST MOD 30 MIN: CPT | Mod: S$PBB,,, | Performed by: NURSE PRACTITIONER

## 2024-03-28 PROCEDURE — 99215 OFFICE O/P EST HI 40 MIN: CPT | Mod: PBBFAC | Performed by: NURSE PRACTITIONER

## 2024-03-28 RX ORDER — LOSARTAN POTASSIUM 100 MG/1
100 TABLET ORAL DAILY
Qty: 90 TABLET | Refills: 3 | Status: SHIPPED | OUTPATIENT
Start: 2024-03-28 | End: 2024-05-24 | Stop reason: SDUPTHER

## 2024-03-28 RX ORDER — ROSUVASTATIN CALCIUM 20 MG/1
20 TABLET, COATED ORAL DAILY
Qty: 90 TABLET | Refills: 1 | Status: SHIPPED | OUTPATIENT
Start: 2024-03-28 | End: 2024-05-21

## 2024-03-28 RX ORDER — AMLODIPINE BESYLATE 5 MG/1
5 TABLET ORAL DAILY
Qty: 90 TABLET | Refills: 2 | Status: SHIPPED | OUTPATIENT
Start: 2024-03-28

## 2024-03-28 RX ORDER — TIZANIDINE 4 MG/1
4 TABLET ORAL 2 TIMES DAILY PRN
COMMUNITY
Start: 2023-11-07 | End: 2024-05-07

## 2024-03-28 RX ORDER — PRAVASTATIN SODIUM 20 MG/1
20 TABLET ORAL
COMMUNITY
Start: 2024-02-15 | End: 2024-03-28

## 2024-03-28 NOTE — PROGRESS NOTES
Subjective:       Patient ID: Timothy Ortega is a 73 y.o. male.    Chief Complaint: Follow-up (Pain in groin with hx of hernia repair)    Follow-up  Pertinent negatives include no abdominal pain, arthralgias, chest pain, chills, congestion, coughing, diaphoresis, fatigue, fever, headaches, joint swelling, nausea, neck pain, numbness, sore throat, vomiting or weakness.       1) HTN: B/P good, no HTNive symptoms. in Dig [facility]  2) LIPIDS: Improved D&E, tolerating and compliant with med(s). statin change to crestor by digital medicine Weight steady. Pt did not know that he was supposed to stop the pravastatin. He was taking both pravastatin and crestor x  6 mo which explains drastic improvement in lipids. He is asymptomatic. Advised him to stop pravastatin.  3) DM: no hyper/hypoglycemic symptoms. Self monitoring normal-slightly elevated. Compliant with meds (metformin 1000 mg bid, Mounjaro, jardiance 25, lantus 30, humalog 6 with meals) Working with DM program.  Admits to eating more carbs/sugars.less active. Working on getting back on track  4) CAD: no CP, SOB, CHOU. Followed by cards.   5) Prostate followed by urology still having hematospermia.  L inguinal hernia- recurrent- prev surgery same side. No pain. Swelling present daily.reducible. wants to see surgeon.         Review of Systems   Constitutional:  Negative for activity change, appetite change, chills, diaphoresis, fatigue, fever and unexpected weight change.   HENT:  Negative for congestion, ear pain, postnasal drip, rhinorrhea, sinus pressure, sinus pain, sneezing, sore throat, tinnitus, trouble swallowing and voice change.    Eyes:  Negative for photophobia, pain and visual disturbance.   Respiratory:  Negative for cough, chest tightness, shortness of breath and wheezing.    Cardiovascular:  Negative for chest pain, palpitations and leg swelling.   Gastrointestinal:  Negative for abdominal distention, abdominal pain, constipation, diarrhea, nausea and  vomiting.   Genitourinary:  Negative for decreased urine volume, difficulty urinating, dysuria, flank pain, frequency, hematuria and urgency.   Musculoskeletal:  Negative for arthralgias, back pain, joint swelling, neck pain and neck stiffness.   Allergic/Immunologic: Negative for immunocompromised state.   Neurological:  Negative for dizziness, tremors, seizures, syncope, facial asymmetry, speech difficulty, weakness, light-headedness, numbness and headaches.   Hematological:  Negative for adenopathy. Does not bruise/bleed easily.   Psychiatric/Behavioral:  Negative for confusion and sleep disturbance.        Objective:      Physical Exam  Vitals reviewed.   HENT:      Head: Normocephalic and atraumatic.      Right Ear: Tympanic membrane normal.      Left Ear: Tympanic membrane normal.   Eyes:      Conjunctiva/sclera: Conjunctivae normal.   Cardiovascular:      Rate and Rhythm: Normal rate and regular rhythm.      Heart sounds: Normal heart sounds.   Pulmonary:      Effort: Pulmonary effort is normal.      Breath sounds: Normal breath sounds.   Abdominal:      General: Bowel sounds are normal.      Palpations: Abdomen is soft.      Hernia: A hernia is present. Hernia is present in the left inguinal area (nonpainful /reducible).   Musculoskeletal:         General: Normal range of motion.      Cervical back: Normal range of motion and neck supple.   Skin:     General: Skin is warm and dry.   Neurological:      Mental Status: He is alert and oriented to person, place, and time.         Assessment:     Vitals:    03/28/24 0831   BP: 122/68   Pulse: 74   Temp: 97.6 °F (36.4 °C)         1. Type 2 diabetes mellitus with complication, with long-term current use of insulin    2. Aortic atherosclerosis    3. Coronary artery disease involving native coronary artery of native heart without angina pectoris    4. Hyperlipidemia associated with type 2 diabetes mellitus    5. Hypertension associated with diabetes    6.  Nonrheumatic mitral valve regurgitation    7. Erectile dysfunction, unspecified erectile dysfunction type    8. Primary osteoarthritis of first carpometacarpal joint of right hand    9. Obstructive sleep apnea    10. Diabetic polyneuropathy associated with type 2 diabetes mellitus    11. Bilateral carpal tunnel syndrome    12. Other hyperlipidemia    13. Non-recurrent unilateral inguinal hernia without obstruction or gangrene        Plan:   Type 2 diabetes mellitus with complication, with long-term current use of insulin  -     Lipid Panel; Future; Expected date: 09/24/2024  -     Comprehensive Metabolic Panel; Future; Expected date: 09/24/2024  -     Hemoglobin A1C; Future; Expected date: 09/24/2024  -     Microalbumin/Creatinine Ratio, Urine; Future; Expected date: 09/28/2024  -     TSH; Future; Expected date: 09/24/2024    Aortic atherosclerosis    Coronary artery disease involving native coronary artery of native heart without angina pectoris    Hyperlipidemia associated with type 2 diabetes mellitus    Hypertension associated with diabetes  -     amLODIPine (NORVASC) 5 MG tablet; Take 1 tablet (5 mg total) by mouth once daily.  Dispense: 90 tablet; Refill: 2  -     losartan (COZAAR) 100 MG tablet; Take 1 tablet (100 mg total) by mouth once daily.  Dispense: 90 tablet; Refill: 3    Nonrheumatic mitral valve regurgitation    Erectile dysfunction, unspecified erectile dysfunction type    Primary osteoarthritis of first carpometacarpal joint of right hand    Obstructive sleep apnea    Diabetic polyneuropathy associated with type 2 diabetes mellitus    Bilateral carpal tunnel syndrome    Other hyperlipidemia  -     rosuvastatin (CRESTOR) 20 MG tablet; Take 1 tablet (20 mg total) by mouth once daily.  Dispense: 90 tablet; Refill: 1    Non-recurrent unilateral inguinal hernia without obstruction or gangrene  -     US Soft Tissue, Groin Left; Future; Expected date: 03/28/2024  -     Ambulatory referral/consult to  General Surgery; Future; Expected date: 04/04/2024      Stop pravastatin, continue crestor  US groin/see gen surg  Work on dm diet, c/w dm mgt  Rtc in 6 mo w lab

## 2024-04-03 ENCOUNTER — HOSPITAL ENCOUNTER (OUTPATIENT)
Dept: RADIOLOGY | Facility: HOSPITAL | Age: 74
Discharge: HOME | End: 2024-04-03
Attending: NURSE PRACTITIONER
Payer: MEDICARE

## 2024-04-03 DIAGNOSIS — K40.90 NON-RECURRENT UNILATERAL INGUINAL HERNIA WITHOUT OBSTRUCTION OR GANGRENE: ICD-10-CM

## 2024-04-03 PROCEDURE — 76882 US LMTD JT/FCL EVL NVASC XTR: CPT | Mod: 26,LT,, | Performed by: RADIOLOGY

## 2024-04-03 PROCEDURE — 76882 US LMTD JT/FCL EVL NVASC XTR: CPT | Mod: TC,LT

## 2024-04-08 ENCOUNTER — PATIENT MESSAGE (OUTPATIENT)
Dept: DIABETES | Facility: CLINIC | Age: 74
End: 2024-04-08
Payer: MEDICARE

## 2024-04-08 ENCOUNTER — OFFICE VISIT (OUTPATIENT)
Dept: SURGERY | Facility: CLINIC | Age: 74
End: 2024-04-08
Payer: MEDICARE

## 2024-04-08 VITALS
DIASTOLIC BLOOD PRESSURE: 64 MMHG | SYSTOLIC BLOOD PRESSURE: 118 MMHG | HEART RATE: 71 BPM | BODY MASS INDEX: 23.89 KG/M2 | WEIGHT: 171.31 LBS

## 2024-04-08 DIAGNOSIS — K40.90 NON-RECURRENT UNILATERAL INGUINAL HERNIA WITHOUT OBSTRUCTION OR GANGRENE: ICD-10-CM

## 2024-04-08 DIAGNOSIS — Z79.4 TYPE 2 DIABETES MELLITUS WITH COMPLICATION, WITH LONG-TERM CURRENT USE OF INSULIN: Primary | ICD-10-CM

## 2024-04-08 DIAGNOSIS — E11.8 TYPE 2 DIABETES MELLITUS WITH COMPLICATION, WITH LONG-TERM CURRENT USE OF INSULIN: Primary | ICD-10-CM

## 2024-04-08 PROCEDURE — 99213 OFFICE O/P EST LOW 20 MIN: CPT | Mod: PBBFAC | Performed by: SURGERY

## 2024-04-08 PROCEDURE — 99214 OFFICE O/P EST MOD 30 MIN: CPT | Mod: S$PBB,,, | Performed by: SURGERY

## 2024-04-08 PROCEDURE — 99999 PR PBB SHADOW E&M-EST. PATIENT-LVL III: CPT | Mod: PBBFAC,,, | Performed by: SURGERY

## 2024-04-08 NOTE — PROGRESS NOTES
History & Physical    SUBJECTIVE:     History of Present Illness:  Patient is a 73 y.o. male s/p robotic left inguinal hernia 2/22/21 presents for intermittent left groin swelling.  Reports it will come and go intermittently.  Not particularly painful when it occurs.  Recent ultrasound did not show any hernia recurrence.  Prominent lymph node but did not appear abnormal on ultrasound.    Follow-up He is continuing to experience burning irritating pain through the groin area that is nagging.  He has been taking the gabapentin but no change in symptoms.    Initially referred for left inguinal hernia.  Patient reports having a bulge in his left groin with irritating discomfort.  Recently underwent CT showing left inguinal hernia containing colon.  Denies any constipation or bowel obstructive symptoms.    Chief Complaint   Patient presents with    Hernia     LIH       Review of patient's allergies indicates:  No Known Allergies    Current Outpatient Medications   Medication Sig Dispense Refill    amLODIPine (NORVASC) 5 MG tablet Take 1 tablet (5 mg total) by mouth once daily. 90 tablet 2    ASCORBATE CALCIUM (VITAMIN C ORAL) Take by mouth once daily.      aspirin 81 mg Tab Take 1 tablet by mouth Daily.      empagliflozin (JARDIANCE) 25 mg tablet Take 1 tablet (25 mg total) by mouth once daily. 90 tablet 3    ERGOCALCIFEROL, VITAMIN D2, (VITAMIN D ORAL) Take by mouth once daily.      FREESTYLE LANCETS 28 gauge lancets USE THREE TIMES DAILY 100 each 0    insulin (LANTUS SOLOSTAR U-100 INSULIN) glargine 100 units/mL SubQ pen ADMINISTER 30 UNITS UNDER THE SKIN EVERY EVENING 27 mL 3    insulin aspart U-100 (NOVOLOG FLEXPEN U-100 INSULIN) 100 unit/mL (3 mL) InPn pen INJECT 28 UNITS UNDER THE SKIN THREE TIMES DAILY BEFORE MEALS 75 mL 3    KRILL OIL ORAL Take 1 capsule by mouth once daily.      lancets 33 gauge Misc 1 lancet by Misc.(Non-Drug; Combo Route) route 3 (three) times daily. 100 each 11    losartan (COZAAR) 100 MG  "tablet Take 1 tablet (100 mg total) by mouth once daily. 90 tablet 3    mupirocin (BACTROBAN) 2 % ointment Apply to affected area 3 times daily 22 g 1    mupirocin (BACTROBAN) 2 % ointment Apply topically 2 (two) times daily. 30 g 2    pen needle, diabetic (BD ULTRA-FINE SHORT PEN NEEDLE) 31 gauge x 5/16" Ndle USE 5 TIMES A  each 3    rosuvastatin (CRESTOR) 20 MG tablet Take 1 tablet (20 mg total) by mouth once daily. 90 tablet 1    tirzepatide (MOUNJARO) 15 mg/0.5 mL PnIj Inject 15 mg into the skin every 7 days. 4 pen 5    tiZANidine (ZANAFLEX) 4 MG tablet Take 4 mg by mouth 2 (two) times daily as needed.       No current facility-administered medications for this visit.     Facility-Administered Medications Ordered in Other Visits   Medication Dose Route Frequency Provider Last Rate Last Admin    lactated ringers infusion   Intravenous Continuous Lili Garvin MD 10 mL/hr at 02/26/21 0625 Restarted at 02/26/21 0659       Past Medical History:   Diagnosis Date    Coronary artery disease 2010    [facility] - no stents    DM (diabetes mellitus) 2002     lunch 10/28/2016    DM (diabetes mellitus)      am 04/12/2021    DM (diabetes mellitus)     BS 96 am 04/13/2022 Insulin for years    Groin pain, left 2/11/2021    Hyperlipemia     Hypertension     Kidney stones     Nephrolithiasis 2/11/2021    Neuropathy     Type 2 diabetes mellitus 10-12 years     am 10/31/2014     Past Surgical History:   Procedure Laterality Date    CARPAL TUNNEL RELEASE Right 07/2020    COLONOSCOPY N/A 03/31/2017    Procedure: COLONOSCOPY;  Surgeon: Cornelius Ibarra MD;  Location: [facility];  Service: Endoscopy;  Laterality: N/A;    COLONOSCOPY N/A 04/18/2022    Procedure: COLONOSCOPY;  Surgeon: Geneva Serna MD;  Location: [facility];  Service: Endoscopy;  Laterality: N/A;    DENTAL SURGERY      Implant    HAND SURGERY      KNEE ARTHROSCOPY Left     ROBOT-ASSISTED LAPAROSCOPIC REPAIR OF INGUINAL HERNIA USING DA JOSE ANGEL XI " Left 2021    Procedure: XI ROBOTIC REPAIR, HERNIA, INGUINAL;  Surgeon: Tavon Cruz MD;  [facility];  Service: General;  Laterality: Left;     Family History   Problem Relation Age of Onset    Diabetes Mother     Glaucoma Mother     Heart disease Mother 75        CAB    Diabetes Father     Heart attack Father 58        Fatal MI    Diabetes Brother     Diabetes Sister     Diabetes Sister     Cataracts Paternal Uncle     Cataracts Maternal Grandmother      Social History     Tobacco Use    Smoking status: Former     Current packs/day: 0.00     Average packs/day: 1 pack/day for 20.0 years (20.0 ttl pk-yrs)     Types: Cigarettes     Start date: 1980     Quit date: 2000     Years since quittin.2     Passive exposure: Past    Smokeless tobacco: Never   Substance Use Topics    Alcohol use: No     Alcohol/week: 0.0 standard drinks of alcohol    Drug use: No        Review of Systems:  Review of Systems   Constitutional:  Negative for activity change, appetite change, chills, diaphoresis, fatigue, fever and unexpected weight change.   HENT:  Negative for congestion, hearing loss, sore throat and trouble swallowing.    Eyes:  Negative for visual disturbance.   Respiratory:  Negative for apnea, cough, choking, chest tightness, shortness of breath and stridor.    Cardiovascular:  Negative for chest pain, palpitations and leg swelling.   Gastrointestinal:  Negative for abdominal distention, abdominal pain, anal bleeding, blood in stool, constipation, diarrhea, nausea, rectal pain and vomiting.   Endocrine: Negative for cold intolerance, heat intolerance, polydipsia, polyphagia and polyuria.   Genitourinary:  Negative for difficulty urinating, dysuria, frequency, hematuria and urgency.   Musculoskeletal:  Negative for arthralgias, back pain, myalgias and neck pain.   Skin:  Negative for color change, pallor, rash and wound.   Neurological:  Negative for dizziness, syncope, weakness, light-headedness,  numbness and headaches.   Hematological:  Negative for adenopathy. Does not bruise/bleed easily.   Psychiatric/Behavioral:  Negative for agitation, confusion, decreased concentration and sleep disturbance. The patient is not nervous/anxious.        OBJECTIVE:     Vital Signs (Most Recent)  Pulse: 71 (04/08/24 0943)  BP: 118/64 (04/08/24 0943)     77.7 kg (171 lb 4.8 oz)     Physical Exam:  Physical Exam  Vitals reviewed.   Constitutional:       General: He is not in acute distress.     Appearance: He is well-developed. He is not diaphoretic.   HENT:      Head: Normocephalic and atraumatic.      Right Ear: External ear normal.      Left Ear: External ear normal.   Eyes:      General: No scleral icterus.     Conjunctiva/sclera: Conjunctivae normal.      Pupils: Pupils are equal, round, and reactive to light.   Neck:      Thyroid: No thyromegaly.      Trachea: No tracheal deviation.   Cardiovascular:      Rate and Rhythm: Normal rate and regular rhythm.      Heart sounds: Normal heart sounds. No murmur heard.     No friction rub. No gallop.   Pulmonary:      Effort: Pulmonary effort is normal. No respiratory distress.      Breath sounds: Normal breath sounds. No wheezing or rales.   Chest:      Chest wall: No tenderness.   Abdominal:      General: Bowel sounds are normal. There is no distension.      Palpations: Abdomen is soft.      Tenderness: There is no abdominal tenderness.      Hernia: No hernia is present.      Comments: Incision well healed  No palpable hernia recurrent  No significant palpable lymphadenopathy   Musculoskeletal:         General: No tenderness or deformity. Normal range of motion.      Cervical back: Normal range of motion and neck supple.   Lymphadenopathy:      Cervical: No cervical adenopathy.   Skin:     General: Skin is warm and dry.      Coloration: Skin is not pale.      Findings: No erythema or rash.   Neurological:      Mental Status: He is alert and oriented to person, place, and time.    Psychiatric:         Behavior: Behavior normal.         Thought Content: Thought content normal.         Judgment: Judgment normal.         Ultrasound:  FINDINGS:  No hernia.  13 x 10 x 5 mm left inguinal lymph node not considered pathologically enlarged by size or morphologic criteria.  No mass lesion, lymphadenopathy, fluid collection, abscess, vascular malformation, or foreign body.    Impression:     Negative exam.    ASSESSMENT/PLAN:     70-year-old male s/p robotic left inguinal hernia repair    Intermittent groin swell    PLAN:Plan     Unable to appreciate inguinal hernia on exam, ultrasound reviewed no hernia noted.  Normal-appearing lymph node on ultrasound nonpalpable on exam  No clear source of patient's symptoms   Discussed option for CT scan for further evaluation versus continue monitoring   Since he is minimally symptomatic he would like to just observe at this time he will call if it becomes symptomatic in the future for follow-up.

## 2024-04-12 ENCOUNTER — PATIENT MESSAGE (OUTPATIENT)
Dept: INTERNAL MEDICINE | Facility: CLINIC | Age: 74
End: 2024-04-12
Payer: MEDICARE

## 2024-04-12 DIAGNOSIS — R92.8 OTHER ABNORMAL AND INCONCLUSIVE FINDINGS ON DIAGNOSTIC IMAGING OF BREAST: ICD-10-CM

## 2024-04-12 DIAGNOSIS — Z87.898 H/O ABNORMAL MAMMOGRAM: Primary | ICD-10-CM

## 2024-04-23 ENCOUNTER — HOSPITAL ENCOUNTER (OUTPATIENT)
Dept: RADIOLOGY | Facility: HOSPITAL | Age: 74
Discharge: HOME | End: 2024-04-23
Attending: PEDIATRICS
Payer: MEDICARE

## 2024-04-23 DIAGNOSIS — Z87.898 H/O ABNORMAL MAMMOGRAM: ICD-10-CM

## 2024-04-23 DIAGNOSIS — R92.8 OTHER ABNORMAL AND INCONCLUSIVE FINDINGS ON DIAGNOSTIC IMAGING OF BREAST: ICD-10-CM

## 2024-05-05 ENCOUNTER — PATIENT MESSAGE (OUTPATIENT)
Dept: ADMINISTRATIVE | Facility: OTHER | Age: 74
End: 2024-05-05
Payer: MEDICARE

## 2024-05-06 DIAGNOSIS — E11.59 HYPERTENSION ASSOCIATED WITH DIABETES: ICD-10-CM

## 2024-05-06 DIAGNOSIS — I25.10 CORONARY ARTERY DISEASE INVOLVING NATIVE CORONARY ARTERY OF NATIVE HEART WITHOUT ANGINA PECTORIS: Primary | ICD-10-CM

## 2024-05-06 DIAGNOSIS — I15.2 HYPERTENSION ASSOCIATED WITH DIABETES: ICD-10-CM

## 2024-05-07 ENCOUNTER — HOSPITAL ENCOUNTER (OUTPATIENT)
Dept: CARDIOLOGY | Facility: HOSPITAL | Age: 74
Discharge: HOME | End: 2024-05-07
Attending: INTERNAL MEDICINE
Payer: MEDICARE

## 2024-05-07 ENCOUNTER — OFFICE VISIT (OUTPATIENT)
Dept: CARDIOLOGY | Facility: CLINIC | Age: 74
End: 2024-05-07
Payer: MEDICARE

## 2024-05-07 VITALS
SYSTOLIC BLOOD PRESSURE: 116 MMHG | WEIGHT: 173.31 LBS | DIASTOLIC BLOOD PRESSURE: 64 MMHG | OXYGEN SATURATION: 98 % | HEART RATE: 73 BPM | BODY MASS INDEX: 24.26 KG/M2 | HEIGHT: 71 IN

## 2024-05-07 DIAGNOSIS — I15.2 HYPERTENSION ASSOCIATED WITH DIABETES: ICD-10-CM

## 2024-05-07 DIAGNOSIS — E11.69 HYPERLIPIDEMIA ASSOCIATED WITH TYPE 2 DIABETES MELLITUS: ICD-10-CM

## 2024-05-07 DIAGNOSIS — E11.8 TYPE 2 DIABETES MELLITUS WITH COMPLICATION, WITH LONG-TERM CURRENT USE OF INSULIN: ICD-10-CM

## 2024-05-07 DIAGNOSIS — I25.10 CORONARY ARTERY DISEASE INVOLVING NATIVE CORONARY ARTERY OF NATIVE HEART WITHOUT ANGINA PECTORIS: Primary | ICD-10-CM

## 2024-05-07 DIAGNOSIS — Z79.4 TYPE 2 DIABETES MELLITUS WITH COMPLICATION, WITH LONG-TERM CURRENT USE OF INSULIN: ICD-10-CM

## 2024-05-07 DIAGNOSIS — E11.59 HYPERTENSION ASSOCIATED WITH DIABETES: ICD-10-CM

## 2024-05-07 DIAGNOSIS — I34.0 NONRHEUMATIC MITRAL VALVE REGURGITATION: ICD-10-CM

## 2024-05-07 DIAGNOSIS — E78.5 HYPERLIPIDEMIA ASSOCIATED WITH TYPE 2 DIABETES MELLITUS: ICD-10-CM

## 2024-05-07 DIAGNOSIS — R06.02 SOB (SHORTNESS OF BREATH): ICD-10-CM

## 2024-05-07 DIAGNOSIS — I25.10 CORONARY ARTERY DISEASE INVOLVING NATIVE CORONARY ARTERY OF NATIVE HEART WITHOUT ANGINA PECTORIS: ICD-10-CM

## 2024-05-07 DIAGNOSIS — I70.0 AORTIC ATHEROSCLEROSIS: ICD-10-CM

## 2024-05-07 LAB
OHS QRS DURATION: 86 MS
OHS QTC CALCULATION: 436 MS

## 2024-05-07 PROCEDURE — 99999 PR PBB SHADOW E&M-EST. PATIENT-LVL IV: CPT | Mod: PBBFAC,,, | Performed by: INTERNAL MEDICINE

## 2024-05-07 PROCEDURE — 93010 ELECTROCARDIOGRAM REPORT: CPT | Mod: ,,, | Performed by: INTERNAL MEDICINE

## 2024-05-07 PROCEDURE — 93005 ELECTROCARDIOGRAM TRACING: CPT

## 2024-05-07 PROCEDURE — 99215 OFFICE O/P EST HI 40 MIN: CPT | Mod: S$PBB,,, | Performed by: INTERNAL MEDICINE

## 2024-05-07 PROCEDURE — 99214 OFFICE O/P EST MOD 30 MIN: CPT | Mod: PBBFAC,25 | Performed by: INTERNAL MEDICINE

## 2024-05-07 RX ORDER — MELOXICAM 15 MG/1
15 TABLET ORAL
Status: ON HOLD | COMMUNITY
Start: 2024-05-06 | End: 2024-05-29 | Stop reason: HOSPADM

## 2024-05-07 RX ORDER — LEVOCETIRIZINE DIHYDROCHLORIDE 5 MG/1
5 TABLET, FILM COATED ORAL NIGHTLY
COMMUNITY
Start: 2024-04-29

## 2024-05-07 NOTE — PROGRESS NOTES
"Subjective:   Patient ID:  Timothy Ortega is a 73 y.o. male who presents for follow up of No chief complaint on file.      74 yo male, came in for 1yr f/u.   Hx of CAD, had C in 2010 and was told "nonobstructive", HTN, HLP, IDDM > 20 yrs, BRIANA on cpap, ex smoker.  h/o right carpel tunnel syndrome procedure.    No sob, no palpitation, no dizziness, no syncope, no CNS symptoms. Rare;y sharp chest pain atypical pain  Patient has fairly good exercise tolerance. Walks 2 miles a day, slowly down after COVID 19 pandemic  Patient is compliant with medications.  No smoking/drinking  EKG NSR and possible inferior infarct   nuke stress normal EF and no ischemia.  EKG NSR     visit  ekg NSR. Taking Lipitor for 12 yrs. C/o fatigue. On CPAP and still has daytime sleep  No chest pain palpitation dizziness and faint  Occasional SOB   echo  · The left ventricle is normal in size with concentric remodeling and normal systolic function. The estimated ejection fraction is 55%  · Normal left ventricular diastolic function.  · Normal right ventricular size with normal right ventricular systolic function.  · Mild mitral regurgitation.  · Mild tricuspid regurgitation.  · Normal central venous pressure (3 mmHg).  · The estimated PA systolic pressure is 33 mmHg.    05/23 visit  Yard work, lives with wife.  He denied chest pain, dyspnea on exertion, palpitation, fainting, PND, orthopnea, syncope and claudication.   No active bleeding  Med compliance  Improved fatigue after d/c metformin and switched crestor to pravastatin    Interval history  EKG reviewed by myself today reveals NSR   Chest burning and chest tightness when deep breathing. Some sob.   He denied palpitation, fainting, PND, orthopnea, syncope and claudication.   LDL 58 BP C A1c 7.4                            Past Medical History:   Diagnosis Date    Coronary artery disease 2010    [facility] - no stents    DM (diabetes mellitus) 2002     lunch 10/28/2016    " DM (diabetes mellitus)      am 2021    DM (diabetes mellitus)     BS 96 am 2022 Insulin for years    Groin pain, left 2021    Hyperlipemia     Hypertension     Kidney stones     Nephrolithiasis 2021    Neuropathy     Type 2 diabetes mellitus 10-12 years     am 10/31/2014       Past Surgical History:   Procedure Laterality Date    CARPAL TUNNEL RELEASE Right 2020    COLONOSCOPY N/A 2017    Procedure: COLONOSCOPY;  Surgeon: Cornelius Ibarra MD;  Location: [facility];  Service: Endoscopy;  Laterality: N/A;    COLONOSCOPY N/A 2022    Procedure: COLONOSCOPY;  Surgeon: Geneva Serna MD;  Location: [facility] ENDO;  Service: Endoscopy;  Laterality: N/A;    DENTAL SURGERY      Implant    HAND SURGERY      KNEE ARTHROSCOPY Left     ROBOT-ASSISTED LAPAROSCOPIC REPAIR OF INGUINAL HERNIA USING DA JOSE ANGEL XI Left 2021    Procedure: XI ROBOTIC REPAIR, HERNIA, INGUINAL;  Surgeon: Tavon Cruz MD;  Location: [facility];  Service: General;  Laterality: Left;       Social History     Tobacco Use    Smoking status: Former     Current packs/day: 0.00     Average packs/day: 1 pack/day for 20.0 years (20.0 ttl pk-yrs)     Types: Cigarettes     Start date: 1980     Quit date: 2000     Years since quittin.3     Passive exposure: Past    Smokeless tobacco: Never   Substance Use Topics    Alcohol use: No     Alcohol/week: 0.0 standard drinks of alcohol    Drug use: No       Family History   Problem Relation Name Age of Onset    Diabetes Mother      Glaucoma Mother      Heart disease Mother  75        CAB    Diabetes Father      Heart attack Father  58        Fatal MI    Diabetes Brother      Diabetes Sister      Diabetes Sister      Cataracts Paternal Uncle      Cataracts Maternal Grandmother           ROS    Objective:   Physical Exam  HENT:      Head: Normocephalic.   Eyes:      Pupils: Pupils are equal, round, and reactive to light.   Neck:      Thyroid: No thyromegaly.       Vascular: Normal carotid pulses. No carotid bruit or JVD.   Cardiovascular:      Rate and Rhythm: Normal rate and regular rhythm. No extrasystoles are present.     Chest Wall: PMI is not displaced.      Pulses: Normal pulses.      Heart sounds: Normal heart sounds. No murmur heard.     No gallop. No S3 sounds.   Pulmonary:      Effort: No respiratory distress.      Breath sounds: Normal breath sounds. No stridor.   Abdominal:      General: Bowel sounds are normal.      Palpations: Abdomen is soft.      Tenderness: There is no abdominal tenderness. There is no rebound.   Musculoskeletal:         General: Normal range of motion.   Skin:     Findings: No rash.   Neurological:      Mental Status: He is alert and oriented to person, place, and time.   Psychiatric:         Behavior: Behavior normal.         Lab Results   Component Value Date    CHOL 122 03/21/2024    CHOL 184 09/21/2023    CHOL 181 03/21/2023     Lab Results   Component Value Date    HDL 35 (L) 03/21/2024    HDL 29 (L) 09/21/2023    HDL 31 (L) 03/21/2023     Lab Results   Component Value Date    LDLCALC 58.6 (L) 03/21/2024    LDLCALC 121.4 09/21/2023    LDLCALC 107.8 03/21/2023     Lab Results   Component Value Date    TRIG 142 03/21/2024    TRIG 168 (H) 09/21/2023    TRIG 211 (H) 03/21/2023     Lab Results   Component Value Date    CHOLHDL 28.7 03/21/2024    CHOLHDL 15.8 (L) 09/21/2023    CHOLHDL 17.1 (L) 03/21/2023       Chemistry        Component Value Date/Time     03/21/2024 0753    K 4.7 03/21/2024 0753     03/21/2024 0753    CO2 27 03/21/2024 0753    BUN 16 03/21/2024 0753    CREATININE 1.1 03/21/2024 0753     (H) 03/21/2024 0753        Component Value Date/Time    CALCIUM 10.0 03/21/2024 0753    ALKPHOS 52 (L) 03/21/2024 0753    AST 21 03/21/2024 0753    ALT 29 03/21/2024 0753    BILITOT 0.6 03/21/2024 0753    ESTGFRAFRICA >60.0 09/23/2021 0717    EGFRNONAA >60.0 09/23/2021 0717          Lab Results   Component Value Date     HGBA1C 7.6 (H) 03/21/2024     Lab Results   Component Value Date    TSH 1.394 03/21/2024     Lab Results   Component Value Date    INR 1.0 12/01/2014    INR 1.0 05/06/2011     Lab Results   Component Value Date    WBC 7.88 04/25/2022    HGB 14.2 04/25/2022    HCT 44.7 04/25/2022     (H) 04/25/2022     04/25/2022     BMP  Sodium   Date Value Ref Range Status   03/21/2024 142 136 - 145 mmol/L Final     Potassium   Date Value Ref Range Status   03/21/2024 4.7 3.5 - 5.1 mmol/L Final     Chloride   Date Value Ref Range Status   03/21/2024 107 95 - 110 mmol/L Final     CO2   Date Value Ref Range Status   03/21/2024 27 23 - 29 mmol/L Final     BUN   Date Value Ref Range Status   03/21/2024 16 8 - 23 mg/dL Final     Creatinine   Date Value Ref Range Status   03/21/2024 1.1 0.5 - 1.4 mg/dL Final     Calcium   Date Value Ref Range Status   03/21/2024 10.0 8.7 - 10.5 mg/dL Final     Anion Gap   Date Value Ref Range Status   03/21/2024 8 8 - 16 mmol/L Final     eGFR if    Date Value Ref Range Status   09/23/2021 >60.0 >60 mL/min/1.73 m^2 Final     eGFR if non    Date Value Ref Range Status   09/23/2021 >60.0 >60 mL/min/1.73 m^2 Final     Comment:     Calculation used to obtain the estimated glomerular filtration  rate (eGFR) is the CKD-EPI equation.        BNP  @LABRCNTIP(BNP,BNPTRIAGEBLO)@  @LABRCNTIP(troponini)@  CrCl cannot be calculated (Patient's most recent lab result is older than the maximum 7 days allowed.).  No results found in the last 24 hours.  No results found in the last 24 hours.  No results found in the last 24 hours.    Assessment:      1. Coronary artery disease involving native coronary artery of native heart without angina pectoris    2. Aortic atherosclerosis    3. Hyperlipidemia associated with type 2 diabetes mellitus    4. Hypertension associated with diabetes    5. Nonrheumatic mitral valve regurgitation    6. Type 2 diabetes mellitus with complication,  with long-term current use of insulin    7. SOB (shortness of breath)        Plan:   Exercise MPI r/o ischemia due to recent chest tightness and SOB  Continue asa statin amlodipine losartan   DM Rx per PCP  Counseled DASH  Check Lipid profile with PCP in 6 months  Recommend heart-healthy diet, weight control and regular exercise.  Federica. Risk modification.   I have reviewed all pertinent labs and cardiac studies independently. Plans and recommendations have been formulated under my direct supervision. All questions answered and patient voiced understanding.   If symptoms persist go to the ED  RTC in 12 months

## 2024-05-12 ENCOUNTER — PATIENT MESSAGE (OUTPATIENT)
Dept: ADMINISTRATIVE | Facility: OTHER | Age: 74
End: 2024-05-12
Payer: MEDICARE

## 2024-05-21 RX ORDER — PRAVASTATIN SODIUM 20 MG/1
TABLET ORAL
Qty: 90 TABLET | Refills: 3 | Status: SHIPPED | OUTPATIENT
Start: 2024-05-21 | End: 2024-05-24

## 2024-05-23 ENCOUNTER — HOSPITAL ENCOUNTER (OUTPATIENT)
Dept: RADIOLOGY | Facility: HOSPITAL | Age: 74
Discharge: HOME | End: 2024-05-23
Attending: INTERNAL MEDICINE
Payer: MEDICARE

## 2024-05-23 ENCOUNTER — HOSPITAL ENCOUNTER (OUTPATIENT)
Dept: CARDIOLOGY | Facility: HOSPITAL | Age: 74
Discharge: HOME | End: 2024-05-23
Attending: INTERNAL MEDICINE
Payer: MEDICARE

## 2024-05-23 DIAGNOSIS — I25.10 CORONARY ARTERY DISEASE INVOLVING NATIVE CORONARY ARTERY OF NATIVE HEART WITHOUT ANGINA PECTORIS: ICD-10-CM

## 2024-05-23 DIAGNOSIS — R06.02 SOB (SHORTNESS OF BREATH): ICD-10-CM

## 2024-05-23 LAB
CV STRESS BASE HR: 66 BPM
DIASTOLIC BLOOD PRESSURE: 69 MMHG
NUC REST EJECTION FRACTION: 75
NUC STRESS EJECTION FRACTION: 72 %
OHS CV CPX 85 PERCENT MAX PREDICTED HEART RATE MALE: 127
OHS CV CPX MAX PREDICTED HEART RATE: 149
OHS CV CPX PATIENT IS FEMALE: 0
OHS CV CPX PATIENT IS MALE: 1
OHS CV CPX PEAK DIASTOLIC BLOOD PRESSURE: 67 MMHG
OHS CV CPX PEAK HEAR RATE: 126 BPM
OHS CV CPX PEAK RATE PRESSURE PRODUCT: NORMAL
OHS CV CPX PEAK SYSTOLIC BLOOD PRESSURE: 153 MMHG
OHS CV CPX PERCENT MAX PREDICTED HEART RATE ACHIEVED: 85
OHS CV CPX RATE PRESSURE PRODUCT PRESENTING: 7656
STRESS ECHO POST EXERCISE DUR MIN: 8 MINUTES
STRESS ECHO POST EXERCISE DUR SEC: 44 SECONDS
STRESS ST DEPRESSION: 2 MM
SYSTOLIC BLOOD PRESSURE: 116 MMHG

## 2024-05-23 PROCEDURE — A9502 TC99M TETROFOSMIN: HCPCS | Performed by: INTERNAL MEDICINE

## 2024-05-23 PROCEDURE — 78452 HT MUSCLE IMAGE SPECT MULT: CPT

## 2024-05-23 PROCEDURE — 93016 CV STRESS TEST SUPVJ ONLY: CPT | Mod: ,,, | Performed by: INTERNAL MEDICINE

## 2024-05-23 PROCEDURE — 93018 CV STRESS TEST I&R ONLY: CPT | Mod: ,,, | Performed by: INTERNAL MEDICINE

## 2024-05-23 PROCEDURE — 78452 HT MUSCLE IMAGE SPECT MULT: CPT | Mod: 26,,, | Performed by: INTERNAL MEDICINE

## 2024-05-23 PROCEDURE — 93017 CV STRESS TEST TRACING ONLY: CPT

## 2024-05-23 RX ADMIN — TETROFOSMIN 9.8 MILLICURIE: 1.38 INJECTION, POWDER, LYOPHILIZED, FOR SOLUTION INTRAVENOUS at 08:05

## 2024-05-23 RX ADMIN — TETROFOSMIN 31.5 MILLICURIE: 1.38 INJECTION, POWDER, LYOPHILIZED, FOR SOLUTION INTRAVENOUS at 10:05

## 2024-05-23 NOTE — PATIENT INSTRUCTIONS
CURRENT DM MEDICATIONS:    Metformin 1000 mg BID    Lantus 52 units qhs    Novolog 14 units w/ ss TID wm    Jardiance 25 mg    Trulicity 4.5 mg weekly       WITH MEALS:   If blood sugar is below 70 eat first then check your blood sugar 2 hours later and make correction.  If blood pre-meal sugar is  70 -150 take 14 units of Novolog;  If blood pre-meal sugar is 151-200 take +2 units of Novolog;  If blood pre-meal sugar is 201-250 take +4 units of Novolog;  If blood pre-meal sugar is 251-300 take +6 units of Novolog;  If blood pre-meal sugar is 301-350 take +8 units of Novolog;  If blood pre-meal sugar is 351-400+ take +10 units of Novolog;  Also increase water intake and call for appointment.      oral

## 2024-05-23 NOTE — NURSING NOTE
Patient evaluated by Dr. Wadsworth (Cardiology) and Dr. Gr (Patient's Primary Cardiologist) notified.    As per Dr. Wadsworth, patient OK to go home since he is not symptomatic.      Patient givin instruction to avoid strenuous activity and to report to ED immediately upon having chest pain, shortness of breath, etc.    Mr. Ortega verbalized understanding and left Cardiology Special Procedures in no distress.

## 2024-05-23 NOTE — NURSING NOTE
Notified Dr. Wadsworth (Cardiology) at 11:53 hours regardin.)  Pt. W/ abnormal ECG tracings per RN interpretation during Stress Test along with chest pain.     2.)  Nuclear MPI images appear to be abnormal per  Maddi.     3.)  Pt. still c/o atypical chest pain (burning sensation to bilateral anterior chest wall worsened upon deep inhalation).

## 2024-05-24 ENCOUNTER — LAB VISIT (OUTPATIENT)
Dept: LAB | Facility: HOSPITAL | Age: 74
End: 2024-05-24
Attending: INTERNAL MEDICINE
Payer: MEDICARE

## 2024-05-24 ENCOUNTER — OFFICE VISIT (OUTPATIENT)
Dept: CARDIOLOGY | Facility: CLINIC | Age: 74
End: 2024-05-24
Payer: MEDICARE

## 2024-05-24 VITALS
SYSTOLIC BLOOD PRESSURE: 114 MMHG | HEART RATE: 65 BPM | HEIGHT: 71 IN | WEIGHT: 171.75 LBS | BODY MASS INDEX: 24.04 KG/M2 | OXYGEN SATURATION: 98 % | DIASTOLIC BLOOD PRESSURE: 72 MMHG

## 2024-05-24 DIAGNOSIS — R94.39 ABNORMAL NUCLEAR STRESS TEST: ICD-10-CM

## 2024-05-24 DIAGNOSIS — Z79.4 TYPE 2 DIABETES MELLITUS WITH COMPLICATION, WITH LONG-TERM CURRENT USE OF INSULIN: ICD-10-CM

## 2024-05-24 DIAGNOSIS — I25.118 CORONARY ARTERY DISEASE OF NATIVE ARTERY OF NATIVE HEART WITH STABLE ANGINA PECTORIS: Primary | ICD-10-CM

## 2024-05-24 DIAGNOSIS — E78.5 HYPERLIPIDEMIA ASSOCIATED WITH TYPE 2 DIABETES MELLITUS: ICD-10-CM

## 2024-05-24 DIAGNOSIS — E11.69 HYPERLIPIDEMIA ASSOCIATED WITH TYPE 2 DIABETES MELLITUS: ICD-10-CM

## 2024-05-24 DIAGNOSIS — I34.0 NONRHEUMATIC MITRAL VALVE REGURGITATION: ICD-10-CM

## 2024-05-24 DIAGNOSIS — E11.8 TYPE 2 DIABETES MELLITUS WITH COMPLICATION, WITH LONG-TERM CURRENT USE OF INSULIN: ICD-10-CM

## 2024-05-24 DIAGNOSIS — E11.59 HYPERTENSION ASSOCIATED WITH DIABETES: ICD-10-CM

## 2024-05-24 DIAGNOSIS — I15.2 HYPERTENSION ASSOCIATED WITH DIABETES: ICD-10-CM

## 2024-05-24 DIAGNOSIS — I25.118 CORONARY ARTERY DISEASE OF NATIVE ARTERY OF NATIVE HEART WITH STABLE ANGINA PECTORIS: ICD-10-CM

## 2024-05-24 LAB
ANION GAP SERPL CALC-SCNC: 8 MMOL/L (ref 8–16)
BASOPHILS # BLD AUTO: 0.04 K/UL (ref 0–0.2)
BASOPHILS NFR BLD: 0.8 % (ref 0–1.9)
BUN SERPL-MCNC: 14 MG/DL (ref 8–23)
CALCIUM SERPL-MCNC: 9.7 MG/DL (ref 8.7–10.5)
CHLORIDE SERPL-SCNC: 107 MMOL/L (ref 95–110)
CO2 SERPL-SCNC: 26 MMOL/L (ref 23–29)
CREAT SERPL-MCNC: 1 MG/DL (ref 0.5–1.4)
DIFFERENTIAL METHOD BLD: ABNORMAL
EOSINOPHIL # BLD AUTO: 0.1 K/UL (ref 0–0.5)
EOSINOPHIL NFR BLD: 1.2 % (ref 0–8)
ERYTHROCYTE [DISTWIDTH] IN BLOOD BY AUTOMATED COUNT: 13.4 % (ref 11.5–14.5)
EST. GFR  (NO RACE VARIABLE): >60 ML/MIN/1.73 M^2
GLUCOSE SERPL-MCNC: 260 MG/DL (ref 70–110)
HCT VFR BLD AUTO: 44.2 % (ref 40–54)
HGB BLD-MCNC: 14.9 G/DL (ref 14–18)
IMM GRANULOCYTES # BLD AUTO: 0.01 K/UL (ref 0–0.04)
IMM GRANULOCYTES NFR BLD AUTO: 0.2 % (ref 0–0.5)
INR PPP: 1 (ref 0.8–1.2)
LYMPHOCYTES # BLD AUTO: 1.6 K/UL (ref 1–4.8)
LYMPHOCYTES NFR BLD: 31.5 % (ref 18–48)
MCH RBC QN AUTO: 32.6 PG (ref 27–31)
MCHC RBC AUTO-ENTMCNC: 33.7 G/DL (ref 32–36)
MCV RBC AUTO: 97 FL (ref 82–98)
MONOCYTES # BLD AUTO: 0.6 K/UL (ref 0.3–1)
MONOCYTES NFR BLD: 11.6 % (ref 4–15)
NEUTROPHILS # BLD AUTO: 2.8 K/UL (ref 1.8–7.7)
NEUTROPHILS NFR BLD: 54.7 % (ref 38–73)
NRBC BLD-RTO: 0 /100 WBC
PLATELET # BLD AUTO: 146 K/UL (ref 150–450)
PMV BLD AUTO: 10.9 FL (ref 9.2–12.9)
POTASSIUM SERPL-SCNC: 4.3 MMOL/L (ref 3.5–5.1)
PROTHROMBIN TIME: 11 SEC (ref 9–12.5)
RBC # BLD AUTO: 4.57 M/UL (ref 4.6–6.2)
SODIUM SERPL-SCNC: 141 MMOL/L (ref 136–145)
WBC # BLD AUTO: 5.18 K/UL (ref 3.9–12.7)

## 2024-05-24 PROCEDURE — 99215 OFFICE O/P EST HI 40 MIN: CPT | Mod: S$PBB,,, | Performed by: INTERNAL MEDICINE

## 2024-05-24 PROCEDURE — 85610 PROTHROMBIN TIME: CPT | Performed by: INTERNAL MEDICINE

## 2024-05-24 PROCEDURE — 80048 BASIC METABOLIC PNL TOTAL CA: CPT | Performed by: INTERNAL MEDICINE

## 2024-05-24 PROCEDURE — 85025 COMPLETE CBC W/AUTO DIFF WBC: CPT | Performed by: INTERNAL MEDICINE

## 2024-05-24 PROCEDURE — 99999 PR PBB SHADOW E&M-EST. PATIENT-LVL IV: CPT | Mod: PBBFAC,,, | Performed by: INTERNAL MEDICINE

## 2024-05-24 PROCEDURE — 36415 COLL VENOUS BLD VENIPUNCTURE: CPT | Performed by: INTERNAL MEDICINE

## 2024-05-24 PROCEDURE — 99214 OFFICE O/P EST MOD 30 MIN: CPT | Mod: PBBFAC | Performed by: INTERNAL MEDICINE

## 2024-05-24 RX ORDER — METOPROLOL SUCCINATE 25 MG/1
25 TABLET, EXTENDED RELEASE ORAL DAILY
Qty: 30 TABLET | Refills: 5 | Status: SHIPPED | OUTPATIENT
Start: 2024-05-24

## 2024-05-24 RX ORDER — LOSARTAN POTASSIUM 50 MG/1
50 TABLET ORAL DAILY
Qty: 90 TABLET | Refills: 2 | Status: SHIPPED | OUTPATIENT
Start: 2024-05-24 | End: 2024-06-04 | Stop reason: SDUPTHER

## 2024-05-24 RX ORDER — ROSUVASTATIN CALCIUM 20 MG/1
20 TABLET, COATED ORAL DAILY
COMMUNITY

## 2024-05-24 NOTE — PROGRESS NOTES
"Subjective:   Patient ID:  Timothy Ortega is a 73 y.o. male who presents for follow up of Shortness of Breath (Notices it is kind of hard to take a deep breathe)      74 yo male, came in for 1yr f/u.   Hx of CAD, had LHC in 2010 and was told "nonobstructive", HTN, HLP, IDDM > 20 yrs, BRIANA on cpap, ex smoker.  h/o right carpel tunnel syndrome procedure.    No sob, no palpitation, no dizziness, no syncope, no CNS symptoms. Rare;y sharp chest pain atypical pain  Patient has fairly good exercise tolerance. Walks 2 miles a day, slowly down after COVID 19 pandemic  Patient is compliant with medications.  No smoking/drinking  EKG NSR and possible inferior infarct   nuke stress normal EF and no ischemia.  EKG NSR     visit  ekg NSR. Taking Lipitor for 12 yrs. C/o fatigue. On CPAP and still has daytime sleep  No chest pain palpitation dizziness and faint  Occasional SOB   echo  · The left ventricle is normal in size with concentric remodeling and normal systolic function. The estimated ejection fraction is 55%  · Normal left ventricular diastolic function.  · Normal right ventricular size with normal right ventricular systolic function.  · Mild mitral regurgitation.  · Mild tricuspid regurgitation.  · Normal central venous pressure (3 mmHg).  · The estimated PA systolic pressure is 33 mmHg.    05/23 visit  Yard work, lives with wife.  He denied chest pain, dyspnea on exertion, palpitation, fainting, PND, orthopnea, syncope and claudication.   No active bleeding  Med compliance  Improved fatigue after d/c metformin and switched crestor to pravastatin    04/24 visit  EKG reviewed by myself today reveals NSR   Chest burning and chest tightness when deep breathing. Some sob.   He denied palpitation, fainting, PND, orthopnea, syncope and claudication.   LDL 58 BP C A1c 7.4    Interval history  Exercise stress nuke test showed multivessel ischemia                              Past Medical History:   Diagnosis " Date    Coronary artery disease     [facility] - no stents    DM (diabetes mellitus) 2002     lunch 10/28/2016    DM (diabetes mellitus)      am 2021    DM (diabetes mellitus)     BS 96 am 2022 Insulin for years    Groin pain, left 2021    Hyperlipemia     Hypertension     Kidney stones     Nephrolithiasis 2021    Neuropathy     Type 2 diabetes mellitus 10-12 years     am 10/31/2014       Past Surgical History:   Procedure Laterality Date    CARPAL TUNNEL RELEASE Right 2020    COLONOSCOPY N/A 2017    Procedure: COLONOSCOPY;  Surgeon: Cornelius Ibarra MD;  Location: [facility] ENDO;  Service: Endoscopy;  Laterality: N/A;    COLONOSCOPY N/A 2022    Procedure: COLONOSCOPY;  Surgeon: Geneva Serna MD;  Location: [facility];  Service: Endoscopy;  Laterality: N/A;    DENTAL SURGERY      Implant    HAND SURGERY      KNEE ARTHROSCOPY Left     ROBOT-ASSISTED LAPAROSCOPIC REPAIR OF INGUINAL HERNIA USING DA JOSE ANGEL XI Left 2021    Procedure: XI ROBOTIC REPAIR, HERNIA, INGUINAL;  Surgeon: Tavon Cruz MD;  Location: [facility];  Service: General;  Laterality: Left;       Social History     Tobacco Use    Smoking status: Former     Current packs/day: 0.00     Average packs/day: 1 pack/day for 20.0 years (20.0 ttl pk-yrs)     Types: Cigarettes     Start date: 1980     Quit date: 2000     Years since quittin.4     Passive exposure: Past    Smokeless tobacco: Never   Substance Use Topics    Alcohol use: No     Alcohol/week: 0.0 standard drinks of alcohol    Drug use: No       Family History   Problem Relation Name Age of Onset    Diabetes Mother      Glaucoma Mother      Heart disease Mother  75        CAB    Diabetes Father      Heart attack Father  58        Fatal MI    Diabetes Brother      Diabetes Sister      Diabetes Sister      Cataracts Paternal Uncle      Cataracts Maternal Grandmother           ROS    Objective:   Physical Exam  HENT:      Head:  Normocephalic.   Eyes:      Pupils: Pupils are equal, round, and reactive to light.   Neck:      Thyroid: No thyromegaly.      Vascular: Normal carotid pulses. No carotid bruit or JVD.   Cardiovascular:      Rate and Rhythm: Normal rate and regular rhythm. No extrasystoles are present.     Chest Wall: PMI is not displaced.      Pulses: Normal pulses.      Heart sounds: Normal heart sounds. No murmur heard.     No gallop. No S3 sounds.   Pulmonary:      Effort: No respiratory distress.      Breath sounds: Normal breath sounds. No stridor.   Abdominal:      General: Bowel sounds are normal.      Palpations: Abdomen is soft.      Tenderness: There is no abdominal tenderness. There is no rebound.   Musculoskeletal:         General: Normal range of motion.   Skin:     Findings: No rash.   Neurological:      Mental Status: He is alert and oriented to person, place, and time.   Psychiatric:         Behavior: Behavior normal.         Lab Results   Component Value Date    CHOL 122 03/21/2024    CHOL 184 09/21/2023    CHOL 181 03/21/2023     Lab Results   Component Value Date    HDL 35 (L) 03/21/2024    HDL 29 (L) 09/21/2023    HDL 31 (L) 03/21/2023     Lab Results   Component Value Date    LDLCALC 58.6 (L) 03/21/2024    LDLCALC 121.4 09/21/2023    LDLCALC 107.8 03/21/2023     Lab Results   Component Value Date    TRIG 142 03/21/2024    TRIG 168 (H) 09/21/2023    TRIG 211 (H) 03/21/2023     Lab Results   Component Value Date    CHOLHDL 28.7 03/21/2024    CHOLHDL 15.8 (L) 09/21/2023    CHOLHDL 17.1 (L) 03/21/2023       Chemistry        Component Value Date/Time     03/21/2024 0753    K 4.7 03/21/2024 0753     03/21/2024 0753    CO2 27 03/21/2024 0753    BUN 16 03/21/2024 0753    CREATININE 1.1 03/21/2024 0753     (H) 03/21/2024 0753        Component Value Date/Time    CALCIUM 10.0 03/21/2024 0753    ALKPHOS 52 (L) 03/21/2024 0753    AST 21 03/21/2024 0753    ALT 29 03/21/2024 0753    BILITOT 0.6 03/21/2024  0753    ESTGFRAFRICA >60.0 09/23/2021 0717    EGFRNONAA >60.0 09/23/2021 0717          Lab Results   Component Value Date    HGBA1C 7.6 (H) 03/21/2024     Lab Results   Component Value Date    TSH 1.394 03/21/2024     Lab Results   Component Value Date    INR 1.0 12/01/2014    INR 1.0 05/06/2011     Lab Results   Component Value Date    WBC 7.88 04/25/2022    HGB 14.2 04/25/2022    HCT 44.7 04/25/2022     (H) 04/25/2022     04/25/2022     BMP  Sodium   Date Value Ref Range Status   03/21/2024 142 136 - 145 mmol/L Final     Potassium   Date Value Ref Range Status   03/21/2024 4.7 3.5 - 5.1 mmol/L Final     Chloride   Date Value Ref Range Status   03/21/2024 107 95 - 110 mmol/L Final     CO2   Date Value Ref Range Status   03/21/2024 27 23 - 29 mmol/L Final     BUN   Date Value Ref Range Status   03/21/2024 16 8 - 23 mg/dL Final     Creatinine   Date Value Ref Range Status   03/21/2024 1.1 0.5 - 1.4 mg/dL Final     Calcium   Date Value Ref Range Status   03/21/2024 10.0 8.7 - 10.5 mg/dL Final     Anion Gap   Date Value Ref Range Status   03/21/2024 8 8 - 16 mmol/L Final     eGFR if    Date Value Ref Range Status   09/23/2021 >60.0 >60 mL/min/1.73 m^2 Final     eGFR if non    Date Value Ref Range Status   09/23/2021 >60.0 >60 mL/min/1.73 m^2 Final     Comment:     Calculation used to obtain the estimated glomerular filtration  rate (eGFR) is the CKD-EPI equation.        BNP  @LABRCNTIP(BNP,BNPTRIAGEBLO)@  @LABRCNTIP(troponini)@  CrCl cannot be calculated (Patient's most recent lab result is older than the maximum 7 days allowed.).  No results found in the last 24 hours.  No results found in the last 24 hours.  No results found in the last 24 hours.    Assessment:      1. Coronary artery disease of native artery of native heart with stable angina pectoris    2. Hyperlipidemia associated with type 2 diabetes mellitus    3. Hypertension associated with diabetes    4.  Nonrheumatic mitral valve regurgitation    5. Type 2 diabetes mellitus with complication, with long-term current use of insulin    6. Abnormal nuclear stress test        Plan:   Coronary artery disease of native artery of native heart with stable angina pectoris    Hyperlipidemia associated with type 2 diabetes mellitus    Hypertension associated with diabetes  -     losartan (COZAAR) 50 MG tablet; Take 1 tablet (50 mg total) by mouth once daily.  Dispense: 90 tablet; Refill: 2    Nonrheumatic mitral valve regurgitation    Type 2 diabetes mellitus with complication, with long-term current use of insulin    Abnormal nuclear stress test    Other orders  -     metoprolol succinate (TOPROL-XL) 25 MG 24 hr tablet; Take 1 tablet (25 mg total) by mouth once daily.  Dispense: 30 tablet; Refill: 5          Add toprolXl 25 mg before cath  Decrease losartan to 50 mg to avoid low BP  CVontinue asa statin amlodipine  Arrange LHC due to positive exercise nuke stress test, exertional chest pain and h/o DM  I have explained the risks, benefits, and alternatives of the procedure in detail with patient and family. The patient voices understanding and all questions have been addressed.  The patient agrees to proceed as planned.

## 2024-05-24 NOTE — H&P (VIEW-ONLY)
"Subjective:   Patient ID:  Timothy Ortega is a 73 y.o. male who presents for follow up of Shortness of Breath (Notices it is kind of hard to take a deep breathe)      74 yo male, came in for 1yr f/u.   Hx of CAD, had LHC in 2010 and was told "nonobstructive", HTN, HLP, IDDM > 20 yrs, BRIANA on cpap, ex smoker.  h/o right carpel tunnel syndrome procedure.    No sob, no palpitation, no dizziness, no syncope, no CNS symptoms. Rare;y sharp chest pain atypical pain  Patient has fairly good exercise tolerance. Walks 2 miles a day, slowly down after COVID 19 pandemic  Patient is compliant with medications.  No smoking/drinking  EKG NSR and possible inferior infarct   nuke stress normal EF and no ischemia.  EKG NSR     visit  ekg NSR. Taking Lipitor for 12 yrs. C/o fatigue. On CPAP and still has daytime sleep  No chest pain palpitation dizziness and faint  Occasional SOB   echo  · The left ventricle is normal in size with concentric remodeling and normal systolic function. The estimated ejection fraction is 55%  · Normal left ventricular diastolic function.  · Normal right ventricular size with normal right ventricular systolic function.  · Mild mitral regurgitation.  · Mild tricuspid regurgitation.  · Normal central venous pressure (3 mmHg).  · The estimated PA systolic pressure is 33 mmHg.    05/23 visit  Yard work, lives with wife.  He denied chest pain, dyspnea on exertion, palpitation, fainting, PND, orthopnea, syncope and claudication.   No active bleeding  Med compliance  Improved fatigue after d/c metformin and switched crestor to pravastatin    04/24 visit  EKG reviewed by myself today reveals NSR   Chest burning and chest tightness when deep breathing. Some sob.   He denied palpitation, fainting, PND, orthopnea, syncope and claudication.   LDL 58 BP C A1c 7.4    Interval history  Exercise stress nuke test showed multivessel ischemia                              Past Medical History:   Diagnosis " Date    Coronary artery disease     [facility] - no stents    DM (diabetes mellitus) 2002     lunch 10/28/2016    DM (diabetes mellitus)      am 2021    DM (diabetes mellitus)     BS 96 am 2022 Insulin for years    Groin pain, left 2021    Hyperlipemia     Hypertension     Kidney stones     Nephrolithiasis 2021    Neuropathy     Type 2 diabetes mellitus 10-12 years     am 10/31/2014       Past Surgical History:   Procedure Laterality Date    CARPAL TUNNEL RELEASE Right 2020    COLONOSCOPY N/A 2017    Procedure: COLONOSCOPY;  Surgeon: Cornelius Ibarra MD;  Location[facility] ENDO;  Service: Endoscopy;  Laterality: N/A;    COLONOSCOPY N/A 2022    Procedure: COLONOSCOPY;  Surgeon: Geneva Serna MD;  Location[facility];  Service: Endoscopy;  Laterality: N/A;    DENTAL SURGERY      Implant    HAND SURGERY      KNEE ARTHROSCOPY Left     ROBOT-ASSISTED LAPAROSCOPIC REPAIR OF INGUINAL HERNIA USING DA JOSE ANGEL XI Left 2021    Procedure: XI ROBOTIC REPAIR, HERNIA, INGUINAL;  Surgeon: Tavon Cruz MD;  [facility];  Service: General;  Laterality: Left;       Social History     Tobacco Use    Smoking status: Former     Current packs/day: 0.00     Average packs/day: 1 pack/day for 20.0 years (20.0 ttl pk-yrs)     Types: Cigarettes     Start date: 1980     Quit date: 2000     Years since quittin.4     Passive exposure: Past    Smokeless tobacco: Never   Substance Use Topics    Alcohol use: No     Alcohol/week: 0.0 standard drinks of alcohol    Drug use: No       Family History   Problem Relation Name Age of Onset    Diabetes Mother      Glaucoma Mother      Heart disease Mother  75        CAB    Diabetes Father      Heart attack Father  58        Fatal MI    Diabetes Brother      Diabetes Sister      Diabetes Sister      Cataracts Paternal Uncle      Cataracts Maternal Grandmother           ROS    Objective:   Physical Exam  HENT:      Head:  Normocephalic.   Eyes:      Pupils: Pupils are equal, round, and reactive to light.   Neck:      Thyroid: No thyromegaly.      Vascular: Normal carotid pulses. No carotid bruit or JVD.   Cardiovascular:      Rate and Rhythm: Normal rate and regular rhythm. No extrasystoles are present.     Chest Wall: PMI is not displaced.      Pulses: Normal pulses.      Heart sounds: Normal heart sounds. No murmur heard.     No gallop. No S3 sounds.   Pulmonary:      Effort: No respiratory distress.      Breath sounds: Normal breath sounds. No stridor.   Abdominal:      General: Bowel sounds are normal.      Palpations: Abdomen is soft.      Tenderness: There is no abdominal tenderness. There is no rebound.   Musculoskeletal:         General: Normal range of motion.   Skin:     Findings: No rash.   Neurological:      Mental Status: He is alert and oriented to person, place, and time.   Psychiatric:         Behavior: Behavior normal.         Lab Results   Component Value Date    CHOL 122 03/21/2024    CHOL 184 09/21/2023    CHOL 181 03/21/2023     Lab Results   Component Value Date    HDL 35 (L) 03/21/2024    HDL 29 (L) 09/21/2023    HDL 31 (L) 03/21/2023     Lab Results   Component Value Date    LDLCALC 58.6 (L) 03/21/2024    LDLCALC 121.4 09/21/2023    LDLCALC 107.8 03/21/2023     Lab Results   Component Value Date    TRIG 142 03/21/2024    TRIG 168 (H) 09/21/2023    TRIG 211 (H) 03/21/2023     Lab Results   Component Value Date    CHOLHDL 28.7 03/21/2024    CHOLHDL 15.8 (L) 09/21/2023    CHOLHDL 17.1 (L) 03/21/2023       Chemistry        Component Value Date/Time     03/21/2024 0753    K 4.7 03/21/2024 0753     03/21/2024 0753    CO2 27 03/21/2024 0753    BUN 16 03/21/2024 0753    CREATININE 1.1 03/21/2024 0753     (H) 03/21/2024 0753        Component Value Date/Time    CALCIUM 10.0 03/21/2024 0753    ALKPHOS 52 (L) 03/21/2024 0753    AST 21 03/21/2024 0753    ALT 29 03/21/2024 0753    BILITOT 0.6 03/21/2024  0753    ESTGFRAFRICA >60.0 09/23/2021 0717    EGFRNONAA >60.0 09/23/2021 0717          Lab Results   Component Value Date    HGBA1C 7.6 (H) 03/21/2024     Lab Results   Component Value Date    TSH 1.394 03/21/2024     Lab Results   Component Value Date    INR 1.0 12/01/2014    INR 1.0 05/06/2011     Lab Results   Component Value Date    WBC 7.88 04/25/2022    HGB 14.2 04/25/2022    HCT 44.7 04/25/2022     (H) 04/25/2022     04/25/2022     BMP  Sodium   Date Value Ref Range Status   03/21/2024 142 136 - 145 mmol/L Final     Potassium   Date Value Ref Range Status   03/21/2024 4.7 3.5 - 5.1 mmol/L Final     Chloride   Date Value Ref Range Status   03/21/2024 107 95 - 110 mmol/L Final     CO2   Date Value Ref Range Status   03/21/2024 27 23 - 29 mmol/L Final     BUN   Date Value Ref Range Status   03/21/2024 16 8 - 23 mg/dL Final     Creatinine   Date Value Ref Range Status   03/21/2024 1.1 0.5 - 1.4 mg/dL Final     Calcium   Date Value Ref Range Status   03/21/2024 10.0 8.7 - 10.5 mg/dL Final     Anion Gap   Date Value Ref Range Status   03/21/2024 8 8 - 16 mmol/L Final     eGFR if    Date Value Ref Range Status   09/23/2021 >60.0 >60 mL/min/1.73 m^2 Final     eGFR if non    Date Value Ref Range Status   09/23/2021 >60.0 >60 mL/min/1.73 m^2 Final     Comment:     Calculation used to obtain the estimated glomerular filtration  rate (eGFR) is the CKD-EPI equation.        BNP  @LABRCNTIP(BNP,BNPTRIAGEBLO)@  @LABRCNTIP(troponini)@  CrCl cannot be calculated (Patient's most recent lab result is older than the maximum 7 days allowed.).  No results found in the last 24 hours.  No results found in the last 24 hours.  No results found in the last 24 hours.    Assessment:      1. Coronary artery disease of native artery of native heart with stable angina pectoris    2. Hyperlipidemia associated with type 2 diabetes mellitus    3. Hypertension associated with diabetes    4.  Nonrheumatic mitral valve regurgitation    5. Type 2 diabetes mellitus with complication, with long-term current use of insulin    6. Abnormal nuclear stress test        Plan:   Coronary artery disease of native artery of native heart with stable angina pectoris    Hyperlipidemia associated with type 2 diabetes mellitus    Hypertension associated with diabetes  -     losartan (COZAAR) 50 MG tablet; Take 1 tablet (50 mg total) by mouth once daily.  Dispense: 90 tablet; Refill: 2    Nonrheumatic mitral valve regurgitation    Type 2 diabetes mellitus with complication, with long-term current use of insulin    Abnormal nuclear stress test    Other orders  -     metoprolol succinate (TOPROL-XL) 25 MG 24 hr tablet; Take 1 tablet (25 mg total) by mouth once daily.  Dispense: 30 tablet; Refill: 5          Add toprolXl 25 mg before cath  Decrease losartan to 50 mg to avoid low BP  CVontinue asa statin amlodipine  Arrange LHC due to positive exercise nuke stress test, exertional chest pain and h/o DM  I have explained the risks, benefits, and alternatives of the procedure in detail with patient and family. The patient voices understanding and all questions have been addressed.  The patient agrees to proceed as planned.

## 2024-05-27 ENCOUNTER — TELEPHONE (OUTPATIENT)
Dept: CARDIOLOGY | Facility: CLINIC | Age: 74
End: 2024-05-27
Payer: MEDICARE

## 2024-05-28 ENCOUNTER — HOSPITAL ENCOUNTER (OUTPATIENT)
Facility: HOSPITAL | Age: 74
Discharge: HOME | End: 2024-05-29
Attending: INTERNAL MEDICINE | Admitting: INTERNAL MEDICINE
Payer: MEDICARE

## 2024-05-28 DIAGNOSIS — I25.10 CAD (CORONARY ARTERY DISEASE): ICD-10-CM

## 2024-05-28 DIAGNOSIS — R94.39 ABNORMAL STRESS TEST: ICD-10-CM

## 2024-05-28 DIAGNOSIS — R94.39 ABNORMAL NUCLEAR STRESS TEST: Primary | ICD-10-CM

## 2024-05-28 DIAGNOSIS — I70.0 AORTIC ATHEROSCLEROSIS: ICD-10-CM

## 2024-05-28 LAB
APTT PPP: 36.6 SEC (ref 21–32)
BASOPHILS # BLD AUTO: 0.03 K/UL (ref 0–0.2)
BASOPHILS NFR BLD: 0.5 % (ref 0–1.9)
DIFFERENTIAL METHOD BLD: ABNORMAL
EOSINOPHIL # BLD AUTO: 0.1 K/UL (ref 0–0.5)
EOSINOPHIL NFR BLD: 1.7 % (ref 0–8)
ERYTHROCYTE [DISTWIDTH] IN BLOOD BY AUTOMATED COUNT: 12.8 % (ref 11.5–14.5)
HCT VFR BLD AUTO: 41.3 % (ref 40–54)
HGB BLD-MCNC: 14.3 G/DL (ref 14–18)
IMM GRANULOCYTES # BLD AUTO: 0.01 K/UL (ref 0–0.04)
IMM GRANULOCYTES NFR BLD AUTO: 0.2 % (ref 0–0.5)
INR PPP: 1 (ref 0.8–1.2)
LYMPHOCYTES # BLD AUTO: 2.2 K/UL (ref 1–4.8)
LYMPHOCYTES NFR BLD: 36.6 % (ref 18–48)
MCH RBC QN AUTO: 32.1 PG (ref 27–31)
MCHC RBC AUTO-ENTMCNC: 34.6 G/DL (ref 32–36)
MCV RBC AUTO: 93 FL (ref 82–98)
MONOCYTES # BLD AUTO: 0.5 K/UL (ref 0.3–1)
MONOCYTES NFR BLD: 7.9 % (ref 4–15)
NEUTROPHILS # BLD AUTO: 3.2 K/UL (ref 1.8–7.7)
NEUTROPHILS NFR BLD: 53.1 % (ref 38–73)
NRBC BLD-RTO: 0 /100 WBC
OHS QRS DURATION: 78 MS
OHS QTC CALCULATION: 416 MS
PLATELET # BLD AUTO: 153 K/UL (ref 150–450)
PMV BLD AUTO: 9.5 FL (ref 9.2–12.9)
POC ACTIVATED CLOTTING TIME K: 226 SEC (ref 74–137)
POC ACTIVATED CLOTTING TIME K: 226 SEC (ref 74–137)
POC ACTIVATED CLOTTING TIME K: 238 SEC (ref 74–137)
POCT GLUCOSE: 124 MG/DL (ref 70–110)
POCT GLUCOSE: 160 MG/DL (ref 70–110)
PROTHROMBIN TIME: 11.2 SEC (ref 9–12.5)
RBC # BLD AUTO: 4.45 M/UL (ref 4.6–6.2)
SAMPLE: ABNORMAL
WBC # BLD AUTO: 5.98 K/UL (ref 3.9–12.7)

## 2024-05-28 PROCEDURE — 93005 ELECTROCARDIOGRAM TRACING: CPT | Mod: 59

## 2024-05-28 PROCEDURE — 85730 THROMBOPLASTIN TIME PARTIAL: CPT | Performed by: INTERNAL MEDICINE

## 2024-05-28 PROCEDURE — 25000003 PHARM REV CODE 250: Performed by: INTERNAL MEDICINE

## 2024-05-28 PROCEDURE — 93458 L HRT ARTERY/VENTRICLE ANGIO: CPT | Mod: XU | Performed by: INTERNAL MEDICINE

## 2024-05-28 PROCEDURE — 27800903 OPTIME MED/SURG SUP & DEVICES OTHER IMPLANTS: Performed by: INTERNAL MEDICINE

## 2024-05-28 PROCEDURE — 93458 L HRT ARTERY/VENTRICLE ANGIO: CPT | Mod: 26,XU,, | Performed by: INTERNAL MEDICINE

## 2024-05-28 PROCEDURE — 63600175 PHARM REV CODE 636 W HCPCS: Performed by: INTERNAL MEDICINE

## 2024-05-28 PROCEDURE — C1894 INTRO/SHEATH, NON-LASER: HCPCS | Performed by: INTERNAL MEDICINE

## 2024-05-28 PROCEDURE — 85347 COAGULATION TIME ACTIVATED: CPT | Performed by: INTERNAL MEDICINE

## 2024-05-28 PROCEDURE — 85025 COMPLETE CBC W/AUTO DIFF WBC: CPT | Performed by: INTERNAL MEDICINE

## 2024-05-28 PROCEDURE — C1769 GUIDE WIRE: HCPCS | Performed by: INTERNAL MEDICINE

## 2024-05-28 PROCEDURE — 85610 PROTHROMBIN TIME: CPT | Performed by: INTERNAL MEDICINE

## 2024-05-28 PROCEDURE — C1874 STENT, COATED/COV W/DEL SYS: HCPCS | Performed by: INTERNAL MEDICINE

## 2024-05-28 PROCEDURE — C1725 CATH, TRANSLUMIN NON-LASER: HCPCS | Performed by: INTERNAL MEDICINE

## 2024-05-28 PROCEDURE — C1761 OPTIME CATH, TRANSLUMINAL INTRAVASC LITHO, CORONARY: HCPCS | Performed by: INTERNAL MEDICINE

## 2024-05-28 PROCEDURE — 93010 ELECTROCARDIOGRAM REPORT: CPT | Mod: ,,, | Performed by: STUDENT IN AN ORGANIZED HEALTH CARE EDUCATION/TRAINING PROGRAM

## 2024-05-28 PROCEDURE — 99152 MOD SED SAME PHYS/QHP 5/>YRS: CPT | Mod: ,,, | Performed by: INTERNAL MEDICINE

## 2024-05-28 PROCEDURE — 36415 COLL VENOUS BLD VENIPUNCTURE: CPT | Performed by: INTERNAL MEDICINE

## 2024-05-28 PROCEDURE — 92972 PERQ TRLUML CORONRY LITHOTRP: CPT | Mod: ,,, | Performed by: INTERNAL MEDICINE

## 2024-05-28 PROCEDURE — 92928 PRQ TCAT PLMT NTRAC ST 1 LES: CPT | Mod: LD,,, | Performed by: INTERNAL MEDICINE

## 2024-05-28 PROCEDURE — 92972 PERQ TRLUML CORONRY LITHOTRP: CPT | Performed by: INTERNAL MEDICINE

## 2024-05-28 PROCEDURE — C1887 CATHETER, GUIDING: HCPCS | Performed by: INTERNAL MEDICINE

## 2024-05-28 PROCEDURE — C9600 PERC DRUG-EL COR STENT SING: HCPCS | Mod: LD | Performed by: INTERNAL MEDICINE

## 2024-05-28 PROCEDURE — 99152 MOD SED SAME PHYS/QHP 5/>YRS: CPT | Performed by: INTERNAL MEDICINE

## 2024-05-28 PROCEDURE — 25500020 PHARM REV CODE 255: Performed by: INTERNAL MEDICINE

## 2024-05-28 PROCEDURE — 99153 MOD SED SAME PHYS/QHP EA: CPT | Performed by: INTERNAL MEDICINE

## 2024-05-28 DEVICE — IMPLANTABLE DEVICE
Type: IMPLANTABLE DEVICE | Site: HEART | Status: FUNCTIONAL
Brand: ORSIRO MISSION

## 2024-05-28 RX ORDER — NITROGLYCERIN 5 MG/ML
INJECTION, SOLUTION INTRAVENOUS
Status: DISCONTINUED | OUTPATIENT
Start: 2024-05-28 | End: 2024-05-28 | Stop reason: HOSPADM

## 2024-05-28 RX ORDER — INSULIN GLARGINE 100 [IU]/ML
10 INJECTION, SOLUTION SUBCUTANEOUS NIGHTLY
Status: DISCONTINUED | OUTPATIENT
Start: 2024-05-28 | End: 2024-05-28 | Stop reason: CLARIF

## 2024-05-28 RX ORDER — GLUCAGON 1 MG
1 KIT INJECTION
Status: DISCONTINUED | OUTPATIENT
Start: 2024-05-28 | End: 2024-05-29 | Stop reason: HOSPADM

## 2024-05-28 RX ORDER — IBUPROFEN 200 MG
24 TABLET ORAL
Status: DISCONTINUED | OUTPATIENT
Start: 2024-05-28 | End: 2024-05-29 | Stop reason: HOSPADM

## 2024-05-28 RX ORDER — VERAPAMIL HYDROCHLORIDE 2.5 MG/ML
INJECTION, SOLUTION INTRAVENOUS
Status: DISCONTINUED | OUTPATIENT
Start: 2024-05-28 | End: 2024-05-28 | Stop reason: HOSPADM

## 2024-05-28 RX ORDER — PRASUGREL 10 MG/1
10 TABLET, FILM COATED ORAL DAILY
Qty: 30 TABLET | Refills: 11 | Status: SHIPPED | OUTPATIENT
Start: 2024-05-28 | End: 2025-05-28

## 2024-05-28 RX ORDER — METOPROLOL SUCCINATE 25 MG/1
25 TABLET, EXTENDED RELEASE ORAL DAILY
Status: DISCONTINUED | OUTPATIENT
Start: 2024-05-29 | End: 2024-05-29 | Stop reason: HOSPADM

## 2024-05-28 RX ORDER — ASPIRIN 81 MG/1
81 TABLET ORAL DAILY
Status: DISCONTINUED | OUTPATIENT
Start: 2024-05-29 | End: 2024-05-29 | Stop reason: HOSPADM

## 2024-05-28 RX ORDER — AMLODIPINE BESYLATE 5 MG/1
5 TABLET ORAL DAILY
Status: DISCONTINUED | OUTPATIENT
Start: 2024-05-29 | End: 2024-05-29 | Stop reason: HOSPADM

## 2024-05-28 RX ORDER — PRASUGREL 10 MG/1
60 TABLET, FILM COATED ORAL ONCE
Status: COMPLETED | OUTPATIENT
Start: 2024-05-28 | End: 2024-05-28

## 2024-05-28 RX ORDER — SODIUM CHLORIDE 9 MG/ML
INJECTION, SOLUTION INTRAVENOUS CONTINUOUS
Status: ACTIVE | OUTPATIENT
Start: 2024-05-28 | End: 2024-05-28

## 2024-05-28 RX ORDER — DIPHENHYDRAMINE HCL 50 MG
50 CAPSULE ORAL ONCE
Status: COMPLETED | OUTPATIENT
Start: 2024-05-28 | End: 2024-05-28

## 2024-05-28 RX ORDER — ISOSORBIDE MONONITRATE 30 MG/1
30 TABLET, EXTENDED RELEASE ORAL DAILY
Status: DISCONTINUED | OUTPATIENT
Start: 2024-05-28 | End: 2024-05-29 | Stop reason: HOSPADM

## 2024-05-28 RX ORDER — IBUPROFEN 200 MG
16 TABLET ORAL
Status: DISCONTINUED | OUTPATIENT
Start: 2024-05-28 | End: 2024-05-29 | Stop reason: HOSPADM

## 2024-05-28 RX ORDER — ONDANSETRON 8 MG/1
8 TABLET, ORALLY DISINTEGRATING ORAL EVERY 8 HOURS PRN
Status: DISCONTINUED | OUTPATIENT
Start: 2024-05-28 | End: 2024-05-29 | Stop reason: HOSPADM

## 2024-05-28 RX ORDER — HEPARIN SODIUM,PORCINE/D5W 25000/250
0-40 INTRAVENOUS SOLUTION INTRAVENOUS CONTINUOUS
Status: DISCONTINUED | OUTPATIENT
Start: 2024-05-28 | End: 2024-05-29

## 2024-05-28 RX ORDER — ATORVASTATIN CALCIUM 40 MG/1
40 TABLET, FILM COATED ORAL NIGHTLY
Status: DISCONTINUED | OUTPATIENT
Start: 2024-05-28 | End: 2024-05-29 | Stop reason: HOSPADM

## 2024-05-28 RX ORDER — HEPARIN SODIUM 1000 [USP'U]/ML
INJECTION, SOLUTION INTRAVENOUS; SUBCUTANEOUS
Status: DISCONTINUED | OUTPATIENT
Start: 2024-05-28 | End: 2024-05-28 | Stop reason: HOSPADM

## 2024-05-28 RX ORDER — FENTANYL CITRATE 50 UG/ML
INJECTION, SOLUTION INTRAMUSCULAR; INTRAVENOUS
Status: DISCONTINUED | OUTPATIENT
Start: 2024-05-28 | End: 2024-05-28 | Stop reason: HOSPADM

## 2024-05-28 RX ORDER — MIDAZOLAM HYDROCHLORIDE 1 MG/ML
INJECTION INTRAMUSCULAR; INTRAVENOUS
Status: DISCONTINUED | OUTPATIENT
Start: 2024-05-28 | End: 2024-05-28 | Stop reason: HOSPADM

## 2024-05-28 RX ORDER — SODIUM CHLORIDE 9 MG/ML
INJECTION, SOLUTION INTRAVENOUS
Status: DISCONTINUED | OUTPATIENT
Start: 2024-05-28 | End: 2024-05-29 | Stop reason: HOSPADM

## 2024-05-28 RX ORDER — SODIUM CHLORIDE 0.9 % (FLUSH) 0.9 %
10 SYRINGE (ML) INJECTION
Status: DISCONTINUED | OUTPATIENT
Start: 2024-05-28 | End: 2024-05-29 | Stop reason: HOSPADM

## 2024-05-28 RX ORDER — INSULIN ASPART 100 [IU]/ML
0-10 INJECTION, SOLUTION INTRAVENOUS; SUBCUTANEOUS
Status: DISCONTINUED | OUTPATIENT
Start: 2024-05-28 | End: 2024-05-29 | Stop reason: HOSPADM

## 2024-05-28 RX ORDER — LOSARTAN POTASSIUM 50 MG/1
50 TABLET ORAL DAILY
Status: DISCONTINUED | OUTPATIENT
Start: 2024-05-29 | End: 2024-05-29 | Stop reason: HOSPADM

## 2024-05-28 RX ORDER — NAPROXEN SODIUM 220 MG/1
81 TABLET, FILM COATED ORAL ONCE
Status: COMPLETED | OUTPATIENT
Start: 2024-05-28 | End: 2024-05-28

## 2024-05-28 RX ORDER — LIDOCAINE HYDROCHLORIDE 20 MG/ML
INJECTION, SOLUTION EPIDURAL; INFILTRATION; INTRACAUDAL; PERINEURAL
Status: DISCONTINUED | OUTPATIENT
Start: 2024-05-28 | End: 2024-05-28 | Stop reason: HOSPADM

## 2024-05-28 RX ORDER — ASPIRIN 81 MG/1
TABLET ORAL
Status: DISCONTINUED | OUTPATIENT
Start: 2024-05-28 | End: 2024-05-28 | Stop reason: HOSPADM

## 2024-05-28 RX ORDER — ACETAMINOPHEN 325 MG/1
650 TABLET ORAL EVERY 4 HOURS PRN
Status: DISCONTINUED | OUTPATIENT
Start: 2024-05-28 | End: 2024-05-29 | Stop reason: HOSPADM

## 2024-05-28 RX ORDER — PRASUGREL 10 MG/1
10 TABLET, FILM COATED ORAL DAILY
Status: DISCONTINUED | OUTPATIENT
Start: 2024-05-29 | End: 2024-05-29 | Stop reason: HOSPADM

## 2024-05-28 RX ADMIN — ATORVASTATIN CALCIUM 40 MG: 40 TABLET, FILM COATED ORAL at 08:05

## 2024-05-28 RX ADMIN — DIPHENHYDRAMINE HYDROCHLORIDE 50 MG: 50 CAPSULE ORAL at 10:05

## 2024-05-28 RX ADMIN — PRASUGREL 60 MG: 10 TABLET, FILM COATED ORAL at 12:05

## 2024-05-28 RX ADMIN — HEPARIN SODIUM 12 UNITS/KG/HR: 10000 INJECTION, SOLUTION INTRAVENOUS at 06:05

## 2024-05-28 RX ADMIN — ISOSORBIDE MONONITRATE 30 MG: 30 TABLET, EXTENDED RELEASE ORAL at 06:05

## 2024-05-28 RX ADMIN — ASPIRIN 81 MG CHEWABLE TABLET 81 MG: 81 TABLET CHEWABLE at 10:05

## 2024-05-28 RX ADMIN — INSULIN DETEMIR 10 UNITS: 100 INJECTION, SOLUTION SUBCUTANEOUS at 08:05

## 2024-05-28 RX ADMIN — INSULIN ASPART 1 UNITS: 100 INJECTION, SOLUTION INTRAVENOUS; SUBCUTANEOUS at 08:05

## 2024-05-28 RX ADMIN — SODIUM CHLORIDE: 9 INJECTION, SOLUTION INTRAVENOUS at 10:05

## 2024-05-28 NOTE — Clinical Note
The catheter was inserted over the wire into the ostium   right coronary artery. An angiography was performed of the right coronary arteries.  All catheter exchanges over jwire

## 2024-05-28 NOTE — Clinical Note
300 ml of contrast were injected throughout the case. 0 mL of contrast was the total wasted during the case. 300 mL was the total amount used during the case.

## 2024-05-28 NOTE — Clinical Note
The radial band was applied to the left radial artery. 10 cc's of air were inserted into the closure device. No

## 2024-05-28 NOTE — BRIEF OP NOTE
[facility] - Cath Lab (Hospital)  Brief Operative Note  Cardiology    SUMMARY     Surgery Date: 5/28/2024     Surgeons and Role:     * Rojelio Wadsworth MD - Primary    Assisting Surgeon: None    Pre-op Diagnosis:  Abnormal stress test [R94.39]    Post-op Diagnosis: Post-Op Diagnosis Codes:     * Abnormal stress test [R94.39]    Procedure Performed:     Procedure(s) (LRB):  Angiogram, Coronary, with Left Heart Cath (N/A)  PTCA, Single Vessel  LITHOTRIPSY, CORONARY TRANSLUMINAL, PERCUTANEOUS  Stent, Drug Eluting, Single Vessel, Coronary    Technical Procedures Used:     Operative Findings:   Results for orders placed during the hospital encounter of 05/28/24    Cardiac catheterization    Conclusion    The Ost LAD to Prox LAD lesion was 80% stenosed with 0% stenosis post-intervention.    The Mid LAD lesion was 99% stenosed with 0% stenosis post-intervention.    The pre-procedure left ventricular end diastolic pressure was 15.    Ost LAD to Prox LAD lesion, Mid LAD lesion: A .    Ost LAD to Prox LAD lesion: A STENT [employer] Solos [id number] stent was successfully placed at 11 MAREN for .    Mid LAD lesion: A STENT [employer] Solos [id number] stent was successfully placed at 11 MAREN for . A small D2 was jailed but had jocelyn 3 flow still, very calcified vessel and severe vessel angulation, complex PCI    The estimated blood loss was none.    There was single vessel coronary artery disease.    The PCI was successful.    The filling pressures on the left were normal.    The procedure log was documented by Documenter: Lolita Gomez Nikkia, RN and verified by Rojelio Wadsworth MD.    Date: 5/28/2024  Time: 12:42 PM        Will start heparin drip post pci given jailed d2 though very small   Imdur   Effient, asa  Precautionary will keep for 24 hours observation   Complex PCI of sub  lad x2 stents, shockwave    Estimated Blood Loss: * No values recorded between 5/28/2024 11:16 AM and 5/28/2024 12:41 PM *         Specimens:    Specimen (24h ago, onward)      None

## 2024-05-29 ENCOUNTER — TELEPHONE (OUTPATIENT)
Dept: CARDIOLOGY | Facility: HOSPITAL | Age: 74
End: 2024-05-29
Payer: MEDICARE

## 2024-05-29 LAB
ANION GAP SERPL CALC-SCNC: 8 MMOL/L (ref 8–16)
APTT PPP: 37 SEC (ref 21–32)
APTT PPP: 41 SEC (ref 21–32)
APTT PPP: 71.1 SEC (ref 21–32)
BASOPHILS # BLD AUTO: 0.03 K/UL (ref 0–0.2)
BASOPHILS NFR BLD: 0.4 % (ref 0–1.9)
BUN SERPL-MCNC: 17 MG/DL (ref 8–23)
CALCIUM SERPL-MCNC: 8.6 MG/DL (ref 8.7–10.5)
CHLORIDE SERPL-SCNC: 110 MMOL/L (ref 95–110)
CO2 SERPL-SCNC: 21 MMOL/L (ref 23–29)
CREAT SERPL-MCNC: 0.8 MG/DL (ref 0.5–1.4)
DIFFERENTIAL METHOD BLD: ABNORMAL
EOSINOPHIL # BLD AUTO: 0.1 K/UL (ref 0–0.5)
EOSINOPHIL NFR BLD: 1.7 % (ref 0–8)
ERYTHROCYTE [DISTWIDTH] IN BLOOD BY AUTOMATED COUNT: 13 % (ref 11.5–14.5)
EST. GFR  (NO RACE VARIABLE): >60 ML/MIN/1.73 M^2
GLUCOSE SERPL-MCNC: 125 MG/DL (ref 70–110)
HCT VFR BLD AUTO: 39.7 % (ref 40–54)
HGB BLD-MCNC: 13.7 G/DL (ref 14–18)
IMM GRANULOCYTES # BLD AUTO: 0.03 K/UL (ref 0–0.04)
IMM GRANULOCYTES NFR BLD AUTO: 0.4 % (ref 0–0.5)
LYMPHOCYTES # BLD AUTO: 2 K/UL (ref 1–4.8)
LYMPHOCYTES NFR BLD: 25.1 % (ref 18–48)
MCH RBC QN AUTO: 31.9 PG (ref 27–31)
MCHC RBC AUTO-ENTMCNC: 34.5 G/DL (ref 32–36)
MCV RBC AUTO: 93 FL (ref 82–98)
MONOCYTES # BLD AUTO: 0.6 K/UL (ref 0.3–1)
MONOCYTES NFR BLD: 7.3 % (ref 4–15)
NEUTROPHILS # BLD AUTO: 5.1 K/UL (ref 1.8–7.7)
NEUTROPHILS NFR BLD: 65.1 % (ref 38–73)
NRBC BLD-RTO: 0 /100 WBC
PLATELET # BLD AUTO: 146 K/UL (ref 150–450)
PMV BLD AUTO: 9.7 FL (ref 9.2–12.9)
POCT GLUCOSE: 127 MG/DL (ref 70–110)
POCT GLUCOSE: 205 MG/DL (ref 70–110)
POTASSIUM SERPL-SCNC: 4 MMOL/L (ref 3.5–5.1)
RBC # BLD AUTO: 4.29 M/UL (ref 4.6–6.2)
SODIUM SERPL-SCNC: 139 MMOL/L (ref 136–145)
WBC # BLD AUTO: 7.77 K/UL (ref 3.9–12.7)

## 2024-05-29 PROCEDURE — 85730 THROMBOPLASTIN TIME PARTIAL: CPT | Mod: 91 | Performed by: INTERNAL MEDICINE

## 2024-05-29 PROCEDURE — 85730 THROMBOPLASTIN TIME PARTIAL: CPT | Performed by: INTERNAL MEDICINE

## 2024-05-29 PROCEDURE — 93010 ELECTROCARDIOGRAM REPORT: CPT | Mod: ,,, | Performed by: STUDENT IN AN ORGANIZED HEALTH CARE EDUCATION/TRAINING PROGRAM

## 2024-05-29 PROCEDURE — 85025 COMPLETE CBC W/AUTO DIFF WBC: CPT | Performed by: INTERNAL MEDICINE

## 2024-05-29 PROCEDURE — 36415 COLL VENOUS BLD VENIPUNCTURE: CPT | Mod: XB | Performed by: INTERNAL MEDICINE

## 2024-05-29 PROCEDURE — 80048 BASIC METABOLIC PNL TOTAL CA: CPT | Performed by: INTERNAL MEDICINE

## 2024-05-29 PROCEDURE — 25000003 PHARM REV CODE 250: Performed by: INTERNAL MEDICINE

## 2024-05-29 PROCEDURE — 93005 ELECTROCARDIOGRAM TRACING: CPT

## 2024-05-29 RX ORDER — ISOSORBIDE MONONITRATE 30 MG/1
30 TABLET, EXTENDED RELEASE ORAL DAILY
Qty: 30 TABLET | Refills: 11 | Status: SHIPPED | OUTPATIENT
Start: 2024-05-30 | End: 2025-05-30

## 2024-05-29 RX ADMIN — ACETAMINOPHEN 650 MG: 325 TABLET ORAL at 08:05

## 2024-05-29 RX ADMIN — ISOSORBIDE MONONITRATE 30 MG: 30 TABLET, EXTENDED RELEASE ORAL at 08:05

## 2024-05-29 RX ADMIN — METOPROLOL SUCCINATE 25 MG: 25 TABLET, EXTENDED RELEASE ORAL at 08:05

## 2024-05-29 RX ADMIN — ASPIRIN 81 MG: 81 TABLET, COATED ORAL at 08:05

## 2024-05-29 RX ADMIN — AMLODIPINE BESYLATE 5 MG: 5 TABLET ORAL at 08:05

## 2024-05-29 RX ADMIN — PRASUGREL 10 MG: 10 TABLET, FILM COATED ORAL at 08:05

## 2024-05-29 RX ADMIN — LOSARTAN POTASSIUM 50 MG: 50 TABLET, FILM COATED ORAL at 08:05

## 2024-05-29 NOTE — TELEPHONE ENCOUNTER
Please phone patient. His BP dropped prior to his discharge home.    We had prescribed Isosorbide to maximize his meds. He needs to HOLD this medication until he follows-up in clinic. Please make sure he received Prasurgrel at bedside.    He also needs to monitor his BP at home and call clinic if he gets any low readings.    Thanks

## 2024-05-29 NOTE — PLAN OF CARE
[facility] - Telemetry (Hospital)  Discharge Final Note    Primary Care Provider: Shlomo Ochoa MD    Expected Discharge Date: 5/29/2024    Final Discharge Note (most recent)       Final Note - 05/29/24 1234          Final Note    Assessment Type Final Discharge Note     Anticipated Discharge Disposition Home or Self Care     Hospital Resources/Appts/Education Provided Appointments scheduled and added to AVS        Post-Acute Status    Coverage MEDICARE PART A & B     Discharge Delays None known at this time                     Important Message from Medicare             Contact Info       Rojelio Wadsworth MD   Specialty: Interventional Cardiology, Cardiology    [address]  [place] [address]   Phone: [phone]       Next Steps: Follow up in 1 week(s)          DC Disposition: home with family  Family Notified: Patient at bedside  Transportation: personal transportation    Patient had no equipment or placement needs from .     Patient has PCP hospital follow up with Shlomo Ochoa MD, on 6/3/2024 at 10:40 am.

## 2024-05-29 NOTE — PLAN OF CARE
Problem: Adult Inpatient Plan of Care  Goal: Plan of Care Review  Outcome: Met  Goal: Patient-Specific Goal (Individualized)  Outcome: Met  Goal: Absence of Hospital-Acquired Illness or Injury  Outcome: Met  Goal: Optimal Comfort and Wellbeing  Outcome: Met  Goal: Readiness for Transition of Care  Outcome: Met     Problem: Diabetes Comorbidity  Goal: Blood Glucose Level Within Targeted Range  Outcome: Met     Problem: Pain Acute  Goal: Optimal Pain Control and Function  Outcome: Met     Problem: Fall Injury Risk  Goal: Absence of Fall and Fall-Related Injury  Outcome: Met     Problem: Wound  Goal: Optimal Coping  Outcome: Met  Goal: Optimal Functional Ability  Outcome: Met  Goal: Absence of Infection Signs and Symptoms  Outcome: Met  Goal: Improved Oral Intake  Outcome: Met  Goal: Optimal Pain Control and Function  Outcome: Met  Goal: Skin Health and Integrity  Outcome: Met  Goal: Optimal Wound Healing  Outcome: Met

## 2024-05-29 NOTE — PLAN OF CARE
[facility] - Telemetry (Hospital)  Initial Discharge Assessment       Primary Care Provider: Shlomo Ochoa MD    Admission Diagnosis: Abnormal stress test [R94.39]    Admission Date: 5/28/2024  Expected Discharge Date: Per Attending    Transition of Care Barriers: None    Payor: MEDICARE / Plan: MEDICARE PART A & B / Product Type: Government /     Extended Emergency Contact Information  Primary Emergency Contact: Lindsay Ortega  Address: [address] [address] [facility]  Home Phone: [phone]  Mobile Phone: [phone]  Relation: Spouse  Secondary Emergency Contact: Nelida Fritz  Address: [address] [address] [facility]  Home Phone: [phone]  Relation: Daughter    Discharge Plan A: Home with family         [id number] - [place] - [address] AT [facility]  [address]  [place] [address]  Phone: [phone] Fax: [phone]    [facility]  [address]  [place] [address]  Phone: [phone] Fax: [phone]    [facility]  [place] [address]  Phone: [phone] Fax: [phone]      Initial Assessment (most recent)       Adult Discharge Assessment - 05/29/24 1107          Discharge Assessment    Assessment Type Discharge Planning Assessment     Confirmed/corrected address, phone number and insurance Yes     Confirmed Demographics Correct on Facesheet     Source of Information patient;family     When was your last doctors appointment? 05/24/24   Naveen Gr MD - Cardiology    Communicated JOSE with patient/caregiver Date not available/Unable to determine     Reason For Admission Abnormal nuclear stress test     People in Home spouse     Facility Arrived From: home     Do you expect to return to your current living situation? Yes     Do you have help at home or someone to help you manage  your care at home? Yes     Who are your caregiver(s) and their phone number(s)? Lindsay Jordan - spouse     Prior to hospitilization cognitive status: Alert/Oriented     Current cognitive status: Alert/Oriented     Walking or Climbing Stairs Difficulty no     Dressing/Bathing Difficulty no     Home Accessibility wheelchair accessible     Equipment Currently Used at Home CPAP     Readmission within 30 days? No     Patient currently being followed by outpatient case management? No     Do you currently have service(s) that help you manage your care at home? No     Do you take prescription medications? Yes     Do you have prescription coverage? Yes     Coverage Medicare A & B     Do you have any problems affording any of your prescribed medications? No     Is the patient taking medications as prescribed? yes     Who is going to help you get home at discharge? Lindsay Jordan - spouse     How do you get to doctors appointments? car, drives self     Are you on dialysis? No     Do you take coumadin? No     Discharge Plan A Home with family     DME Needed Upon Discharge  none     Discharge Plan discussed with: Patient     Transition of Care Barriers None        Transportation Needs    Has the lack of transportation kept you from medical appointments, meetings, work or from getting things needed for daily living? No                   Anticipated DC dispo: home with family  Prior Level of Function: independent with ADLs  People in home: Lindsay Jordan - spouse     Comments:  SW met with patient and spouse at bedside to introduce role and discuss discharge planning. Patient's spouse will be help at home and can provide transport at time of discharge. Confirmed demographics, insurance, and emergency contacts. SW provided and explained Advance Care Planning document to Patient at bedside, will remain open if Patient wishes to complete during this hospital stay. SW updated Patient's whiteboard with contact information and anticipated DC  plan. CM discharge needs depends on hospital progress. SW will continue following to assist with other needs.

## 2024-05-29 NOTE — DISCHARGE SUMMARY
[facility] ([facility]  Cardiology  Discharge Summary      Patient Name: Timothy Ortega  MRN: [id number]  Admission Date: 5/28/2024  Hospital Length of Stay: 0 days  Discharge Date and Time:  05/29/2024 12:26 PM  Attending Physician: Rojelio Wadsworth MD    Discharging Provider: Marissa Nielsen PA-C  Primary Care Physician: Shlomo Ochoa MD    HPI:   Mr. Ortega is a 73 year old male patient with recent abnormal MPI stress test who presented to [facility] yesterday for elective LHC with subsequent successful PCI of ostial to proximal LAD as well as mid LAD. A small D2 was jailed during procedure but still had CHRISTA flow. Patient tolerated procedure well and was subsequently admitted overnight for observation.      Procedure(s) (LRB):  Angiogram, Coronary, with Left Heart Cath (N/A)  PTCA, Single Vessel  LITHOTRIPSY, CORONARY TRANSLUMINAL, PERCUTANEOUS  Stent, Drug Eluting, Single Vessel, Coronary     Indwelling Lines/Drains at time of discharge:  Lines/Drains/Airways       None                   Hospital Course:  Patient seen and examined today by myself and MD and deemed suitable for discharge. Denies any CP/angina. Reported some oozing/bleeding from radial access site overnight. Dressing removed and site assessed, no hematoma, drainage, or active bleeding. Intact radial pulse. Patient denied any severe pain at site or any numbness/tingling/weakness. Labs reviewed and are stable. EKG without any acute ischemic changes. Patient will be discharged home on ASA, Effient, BB, amlodipine, Losartan, statin, and Imdur. He was educated about post-cath restrictions and will need to f/u with Dr. Wadsworth in one week.    Goals of Care Treatment Preferences:  Code Status: Full Code    Significant Diagnostic Studies: LHC, labs  Pending Diagnostic Studies:       Procedure Component Value Units Date/Time    APTT [[id number]]     Order Status: Sent Lab Status: No result     Specimen: Blood             Discharged  Condition: good    Disposition: Home or Self Care    Follow Up:   Follow-up Information       Rojelio Wadsworth MD Follow up in 1 week(s).    Specialties: Interventional Cardiology, Cardiology  Contact information:  [address]  [place] [address] [phone]                           Patient Instructions:      Diet Cardiac     Diet diabetic     Other restrictions (specify):   Order Comments: No heavy bending or lifting with left wrist  No strenuous activity   Ok to shower, do not submerge access site in water     Notify your health care provider if you experience any of the following:  temperature >100.4     Notify your health care provider if you experience any of the following:  persistent nausea and vomiting or diarrhea     Notify your health care provider if you experience any of the following:  severe uncontrolled pain     Notify your health care provider if you experience any of the following:  redness, tenderness, or signs of infection (pain, swelling, redness, odor or green/yellow discharge around incision site)     Notify your health care provider if you experience any of the following:  persistent dizziness, light-headedness, or visual disturbances     Notify your health care provider if you experience any of the following:  worsening rash     Notify your health care provider if you experience any of the following:  severe persistent headache     Notify your health care provider if you experience any of the following:  difficulty breathing or increased cough     Notify your health care provider if you experience any of the following:  increased confusion or weakness     Notify your health care provider if you experience any of the following:     No dressing needed     Activity as tolerated     Medications:  Reconciled Home Medications:      Medication List        START taking these medications      isosorbide mononitrate 30 MG 24 hr tablet  Commonly known as: IMDUR  Take 1 tablet (30 mg  "total) by mouth once daily.  Start taking on: May 30, 2024     prasugreL 10 mg Tab  Commonly known as: EFFIENT  Take 1 tablet (10 mg total) by mouth once daily.            CONTINUE taking these medications      amLODIPine 5 MG tablet  Commonly known as: NORVASC  Take 1 tablet (5 mg total) by mouth once daily.     aspirin 81 mg Tab  Take 1 tablet by mouth Daily.     empagliflozin 25 mg tablet  Commonly known as: JARDIANCE  Take 1 tablet (25 mg total) by mouth once daily.     insulin aspart U-100 100 unit/mL (3 mL) Inpn pen  Commonly known as: NovoLOG Flexpen U-100 Insulin  INJECT 28 UNITS UNDER THE SKIN THREE TIMES DAILY BEFORE MEALS     insulin glargine U-100 (Lantus) 100 unit/mL (3 mL) Inpn pen  Commonly known as: LANTUS SOLOSTAR U-100 INSULIN  ADMINISTER 30 UNITS UNDER THE SKIN EVERY EVENING     * lancets 33 gauge Misc  1 lancet by Misc.(Non-Drug; Combo Route) route 3 (three) times daily.     * FREESTYLE LANCETS 28 gauge lancets  Generic drug: lancets  USE THREE TIMES DAILY     levocetirizine 5 MG tablet  Commonly known as: XYZAL  Take 5 mg by mouth every evening.     losartan 50 MG tablet  Commonly known as: COZAAR  Take 1 tablet (50 mg total) by mouth once daily.     metoprolol succinate 25 MG 24 hr tablet  Commonly known as: TOPROL-XL  Take 1 tablet (25 mg total) by mouth once daily.     * MOUNJARO 15 mg/0.5 mL Pnij  Generic drug: tirzepatide  Inject 15 mg into the skin every 7 days.     * MOUNJARO 10 mg/0.5 mL Pnij  Generic drug: tirzepatide  Inject 10 mg into the skin every 7 days.     * mupirocin 2 % ointment  Commonly known as: BACTROBAN  Apply to affected area 3 times daily     * mupirocin 2 % ointment  Commonly known as: BACTROBAN  Apply topically 2 (two) times daily.     pen needle, diabetic 31 gauge x 5/16" Ndle  Commonly known as: BD ULTRA-FINE SHORT PEN NEEDLE  USE 5 TIMES A DAY     rosuvastatin 20 MG tablet  Commonly known as: CRESTOR  Take 20 mg by mouth once daily.     VITAMIN C ORAL  Take by mouth " once daily.     VITAMIN D ORAL  Take by mouth once daily.           * This list has 6 medication(s) that are the same as other medications prescribed for you. Read the directions carefully, and ask your doctor or other care provider to review them with you.                STOP taking these medications      KRILL OIL ORAL     meloxicam 15 MG tablet  Commonly known as: MOBIC              Time spent on the discharge of patient: 25 minutes    Marissa Nielsen PA-C  Cardiology  [facility] - Telemetry ([facility])

## 2024-05-29 NOTE — TELEPHONE ENCOUNTER
Contacted pt and informed him that since his  BP dropped prior to his discharge home, we would like for him to hold the Isosorbide until he comes in for his HFU on 06/04/24 w/ Dr. Gr. Pt confirmed he received Prasugrel. Informed pt to monitor his BP at home and call clinic if he gets any low readings. Pt vu w/o q/c      ---- JUANA Ricci 05/29/24 03:30 PM ----  Please phone patient. His BP dropped prior to his discharge home.    We had prescribed Isosorbide to maximize his meds. He needs to HOLD this medication until he follows-up in clinic. Please make sure he received Prasugrel at bedside.    He also needs to monitor his BP at home and call clinic if he gets any low readings.    Thanks

## 2024-05-30 LAB
OHS QRS DURATION: 78 MS
OHS QTC CALCULATION: 420 MS

## 2024-06-03 ENCOUNTER — OFFICE VISIT (OUTPATIENT)
Dept: INTERNAL MEDICINE | Facility: CLINIC | Age: 74
End: 2024-06-03
Payer: MEDICARE

## 2024-06-03 VITALS
HEIGHT: 71 IN | WEIGHT: 171.75 LBS | DIASTOLIC BLOOD PRESSURE: 68 MMHG | SYSTOLIC BLOOD PRESSURE: 114 MMHG | BODY MASS INDEX: 24.04 KG/M2 | TEMPERATURE: 99 F

## 2024-06-03 DIAGNOSIS — E11.59 HYPERTENSION ASSOCIATED WITH DIABETES: ICD-10-CM

## 2024-06-03 DIAGNOSIS — I15.2 HYPERTENSION ASSOCIATED WITH DIABETES: ICD-10-CM

## 2024-06-03 DIAGNOSIS — E11.69 HYPERLIPIDEMIA ASSOCIATED WITH TYPE 2 DIABETES MELLITUS: ICD-10-CM

## 2024-06-03 DIAGNOSIS — E11.8 TYPE 2 DIABETES MELLITUS WITH COMPLICATION, WITH LONG-TERM CURRENT USE OF INSULIN: ICD-10-CM

## 2024-06-03 DIAGNOSIS — E78.5 HYPERLIPIDEMIA ASSOCIATED WITH TYPE 2 DIABETES MELLITUS: ICD-10-CM

## 2024-06-03 DIAGNOSIS — Z79.4 TYPE 2 DIABETES MELLITUS WITH COMPLICATION, WITH LONG-TERM CURRENT USE OF INSULIN: ICD-10-CM

## 2024-06-03 DIAGNOSIS — I25.118 CORONARY ARTERY DISEASE OF NATIVE ARTERY OF NATIVE HEART WITH STABLE ANGINA PECTORIS: Primary | ICD-10-CM

## 2024-06-03 PROCEDURE — 99999 PR PBB SHADOW E&M-EST. PATIENT-LVL IV: CPT | Mod: PBBFAC,,, | Performed by: PEDIATRICS

## 2024-06-03 PROCEDURE — 99495 TRANSJ CARE MGMT MOD F2F 14D: CPT | Mod: S$PBB,,, | Performed by: PEDIATRICS

## 2024-06-03 PROCEDURE — 99214 OFFICE O/P EST MOD 30 MIN: CPT | Mod: PBBFAC | Performed by: PEDIATRICS

## 2024-06-03 RX ORDER — INSULIN GLARGINE 100 [IU]/ML
INJECTION, SOLUTION SUBCUTANEOUS
Qty: 27 ML | Refills: 3 | Status: SHIPPED | OUTPATIENT
Start: 2024-06-03

## 2024-06-03 NOTE — PROGRESS NOTES
Patient ID: Timothy Ortega is a 73 y.o. male.    Chief Complaint: Hospital Follow Up    History of Present Illness    CHIEF COMPLAINT:  Patient presents today for follow-up of angiogram with stent placements.    ANGIOGRAM AND STENT PLACEMENTS:  Patient reports mild discomfort in the left wrist following the angiogram and stent placements. The procedure involved addressing an 80% stenosed lesion in the left anterior descending (LAD) artery to proximal LAD, and a 99% stenosed mid LAD. Both areas showed 0% stenosis post intervention. Two stents were placed in the LAD and inserted successfully. Patient denies any complications or adverse symptoms related to the stent placements. To manage the discomfort, he has been wearing a wrist splint for protection.    BLOOD SUGAR MANAGEMENT:  Patient reports that his blood sugars have been running high, specifically in the range of 140 to 190. He is currently on a sliding scale of insulin, taking 30 units of Lantus daily and NovoLog on a sliding scale starting at eight units. The physician has advised the patient to increase his Lantus dosage to 33 units.    STEROID TREATMENT:  Patient reports receiving a round of steroid injections for his hands in the past couple of weeks.    FAMILY HISTORY:  Patient reports that his mother was recently placed in a memory care unit.    CURRENT MEDICATIONS:  Patient is currently on multiple medications including Jardiance, Crestor, metoprolol, Mounjaro, losartan, and amlodipine. The dosage of losartan has been reduced to 25 mg. Patient is also taking Effient, which is likely to continue for one year to prevent stent blockage.    BLOOD PRESSURE:  Patient's blood pressure is currently perfect.    EXERCISE:  The patient has been advised to gradually increase his exercise capacity. He expressed an intention to resume bike riding.    PMH, PSH, SH, FH reviewed with patient.    ROS:  General: -fever, -chills, -fatigue, -weight gain, -weight loss  Eyes:  -vision changes, -redness, -discharge  ENT: -ear pain, -nasal congestion, -sore throat  Cardiovascular: -chest pain, -palpitations, -lower extremity edema  Respiratory: -cough, -shortness of breath  Gastrointestinal: -abdominal pain, -nausea, -vomiting, -diarrhea, -constipation, -blood in stool  Genitourinary: -dysuria, -hematuria, -frequency  Musculoskeletal: +joint pain, -muscle pain  Skin: -rash, -lesion  Neurological: -headache, -dizziness, -numbness, -tingling  Psychiatric: -anxiety, -depression, -sleep difficulty         Exam:  Physical Exam    General: No acute distress. Well-developed. Well-nourished.  Eyes: EOMI. Sclerae anicteric.  HENT: Normocephalic. Atraumatic. Nares patent. Moist oral mucosa.  Cardiovascular: Regular rate. Regular rhythm. No murmurs. No rubs. No gallops. Normal S1, S2.  Respiratory: Normal respiratory effort. Clear to auscultation bilaterally. No rales. No rhonchi. No wheezing.  Abdomen: Soft. Non-tender. Non-distended. Normoactive bowel sounds.  Musculoskeletal: No  obvious deformity.  Extremities: No lower extremity edema.  Neurological: Alert & oriented x3. No slurred speech. Normal gait.  Psychiatric: Normal mood. Normal affect. Good insight. Good judgment.  Skin: Warm. Dry. No rash.         Assessment/Plan:  Coronary artery disease of native artery of native heart with stable angina pectoris    Hyperlipidemia associated with type 2 diabetes mellitus    Hypertension associated with diabetes    Type 2 diabetes mellitus with complication, with long-term current use of insulin  -     insulin glargine U-100, Lantus, (LANTUS SOLOSTAR U-100 INSULIN) 100 unit/mL (3 mL) InPn pen; ADMINISTER 33 UNITS UNDER THE SKIN EVERY EVENING  Dispense: 27 mL; Refill: 3         Assessment & Plan    Z79.899 Other long term (current) drug therapy  Z81.8 Family history of other mental and behavioral disorders  T82.855S Stenosis of coronary artery stent, sequela  S61.502D Unspecified open wound of left  wrist, subsequent encounter  M25.532 Pain in left wrist  I25.110 Atherosclerotic heart disease of native coronary artery with unstable angina pectoris  T82.855A Stenosis of coronary artery stent, initial encounter  Z82.49 Family history of ischemic heart disease and other diseases of the circulatory system  Z79.4 Long term (current) use of insulin  DIABETES MANAGEMENT:  - Increased the patient's Lantus dosage from 30 to 33 units due to elevated blood sugar.  - Advised the patient to potentially revert to 30 units once the effects of the steroids diminish and stress levels decrease.  - Requested the patient to report their blood sugar in the following week for further dosage adjustments.  CARDIOVASCULAR HEALTH:  - Prescribed the continuation of Effient for a year to prevent stent occlusion during maturation.  - Educated the patient on the cardiac benefits of Jardiance and the protective effects of Crestor, Metoprolol, Mounjaro, Losartan, and Amlodipine.  - Scheduled a follow-up visit with Dr. Gr for the next day.  PHYSICAL FITNESS:  - Encouraged the patient to gradually increase their exercise capacity to build endurance.          Visit today included increased complexity associated with the care of the episodic problem  addressed and managing the longitudinal care of the patient due to the serious and/or complex managed problem(s) .      No follow-ups on file.    This note was generated with the assistance of ambient listening technology. Verbal consent was obtained by the patient and accompanying visitor(s) for the recording of patient appointment to facilitate this note. I attest to having reviewed and edited the generated note for accuracy, though some syntax or spelling errors may persist. Please contact the author of this note for any clarification.    Transitional Care Note    Family and/or Caretaker present at visit?  No.  Diagnostic tests reviewed/disposition: No diagnosic tests pending after this  hospitalization.  Disease/illness education: given  Home health/community services discussion/referrals: Patient does not have home health established from hospital visit.  They do not need home health.  If needed, we will set up home health for the patient.   Establishment or re-establishment of referral orders for community resources: No other necessary community resources.   Discussion with other health care providers:  card f/u in place .

## 2024-06-04 ENCOUNTER — PATIENT MESSAGE (OUTPATIENT)
Dept: ADMINISTRATIVE | Facility: OTHER | Age: 74
End: 2024-06-04
Payer: MEDICARE

## 2024-06-04 ENCOUNTER — OFFICE VISIT (OUTPATIENT)
Dept: CARDIOLOGY | Facility: CLINIC | Age: 74
End: 2024-06-04
Payer: MEDICARE

## 2024-06-04 VITALS
WEIGHT: 171.31 LBS | SYSTOLIC BLOOD PRESSURE: 120 MMHG | HEIGHT: 71 IN | BODY MASS INDEX: 23.98 KG/M2 | OXYGEN SATURATION: 98 % | DIASTOLIC BLOOD PRESSURE: 70 MMHG | HEART RATE: 70 BPM

## 2024-06-04 DIAGNOSIS — Z95.5 H/O HEART ARTERY STENT: ICD-10-CM

## 2024-06-04 DIAGNOSIS — I25.118 CORONARY ARTERY DISEASE OF NATIVE ARTERY OF NATIVE HEART WITH STABLE ANGINA PECTORIS: Primary | ICD-10-CM

## 2024-06-04 DIAGNOSIS — E11.8 TYPE 2 DIABETES MELLITUS WITH COMPLICATION, WITH LONG-TERM CURRENT USE OF INSULIN: ICD-10-CM

## 2024-06-04 DIAGNOSIS — I15.2 HYPERTENSION ASSOCIATED WITH DIABETES: ICD-10-CM

## 2024-06-04 DIAGNOSIS — E78.5 HYPERLIPIDEMIA ASSOCIATED WITH TYPE 2 DIABETES MELLITUS: ICD-10-CM

## 2024-06-04 DIAGNOSIS — Z79.4 TYPE 2 DIABETES MELLITUS WITH COMPLICATION, WITH LONG-TERM CURRENT USE OF INSULIN: ICD-10-CM

## 2024-06-04 DIAGNOSIS — E11.69 HYPERLIPIDEMIA ASSOCIATED WITH TYPE 2 DIABETES MELLITUS: ICD-10-CM

## 2024-06-04 DIAGNOSIS — I70.0 AORTIC ATHEROSCLEROSIS: ICD-10-CM

## 2024-06-04 DIAGNOSIS — I34.0 NONRHEUMATIC MITRAL VALVE REGURGITATION: ICD-10-CM

## 2024-06-04 DIAGNOSIS — E11.59 HYPERTENSION ASSOCIATED WITH DIABETES: ICD-10-CM

## 2024-06-04 PROCEDURE — 99215 OFFICE O/P EST HI 40 MIN: CPT | Mod: PBBFAC | Performed by: INTERNAL MEDICINE

## 2024-06-04 PROCEDURE — 99999 PR PBB SHADOW E&M-EST. PATIENT-LVL V: CPT | Mod: PBBFAC,,, | Performed by: INTERNAL MEDICINE

## 2024-06-04 PROCEDURE — 99214 OFFICE O/P EST MOD 30 MIN: CPT | Mod: S$PBB,,, | Performed by: INTERNAL MEDICINE

## 2024-06-04 RX ORDER — NITROGLYCERIN 0.4 MG/1
0.4 TABLET SUBLINGUAL EVERY 5 MIN PRN
Qty: 25 TABLET | Refills: 5 | Status: SHIPPED | OUTPATIENT
Start: 2024-06-04 | End: 2025-06-04

## 2024-06-04 RX ORDER — LOSARTAN POTASSIUM 25 MG/1
25 TABLET ORAL DAILY
Qty: 90 TABLET | Refills: 3 | Status: SHIPPED | OUTPATIENT
Start: 2024-06-04

## 2024-06-04 NOTE — PROGRESS NOTES
"Subjective:   Patient ID:  Timothy Ortega is a 73 y.o. male who presents for follow up of No chief complaint on file.      72 yo male, came in for post cath f/u  Hx of CAD, s/p PCI DESx2 in p and mid LAD,  had LHC in 2010 and was told "nonobstructive", HTN, HLP, IDDM > 20 yrs, BRIANA on cpap, ex smoker.  h/o right carpel tunnel syndrome procedure.    No sob, no palpitation, no dizziness, no syncope, no CNS symptoms. Rare;y sharp chest pain atypical pain  Patient has fairly good exercise tolerance. Walks 2 miles a day, slowly down after COVID 19 pandemic  Patient is compliant with medications.  No smoking/drinking  EKG NSR and possible inferior infarct   nuke stress normal EF and no ischemia.  EKG NSR     visit  ekg NSR. Taking Lipitor for 12 yrs. C/o fatigue. On CPAP and still has daytime sleep  No chest pain palpitation dizziness and faint  Occasional SOB   echo  · The left ventricle is normal in size with concentric remodeling and normal systolic function. The estimated ejection fraction is 55%  · Normal left ventricular diastolic function.  · Normal right ventricular size with normal right ventricular systolic function.  · Mild mitral regurgitation.  · Mild tricuspid regurgitation.  · Normal central venous pressure (3 mmHg).  · The estimated PA systolic pressure is 33 mmHg.    05/23 visit  Yard work, lives with wife.  He denied chest pain, dyspnea on exertion, palpitation, fainting, PND, orthopnea, syncope and claudication.   No active bleeding  Med compliance  Improved fatigue after d/c metformin and switched crestor to pravastatin    04/24 visit  EKG reviewed by myself today reveals NSR   Chest burning and chest tightness when deep breathing. Some sob.   He denied palpitation, fainting, PND, orthopnea, syncope and claudication.   LDL 58 BP C A1c 7.4    05/24 visit  Exercise stress nuke test showed multivessel ischemia    Interval history  S/p PCI DESx2 in 05/24 by Dr. Ananth AGUIAR nml  No " recurrent chest pain non active bleeding  Left radial pulse nml. Mild bruise  LDL 59 A1c 7.6  BP C                              Past Medical History:   Diagnosis Date    Coronary artery disease 2010    [facility] - no stents    DM (diabetes mellitus) 2002     lunch 10/28/2016    DM (diabetes mellitus)      am 04/12/2021    DM (diabetes mellitus)     BS 96 am 04/13/2022 Insulin for years    Groin pain, left 2/11/2021    Hyperlipemia     Hypertension     Kidney stones     Nephrolithiasis 2/11/2021    Neuropathy     Type 2 diabetes mellitus 10-12 years     am 10/31/2014       Past Surgical History:   Procedure Laterality Date    ANGIOGRAM, CORONARY, WITH LEFT HEART CATHETERIZATION N/A 5/28/2024    Procedure: Angiogram, Coronary, with Left Heart Cath;  Surgeon: Rojelio Wadsworth MD;  Location: [facility] CATH LAB;  Service: Cardiology;  Laterality: N/A;    CARPAL TUNNEL RELEASE Right 07/2020    COLONOSCOPY N/A 03/31/2017    Procedure: COLONOSCOPY;  Surgeon: Cornelius Ibarra MD;  Location: [facility];  Service: Endoscopy;  Laterality: N/A;    COLONOSCOPY N/A 04/18/2022    Procedure: COLONOSCOPY;  Surgeon: Geneva Serna MD;  Location: [facility];  Service: Endoscopy;  Laterality: N/A;    DENTAL SURGERY      Implant    HAND SURGERY      KNEE ARTHROSCOPY Left     LITHOTRIPSY, CORONARY TRANSLUMINAL, PERCUTANEOUS  5/28/2024    Procedure: LITHOTRIPSY, CORONARY TRANSLUMINAL, PERCUTANEOUS;  Surgeon: Rojelio Wadsworth MD;  Location: [facility] CATH LAB;  Service: Cardiology;;    PTCA, SINGLE VESSEL  5/28/2024    Procedure: PTCA, Single Vessel;  Surgeon: Rojelio Wadsworth MD;  Location: [facility] CATH LAB;  Service: Cardiology;;    ROBOT-ASSISTED LAPAROSCOPIC REPAIR OF INGUINAL HERNIA USING DA JOSE ANGEL XI Left 02/26/2021    Procedure: XI ROBOTIC REPAIR, HERNIA, INGUINAL;  Surgeon: Tavon Cruz MD;  Location: [facility] OR;  Service: General;  Laterality: Left;    STENT, DRUG ELUTING, SINGLE VESSEL, CORONARY  5/28/2024    Procedure:  Stent, Drug Eluting, Single Vessel, Coronary;  Surgeon: Rojelio Wadsworth MD;  Location: [facility] CATH LAB;  Service: Cardiology;;       Social History     Tobacco Use    Smoking status: Former     Current packs/day: 0.00     Average packs/day: 1 pack/day for 20.0 years (20.0 ttl pk-yrs)     Types: Cigarettes     Start date: 1980     Quit date: 2000     Years since quittin.4     Passive exposure: Past    Smokeless tobacco: Never   Substance Use Topics    Alcohol use: No     Alcohol/week: 0.0 standard drinks of alcohol    Drug use: No       Family History   Problem Relation Name Age of Onset    Diabetes Mother      Glaucoma Mother      Heart disease Mother  75        CAB    Diabetes Father      Heart attack Father  58        Fatal MI    Diabetes Brother      Diabetes Sister      Diabetes Sister      Cataracts Paternal Uncle      Cataracts Maternal Grandmother           ROS    Objective:   Physical Exam  HENT:      Head: Normocephalic.   Eyes:      Pupils: Pupils are equal, round, and reactive to light.   Neck:      Thyroid: No thyromegaly.      Vascular: Normal carotid pulses. No carotid bruit or JVD.   Cardiovascular:      Rate and Rhythm: Normal rate and regular rhythm. No extrasystoles are present.     Chest Wall: PMI is not displaced.      Pulses: Normal pulses.      Heart sounds: Normal heart sounds. No murmur heard.     No gallop. No S3 sounds.   Pulmonary:      Effort: No respiratory distress.      Breath sounds: Normal breath sounds. No stridor.   Abdominal:      General: Bowel sounds are normal.      Palpations: Abdomen is soft.      Tenderness: There is no abdominal tenderness. There is no rebound.   Musculoskeletal:         General: Normal range of motion.   Skin:     Findings: No rash.   Neurological:      Mental Status: He is alert and oriented to person, place, and time.   Psychiatric:         Behavior: Behavior normal.         Lab Results   Component Value Date    CHOL 122 2024    CHOL  184 09/21/2023    CHOL 181 03/21/2023     Lab Results   Component Value Date    HDL 35 (L) 03/21/2024    HDL 29 (L) 09/21/2023    HDL 31 (L) 03/21/2023     Lab Results   Component Value Date    LDLCALC 58.6 (L) 03/21/2024    LDLCALC 121.4 09/21/2023    LDLCALC 107.8 03/21/2023     Lab Results   Component Value Date    TRIG 142 03/21/2024    TRIG 168 (H) 09/21/2023    TRIG 211 (H) 03/21/2023     Lab Results   Component Value Date    CHOLHDL 28.7 03/21/2024    CHOLHDL 15.8 (L) 09/21/2023    CHOLHDL 17.1 (L) 03/21/2023       Chemistry        Component Value Date/Time     05/29/2024 0526    K 4.0 05/29/2024 0526     05/29/2024 0526    CO2 21 (L) 05/29/2024 0526    BUN 17 05/29/2024 0526    CREATININE 0.8 05/29/2024 0526     (H) 05/29/2024 0526        Component Value Date/Time    CALCIUM 8.6 (L) 05/29/2024 0526    ALKPHOS 52 (L) 03/21/2024 0753    AST 21 03/21/2024 0753    ALT 29 03/21/2024 0753    BILITOT 0.6 03/21/2024 0753    ESTGFRAFRICA >60.0 09/23/2021 0717    EGFRNONAA >60.0 09/23/2021 0717          Lab Results   Component Value Date    HGBA1C 7.6 (H) 03/21/2024     Lab Results   Component Value Date    TSH 1.394 03/21/2024     Lab Results   Component Value Date    INR 1.0 05/28/2024    INR 1.0 05/24/2024    INR 1.0 12/01/2014     Lab Results   Component Value Date    WBC 7.77 05/29/2024    HGB 13.7 (L) 05/29/2024    HCT 39.7 (L) 05/29/2024    MCV 93 05/29/2024     (L) 05/29/2024     BMP  Sodium   Date Value Ref Range Status   05/29/2024 139 136 - 145 mmol/L Final     Potassium   Date Value Ref Range Status   05/29/2024 4.0 3.5 - 5.1 mmol/L Final     Chloride   Date Value Ref Range Status   05/29/2024 110 95 - 110 mmol/L Final     CO2   Date Value Ref Range Status   05/29/2024 21 (L) 23 - 29 mmol/L Final     BUN   Date Value Ref Range Status   05/29/2024 17 8 - 23 mg/dL Final     Creatinine   Date Value Ref Range Status   05/29/2024 0.8 0.5 - 1.4 mg/dL Final     Calcium   Date Value Ref  Range Status   05/29/2024 8.6 (L) 8.7 - 10.5 mg/dL Final     Anion Gap   Date Value Ref Range Status   05/29/2024 8 8 - 16 mmol/L Final     eGFR if    Date Value Ref Range Status   09/23/2021 >60.0 >60 mL/min/1.73 m^2 Final     eGFR if non    Date Value Ref Range Status   09/23/2021 >60.0 >60 mL/min/1.73 m^2 Final     Comment:     Calculation used to obtain the estimated glomerular filtration  rate (eGFR) is the CKD-EPI equation.        BNP  @LABRCNTIP(BNP,BNPTRIAGEBLO)@  @LABRCNTIP(troponini)@  Estimated Creatinine Clearance: 90.2 mL/min (based on SCr of 0.8 mg/dL).  No results found in the last 24 hours.  No results found in the last 24 hours.  No results found in the last 24 hours.    Assessment:      1. Coronary artery disease of native artery of native heart with stable angina pectoris    2. Hypertension associated with diabetes    3. Aortic atherosclerosis    4. Hyperlipidemia associated with type 2 diabetes mellitus    5. Nonrheumatic mitral valve regurgitation    6. H/O heart artery stent    7. Type 2 diabetes mellitus with complication, with long-term current use of insulin        Plan:   Continue asa 81mg life long  effient 10 mg for ayr statin metoprolol amlodipine and losartan   Add ntg slprn  DM Rx per PCP  Counseled DASH  Check Lipid profile with PCP in 6 months  Recommend heart-healthy diet, weight control and regular exercise.  Federica. Risk modification.   I have reviewed all pertinent labs and cardiac studies independently. Plans and recommendations have been formulated under my direct supervision. All questions answered and patient voiced understanding.   If symptoms persist go to the ED  RTC in 3 months      Order ETT  Cardiac rehab stage II

## 2024-06-05 VITALS
SYSTOLIC BLOOD PRESSURE: 86 MMHG | BODY MASS INDEX: 24.81 KG/M2 | HEIGHT: 71 IN | HEART RATE: 65 BPM | RESPIRATION RATE: 18 BRPM | WEIGHT: 177.25 LBS | OXYGEN SATURATION: 98 % | TEMPERATURE: 98 F | DIASTOLIC BLOOD PRESSURE: 56 MMHG

## 2024-06-17 RX ORDER — INSULIN LISPRO 100 [IU]/ML
INJECTION, SOLUTION INTRAVENOUS; SUBCUTANEOUS
Qty: 75 ML | OUTPATIENT
Start: 2024-06-17

## 2024-06-20 ENCOUNTER — LAB VISIT (OUTPATIENT)
Dept: LAB | Facility: HOSPITAL | Age: 74
End: 2024-06-20
Attending: PEDIATRICS
Payer: MEDICARE

## 2024-06-20 DIAGNOSIS — E11.8 TYPE 2 DIABETES MELLITUS WITH COMPLICATION, WITH LONG-TERM CURRENT USE OF INSULIN: ICD-10-CM

## 2024-06-20 DIAGNOSIS — Z79.4 TYPE 2 DIABETES MELLITUS WITH COMPLICATION, WITH LONG-TERM CURRENT USE OF INSULIN: ICD-10-CM

## 2024-06-20 LAB
ESTIMATED AVG GLUCOSE: 166 MG/DL (ref 68–131)
HBA1C MFR BLD: 7.4 % (ref 4–5.6)

## 2024-06-20 PROCEDURE — 83036 HEMOGLOBIN GLYCOSYLATED A1C: CPT | Performed by: PEDIATRICS

## 2024-06-20 PROCEDURE — 36415 COLL VENOUS BLD VENIPUNCTURE: CPT | Performed by: PEDIATRICS

## 2024-06-26 ENCOUNTER — OFFICE VISIT (OUTPATIENT)
Dept: PODIATRY | Facility: CLINIC | Age: 74
End: 2024-06-26
Payer: MEDICARE

## 2024-06-26 DIAGNOSIS — L90.9 PLANTAR FAT PAD ATROPHY: ICD-10-CM

## 2024-06-26 DIAGNOSIS — Q82.8 POROKERATOSIS: ICD-10-CM

## 2024-06-26 DIAGNOSIS — E11.49 TYPE 2 DIABETES MELLITUS WITH OTHER NEUROLOGIC COMPLICATION, WITH LONG-TERM CURRENT USE OF INSULIN: Primary | ICD-10-CM

## 2024-06-26 DIAGNOSIS — Z79.4 TYPE 2 DIABETES MELLITUS WITH OTHER NEUROLOGIC COMPLICATION, WITH LONG-TERM CURRENT USE OF INSULIN: Primary | ICD-10-CM

## 2024-06-26 PROCEDURE — 99213 OFFICE O/P EST LOW 20 MIN: CPT | Mod: PBBFAC,25 | Performed by: PODIATRIST

## 2024-06-26 PROCEDURE — 99499 UNLISTED E&M SERVICE: CPT | Mod: S$PBB,,, | Performed by: PODIATRIST

## 2024-06-26 PROCEDURE — 11056 PARNG/CUTG B9 HYPRKR LES 2-4: CPT | Mod: Q9,PBBFAC | Performed by: PODIATRIST

## 2024-06-26 PROCEDURE — 11056 PARNG/CUTG B9 HYPRKR LES 2-4: CPT | Mod: Q9,S$PBB,, | Performed by: PODIATRIST

## 2024-06-26 PROCEDURE — 99999 PR PBB SHADOW E&M-EST. PATIENT-LVL III: CPT | Mod: PBBFAC,,, | Performed by: PODIATRIST

## 2024-06-26 NOTE — PROGRESS NOTES
Subjective:       Patient ID: Timothy Ortega is a 73 y.o. male.    Chief Complaint: Foot Problem (Patient denies pain at present. Patient is diabetic. Patient states, when standing theres a burning pain. )    HPI: Timothy Ortega presents to the office today, with areas of concern to the plantar aspect of the right and left foot.  She also relates burning sensation to the medial aspect of the left great toe.  He does have sensations of neuropathy which he does utilize topical compound pharmacy in pain cream for.  States this has been significantly helpful.   Continues to have the sensation of walking on rocks albeit denies puncture wounds or injuries.  Does have history of diabetes mellitus. Follows with Dr. Ochoa with last appointment on 2020    Review of patient's allergies indicates:  No Known Allergies    Past Medical History:   Diagnosis Date    Coronary artery disease     [facility] - no stents    DM (diabetes mellitus) 2002     lunch 10/28/2016    DM (diabetes mellitus)      am 2021    DM (diabetes mellitus)     BS 96 am 2022 Insulin for years    Groin pain, left 2021    Hyperlipemia     Hypertension     Kidney stones     Nephrolithiasis 2021    Neuropathy     Type 2 diabetes mellitus 10-12 years     am 10/31/2014       Family History   Problem Relation Name Age of Onset    Diabetes Mother      Glaucoma Mother      Heart disease Mother  75        CAB    Diabetes Father      Heart attack Father  58        Fatal MI    Diabetes Brother      Diabetes Sister      Diabetes Sister      Cataracts Paternal Uncle      Cataracts Maternal Grandmother         Social History     Socioeconomic History    Marital status:    Tobacco Use    Smoking status: Former     Current packs/day: 0.00     Average packs/day: 1 pack/day for 20.0 years (20.0 ttl pk-yrs)     Types: Cigarettes     Start date: 1980     Quit date: 2000     Years since quittin.5     Passive  exposure: Past    Smokeless tobacco: Never   Substance and Sexual Activity    Alcohol use: No     Alcohol/week: 0.0 standard drinks of alcohol    Drug use: No    Sexual activity: Yes     Partners: Female   Social History Narrative    . Lives with spouse. Has 2 children. Retired. No pets or smokers in household.     Social Determinants of Health     Financial Resource Strain: Patient Declined (5/23/2024)    Overall Financial Resource Strain (CARDIA)     Difficulty of Paying Living Expenses: Patient declined   Food Insecurity: Patient Declined (5/23/2024)    Hunger Vital Sign     Worried About Running Out of Food in the Last Year: Patient declined     Ran Out of Food in the Last Year: Patient declined   Transportation Needs: No Transportation Needs (5/29/2024)    TRANSPORTATION NEEDS     Transportation : No   Physical Activity: Insufficiently Active (5/23/2024)    Exercise Vital Sign     Days of Exercise per Week: 1 day     Minutes of Exercise per Session: 20 min   Stress: No Stress Concern Present (5/23/2024)    [employer] [place] [employer] - Occupational Stress Questionnaire     Feeling of Stress : Not at all   Housing Stability: Unknown (5/23/2024)    Housing Stability Vital Sign     Unable to Pay for Housing in the Last Year: Patient declined       Past Surgical History:   Procedure Laterality Date    ANGIOGRAM, CORONARY, WITH LEFT HEART CATHETERIZATION N/A 5/28/2024    Procedure: Angiogram, Coronary, with Left Heart Cath;  Surgeon: Rojelio Wadsworth MD;  Location: [facility] CATH LAB;  Service: Cardiology;  Laterality: N/A;    CARPAL TUNNEL RELEASE Right 07/2020    COLONOSCOPY N/A 03/31/2017    Procedure: COLONOSCOPY;  Surgeon: Cornelius Ibarra MD;  Location: [facility] ENDO;  Service: Endoscopy;  Laterality: N/A;    COLONOSCOPY N/A 04/18/2022    Procedure: COLONOSCOPY;  Surgeon: Geneva Serna MD;  Location: [facility] ENDO;  Service: Endoscopy;  Laterality: N/A;    DENTAL SURGERY      Implant    HAND  SURGERY      KNEE ARTHROSCOPY Left     LITHOTRIPSY, CORONARY TRANSLUMINAL, PERCUTANEOUS  5/28/2024    Procedure: LITHOTRIPSY, CORONARY TRANSLUMINAL, PERCUTANEOUS;  Surgeon: Rojelio Wadsworth MD;  Location: [facility] CATH LAB;  Service: Cardiology;;    PTCA, SINGLE VESSEL  5/28/2024    Procedure: PTCA, Single Vessel;  Surgeon: Rojelio Wadsworth MD;  Location: [facility] CATH LAB;  Service: Cardiology;;    ROBOT-ASSISTED LAPAROSCOPIC REPAIR OF INGUINAL HERNIA USING DA JOSE ANGEL XI Left 02/26/2021    Procedure: XI ROBOTIC REPAIR, HERNIA, INGUINAL;  Surgeon: Tavon Cruz MD;  Location: [facility] OR;  Service: General;  Laterality: Left;    STENT, DRUG ELUTING, SINGLE VESSEL, CORONARY  5/28/2024    Procedure: Stent, Drug Eluting, Single Vessel, Coronary;  Surgeon: Rojelio Wadsworth MD;  Location: [facility] CATH LAB;  Service: Cardiology;;       Review of Systems       Objective:   There were no vitals taken for this visit.    Cardiac catheterization    The Ost LAD to Prox LAD lesion was 80% stenosed with 0% stenosis   post-intervention.    The Mid LAD lesion was 99% stenosed with 0% stenosis post-intervention.    The pre-procedure left ventricular end diastolic pressure was 15.    Ost LAD to Prox LAD lesion, Mid LAD lesion: A .    Ost LAD to Prox LAD lesion: A STENT ORSIRO MISSION [id number] stent was   successfully placed at 11 MAREN for .    Mid LAD lesion: A STENT ORSIRO MISSION [id number] stent was successfully   placed at 11 MAREN for . A small D2 was jailed but had jocelyn 3 flow still,   very calcified vessel and severe vessel angulation, complex PCI    The estimated blood loss was none.    There was single vessel coronary artery disease.    The PCI was successful.    The filling pressures on the left were normal.    The procedure log was documented by Documenter: Lolita Gomez Nikkia, RN and verified by Rojelio Wadsworth MD.    Date: 5/28/2024  Time: 12:42 PM         Physical Exam   LOWER EXTREMITY PHYSICAL EXAMINATION    Vascular:   The right dorsalis pedis pulse 2/4 and the right posterior tibial pulse 2/4.  The left dorsalis pedis pulse 2/4 and posterior tibial pulse on the left is 2/4.  Capillary refill is intact.  Pedal hair growth intact    Neurological:  Protective sensation is intact with Wacissa Scarlett monofilament. Proprioception is intact. Intact sensation to light touch.  Vibratory sensation is diminished to the 1st metatarsal phalangeal joint.     Musculoskeletal: Manual Muscle Testing is 5/5 with dorsiflexion, plantar flexion, abduction, and adduction.   There is normal range of motion in the forefoot, hindfoot, and Ankle joint.      Dermatological:  Skin is supple and moist.  There are 3 lesions present to the plantar aspect of the foot which have a resemblance to a plantar porokeratosis.  Centralize core is noted.  There is no acute signs of infection.  No signs of underlying ulceration.  There is no absence of skin line.  No obvious capillary hemorrhaging.      Imaging:     X-Ray Foot Complete Right    Narrative  DATE OF EXAM: Jun 7 2012    BOC   0106  -  FOOT 3 VIEWS MINIMUM RIGHT:   \  [id number]    CLINICAL HISTORY:   \729.5 0 PAIN IN LIMB    PROCEDURE COMMENT:   \    ICD 9 CODE(S):   (\)    CPT 4 CODE(S)/MODIFIER(S):   (\)    Findings: Osseous structures do not demonstrate any evidence of acute  fracture.  There are calcaneal enthesophytes noted.    Impression  Calcaneal enthesophytes.  ______________________________________    Electronically signed by: APOLONIA MURRAY MD  Date:     06/07/12  Time:    11:20        : NAVIN  Transcribe Date/Time: Jun 7 2012 11:20A  Dictated by : APOLONIA MURRAY MD  Report reviewed by:  Read On:  \  Images were reviewed, findings were verified and document was  electronically  SIGNED BY: APOLONIA MURRAY MD On: Jun 7 2012 11:20A          Assessment:     1. Type 2 diabetes mellitus with other neurologic complication, with long-term current use of insulin    2. Porokeratosis    3.  Plantar fat pad atrophy        Plan:     Type 2 diabetes mellitus with other neurologic complication, with long-term current use of insulin    Porokeratosis    Plantar fat pad atrophy      Foot counseling and education is provided at this visit. Patient is advised to wear socks and shoes at all times.  Do not walk barefoot, or with just socks, even when indoors.  Be sure to check and inspect the inside of the shoe before putting them on her feet.  Protect your feet at all times.  Walking shoes and/or athletic shoes are the best types of shoe gear. Do not wear vinyl or plastic type shoe gear, as they do not stretch and/or breathe.  Protect your feet from hot and/or cold. Elevate the extremities when sitting.  Do not wear excessively tight socks and/or shoe gear. Wiggle your toes for a few minutes throughout the day. Move your ankles up and down, in and out, to help blood flow in your lower extremity.    Porokeratotic skin formation x3, as outlined within the examination portion of this note, is surgically debrided with sharp #10/#15 blade, to alleviate discomfort with weight bearing and ambulation, and to lessen the possibility of skin complications, e.g., ulceration due to pressure. No ulceration(s) is are noted with/post debridement. The lesion is completed healed and resolved. No evidence of infection.       Future Appointments   Date Time Provider Department Center   7/11/2024  1:00 PM Bandar Garcia MD [facility] UROLOGY [facility]   9/6/2024  8:30 AM Al Daugherty PATIGRE [facility] DIABETE [facility]   9/20/2024  8:10 AM SPECIMEN, [facility] SPECLAB [facility]   9/20/2024  8:35 AM LABORATORY, [facility] HGVH LAB [facility]   9/25/2024  8:00 AM Shlomo Ochoa MD HG IM [facility]   3/21/2025 10:30 AM Lejeune, Elizabeth B, NP [facility] PULMSVC [facility]   5/13/2025  8:40 AM Naveen Gr MD [facility] CARDIO [facility]

## 2024-07-11 ENCOUNTER — LAB VISIT (OUTPATIENT)
Dept: LAB | Facility: HOSPITAL | Age: 74
End: 2024-07-11
Attending: UROLOGY
Payer: MEDICARE

## 2024-07-11 ENCOUNTER — OFFICE VISIT (OUTPATIENT)
Dept: UROLOGY | Facility: CLINIC | Age: 74
End: 2024-07-11
Payer: MEDICARE

## 2024-07-11 VITALS — BODY MASS INDEX: 23.98 KG/M2 | HEIGHT: 71 IN | WEIGHT: 171.31 LBS

## 2024-07-11 DIAGNOSIS — R97.20 ELEVATED PROSTATE SPECIFIC ANTIGEN (PSA): ICD-10-CM

## 2024-07-11 DIAGNOSIS — N52.9 ERECTILE DYSFUNCTION, UNSPECIFIED ERECTILE DYSFUNCTION TYPE: ICD-10-CM

## 2024-07-11 DIAGNOSIS — N20.0 NEPHROLITHIASIS: Primary | ICD-10-CM

## 2024-07-11 LAB — COMPLEXED PSA SERPL-MCNC: 1.3 NG/ML (ref 0–4)

## 2024-07-11 PROCEDURE — 99213 OFFICE O/P EST LOW 20 MIN: CPT | Mod: PBBFAC | Performed by: UROLOGY

## 2024-07-11 PROCEDURE — 36415 COLL VENOUS BLD VENIPUNCTURE: CPT | Performed by: UROLOGY

## 2024-07-11 PROCEDURE — 84153 ASSAY OF PSA TOTAL: CPT | Performed by: UROLOGY

## 2024-07-11 PROCEDURE — 99214 OFFICE O/P EST MOD 30 MIN: CPT | Mod: S$PBB,,, | Performed by: UROLOGY

## 2024-07-11 PROCEDURE — 99999 PR PBB SHADOW E&M-EST. PATIENT-LVL III: CPT | Mod: PBBFAC,,, | Performed by: UROLOGY

## 2024-07-11 NOTE — PROGRESS NOTES
Chief Complaint:   Encounter Diagnoses   Name Primary?    Nephrolithiasis Yes    Elevated prostate specific antigen (PSA)      Erectile dysfunction, unspecified erectile dysfunction type          HPI:    7/11/24- no evidence of stone pain, no issues with voiding.  Patient is due for a PSA.  Groin pain persists with no identifiable etiologies.    70-year-old gentleman who comes in with a lump in his left groin, previous ultrasound was negative.  He has also been having some burning and discomfort in his testicles for some time now.  Along cannot just few weeks ago, it seems to be intermittent in nature with no discomfort.  He has no redness to the area.  The testicles he states is a burning or discomfort, at best a 2/10 on a pain scale.  Seems to last for about a day and in goes away.  No alleviating or eliciting factors.  He is not taking any medications for this.  He has no lower urinary tract symptoms, he only gets up 1 time per evening to void.  He has a good stream, with no issues in regards to frequency or urgency.  No gross hematuria, does have a history of smoking.  He has passed multiple stones in his life time, the last being 2 years ago.  He has required no surgical management for these.  He has been told that he has 1 stone left on the left side, which is obviously suspicious considering the testicle pain is on the left.  No previous urological history except for some erectile dysfunction, which she relates to his diabetes.  Viagra worked years ago, but the side effects were bothersome and he did not want to take this any longer.  He is currently having no issues with this at this time.  Patient had a grandmother with bladder cancer, his father also had kidney stones, no other urological cancers or stones within the family.  He currently is having little bit of left testicular discomfort, no groin mass today though.    Allergies:  Patient has no known allergies.    Medications:  has a current medication  list which includes the following prescription(s): amlodipine, ascorbic acid, aspirin, empagliflozin, ergocalciferol (vitamin d2), freestyle lancets, insulin aspart u-100, lantus solostar u-100 insulin, isosorbide mononitrate, lancets, levocetirizine, losartan, metoprolol succinate, mupirocin, mupirocin, nitroglycerin, pen needle, diabetic, prasugrel, rosuvastatin, and mounjaro, and the following Facility-Administered Medications: lactated ringers.    Review of Systems:  General: No fever, chills, fatigability, or weight loss.  Skin: No rashes, itching, or changes in color or texture of skin.  Chest: Denies CHOU, cyanosis, wheezing, cough, and sputum production.  Abdomen: Appetite fine. No weight loss. Denies diarrhea, abdominal pain, hematemesis, or blood in stool.  Musculoskeletal: No joint stiffness or swelling. Denies back pain.  : As above.  All other review of systems negative.    PMH:   has a past medical history of Coronary artery disease (2010), DM (diabetes mellitus) (2002), DM (diabetes mellitus), DM (diabetes mellitus), Groin pain, left (2/11/2021), Hyperlipemia, Hypertension, Kidney stones, Nephrolithiasis (2/11/2021), Neuropathy, and Type 2 diabetes mellitus (10-12 years).    PSH:   has a past surgical history that includes Colonoscopy (N/A, 03/31/2017); Knee arthroscopy (Left); Dental surgery; Carpal tunnel release (Right, 07/2020); Robot-assisted laparoscopic repair of inguinal hernia using da Emeka Xi (Left, 02/26/2021); Colonoscopy (N/A, 04/18/2022); Hand surgery; ANGIOGRAM, CORONARY, WITH LEFT HEART CATHETERIZATION (N/A, 5/28/2024); ptca, single vessel (5/28/2024); lithotripsy, coronary transluminal, percutaneous (5/28/2024); and stent, drug eluting, single vessel, coronary (5/28/2024).    FamHx: family history includes Cataracts in his maternal grandmother and paternal uncle; Diabetes in his brother, father, mother, sister, and sister; Glaucoma in his mother; Heart attack (age of onset: 58) in  his father; Heart disease (age of onset: 75) in his mother.    SocHx:  reports that he quit smoking about 24 years ago. His smoking use included cigarettes. He started smoking about 44 years ago. He has a 20 pack-year smoking history. He has been exposed to tobacco smoke. He has never used smokeless tobacco. He reports that he does not drink alcohol and does not use drugs.      Physical Exam:  There were no vitals filed for this visit.  General: A&Ox3, no apparent distress, no deformities  Neck: No masses, normal ROM  Lungs: normal inspiration, no use of accessory muscles  Heart: normal pulse, no arrhythmias  Abdomen: Soft, NT, ND, no masses, no hernias, no hepatosplenomegaly  Skin: The skin is warm and dry. No jaundice.  Ext: No c/c/e.  : 2/21- Test desc lamar, no abnormalities of epididymus. Normal penile and scrotal skin. Meatus normal.    Labs/Studies:   PSA 0.88 6/23  KUB/CODI normal 6/23  ALEJANDRA negative 5/21  CT left inguinal hernia 2/21  Abdominal ultrasound negative 1/21    Impression/Plan:        1. Nephrolithiasis- no evidence of stone pain, wants to hold on repeat imaging.  PSA today and proceed as appropriate.  Patient will still get hematospermia, but infrequently and no other symptoms.  Call if this becomes worse.    2. Erectile dysfunction- continue with sildenafil 100 mg, re-evaluate at future appointments.    3. Left groin pain- despite antibiotics and imaging, nothing thus far has assisted.  He did have a left hernia repair in February of 2021, therefore this could be a nerve impingement.  We discussed injections he will just monitor for now, and let me know if anything changes in the meantime.

## 2024-07-22 ENCOUNTER — PATIENT MESSAGE (OUTPATIENT)
Dept: PODIATRY | Facility: CLINIC | Age: 74
End: 2024-07-22
Payer: MEDICARE

## 2024-07-22 ENCOUNTER — PATIENT MESSAGE (OUTPATIENT)
Dept: DIABETES | Facility: CLINIC | Age: 74
End: 2024-07-22
Payer: MEDICARE

## 2024-07-23 RX ORDER — GABAPENTIN 300 MG/1
300 CAPSULE ORAL NIGHTLY
Qty: 30 CAPSULE | Refills: 2 | Status: SHIPPED | OUTPATIENT
Start: 2024-07-23 | End: 2024-10-21

## 2024-07-23 RX ORDER — LIDOCAINE AND PRILOCAINE 25; 25 MG/G; MG/G
CREAM TOPICAL
Qty: 30 G | Refills: 0 | Status: SHIPPED | OUTPATIENT
Start: 2024-07-23

## 2024-07-30 DIAGNOSIS — I25.118 CORONARY ARTERY DISEASE OF NATIVE ARTERY OF NATIVE HEART WITH STABLE ANGINA PECTORIS: Primary | ICD-10-CM

## 2024-07-30 DIAGNOSIS — E11.8 TYPE 2 DIABETES MELLITUS WITH COMPLICATION, WITH LONG-TERM CURRENT USE OF INSULIN: ICD-10-CM

## 2024-07-30 DIAGNOSIS — Z79.4 TYPE 2 DIABETES MELLITUS WITH COMPLICATION, WITH LONG-TERM CURRENT USE OF INSULIN: ICD-10-CM

## 2024-08-01 ENCOUNTER — LAB VISIT (OUTPATIENT)
Dept: LAB | Facility: HOSPITAL | Age: 74
End: 2024-08-01
Attending: INTERNAL MEDICINE
Payer: MEDICARE

## 2024-08-01 DIAGNOSIS — E11.8 TYPE 2 DIABETES MELLITUS WITH COMPLICATION, WITH LONG-TERM CURRENT USE OF INSULIN: ICD-10-CM

## 2024-08-01 DIAGNOSIS — Z79.4 TYPE 2 DIABETES MELLITUS WITH COMPLICATION, WITH LONG-TERM CURRENT USE OF INSULIN: ICD-10-CM

## 2024-08-01 DIAGNOSIS — I25.118 CORONARY ARTERY DISEASE OF NATIVE ARTERY OF NATIVE HEART WITH STABLE ANGINA PECTORIS: ICD-10-CM

## 2024-08-01 LAB
BASOPHILS # BLD AUTO: 0.05 K/UL (ref 0–0.2)
BASOPHILS NFR BLD: 0.8 % (ref 0–1.9)
CHOLEST SERPL-MCNC: 126 MG/DL (ref 120–199)
CHOLEST/HDLC SERPL: 3.6 {RATIO} (ref 2–5)
CRP SERPL-MCNC: 0.8 MG/L (ref 0–8.2)
DIFFERENTIAL METHOD BLD: ABNORMAL
EOSINOPHIL # BLD AUTO: 0.1 K/UL (ref 0–0.5)
EOSINOPHIL NFR BLD: 1.5 % (ref 0–8)
ERYTHROCYTE [DISTWIDTH] IN BLOOD BY AUTOMATED COUNT: 13.8 % (ref 11.5–14.5)
ESTIMATED AVG GLUCOSE: 143 MG/DL (ref 68–131)
HBA1C MFR BLD: 6.6 % (ref 4–5.6)
HCT VFR BLD AUTO: 45.6 % (ref 40–54)
HDLC SERPL-MCNC: 35 MG/DL (ref 40–75)
HDLC SERPL: 27.8 % (ref 20–50)
HGB BLD-MCNC: 14.5 G/DL (ref 14–18)
IMM GRANULOCYTES # BLD AUTO: 0.02 K/UL (ref 0–0.04)
IMM GRANULOCYTES NFR BLD AUTO: 0.3 % (ref 0–0.5)
LDLC SERPL CALC-MCNC: 61.6 MG/DL (ref 63–159)
LYMPHOCYTES # BLD AUTO: 1.9 K/UL (ref 1–4.8)
LYMPHOCYTES NFR BLD: 31.5 % (ref 18–48)
MCH RBC QN AUTO: 31.7 PG (ref 27–31)
MCHC RBC AUTO-ENTMCNC: 31.8 G/DL (ref 32–36)
MCV RBC AUTO: 100 FL (ref 82–98)
MONOCYTES # BLD AUTO: 0.6 K/UL (ref 0.3–1)
MONOCYTES NFR BLD: 10.3 % (ref 4–15)
NEUTROPHILS # BLD AUTO: 3.3 K/UL (ref 1.8–7.7)
NEUTROPHILS NFR BLD: 55.6 % (ref 38–73)
NONHDLC SERPL-MCNC: 91 MG/DL
NRBC BLD-RTO: 0 /100 WBC
PLATELET # BLD AUTO: 178 K/UL (ref 150–450)
PMV BLD AUTO: 9.9 FL (ref 9.2–12.9)
RBC # BLD AUTO: 4.58 M/UL (ref 4.6–6.2)
TRIGL SERPL-MCNC: 147 MG/DL (ref 30–150)
WBC # BLD AUTO: 6 K/UL (ref 3.9–12.7)

## 2024-08-01 PROCEDURE — 80061 LIPID PANEL: CPT | Performed by: INTERNAL MEDICINE

## 2024-08-01 PROCEDURE — 83036 HEMOGLOBIN GLYCOSYLATED A1C: CPT | Performed by: INTERNAL MEDICINE

## 2024-08-01 PROCEDURE — 36415 COLL VENOUS BLD VENIPUNCTURE: CPT | Performed by: INTERNAL MEDICINE

## 2024-08-01 PROCEDURE — 86140 C-REACTIVE PROTEIN: CPT | Performed by: INTERNAL MEDICINE

## 2024-08-01 PROCEDURE — 85025 COMPLETE CBC W/AUTO DIFF WBC: CPT | Performed by: INTERNAL MEDICINE

## 2024-08-05 ENCOUNTER — HOSPITAL ENCOUNTER (OUTPATIENT)
Dept: CARDIOLOGY | Facility: HOSPITAL | Age: 74
Discharge: HOME | End: 2024-08-05
Attending: INTERNAL MEDICINE
Payer: MEDICARE

## 2024-08-05 ENCOUNTER — TELEPHONE (OUTPATIENT)
Dept: CARDIOLOGY | Facility: CLINIC | Age: 74
End: 2024-08-05
Payer: MEDICARE

## 2024-08-05 VITALS
SYSTOLIC BLOOD PRESSURE: 107 MMHG | BODY MASS INDEX: 23.94 KG/M2 | DIASTOLIC BLOOD PRESSURE: 68 MMHG | WEIGHT: 171 LBS | HEART RATE: 74 BPM | HEIGHT: 71 IN

## 2024-08-05 DIAGNOSIS — I25.118 CORONARY ARTERY DISEASE OF NATIVE ARTERY OF NATIVE HEART WITH STABLE ANGINA PECTORIS: ICD-10-CM

## 2024-08-05 LAB
CV STRESS BASE HR: 74 BPM
DIASTOLIC BLOOD PRESSURE: 68 MMHG
OHS CV CPX 1 MINUTE RECOVERY HEART RATE: 110 BPM
OHS CV CPX 85 PERCENT MAX PREDICTED HEART RATE MALE: 127
OHS CV CPX ESTIMATED METS: 10
OHS CV CPX MAX PREDICTED HEART RATE: 149
OHS CV CPX PATIENT IS FEMALE: 0
OHS CV CPX PATIENT IS MALE: 1
OHS CV CPX PEAK DIASTOLIC BLOOD PRESSURE: 68 MMHG
OHS CV CPX PEAK HEAR RATE: 122 BPM
OHS CV CPX PEAK RATE PRESSURE PRODUCT: NORMAL
OHS CV CPX PEAK SYSTOLIC BLOOD PRESSURE: 167 MMHG
OHS CV CPX PERCENT MAX PREDICTED HEART RATE ACHIEVED: 82
OHS CV CPX RATE PRESSURE PRODUCT PRESENTING: 7918
STRESS ECHO POST EXERCISE DUR MIN: 15 MINUTES
STRESS ECHO POST EXERCISE DUR SEC: 40 SECONDS
SYSTOLIC BLOOD PRESSURE: 107 MMHG

## 2024-08-05 PROCEDURE — 93017 CV STRESS TEST TRACING ONLY: CPT

## 2024-08-05 PROCEDURE — 93016 CV STRESS TEST SUPVJ ONLY: CPT | Mod: ,,, | Performed by: INTERNAL MEDICINE

## 2024-08-05 PROCEDURE — 93018 CV STRESS TEST I&R ONLY: CPT | Mod: ,,, | Performed by: INTERNAL MEDICINE

## 2024-08-05 RX ORDER — INSULIN LISPRO 100 U/ML
INJECTION, SOLUTION SUBCUTANEOUS
Qty: 75 ML | Refills: 3 | Status: SHIPPED | OUTPATIENT
Start: 2024-08-05

## 2024-08-07 ENCOUNTER — TELEPHONE (OUTPATIENT)
Dept: CARDIOLOGY | Facility: CLINIC | Age: 74
End: 2024-08-07
Payer: MEDICARE

## 2024-08-08 ENCOUNTER — PATIENT MESSAGE (OUTPATIENT)
Dept: CARDIOLOGY | Facility: CLINIC | Age: 74
End: 2024-08-08
Payer: MEDICARE

## 2024-08-20 ENCOUNTER — CLINICAL SUPPORT (OUTPATIENT)
Dept: CARDIAC REHAB | Facility: HOSPITAL | Age: 74
End: 2024-08-20
Attending: INTERNAL MEDICINE
Payer: MEDICARE

## 2024-08-20 VITALS
WEIGHT: 177.69 LBS | OXYGEN SATURATION: 97 % | DIASTOLIC BLOOD PRESSURE: 72 MMHG | HEIGHT: 71 IN | HEART RATE: 68 BPM | RESPIRATION RATE: 16 BRPM | BODY MASS INDEX: 24.88 KG/M2 | SYSTOLIC BLOOD PRESSURE: 118 MMHG

## 2024-08-20 DIAGNOSIS — I25.118 CORONARY ARTERY DISEASE OF NATIVE ARTERY OF NATIVE HEART WITH STABLE ANGINA PECTORIS: ICD-10-CM

## 2024-08-20 DIAGNOSIS — Z95.5 H/O HEART ARTERY STENT: ICD-10-CM

## 2024-08-20 NOTE — PROGRESS NOTES
Patient oriented to cardiac rehab program. Questions answered and explained, consents signed, and tour of facilities given.  Patient v/u.

## 2024-08-22 RX ORDER — LIDOCAINE AND PRILOCAINE 25; 25 MG/G; MG/G
CREAM TOPICAL
Qty: 30 G | Refills: 0 | Status: SHIPPED | OUTPATIENT
Start: 2024-08-22

## 2024-08-26 ENCOUNTER — CLINICAL SUPPORT (OUTPATIENT)
Dept: CARDIAC REHAB | Facility: HOSPITAL | Age: 74
End: 2024-08-26
Attending: INTERNAL MEDICINE
Payer: MEDICARE

## 2024-08-26 DIAGNOSIS — I25.118 CORONARY ARTERY DISEASE OF NATIVE ARTERY OF NATIVE HEART WITH STABLE ANGINA PECTORIS: ICD-10-CM

## 2024-08-26 PROCEDURE — 93798 PHYS/QHP OP CAR RHAB W/ECG: CPT | Performed by: INTERNAL MEDICINE

## 2024-08-28 ENCOUNTER — CLINICAL SUPPORT (OUTPATIENT)
Dept: CARDIAC REHAB | Facility: HOSPITAL | Age: 74
End: 2024-08-28
Attending: INTERNAL MEDICINE
Payer: MEDICARE

## 2024-08-28 DIAGNOSIS — I25.118 CORONARY ARTERY DISEASE OF NATIVE ARTERY OF NATIVE HEART WITH STABLE ANGINA PECTORIS: Primary | ICD-10-CM

## 2024-08-28 DIAGNOSIS — E11.8 TYPE 2 DIABETES MELLITUS WITH COMPLICATION, WITH LONG-TERM CURRENT USE OF INSULIN: ICD-10-CM

## 2024-08-28 DIAGNOSIS — Z79.4 TYPE 2 DIABETES MELLITUS WITH COMPLICATION, WITH LONG-TERM CURRENT USE OF INSULIN: ICD-10-CM

## 2024-08-28 PROCEDURE — 93798 PHYS/QHP OP CAR RHAB W/ECG: CPT | Performed by: NURSE PRACTITIONER

## 2024-08-28 RX ORDER — LIDOCAINE AND PRILOCAINE 25; 25 MG/G; MG/G
CREAM TOPICAL
Qty: 30 G | Refills: 4 | Status: SHIPPED | OUTPATIENT
Start: 2024-08-28

## 2024-08-28 RX ORDER — EMPAGLIFLOZIN 25 MG/1
25 TABLET, FILM COATED ORAL
Qty: 90 TABLET | Refills: 3 | Status: SHIPPED | OUTPATIENT
Start: 2024-08-28

## 2024-08-30 ENCOUNTER — CLINICAL SUPPORT (OUTPATIENT)
Dept: CARDIAC REHAB | Facility: HOSPITAL | Age: 74
End: 2024-08-30
Attending: INTERNAL MEDICINE
Payer: MEDICARE

## 2024-08-30 DIAGNOSIS — I25.118 CORONARY ARTERY DISEASE OF NATIVE ARTERY OF NATIVE HEART WITH STABLE ANGINA PECTORIS: Primary | ICD-10-CM

## 2024-08-30 PROCEDURE — 93798 PHYS/QHP OP CAR RHAB W/ECG: CPT | Performed by: INTERNAL MEDICINE

## 2024-09-04 ENCOUNTER — CLINICAL SUPPORT (OUTPATIENT)
Dept: CARDIAC REHAB | Facility: HOSPITAL | Age: 74
End: 2024-09-04
Payer: MEDICARE

## 2024-09-04 DIAGNOSIS — I25.118 CORONARY ARTERY DISEASE OF NATIVE ARTERY OF NATIVE HEART WITH STABLE ANGINA PECTORIS: Primary | ICD-10-CM

## 2024-09-04 PROCEDURE — 93798 PHYS/QHP OP CAR RHAB W/ECG: CPT | Performed by: INTERNAL MEDICINE

## 2024-09-06 ENCOUNTER — OFFICE VISIT (OUTPATIENT)
Dept: DIABETES | Facility: CLINIC | Age: 74
End: 2024-09-06
Payer: MEDICARE

## 2024-09-06 ENCOUNTER — TELEPHONE (OUTPATIENT)
Dept: PHYSICAL MEDICINE AND REHAB | Facility: CLINIC | Age: 74
End: 2024-09-06
Payer: MEDICARE

## 2024-09-06 VITALS
SYSTOLIC BLOOD PRESSURE: 113 MMHG | DIASTOLIC BLOOD PRESSURE: 63 MMHG | WEIGHT: 183 LBS | HEART RATE: 62 BPM | HEIGHT: 71 IN | BODY MASS INDEX: 25.62 KG/M2

## 2024-09-06 DIAGNOSIS — R20.2 NUMBNESS AND TINGLING OF BOTH LOWER EXTREMITIES: ICD-10-CM

## 2024-09-06 DIAGNOSIS — I25.10 CORONARY ARTERY DISEASE INVOLVING NATIVE CORONARY ARTERY OF NATIVE HEART WITHOUT ANGINA PECTORIS: ICD-10-CM

## 2024-09-06 DIAGNOSIS — E11.59 HYPERTENSION ASSOCIATED WITH DIABETES: ICD-10-CM

## 2024-09-06 DIAGNOSIS — R20.2 NUMBNESS AND TINGLING IN BOTH HANDS: ICD-10-CM

## 2024-09-06 DIAGNOSIS — E11.8 TYPE 2 DIABETES MELLITUS WITH COMPLICATION, WITH LONG-TERM CURRENT USE OF INSULIN: Primary | ICD-10-CM

## 2024-09-06 DIAGNOSIS — I15.2 HYPERTENSION ASSOCIATED WITH DIABETES: ICD-10-CM

## 2024-09-06 DIAGNOSIS — Z79.4 TYPE 2 DIABETES MELLITUS WITH COMPLICATION, WITH LONG-TERM CURRENT USE OF INSULIN: Primary | ICD-10-CM

## 2024-09-06 DIAGNOSIS — R20.0 NUMBNESS AND TINGLING OF BOTH LOWER EXTREMITIES: ICD-10-CM

## 2024-09-06 DIAGNOSIS — E78.5 HYPERLIPIDEMIA ASSOCIATED WITH TYPE 2 DIABETES MELLITUS: ICD-10-CM

## 2024-09-06 DIAGNOSIS — E11.69 HYPERLIPIDEMIA ASSOCIATED WITH TYPE 2 DIABETES MELLITUS: ICD-10-CM

## 2024-09-06 DIAGNOSIS — R20.0 NUMBNESS AND TINGLING IN BOTH HANDS: ICD-10-CM

## 2024-09-06 LAB — GLUCOSE SERPL-MCNC: 171 MG/DL (ref 70–110)

## 2024-09-06 PROCEDURE — 99999 PR PBB SHADOW E&M-EST. PATIENT-LVL V: CPT | Mod: PBBFAC,,, | Performed by: PHYSICIAN ASSISTANT

## 2024-09-06 PROCEDURE — 99215 OFFICE O/P EST HI 40 MIN: CPT | Mod: PBBFAC | Performed by: PHYSICIAN ASSISTANT

## 2024-09-06 RX ORDER — HYDROCORTISONE 25 MG/G
CREAM TOPICAL 2 TIMES DAILY PRN
COMMUNITY
Start: 2024-07-10

## 2024-09-06 RX ORDER — INSULIN GLARGINE 100 [IU]/ML
28 INJECTION, SOLUTION SUBCUTANEOUS DAILY
Qty: 25.2 ML | Refills: 3 | Status: SHIPPED | OUTPATIENT
Start: 2024-09-06 | End: 2024-09-06

## 2024-09-06 RX ORDER — TIRZEPATIDE 15 MG/.5ML
15 INJECTION, SOLUTION SUBCUTANEOUS
Qty: 12 PEN | Refills: 3 | Status: SHIPPED | OUTPATIENT
Start: 2024-09-06

## 2024-09-06 RX ORDER — INSULIN GLARGINE 100 [IU]/ML
28 INJECTION, SOLUTION SUBCUTANEOUS DAILY
Qty: 24 ML | Refills: 3 | Status: SHIPPED | OUTPATIENT
Start: 2024-09-06 | End: 2025-09-06

## 2024-09-06 NOTE — PATIENT INSTRUCTIONS
CURRENT DM MEDICATIONS:   Lantus 28 units at night   Admelog 28 units TID wm and 3 units with dessert  Jardiance 25 mg daily  Mounjaro 15 mg weekly

## 2024-09-06 NOTE — PROGRESS NOTES
PCP: Shlomo Ochoa MD    Subjective:     Chief Complaint: Diabetes - Established Patient    HISTORY OF PRESENT ILLNESS: 73 y.o.  male presenting for diabetes management visit.   The patient's last visit with me was on 6/19/2023.  Patient has had Type II diabetes since 1990s.  Pertinent to decision making is the following comorbidities: HTN, HLD, CAD and Overweight by BMI  Patient has the following Diabetes complications: without complications  He has attended diabetes education in the past.     Patient's most recent A1c of 6.6% was completed 1 months ago.   Patient states since His last A1c His blood glucose levels have been within range throughout the day .   Patient monitors blood glucose 3 times per day with [facility] meter : Fasting, Before Lunch and Before Dinner. CGM declined.   Patient blood glucose monitoring device data will be reviewed under the Episodes tab today - see below.   Patient endorses the following diabetes related symptoms: Tingling in Lower Extremities and tingling in bilateral upper extremities . History of CTR in B/L hands. Takes Gabapentin 300 mg qhs.    Patient is due today for the following diabetes-related health maintenance standards: Urine Microalbumin/creatinine ratio and Influenza Vaccine.   He denies any recent hospital admissions or emergency room visits.   He denies having hypoglycemia.  Patient's concerns today include glycemic control.     Timothy's recent readings (past 60 days):    Time Taken Glucose (mg/dL)   9/6/2024  7:52    9/5/2024  6:09    9/5/2024 12:37    9/5/2024  7:37    9/4/2024  4:32 PM 94   9/4/2024 12:17 PM 91   9/4/2024  9:03 AM 93   9/4/2024  7:29    9/3/2024  5:56    9/3/2024 12:28    9/3/2024  7:48    9/2/2024  4:03    9/2/2024 12:26    9/2/2024  9:28    9/1/2024  6:57    9/1/2024 11:57    9/1/2024  8:01    8/31/2024  6:15    8/31/2024   "7:29    8/30/2024  4:26    8/30/2024  1:11    8/30/2024  9:09    8/30/2024  6:57    8/29/2024  5:13    8/29/2024  8:30    8/28/2024  4:39 PM 97   8/28/2024 11:55 AM 82   8/28/2024  9:02 AM 88   8/28/2024  7:32    8/27/2024  4:55    8/27/2024 12:07    8/27/2024  7:50    8/26/2024  5:55 PM 87   8/26/2024 12:56    8/26/2024  9:17 AM 88   8/26/2024  7:01    8/25/2024  5:57    8/25/2024  8:07    8/24/2024  5:23    8/24/2024  1:01    8/24/2024  8:31    8/23/2024  4:26    8/23/2024 12:55    8/23/2024  9:21    8/22/2024  6:01    8/22/2024  7:27    8/21/2024  5:50    8/21/2024  1:01 PM 78   8/21/2024  7:56    8/20/2024  6:04    8/20/2024 12:42    8/20/2024  8:14    8/19/2024  5:39    8/19/2024  9:14 AM 98   8/18/2024  6:02    8/18/2024 12:10    8/18/2024  7:40    8/17/2024  5:57    8/17/2024  8:21    8/16/2024 11:57    8/16/2024  7:51    8/15/2024  5:36    8/15/2024  2:10    8/15/2024  8:04    8/14/2024  7:25    8/14/2024  8:31 AM 98   8/13/2024  5:56    8/13/2024 12:50 PM 72   8/13/2024  8:46    8/12/2024  7:47    8/11/2024  6:47    8/11/2024 11:51 AM 82   8/11/2024  7:45    8/10/2024  6:06    8/10/2024  7:10    8/9/2024  5:37    8/9/2024 12:23 PM 84   8/9/2024  8:22    8/8/2024  5:46    8/8/2024  1:13 PM 83   8/8/2024  8:10    8/7/2024  6:00    8/7/2024 12:16    8/7/2024  8:01    8/6/2024  6:25    8/6/2024  1:24 PM 96   8/6/2024  7:16 AM 94       Patient medication regimen is as below.     CURRENT DM MEDICATIONS:   Lantus 28 units at night   Admelog 28 units TID wm and 3 units with dessert  Jardiance 25 mg daily  Mounjaro 15 mg weekly     Labs Reviewed.       No results found for: "CPEPTIDE"  No " "results found for: "GLUTAMICACID"       //   , There is no height or weight on file to calculate BMI.  His blood sugar in clinic today is:    Lab Results   Component Value Date    POCGLU 108 10/06/2023       Review of Systems   Constitutional:  Negative for activity change, appetite change, chills and fever.   HENT:  Negative for dental problem, mouth sores, nosebleeds, sore throat and trouble swallowing.    Eyes:  Negative for pain and discharge.   Respiratory:  Negative for shortness of breath, wheezing and stridor.    Cardiovascular:  Negative for chest pain, palpitations and leg swelling.   Gastrointestinal:  Negative for abdominal pain, diarrhea, nausea and vomiting.   Endocrine: Negative for polydipsia, polyphagia and polyuria.   Genitourinary:  Negative for dysuria, frequency and urgency.   Musculoskeletal:  Negative for joint swelling and myalgias.   Skin:  Negative for rash and wound.   Neurological:  Negative for dizziness, syncope, weakness and headaches.   Psychiatric/Behavioral:  Negative for behavioral problems and dysphoric mood.          Diabetes Management Status  Statin: Taking  ACE/ARB: Taking    Screening or Prevention Patient's value Goal Complete/Controlled?   HgA1C Testing and Control   Lab Results   Component Value Date    HGBA1C 6.6 (H) 08/01/2024      Annually/Less than 8% Yes   Lipid profile : 08/01/2024 Annually Yes   LDL control Lab Results   Component Value Date    LDLCALC 61.6 (L) 08/01/2024    Annually/Less than 100 mg/dl  Yes   Nephropathy screening Lab Results   Component Value Date    MICALBCREAT Unable to calculate 09/21/2023     No results found for: "PROTEINUA" Annually Yes   Blood pressure BP Readings from Last 1 Encounters:   08/20/24 118/72    Less than 140/90 Yes   Dilated retinal exam : 02/26/2024 Annually Yes    Foot exam   : 03/05/2024 Annually Yes     ACTIVITY LEVEL: Moderately Active. Discussed activities, benefits, methods, and precautions.  MEAL PLANNING: Patient " reports number of meals per day to be 3 and number of snacks per day to be 2.   Patient is encouraged to carb count and consume no more than 30 - 45 grams of carbohydrates in each meal and 15 grams of carbohydrates in each snack.     Social History     Socioeconomic History    Marital status:    Tobacco Use    Smoking status: Former     Current packs/day: 0.00     Average packs/day: 1 pack/day for 20.0 years (20.0 ttl pk-yrs)     Types: Cigarettes     Start date: 1980     Quit date: 2000     Years since quittin.6     Passive exposure: Past    Smokeless tobacco: Never   Substance and Sexual Activity    Alcohol use: No     Alcohol/week: 0.0 standard drinks of alcohol    Drug use: No    Sexual activity: Yes     Partners: Female   Social History Narrative    . Lives with spouse. Has 2 children. Retired. No pets or smokers in household.     Social Determinants of Health     Financial Resource Strain: Patient Declined (2024)    Overall Financial Resource Strain (CARDIA)     Difficulty of Paying Living Expenses: Patient declined   Food Insecurity: Patient Declined (2024)    Hunger Vital Sign     Worried About Running Out of Food in the Last Year: Patient declined     Ran Out of Food in the Last Year: Patient declined   Transportation Needs: No Transportation Needs (2024)    TRANSPORTATION NEEDS     Transportation : No   Physical Activity: Insufficiently Active (2024)    Exercise Vital Sign     Days of Exercise per Week: 1 day     Minutes of Exercise per Session: 20 min   Stress: No Stress Concern Present (2024)    [employer] [place] [employer] - Occupational Stress Questionnaire     Feeling of Stress : Not at all   Housing Stability: Unknown (2024)    Housing Stability Vital Sign     Unable to Pay for Housing in the Last Year: Patient declined     Past Medical History:   Diagnosis Date    Coronary artery disease     [facility] - no stents    DM (diabetes  mellitus) 2002     lunch 10/28/2016    DM (diabetes mellitus)      am 04/12/2021    DM (diabetes mellitus)     BS 96 am 04/13/2022 Insulin for years    Groin pain, left 2/11/2021    Hyperlipemia     Hypertension     Kidney stones     Nephrolithiasis 2/11/2021    Neuropathy     Type 2 diabetes mellitus 10-12 years     am 10/31/2014       Objective:        Physical Exam  Constitutional:       General: He is not in acute distress.     Appearance: He is well-developed. He is not diaphoretic.   HENT:      Head: Normocephalic and atraumatic.      Right Ear: External ear normal.      Left Ear: External ear normal.      Nose: Nose normal.   Eyes:      General:         Right eye: No discharge.         Left eye: No discharge.      Pupils: Pupils are equal, round, and reactive to light.   Cardiovascular:      Rate and Rhythm: Normal rate and regular rhythm.      Heart sounds: Normal heart sounds.   Pulmonary:      Effort: Pulmonary effort is normal. No respiratory distress.      Breath sounds: Normal breath sounds. No stridor.   Abdominal:      Palpations: Abdomen is soft.   Musculoskeletal:         General: Normal range of motion.      Cervical back: Normal range of motion and neck supple.   Skin:     General: Skin is warm and dry.      Capillary Refill: Capillary refill takes less than 2 seconds.      Coloration: Skin is not pale.   Neurological:      Mental Status: He is alert and oriented to person, place, and time. Mental status is at baseline.      Motor: No abnormal muscle tone.      Coordination: Coordination normal.   Psychiatric:         Mood and Affect: Mood normal.         Behavior: Behavior normal.         Thought Content: Thought content normal.         Judgment: Judgment normal.           Assessment / Plan:     Type 2 diabetes mellitus with complication, with long-term current use of insulin  -     POCT Glucose, Hand-Held Device  -     tirzepatide (MOUNJARO) 15 mg/0.5 mL PnIj; Inject 15 mg into  the skin every 7 days.  Dispense: 12 Pen; Refill: 3  -     Discontinue: insulin glargine U-100, Lantus, (LANTUS SOLOSTAR U-100 INSULIN) 100 unit/mL (3 mL) InPn pen; Inject 28 Units into the skin once daily. ADMINISTER 33 UNITS UNDER THE SKIN EVERY EVENING  Dispense: 25.2 mL; Refill: 3  -     insulin glargine U-100, Lantus, (LANTUS SOLOSTAR U-100 INSULIN) 100 unit/mL (3 mL) InPn pen; Inject 28 Units into the skin once daily.  Dispense: 24 mL; Refill: 3    Hypertension associated with diabetes    Hyperlipidemia associated with type 2 diabetes mellitus    Coronary artery disease involving native coronary artery of native heart without angina pectoris    BMI 24.0-24.9, adult    Numbness and tingling of both lower extremities  -     EMG W/ ULTRASOUND AND NERVE CONDUCTION TEST 4 Extremities; Future  -     Ambulatory referral/consult to Physiatry; Future; Expected date: 09/13/2024    Numbness and tingling in both hands  -     EMG W/ ULTRASOUND AND NERVE CONDUCTION TEST 4 Extremities; Future  -     Ambulatory referral/consult to Physiatry; Future; Expected date: 09/13/2024      Additional Plan Details:    - POCT Glucose  - Encouraged continuation of lifestyle changes including regular exercise and limiting carbohydrates to 30-45 grams per meal threes times daily and 15 grams per snack with a limit of two daily.   - Encouraged continued monitoring of blood glucose with maintenance of 3 times daily at Fasting, Before Lunch and Before Dinner. CGM declined.   - Current DM Medication Regimen: continue Mounjaro 15 mg weekly. Continue Lantus 28 units qhs. Continue Admelog 28 units before meals TID. Continue Jardiance 25 mg daily.    - Referral to physiatry: CTS vs. DM Neuropathy; DM Neuropathy vs Lumbar Radic  - Health Maintenance standards addressed today: Urine Microalbumin / Creatinine Ratio scheduled and Flu Shot - patient would like to complete outside of [facility]  - Nursing Visit: Patient is below goal range for nursing visit  for age group and will not need nursing visit at this time .   - Follow up in  6  months with A1c prior.    CURRENT DM MEDICATIONS:   Lantus 28 units at night   Admelog 28 units TID wm and 3 units with dessert  Jardiance 25 mg daily  Mounjaro 15 mg weekly       Blakeney McKnight, PA-C [facility]    A total of 30 minutes was spent in face to face time, of which over 50 % was spent in counseling patient on disease process, complications, treatment, and side effects of medications.

## 2024-09-09 ENCOUNTER — CLINICAL SUPPORT (OUTPATIENT)
Dept: CARDIAC REHAB | Facility: HOSPITAL | Age: 74
End: 2024-09-09
Payer: MEDICARE

## 2024-09-09 DIAGNOSIS — I25.118 CORONARY ARTERY DISEASE OF NATIVE ARTERY OF NATIVE HEART WITH STABLE ANGINA PECTORIS: Primary | ICD-10-CM

## 2024-09-09 PROCEDURE — 93798 PHYS/QHP OP CAR RHAB W/ECG: CPT | Performed by: INTERNAL MEDICINE

## 2024-09-13 ENCOUNTER — CLINICAL SUPPORT (OUTPATIENT)
Dept: CARDIAC REHAB | Facility: HOSPITAL | Age: 74
End: 2024-09-13
Payer: MEDICARE

## 2024-09-13 DIAGNOSIS — I25.118 CORONARY ARTERY DISEASE OF NATIVE ARTERY OF NATIVE HEART WITH STABLE ANGINA PECTORIS: Primary | ICD-10-CM

## 2024-09-13 PROCEDURE — 93798 PHYS/QHP OP CAR RHAB W/ECG: CPT | Performed by: INTERNAL MEDICINE

## 2024-09-13 NOTE — PROGRESS NOTES
Patient tolerated cardiac rehab session well.       Pulmonary Medicine and [address] MD      Patient - Clotilde Rees   MRN -  [id number]   Acct # - [de-identified]   - 1945      Date of Admission -  2/15/2023 10:00 AM  Date of evaluation -  2023  Room - 1008/1008-   Hospital Day -  [facility], DO Primary Care Physician - Christie Forrest MD     Reason for Consult    Hypoxia    Assessment   Acute hypoxic respiratory failure  Acute PE, tiny distal posterior basilar left lower lobe segmental pulmonary artery  Acute right leg DVT of femoral artery  Acute exacerbation of COPD/emphysema  Small right pleural effusion/atelectasis  Retained secretions in trachea and mainstem bronchi,? Aspiration  30-pack-year smoking history  Indeterminate 9 mm lingular nodule  Possible obstructive sleep apnea  Recent history of COVID-19  Dilated patulous esophagus with wall thickening/small to moderate hiatal hernia    Recommendations   Oxygen via high flow nasal cannula  Incentive spirometry every hour while awake   Continue Unasyn   IV solu-medrol 40 mg every 6 hours   Continue budesonide and Brovana via nebulizer twice daily  Albuterol and Ipratropium 3 times daily and as needed  Speech therapy evaluation  X-ray chest in am  Labs: CBC and BMP in am  DVT prophylaxis, heparin drip, we will plan on transitioning to Eliquis  GI prophylaxis, Protonix  Will follow with you    Problem List      Patient Active Problem List   Diagnosis    COVID-19    Acute on chronic respiratory failure with hypoxia (HCC)    COPD exacerbation (HCC)    Tobacco abuse    Essential (primary) hypertension    Bipolar 1 disorder ([address]    SUNITHA (acute kidney injury) ([address]    Acute aspiration pneumonia ([address]    Hypokalemia    Pulmonary embolism on left [facility])       HPI     Clotilde Rees is 78 y.o.,  male who presented to the emergency room with complaints of shortness of breath. He recently was discharged from this facility with COVID-19.   He arrived via EMS with fever and weakness as well. He had CTA of the chest done which did show tiny small left pulmonary emboli. He was started on heparin drip and admitted for further management. Upon discharge from facility last month he was sent on 3 L nasal cannula. When he arrived to the ED he was 89% on room air. He is a 30-pack-year smoking history, quit recently. He is currently in the bed getting echocardiogram done. Shortness of breath is not much change. He has occasional cough. No hemoptysis. Denies chest pain. He is on 6 L nasal cannula at this time.   PMHx   Past Medical History      Diagnosis Date    Back pain     Bipolar 1 disorder ([facility]     Cancer ([facility]     bowel    COPD (chronic obstructive pulmonary disease) (HCC)     GERD (gastroesophageal reflux disease)     Heart attack ([facility]       Past Surgical History        Procedure Laterality Date    ANUS SURGERY      bowel surgery r/t cancer approx 4 years ago    COLONOSCOPY N/A 7/30/2018    COLONOSCOPY POLYPECTOMY HOT BIOPSY performed by Antoni Valadez DO at [facility]    Current Medications    sodium chloride flush  5-40 mL IntraVENous 2 times per day    arformoterol tartrate  15 mcg Nebulization BID    aspirin  81 mg Oral Daily    budesonide  0.5 mg Nebulization BID    divalproex  1,000 mg Oral Nightly    [START ON 2/19/2023] vitamin D  50,000 Units Oral Weekly    lamoTRIgine  100 mg Oral Daily    metoprolol succinate  25 mg Oral Daily    nicotine  1 patch TransDERmal Daily    pantoprazole  40 mg Oral QAM AC    QUEtiapine  400 mg Oral Nightly    methylPREDNISolone  40 mg IntraVENous Q6H    Followed by    Erickson Nicolas ON 2/18/2023] predniSONE  40 mg Oral Daily    ampicillin-sulbactam  3,000 mg IntraVENous Q6H    ipratropium-albuterol  1 ampule Inhalation TID     oxyCODONE-acetaminophen, perflutren lipid microspheres, albuterol, sodium chloride flush, heparin (porcine), heparin (porcine), sodium chloride flush, sodium chloride, polyethylene glycol, ondansetron **OR** ondansetron, acetaminophen **OR** acetaminophen, magnesium sulfate, potassium chloride **OR** potassium alternative oral replacement **OR** potassium chloride  IV Drips/Infusions   heparin (PORCINE) Infusion 13 Units/kg/hr (02/16/23 0507)    sodium chloride 10 mL/hr at 02/16/23 0844    sodium chloride 50 mL/hr at 02/15/23 1637     Home Medications  Medications Prior to Admission: pantoprazole (PROTONIX) 40 MG tablet, Take 1 tablet by mouth every morning (before breakfast)  aspirin 81 MG chewable tablet, Take 1 tablet by mouth daily  arformoterol tartrate (BROVANA) 15 MCG/2ML NEBU, Take 2 mLs by nebulization in the morning and 2 mLs in the evening. (Patient not taking: Reported on 2/16/2023)  budesonide (PULMICORT) 0.5 MG/2ML nebulizer suspension, Take 2 mLs by nebulization in the morning and 2 mLs in the evening.  (Patient not taking: Reported on 2/16/2023)  metoprolol succinate (TOPROL XL) 25 MG extended release tablet, Take 1 tablet by mouth daily  polyethylene glycol (GLYCOLAX) 17 g packet, Take 17 g by mouth daily as needed for Constipation  nicotine (NICODERM CQ) 14 MG/24HR, Place 1 patch onto the skin daily  Ergocalciferol (VITAMIN D) 91286 units CAPS, Take 50,000 Units by mouth once a week (Patient not taking: Reported on 2/16/2023)  predniSONE (DELTASONE) 10 MG tablet, Take 20 mg twice a day for 3 days, take 20 mg in the morning and 10 mg at night for 3 days, take 10 mg twice a day for 3 days, take 10 mg only in the morning for 3 days, then stop  bumetanide (BUMEX) 1 MG tablet, Take 1 tablet by mouth daily  QUEtiapine (SEROQUEL) 400 MG tablet, Take 400 mg by mouth at bedtime  lamoTRIgine (LAMICTAL) 100 MG tablet, Take 100 mg by mouth daily  divalproex (DEPAKOTE ER) 500 MG extended release tablet, Take 2 tablets by mouth daily (Patient taking differently: Take 1,000 mg by mouth nightly)  QUEtiapine (SEROQUEL XR) 150 MG TB24 extended release tablet, Take 1 tablet by mouth daily (Patient taking differently: Take 150 mg by mouth at bedtime)    Allergies    Patient has no known allergies. Social History     Social History     Tobacco Use    Smoking status: Some Days     Types: Cigarettes, Pipe    Smokeless tobacco: Never    Tobacco comments:     unable to identify an amount   Substance Use Topics    Alcohol use: No     Family History          Problem Relation Age of Onset    Cancer Father         Lung    Stroke Father     Cancer Brother         Lymphoma    Mental Illness Other         Aunt     ROS - 11 systems   General Denies any fever or chills  HEENT Denies any diplopia, tinnitus or vertigo  Resp positive for  cough with sputum and dyspnea  Cardiac Denies any chest pain, palpitations, claudication or edema  GI Denies any melena, hematochezia, hematemesis or pyrosis   Denies any frequency, urgency, hesitancy or incontinence  Heme Denies bruising or bleeding easily  Endocrine Denies any history of diabetes or thyroid disease  Neuro Denies any focal motor or sensory deficits  Psychiatric Denies anxiety, depression, suicidal ideation  Skin Denies rashes, itching, open sores    Vitals     height is 5' 10\" (1.778 m) and weight is 200 lb (90.7 kg). His oral temperature is 97.3 °F (36.3 °C). His blood pressure is 144/71 (abnormal) and his pulse is 70. His respiration is 20 and oxygen saturation is 98%. Body mass index is 28.7 kg/m². I/O      Intake/Output Summary (Last 24 hours) at 2/16/2023 0948  Last data filed at 2/16/2023 0253  Gross per 24 hour   Intake --   Output 575 ml   Net -575 ml     I/O last 3 completed shifts:  In: -   Out: 575 [Urine:575]   Patient Vitals for the past 96 hrs (Last 3 readings):   Weight   02/15/23 1001 200 lb (90.7 kg)     Exam   General Appearance Awake, alert, oriented, in no acute distress  HEENT - Head is normocephalic, atraumatic.  Pupil reactive to light  Neck - Supple,  trachea midline and straight  Lungs -decreased air exchange, positive wheezing  Cardiovascular - Heart sounds are normal.  Regular rhythm normal rate without murmur, gallop or rub. Abdomen - Soft, nontender, nondistended, no masses or organomegaly  Neurologic - CN II-XII are grossly intact.  There are no focal motor or sensory deficits  Skin - No bruising or bleeding  Extremities - No cyanosis, clubbing or edema    Labs  - Old records and notes have been reviewed in [facility]   CBC     Lab Results   Component Value Date/Time    WBC 5.1 02/16/2023 04:19 AM    RBC 3.65 02/16/2023 04:19 AM    HGB 11.5 02/16/2023 04:19 AM    HCT 37.0 02/16/2023 04:19 AM     02/16/2023 04:19 AM    .4 02/16/2023 04:19 AM    MCH 31.5 02/16/2023 04:19 AM    MCHC 31.1 02/16/2023 04:19 AM    RDW 15.2 02/16/2023 04:19 AM    LYMPHOPCT 14 02/16/2023 04:19 AM    MONOPCT 6 02/16/2023 04:19 AM    BASOPCT 0 02/16/2023 04:19 AM    MONOSABS 0.31 02/16/2023 04:19 AM    LYMPHSABS 0.71 02/16/2023 04:19 AM    EOSABS 0.00 02/16/2023 04:19 AM    BASOSABS 0.00 02/16/2023 04:19 AM    DIFFTYPE NOT REPORTED 07/19/2018 12:03 PM     BMP   Lab Results   Component Value Date/Time     02/16/2023 04:19 AM    K 3.3 02/16/2023 04:19 AM    CL 96 02/16/2023 04:19 AM    CO2 40 02/16/2023 04:19 AM    BUN 12 02/16/2023 04:19 AM    CREATININE 1.08 02/16/2023 04:19 AM    GLUCOSE 188 02/16/2023 04:19 AM    CALCIUM 8.2 02/16/2023 04:19 AM    MG 2.1 02/16/2023 04:19 AM     LFTS  Lab Results   Component Value Date/Time    ALKPHOS 51 01/14/2023 05:31 AM    ALT 16 01/14/2023 05:31 AM    AST 27 01/14/2023 05:31 AM    PROT 6.6 01/14/2023 05:31 AM    BILITOT 0.2 01/14/2023 05:31 AM    BILIDIR <0.1 01/13/2023 08:24 AM    IBILI Can not be calculated 01/13/2023 08:24 AM    LABALBU 3.0 01/27/2023 05:29 AM     PTT  Lab Results   Component Value Date    APTT 27.0 02/15/2023     INR   Lab Results   Component Value Date    INR 1.1 02/15/2023    PROTIME 13.7 02/15/2023       Radiology    CXR       CT Scans        (See actual reports for details)    \"Thank you for asking us to see this patient\"    Case discussed with nurse and patient. Questions and concerns addressed.     Electronically signed by     Les Fried MD on 2/16/2023 at 2:33 PM  Pulmonary Critical Care and Sleep Medicine,  [facility] [place]  Cell: [phone]  Office: [phone]

## 2024-09-16 ENCOUNTER — CLINICAL SUPPORT (OUTPATIENT)
Dept: CARDIAC REHAB | Facility: HOSPITAL | Age: 74
End: 2024-09-16
Payer: MEDICARE

## 2024-09-16 DIAGNOSIS — I25.118 CORONARY ARTERY DISEASE OF NATIVE ARTERY OF NATIVE HEART WITH STABLE ANGINA PECTORIS: Primary | ICD-10-CM

## 2024-09-16 PROCEDURE — 93798 PHYS/QHP OP CAR RHAB W/ECG: CPT | Performed by: STUDENT IN AN ORGANIZED HEALTH CARE EDUCATION/TRAINING PROGRAM

## 2024-09-18 ENCOUNTER — CLINICAL SUPPORT (OUTPATIENT)
Dept: CARDIAC REHAB | Facility: HOSPITAL | Age: 74
End: 2024-09-18
Payer: MEDICARE

## 2024-09-18 DIAGNOSIS — I25.118 CORONARY ARTERY DISEASE OF NATIVE ARTERY OF NATIVE HEART WITH STABLE ANGINA PECTORIS: Primary | ICD-10-CM

## 2024-09-18 PROCEDURE — 93798 PHYS/QHP OP CAR RHAB W/ECG: CPT | Performed by: INTERNAL MEDICINE

## 2024-09-20 ENCOUNTER — CLINICAL SUPPORT (OUTPATIENT)
Dept: CARDIAC REHAB | Facility: HOSPITAL | Age: 74
End: 2024-09-20
Payer: MEDICARE

## 2024-09-20 ENCOUNTER — TELEPHONE (OUTPATIENT)
Dept: INTERNAL MEDICINE | Facility: CLINIC | Age: 74
End: 2024-09-20
Payer: MEDICARE

## 2024-09-20 DIAGNOSIS — I25.118 CORONARY ARTERY DISEASE OF NATIVE ARTERY OF NATIVE HEART WITH STABLE ANGINA PECTORIS: Primary | ICD-10-CM

## 2024-09-20 PROCEDURE — 93798 PHYS/QHP OP CAR RHAB W/ECG: CPT | Performed by: INTERNAL MEDICINE

## 2024-09-23 ENCOUNTER — CLINICAL SUPPORT (OUTPATIENT)
Dept: CARDIAC REHAB | Facility: HOSPITAL | Age: 74
End: 2024-09-23
Payer: MEDICARE

## 2024-09-23 DIAGNOSIS — I25.118 CORONARY ARTERY DISEASE OF NATIVE ARTERY OF NATIVE HEART WITH STABLE ANGINA PECTORIS: Primary | ICD-10-CM

## 2024-09-23 PROCEDURE — 93798 PHYS/QHP OP CAR RHAB W/ECG: CPT | Performed by: STUDENT IN AN ORGANIZED HEALTH CARE EDUCATION/TRAINING PROGRAM

## 2024-09-25 ENCOUNTER — OFFICE VISIT (OUTPATIENT)
Dept: PHYSICAL MEDICINE AND REHAB | Facility: CLINIC | Age: 74
End: 2024-09-25
Payer: MEDICARE

## 2024-09-25 ENCOUNTER — CLINICAL SUPPORT (OUTPATIENT)
Dept: CARDIAC REHAB | Facility: HOSPITAL | Age: 74
End: 2024-09-25
Payer: MEDICARE

## 2024-09-25 VITALS — WEIGHT: 183 LBS | RESPIRATION RATE: 14 BRPM | BODY MASS INDEX: 25.62 KG/M2 | HEIGHT: 71 IN

## 2024-09-25 DIAGNOSIS — G56.03 BILATERAL CARPAL TUNNEL SYNDROME: ICD-10-CM

## 2024-09-25 DIAGNOSIS — M54.16 LUMBAR RADICULOPATHY: ICD-10-CM

## 2024-09-25 DIAGNOSIS — I25.118 CORONARY ARTERY DISEASE OF NATIVE ARTERY OF NATIVE HEART WITH STABLE ANGINA PECTORIS: Primary | ICD-10-CM

## 2024-09-25 DIAGNOSIS — M54.12 CERVICAL RADICULOPATHY: Primary | ICD-10-CM

## 2024-09-25 PROCEDURE — 99999 PR PBB SHADOW E&M-EST. PATIENT-LVL III: CPT | Mod: PBBFAC,,, | Performed by: PHYSICAL MEDICINE & REHABILITATION

## 2024-09-25 PROCEDURE — 93798 PHYS/QHP OP CAR RHAB W/ECG: CPT | Performed by: STUDENT IN AN ORGANIZED HEALTH CARE EDUCATION/TRAINING PROGRAM

## 2024-09-25 PROCEDURE — 95913 NRV CNDJ TEST 13/> STUDIES: CPT | Mod: PBBFAC | Performed by: PHYSICAL MEDICINE & REHABILITATION

## 2024-09-25 PROCEDURE — 95885 MUSC TST DONE W/NERV TST LIM: CPT | Mod: PBBFAC | Performed by: PHYSICAL MEDICINE & REHABILITATION

## 2024-09-25 PROCEDURE — 99213 OFFICE O/P EST LOW 20 MIN: CPT | Mod: PBBFAC,25 | Performed by: PHYSICAL MEDICINE & REHABILITATION

## 2024-09-25 NOTE — PROGRESS NOTES
[facility]   [address]  [place] [address]  Phone: [phone]        Full Name: deysi ellis YOB: 1950  Patient ID: [id number]      Visit Date: 9/25/2024 15:09  Age: 73 Years 10 Months Old  Examining Physician: Vaishnavi Dickinson M.D.  Referring Physician: JUANA Palacios  Reason for Referral: upper and lower extr        Chief Complaint   Patient presents with    Hand Pain    foot pain       HPI: This is a 73 y.o.  male being seen in clinic today for evaluation of chronic hand numbness/tingling with a history of CTS and s/p CTR around 2019. He has persistent tingling again over the past year.  He has numbness/tingling in the bottom of his feet and occasional low back pain.  Position change and rest provide some relief.     History obtained from patient    Past family, medical, social, and surgical history reviewed in chart    Review of Systems:     General- denies lethargy, weight change, fever, chills  Head/neck- denies swallowing difficulties  ENT- denies hearing changes  Cardiovascular-denies chest pain  Pulmonary- denies shortness of breath  GI- denies constipation or bowel incontinence  - denies bladder incontinence  Skin- denies wounds or rashes  Musculoskeletal- denies weakness, +/- pain  Neurologic- + numbness and tingling  Psychiatric- denies depressive or psychotic features, denies anxiety  Lymphatic-denies swelling  Endocrine- denies hypoglycemic symptoms/DM history  All other pertinent systems negative     Physical Examination:  General: Well developed, well nourished male, NAD  HEENT:NCAT EOMI bilaterally   Pulmonary:Normal respirations    Spinal Examination: CERVICAL  Active ROM is within normal limits.  Inspection: No deformity of spinal alignment.    Spinal Examination: LUMBAR or THORACIC  Active ROM is within normal limits.  Inspection: No deformity of spinal alignment.    SLR neg    Musculoskeletal Tests:  Phalen: neg  Elbow compression (ulnar): neg  Tinels  at wrist: neg    Bilateral Upper and Lower Extremities:  Pulses are 2+ at radial bilaterally.  Shoulder/Elbow/Wrist/Hand ROM wnl  Hip/Knee/Ankle ROM wnl  Bilateral Extremities show normal capillary refill.  No signs of cyanosis, rubor, edema, skin changes, or dysvascular changes of appendages.  Nails appear intact.    Neurological Exam:  Cranial Nerves:  II-XII grossly intact    Manual Muscle Testing: (Motor 5=normal)  5/5 strength bilateral upper/lower extremities    No focal atrophy is noted of either upper or lower extremity.    Bilateral Reflexes:  Lea's response is absent bilaterally.    Sensation: tested to light touch  - intact in all four limbs.    Gait: Narrow base and good arm swing.      Entire procedure explained to patient prior to proceeding.  Verbal consent obtained        SNC      Nerve / Sites Rec. Site Onset Lat Peak Lat Amp Segments Distance Peak Diff Velocity     ms ms µV  mm ms m/s   R Median - Digit II (Antidromic)      Wrist Dig II 3.6 4.5 12.1 Wrist - Dig   38   L Median - Digit II (Antidromic)      Wrist Dig II 3.8 4.7 10.1 Wrist - Dig   37   R Ulnar - Digit V (Antidromic)      Wrist Dig V 2.9 3.6 12.0 Wrist - Dig V 140  49   L Ulnar - Digit V (Antidromic)      Wrist Dig V 2.7 3.5 8.4 Wrist - Dig V 140  52   R Radial - Anatomical snuff box (Forearm)      Forearm Wrist 1.3 2.2 8.3 Forearm - Wrist 100  80      2 Wrist 1.5 2.1 11.7 2 - Forearm  -0.1    L Radial - Anatomical snuff box (Forearm)      Forearm Wrist 2.1 2.6 12.7 Forearm - Wrist 100  48   R Sural - Ankle (Calf)      Calf Ankle 2.5 3.3 11.1 Calf - Ankle 140  56   R Superficial peroneal - Ankle      Lat leg Ankle 2.4 3.0 7.2 Lat leg - Ankle 140  57       MNC      Nerve / Sites Muscle Latency Amplitude Duration Rel Amp Segments Distance Lat Diff Velocity     ms mV ms %  mm ms m/s   R Median - APB      Wrist APB 5.5 5.3 6.9 100 Wrist - APB 80        Elbow APB 10.2 4.5 7.7 85.9 Elbow - Wrist 290 4.7 62   L Median - APB       Wrist APB 5.3 4.4 7.1 100 Wrist - APB 80        Elbow APB 9.9 4.3 7.7 98 Elbow - Wrist 250 4.7 53   R Ulnar - ADM      Wrist ADM 3.0 6.7 6.4 100 Wrist -         B. Elbow ADM 7.1 6.6 6.2 98.4 B. Elbow - Wrist 230 4.1 56      A. Elbow ADM 9.2 6.5 6.6 99 A. Elbow - B. Elbow 110 2.0 54   L Ulnar - ADM      Wrist ADM 2.9 7.2 6.2 100 Wrist -         B. Elbow ADM 7.3 6.7 6.6 92.3 B. Elbow - Wrist 250 4.4 56      A. Elbow ADM 9.5 6.3 6.9 93.6 A. Elbow - B. Elbow 110 2.2 50   R Peroneal - EDB      Ankle EDB 4.5 4.9 4.8 100 Ankle - EDB 80        Fibular head EDB 11.9 4.7 5.5 97 Fibular head - Ankle 340 7.4 46      Pop fossa EDB 13.2 4.7 5.5 99.1 Pop fossa - Fibular head 60 1.3 46   R Tibial - AH      Ankle AH 5.1 5.2 3.3 100 Ankle - Ankle 80        Pop fossa AH 15.3 3.3 4.2 63.7 Pop fossa - Ankle 470 10.2 46       EMG         EMG Summary Table     Spontaneous MUAP Recruitment   Muscle IA Fib PSW Fasc Other Dur. Dur Amp Dur Polys Pattern Effort   R. Rectus femoris N None None None . _NFT_ _NFT_ N N N N Max   R. Extensor digitorum brevis N None None None . _NFT_ _NFT_ Sl Incr Sl Incr 1+ Reduced Max   R. Abductor hallucis Incr 1+ 1+ None . _NFT_ _NFT_ Sl Incr N 1+ Reduced Max   L. Extensor digitorum brevis N None None None . _NFT_ _NFT_ N Sl Incr 1+ Reduced Max   L. Abductor hallucis Incr None None None . _NFT_ _NFT_ N Sl Incr 1+ sl red Max   R. First dorsal interosseous N None None None . _NFT_ _NFT_ N Sl Incr 1+ sl red Max   L. First dorsal interosseous N None None None . _NFT_ _NFT_ N Sl Incr 1+ sl red Max                                                    INTERPRETATION  -Bilateral median motor nerve conduction study showed prolonged latency, normal amplitude, and conduction velocity  -Bilateral median sensory nerve conduction study showed prolonged peak latency and normal amplitude  -Bilateral ulnar motor nerve conduction study showed normal latency, amplitude, and conduction velocity  -Bilateral ulnar  sensory nerve conduction study showed normal peak latency and amplitude  -Bilateral radial sensory nerve conduction study showed normal peak latency and amplitude  -Right superficial peroneal sensory nerve conduction study showed normal peak latency and amplitude  -Right sural sensory nerve conduction study showed normal peak latency and amplitude  -Right peroneal motor nerve conduction study showed normal latency, amplitude, and conduction velocity  -Right tibial motor nerve conduction study showed normal latency, amplitude, and conduction velocity  -Needle EMG examination performed to above mentioned muscles       IMPRESSION  ABNORMAL study  2. There is electrodiagnostic evidence of a moderate demyelinating median neuropathy (Carpal tunnel syndrome) across bilateral wrists and a chronic radiculopathy of the C8, T1 nerve roots.  There is an acute on chronic radiculopathy of the right S1 nerve root,  a subacute on chronic radiculopathy of the left S1 nerve root, and a chronic radiculopathy of the L5 nerve roots    PLAN  Discussed in detail for greater than 30 minutes about diagnosis and treatment plan    1. Follow up with referring provider: Al Daugherty  2. Handouts on CTS, radic provided. Rec referral to PT and IPM. Fu with ortho  3. This study is good for one year. If symptoms worsen or do not improve, please re-consult.    Vaishnavi Dickinson M.D.  Physical Medicine and Rehab

## 2024-09-26 ENCOUNTER — TELEPHONE (OUTPATIENT)
Dept: DIABETES | Facility: CLINIC | Age: 74
End: 2024-09-26
Payer: MEDICARE

## 2024-09-26 ENCOUNTER — PATIENT MESSAGE (OUTPATIENT)
Dept: DIABETES | Facility: CLINIC | Age: 74
End: 2024-09-26
Payer: MEDICARE

## 2024-09-26 DIAGNOSIS — M54.16 LUMBAR RADICULOPATHY: ICD-10-CM

## 2024-09-26 DIAGNOSIS — Z98.890 STATUS POST CARPAL TUNNEL RELEASE: ICD-10-CM

## 2024-09-26 DIAGNOSIS — M54.12 CERVICAL RADICULOPATHY: ICD-10-CM

## 2024-09-26 DIAGNOSIS — G56.03 BILATERAL CARPAL TUNNEL SYNDROME: Primary | ICD-10-CM

## 2024-09-26 NOTE — TELEPHONE ENCOUNTER
Referral to Orthopedics - carpal tunnel syndrome s/p CTR on EMG     Referral to Interventional Spine - Cervical and Lumbar radiculopathy on EMG      Al Daugherty PA-C, BC-ADM  [facility]

## 2024-09-26 NOTE — TELEPHONE ENCOUNTER
Spoke with patient where he confirmed yes he will like a referral in  interventional pain spine specialist. Pt was also informed he had EMG done. He had evidence of carpal tunnel syndrome in bilateral wrists and pinched nerves in neck and back.     I know he has a hand doctor. No           If he would like a referral to someone different please let me know. Yes he will like a referral placed

## 2024-09-26 NOTE — TELEPHONE ENCOUNTER
Please let pt know I see he had EMG done. He had evidence of carpal tunnel syndrome in bilateral wrists and pinched nerves in neck and back.   I know he has a hand doctor. If he would like a referral to someone different please let me know. For the pinched nerves in back and neck I recommend he see an interventional pain spine specialist. Ask him if he has one already or would like me to refer him to the team with [facility].     Al Daugherty, MYRNA, BC-ADM  [facility]

## 2024-09-27 ENCOUNTER — CLINICAL SUPPORT (OUTPATIENT)
Dept: CARDIAC REHAB | Facility: HOSPITAL | Age: 74
End: 2024-09-27
Payer: MEDICARE

## 2024-09-27 ENCOUNTER — TELEPHONE (OUTPATIENT)
Dept: PAIN MEDICINE | Facility: CLINIC | Age: 74
End: 2024-09-27
Payer: MEDICARE

## 2024-09-27 DIAGNOSIS — I25.118 CORONARY ARTERY DISEASE OF NATIVE ARTERY OF NATIVE HEART WITH STABLE ANGINA PECTORIS: Primary | ICD-10-CM

## 2024-09-27 PROCEDURE — 93798 PHYS/QHP OP CAR RHAB W/ECG: CPT | Performed by: INTERNAL MEDICINE

## 2024-09-30 ENCOUNTER — OFFICE VISIT (OUTPATIENT)
Dept: PODIATRY | Facility: CLINIC | Age: 74
End: 2024-09-30
Payer: MEDICARE

## 2024-09-30 ENCOUNTER — CLINICAL SUPPORT (OUTPATIENT)
Dept: CARDIAC REHAB | Facility: HOSPITAL | Age: 74
End: 2024-09-30
Payer: MEDICARE

## 2024-09-30 ENCOUNTER — TELEPHONE (OUTPATIENT)
Dept: PAIN MEDICINE | Facility: CLINIC | Age: 74
End: 2024-09-30
Payer: MEDICARE

## 2024-09-30 DIAGNOSIS — Z79.4 TYPE 2 DIABETES MELLITUS WITH OTHER NEUROLOGIC COMPLICATION, WITH LONG-TERM CURRENT USE OF INSULIN: ICD-10-CM

## 2024-09-30 DIAGNOSIS — L90.9 PLANTAR FAT PAD ATROPHY: ICD-10-CM

## 2024-09-30 DIAGNOSIS — M79.672 BILATERAL FOOT PAIN: Primary | ICD-10-CM

## 2024-09-30 DIAGNOSIS — M79.671 BILATERAL FOOT PAIN: Primary | ICD-10-CM

## 2024-09-30 DIAGNOSIS — E11.49 TYPE 2 DIABETES MELLITUS WITH OTHER NEUROLOGIC COMPLICATION, WITH LONG-TERM CURRENT USE OF INSULIN: ICD-10-CM

## 2024-09-30 DIAGNOSIS — Q82.8 POROKERATOSIS: ICD-10-CM

## 2024-09-30 DIAGNOSIS — I25.118 CORONARY ARTERY DISEASE OF NATIVE ARTERY OF NATIVE HEART WITH STABLE ANGINA PECTORIS: Primary | ICD-10-CM

## 2024-09-30 PROCEDURE — 93798 PHYS/QHP OP CAR RHAB W/ECG: CPT | Performed by: INTERNAL MEDICINE

## 2024-09-30 PROCEDURE — 99213 OFFICE O/P EST LOW 20 MIN: CPT | Mod: 25,S$PBB,, | Performed by: PODIATRIST

## 2024-09-30 PROCEDURE — 99999 PR PBB SHADOW E&M-EST. PATIENT-LVL III: CPT | Mod: PBBFAC,,, | Performed by: PODIATRIST

## 2024-09-30 PROCEDURE — 11056 PARNG/CUTG B9 HYPRKR LES 2-4: CPT | Mod: Q9,S$PBB,, | Performed by: PODIATRIST

## 2024-09-30 PROCEDURE — 11056 PARNG/CUTG B9 HYPRKR LES 2-4: CPT | Mod: Q9,PBBFAC | Performed by: PODIATRIST

## 2024-09-30 PROCEDURE — 99213 OFFICE O/P EST LOW 20 MIN: CPT | Mod: PBBFAC,25 | Performed by: PODIATRIST

## 2024-09-30 NOTE — PROGRESS NOTES
Subjective:       Patient ID: Timothy Ortega is a 73 y.o. male.    Chief Complaint: Bunions (Right bunion: pt denies pain, pt is diabetic, states when walking pain in feet increases )    HPI: Timothy Ortega presents to the office today, with areas of concern to the plantar aspect of the right and left foot. States pain to the big toe.  Reports 4/10 pain.  Continues to have the sensation of walking on rocks albeit denies puncture wounds or injuries.  Does have history of diabetes mellitus. Follows with JUANA Faria with last appointment on 2024    Review of patient's allergies indicates:  No Known Allergies    Past Medical History:   Diagnosis Date    Coronary artery disease     [facility] - no stents    DM (diabetes mellitus)      lunch 10/28/2016    DM (diabetes mellitus)      am 2021    DM (diabetes mellitus)     BS 96 am 2022 Insulin for years    Groin pain, left 2021    Hyperlipemia     Hypertension     Kidney stones     Nephrolithiasis 2021    Neuropathy     Type 2 diabetes mellitus 10-12 years     am 10/31/2014       Family History   Problem Relation Name Age of Onset    Diabetes Mother      Glaucoma Mother      Heart disease Mother  75        CAB    Diabetes Father      Heart attack Father  58        Fatal MI    Diabetes Brother      Diabetes Sister      Diabetes Sister      Cataracts Paternal Uncle      Cataracts Maternal Grandmother         Social History     Socioeconomic History    Marital status:    Tobacco Use    Smoking status: Former     Current packs/day: 0.00     Average packs/day: 1 pack/day for 20.0 years (20.0 ttl pk-yrs)     Types: Cigarettes     Start date: 1980     Quit date: 2000     Years since quittin.7     Passive exposure: Past    Smokeless tobacco: Never   Substance and Sexual Activity    Alcohol use: No     Alcohol/week: 0.0 standard drinks of alcohol    Drug use: No    Sexual activity: Yes     Partners:  Female   Social History Narrative    . Lives with spouse. Has 2 children. Retired. No pets or smokers in household.     Social Drivers of Health     Financial Resource Strain: Patient Declined (5/23/2024)    Overall Financial Resource Strain (CARDIA)     Difficulty of Paying Living Expenses: Patient declined   Food Insecurity: Patient Declined (5/23/2024)    Hunger Vital Sign     Worried About Running Out of Food in the Last Year: Patient declined     Ran Out of Food in the Last Year: Patient declined   Transportation Needs: No Transportation Needs (5/29/2024)    TRANSPORTATION NEEDS     Transportation : No   Physical Activity: Insufficiently Active (5/23/2024)    Exercise Vital Sign     Days of Exercise per Week: 1 day     Minutes of Exercise per Session: 20 min   Stress: No Stress Concern Present (5/23/2024)    [place] [employer] - Occupational Stress Questionnaire     Feeling of Stress : Not at all   Housing Stability: Unknown (5/23/2024)    Housing Stability Vital Sign     Unable to Pay for Housing in the Last Year: Patient declined       Past Surgical History:   Procedure Laterality Date    ANGIOGRAM, CORONARY, WITH LEFT HEART CATHETERIZATION N/A 5/28/2024    Procedure: Angiogram, Coronary, with Left Heart Cath;  Surgeon: Rojelio Wadsworth MD;  Location: [facility] CATH LAB;  Service: Cardiology;  Laterality: N/A;    CARPAL TUNNEL RELEASE Right 07/2020    COLONOSCOPY N/A 03/31/2017    Procedure: COLONOSCOPY;  Surgeon: Cornelius Ibarra MD;  Location: [facility] ENDO;  Service: Endoscopy;  Laterality: N/A;    COLONOSCOPY N/A 04/18/2022    Procedure: COLONOSCOPY;  Surgeon: Geneva Serna MD;  Location: [facility] ENDO;  Service: Endoscopy;  Laterality: N/A;    DENTAL SURGERY      Implant    HAND SURGERY      KNEE ARTHROSCOPY Left     LITHOTRIPSY, CORONARY TRANSLUMINAL, PERCUTANEOUS  5/28/2024    Procedure: LITHOTRIPSY, CORONARY TRANSLUMINAL, PERCUTANEOUS;  Surgeon: Rojelio Wadsworth MD;   Location: [facility] CATH LAB;  Service: Cardiology;;    PTCA, SINGLE VESSEL  5/28/2024    Procedure: PTCA, Single Vessel;  Surgeon: Rojelio Wadsworth MD;  Location: [facility] CATH LAB;  Service: Cardiology;;    ROBOT-ASSISTED LAPAROSCOPIC REPAIR OF INGUINAL HERNIA USING DA JOSE ANGEL XI Left 02/26/2021    Procedure: XI ROBOTIC REPAIR, HERNIA, INGUINAL;  Surgeon: Tavon Cruz MD;  Location: [facility] OR;  Service: General;  Laterality: Left;    STENT, DRUG ELUTING, SINGLE VESSEL, CORONARY  5/28/2024    Procedure: Stent, Drug Eluting, Single Vessel, Coronary;  Surgeon: Rojelio Wadsworth MD;  Location: [facility] CATH LAB;  Service: Cardiology;;       Review of Systems       Objective:   There were no vitals taken for this visit.    Exercise Stress - EKG    The ECG portion of the study is negative for ischemia. Sensitivity is   reduced secondary to the target heart rate not being achieved.    Sensitivity is reduced secondary to the target heart rate not being   achieved.    The patient reported no chest pain during the stress test.    The patient exercised for 15 minutes 40 seconds on a modified Jason   protocol, corresponding to a functional capacity of 10METS, achieving a   peak heart rate of 122 bpm, which is 82% of the age predicted maximum   heart rate. The patient reported no symptoms during the stress test. The   test was stopped because the patient requested it, due to dyspnea and   tired.         Physical Exam   LOWER EXTREMITY PHYSICAL EXAMINATION    Vascular:  The right dorsalis pedis pulse 2/4 and the right posterior tibial pulse 2/4.  The left dorsalis pedis pulse 2/4 and posterior tibial pulse on the left is 2/4.  Capillary refill is intact.  Pedal hair growth intact    Neurological:  Protective sensation is intact with [place] monofilament. Proprioception is intact. Intact sensation to light touch.  Vibratory sensation is diminished to the 1st metatarsal phalangeal joint.     Dermatological:  Skin is supple and moist.   Multiple lesions present sub right foot and left foot Total of 4 lesions noted. No signs of underlying ulceration. Fat pad atrophy present. No signs of infection.     Imaging:     X-Ray Foot Complete Right    Narrative  DATE OF EXAM: Jun 7 2012    BOC   0106  -  FOOT 3 VIEWS MINIMUM RIGHT:   \  [id number]    CLINICAL HISTORY:   \729.5 0 PAIN IN LIMB    PROCEDURE COMMENT:   \    ICD 9 CODE(S):   (\)    CPT 4 CODE(S)/MODIFIER(S):   (\)    Findings: Osseous structures do not demonstrate any evidence of acute  fracture.  There are calcaneal enthesophytes noted.    Impression  Calcaneal enthesophytes.  ______________________________________    Electronically signed by: APOLONIA MURRAY MD  Date:     06/07/12  Time:    11:20        : NAVIN  Transcribe Date/Time: Jun 7 2012 11:20A  Dictated by : APOLONIA MURRAY MD  Report reviewed by:  Read On:  \  Images were reviewed, findings were verified and document was  electronically  SIGNED BY: APOLONIA MURRAY MD On: Jun 7 2012 11:20A    Assessment:     1. Bilateral foot pain    2. Plantar fat pad atrophy    3. Porokeratosis    4. Type 2 diabetes mellitus with other neurologic complication, with long-term current use of insulin        Plan:     Bilateral foot pain    Plantar fat pad atrophy    Porokeratosis    Type 2 diabetes mellitus with other neurologic complication, with long-term current use of insulin      Foot counseling and education is provided at this visit. Patient is advised to wear socks and shoes at all times.  Do not walk barefoot, or with just socks, even when indoors.  Be sure to check and inspect the inside of the shoe before putting them on her feet.  Protect your feet at all times.  Walking shoes and/or athletic shoes are the best types of shoe gear. Do not wear vinyl or plastic type shoe gear, as they do not stretch and/or breathe.  Protect your feet from hot and/or cold. Elevate the extremities when sitting.  Do not wear excessively tight socks  and/or shoe gear. Wiggle your toes for a few minutes throughout the day. Move your ankles up and down, in and out, to help blood flow in your lower extremity.    We discussed the etiology and pathology associated with acquired plantar porokeratosis.  We discussed the importance of removing the centralize core and alleviating pain discomfort.  We also discussed utilizing a pumice stone at home to reduce callus formation and subsequent recurrence of this area.  Recommend to apply hydrating creams and lotions this area daily to ensure that there is no subsequent buildup.    Porokeratotic skin formation, as outlined within the examination portion of this note, is surgically debrided with sharp #10/#15 blade, to alleviate discomfort with weight bearing and ambulation, and to lessen the possibility of skin complications, e.g., ulceration due to pressure. No ulceration(s) is are noted with/post debridement. The lesion is completed healed and resolved. No evidence of infection.     Encourage patient to wearing exogenous padding gel inserts to some apply cushioning to the foot and reduce plantar pressure given history of plantar fat pad atrophy.    Future Appointments   Date Time Provider Department Center   10/7/2024  9:20 AM Iza Miranda MD HG ORTHO [facility]   10/10/2024  9:40 AM Shlomo Ochoa MD HG IM [facility]   3/7/2025  8:30 AM Al Daugherty PA-C HG DIABETE [facility]   3/21/2025 10:30 AM Lejeune, Elizabeth B, NP [facility] PULMSVC [facility]   5/13/2025  8:40 AM Naveen Gr MD HG CARDIO [facility]   7/11/2025  9:35 AM LABORATORY, [facility] LAB [facility]   7/17/2025  3:00 PM Bandar Garcia MD HG UROLOGY [facility]

## 2024-10-01 ENCOUNTER — TELEPHONE (OUTPATIENT)
Dept: PAIN MEDICINE | Facility: CLINIC | Age: 74
End: 2024-10-01
Payer: MEDICARE

## 2024-10-02 ENCOUNTER — CLINICAL SUPPORT (OUTPATIENT)
Dept: CARDIAC REHAB | Facility: HOSPITAL | Age: 74
End: 2024-10-02
Payer: MEDICARE

## 2024-10-02 DIAGNOSIS — I25.118 CORONARY ARTERY DISEASE OF NATIVE ARTERY OF NATIVE HEART WITH STABLE ANGINA PECTORIS: Primary | ICD-10-CM

## 2024-10-02 PROCEDURE — 93798 PHYS/QHP OP CAR RHAB W/ECG: CPT | Performed by: STUDENT IN AN ORGANIZED HEALTH CARE EDUCATION/TRAINING PROGRAM

## 2024-10-04 ENCOUNTER — CLINICAL SUPPORT (OUTPATIENT)
Dept: CARDIAC REHAB | Facility: HOSPITAL | Age: 74
End: 2024-10-04
Payer: MEDICARE

## 2024-10-04 ENCOUNTER — TELEPHONE (OUTPATIENT)
Dept: PAIN MEDICINE | Facility: CLINIC | Age: 74
End: 2024-10-04
Payer: MEDICARE

## 2024-10-04 DIAGNOSIS — I25.118 CORONARY ARTERY DISEASE OF NATIVE ARTERY OF NATIVE HEART WITH STABLE ANGINA PECTORIS: Primary | ICD-10-CM

## 2024-10-04 PROCEDURE — 93798 PHYS/QHP OP CAR RHAB W/ECG: CPT | Performed by: INTERNAL MEDICINE

## 2024-10-04 NOTE — TELEPHONE ENCOUNTER
----- Message from Ana sent at 10/4/2024  1:44 PM CDT -----  Good Afternoon,      I spoke with pt this afternoon and he is still need and appointment for is back pain. Can you please call him?        Thank you,  Ana Cortez  [facility]

## 2024-10-04 NOTE — TELEPHONE ENCOUNTER
Reached out to patient to schedule appointment from messages. Apt has been made.   Pt understand. All questions answered.     Mann Hummel  Medical Assistant

## 2024-10-10 ENCOUNTER — OFFICE VISIT (OUTPATIENT)
Dept: INTERNAL MEDICINE | Facility: CLINIC | Age: 74
End: 2024-10-10
Payer: MEDICARE

## 2024-10-10 VITALS
DIASTOLIC BLOOD PRESSURE: 68 MMHG | HEIGHT: 71 IN | BODY MASS INDEX: 25.06 KG/M2 | TEMPERATURE: 98 F | OXYGEN SATURATION: 99 % | RESPIRATION RATE: 16 BRPM | SYSTOLIC BLOOD PRESSURE: 114 MMHG | HEART RATE: 64 BPM | WEIGHT: 179 LBS

## 2024-10-10 DIAGNOSIS — M54.16 LUMBAR RADICULOPATHY: ICD-10-CM

## 2024-10-10 DIAGNOSIS — E78.5 HYPERLIPIDEMIA ASSOCIATED WITH TYPE 2 DIABETES MELLITUS: ICD-10-CM

## 2024-10-10 DIAGNOSIS — N20.0 NEPHROLITHIASIS: ICD-10-CM

## 2024-10-10 DIAGNOSIS — Z95.5 H/O HEART ARTERY STENT: ICD-10-CM

## 2024-10-10 DIAGNOSIS — R97.20 ELEVATED PROSTATE SPECIFIC ANTIGEN (PSA): ICD-10-CM

## 2024-10-10 DIAGNOSIS — Z23 NEED FOR VACCINATION: ICD-10-CM

## 2024-10-10 DIAGNOSIS — G47.33 OBSTRUCTIVE SLEEP APNEA: ICD-10-CM

## 2024-10-10 DIAGNOSIS — E11.69 HYPERLIPIDEMIA ASSOCIATED WITH TYPE 2 DIABETES MELLITUS: ICD-10-CM

## 2024-10-10 DIAGNOSIS — E11.59 HYPERTENSION ASSOCIATED WITH DIABETES: ICD-10-CM

## 2024-10-10 DIAGNOSIS — Z86.0100 HISTORY OF COLON POLYPS: ICD-10-CM

## 2024-10-10 DIAGNOSIS — E11.8 TYPE 2 DIABETES MELLITUS WITH COMPLICATION, WITH LONG-TERM CURRENT USE OF INSULIN: ICD-10-CM

## 2024-10-10 DIAGNOSIS — I25.118 CORONARY ARTERY DISEASE OF NATIVE ARTERY OF NATIVE HEART WITH STABLE ANGINA PECTORIS: Primary | ICD-10-CM

## 2024-10-10 DIAGNOSIS — I15.2 HYPERTENSION ASSOCIATED WITH DIABETES: ICD-10-CM

## 2024-10-10 DIAGNOSIS — I70.0 AORTIC ATHEROSCLEROSIS: ICD-10-CM

## 2024-10-10 DIAGNOSIS — Z79.4 TYPE 2 DIABETES MELLITUS WITH COMPLICATION, WITH LONG-TERM CURRENT USE OF INSULIN: ICD-10-CM

## 2024-10-10 DIAGNOSIS — E11.42 DIABETIC POLYNEUROPATHY ASSOCIATED WITH TYPE 2 DIABETES MELLITUS: ICD-10-CM

## 2024-10-10 DIAGNOSIS — G56.03 BILATERAL CARPAL TUNNEL SYNDROME: ICD-10-CM

## 2024-10-10 PROCEDURE — G2211 COMPLEX E/M VISIT ADD ON: HCPCS | Mod: S$PBB,,, | Performed by: PEDIATRICS

## 2024-10-10 PROCEDURE — 90653 IIV ADJUVANT VACCINE IM: CPT | Mod: PBBFAC

## 2024-10-10 PROCEDURE — 99999PBSHW FLU VACCINE - ADJUVANTED: Mod: PBBFAC,,,

## 2024-10-10 PROCEDURE — 99215 OFFICE O/P EST HI 40 MIN: CPT | Mod: PBBFAC | Performed by: PEDIATRICS

## 2024-10-10 PROCEDURE — 99999 PR PBB SHADOW E&M-EST. PATIENT-LVL V: CPT | Mod: PBBFAC,,, | Performed by: PEDIATRICS

## 2024-10-10 PROCEDURE — 99214 OFFICE O/P EST MOD 30 MIN: CPT | Mod: S$PBB,,, | Performed by: PEDIATRICS

## 2024-10-10 PROCEDURE — G0008 ADMIN INFLUENZA VIRUS VAC: HCPCS | Mod: PBBFAC

## 2024-10-10 NOTE — PROGRESS NOTES
Patient ID: Timothy Ortega is a 73 y.o. male.    Chief Complaint: Follow-up    History of Present Illness    CHIEF COMPLAINT:  Patient presents today for regular follow-up.    MUSCULOSKELETAL ISSUES:  He reports ongoing foot issues and believes his back problems may be contributing. He recently saw a podiatrist and is considering surgery, but conservative management options are being attempted first. He acknowledges that foot surgeries typically require a prolonged recovery period. He has not yet been evaluated by an orthopedist for his back concerns due to a previous referral miscommunication, expressing frustration with the delay in addressing these problems. He has an upcoming appointment with Dr. Cortes has a history of carpal tunnel surgery on both hands and reports recurring symptoms related to his hands and wrists. He is scheduled for follow-up visits with specialists to address these ongoing issues. He reports noticeable muscle loss in his legs, acknowledging visible atrophy when examining his lower extremities.    CARDIOVASCULAR HEALTH:  He recently had an appointment with Dr. Henry for his heart disease and stents. Dr. Henry indicated that everything was satisfactory and did not make significant changes to his management. He completed a treadmill test in August, which yielded negative results and demonstrated good exercise capacity.    DIABETES MANAGEMENT:  He reports a recent appointment with Al Daugherty for diabetes management. His A1c was 6.4 and blood sugar readings were good. No changes were made to his diabetes medications during this visit.    UROLOGICAL CONCERNS:  He reports worsening blood in semen. He expresses dissatisfaction with his current urologist, stating the physician is not proactive in addressing his concerns. He had to initiate discussions about labs for his prostate and the blood in his semen during his last visit. He desires to change to a new urologist.    SLEEP  APNEA:  He reports difficulty sleeping at night despite using a CPAP mask for his sleep apnea. He has an upcoming follow-up visit with his sleep specialist in March.    LAB RESULTS:  Urine test shows no evidence of diabetic kidney problems. Blood tests indicate good kidney function. Thyroid test results are within normal range. He has elevated triglycerides.    VACCINATIONS:  He requests a flu vaccine but declines COVID-19 vaccination when offered.    PMH, PSH, SH, FH reviewed with patient.      ROS:  General: -fever, -chills, -fatigue, -weight gain, -weight loss  Eyes: -vision changes, -redness, -discharge  ENT: -ear pain, -nasal congestion, -sore throat  Cardiovascular: -chest pain, -palpitations, -lower extremity edema  Respiratory: -cough, -shortness of breath  Gastrointestinal: -abdominal pain, -nausea, -vomiting, -diarrhea, -constipation, -blood in stool  Genitourinary: -dysuria, -hematuria, -frequency  Musculoskeletal: +joint pain, -muscle pain, +joint stiffness, +muscle weakness  Skin: -rash, -lesion  Neurological: -headache, -dizziness, -numbness, -tingling  Psychiatric: -anxiety, -depression, +sleep difficulty         Exam:  Physical Exam    General: No acute distress. Well-developed. Well-nourished.  Eyes: EOMI. Sclerae anicteric.  HENT: Normocephalic. Atraumatic. Nares patent. Moist oral mucosa.  Cardiovascular: Regular rate. Regular rhythm. No murmurs. No rubs. No gallops. Normal S1, S2.  Respiratory: Normal respiratory effort. Clear to auscultation bilaterally. No rales. No rhonchi. No wheezing.  Abdomen: Soft. Non-tender. Non-distended. Normoactive bowel sounds.  Musculoskeletal: No  obvious deformity. Muscle atrophy in legs.  Extremities: No lower extremity edema.  Neurological: Alert & oriented x3. No slurred speech. Normal gait.  Psychiatric: Normal mood. Normal affect. Good insight. Good judgment.  Skin: Warm. Dry. No rash.         Assessment/Plan:  Coronary artery disease of native artery of  native heart with stable angina pectoris    Aortic atherosclerosis    Bilateral carpal tunnel syndrome    Diabetic polyneuropathy associated with type 2 diabetes mellitus    Elevated prostate specific antigen (PSA)   -     Ambulatory referral/consult to Urology; Future; Expected date: 10/17/2024    H/O heart artery stent    History of colon polyps    Hyperlipidemia associated with type 2 diabetes mellitus    Hypertension associated with diabetes    Lumbar radiculopathy    Nephrolithiasis  -     Ambulatory referral/consult to Urology; Future; Expected date: 10/17/2024    Type 2 diabetes mellitus with complication, with long-term current use of insulin  -     Comprehensive Metabolic Panel; Future; Expected date: 10/10/2024  -     Lipid Panel; Future; Expected date: 10/10/2024  -     Hemoglobin A1C; Future; Expected date: 10/10/2024    Obstructive sleep apnea         Assessment & Plan    Reviewed podiatrist's conservative approach for foot issues before considering surgery  Assessed back pain as potential cause of leg muscle atrophy  Evaluated A1c and blood sugar, noting good control  Analyzed cholesterol panel, noting elevated triglycerides  Considered increasing rosuvastatin dosage if triglyceride trend continues  Assessed kidney function and urine test, finding no evidence of diabetic nephropathy  Reviewed thyroid function as routine screening for diabetic patients  Evaluated sleep apnea management, considering potential for Inspire device    CARPAL TUNNEL SYNDROME:  - Noted the patient's history of bilateral carpal tunnel syndrome and previous carpal tunnel surgery.  - Evaluated the recurrence of the condition, possibly due to scar tissue formation from previous surgery.  - Acknowledged the patient's upcoming appointment with Dr. Grewal for hands and wrist evaluation.  - Noted the patient's preference to continue care with Dr. Petersen.    FLU VACCINATION:  - Administered flu vaccine during the visit.    PATIENT  REFUSAL OF VACCINATION:  - Offered COVID vaccine, which the patient declined.    HEMATOSPERMIA:  - Noted the patient's report of worsening blood in semen.  - Suggested possible need for antibiotics.  - Recommend contacting the urologist.  - Acknowledged patient's dissatisfaction with current urologist and request for a change.  - Will schedule an appointment with a different urologist within the practice.    MUSCLE ATROPHY:  - Observed muscle atrophy in the patient's legs.  - Suggested potential relation between muscle atrophy and back problems.  - Recommend bicycling to help rebuild muscle strength.  - Discussed the importance of maintaining muscle mass in legs to support back health.    HYPERLIPIDEMIA:  - Noted increase in patient's triglyceride levels.  - Advised patient to monitor carbohydrate intake and improve diet.  - Continued rosuvastatin 20 mg daily.  - Considered potential increase to 40 mg if trend continues.    BACK PAIN:  - Noted patient's report of ongoing back problems.  - Suggested potential relation between back problem and observed muscle atrophy in the legs.  - Referred patient to Dr. Gonzalez for pain management, including evaluation and possible injections.  - Discussed potential referral to Dr. Hogan for neurosurgery if needed.  - Emphasized the importance of maintaining muscle mass in legs to support back health.    DIABETES:  - Noted patient's A1c of 6.4 and blood sugar of 142, indicating excellent control.  - Confirmed well-controlled diabetes with no changes to medication required.  - Performed urine test, showing no evidence of diabetic kidney problems.  - Conducted blood test, confirming good kidney function.  - Performed thyroid test as screening due to diabetes, with good results.  - Explained the relationship between diabetes and potential thyroid issues.    SLEEP APNEA:  - Noted patient's report of poor sleep quality despite using CPAP mask.  - Suggested discussing new David  device treatment option with sleep specialist.  - Acknowledged upcoming appointment with Dr. Lejeune (sleep specialist) in March.  - Discussed potential benefits of Inspire device for sleep apnea management.    COLON POLYPS:  - Noted patient's colonoscopy with polyps in 2022.  - Scheduled next colonoscopy for 2027.    CORONARY ARTERY DISEASE:  - Noted patient's history of heart disease and stents.  - Reviewed recent negative treadmill test in August, indicating good exercise capacity.  - Noted patient's report of no chest pain or shortness of breath.  - Acknowledged Dr. David phillips's satisfaction with the patient's current cardiac status.  - Ordered treadmill test as per Dr. Henry's recommendation to evaluate heart condition.    FOLLOW UP:  - Patient to watch carbohydrate intake to address elevated triglycerides.  - Recommend bicycling or similar exercises to build leg muscle mass.  - Follow up in 6 months.          Visit today included increased complexity associated with the care of the episodic problem  addressed and managing the longitudinal care of the patient due to the serious and/or complex managed problem(s) .      Follow up in about 6 months (around 4/10/2025).    This note was generated with the assistance of ambient listening technology. Verbal consent was obtained by the patient and accompanying visitor(s) for the recording of patient appointment to facilitate this note. I attest to having reviewed and edited the generated note for accuracy, though some syntax or spelling errors may persist. Please contact the author of this note for any clarification.

## 2024-10-11 ENCOUNTER — DOCUMENTATION ONLY (OUTPATIENT)
Dept: CARDIAC REHAB | Facility: HOSPITAL | Age: 74
End: 2024-10-11
Payer: MEDICARE

## 2024-10-11 NOTE — PROGRESS NOTES
I called pt to check on him due to missing several cardiac rehab classes and he says that he has been suffering with his back. Patient has appointment for this on 10/15 and will know how to move foward with cardiac rehab after that.lily

## 2024-10-15 ENCOUNTER — OFFICE VISIT (OUTPATIENT)
Dept: PAIN MEDICINE | Facility: CLINIC | Age: 74
End: 2024-10-15
Payer: MEDICARE

## 2024-10-15 ENCOUNTER — HOSPITAL ENCOUNTER (OUTPATIENT)
Dept: RADIOLOGY | Facility: HOSPITAL | Age: 74
Discharge: HOME | End: 2024-10-15
Attending: ANESTHESIOLOGY
Payer: MEDICARE

## 2024-10-15 VITALS
BODY MASS INDEX: 25.12 KG/M2 | DIASTOLIC BLOOD PRESSURE: 61 MMHG | WEIGHT: 179.44 LBS | SYSTOLIC BLOOD PRESSURE: 105 MMHG | RESPIRATION RATE: 16 BRPM | HEART RATE: 64 BPM | HEIGHT: 71 IN

## 2024-10-15 DIAGNOSIS — M54.16 LUMBAR RADICULOPATHY: ICD-10-CM

## 2024-10-15 DIAGNOSIS — M54.16 LUMBAR RADICULOPATHY: Primary | ICD-10-CM

## 2024-10-15 DIAGNOSIS — I70.0 AORTIC ATHEROSCLEROSIS: ICD-10-CM

## 2024-10-15 DIAGNOSIS — M47.816 LUMBAR SPONDYLOSIS: ICD-10-CM

## 2024-10-15 DIAGNOSIS — E11.42 DIABETIC POLYNEUROPATHY ASSOCIATED WITH TYPE 2 DIABETES MELLITUS: ICD-10-CM

## 2024-10-15 DIAGNOSIS — M54.12 CERVICAL RADICULOPATHY: ICD-10-CM

## 2024-10-15 PROCEDURE — 99203 OFFICE O/P NEW LOW 30 MIN: CPT | Mod: S$PBB,,, | Performed by: ANESTHESIOLOGY

## 2024-10-15 PROCEDURE — 99999 PR PBB SHADOW E&M-EST. PATIENT-LVL V: CPT | Mod: PBBFAC,,, | Performed by: ANESTHESIOLOGY

## 2024-10-15 PROCEDURE — 72114 X-RAY EXAM L-S SPINE BENDING: CPT | Mod: 26,,, | Performed by: RADIOLOGY

## 2024-10-15 PROCEDURE — 72114 X-RAY EXAM L-S SPINE BENDING: CPT | Mod: TC,PN

## 2024-10-15 PROCEDURE — 99215 OFFICE O/P EST HI 40 MIN: CPT | Mod: PBBFAC,25,PN | Performed by: ANESTHESIOLOGY

## 2024-10-15 RX ORDER — PREGABALIN 50 MG/1
50 CAPSULE ORAL 3 TIMES DAILY
Qty: 60 CAPSULE | Refills: 2 | Status: SHIPPED | OUTPATIENT
Start: 2024-10-15

## 2024-10-15 NOTE — PROGRESS NOTES
New Patient Interventional Pain Note (Initial Visit)    Referring Physician: Al Daugherty, *    PCP: Shlomo Ochoa MD    Chief Complaint:  Lower back    SUBJECTIVE:    Timothy Ortega is a 73 y.o. male who presents to the clinic for the evaluation of lower back pain.   Patient reports 5 year history of waxing and waning lower back pain that recently increased proximally one-week ago.  Patient denies any previous surgeries on his lumbar spine.  Patient denies any previous surgeries on his bilateral lower extremities.    Lower back pain described as a stabbing aching pain that starts in the midline and radiates out in a band across his lower back.  Patient reports intermittent radiation down his right lower extremity in his stinging pain on the posterior aspect to his toe.  Patient denies any significant left lower extremity pain.  Pain is worse with repetitive bending and prolonged sitting, better with stretching and walking.  Pain is currently rated a 3 out 10, but can increase to a 9/10 with exacerbating activity.  Patient denies any fevers, chills, saddle anesthesia, or bowel and bladder incontinence.      Non-Pharmacologic Treatments:  Physical Therapy/Home Exercise: yes  Ice/Heat:yes  TENS: no  Acupuncture: no  Massage: yes  Chiropractic: no        Previous Pain Medications:  Tylenol, ibuprofen, gabapentin, Flexeril, topicals     report:  Reviewed and consistent with medication use as prescribed.    Pain Procedures:   none3    Pain Disability Index Review:          No data to display                Imaging:         Results for orders placed during the hospital encounter of 05/10/19    MRI Cervical Spine Without Contrast    Narrative  EXAMINATION:  MRI CERVICAL SPINE WITHOUT CONTRAST    CLINICAL HISTORY:  Cervical disc disorders; Cervical disc disorder, unspecified, unspecified cervical region    TECHNIQUE:  Multiplanar, multisequence MR images of the cervical spine were performed without the  administration of contrast.    COMPARISON:  None.    FINDINGS:  Vertebral body heights maintained.  1-2 mm retrolisthesis of C5 on C6 noted.  No concerning marrow signal present.  Multilevel disc desiccation present with height loss most noted at C5-6.    Posterior fossa is unremarkable.  Spinal cord is unremarkable.    Neck soft tissue structures are unremarkable.    C2-C3: No spinal canal stenosis or neural foraminal narrowing.    C3-C4: Posterior disc osteophyte complex present mildly effacing the anterior thecal sac without significant spinal canal stenosis.  Right greater than left facet and uncovertebral hypertrophy causes mild neural foraminal narrowing bilaterally.    C4-C5: Mild posterior disc osteophyte complex mildly effaces the anterior thecal sac without significant spinal canal stenosis.  Right greater than left uncovertebral/facet degenerative changes noted with mild right-sided and minimal left-sided neural foraminal narrowing.    C5-C6: Retrolisthesis and posterior disc osteophyte complex present facing the thecal sac with mild spinal canal stenosis.  Uncovertebral and facet hypertrophy noted with mild to moderate right greater than left neural foraminal narrowing.    C6-C7: No significant spinal canal stenosis or left neural foraminal narrowing.  Mild right neural foraminal narrowing present secondary to facet and uncovertebral hypertrophy.    C7-T1: No significant spinal canal stenosis or neural foraminal narrowing.    IMPRESSION:    Degenerative findings as above most prevalent at C5-6.      Electronically signed by: Florentino Hernández MD  Date:    05/13/2019  Time:    07:48            Past Medical History:   Diagnosis Date    Coronary artery disease 2010    [facility] - no stents    DM (diabetes mellitus) 2002     lunch 10/28/2016    DM (diabetes mellitus)      am 04/12/2021    DM (diabetes mellitus)     BS 96 am 04/13/2022 Insulin for years    Groin pain, left 2/11/2021    Hyperlipemia      Hypertension     Kidney stones     Nephrolithiasis 2021    Neuropathy     Type 2 diabetes mellitus 10-12 years     am 10/31/2014     Past Surgical History:   Procedure Laterality Date    ANGIOGRAM, CORONARY, WITH LEFT HEART CATHETERIZATION N/A 2024    Procedure: Angiogram, Coronary, with Left Heart Cath;  Surgeon: Rojelio Wadsworth MD;  Location: [facility] CATH LAB;  Service: Cardiology;  Laterality: N/A;    CARPAL TUNNEL RELEASE Right 2020    COLONOSCOPY N/A 2017    Procedure: COLONOSCOPY;  Surgeon: Cornelius Ibarra MD;  Location: [facility] ENDO;  Service: Endoscopy;  Laterality: N/A;    COLONOSCOPY N/A 2022    Procedure: COLONOSCOPY;  Surgeon: Geneva Serna MD;  Location: [facility] ENDO;  Service: Endoscopy;  Laterality: N/A;    DENTAL SURGERY      Implant    HAND SURGERY      KNEE ARTHROSCOPY Left     LITHOTRIPSY, CORONARY TRANSLUMINAL, PERCUTANEOUS  2024    Procedure: LITHOTRIPSY, CORONARY TRANSLUMINAL, PERCUTANEOUS;  Surgeon: Rojelio Wadsworth MD;  Location: [facility] CATH LAB;  Service: Cardiology;;    PTCA, SINGLE VESSEL  2024    Procedure: PTCA, Single Vessel;  Surgeon: Rojelio Wadsworth MD;  Location: [facility] CATH LAB;  Service: Cardiology;;    ROBOT-ASSISTED LAPAROSCOPIC REPAIR OF INGUINAL HERNIA USING DA JOSE ANGEL XI Left 2021    Procedure: XI ROBOTIC REPAIR, HERNIA, INGUINAL;  Surgeon: Tavon Cruz MD;  Location: [facility] OR;  Service: General;  Laterality: Left;    STENT, DRUG ELUTING, SINGLE VESSEL, CORONARY  2024    Procedure: Stent, Drug Eluting, Single Vessel, Coronary;  Surgeon: Rojelio Wadsworth MD;  Location: [facility] CATH LAB;  Service: Cardiology;;     Social History     Socioeconomic History    Marital status:    Tobacco Use    Smoking status: Former     Current packs/day: 0.00     Average packs/day: 1 pack/day for 20.0 years (20.0 ttl pk-yrs)     Types: Cigarettes     Start date: 1980     Quit date: 2000     Years since quittin.8     Passive  exposure: Past    Smokeless tobacco: Never   Substance and Sexual Activity    Alcohol use: No     Alcohol/week: 0.0 standard drinks of alcohol    Drug use: No    Sexual activity: Yes     Partners: Female   Social History Narrative    . Lives with spouse. Has 2 children. Retired. No pets or smokers in household.     Social Drivers of Health     Financial Resource Strain: Patient Declined (5/23/2024)    Overall Financial Resource Strain (CARDIA)     Difficulty of Paying Living Expenses: Patient declined   Food Insecurity: Patient Declined (5/23/2024)    Hunger Vital Sign     Worried About Running Out of Food in the Last Year: Patient declined     Ran Out of Food in the Last Year: Patient declined   Transportation Needs: No Transportation Needs (5/29/2024)    TRANSPORTATION NEEDS     Transportation : No   Physical Activity: Insufficiently Active (5/23/2024)    Exercise Vital Sign     Days of Exercise per Week: 1 day     Minutes of Exercise per Session: 20 min   Stress: No Stress Concern Present (5/23/2024)    [employer] - Occupational Stress Questionnaire     Feeling of Stress : Not at all   Housing Stability: Unknown (5/23/2024)    Housing Stability Vital Sign     Unable to Pay for Housing in the Last Year: Patient declined     Family History   Problem Relation Name Age of Onset    Diabetes Mother      Glaucoma Mother      Heart disease Mother  75        CAB    Diabetes Father      Heart attack Father  58        Fatal MI    Diabetes Brother      Diabetes Sister      Diabetes Sister      Cataracts Paternal Uncle      Cataracts Maternal Grandmother         Review of patient's allergies indicates:  No Known Allergies    Current Outpatient Medications   Medication Sig    amLODIPine (NORVASC) 5 MG tablet Take 1 tablet (5 mg total) by mouth once daily.    ASCORBATE CALCIUM (VITAMIN C ORAL) Take by mouth once daily.    aspirin 81 mg Tab Take 1 tablet by mouth Daily.    ERGOCALCIFEROL,  "VITAMIN D2, (VITAMIN D ORAL) Take by mouth once daily.    FREESTYLE LANCETS 28 gauge lancets USE THREE TIMES DAILY    hydrocortisone 2.5 % cream Apply topically 2 (two) times daily as needed.    insulin glargine U-100, Lantus, (LANTUS SOLOSTAR U-100 INSULIN) 100 unit/mL (3 mL) InPn pen Inject 28 Units into the skin once daily.    insulin lispro (ADMELOG SOLOSTAR U-100 INSULIN) 100 unit/mL pen INJECT 28 UNITS UNDER THE SKIN THREE TIMES DAILY BEFORE A MEAL    isosorbide mononitrate (IMDUR) 30 MG 24 hr tablet Take 1 tablet (30 mg total) by mouth once daily.    JARDIANCE 25 mg tablet TAKE 1 TABLET(25 MG) BY MOUTH EVERY DAY    lancets 33 gauge Misc 1 lancet by Misc.(Non-Drug; Combo Route) route 3 (three) times daily.    levocetirizine (XYZAL) 5 MG tablet Take 5 mg by mouth every evening.    LIDOcaine-prilocaine (EMLA) cream Apply topically as needed (increased pain).    losartan (COZAAR) 25 MG tablet Take 1 tablet (25 mg total) by mouth once daily.    metoprolol succinate (TOPROL-XL) 25 MG 24 hr tablet Take 1 tablet (25 mg total) by mouth once daily.    mupirocin (BACTROBAN) 2 % ointment Apply topically 2 (two) times daily.    nitroGLYCERIN (NITROSTAT) 0.4 MG SL tablet Place 1 tablet (0.4 mg total) under the tongue every 5 (five) minutes as needed (chest pain).    pen needle, diabetic (BD ULTRA-FINE SHORT PEN NEEDLE) 31 gauge x 5/16" Ndle USE 5 TIMES A DAY    prasugreL (EFFIENT) 10 mg Tab Take 1 tablet (10 mg total) by mouth once daily.    rosuvastatin (CRESTOR) 20 MG tablet Take 20 mg by mouth once daily.    tirzepatide (MOUNJARO) 15 mg/0.5 mL PnIj Inject 15 mg into the skin every 7 days.    mupirocin (BACTROBAN) 2 % ointment Apply to affected area 3 times daily    pregabalin (LYRICA) 50 MG capsule Take 1 capsule (50 mg total) by mouth 3 (three) times daily.     No current facility-administered medications for this visit.     Facility-Administered Medications Ordered in Other Visits   Medication    lactated ringers " "infusion         ROS  Review of Systems   Constitutional:  Negative for activity change, appetite change and fever.   HENT:  Negative for facial swelling, rhinorrhea and sore throat.    Respiratory:  Negative for cough, chest tightness, shortness of breath, wheezing and stridor.    Cardiovascular:  Negative for chest pain and palpitations.   Gastrointestinal:  Negative for abdominal pain, blood in stool, constipation, diarrhea, nausea and vomiting.   Endocrine: Negative for polydipsia, polyphagia and polyuria.   Genitourinary:  Positive for urgency. Negative for dysuria and hematuria.   Musculoskeletal:  Positive for arthralgias, back pain, gait problem and myalgias. Negative for joint swelling, neck pain and neck stiffness.   Skin:  Negative for rash.   Allergic/Immunologic: Negative for food allergies.   Neurological:  Positive for weakness and numbness. Negative for dizziness, tremors, seizures, syncope, facial asymmetry, speech difficulty, light-headedness and headaches.   Psychiatric/Behavioral:  Negative for agitation, hallucinations, self-injury and suicidal ideas. The patient is not nervous/anxious and is not hyperactive.             OBJECTIVE:  /61   Pulse 64   Resp 16   Ht 5' 11" (1.803 m)   Wt 81.4 kg (179 lb 7.3 oz)   BMI 25.03 kg/m²         Physical Exam  Constitutional:       General: He is not in acute distress.     Appearance: Normal appearance. He is not ill-appearing.   HENT:      Head: Normocephalic and atraumatic.      Nose: No congestion or rhinorrhea.   Eyes:      Extraocular Movements: Extraocular movements intact.      Pupils: Pupils are equal, round, and reactive to light.   Pulmonary:      Effort: Pulmonary effort is normal.   Abdominal:      General: Abdomen is flat.   Skin:     General: Skin is warm and dry.      Capillary Refill: Capillary refill takes less than 2 seconds.   Neurological:      General: No focal deficit present.      Mental Status: He is alert and oriented to " person, place, and time.      Sensory: No sensory deficit.      Motor: Weakness present. No abnormal muscle tone.      Gait: Gait normal.      Deep Tendon Reflexes:      Reflex Scores:       Patellar reflexes are 2+ on the right side and 2+ on the left side.       Achilles reflexes are 1+ on the right side and 1+ on the left side.     Comments: 4/5 strength in right dorsiflexion   Psychiatric:         Mood and Affect: Mood normal.         Behavior: Behavior normal.         Thought Content: Thought content normal.           Musculoskeletal:      Lumbar Exam  Incision: no  Pain with Flexion: yes  Pain with Extension: yes  ROM:  Decreased  Paraspinous TTP:  Positive bilaterally  Facet TTP:  L4-5  Facet Loading:  Positive bilaterally  SLR:  Positive on the right at 75°  SIJ TTP:  Negative bilaterally  RENETTA:  Negative bilaterally      LABS:  Lab Results   Component Value Date    WBC 6.00 08/01/2024    HGB 14.5 08/01/2024    HCT 45.6 08/01/2024     (H) 08/01/2024     08/01/2024       CMP  Sodium   Date Value Ref Range Status   09/20/2024 139 136 - 145 mmol/L Final     Potassium   Date Value Ref Range Status   09/20/2024 4.3 3.5 - 5.1 mmol/L Final     Chloride   Date Value Ref Range Status   09/20/2024 110 95 - 110 mmol/L Final     CO2   Date Value Ref Range Status   09/20/2024 22 (L) 23 - 29 mmol/L Final     Glucose   Date Value Ref Range Status   09/20/2024 142 (H) 70 - 110 mg/dL Final     BUN   Date Value Ref Range Status   09/20/2024 16 8 - 23 mg/dL Final     Creatinine   Date Value Ref Range Status   09/20/2024 1.0 0.5 - 1.4 mg/dL Final     Calcium   Date Value Ref Range Status   09/20/2024 9.3 8.7 - 10.5 mg/dL Final     Total Protein   Date Value Ref Range Status   09/20/2024 6.5 6.0 - 8.4 g/dL Final     Albumin   Date Value Ref Range Status   09/20/2024 4.0 3.5 - 5.2 g/dL Final   04/25/2022 4.4 3.6 - 5.1 g/dL Final     Total Bilirubin   Date Value Ref Range Status   09/20/2024 0.6 0.1 - 1.0 mg/dL  Final     Comment:     For infants and newborns, interpretation of results should be based  on gestational age, weight and in agreement with clinical  observations.    Premature Infant recommended reference ranges:  Up to 24 hours.............<8.0 mg/dL  Up to 48 hours............<12.0 mg/dL  3-5 days..................<15.0 mg/dL  6-29 days.................<15.0 mg/dL       Alkaline Phosphatase   Date Value Ref Range Status   09/20/2024 40 (L) 55 - 135 U/L Final     AST   Date Value Ref Range Status   09/20/2024 29 10 - 40 U/L Final     ALT   Date Value Ref Range Status   09/20/2024 33 10 - 44 U/L Final     Anion Gap   Date Value Ref Range Status   09/20/2024 7 (L) 8 - 16 mmol/L Final     eGFR if    Date Value Ref Range Status   09/23/2021 >60.0 >60 mL/min/1.73 m^2 Final     eGFR if non    Date Value Ref Range Status   09/23/2021 >60.0 >60 mL/min/1.73 m^2 Final     Comment:     Calculation used to obtain the estimated glomerular filtration  rate (eGFR) is the CKD-EPI equation.          Lab Results   Component Value Date    HGBA1C 6.4 (H) 09/20/2024             ASSESSMENT:       73 y.o. year old male with lower back pain, consistent with     1. Lumbar radiculopathy  Ambulatory referral/consult to Pain Clinic    X-Ray Lumbar Complete Including Flex And Ext    pregabalin (LYRICA) 50 MG capsule      2. Cervical radiculopathy  Ambulatory referral/consult to Pain Clinic    pregabalin (LYRICA) 50 MG capsule      3. Lumbar spondylosis  X-Ray Lumbar Complete Including Flex And Ext    pregabalin (LYRICA) 50 MG capsule      4. Aortic atherosclerosis  pregabalin (LYRICA) 50 MG capsule      5. Diabetic polyneuropathy associated with type 2 diabetes mellitus  pregabalin (LYRICA) 50 MG capsule        Lumbar radiculopathy  -     Ambulatory referral/consult to Pain Clinic  -     X-Ray Lumbar Complete Including Flex And Ext; Future; Expected date: 10/15/2024  -     pregabalin (LYRICA) 50 MG capsule;  Take 1 capsule (50 mg total) by mouth 3 (three) times daily.  Dispense: 60 capsule; Refill: 2    Cervical radiculopathy  -     Ambulatory referral/consult to Pain Clinic  -     pregabalin (LYRICA) 50 MG capsule; Take 1 capsule (50 mg total) by mouth 3 (three) times daily.  Dispense: 60 capsule; Refill: 2    Lumbar spondylosis  -     X-Ray Lumbar Complete Including Flex And Ext; Future; Expected date: 10/15/2024  -     pregabalin (LYRICA) 50 MG capsule; Take 1 capsule (50 mg total) by mouth 3 (three) times daily.  Dispense: 60 capsule; Refill: 2    Aortic atherosclerosis  -     pregabalin (LYRICA) 50 MG capsule; Take 1 capsule (50 mg total) by mouth 3 (three) times daily.  Dispense: 60 capsule; Refill: 2    Diabetic polyneuropathy associated with type 2 diabetes mellitus  -     pregabalin (LYRICA) 50 MG capsule; Take 1 capsule (50 mg total) by mouth 3 (three) times daily.  Dispense: 60 capsule; Refill: 2             PLAN:   - Interventions:   - none at this time.  Pain appears to be consistent with right lumbar radiculopathy.  We we will obtain updated imaging of lumbar spine    - Anticoagulation use:   Yes Effient    - Medications:  Discontinue gabapentin and start pregabalin 50 mg twice a day    - Therapy:   - continue formal physical therapy for lower back pain and cardiac rehab    - Imaging/Diagnostic:  - we will obtain updated x-rays of lumbar spine to further evaluate lower back pain with right lumbar radiculopathy  - Can consider updated MRI lumbar spine without contrast in the future    - Consults:   - none at this time      - Patient Questions: Answered all of the patient's questions regarding diagnosis, therapy, and treatment     This condition does not require this patient to take time off of work, and the primary goal of our Pain Management services is to improve the patient's functional capacity.     - Follow up visit: return to clinic in 4-5 weeks        The above plan and management options were  discussed at length with patient. Patient is in agreement with the above and verbalized understanding.    I discussed the goals of interventional chronic pain management with the patient on today's visit.  I explained the utility of injections for diagnostic and therapeutic purposes.  We discussed a multimodal approach to pain including treating the patient's given worst pain at any given time.  We will use a systematic approach to addressing pain.  We will also adopt a multimodal approach that includes injections, adjuvant medications, physical therapy, at times psychiatry.  There may be a limited role for opioid use intermittently in the treatment of pain, more particularly for acute pain although no one approach can be used as a sole treatment modality.    I emphasized the importance of regular exercise, core strengthening and stretching, diet and weight loss as a cornerstone of long-term pain management.      Addy Romano MD  Interventional Pain Management  [facility]    Future Appointments   Date Time Provider Department Center   10/16/2024  8:00 AM CARDIAC REHAB, ON ON [facility]   10/18/2024  8:00 AM CARDIAC REHAB, ON ON [facility]   10/28/2024  8:00 AM CARDIAC REHAB, ON ON [facility]   11/7/2024 11:45 AM Ronny Morales MD [facility] UROLOGY [facility]   11/12/2024  8:00 AM Shy Rome PA-C [facility] INT PANCHITO [facility]   3/7/2025  8:30 AM Al Daugherty PA-C [facility] DIABETE [facility]   3/21/2025 10:30 AM Lejeune, Elizabeth B, NP [facility] PULMSVC [facility]   4/3/2025  9:20 AM LABORATORY, [facility] ON LAB [facility]   4/10/2025  9:20 AM Shlomo Ochoa MD [facility] IM [facility]   5/13/2025  8:40 AM Naveen rG MD [facility] CARDIO [facility]   7/11/2025  9:35 AM LABORATORY, [facility] HGV LAB [facility]   7/17/2025  3:00 PM Bandar Garcia MD [facility] UROLOGY [facility]           Disclaimer:  This note was prepared using voice recognition system and is likely to have sound alike  errors that may have been overlooked even after proof reading.  Please call me with any questions      I spent a total of 30 minutes on the day of the visit.  This includes face to face time and non-face to face time preparing to see the patient (eg, review of tests), obtaining and/or reviewing separately obtained history, documenting clinical information in the electronic or other health record, independently interpreting results and communicating results to the patient/family/caregiver, or care coordinator.

## 2024-10-16 ENCOUNTER — CLINICAL SUPPORT (OUTPATIENT)
Dept: CARDIAC REHAB | Facility: HOSPITAL | Age: 74
End: 2024-10-16
Attending: INTERNAL MEDICINE
Payer: MEDICARE

## 2024-10-16 DIAGNOSIS — I25.118 CORONARY ARTERY DISEASE OF NATIVE ARTERY OF NATIVE HEART WITH STABLE ANGINA PECTORIS: Primary | ICD-10-CM

## 2024-10-16 PROCEDURE — 93798 PHYS/QHP OP CAR RHAB W/ECG: CPT | Performed by: STUDENT IN AN ORGANIZED HEALTH CARE EDUCATION/TRAINING PROGRAM

## 2024-10-17 ENCOUNTER — PATIENT MESSAGE (OUTPATIENT)
Dept: INTERNAL MEDICINE | Facility: CLINIC | Age: 74
End: 2024-10-17
Payer: MEDICARE

## 2024-10-18 ENCOUNTER — CLINICAL SUPPORT (OUTPATIENT)
Dept: CARDIAC REHAB | Facility: HOSPITAL | Age: 74
End: 2024-10-18
Payer: MEDICARE

## 2024-10-18 DIAGNOSIS — I25.118 CORONARY ARTERY DISEASE OF NATIVE ARTERY OF NATIVE HEART WITH STABLE ANGINA PECTORIS: Primary | ICD-10-CM

## 2024-10-18 PROCEDURE — 93798 PHYS/QHP OP CAR RHAB W/ECG: CPT | Performed by: INTERNAL MEDICINE

## 2024-10-21 ENCOUNTER — CLINICAL SUPPORT (OUTPATIENT)
Dept: CARDIAC REHAB | Facility: HOSPITAL | Age: 74
End: 2024-10-21
Payer: MEDICARE

## 2024-10-21 DIAGNOSIS — I25.118 CORONARY ARTERY DISEASE OF NATIVE ARTERY OF NATIVE HEART WITH STABLE ANGINA PECTORIS: Primary | ICD-10-CM

## 2024-10-21 PROCEDURE — 93798 PHYS/QHP OP CAR RHAB W/ECG: CPT | Performed by: INTERNAL MEDICINE

## 2024-10-21 RX ORDER — ROSUVASTATIN CALCIUM 20 MG/1
TABLET, COATED ORAL
Qty: 90 TABLET | Refills: 3 | Status: SHIPPED | OUTPATIENT
Start: 2024-10-21

## 2024-10-23 ENCOUNTER — CLINICAL SUPPORT (OUTPATIENT)
Dept: CARDIAC REHAB | Facility: HOSPITAL | Age: 74
End: 2024-10-23
Payer: MEDICARE

## 2024-10-23 DIAGNOSIS — I25.118 CORONARY ARTERY DISEASE OF NATIVE ARTERY OF NATIVE HEART WITH STABLE ANGINA PECTORIS: Primary | ICD-10-CM

## 2024-10-23 PROCEDURE — 93798 PHYS/QHP OP CAR RHAB W/ECG: CPT | Performed by: NURSE PRACTITIONER

## 2024-10-25 ENCOUNTER — CLINICAL SUPPORT (OUTPATIENT)
Dept: CARDIAC REHAB | Facility: HOSPITAL | Age: 74
End: 2024-10-25
Payer: MEDICARE

## 2024-10-25 DIAGNOSIS — I25.118 CORONARY ARTERY DISEASE OF NATIVE ARTERY OF NATIVE HEART WITH STABLE ANGINA PECTORIS: Primary | ICD-10-CM

## 2024-10-25 PROCEDURE — 93798 PHYS/QHP OP CAR RHAB W/ECG: CPT | Performed by: INTERNAL MEDICINE

## 2024-10-28 ENCOUNTER — CLINICAL SUPPORT (OUTPATIENT)
Dept: CARDIAC REHAB | Facility: HOSPITAL | Age: 74
End: 2024-10-28
Payer: MEDICARE

## 2024-10-28 DIAGNOSIS — I25.118 CORONARY ARTERY DISEASE OF NATIVE ARTERY OF NATIVE HEART WITH STABLE ANGINA PECTORIS: Primary | ICD-10-CM

## 2024-10-28 PROCEDURE — 93798 PHYS/QHP OP CAR RHAB W/ECG: CPT | Performed by: NURSE PRACTITIONER

## 2024-10-29 DIAGNOSIS — M54.12 CERVICAL RADICULOPATHY: ICD-10-CM

## 2024-10-29 DIAGNOSIS — M47.816 LUMBAR SPONDYLOSIS: ICD-10-CM

## 2024-10-29 DIAGNOSIS — E11.42 DIABETIC POLYNEUROPATHY ASSOCIATED WITH TYPE 2 DIABETES MELLITUS: ICD-10-CM

## 2024-10-29 DIAGNOSIS — M54.16 LUMBAR RADICULOPATHY: ICD-10-CM

## 2024-10-29 DIAGNOSIS — I70.0 AORTIC ATHEROSCLEROSIS: ICD-10-CM

## 2024-10-29 RX ORDER — PREGABALIN 50 MG/1
50 CAPSULE ORAL 3 TIMES DAILY
Qty: 60 CAPSULE | Refills: 2 | OUTPATIENT
Start: 2024-10-29

## 2024-10-30 ENCOUNTER — CLINICAL SUPPORT (OUTPATIENT)
Dept: CARDIAC REHAB | Facility: HOSPITAL | Age: 74
End: 2024-10-30
Payer: MEDICARE

## 2024-10-30 DIAGNOSIS — I25.118 CORONARY ARTERY DISEASE OF NATIVE ARTERY OF NATIVE HEART WITH STABLE ANGINA PECTORIS: Primary | ICD-10-CM

## 2024-10-30 PROCEDURE — 93798 PHYS/QHP OP CAR RHAB W/ECG: CPT | Performed by: NURSE PRACTITIONER

## 2024-11-01 ENCOUNTER — CLINICAL SUPPORT (OUTPATIENT)
Dept: CARDIAC REHAB | Facility: HOSPITAL | Age: 74
End: 2024-11-01
Payer: MEDICARE

## 2024-11-01 DIAGNOSIS — I25.118 CORONARY ARTERY DISEASE OF NATIVE ARTERY OF NATIVE HEART WITH STABLE ANGINA PECTORIS: Primary | ICD-10-CM

## 2024-11-01 PROCEDURE — 93798 PHYS/QHP OP CAR RHAB W/ECG: CPT | Performed by: INTERNAL MEDICINE

## 2024-11-04 ENCOUNTER — CLINICAL SUPPORT (OUTPATIENT)
Dept: CARDIAC REHAB | Facility: HOSPITAL | Age: 74
End: 2024-11-04
Payer: MEDICARE

## 2024-11-04 DIAGNOSIS — I25.118 CORONARY ARTERY DISEASE OF NATIVE ARTERY OF NATIVE HEART WITH STABLE ANGINA PECTORIS: Primary | ICD-10-CM

## 2024-11-04 PROCEDURE — 93798 PHYS/QHP OP CAR RHAB W/ECG: CPT | Performed by: INTERNAL MEDICINE

## 2024-11-06 ENCOUNTER — CLINICAL SUPPORT (OUTPATIENT)
Dept: CARDIAC REHAB | Facility: HOSPITAL | Age: 74
End: 2024-11-06
Payer: MEDICARE

## 2024-11-06 DIAGNOSIS — I25.118 CORONARY ARTERY DISEASE OF NATIVE ARTERY OF NATIVE HEART WITH STABLE ANGINA PECTORIS: Primary | ICD-10-CM

## 2024-11-06 PROCEDURE — 93798 PHYS/QHP OP CAR RHAB W/ECG: CPT | Performed by: STUDENT IN AN ORGANIZED HEALTH CARE EDUCATION/TRAINING PROGRAM

## 2024-11-07 ENCOUNTER — TELEPHONE (OUTPATIENT)
Dept: PAIN MEDICINE | Facility: CLINIC | Age: 74
End: 2024-11-07
Payer: MEDICARE

## 2024-11-07 NOTE — TELEPHONE ENCOUNTER
Reached out to patient to schedule appointment from messages. Apt has been made.   Pt understand. All questions answered.     Mann Gayle Medical Assistant

## 2024-11-07 NOTE — TELEPHONE ENCOUNTER
Attempt to call patient to confirm sooner appointment. Patent did not answer, Left message on patients voice mail to call back at earliest convenience to confirm or reschedule p.t apt.

## 2024-11-07 NOTE — TELEPHONE ENCOUNTER
----- Message from Shy Rome PA-C sent at 11/7/2024 11:16 AM CST -----  Regarding: FW: Reschedule Apt  Contact: Pt [phone]    ----- Message -----  From: Chicho Fletcher  Sent: 11/7/2024  11:09 AM CST  To: Latricia OLIVA Staff  Subject: Reschedule Apt                                   .[email or web address]     Patient is calling for Medical Advice regarding: Patient called to have his apt changed to the November 12th appointment. He said he is out of town and can't come tomorrow.    How long has patient had these symptoms:    Pharmacy name and phone#:    Patient wants a call back or thru [facility]: Call    Comments:    Please advise patient replies from provider may take up to 48 hours.

## 2024-11-11 ENCOUNTER — CLINICAL SUPPORT (OUTPATIENT)
Dept: CARDIAC REHAB | Facility: HOSPITAL | Age: 74
End: 2024-11-11
Payer: MEDICARE

## 2024-11-11 DIAGNOSIS — I25.118 CORONARY ARTERY DISEASE OF NATIVE ARTERY OF NATIVE HEART WITH STABLE ANGINA PECTORIS: Primary | ICD-10-CM

## 2024-11-11 PROCEDURE — 93798 PHYS/QHP OP CAR RHAB W/ECG: CPT | Performed by: INTERNAL MEDICINE

## 2024-11-11 NOTE — PROGRESS NOTES
Chronic Pain -- Established Patient (Follow-up visit)    Referring Physician: Al Daugherty, *    PCP: Shlomo Ochoa MD    Chief complaint:  Follow-up     Lower back pain   RLE radicular pain - resolved        SUBJECTIVE:      Interval History (11/12/2024):  Patient presents today for follow-up visit.  Patient was last seen about a month ago. At that visit, the plan was to start Lyrica, which he has been taking regularly.  Today, he complains of localized low back pain only, and he reports that the right leg pain has resolved.  He continues to do cardiac rehab therapy.  He reports that the pain is better with walking.  Sitting increases pain.  Patient reports pain as 4/10 today.    Initial HPI (10/15/2024):  Timothy Ortega is a 73 y.o. male who presents to the clinic for the evaluation of lower back pain. Patient reports 5 year history of waxing and waning lower back pain that recently increased proximally one-week ago.  Patient denies any previous surgeries on his lumbar spine.  Patient denies any previous surgeries on his bilateral lower extremities.    Lower back pain described as a stabbing aching pain that starts in the midline and radiates out in a band across his lower back.  Patient reports intermittent radiation down his right lower extremity in his stinging pain on the posterior aspect to his toe.  Patient denies any significant left lower extremity pain.  Pain is worse with repetitive bending and prolonged sitting, better with stretching and walking.  Pain is currently rated a 3 out 10, but can increase to a 9/10 with exacerbating activity.  Patient denies any fevers, chills, saddle anesthesia, or bowel and bladder incontinence.      Non-Pharmacologic Treatments:  Physical Therapy/Home Exercise: yes  Ice/Heat:yes  TENS: no  Acupuncture: no  Massage: yes  Chiropractic: no        Previous Pain Medications:  Tylenol, ibuprofen, gabapentin, Flexeril, topicals      Pain Procedures:   None          Pain Disability Index (PDI) Score Review:      11/12/2024     7:57 AM   Last 3 PDI Scores   Pain Disability Index (PDI) 28         Imaging/ Diagnostic Studies/ Labs (Reviewed on 11/12/2024):    10/15/2024 X-Ray Lumbar Complete Including Flex And Ext  COMPARISON: None    FINDINGS:  Negative for acute fracture or dislocation.  Normal alignment.    Mild dextroscoliosis.    Multilevel spondylosis.  Moderate multilevel disc space narrowing L2-S1.    Aortic atherosclerosis.  Impression:   Multilevel moderate degenerative change.        05/13/2019 MRI Cervical Spine Without Contrast    Narrative  EXAMINATION:  MRI CERVICAL SPINE WITHOUT CONTRAST    CLINICAL HISTORY:  Cervical disc disorders; Cervical disc disorder, unspecified, unspecified cervical region    TECHNIQUE:  Multiplanar, multisequence MR images of the cervical spine were performed without the administration of contrast.    COMPARISON:  None.    FINDINGS:  Vertebral body heights maintained.  1-2 mm retrolisthesis of C5 on C6 noted.  No concerning marrow signal present.  Multilevel disc desiccation present with height loss most noted at C5-6.    Posterior fossa is unremarkable.  Spinal cord is unremarkable.    Neck soft tissue structures are unremarkable.    C2-C3: No spinal canal stenosis or neural foraminal narrowing.    C3-C4: Posterior disc osteophyte complex present mildly effacing the anterior thecal sac without significant spinal canal stenosis.  Right greater than left facet and uncovertebral hypertrophy causes mild neural foraminal narrowing bilaterally.    C4-C5: Mild posterior disc osteophyte complex mildly effaces the anterior thecal sac without significant spinal canal stenosis.  Right greater than left uncovertebral/facet degenerative changes noted with mild right-sided and minimal left-sided neural foraminal narrowing.    C5-C6: Retrolisthesis and posterior disc osteophyte complex present facing the thecal sac with mild spinal canal  "stenosis.  Uncovertebral and facet hypertrophy noted with mild to moderate right greater than left neural foraminal narrowing.    C6-C7: No significant spinal canal stenosis or left neural foraminal narrowing.  Mild right neural foraminal narrowing present secondary to facet and uncovertebral hypertrophy.    C7-T1: No significant spinal canal stenosis or neural foraminal narrowing.    IMPRESSION:    Degenerative findings as above most prevalent at C5-6.                Review of Systems:   Constitutional:  Negative for activity change, appetite change and fever.   HENT:  Negative for facial swelling, rhinorrhea and sore throat.    Respiratory:  Negative for cough, chest tightness, shortness of breath, wheezing and stridor.    Cardiovascular:  Negative for chest pain and palpitations.   Gastrointestinal:  Negative for abdominal pain, blood in stool, constipation, diarrhea, nausea and vomiting.   Endocrine: Negative for polydipsia, polyphagia and polyuria.   Genitourinary:  Positive for urgency. Negative for dysuria and hematuria.   Musculoskeletal:  Positive for arthralgias, back pain, gait problem and myalgias. Negative for joint swelling, neck pain and neck stiffness.   Skin:  Negative for rash.   Allergic/Immunologic: Negative for food allergies.   Neurological:  Positive for weakness and numbness. Negative for dizziness, tremors, seizures, syncope, facial asymmetry, speech difficulty, light-headedness and headaches.   Psychiatric/Behavioral:  Negative for agitation, hallucinations, self-injury and suicidal ideas. The patient is not nervous/anxious and is not hyperactive.              OBJECTIVE:    Physical Exam  Vitals:    11/12/24 0752   BP: 123/73   Pulse: 66   Resp: 17   Weight: 83 kg (182 lb 15.7 oz)   Height: 5' 11" (1.803 m)   PainSc:   4   PainLoc: Back    Body mass index is 25.52 kg/m².   (reviewed on 11/12/2024)    Constitutional:       General: He is not in acute distress.     Appearance: Normal " appearance. He is not ill-appearing.   HENT:      Head: Normocephalic and atraumatic.   Eyes:      Extraocular Movements: Extraocular movements intact.   Pulmonary:      Effort: Pulmonary effort is normal.   Abdominal:      General: Abdomen is flat.   Skin:     General: Skin is warm and dry.   Neurological:      General: No focal deficit present.      Mental Status: He is alert and oriented to person, place, and time.      Sensory: No sensory deficit.      Motor: Weakness present. No abnormal muscle tone.      Gait: Gait normal.      Deep Tendon Reflexes:      Reflex Scores:       Patellar reflexes are 2+ on the right side and 2+ on the left side.       Achilles reflexes are 1+ on the right side and 1+ on the left side.     Comments: 4/5 strength in right dorsiflexion   Psychiatric:         Mood and Affect: Mood normal.         Behavior: Behavior normal.         Thought Content: Thought content normal.     Musculoskeletal:  Lumbar Exam  Incision: no  Pain with Flexion: yes  Pain with Extension: yes  ROM:  Decreased  Paraspinous TTP:  Positive bilaterally  Facet TTP:  L4-5  Facet Loading:  Positive bilaterally  SLR:  Equivocal on the right at 75°  SIJ TTP:  Negative bilaterally  RENETTA:  Negative bilaterally              ASSESSMENT:     73 y.o. year old male with lower back pain, consistent with right lumbar radiculopathy.      1. Right lumbar radiculopathy        2. Lumbar spondylosis  LIDOcaine (LIDODERM) 5 %      3. Aortic atherosclerosis        4. Diabetic polyneuropathy associated with type 2 diabetes mellitus        5. Cervical radiculopathy          Right lumbar radiculopathy    Lumbar spondylosis  -     LIDOcaine (LIDODERM) 5 %; Use 1-3 patches per day over recommended area. Remove & Discard patch within 12 hours or as directed by MD.  Dispense: 90 patch; Refill: 0    Aortic atherosclerosis    Diabetic polyneuropathy associated with type 2 diabetes mellitus    Cervical radiculopathy    Other orders  -      acetaminophen (TYLENOL) 500 MG tablet; Take 2 tablets (1,000 mg total) by mouth every 8 (eight) hours as needed for Pain. Not actual Rx - Use for Recommended dose instructions           PLAN:   - Interventions:   - Consider lumbar MBB/ RFA for Lumbar Back Pain - facet-mediated, axial in nature.  Information provided to the patient today.  - Consider right lumbar TF ALIA (after MRI) - if RLE radicular pain returns.     - Anticoagulation use:   Yes Effient    - Medications:  - Increase Lyrica (pregabalin) 50mg BID to TID - started last visit.   - Can take Tylenol (acetaminophen) 1000mg (two tablets of 500mg) 3x per day as needed for pain (to take up to max dose of 3000mg per day).   Avoid dual action Advil (Tylenol + ibuprofen) -- due to NSAID. Patient instructed to avoid NSAID in light of cardiac issues.  - Start LIDOcaine (LIDODERM) 5 % topical patches to apply Q12H PRN pain.  If Rx is not approved by insurance, try Salon Pas 4% lidocaine patches, available over-the-counter at pharmacy.     - LA  reviewed and appropriate.       - Therapy:   - Continue formal physical therapy for lower back pain and cardiac rehab.     - Imaging/Diagnostic:  - Lumbar x-ray reviewed.   - Can consider updated MRI lumbar spine without contrast in the future    - Consults:   - None at this time    - Follow up visit: 3 months follow-up - virtual visit         Future Appointments   Date Time Provider Department Center   11/13/2024  8:00 AM CARDI[facility] REHAB, [facility]   11/14/2024  8:15 AM Ronny Morales MD [facility] UROLOGY [facility]   11/15/2024  8:00 AM CARDIAC REHAB, [facility]   11/18/2024  8:00 AM CARDIAC REHAB, [facility]   11/20/2024  8:00 AM CARDIAC REHAB, [facility]   11/22/2024  8:00 AM CARDIAC REHAB, [facility]   11/25/2024  8:00 AM CARDIAC REHAB, [facility]   11/27/2024  8:00 AM CARDIAC REHAB, ON ON [facility]   11/29/2024  8:00 AM CARDIAC REHAB,  [facility]   12/2/2024  8:00 AM CARDIAC REHAB, [facility]   12/4/2024  8:00 AM CARDIAC REHAB, [facility]   12/6/2024  8:00 AM CARDIAC REHAB, [facility]   12/9/2024  8:00 AM CARDIAC REHAB, [facility]   12/11/2024  8:00 AM CARDIAC REHAB, [facility]   12/13/2024  8:00 AM CARDIAC REHAB, [facility]   12/16/2024  8:00 AM CARDIAC REHAB, ON[facility]   2/10/2025  8:00 AM Shy Rome PA-C [facility] INT PANCHITO [facility]   3/7/2025  8:30 AM Al Daugherty PA-C [facility] DIABETE [facility]   3/21/2025 10:30 AM Lejeune, Elizabeth B, NP [facility] PULMSVC [facility]   4/3/2025  9:20 AM LABORATORY [facility]   4/10/2025  9:20 AM Shlomo Ochoa MD [facility] IM [facility]   5/13/2025  8:40 AM Naveen Gr MD [facility] CARDIO [facility]   7/11/2025  9:35 AM LABORATORY, [facility] LAB [facility]   7/17/2025  3:00 PM Bandar Garcia MD [facility] UROLOGY [facility]       - Patient Questions: Answered all of the patient's questions regarding diagnosis, therapy, and treatment.    - This condition does not require this patient to take time off of work, and the primary goal of our Pain Management services is to improve the patient's functional capacity.   - I discussed the risks, benefits, and alternatives to potential treatment options. All questions and concerns were fully addressed today in clinic.         Shy Rome PA-C  Interventional Pain Management - [facility]    Disclaimer:  This note was prepared using voice recognition system and is likely to have sound alike errors that may have been overlooked even after proof reading.  Please call me with any questions.

## 2024-11-12 ENCOUNTER — OFFICE VISIT (OUTPATIENT)
Dept: PAIN MEDICINE | Facility: CLINIC | Age: 74
End: 2024-11-12
Payer: MEDICARE

## 2024-11-12 VITALS
BODY MASS INDEX: 25.62 KG/M2 | SYSTOLIC BLOOD PRESSURE: 123 MMHG | HEIGHT: 71 IN | WEIGHT: 183 LBS | DIASTOLIC BLOOD PRESSURE: 73 MMHG | RESPIRATION RATE: 17 BRPM | HEART RATE: 66 BPM

## 2024-11-12 DIAGNOSIS — M54.16 RIGHT LUMBAR RADICULOPATHY: Primary | ICD-10-CM

## 2024-11-12 DIAGNOSIS — M47.816 LUMBAR SPONDYLOSIS: ICD-10-CM

## 2024-11-12 DIAGNOSIS — I70.0 AORTIC ATHEROSCLEROSIS: ICD-10-CM

## 2024-11-12 DIAGNOSIS — E11.42 DIABETIC POLYNEUROPATHY ASSOCIATED WITH TYPE 2 DIABETES MELLITUS: ICD-10-CM

## 2024-11-12 DIAGNOSIS — M54.12 CERVICAL RADICULOPATHY: ICD-10-CM

## 2024-11-12 PROCEDURE — 99215 OFFICE O/P EST HI 40 MIN: CPT | Mod: PBBFAC | Performed by: PHYSICIAN ASSISTANT

## 2024-11-12 PROCEDURE — 99214 OFFICE O/P EST MOD 30 MIN: CPT | Mod: S$PBB,,, | Performed by: PHYSICIAN ASSISTANT

## 2024-11-12 PROCEDURE — 99999 PR PBB SHADOW E&M-EST. PATIENT-LVL V: CPT | Mod: PBBFAC,,, | Performed by: PHYSICIAN ASSISTANT

## 2024-11-12 RX ORDER — LIDOCAINE 50 MG/G
PATCH TOPICAL
Qty: 90 PATCH | Refills: 0 | Status: SHIPPED | OUTPATIENT
Start: 2024-11-12

## 2024-11-12 RX ORDER — ACETAMINOPHEN 500 MG
1000 TABLET ORAL EVERY 8 HOURS PRN
Start: 2024-11-12

## 2024-11-12 NOTE — PATIENT INSTRUCTIONS
"Diagnostic Medial Branch Block - Patient Information -- insurance requires 2 of these before moving forward with RFA (radiofrequency ablation)    What are facet joints?  Bones called vertebrae make up your spine. Each vertebra has facets (flat surfaces) that touch where the vertebrae fit together. These form a structure called a facet joint on each side of the vertebrae.Back or neck pain may be caused by a problem with your facet joints. If these joints are blocked or numbed, they will not be able to transfer the painful sensation to the brain.   Therefore, this procedure is completed to see if your back pain is (solely or in part) caused by the facet joints.        How is the procedure performed?  The patient lies on his/her stomach. The skin of the back or neck is cleansed with antiseptic solution and a local anesthetic in injected to numb the area. A small needle is then guided using an X-ray to the targeted facet joints which are then numbed. An anesthetic is then injected into the joint.   The injection takes about 15 minutes to complete.    Where will your procedure be performed?  The treatment is done in a hospital or surgery center. Youll be asked to fill out some forms, including a consent form. You may also be examined prior to.         Will the injection hurt?  There is some discomfort with needle insertion which we minimize by numbing the skin over the joint with a local anesthetic. You may elect to have a small amount of sedating medication to help with discomfort and to help you relax.     How long does the effect last?  The pain relief will only last a few hours/ less than 12 hours. Pain relief in the first few hours after the injection is the most important as this tells us our diagnosis of facet joint mediated pain is correct. If the "test injection" provides pain relief, we can discuss other options available for extended pain relief, such a radiofrequency ablation (RFA).    What is the next step " after the injection?  Our staff will call you the next day to document pain relief obtained after the procedure. If the pain relief is significant, our final plan would be to move forward with next part of the treatment: RFA (radiofrequency ablation), which can provide an extended pain relief from 6 months to 18 months. To note: many insurances require 2 diagnostic medial branch blocks prior to moving forward with RFA for the first time.    What are the risks and side effects?  Serious side effects and complications are rare. The most common problem after the injection is having pain in the area of the injection for a few days. The other complications are infection, bleeding and nerve injury. These complications are minimized by stopping blood thinners, using sterile technique, and fluoroscopy for xray needle guidance.    After the Procedure:  Most often, you can go home in about an hour. Our procedure center requires you to have an adult friend or relative drive you to and from the facility. The anesthetic wears off in a few hours. When it does, your back or neck may feel more sore than usual. This is normal. Take it easy for the rest of the day.     ______________________________________________________________________    If lumbar medial branch block is successful in pain relief (at least 80% pain relief short-term); for longer last relief, we can try:    Radiofrequency Ablation (RFA) - Cervical or Lumbar:      What is a facet joint pain?  The spine is made of vertebrae, which makes up the spine. The vertebrae are connected to each other with facet joints, which allows the bending and rotational spine movements. As the joints become inflamed and irritated, there is a small medial branch nerve that transmits the pain signal from the joint to the brain. Furthermore, spine pain may worsen during the extension of spine.       How does cervical/ lumbar RFA bring pain relief?  The pain is produced due to inflammation  thoracic facet joint which is transmitted via medial branch nerve to the central nervous system. By ablating the medial branch nerve via radiofrequency waves, this results in decreased neck/ back pain caused by the facet joints.     How is the cervical/ lumbar RFA performed?  After sterile preparation of the cervical/ lumbar region, the injection site is localized under X-ray. Following the local anesthetic applied to the injection site, which can help decrease the injection site pain, and then the special radiofrequency needle is guided toward the target cervical or lumbar facet joint area with the help of x-ray. After the target is reached, the area is stimulated with to rule out any cervical /  lumbar nerve root involvement. Thereafter, small amount of local anesthetic with steroid is injected for to minimize postablation procedure pain, and then the radiofrequency ablation is performed. Lastly, the needle is taken out at the end of the procedure.      What to expect after the cervical/ lumbar RFA?  This is an outpatient procedure. Patient should expect to receive full relief over 3-8 weeks. In some patients, pain may worsen slightly before improvement.    How long the relief from the cervical/ lumbar RFA would last for?  It varies from patient to patient. Usually, the relief can last from 6 months, up to 18 months.    Do the medial branch nerves ever grow back? And can the cervical/ lumbar RFA procedure repeated?  Yes, the medial branch nerves do grow back. Yes, the RFA can be repeated in order to achieve the previous pain relief.

## 2024-11-13 ENCOUNTER — CLINICAL SUPPORT (OUTPATIENT)
Dept: CARDIAC REHAB | Facility: HOSPITAL | Age: 74
End: 2024-11-13
Payer: MEDICARE

## 2024-11-13 DIAGNOSIS — I25.118 CORONARY ARTERY DISEASE OF NATIVE ARTERY OF NATIVE HEART WITH STABLE ANGINA PECTORIS: Primary | ICD-10-CM

## 2024-11-13 PROCEDURE — 93798 PHYS/QHP OP CAR RHAB W/ECG: CPT | Performed by: STUDENT IN AN ORGANIZED HEALTH CARE EDUCATION/TRAINING PROGRAM

## 2024-11-14 ENCOUNTER — OFFICE VISIT (OUTPATIENT)
Dept: UROLOGY | Facility: CLINIC | Age: 74
End: 2024-11-14
Payer: MEDICARE

## 2024-11-14 ENCOUNTER — PATIENT MESSAGE (OUTPATIENT)
Dept: PAIN MEDICINE | Facility: CLINIC | Age: 74
End: 2024-11-14
Payer: MEDICARE

## 2024-11-14 VITALS
DIASTOLIC BLOOD PRESSURE: 84 MMHG | SYSTOLIC BLOOD PRESSURE: 145 MMHG | HEART RATE: 85 BPM | HEIGHT: 71 IN | WEIGHT: 180 LBS | BODY MASS INDEX: 25.2 KG/M2

## 2024-11-14 DIAGNOSIS — R36.1 HEMATOSPERMIA: Primary | ICD-10-CM

## 2024-11-14 DIAGNOSIS — N20.0 NEPHROLITHIASIS: ICD-10-CM

## 2024-11-14 LAB
BILIRUB UR QL STRIP: NEGATIVE
GLUCOSE UR QL STRIP: POSITIVE
KETONES UR QL STRIP: NEGATIVE
LEUKOCYTE ESTERASE UR QL STRIP: NEGATIVE
PH, POC UA: 5.5
POC BLOOD, URINE: NEGATIVE
POC NITRATES, URINE: NEGATIVE
PROT UR QL STRIP: NEGATIVE
SP GR UR STRIP: 1.01 (ref 1–1.03)
UROBILINOGEN UR STRIP-ACNC: 0.2 (ref 0.3–2.2)

## 2024-11-14 PROCEDURE — 99214 OFFICE O/P EST MOD 30 MIN: CPT | Mod: PBBFAC | Performed by: UROLOGY

## 2024-11-14 PROCEDURE — 99999PBSHW POCT URINALYSIS, DIPSTICK, AUTOMATED, W/O SCOPE: Mod: PBBFAC,,,

## 2024-11-14 PROCEDURE — 99999 PR PBB SHADOW E&M-EST. PATIENT-LVL IV: CPT | Mod: PBBFAC,,, | Performed by: UROLOGY

## 2024-11-14 PROCEDURE — 81003 URINALYSIS AUTO W/O SCOPE: CPT | Mod: PBBFAC | Performed by: UROLOGY

## 2024-11-14 PROCEDURE — 99214 OFFICE O/P EST MOD 30 MIN: CPT | Mod: S$PBB,,, | Performed by: UROLOGY

## 2024-11-14 NOTE — PROGRESS NOTES
Chief Complaint:   Encounter Diagnoses   Name Primary?    Nephrolithiasis     Hematospermia Yes       HPI:  HPI Timothy Ortega bre 74 y.o. male who presents with complaints of hematospermia.  He has had this for several months.  It is always red in color.  He was recently been started on antiplatelet therapy , he also takes a baby aspirin.  He has no trouble constipation.  He has no discomfort with ejaculation.  He denies any difficulty urinating.  He was nocturia x1.  He has never been on BPH therapy.  He gets routine PSAs.  He has had no blood in his urine.  He does have a history of kidney stones.    History:  Social History     Tobacco Use    Smoking status: Former     Current packs/day: 0.00     Average packs/day: 1 pack/day for 20.0 years (20.0 ttl pk-yrs)     Types: Cigarettes     Start date: 1980     Quit date: 2000     Years since quittin.8     Passive exposure: Past    Smokeless tobacco: Never   Substance Use Topics    Alcohol use: No     Alcohol/week: 0.0 standard drinks of alcohol    Drug use: No     Past Medical History:   Diagnosis Date    Coronary artery disease     [facility] - no stents    DM (diabetes mellitus)      lunch 10/28/2016    DM (diabetes mellitus)      am 2021    DM (diabetes mellitus)     BS 96 am 2022 Insulin for years    Groin pain, left 2021    Hyperlipemia     Hypertension     Kidney stones     Nephrolithiasis 2021    Neuropathy     Type 2 diabetes mellitus 10-12 years     am 10/31/2014     Past Surgical History:   Procedure Laterality Date    ANGIOGRAM, CORONARY, WITH LEFT HEART CATHETERIZATION N/A 2024    Procedure: Angiogram, Coronary, with Left Heart Cath;  Surgeon: Rojelio Wadsworth MD;  Location: [facility] CATH LAB;  Service: Cardiology;  Laterality: N/A;    CARPAL TUNNEL RELEASE Right 2020    COLONOSCOPY N/A 2017    Procedure: COLONOSCOPY;  Surgeon: Cornelius Ibarra MD;  Location: [facility] ENDO;  Service:  Endoscopy;  Laterality: N/A;    COLONOSCOPY N/A 04/18/2022    Procedure: COLONOSCOPY;  Surgeon: Geneva Serna MD;  Location: [facility] ENDO;  Service: Endoscopy;  Laterality: N/A;    DENTAL SURGERY      Implant    HAND SURGERY      KNEE ARTHROSCOPY Left     LITHOTRIPSY, CORONARY TRANSLUMINAL, PERCUTANEOUS  5/28/2024    Procedure: LITHOTRIPSY, CORONARY TRANSLUMINAL, PERCUTANEOUS;  Surgeon: Rojelio Wadsworth MD;  Location: [facility] CATH LAB;  Service: Cardiology;;    PTCA, SINGLE VESSEL  5/28/2024    Procedure: PTCA, Single Vessel;  Surgeon: Rojelio Wadsworth MD;  Location: [facility] CATH LAB;  Service: Cardiology;;    ROBOT-ASSISTED LAPAROSCOPIC REPAIR OF INGUINAL HERNIA USING DA JOSE ANGEL XI Left 02/26/2021    Procedure: XI ROBOTIC REPAIR, HERNIA, INGUINAL;  Surgeon: Tavon Cruz MD;  Location: [facility] OR;  Service: General;  Laterality: Left;    STENT, DRUG ELUTING, SINGLE VESSEL, CORONARY  5/28/2024    Procedure: Stent, Drug Eluting, Single Vessel, Coronary;  Surgeon: Rojelio Wadsworth MD;  Location: [facility] CATH LAB;  Service: Cardiology;;     Family History   Problem Relation Name Age of Onset    Diabetes Mother      Glaucoma Mother      Heart disease Mother  75        CAB    Diabetes Father      Heart attack Father  58        Fatal MI    Diabetes Brother      Diabetes Sister      Diabetes Sister      Cataracts Paternal Uncle      Cataracts Maternal Grandmother         Current Outpatient Medications on File Prior to Visit   Medication Sig Dispense Refill    acetaminophen (TYLENOL) 500 MG tablet Take 2 tablets (1,000 mg total) by mouth every 8 (eight) hours as needed for Pain. Not actual Rx - Use for Recommended dose instructions      amLODIPine (NORVASC) 5 MG tablet Take 1 tablet (5 mg total) by mouth once daily. 90 tablet 2    ASCORBATE CALCIUM (VITAMIN C ORAL) Take by mouth once daily.      aspirin 81 mg Tab Take 1 tablet by mouth Daily.      ERGOCALCIFEROL, VITAMIN D2, (VITAMIN D ORAL) Take by mouth once daily.      FREESTYLE  "LANCETS 28 gauge lancets USE THREE TIMES DAILY 100 each 0    hydrocortisone 2.5 % cream Apply topically 2 (two) times daily as needed.      insulin glargine U-100, Lantus, (LANTUS SOLOSTAR U-100 INSULIN) 100 unit/mL (3 mL) InPn pen Inject 28 Units into the skin once daily. 24 mL 3    insulin lispro (ADMELOG SOLOSTAR U-100 INSULIN) 100 unit/mL pen INJECT 28 UNITS UNDER THE SKIN THREE TIMES DAILY BEFORE A MEAL 75 mL 3    isosorbide mononitrate (IMDUR) 30 MG 24 hr tablet Take 1 tablet (30 mg total) by mouth once daily. 30 tablet 11    JARDIANCE 25 mg tablet TAKE 1 TABLET(25 MG) BY MOUTH EVERY DAY 90 tablet 3    lancets 33 gauge Misc 1 lancet by Misc.(Non-Drug; Combo Route) route 3 (three) times daily. 100 each 11    levocetirizine (XYZAL) 5 MG tablet Take 5 mg by mouth every evening.      LIDOcaine (LIDODERM) 5 % Use 1-3 patches per day over recommended area. Remove & Discard patch within 12 hours or as directed by MD. 90 patch 0    LIDOcaine-prilocaine (EMLA) cream Apply topically as needed (increased pain). 30 g 4    losartan (COZAAR) 25 MG tablet Take 1 tablet (25 mg total) by mouth once daily. 90 tablet 3    metoprolol succinate (TOPROL-XL) 25 MG 24 hr tablet Take 1 tablet (25 mg total) by mouth once daily. 30 tablet 5    mupirocin (BACTROBAN) 2 % ointment Apply topically 2 (two) times daily. 30 g 2    nitroGLYCERIN (NITROSTAT) 0.4 MG SL tablet Place 1 tablet (0.4 mg total) under the tongue every 5 (five) minutes as needed (chest pain). 25 tablet 5    pen needle, diabetic (BD ULTRA-FINE SHORT PEN NEEDLE) 31 gauge x 5/16" Ndle USE 5 TIMES A  each 3    prasugreL (EFFIENT) 10 mg Tab Take 1 tablet (10 mg total) by mouth once daily. 30 tablet 11    pregabalin (LYRICA) 50 MG capsule Take 1 capsule (50 mg total) by mouth 3 (three) times daily. 60 capsule 2    rosuvastatin (CRESTOR) 20 MG tablet TAKE 1 TABLET(20 MG) BY MOUTH DAILY 90 tablet 3    tirzepatide (MOUNJARO) 15 mg/0.5 mL PnIj Inject 15 mg into the skin " "every 7 days. 12 Pen 3     Current Facility-Administered Medications on File Prior to Visit   Medication Dose Route Frequency Provider Last Rate Last Admin    lactated ringers infusion   Intravenous Continuous Lili Garvin MD 10 mL/hr at 02/26/21 0625 Restarted at 02/26/21 0659        Objective:     Vitals:    11/14/24 0817   BP: (!) 145/84   BP Location: Right arm   Patient Position: Sitting   Pulse: 85   Weight: 81.6 kg (180 lb)   Height: 5' 11" (1.803 m)      BMI Readings from Last 1 Encounters:   11/14/24 25.10 kg/m²          Physical Exam  No acute distress alert and oriented   Respirations even unlabored   Abdomen is thin nontender   Digital rectal exam reveals a 35 g gland with the left side slightly soft.  Lab Results   Component Value Date    PSADIAG 1.3 07/11/2024    PSADIAG 0.88 06/13/2023    PSADIAG 0.77 07/05/2022    PSA 0.50 09/21/2020    PSA 0.44 08/17/2019    PSA 0.36 08/13/2018    PSA 0.31 08/11/2017    PSA 0.35 12/30/2011    PSA 0.27 12/03/2010    PSA 0.24 11/27/2009    PSA 0.34 12/19/2008    PSA 0.3 11/02/2007    PSA 0.3 09/01/2006    PSA 0.3 02/04/2005        Lab Results   Component Value Date    CREATININE 1.0 09/20/2024      Assessment:       1. Hematospermia    2. Nephrolithiasis        Plan:     1. Hematospermia    2. Nephrolithiasis      Orders Placed This Encounter    POCT Urinalysis, Dipstick, Automated, W/O Scope      Hematospermia.  Negative evaluation.  Patient was reassured.  Discussed if he has symptoms of concomitant prostatitis would recommend course of antibiotics.  We will re-evaluate him in July for another PSA and evaluation.  "

## 2024-11-15 ENCOUNTER — CLINICAL SUPPORT (OUTPATIENT)
Dept: CARDIAC REHAB | Facility: HOSPITAL | Age: 74
End: 2024-11-15
Payer: MEDICARE

## 2024-11-15 DIAGNOSIS — I25.118 CORONARY ARTERY DISEASE OF NATIVE ARTERY OF NATIVE HEART WITH STABLE ANGINA PECTORIS: Primary | ICD-10-CM

## 2024-11-15 PROCEDURE — 93798 PHYS/QHP OP CAR RHAB W/ECG: CPT | Performed by: INTERNAL MEDICINE

## 2024-11-18 ENCOUNTER — CLINICAL SUPPORT (OUTPATIENT)
Dept: CARDIAC REHAB | Facility: HOSPITAL | Age: 74
End: 2024-11-18
Attending: INTERNAL MEDICINE
Payer: MEDICARE

## 2024-11-18 DIAGNOSIS — I25.118 CORONARY ARTERY DISEASE OF NATIVE ARTERY OF NATIVE HEART WITH STABLE ANGINA PECTORIS: Primary | ICD-10-CM

## 2024-11-18 PROCEDURE — 93798 PHYS/QHP OP CAR RHAB W/ECG: CPT | Performed by: INTERNAL MEDICINE

## 2024-11-18 RX ORDER — METOPROLOL SUCCINATE 25 MG/1
TABLET, EXTENDED RELEASE ORAL
Qty: 30 TABLET | Refills: 5 | Status: SHIPPED | OUTPATIENT
Start: 2024-11-18

## 2024-11-20 ENCOUNTER — CLINICAL SUPPORT (OUTPATIENT)
Dept: CARDIAC REHAB | Facility: HOSPITAL | Age: 74
End: 2024-11-20
Payer: MEDICARE

## 2024-11-20 DIAGNOSIS — I25.118 CORONARY ARTERY DISEASE OF NATIVE ARTERY OF NATIVE HEART WITH STABLE ANGINA PECTORIS: Primary | ICD-10-CM

## 2024-11-20 PROCEDURE — 93798 PHYS/QHP OP CAR RHAB W/ECG: CPT | Performed by: INTERNAL MEDICINE

## 2024-11-22 ENCOUNTER — CLINICAL SUPPORT (OUTPATIENT)
Dept: CARDIAC REHAB | Facility: HOSPITAL | Age: 74
End: 2024-11-22
Payer: MEDICARE

## 2024-11-22 DIAGNOSIS — I25.118 CORONARY ARTERY DISEASE OF NATIVE ARTERY OF NATIVE HEART WITH STABLE ANGINA PECTORIS: Primary | ICD-10-CM

## 2024-11-22 PROCEDURE — 93798 PHYS/QHP OP CAR RHAB W/ECG: CPT | Performed by: INTERNAL MEDICINE

## 2024-11-25 ENCOUNTER — TELEPHONE (OUTPATIENT)
Dept: CARDIOLOGY | Facility: CLINIC | Age: 74
End: 2024-11-25
Payer: MEDICARE

## 2024-11-25 ENCOUNTER — CLINICAL SUPPORT (OUTPATIENT)
Dept: CARDIAC REHAB | Facility: HOSPITAL | Age: 74
End: 2024-11-25
Payer: MEDICARE

## 2024-11-25 DIAGNOSIS — I25.118 CORONARY ARTERY DISEASE OF NATIVE ARTERY OF NATIVE HEART WITH STABLE ANGINA PECTORIS: Primary | ICD-10-CM

## 2024-11-25 PROCEDURE — 93798 PHYS/QHP OP CAR RHAB W/ECG: CPT | Performed by: INTERNAL MEDICINE

## 2024-11-25 NOTE — TELEPHONE ENCOUNTER
Spoke with staff in regards to pre op clearance. Fax number was provided.                   ----- Message from Fiordaliza sent at 11/25/2024 12:17 PM CST -----  Type:  Needs Medical Advice    Who Called: rolando jamison  Symptoms (please be specific): na   How long has patient had these symptoms:  na  Pharmacy name and phone #:  na  Would the patient rather a call back or a response via [facility]? call  Best Call Back Number: [phone]  Additional Information: requesting to speak with office regarding medical clearance. Needs premed for cleanings.

## 2024-11-26 DIAGNOSIS — E11.8 TYPE 2 DIABETES MELLITUS WITH COMPLICATION, WITH LONG-TERM CURRENT USE OF INSULIN: ICD-10-CM

## 2024-11-26 DIAGNOSIS — Z79.4 TYPE 2 DIABETES MELLITUS WITH COMPLICATION, WITH LONG-TERM CURRENT USE OF INSULIN: ICD-10-CM

## 2024-11-26 DIAGNOSIS — I25.118 CORONARY ARTERY DISEASE OF NATIVE ARTERY OF NATIVE HEART WITH STABLE ANGINA PECTORIS: Primary | ICD-10-CM

## 2024-11-27 ENCOUNTER — CLINICAL SUPPORT (OUTPATIENT)
Dept: CARDIAC REHAB | Facility: HOSPITAL | Age: 74
End: 2024-11-27
Payer: MEDICARE

## 2024-11-27 DIAGNOSIS — I25.118 CORONARY ARTERY DISEASE OF NATIVE ARTERY OF NATIVE HEART WITH STABLE ANGINA PECTORIS: Primary | ICD-10-CM

## 2024-11-27 PROCEDURE — 93798 PHYS/QHP OP CAR RHAB W/ECG: CPT | Performed by: INTERNAL MEDICINE

## 2024-11-29 ENCOUNTER — CLINICAL SUPPORT (OUTPATIENT)
Dept: CARDIAC REHAB | Facility: HOSPITAL | Age: 74
End: 2024-11-29
Payer: MEDICARE

## 2024-11-29 DIAGNOSIS — I25.118 CORONARY ARTERY DISEASE OF NATIVE ARTERY OF NATIVE HEART WITH STABLE ANGINA PECTORIS: Primary | ICD-10-CM

## 2024-11-29 PROCEDURE — 93798 PHYS/QHP OP CAR RHAB W/ECG: CPT | Performed by: INTERNAL MEDICINE

## 2024-12-02 ENCOUNTER — CLINICAL SUPPORT (OUTPATIENT)
Dept: CARDIAC REHAB | Facility: HOSPITAL | Age: 74
End: 2024-12-02
Payer: MEDICARE

## 2024-12-02 DIAGNOSIS — I25.118 CORONARY ARTERY DISEASE OF NATIVE ARTERY OF NATIVE HEART WITH STABLE ANGINA PECTORIS: Primary | ICD-10-CM

## 2024-12-02 PROCEDURE — 93798 PHYS/QHP OP CAR RHAB W/ECG: CPT | Performed by: INTERNAL MEDICINE

## 2024-12-03 ENCOUNTER — TELEPHONE (OUTPATIENT)
Dept: CARDIOLOGY | Facility: CLINIC | Age: 74
End: 2024-12-03
Payer: MEDICARE

## 2024-12-03 ENCOUNTER — PATIENT MESSAGE (OUTPATIENT)
Dept: DIABETES | Facility: CLINIC | Age: 74
End: 2024-12-03
Payer: MEDICARE

## 2024-12-03 ENCOUNTER — HOSPITAL ENCOUNTER (OUTPATIENT)
Dept: CARDIOLOGY | Facility: HOSPITAL | Age: 74
Discharge: HOME | End: 2024-12-03
Attending: INTERNAL MEDICINE
Payer: MEDICARE

## 2024-12-03 DIAGNOSIS — I25.118 CORONARY ARTERY DISEASE OF NATIVE ARTERY OF NATIVE HEART WITH STABLE ANGINA PECTORIS: ICD-10-CM

## 2024-12-03 DIAGNOSIS — Z95.5 H/O HEART ARTERY STENT: ICD-10-CM

## 2024-12-03 LAB
CV STRESS BASE HR: 77 BPM
DIASTOLIC BLOOD PRESSURE: 69 MMHG
OHS CV CPX 85 PERCENT MAX PREDICTED HEART RATE MALE: 126
OHS CV CPX ESTIMATED METS: 10
OHS CV CPX MAX PREDICTED HEART RATE: 148
OHS CV CPX PATIENT IS FEMALE: 0
OHS CV CPX PATIENT IS MALE: 1
OHS CV CPX PEAK DIASTOLIC BLOOD PRESSURE: 91 MMHG
OHS CV CPX PEAK HEAR RATE: 134 BPM
OHS CV CPX PEAK RATE PRESSURE PRODUCT: NORMAL
OHS CV CPX PEAK SYSTOLIC BLOOD PRESSURE: 187 MMHG
OHS CV CPX PERCENT MAX PREDICTED HEART RATE ACHIEVED: 91
OHS CV CPX RATE PRESSURE PRODUCT PRESENTING: NORMAL
STRESS ECHO POST EXERCISE DUR MIN: 8 MINUTES
STRESS ECHO POST EXERCISE DUR SEC: 17 SECONDS
STRESS ST DEPRESSION: 0.5 MM
SYSTOLIC BLOOD PRESSURE: 132 MMHG

## 2024-12-03 PROCEDURE — 93018 CV STRESS TEST I&R ONLY: CPT | Mod: ,,, | Performed by: INTERNAL MEDICINE

## 2024-12-03 PROCEDURE — 93017 CV STRESS TEST TRACING ONLY: CPT

## 2024-12-03 PROCEDURE — 93016 CV STRESS TEST SUPVJ ONLY: CPT | Mod: ,,, | Performed by: INTERNAL MEDICINE

## 2024-12-03 NOTE — TELEPHONE ENCOUNTER
Contacted Pt and reviewed results and recommendations-A1C is 6.7 controlled  LDL level is 54, the lipid is controlled  PT stated verbal understanding         ----- Message from Naveen Gr MD sent at 12/3/2024  1:19 PM CST -----  A1C is 6.7 controlled  LDL level is 54, the lipid is controlled

## 2024-12-04 ENCOUNTER — CLINICAL SUPPORT (OUTPATIENT)
Dept: CARDIAC REHAB | Facility: HOSPITAL | Age: 74
End: 2024-12-04
Payer: MEDICARE

## 2024-12-04 VITALS
OXYGEN SATURATION: 96 % | DIASTOLIC BLOOD PRESSURE: 67 MMHG | BODY MASS INDEX: 25.76 KG/M2 | HEIGHT: 71 IN | HEART RATE: 74 BPM | SYSTOLIC BLOOD PRESSURE: 143 MMHG | WEIGHT: 184 LBS | RESPIRATION RATE: 16 BRPM

## 2024-12-04 DIAGNOSIS — I25.118 CORONARY ARTERY DISEASE OF NATIVE ARTERY OF NATIVE HEART WITH STABLE ANGINA PECTORIS: Primary | ICD-10-CM

## 2024-12-04 PROCEDURE — 93798 PHYS/QHP OP CAR RHAB W/ECG: CPT

## 2024-12-05 ENCOUNTER — PATIENT MESSAGE (OUTPATIENT)
Dept: CARDIOLOGY | Facility: CLINIC | Age: 74
End: 2024-12-05
Payer: MEDICARE

## 2024-12-05 ENCOUNTER — TELEPHONE (OUTPATIENT)
Dept: CARDIOLOGY | Facility: CLINIC | Age: 74
End: 2024-12-05
Payer: MEDICARE

## 2024-12-05 NOTE — TELEPHONE ENCOUNTER
Pt notified via portal pb      ----- Message from aNveen Gr MD sent at 12/4/2024  4:53 PM CST -----  The treadmill stress EKG test is normal. No ischemia.  Continue current Rx.

## 2024-12-30 ENCOUNTER — OFFICE VISIT (OUTPATIENT)
Dept: PODIATRY | Facility: CLINIC | Age: 74
End: 2024-12-30
Payer: MEDICARE

## 2024-12-30 DIAGNOSIS — Z79.4 TYPE 2 DIABETES MELLITUS WITH OTHER NEUROLOGIC COMPLICATION, WITH LONG-TERM CURRENT USE OF INSULIN: ICD-10-CM

## 2024-12-30 DIAGNOSIS — E11.49 TYPE 2 DIABETES MELLITUS WITH OTHER NEUROLOGIC COMPLICATION, WITH LONG-TERM CURRENT USE OF INSULIN: ICD-10-CM

## 2024-12-30 DIAGNOSIS — L90.9 PLANTAR FAT PAD ATROPHY: ICD-10-CM

## 2024-12-30 DIAGNOSIS — Q82.8 POROKERATOSIS: Primary | ICD-10-CM

## 2024-12-30 PROCEDURE — 99499 UNLISTED E&M SERVICE: CPT | Mod: S$PBB,,, | Performed by: PODIATRIST

## 2024-12-30 PROCEDURE — 99999 PR PBB SHADOW E&M-EST. PATIENT-LVL III: CPT | Mod: PBBFAC,,, | Performed by: PODIATRIST

## 2024-12-30 PROCEDURE — 99213 OFFICE O/P EST LOW 20 MIN: CPT | Mod: PBBFAC,25 | Performed by: PODIATRIST

## 2024-12-30 PROCEDURE — 11055 PARING/CUTG B9 HYPRKER LES 1: CPT | Mod: Q9,PBBFAC | Performed by: PODIATRIST

## 2024-12-30 PROCEDURE — 11055 PARING/CUTG B9 HYPRKER LES 1: CPT | Mod: Q9,S$PBB,, | Performed by: PODIATRIST

## 2025-01-24 DIAGNOSIS — E11.8 TYPE 2 DIABETES MELLITUS WITH COMPLICATION, WITH LONG-TERM CURRENT USE OF INSULIN: ICD-10-CM

## 2025-01-24 DIAGNOSIS — Z79.4 TYPE 2 DIABETES MELLITUS WITH COMPLICATION, WITH LONG-TERM CURRENT USE OF INSULIN: ICD-10-CM

## 2025-01-24 RX ORDER — PEN NEEDLE, DIABETIC 30 GX3/16"
NEEDLE, DISPOSABLE MISCELLANEOUS
Qty: 300 EACH | Refills: 3 | Status: SHIPPED | OUTPATIENT
Start: 2025-01-24

## 2025-02-13 ENCOUNTER — TELEPHONE (OUTPATIENT)
Dept: DIABETES | Facility: CLINIC | Age: 75
End: 2025-02-13
Payer: MEDICARE

## 2025-02-13 DIAGNOSIS — Z79.4 TYPE 2 DIABETES MELLITUS WITH COMPLICATION, WITH LONG-TERM CURRENT USE OF INSULIN: Primary | ICD-10-CM

## 2025-02-13 DIAGNOSIS — E11.8 TYPE 2 DIABETES MELLITUS WITH COMPLICATION, WITH LONG-TERM CURRENT USE OF INSULIN: Primary | ICD-10-CM

## 2025-02-13 DIAGNOSIS — Z79.4 TYPE 2 DIABETES MELLITUS WITH COMPLICATION, WITH LONG-TERM CURRENT USE OF INSULIN: ICD-10-CM

## 2025-02-13 DIAGNOSIS — E11.8 TYPE 2 DIABETES MELLITUS WITH COMPLICATION, WITH LONG-TERM CURRENT USE OF INSULIN: ICD-10-CM

## 2025-02-13 RX ORDER — INSULIN ASPART INJECTION 100 [IU]/ML
28 INJECTION, SOLUTION SUBCUTANEOUS
Qty: 75.6 ML | Refills: 3 | Status: SHIPPED | OUTPATIENT
Start: 2025-02-13 | End: 2025-02-13 | Stop reason: SDUPTHER

## 2025-02-13 NOTE — TELEPHONE ENCOUNTER
Spoke with patient confirmed that he is no longer able to get his insulin and wants to know if we can send in a alternative

## 2025-02-13 NOTE — TELEPHONE ENCOUNTER
----- Message from Nam sent at 2/13/2025  1:34 PM CST -----  Contact: self  ..Type:  Needs Medical Advice    Who Called: .Timothy Ortega  Would the patient rather a call back or a response via [facility]? Call back  Best Call Back Number: .[phone]  Additional Information: Pt would like a call back in regard to his medication insulin lispro (ADMELOG SOLOSTAR U-100 INSULIN) 100 unit/mL pen he states that the pharmacy can not fill it and is wanting to know if he can take a different medication until they can.

## 2025-02-13 NOTE — TELEPHONE ENCOUNTER
----- Message from Nam sent at 2/13/2025  4:46 PM CST -----  Contact: self  .Type:  RX Refill Request    Who Called: .Timothy Ortega  Pharmacy name and phone #:  .  "[id number] - [place] - [address] AT [address] & [address]  [address] [address] [address]  Phone: [phone] Fax: [phone]    [facility] [address]  [facility] [address]  Phone: [phone] Fax: [phone]    [id number] - [place] - [address] AT [facility] & [facility] [address] [address] [facility]  [address] [address]  Phone: [phone] Fax: [phone]    Would the patient rather a call back or a response via [facility]? Call back  Best Call Back Number: .[phone]  Additional Information: Pt states the two medications that  sent in for him are not in stock, he would like prescriptions to be sent to [facility] [address].

## 2025-02-13 NOTE — TELEPHONE ENCOUNTER
Fiasp as alternative to Admelog. Dosing is the same.     Al Daugherty PA-C, BC-ADM  [facility]

## 2025-02-14 ENCOUNTER — PATIENT MESSAGE (OUTPATIENT)
Dept: DIABETES | Facility: CLINIC | Age: 75
End: 2025-02-14
Payer: MEDICARE

## 2025-02-14 RX ORDER — INSULIN ASPART INJECTION 100 [IU]/ML
28 INJECTION, SOLUTION SUBCUTANEOUS
Qty: 75 ML | Refills: 3 | Status: SHIPPED | OUTPATIENT
Start: 2025-02-14 | End: 2026-02-14

## 2025-02-22 DIAGNOSIS — Z00.00 ENCOUNTER FOR MEDICARE ANNUAL WELLNESS EXAM: ICD-10-CM

## 2025-03-07 ENCOUNTER — OFFICE VISIT (OUTPATIENT)
Dept: DIABETES | Facility: CLINIC | Age: 75
End: 2025-03-07
Payer: MEDICARE

## 2025-03-07 VITALS
HEART RATE: 72 BPM | DIASTOLIC BLOOD PRESSURE: 64 MMHG | SYSTOLIC BLOOD PRESSURE: 128 MMHG | BODY MASS INDEX: 25.24 KG/M2 | WEIGHT: 181 LBS

## 2025-03-07 DIAGNOSIS — I15.2 HYPERTENSION ASSOCIATED WITH DIABETES: ICD-10-CM

## 2025-03-07 DIAGNOSIS — E78.5 HYPERLIPIDEMIA ASSOCIATED WITH TYPE 2 DIABETES MELLITUS: ICD-10-CM

## 2025-03-07 DIAGNOSIS — I25.10 CORONARY ARTERY DISEASE INVOLVING NATIVE CORONARY ARTERY OF NATIVE HEART WITHOUT ANGINA PECTORIS: ICD-10-CM

## 2025-03-07 DIAGNOSIS — E11.69 HYPERLIPIDEMIA ASSOCIATED WITH TYPE 2 DIABETES MELLITUS: ICD-10-CM

## 2025-03-07 DIAGNOSIS — E11.8 TYPE 2 DIABETES MELLITUS WITH COMPLICATION, WITH LONG-TERM CURRENT USE OF INSULIN: Primary | ICD-10-CM

## 2025-03-07 DIAGNOSIS — E11.59 HYPERTENSION ASSOCIATED WITH DIABETES: ICD-10-CM

## 2025-03-07 DIAGNOSIS — Z79.4 TYPE 2 DIABETES MELLITUS WITH COMPLICATION, WITH LONG-TERM CURRENT USE OF INSULIN: Primary | ICD-10-CM

## 2025-03-07 LAB — GLUCOSE SERPL-MCNC: 176 MG/DL (ref 70–110)

## 2025-03-07 PROCEDURE — 99999 PR PBB SHADOW E&M-EST. PATIENT-LVL IV: CPT | Mod: PBBFAC,,, | Performed by: PHYSICIAN ASSISTANT

## 2025-03-07 PROCEDURE — 99214 OFFICE O/P EST MOD 30 MIN: CPT | Mod: PBBFAC | Performed by: PHYSICIAN ASSISTANT

## 2025-03-07 RX ORDER — INSULIN GLARGINE 100 [IU]/ML
28 INJECTION, SOLUTION SUBCUTANEOUS DAILY
Qty: 24 ML | Refills: 3 | Status: SHIPPED | OUTPATIENT
Start: 2025-03-07 | End: 2026-03-07

## 2025-03-07 RX ORDER — MELOXICAM 15 MG/1
15 TABLET ORAL
COMMUNITY
Start: 2025-02-17

## 2025-03-07 NOTE — PATIENT INSTRUCTIONS
CURRENT DM MEDICATIONS:   Lantus 28 units at night   Fiasp 28 - 32 units before meals and 3 units with dessert  Jardiance 25 mg daily  Mounjaro 15 mg weekly

## 2025-03-07 NOTE — PROGRESS NOTES
PCP: Shlomo Ochoa MD    Subjective:     Chief Complaint: Diabetes - Established Patient    HISTORY OF PRESENT ILLNESS: 74 y.o.  male presenting for diabetes management visit.   The patient's last visit with me was on 9/6/2024.   Patient has had Type II diabetes since 1990s.  Pertinent to decision making is the following comorbidities: HTN, HLD, CAD and Overweight by BMI  Patient has the following Diabetes complications: without complications  He has attended diabetes education in the past.     Patient's most recent A1c of 6.7% was completed 3 months ago.   Patient states since His last A1c His blood glucose levels have been within range throughout the day .   Patient monitors blood glucose 3 times per day with [facility] meter : Fasting, Before Lunch and Before Dinner. CGM declined.   Patient blood glucose monitoring device data will be reviewed under the Episodes tab today - see below.   Patient endorses the following diabetes related symptoms: None.   Patient is due today for the following diabetes-related health maintenance standards: Foot Exam , Eye Exam, Lipid panel, A1c, and PSA screening .   He denies any recent hospital admissions or emergency room visits.   He denies having hypoglycemia.  Patient's concerns today include glycemic control. Per pt, glycemic variations related to differing carb intake.     Timothy's recent readings (past 60 days):    Time Taken Glucose (mg/dL) Fasting Glucose (mg/dL) Before Meal or Snack (mg/dL) After Meal or Snack (mg/dL) Bedtime (mg/dL) Feeling Symptoms (mg/dL) Re-check (mg/dL) Patient Comments Steps Walked (steps)   3/7/2025  7:11 AM            3/7/2025 12:00 AM CST         28   3/6/2025  8:29 AM            3/6/2025 12:00 AM CST         2827   3/5/2025  1:28 PM CST 97           3/5/2025  8:19 AM            3/5/2025 12:00 AM CST         3303   3/4/2025  4:23 PM CST 84           3/4/2025  7:50 AM            3/4/2025  12:00 AM CST         4891   3/3/2025  6:07 PM            3/3/2025  7:38 AM            3/3/2025 12:00 AM CST         2212   3/2/2025  6:13 PM            3/2/2025 12:07 PM            3/2/2025  7:47 AM            3/2/2025 12:00 AM CST         1310   3/1/2025  6:14 PM CST 99           3/1/2025  1:07 PM            3/1/2025  9:58 AM            3/1/2025 12:00 AM CST         4593   2/28/2025  8:25 AM CST 89           2/28/2025 12:00 AM CST         439   2/27/2025 12:39 PM            2/27/2025  8:11 AM            2/27/2025 12:00 AM CST         1430   2/26/2025  5:36 PM            2/26/2025  1:25 PM            2/26/2025  8:39 AM            2/26/2025 12:00 AM CST         255   2/25/2025  5:57 PM            2/25/2025 12:24 PM            2/25/2025  8:17 AM            2/25/2025 12:00 AM CST         615   2/24/2025  1:22 PM            2/24/2025  9:09 AM            2/24/2025 12:00 AM CST         1819   2/23/2025  6:13 PM            2/23/2025 12:14 PM            2/23/2025  8:02 AM            2/23/2025 12:00 AM CST         1522   2/22/2025  5:53 PM            2/22/2025  7:29 AM            2/22/2025 12:00 AM CST         3631   2/21/2025  5:34 PM            2/21/2025  7:08 AM            2/21/2025 12:00 AM CST         2002   2/20/2025  5:51 PM            2/20/2025  7:53 AM            2/20/2025 12:00 AM CST         2839   2/19/2025  4:39 PM            2/19/2025  1:08 PM            2/19/2025  8:22 AM            2/19/2025 12:00 AM CST         5495   2/18/2025  5:36 PM            2/18/2025 12:32 PM            2/18/2025  8:19 AM            2/18/2025 12:00 AM CST         2854   2/17/2025  5:47 PM            2/17/2025 12:42 PM            2/17/2025  8:41 AM            2/17/2025 12:00 AM CST         2603  "  2/16/2025  6:24 PM            2/16/2025 12:21 PM            2/16/2025  7:59 AM            2/16/2025 12:00 AM CST         1376   2/15/2025  6:03 PM            2/15/2025 12:31 PM CST 93           2/15/2025  7:31 AM            2/15/2025 12:00 AM CST         2373   2/14/2025  5:25 PM            2/14/2025 12:00 AM CST         1623   2/13/2025  5:50 PM            2/13/2025 12:16 PM            2/13/2025  7:02 AM            2/13/2025 12:00 AM CST         3730   2/12/2025  4:48 PM            2/12/2025  2:23 PM            2/12/2025  8:13 AM            2/12/2025 12:00 AM CST         3248   2/11/2025  6:21 PM            2/11/2025 12:36 PM            2/11/2025  8:57 AM            2/11/2025 12:00 AM CST         2729   2/10/2025  7:45 PM CST 98           2/10/2025 12:53 PM            2/10/2025  8:51 AM            2/10/2025 12:00 AM CST         2818   2/9/2025  6:02 PM            2/9/2025  8:03 AM            2/9/2025 12:00 AM CST         1994   2/8/2025  6:22 PM            2/8/2025  7:27 AM            2/8/2025 12:00 AM CST         3695   2/7/2025  7:28 AM                Patient medication regimen is as below.     CURRENT DM MEDICATIONS:   Lantus 28 units at night   Fiasp 28 units TID wm and 3 units with dessert  Jardiance 25 mg daily  Mounjaro 15 mg weekly     Labs Reviewed.       No results found for: "CPEPTIDE"  No results found for: "GLUTAMICACID"       //   , There is no height or weight on file to calculate BMI.  His blood sugar in clinic today is:    Lab Results   Component Value Date    POCGLU 171 (A) 09/06/2024       Review of Systems   Constitutional:  Negative for activity change, appetite change, chills and fever.   HENT:  Negative for dental problem, mouth sores, nosebleeds, sore throat and trouble swallowing.    Eyes:  Negative for pain and discharge.   Respiratory: " " Negative for shortness of breath, wheezing and stridor.    Cardiovascular:  Negative for chest pain, palpitations and leg swelling.   Gastrointestinal:  Negative for abdominal pain, diarrhea, nausea and vomiting.   Endocrine: Negative for polydipsia, polyphagia and polyuria.   Genitourinary:  Negative for dysuria, frequency and urgency.   Musculoskeletal:  Negative for joint swelling and myalgias.   Skin:  Negative for rash and wound.   Neurological:  Negative for dizziness, syncope, weakness and headaches.   Psychiatric/Behavioral:  Negative for behavioral problems and dysphoric mood.          Diabetes Management Status  Statin: Taking  ACE/ARB: Taking    Screening or Prevention Patient's value Goal Complete/Controlled?   HgA1C Testing and Control   Lab Results   Component Value Date    HGBA1C 6.7 (H) 12/02/2024      Annually/Less than 8% Yes   Lipid profile : 12/02/2024 Annually Yes   LDL control Lab Results   Component Value Date    LDLCALC 53.8 (L) 12/02/2024    Annually/Less than 100 mg/dl  Yes   Nephropathy screening Lab Results   Component Value Date    MICALBCREAT Unable to calculate 09/20/2024     No results found for: "PROTEINUA" Annually Yes   Blood pressure BP Readings from Last 1 Encounters:   12/04/24 (!) 143/67    Less than 140/90 Yes   Dilated retinal exam : 02/26/2024 Annually Yes    Foot exam   : 03/05/2024 Annually Yes     ACTIVITY LEVEL: Moderately Active. Discussed activities, benefits, methods, and precautions.  MEAL PLANNING: Patient reports number of meals per day to be 3 and number of snacks per day to be 2.   Patient is encouraged to carb count and consume no more than 30 - 45 grams of carbohydrates in each meal and 15 grams of carbohydrates in each snack.     Social History     Socioeconomic History    Marital status:    Tobacco Use    Smoking status: Former     Current packs/day: 0.00     Average packs/day: 1 pack/day for 20.0 years (20.0 ttl pk-yrs)     Types: Cigarettes     " Start date: 1980     Quit date: 2000     Years since quittin.1     Passive exposure: Past    Smokeless tobacco: Never   Substance and Sexual Activity    Alcohol use: No     Alcohol/week: 0.0 standard drinks of alcohol    Drug use: No    Sexual activity: Yes     Partners: Female   Social History Narrative    . Lives with spouse. Has 2 children. Retired. No pets or smokers in household.     Social Drivers of Health     Financial Resource Strain: Patient Declined (2024)    Overall Financial Resource Strain (CARDIA)     Difficulty of Paying Living Expenses: Patient declined   Food Insecurity: Patient Declined (2024)    Hunger Vital Sign     Worried About Running Out of Food in the Last Year: Patient declined     Ran Out of Food in the Last Year: Patient declined   Transportation Needs: No Transportation Needs (2024)    TRANSPORTATION NEEDS     Transportation : No   Physical Activity: Insufficiently Active (2024)    Exercise Vital Sign     Days of Exercise per Week: 1 day     Minutes of Exercise per Session: 20 min   Stress: No Stress Concern Present (2024)    [employer] - Occupational Stress Questionnaire     Feeling of Stress : Not at all   Housing Stability: Unknown (2024)    Housing Stability Vital Sign     Unable to Pay for Housing in the Last Year: Patient declined     Past Medical History:   Diagnosis Date    Coronary artery disease     [facility] - no stents    DM (diabetes mellitus)      lunch 10/28/2016    DM (diabetes mellitus)      am 2021    DM (diabetes mellitus)     BS 96 am 2022 Insulin for years    Groin pain, left 2021    Hyperlipemia     Hypertension     Kidney stones     Nephrolithiasis 2021    Neuropathy     Type 2 diabetes mellitus 10-12 years     am 10/31/2014       Objective:        Physical Exam  Constitutional:       General: He is not in acute distress.     Appearance: He is  well-developed. He is not diaphoretic.   HENT:      Head: Normocephalic and atraumatic.      Right Ear: External ear normal.      Left Ear: External ear normal.      Nose: Nose normal.   Eyes:      General:         Right eye: No discharge.         Left eye: No discharge.      Pupils: Pupils are equal, round, and reactive to light.   Cardiovascular:      Rate and Rhythm: Normal rate and regular rhythm.      Pulses:           Dorsalis pedis pulses are 2+ on the right side and 2+ on the left side.        Posterior tibial pulses are 2+ on the right side and 2+ on the left side.      Heart sounds: Normal heart sounds.   Pulmonary:      Effort: Pulmonary effort is normal. No respiratory distress.      Breath sounds: Normal breath sounds. No stridor.   Abdominal:      Palpations: Abdomen is soft.   Musculoskeletal:         General: Normal range of motion.      Cervical back: Normal range of motion and neck supple.      Right foot: Normal range of motion. No deformity.      Left foot: Normal range of motion. No deformity.   Feet:      Right foot:      Protective Sensation: 6 sites tested.  6 sites sensed.      Skin integrity: Dry skin present. No ulcer, blister, skin breakdown, erythema, warmth or callus.      Left foot:      Protective Sensation: 6 sites tested.  6 sites sensed.      Skin integrity: Dry skin present. No ulcer, blister, skin breakdown, erythema, warmth or callus.   Skin:     General: Skin is warm and dry.      Capillary Refill: Capillary refill takes less than 2 seconds.      Coloration: Skin is not pale.   Neurological:      Mental Status: He is alert and oriented to person, place, and time. Mental status is at baseline.      Motor: No abnormal muscle tone.      Coordination: Coordination normal.   Psychiatric:         Mood and Affect: Mood normal.         Behavior: Behavior normal.         Thought Content: Thought content normal.         Judgment: Judgment normal.           Assessment / Plan:     Type 2  diabetes mellitus with complication, with long-term current use of insulin  -     POCT Glucose, Hand-Held Device  -     insulin glargine U-100, Lantus, (LANTUS SOLOSTAR U-100 INSULIN) 100 unit/mL (3 mL) InPn pen; Inject 28 Units into the skin once daily.  Dispense: 24 mL; Refill: 3    Hypertension associated with diabetes    Hyperlipidemia associated with type 2 diabetes mellitus    Coronary artery disease involving native coronary artery of native heart without angina pectoris    BMI 25.0-25.9,adult      Additional Plan Details:    - POCT Glucose  - Encouraged continuation of lifestyle changes including regular exercise and limiting carbohydrates to 30-45 grams per meal threes times daily and 15 grams per snack with a limit of two daily.   - Encouraged continued monitoring of blood glucose with maintenance of 3 times daily at Fasting, Before Lunch and Before Dinner. CGM declined.   - Current DM Medication Regimen: continue Mounjaro 15 mg weekly. Continue Lantus 28 units qhs. Change Admelog 28 - 32 units before meals TID. Continue Jardiance 25 mg daily.    - Health Maintenance standards addressed today: Foot Exam - completed today and documented in physical exam with feedback to patient about proper diabetic foot care and findings, Eye Exam - will be completed within [facility] and scheduled today, Lipid panel to be scheduled today, A1c to be scheduled, and Psa screening  - Nursing Visit: Patient is below goal range for nursing visit for age group and will not need nursing visit at this time .   - Follow up in  2  months with A1c prior.    CURRENT DM MEDICATIONS:   Lantus 28 units at night   Fiasp 28 - 32 units before meals and 3 units with dessert  Jardiance 25 mg daily  Mounjaro 15 mg weekly       Blakeney McKnight, PA-C [facility]    A total of 30 minutes was spent in face to face time, of which over 50 % was spent in counseling patient on disease process, complications, treatment, and side  effects of medications.

## 2025-03-10 ENCOUNTER — PATIENT MESSAGE (OUTPATIENT)
Dept: DIABETES | Facility: CLINIC | Age: 75
End: 2025-03-10
Payer: MEDICARE

## 2025-03-21 ENCOUNTER — OFFICE VISIT (OUTPATIENT)
Dept: PULMONOLOGY | Facility: CLINIC | Age: 75
End: 2025-03-21
Payer: MEDICARE

## 2025-03-21 VITALS
RESPIRATION RATE: 17 BRPM | DIASTOLIC BLOOD PRESSURE: 66 MMHG | BODY MASS INDEX: 25.68 KG/M2 | SYSTOLIC BLOOD PRESSURE: 126 MMHG | HEART RATE: 67 BPM | WEIGHT: 183.44 LBS | HEIGHT: 71 IN | OXYGEN SATURATION: 98 %

## 2025-03-21 DIAGNOSIS — G47.33 OBSTRUCTIVE SLEEP APNEA: ICD-10-CM

## 2025-03-21 DIAGNOSIS — R53.83 FATIGUE, UNSPECIFIED TYPE: Primary | ICD-10-CM

## 2025-03-21 PROCEDURE — 99215 OFFICE O/P EST HI 40 MIN: CPT | Mod: PBBFAC | Performed by: NURSE PRACTITIONER

## 2025-03-21 PROCEDURE — 99999 PR PBB SHADOW E&M-EST. PATIENT-LVL V: CPT | Mod: PBBFAC,,, | Performed by: NURSE PRACTITIONER

## 2025-03-21 NOTE — PROGRESS NOTES
"Subjective:      Patient ID: Timothy Ortega is a 74 y.o. male.    Chief Complaint: Sleep Apnea    HPI  Presents for sleep apnea and review of CPAP therapy. Patient states improved symptoms with use of CPAP. Sleeping more soundly. Waking up feeling more refreshed. Improved daytime sleepiness. Patient states he is benefiting from use of the CPAP.  He still has daytime fatigue.   Falls asleep quickly but does not feel like he gets in a deep sleep. 6-8 hr sleep time. Drinks coffee in the evening.   Naps during the day.       Problem List[1]  /66 (Patient Position: Sitting)   Pulse 67   Resp 17   Ht 5' 11" (1.803 m)   Wt 83.2 kg (183 lb 6.8 oz)   SpO2 98%   BMI 25.58 kg/m²   Body mass index is 25.58 kg/m².    Review of Systems   Constitutional:  Positive for fatigue.   HENT: Negative.     Respiratory: Negative.     Cardiovascular: Negative.    Musculoskeletal: Negative.    Gastrointestinal: Negative.    Neurological: Negative.    Psychiatric/Behavioral: Negative.       Objective:      Physical Exam  Constitutional:       Appearance: Normal appearance.   HENT:      Head: Normocephalic and atraumatic.      Nose: Nose normal.   Cardiovascular:      Rate and Rhythm: Normal rate and regular rhythm.   Pulmonary:      Effort: Pulmonary effort is normal.      Breath sounds: Normal breath sounds.   Abdominal:      Palpations: Abdomen is soft.      Tenderness: There is no abdominal tenderness.   Musculoskeletal:         General: Normal range of motion.      Cervical back: Normal range of motion and neck supple.   Skin:     General: Skin is warm and dry.   Neurological:      General: No focal deficit present.      Mental Status: He is alert and oriented to person, place, and time.   Psychiatric:         Mood and Affect: Mood normal.         Behavior: Behavior normal.       Personal Diagnostic Review      3/20/2025     9:36 AM   EPWORTH SLEEPINESS SCALE   Sitting and reading 2   Watching TV 2   Sitting, inactive in a " public place (e.g. a theatre or a meeting) 1   As a passenger in a car for an hour without a break 2   Lying down to rest in the afternoon when circumstances permit 2   Sitting and talking to someone 0   Sitting quietly after a lunch without alcohol 1   In a car, while stopped for a few minutes in traffic 0   Total score 10        Patient-reported          Assessment:       1. Fatigue, unspecified type    2. Obstructive sleep apnea        Encounter Medications[2]  Orders Placed This Encounter   Procedures    CPAP/BIPAP SUPPLIES     Length of need (1-99 months)::   99     Choose ONE mask type and its corresponding cushions and/or pillows::    Full Face Mask, 1 per 90 days:  Full Face Cushion, (3 per 90 days)     Choose EITHER Heated or Non-Heated Tubjing:    Non-Heated Tubing, 1 per 90 days     All other supplies as needed as listed below::    Headgear, 1 per 180 days     All other supplies as needed as listed below::    Disposable Filter, 6 per 90 days     All other supplies as needed as listed below::    Non-Disposable Filter, 1 per 180 days     All other supplies as needed as listed below::    Humidifier Chamber, 1 per 180 days     Plan:       1. Fatigue, unspecified type  Comments:  Discussed 8-9 hr sleep time. Stop coffee at noon.    2. Obstructive sleep apnea  Overview:  CPAP 11cm H20. [employer] [facility]. [facility].     Orders:  -     CPAP/BIPAP SUPPLIES       Compliant with PAP and benefits from use. Follow up annually in the sleep clinic.                   Elizabeth LeJeune, FELICITAP, ANP         [1]   Patient Active Problem List  Diagnosis    Obstructive sleep apnea    Hyperlipidemia associated with type 2 diabetes mellitus    Hypertension associated with diabetes    CAD (coronary artery disease)    Type 2 diabetes mellitus with complication, with long-term current use of insulin    History of colon polyps    Bilateral carpal tunnel syndrome    Primary osteoarthritis of first carpometacarpal  joint of right hand    Nephrolithiasis    Nonrheumatic mitral valve regurgitation    Erectile dysfunction    Non-recurrent unilateral inguinal hernia without obstruction or gangrene    Elevated prostate specific antigen (PSA)     Diabetic polyneuropathy associated with type 2 diabetes mellitus    Aortic atherosclerosis    SOB (shortness of breath)    Abnormal nuclear stress test    H/O heart artery stent    Lumbar radiculopathy    Cervical radiculopathy   [2]   Outpatient Encounter Medications as of 3/21/2025   Medication Sig Dispense Refill    acetaminophen (TYLENOL) 500 MG tablet Take 2 tablets (1,000 mg total) by mouth every 8 (eight) hours as needed for Pain. Not actual Rx - Use for Recommended dose instructions      amLODIPine (NORVASC) 5 MG tablet Take 1 tablet (5 mg total) by mouth once daily. 90 tablet 2    ASCORBATE CALCIUM (VITAMIN C ORAL) Take by mouth once daily.      aspirin 81 mg Tab Take 1 tablet by mouth Daily.      ERGOCALCIFEROL, VITAMIN D2, (VITAMIN D ORAL) Take by mouth once daily.      FREESTYLE LANCETS 28 gauge lancets USE THREE TIMES DAILY 100 each 0    hydrocortisone 2.5 % cream Apply topically 2 (two) times daily as needed.      insulin aspart, niacinamide, (FIASP FLEXTOUCH U-100 INSULIN) 100 unit/mL (3 mL) InPn Inject 28 Units into the skin 3 (three) times daily with meals. 75 mL 3    insulin glargine U-100, Lantus, (LANTUS SOLOSTAR U-100 INSULIN) 100 unit/mL (3 mL) InPn pen Inject 28 Units into the skin once daily. 24 mL 3    isosorbide mononitrate (IMDUR) 30 MG 24 hr tablet Take 1 tablet (30 mg total) by mouth once daily. 30 tablet 11    JARDIANCE 25 mg tablet TAKE 1 TABLET(25 MG) BY MOUTH EVERY DAY 90 tablet 3    lancets 33 gauge Misc 1 lancet by Misc.(Non-Drug; Combo Route) route 3 (three) times daily. 100 each 11    levocetirizine (XYZAL) 5 MG tablet Take 5 mg by mouth every evening.      LIDOcaine (LIDODERM) 5 % Use 1-3 patches per day over recommended area. Remove & Discard patch  "within 12 hours or as directed by MD. 90 patch 0    LIDOcaine-prilocaine (EMLA) cream Apply topically as needed (increased pain). 30 g 4    meloxicam (MOBIC) 15 MG tablet Take 15 mg by mouth.      metoprolol succinate (TOPROL-XL) 25 MG 24 hr tablet TAKE 1 TABLET(25 MG) BY MOUTH DAILY 30 tablet 5    mupirocin (BACTROBAN) 2 % ointment Apply topically 2 (two) times daily. 30 g 2    nitroGLYCERIN (NITROSTAT) 0.4 MG SL tablet Place 1 tablet (0.4 mg total) under the tongue every 5 (five) minutes as needed (chest pain). 25 tablet 5    pen needle, diabetic (BD ULTRA-FINE SHORT PEN NEEDLE) 31 gauge x 5/16" Ndle USE AS DIRECTED 5 TIMES A  each 3    prasugreL HCl (EFFIENT) 10 mg Tab Take 1 tablet (10 mg total) by mouth once daily. 30 tablet 11    pregabalin (LYRICA) 50 MG capsule Take 1 capsule (50 mg total) by mouth 3 (three) times daily. 60 capsule 2    rosuvastatin (CRESTOR) 20 MG tablet TAKE 1 TABLET(20 MG) BY MOUTH DAILY 90 tablet 3    tirzepatide (MOUNJARO) 15 mg/0.5 mL PnIj Inject 15 mg into the skin every 7 days. 12 Pen 3    valsartan (DIOVAN) 80 MG tablet Take 1 tablet (80 mg total) by mouth once daily. 90 tablet 1    [DISCONTINUED] insulin glargine U-100, Lantus, (LANTUS SOLOSTAR U-100 INSULIN) 100 unit/mL (3 mL) InPn pen Inject 28 Units into the skin once daily. 24 mL 3     Facility-Administered Encounter Medications as of 3/21/2025   Medication Dose Route Frequency Provider Last Rate Last Admin    lactated ringers infusion   Intravenous Continuous Lili Garvin MD 10 mL/hr at 02/26/21 0625 Restarted at 02/26/21 0659     "

## 2025-03-25 ENCOUNTER — OFFICE VISIT (OUTPATIENT)
Dept: INTERNAL MEDICINE | Facility: CLINIC | Age: 75
End: 2025-03-25
Payer: MEDICARE

## 2025-03-25 VITALS
DIASTOLIC BLOOD PRESSURE: 70 MMHG | RESPIRATION RATE: 18 BRPM | HEIGHT: 71 IN | HEART RATE: 64 BPM | SYSTOLIC BLOOD PRESSURE: 110 MMHG | BODY MASS INDEX: 25.5 KG/M2 | OXYGEN SATURATION: 98 % | WEIGHT: 182.13 LBS

## 2025-03-25 DIAGNOSIS — Z79.4 TYPE 2 DIABETES MELLITUS WITH COMPLICATION, WITH LONG-TERM CURRENT USE OF INSULIN: ICD-10-CM

## 2025-03-25 DIAGNOSIS — I25.118 CORONARY ARTERY DISEASE OF NATIVE ARTERY OF NATIVE HEART WITH STABLE ANGINA PECTORIS: ICD-10-CM

## 2025-03-25 DIAGNOSIS — E78.5 HYPERLIPIDEMIA ASSOCIATED WITH TYPE 2 DIABETES MELLITUS: ICD-10-CM

## 2025-03-25 DIAGNOSIS — R97.20 ELEVATED PROSTATE SPECIFIC ANTIGEN (PSA): ICD-10-CM

## 2025-03-25 DIAGNOSIS — E11.8 TYPE 2 DIABETES MELLITUS WITH COMPLICATION, WITH LONG-TERM CURRENT USE OF INSULIN: ICD-10-CM

## 2025-03-25 DIAGNOSIS — G47.33 OBSTRUCTIVE SLEEP APNEA: ICD-10-CM

## 2025-03-25 DIAGNOSIS — E11.59 HYPERTENSION ASSOCIATED WITH DIABETES: ICD-10-CM

## 2025-03-25 DIAGNOSIS — E11.69 HYPERLIPIDEMIA ASSOCIATED WITH TYPE 2 DIABETES MELLITUS: ICD-10-CM

## 2025-03-25 DIAGNOSIS — E11.42 DIABETIC POLYNEUROPATHY ASSOCIATED WITH TYPE 2 DIABETES MELLITUS: ICD-10-CM

## 2025-03-25 DIAGNOSIS — I15.2 HYPERTENSION ASSOCIATED WITH DIABETES: ICD-10-CM

## 2025-03-25 DIAGNOSIS — Z00.00 ENCOUNTER FOR MEDICARE ANNUAL WELLNESS EXAM: Primary | ICD-10-CM

## 2025-03-25 PROBLEM — I70.0 AORTIC ATHEROSCLEROSIS: Status: RESOLVED | Noted: 2024-03-05 | Resolved: 2025-03-25

## 2025-03-25 PROCEDURE — 99215 OFFICE O/P EST HI 40 MIN: CPT | Mod: PBBFAC,PO | Performed by: NURSE PRACTITIONER

## 2025-03-25 PROCEDURE — G0439 PPPS, SUBSEQ VISIT: HCPCS | Mod: ,,, | Performed by: NURSE PRACTITIONER

## 2025-03-25 PROCEDURE — 99999 PR PBB SHADOW E&M-EST. PATIENT-LVL V: CPT | Mod: PBBFAC,,, | Performed by: NURSE PRACTITIONER

## 2025-03-25 NOTE — PROGRESS NOTES
"  Timothy Ortega presented for a  Medicare AWV and comprehensive Health Risk Assessment today. The following components were reviewed and updated:    Medical history  Family History  Social history  Allergies and Current Medications  Health Risk Assessment  Health Maintenance  Care Team         ** See Completed Assessments for Annual Wellness Visit within the encounter summary.**         The following assessments were completed:  Living Situation  CAGE  Depression Screening  Timed Get Up and Go  Whisper Test  Cognitive Function Screening    Nutrition Screening  ADL Screening  PAQ Screening  Has urine leakage ever interrupted your daily activites or sleep? No  Do you think you could use some help to better manage urine leakage?No       Opioid documentation:      Patient does not have a current opioid prescription.        Vitals:    03/25/25 0800   BP: 110/70   Pulse: 64   Resp: 18   SpO2: 98%   Weight: 82.6 kg (182 lb 1.6 oz)   Height: 5' 11" (1.803 m)     Body mass index is 25.4 kg/m².  Physical Exam  Constitutional:       General: He is not in acute distress.     Appearance: Normal appearance. He is well-developed. He is not diaphoretic.   HENT:      Head: Normocephalic and atraumatic.      Right Ear: External ear normal.      Left Ear: External ear normal.      Mouth/Throat:      Mouth: Mucous membranes are moist.   Eyes:      Conjunctiva/sclera: Conjunctivae normal.   Cardiovascular:      Rate and Rhythm: Normal rate and regular rhythm.      Heart sounds: Normal heart sounds. No murmur heard.     No friction rub. No gallop.   Pulmonary:      Effort: Pulmonary effort is normal. No respiratory distress.      Breath sounds: Normal breath sounds. No wheezing or rales.   Chest:      Chest wall: No tenderness.   Abdominal:      General: Bowel sounds are normal.      Palpations: Abdomen is soft.   Musculoskeletal:         General: No tenderness. Normal range of motion.      Cervical back: Normal range of motion. "   Skin:     General: Skin is warm and dry.   Neurological:      Mental Status: He is alert and oriented to person, place, and time.      Cranial Nerves: No cranial nerve deficit.   Psychiatric:         Mood and Affect: Mood normal.         Behavior: Behavior normal.               Diagnoses and health risks identified today and associated recommendations/orders:    1. Encounter for Medicare annual wellness exam  Screenings performed, as noted above.  Personal preventative testing needs reviewed.    - Referral to Enhanced Annual Wellness Visit (eAWV) M+1    2. Coronary artery disease of native artery of native heart with stable angina pectoris  Treated with stenting, meds, jonatan, followed by Dr Gr, no recent chest pain    3. Hypertension associated with diabetes  Treated with amlodipine, valsartan, stable, cont tx    4. Hyperlipidemia associated with type 2 diabetes mellitus  Treated with crestor, stable, cont tx    5. Diabetic polyneuropathy associated with type 2 diabetes mellitus  Treated with Lyrica, states needs a refill, he will notify his pain mgmt provider    6. Obstructive sleep apnea  Treated with cpap, stable, cont tx, follow up with pulmonary in 2026    7. Elevated prostate specific antigen (PSA)   Monitored, jonatan, sees Dr Morales for evaluation    8. Type 2 diabetes mellitus with complication, with long-term current use of insulin  Treated with insulin, Jardiance, Mounjaro, jonatan, enc exercising and mild weight lifting       Provided Timothy with a 5-10 year written screening schedule and personal prevention plan. Recommendations were developed using the USPSTF age appropriate recommendations. Education, counseling, and referrals were provided as needed. After Visit Summary printed and given to patient which includes a list of additional screenings\tests needed.    No follow-ups on file.    Tanvir Young NP  I offered to discuss advanced care planning, including how to pick a person who would make  decisions for you if you were unable to make them for yourself, called a health care power of , and what kind of decisions you might make such as use of life sustaining treatments such as ventilators and tube feeding when faced with a life limiting illness recorded on a living will that they will need to know. (How you want to be cared for as you near the end of your natural life)     X Patient is interested in learning more about how to make advanced directives.  I provided them paperwork and offered to discuss this with them.

## 2025-03-25 NOTE — PATIENT INSTRUCTIONS
Counseling and Referral of Other Preventative  (Italic type indicates deductible and co-insurance are waived)    Patient Name: Timothy Ortega  Today's Date: 3/25/2025    Health Maintenance       Date Due Completion Date    Diabetic Eye Exam 02/26/2025 2/26/2024    COVID-19 Vaccine (3 - 2024-25 season) 12/12/2112 (Originally 9/1/2024) 3/24/2021    Hemoglobin A1c 06/02/2025 12/2/2024    PROSTATE-SPECIFIC ANTIGEN 07/11/2025 7/11/2024    Diabetes Urine Screening 09/20/2025 9/20/2024    Lipid Panel 12/02/2025 12/2/2024    Foot Exam 03/07/2026 3/7/2025    Override on 3/28/2023: Done    Override on 1/13/2022: Done    Override on 2/16/2017: Done    Override on 11/20/2015: Done    Override on 5/28/2014: Done    Override on 3/11/2014: Done    Override on 12/10/2013: Done    Override on 10/22/2013: Done    Override on 9/16/2013: Done    High Dose Statin 03/21/2026 3/21/2025    Colorectal Cancer Screening 04/18/2027 4/18/2022    TETANUS VACCINE 07/20/2028 7/20/2018        No orders of the defined types were placed in this encounter.      The following information is provided to all patients.  This information is to help you find resources for any of the problems found today that may be affecting your health:                  Living healthy guide: www.[facility].[place].gov      Understanding Diabetes: www.diabetes.org      Eating healthy: www.cdc.gov/healthyweight      CDC home safety checklist: www.cdc.gov/steadi/patient.html      Agency on Aging: www.goea.[place].gov      Alcoholics anonymous (AA): www.aa.org      Physical Activity: www.alex.nih.gov/bh8ysft      Tobacco use: www.quitwithusla.org

## 2025-03-31 ENCOUNTER — PATIENT MESSAGE (OUTPATIENT)
Dept: CARDIOLOGY | Facility: CLINIC | Age: 75
End: 2025-03-31
Payer: MEDICARE

## 2025-04-01 RX ORDER — CLOPIDOGREL BISULFATE 75 MG/1
75 TABLET ORAL DAILY
Qty: 30 TABLET | Refills: 11 | Status: SHIPPED | OUTPATIENT
Start: 2025-04-01 | End: 2025-04-09 | Stop reason: SDUPTHER

## 2025-04-03 ENCOUNTER — LAB VISIT (OUTPATIENT)
Dept: LAB | Facility: HOSPITAL | Age: 75
End: 2025-04-03
Attending: PEDIATRICS
Payer: MEDICARE

## 2025-04-03 DIAGNOSIS — Z79.4 TYPE 2 DIABETES MELLITUS WITH COMPLICATION, WITH LONG-TERM CURRENT USE OF INSULIN: ICD-10-CM

## 2025-04-03 DIAGNOSIS — E11.8 TYPE 2 DIABETES MELLITUS WITH COMPLICATION, WITH LONG-TERM CURRENT USE OF INSULIN: ICD-10-CM

## 2025-04-03 LAB
ALBUMIN SERPL BCP-MCNC: 4 G/DL (ref 3.5–5.2)
ALP SERPL-CCNC: 48 UNIT/L (ref 40–150)
ALT SERPL W/O P-5'-P-CCNC: 31 UNIT/L (ref 10–44)
ANION GAP (OHS): 7 MMOL/L (ref 8–16)
AST SERPL-CCNC: 26 UNIT/L (ref 11–45)
BILIRUB SERPL-MCNC: 0.5 MG/DL (ref 0.1–1)
BUN SERPL-MCNC: 18 MG/DL (ref 8–23)
CALCIUM SERPL-MCNC: 9.3 MG/DL (ref 8.7–10.5)
CHLORIDE SERPL-SCNC: 109 MMOL/L (ref 95–110)
CHOLEST SERPL-MCNC: 125 MG/DL (ref 120–199)
CHOLEST/HDLC SERPL: 3.8 {RATIO} (ref 2–5)
CO2 SERPL-SCNC: 26 MMOL/L (ref 23–29)
CREAT SERPL-MCNC: 1 MG/DL (ref 0.5–1.4)
EAG (OHS): 105 MG/DL (ref 68–131)
GFR SERPLBLD CREATININE-BSD FMLA CKD-EPI: >60 ML/MIN/1.73/M2
GLUCOSE SERPL-MCNC: 153 MG/DL (ref 70–110)
HBA1C MFR BLD: 5.3 % (ref 4–5.6)
HDLC SERPL-MCNC: 33 MG/DL (ref 40–75)
HDLC SERPL: 26.4 % (ref 20–50)
LDLC SERPL CALC-MCNC: 63.8 MG/DL (ref 63–159)
NONHDLC SERPL-MCNC: 92 MG/DL
POTASSIUM SERPL-SCNC: 4.4 MMOL/L (ref 3.5–5.1)
PROT SERPL-MCNC: 6.9 GM/DL (ref 6–8.4)
SODIUM SERPL-SCNC: 142 MMOL/L (ref 136–145)
TRIGL SERPL-MCNC: 141 MG/DL (ref 30–150)

## 2025-04-03 PROCEDURE — 36415 COLL VENOUS BLD VENIPUNCTURE: CPT

## 2025-04-03 PROCEDURE — 82435 ASSAY OF BLOOD CHLORIDE: CPT

## 2025-04-03 PROCEDURE — 83036 HEMOGLOBIN GLYCOSYLATED A1C: CPT

## 2025-04-03 PROCEDURE — 80061 LIPID PANEL: CPT

## 2025-04-09 ENCOUNTER — PATIENT MESSAGE (OUTPATIENT)
Dept: ADMINISTRATIVE | Facility: OTHER | Age: 75
End: 2025-04-09
Payer: MEDICARE

## 2025-04-10 ENCOUNTER — OFFICE VISIT (OUTPATIENT)
Dept: INTERNAL MEDICINE | Facility: CLINIC | Age: 75
End: 2025-04-10
Payer: MEDICARE

## 2025-04-10 VITALS
OXYGEN SATURATION: 99 % | BODY MASS INDEX: 26.08 KG/M2 | SYSTOLIC BLOOD PRESSURE: 124 MMHG | WEIGHT: 186.31 LBS | DIASTOLIC BLOOD PRESSURE: 60 MMHG | TEMPERATURE: 98 F | HEART RATE: 62 BPM | HEIGHT: 71 IN

## 2025-04-10 DIAGNOSIS — E11.59 HYPERTENSION ASSOCIATED WITH DIABETES: ICD-10-CM

## 2025-04-10 DIAGNOSIS — Z86.0100 HISTORY OF COLON POLYPS: ICD-10-CM

## 2025-04-10 DIAGNOSIS — I25.118 CORONARY ARTERY DISEASE OF NATIVE ARTERY OF NATIVE HEART WITH STABLE ANGINA PECTORIS: ICD-10-CM

## 2025-04-10 DIAGNOSIS — Z79.4 TYPE 2 DIABETES MELLITUS WITH COMPLICATION, WITH LONG-TERM CURRENT USE OF INSULIN: ICD-10-CM

## 2025-04-10 DIAGNOSIS — E11.42 DIABETIC POLYNEUROPATHY ASSOCIATED WITH TYPE 2 DIABETES MELLITUS: ICD-10-CM

## 2025-04-10 DIAGNOSIS — I15.2 HYPERTENSION ASSOCIATED WITH DIABETES: ICD-10-CM

## 2025-04-10 DIAGNOSIS — Z95.5 H/O HEART ARTERY STENT: ICD-10-CM

## 2025-04-10 DIAGNOSIS — Z00.00 WELL ADULT EXAM: Primary | ICD-10-CM

## 2025-04-10 DIAGNOSIS — G47.33 OBSTRUCTIVE SLEEP APNEA: ICD-10-CM

## 2025-04-10 DIAGNOSIS — E78.5 HYPERLIPIDEMIA ASSOCIATED WITH TYPE 2 DIABETES MELLITUS: ICD-10-CM

## 2025-04-10 DIAGNOSIS — E11.69 HYPERLIPIDEMIA ASSOCIATED WITH TYPE 2 DIABETES MELLITUS: ICD-10-CM

## 2025-04-10 DIAGNOSIS — E11.8 TYPE 2 DIABETES MELLITUS WITH COMPLICATION, WITH LONG-TERM CURRENT USE OF INSULIN: ICD-10-CM

## 2025-04-10 DIAGNOSIS — I34.0 NONRHEUMATIC MITRAL VALVE REGURGITATION: ICD-10-CM

## 2025-04-10 PROCEDURE — 99999 PR PBB SHADOW E&M-EST. PATIENT-LVL V: CPT | Mod: PBBFAC,,, | Performed by: PEDIATRICS

## 2025-04-10 PROCEDURE — 99215 OFFICE O/P EST HI 40 MIN: CPT | Mod: PBBFAC | Performed by: PEDIATRICS

## 2025-04-10 RX ORDER — CLOPIDOGREL BISULFATE 75 MG/1
75 TABLET ORAL DAILY
Qty: 30 TABLET | Refills: 11 | Status: SHIPPED | OUTPATIENT
Start: 2025-04-10 | End: 2026-04-10

## 2025-04-10 NOTE — PROGRESS NOTES
Subjective:       Patient ID: Timothy Ortega is a 74 y.o. male.    Chief Complaint: Follow-up    Timothy Ortega is a 74 y.o. male who presents to clinic for an annual follow up     1) HTN: B/P good, no HTNive symptoms. in [facility]  2) LIPIDS: Improved D&E, tolerating and compliant with med(s). On statin.   3) DM: no hyper/hypoglycemic symptoms. Self monitoring normal-slightly elevated. Compliant with meds (metformin 1000 mg bid, Mounjaro, jardiance 25, lantus 30, humalog 6 with meals) Working with DM program. Neuropathy present but mild   4) CAD: no CP, SOB, CHOU. Followed by cards.  5) Prostate followed by urology still having hematospermia.  6)BRIANA on CPAP: overall ok, some sleepiness.  7)hx colon polyps: last colonoscopy 2022, q 5 year repeat.      LABS REVIEWED AND DISCUSSED WITH PATIENT: A1C, CMP, lipids, microalbumin/creatinine     PMHx, PSHx, SocHx, and FHx reviewed and discussed with patient.       Follow-up  Pertinent negatives include no abdominal pain, arthralgias, chest pain, congestion, coughing, fever, headaches, joint swelling, rash or vomiting.     Review of Systems   Constitutional:  Negative for fever and unexpected weight change.   HENT:  Negative for congestion and rhinorrhea.    Eyes:  Negative for discharge and redness.   Respiratory:  Negative for cough and wheezing.    Cardiovascular:  Negative for chest pain, palpitations and leg swelling.   Gastrointestinal:  Negative for abdominal pain, constipation, diarrhea and vomiting.   Endocrine: Negative for cold intolerance, heat intolerance, polydipsia, polyphagia and polyuria.   Genitourinary:  Positive for hematuria (in sperm). Negative for decreased urine volume and difficulty urinating.   Musculoskeletal:  Negative for arthralgias and joint swelling.   Skin:  Negative for rash and wound.   Neurological:  Negative for syncope and headaches.   Psychiatric/Behavioral:  Negative for behavioral problems and sleep disturbance.        Objective:       Physical Exam  Vitals and nursing note reviewed.   Constitutional:       General: He is not in acute distress.     Appearance: He is well-developed.   Neck:      Thyroid: No thyromegaly.      Vascular: No JVD.   Cardiovascular:      Rate and Rhythm: Normal rate and regular rhythm.      Heart sounds: Normal heart sounds. No murmur heard.  Pulmonary:      Effort: Pulmonary effort is normal. No respiratory distress.      Breath sounds: Normal breath sounds. No wheezing or rales.   Abdominal:      General: There is no distension.      Palpations: Abdomen is soft. There is no mass.      Tenderness: There is no abdominal tenderness. There is no guarding.   Musculoskeletal:      Right lower leg: No edema.      Left lower leg: No edema.   Lymphadenopathy:      Cervical: No cervical adenopathy.   Skin:     Capillary Refill: Capillary refill takes less than 2 seconds.      Findings: No rash.   Neurological:      General: No focal deficit present.      Mental Status: He is alert and oriented to person, place, and time.      Cranial Nerves: No cranial nerve deficit.      Coordination: Coordination normal.   Psychiatric:         Mood and Affect: Mood normal.         Behavior: Behavior normal.         Thought Content: Thought content normal.         Judgment: Judgment normal.         Assessment:       1. Well adult exam    2. Type 2 diabetes mellitus with complication, with long-term current use of insulin    3. Obstructive sleep apnea    4. Nonrheumatic mitral valve regurgitation    5. Hyperlipidemia associated with type 2 diabetes mellitus    6. Hypertension associated with diabetes    7. History of colon polyps    8. H/O heart artery stent    9. Coronary artery disease of native artery of native heart with stable angina pectoris    10. Diabetic polyneuropathy associated with type 2 diabetes mellitus        Plan:       Well adult exam    Type 2 diabetes mellitus with complication, with long-term current use of insulin  -      Comprehensive Metabolic Panel; Future; Expected date: 04/10/2025  -     Lipid Panel; Future; Expected date: 04/10/2025  -     Hemoglobin A1C; Future; Expected date: 04/10/2025  -     Microalbumin/Creatinine Ratio, Urine; Future; Expected date: 04/10/2025    Obstructive sleep apnea    Nonrheumatic mitral valve regurgitation    Hyperlipidemia associated with type 2 diabetes mellitus    Hypertension associated with diabetes    History of colon polyps    H/O heart artery stent    Coronary artery disease of native artery of native heart with stable angina pectoris    Diabetic polyneuropathy associated with type 2 diabetes mellitus    HMI d/w pt. At goal for B/P, lipids, and A1c. Maintain meds, self monitoring D&E, weight moderation. F/U 6 months with labs.

## 2025-04-28 ENCOUNTER — PATIENT MESSAGE (OUTPATIENT)
Dept: INTERNAL MEDICINE | Facility: CLINIC | Age: 75
End: 2025-04-28
Payer: MEDICARE

## 2025-04-28 ENCOUNTER — OFFICE VISIT (OUTPATIENT)
Dept: OPHTHALMOLOGY | Facility: CLINIC | Age: 75
End: 2025-04-28
Payer: MEDICARE

## 2025-04-28 DIAGNOSIS — H52.7 REFRACTIVE ERRORS: ICD-10-CM

## 2025-04-28 DIAGNOSIS — I15.2 HYPERTENSION ASSOCIATED WITH DIABETES: ICD-10-CM

## 2025-04-28 DIAGNOSIS — E11.36 DIABETIC CATARACT: ICD-10-CM

## 2025-04-28 DIAGNOSIS — E11.9 DIABETES MELLITUS TYPE 2 WITHOUT RETINOPATHY: Primary | ICD-10-CM

## 2025-04-28 DIAGNOSIS — E11.59 HYPERTENSION ASSOCIATED WITH DIABETES: ICD-10-CM

## 2025-04-28 PROCEDURE — 92015 DETERMINE REFRACTIVE STATE: CPT | Mod: ,,, | Performed by: OPTOMETRIST

## 2025-04-28 PROCEDURE — 99999 PR PBB SHADOW E&M-EST. PATIENT-LVL III: CPT | Mod: PBBFAC,,, | Performed by: OPTOMETRIST

## 2025-04-28 PROCEDURE — 99213 OFFICE O/P EST LOW 20 MIN: CPT | Mod: PBBFAC | Performed by: OPTOMETRIST

## 2025-04-28 PROCEDURE — 92014 COMPRE OPH EXAM EST PT 1/>: CPT | Mod: S$PBB,,, | Performed by: OPTOMETRIST

## 2025-04-28 NOTE — PROGRESS NOTES
SUBJECTIVE  Timothy Ortega is 74 y.o. male  Uncorrected distance visual acuity was 20/40 in the right eye and 20/40 in the left eye.   Chief Complaint   Patient presents with    Diabetic Eye Exam          HPI    Vision changes since last eye exam?: No   Any eye pain today: No  Other ocular symptoms: No  Last A1C 5.3       Last edited by Pippa Monte on 4/28/2025  9:29 AM.         Assessment /Plan :  1. Diabetes mellitus type 2 without retinopathy  No Background Diabetic Retinopathy  Strict BG control, f/u w/ PCP, and annual DFE  Stressed importance of DM control to preserve vision     2. Diabetic cataract  Cataracts are not visually significant and not affecting activities of daily living.   Annual observation is recommended at this time.   Patient to call or return to clinic with any significant change in vision prior to next visit.    3. Hypertension associated with diabetes  No HTN Retinopathy, monitor annually.     4. Refractive errors    Dispense Final Rx for glasses.  RTC 1 year  Discussed above and answered questions.

## 2025-04-29 ENCOUNTER — TELEPHONE (OUTPATIENT)
Dept: INTERNAL MEDICINE | Facility: CLINIC | Age: 75
End: 2025-04-29
Payer: MEDICARE

## 2025-04-29 NOTE — TELEPHONE ENCOUNTER
----- Message from Andree sent at 4/29/2025  2:31 PM CDT -----  Contact: Devon with [facility] phone [phone]  Patient is returning a phone call.Who left a message for the patient: SebleDolly patient know what this is regarding:  Rx amLODIPine (NORVASC) 5 MG tabletWould you like a call back, or a response through your [facility] portal?:   Call backComments: Missed your call, please call him back. Thanks.

## 2025-05-08 DIAGNOSIS — E11.59 HYPERTENSION ASSOCIATED WITH DIABETES: ICD-10-CM

## 2025-05-08 DIAGNOSIS — I70.0 AORTIC ATHEROSCLEROSIS: ICD-10-CM

## 2025-05-08 DIAGNOSIS — I25.118 CORONARY ARTERY DISEASE OF NATIVE ARTERY OF NATIVE HEART WITH STABLE ANGINA PECTORIS: Primary | ICD-10-CM

## 2025-05-08 DIAGNOSIS — Z95.5 H/O HEART ARTERY STENT: ICD-10-CM

## 2025-05-08 DIAGNOSIS — I15.2 HYPERTENSION ASSOCIATED WITH DIABETES: ICD-10-CM

## 2025-05-13 ENCOUNTER — OFFICE VISIT (OUTPATIENT)
Dept: CARDIOLOGY | Facility: CLINIC | Age: 75
End: 2025-05-13
Payer: MEDICARE

## 2025-05-13 ENCOUNTER — HOSPITAL ENCOUNTER (OUTPATIENT)
Dept: CARDIOLOGY | Facility: HOSPITAL | Age: 75
Discharge: HOME | End: 2025-05-13
Attending: INTERNAL MEDICINE
Payer: MEDICARE

## 2025-05-13 VITALS
SYSTOLIC BLOOD PRESSURE: 138 MMHG | OXYGEN SATURATION: 98 % | WEIGHT: 186.94 LBS | DIASTOLIC BLOOD PRESSURE: 76 MMHG | BODY MASS INDEX: 26.17 KG/M2 | HEIGHT: 71 IN | HEART RATE: 72 BPM

## 2025-05-13 DIAGNOSIS — E11.69 HYPERLIPIDEMIA ASSOCIATED WITH TYPE 2 DIABETES MELLITUS: ICD-10-CM

## 2025-05-13 DIAGNOSIS — I25.118 CORONARY ARTERY DISEASE OF NATIVE ARTERY OF NATIVE HEART WITH STABLE ANGINA PECTORIS: ICD-10-CM

## 2025-05-13 DIAGNOSIS — E11.59 HYPERTENSION ASSOCIATED WITH DIABETES: ICD-10-CM

## 2025-05-13 DIAGNOSIS — E78.5 HYPERLIPIDEMIA ASSOCIATED WITH TYPE 2 DIABETES MELLITUS: ICD-10-CM

## 2025-05-13 DIAGNOSIS — Z95.5 H/O HEART ARTERY STENT: ICD-10-CM

## 2025-05-13 DIAGNOSIS — Z79.4 TYPE 2 DIABETES MELLITUS WITH COMPLICATION, WITH LONG-TERM CURRENT USE OF INSULIN: Primary | ICD-10-CM

## 2025-05-13 DIAGNOSIS — R94.39 ABNORMAL NUCLEAR STRESS TEST: ICD-10-CM

## 2025-05-13 DIAGNOSIS — I15.2 HYPERTENSION ASSOCIATED WITH DIABETES: ICD-10-CM

## 2025-05-13 DIAGNOSIS — E11.8 TYPE 2 DIABETES MELLITUS WITH COMPLICATION, WITH LONG-TERM CURRENT USE OF INSULIN: Primary | ICD-10-CM

## 2025-05-13 DIAGNOSIS — I34.0 NONRHEUMATIC MITRAL VALVE REGURGITATION: ICD-10-CM

## 2025-05-13 DIAGNOSIS — I70.0 AORTIC ATHEROSCLEROSIS: ICD-10-CM

## 2025-05-13 LAB
OHS QRS DURATION: 76 MS
OHS QTC CALCULATION: 410 MS

## 2025-05-13 PROCEDURE — 93005 ELECTROCARDIOGRAM TRACING: CPT

## 2025-05-13 PROCEDURE — 99999 PR PBB SHADOW E&M-EST. PATIENT-LVL V: CPT | Mod: PBBFAC,,, | Performed by: INTERNAL MEDICINE

## 2025-05-13 PROCEDURE — 93010 ELECTROCARDIOGRAM REPORT: CPT | Mod: ,,, | Performed by: INTERNAL MEDICINE

## 2025-05-13 PROCEDURE — 99215 OFFICE O/P EST HI 40 MIN: CPT | Mod: PBBFAC,25 | Performed by: INTERNAL MEDICINE

## 2025-05-13 PROCEDURE — 99214 OFFICE O/P EST MOD 30 MIN: CPT | Mod: S$PBB,,, | Performed by: INTERNAL MEDICINE

## 2025-05-13 RX ORDER — CLOPIDOGREL BISULFATE 75 MG/1
75 TABLET ORAL DAILY
Qty: 30 TABLET | Refills: 11 | Status: SHIPPED | OUTPATIENT
Start: 2025-05-13 | End: 2026-05-13

## 2025-05-13 NOTE — PROGRESS NOTES
"Subjective:   Patient ID:  Timothy Ortega is a 74 y.o. male who presents for follow up of No chief complaint on file.      75 yo male, came in for 1 yr f/u  Hx of CAD, s/p PCI DESx2 in p and mid LAD in 05/2024,   had LHC in 2010 and was told "nonobstructive", HTN, HLP, IDDM > 20 yrs, BRIANA on cpap, ex smoker.  h/o right carpel tunnel syndrome procedure.    No sob, no palpitation, no dizziness, no syncope, no CNS symptoms. Rare;y sharp chest pain atypical pain  Patient has fairly good exercise tolerance. Walks 2 miles a day, slowly down after COVID 19 pandemic  Patient is compliant with medications.  No smoking/drinking  EKG NSR and possible inferior infarct   nuke stress normal EF and no ischemia.  EKG NSR     visit  ekg NSR. Taking Lipitor for 12 yrs. C/o fatigue. On CPAP and still has daytime sleep  No chest pain palpitation dizziness and faint  Occasional SOB   echo  · The left ventricle is normal in size with concentric remodeling and normal systolic function. The estimated ejection fraction is 55%  · Normal left ventricular diastolic function.  · Normal right ventricular size with normal right ventricular systolic function.  · Mild mitral regurgitation.  · Mild tricuspid regurgitation.  · Normal central venous pressure (3 mmHg).  · The estimated PA systolic pressure is 33 mmHg.    05/23 visit  Yard work, lives with wife.  He denied chest pain, dyspnea on exertion, palpitation, fainting, PND, orthopnea, syncope and claudication.   No active bleeding  Med compliance  Improved fatigue after d/c metformin and switched crestor to pravastatin    04/24 visit  EKG reviewed by myself today reveals NSR   Chest burning and chest tightness when deep breathing. Some sob.   He denied palpitation, fainting, PND, orthopnea, syncope and claudication.   LDL 58 BP C A1c 7.4    05/24 visit  Exercise stress nuke test showed multivessel ischemia    04/24 visit  S/p PCI DESx2 in 05/24 by Dr. Ananth AGUIAR nml  No " recurrent chest pain non active bleeding  Left radial pulse nml. Mild bruise  LDL 59 A1c 7.6  BP C      Interval history  EKG reviewed by myself today reveals NSR  Denied chest pain, dyspnea on exertion, palpitation, fainting, PND, orthopnea, syncope and claudication.                             History of Present Illness    CHIEF COMPLAINT:  - Timothy presents for follow-up 1 year after stent placement.    HPI:  - Reports intermittent dyspnea, occurring occasionally and not constant  - Had a stent placed in 05/2024  - Has not required nitroglycerin use since stent placement  - Mentions weight loss with Mounjaro for diabetes management  - Notes reaching a plateau in weight loss progress  - Denies hospital visits, new significant diagnoses, or treatments since the last visit  - Denies chest pain, additional dyspnea, dizziness, near-syncope, LOC during physical work or exercise, low abdominal pain, calf muscle pain, or hematochezia    CARDIAC HISTORY:  - Stent placed 05/2024  - Treadmill stress EKG 12/2024: normal  - EKG 05/13/2025: NSR, normal    MEDICATIONS:  - Aspirin 81 mg daily  - Plavix (clopidogrel) 75 mg daily  - Amlodipine 5 mg daily for HTN  - Isosorbide 30 mg daily  - Metoprolol 25 mg for CAD  - Rosuvastatin 20 mg daily  - Lasix 80 mg for HTN  - Insulin for diabetes  - Jardiance for diabetes  - Mounjaro for diabetes    TEST RESULTS:  - Cholesterol: 04/2025, LDL 64, Triglyceride 141  - Renal function: 04/2025, normal  - Liver function: 04/2025, normal  - Blood sugar: 04/2025, A1C 5.3    MEDICAL HISTORY:  - HTN  - Diabetes          Past Medical History:   Diagnosis Date    Coronary artery disease 2010    [facility] - no stents    DM (diabetes mellitus) 2002     lunch 10/28/2016    DM (diabetes mellitus)      am 04/12/2021    DM (diabetes mellitus)     BS 96 am 04/13/2022 Insulin for years    Groin pain, left 2/11/2021    Hyperlipemia     Hypertension     Kidney stones     Nephrolithiasis 2/11/2021     Neuropathy     Type 2 diabetes mellitus 10-12 years     am 10/31/2014       Past Surgical History:   Procedure Laterality Date    ANGIOGRAM, CORONARY, WITH LEFT HEART CATHETERIZATION N/A 5/28/2024    Procedure: Angiogram, Coronary, with Left Heart Cath;  Surgeon: Rojelio Wadsworth MD;  Location: [facility] CATH LAB;  Service: Cardiology;  Laterality: N/A;    CARPAL TUNNEL RELEASE Right 07/2020    COLONOSCOPY N/A 03/31/2017    Procedure: COLONOSCOPY;  Surgeon: Cornelius Ibarra MD;  Location: [facility] ENDO;  Service: Endoscopy;  Laterality: N/A;    COLONOSCOPY N/A 04/18/2022    Procedure: COLONOSCOPY;  Surgeon: Geneva Srena MD;  Location: [facility] ENDO;  Service: Endoscopy;  Laterality: N/A;    DENTAL SURGERY      Implant    HAND SURGERY      KNEE ARTHROSCOPY Left     LITHOTRIPSY, CORONARY TRANSLUMINAL, PERCUTANEOUS  5/28/2024    Procedure: LITHOTRIPSY, CORONARY TRANSLUMINAL, PERCUTANEOUS;  Surgeon: Rojelio Wadsworth MD;  Location: [facility] CATH LAB;  Service: Cardiology;;    PTCA, SINGLE VESSEL  5/28/2024    Procedure: PTCA, Single Vessel;  Surgeon: Rojelio Wadsworth MD;  Location: [facility] CATH LAB;  Service: Cardiology;;    ROBOT-ASSISTED LAPAROSCOPIC REPAIR OF INGUINAL HERNIA USING DA JOSE ANGEL XI Left 02/26/2021    Procedure: XI ROBOTIC REPAIR, HERNIA, INGUINAL;  Surgeon: Tavon Cruz MD;  Location: [facility];  Service: General;  Laterality: Left;    STENT, DRUG ELUTING, SINGLE VESSEL, CORONARY  5/28/2024    Procedure: Stent, Drug Eluting, Single Vessel, Coronary;  Surgeon: Rojelio Wadsworth MD;  Location: [facility] CATH LAB;  Service: Cardiology;;       Social History[1]    Family History   Problem Relation Name Age of Onset    Diabetes Mother      Glaucoma Mother      Heart disease Mother  75        CAB    Diabetes Father      Heart attack Father  58        Fatal MI    Diabetes Brother      Diabetes Sister      Diabetes Sister      Cataracts Paternal Uncle      Cataracts Maternal Grandmother           ROS    Objective:    Physical Exam  HENT:      Head: Normocephalic.   Eyes:      Pupils: Pupils are equal, round, and reactive to light.   Neck:      Thyroid: No thyromegaly.      Vascular: Normal carotid pulses. No carotid bruit or JVD.   Cardiovascular:      Rate and Rhythm: Normal rate and regular rhythm. No extrasystoles are present.     Chest Wall: PMI is not displaced.      Pulses: Normal pulses.      Heart sounds: Normal heart sounds. No murmur heard.     No gallop. No S3 sounds.   Pulmonary:      Effort: No respiratory distress.      Breath sounds: Normal breath sounds. No stridor.   Abdominal:      General: Bowel sounds are normal.      Palpations: Abdomen is soft.      Tenderness: There is no abdominal tenderness. There is no rebound.   Musculoskeletal:         General: Normal range of motion.   Skin:     Findings: No rash.   Neurological:      Mental Status: He is alert and oriented to person, place, and time.   Psychiatric:         Behavior: Behavior normal.         Lab Results   Component Value Date    CHOL 125 04/03/2025    CHOL 131 12/02/2024    CHOL 123 09/20/2024     Lab Results   Component Value Date    HDL 33 (L) 04/03/2025    HDL 35 (L) 12/02/2024    HDL 32 (L) 09/20/2024     Lab Results   Component Value Date    LDLCALC 63.8 04/03/2025    LDLCALC 53.8 (L) 12/02/2024    LDLCALC 50.2 (L) 09/20/2024     Lab Results   Component Value Date    TRIG 141 04/03/2025    TRIG 211 (H) 12/02/2024    TRIG 204 (H) 09/20/2024     Lab Results   Component Value Date    CHOLHDL 26.4 04/03/2025    CHOLHDL 26.7 12/02/2024    CHOLHDL 26.0 09/20/2024       Chemistry        Component Value Date/Time     04/03/2025 0758     09/20/2024 0723    K 4.4 04/03/2025 0758    K 4.3 09/20/2024 0723     04/03/2025 0758     09/20/2024 0723    CO2 26 04/03/2025 0758    CO2 22 (L) 09/20/2024 0723    BUN 18 04/03/2025 0758    CREATININE 1.0 04/03/2025 0758     (H) 04/03/2025 0755     (H) 09/20/2024 0739         Component Value Date/Time    CALCIUM 9.3 04/03/2025 0758    CALCIUM 9.3 09/20/2024 0723    ALKPHOS 48 04/03/2025 0758    ALKPHOS 40 (L) 09/20/2024 0723    AST 26 04/03/2025 0758    AST 29 09/20/2024 0723    ALT 31 04/03/2025 0758    ALT 33 09/20/2024 0723    BILITOT 0.5 04/03/2025 0758    BILITOT 0.6 09/20/2024 0723    ESTGFRAFRICA >60.0 09/23/2021 0717    EGFRNONAA >60.0 09/23/2021 0717          Lab Results   Component Value Date    HGBA1C 5.3 04/03/2025     Lab Results   Component Value Date    TSH 1.440 09/20/2024     Lab Results   Component Value Date    INR 1.0 05/28/2024    INR 1.0 05/24/2024    INR 1.0 12/01/2014     Lab Results   Component Value Date    WBC 5.91 12/02/2024    HGB 15.1 12/02/2024    HCT 45.1 12/02/2024    MCV 96 12/02/2024     12/02/2024     BMP  Sodium   Date Value Ref Range Status   04/03/2025 142 136 - 145 mmol/L Final   09/20/2024 139 136 - 145 mmol/L Final     Potassium   Date Value Ref Range Status   04/03/2025 4.4 3.5 - 5.1 mmol/L Final   09/20/2024 4.3 3.5 - 5.1 mmol/L Final     Chloride   Date Value Ref Range Status   04/03/2025 109 95 - 110 mmol/L Final   09/20/2024 110 95 - 110 mmol/L Final     CO2   Date Value Ref Range Status   04/03/2025 26 23 - 29 mmol/L Final   09/20/2024 22 (L) 23 - 29 mmol/L Final     BUN   Date Value Ref Range Status   04/03/2025 18 8 - 23 mg/dL Final     Creatinine   Date Value Ref Range Status   04/03/2025 1.0 0.5 - 1.4 mg/dL Final     Calcium   Date Value Ref Range Status   04/03/2025 9.3 8.7 - 10.5 mg/dL Final   09/20/2024 9.3 8.7 - 10.5 mg/dL Final     Anion Gap   Date Value Ref Range Status   04/03/2025 7 (L) 8 - 16 mmol/L Final     eGFR if    Date Value Ref Range Status   09/23/2021 >60.0 >60 mL/min/1.73 m^2 Final     eGFR if non    Date Value Ref Range Status   09/23/2021 >60.0 >60 mL/min/1.73 m^2 Final     Comment:     Calculation used to obtain the estimated glomerular filtration  rate (eGFR) is the CKD-EPI  equation.        BNP  @LABRCNTIP(BNP,BNPTRIAGEBLO)@  @LABRCNTIP(troponini)@  CrCl cannot be calculated (Patient's most recent lab result is older than the maximum 7 days allowed.).  No results found in the last 24 hours.  No results found in the last 24 hours.  No results found in the last 24 hours.    Assessment:      1. Type 2 diabetes mellitus with complication, with long-term current use of insulin    2. Nonrheumatic mitral valve regurgitation    3. Hypertension associated with diabetes    4. H/O heart artery stent    5. Hyperlipidemia associated with type 2 diabetes mellitus    6. Coronary artery disease of native artery of native heart with stable angina pectoris    7. Abnormal nuclear stress test        Plan:   Type 2 diabetes mellitus with complication, with long-term current use of insulin    Nonrheumatic mitral valve regurgitation    Hypertension associated with diabetes    H/O heart artery stent    Hyperlipidemia associated with type 2 diabetes mellitus    Coronary artery disease of native artery of native heart with stable angina pectoris    Abnormal nuclear stress test      Assessment & Plan    IMPRESSION:  - Cardiovascular status post-stent placement from last May is stable, patient denies angina or use of nitroglycerin.  - Recent lab work shows controlled cholesterol (LDL 64, triglycerides 141).  - December treadmill stress EKG results were normal.  - Major risk factors for heart disease are currently well-controlled.    CORONARY ARTERY DISEASE (OLD MYOCARDIAL INFARCTION):  - Continued aspirin 81 mg daily.  - Continued clopidogrel (Plavix) 75 mg daily.  - Continued isosorbide 30 mg daily.  - Continued metoprolol 25 mg daily for CAD.  - Timothy to continue current exercise regimen without chest pain, shortness of breath, or dizziness.    HYPERTENSION:  - Continued amlodipine 5 mg daily for blood pressure.  - Continued losartan 80 mg daily for blood pressure.    DIABETES MELLITUS TYPE 2:  - Continued  insulin for diabetes.  - Continued empagliflozin (Jardiance) for diabetes.  - Continued Mounjaro for diabetes and weight loss.    HYPERLIPIDEMIA:  - Continued rosuvastatin 20 mg daily for cholesterol.    WEIGHT MANAGEMENT:  - Explained strategies to resist temptation of unhealthy food choices, particularly in retail environments.  - Timothy to maintain dietary habits to support weight management.    FOLLOW-UP:  - Follow up in 1 year.            This note was generated with the assistance of ambient listening technology. Verbal consent was obtained by the patient and accompanying visitor(s) for the recording of patient appointment to facilitate this note. I attest to having reviewed and edited the generated note for accuracy, though some syntax or spelling errors may persist. Please contact the author of this note for any clarification.            [1]   Social History  Tobacco Use    Smoking status: Former     Current packs/day: 0.00     Average packs/day: 1 pack/day for 20.0 years (20.0 ttl pk-yrs)     Types: Cigarettes     Start date: 1980     Quit date: 2000     Years since quittin.3     Passive exposure: Past    Smokeless tobacco: Never   Substance Use Topics    Alcohol use: No     Alcohol/week: 0.0 standard drinks of alcohol    Drug use: No

## 2025-05-14 ENCOUNTER — OFFICE VISIT (OUTPATIENT)
Dept: DIABETES | Facility: CLINIC | Age: 75
End: 2025-05-14
Payer: MEDICARE

## 2025-05-14 VITALS
HEART RATE: 63 BPM | BODY MASS INDEX: 26.07 KG/M2 | WEIGHT: 186.94 LBS | DIASTOLIC BLOOD PRESSURE: 59 MMHG | SYSTOLIC BLOOD PRESSURE: 111 MMHG

## 2025-05-14 DIAGNOSIS — Z79.4 TYPE 2 DIABETES MELLITUS WITH COMPLICATION, WITH LONG-TERM CURRENT USE OF INSULIN: Primary | ICD-10-CM

## 2025-05-14 DIAGNOSIS — E11.8 TYPE 2 DIABETES MELLITUS WITH COMPLICATION, WITH LONG-TERM CURRENT USE OF INSULIN: Primary | ICD-10-CM

## 2025-05-14 DIAGNOSIS — E78.5 HYPERLIPIDEMIA ASSOCIATED WITH TYPE 2 DIABETES MELLITUS: ICD-10-CM

## 2025-05-14 DIAGNOSIS — E11.59 HYPERTENSION ASSOCIATED WITH DIABETES: ICD-10-CM

## 2025-05-14 DIAGNOSIS — I25.10 CORONARY ARTERY DISEASE INVOLVING NATIVE CORONARY ARTERY OF NATIVE HEART WITHOUT ANGINA PECTORIS: ICD-10-CM

## 2025-05-14 DIAGNOSIS — I15.2 HYPERTENSION ASSOCIATED WITH DIABETES: ICD-10-CM

## 2025-05-14 DIAGNOSIS — E11.69 HYPERLIPIDEMIA ASSOCIATED WITH TYPE 2 DIABETES MELLITUS: ICD-10-CM

## 2025-05-14 LAB — GLUCOSE SERPL-MCNC: 190 MG/DL (ref 70–110)

## 2025-05-14 PROCEDURE — 82962 GLUCOSE BLOOD TEST: CPT | Mod: PBBFAC | Performed by: PHYSICIAN ASSISTANT

## 2025-05-14 PROCEDURE — 99214 OFFICE O/P EST MOD 30 MIN: CPT | Mod: PBBFAC | Performed by: PHYSICIAN ASSISTANT

## 2025-05-14 PROCEDURE — 99999PBSHW POCT GLUCOSE, HAND-HELD DEVICE: Mod: PBBFAC,,,

## 2025-05-14 PROCEDURE — 99214 OFFICE O/P EST MOD 30 MIN: CPT | Mod: S$PBB,,, | Performed by: PHYSICIAN ASSISTANT

## 2025-05-14 PROCEDURE — 99999 PR PBB SHADOW E&M-EST. PATIENT-LVL IV: CPT | Mod: PBBFAC,,, | Performed by: PHYSICIAN ASSISTANT

## 2025-05-14 PROCEDURE — G2211 COMPLEX E/M VISIT ADD ON: HCPCS | Mod: S$PBB,,, | Performed by: PHYSICIAN ASSISTANT

## 2025-05-14 NOTE — PROGRESS NOTES
PCP: Shlomo Ochoa MD    Subjective:     Chief Complaint: Diabetes - Established Patient    HISTORY OF PRESENT ILLNESS: 74 y.o.  male presenting for diabetes management visit.   The patient's last visit with me was on 3/7/2025.   Patient has had Type II diabetes since 1990s.  Pertinent to decision making is the following comorbidities: HTN, HLD, CAD and Overweight by BMI  Patient has the following Diabetes complications: without complications  He has attended diabetes education in the past.     Patient's most recent A1c of 5.3% was completed 1 months ago.   Patient states since His last A1c His blood glucose levels have been within range throughout the day .   Patient monitors blood glucose 3 times per day with [facility] meter : Fasting, Before Lunch and Before Dinner. CGM declined.   Patient blood glucose monitoring device data will be reviewed under the Episodes tab today - see below.   Patient endorses the following diabetes related symptoms: None.   Patient is due today for the following diabetes-related health maintenance standards: Lipid panel, Urine Microalbumin/creatinine ratio, and A1c.   He denies any recent hospital admissions or emergency room visits.   He denies having hypoglycemia.  Patient's concerns today include glycemic control. Per pt, glycemic variations related to high carb intake after improving A1c.      Timothy's recent readings (past 60 days):    Time Taken Glucose (mg/dL) Fasting Glucose (mg/dL) Before Meal or Snack (mg/dL) After Meal or Snack (mg/dL) Bedtime (mg/dL) Feeling Symptoms (mg/dL) Re-check (mg/dL) Patient Comments Steps Walked (steps) Weight (lb) Body Mass Index (BMI) Activity Log Activity Minutes Breakfast Log Lunch Log Dinner Log Snack Log   5/14/2025  7:45 AM   138                 5/14/2025 12:00 AM CDT         434           5/13/2025  5:38 PM   143                 5/13/2025  1:03 PM   182                 5/13/2025  7:50 AM    233                 5/13/2025 12:00 AM CDT         4880           5/12/2025  5:38 PM   127                 5/12/2025  1:49 PM   210                 5/12/2025  7:23 AM   212                 5/12/2025 12:00 AM CDT         4784           5/11/2025  6:41 PM   126                 5/11/2025  1:01 PM   141                 5/11/2025  7:52 AM   222                 5/11/2025 12:00 AM CDT         2341           5/10/2025  6:32 PM CDT   116                 5/10/2025  6:32 PM                    5/10/2025 11:49 AM   176                 5/10/2025  8:40 AM   159                 5/10/2025 12:00 AM CDT         3261           5/9/2025 12:00 AM CDT         1998           5/8/2025  6:01 PM   206                 5/8/2025 11:08 AM   152                 5/8/2025  8:30 AM   231                 5/8/2025 12:00 AM CDT         1159           5/7/2025  4:59 PM   162                 5/7/2025 12:26 PM                    5/7/2025 12:00 AM CDT         2701           5/6/2025  6:09 PM                    5/6/2025  2:25 PM CDT          180          5/6/2025  1:48 PM CDT 92                   5/6/2025  8:19 AM                    5/6/2025 12:00 AM CDT         1152           5/5/2025  5:28 PM                    5/5/2025  2:33 PM    139                5/5/2025 11:59 AM                    5/5/2025  7:31 AM                    5/5/2025 12:00 AM CDT         1676           5/4/2025 12:15 PM                    5/4/2025  8:03 AM                    5/4/2025 12:00 AM CDT         1842           5/3/2025  5:59 PM CDT 97                   5/3/2025 12:14 PM                    5/3/2025  9:05 AM                    5/3/2025 12:00 AM CDT         4446           5/2/2025  5:59 PM                    5/2/2025 12:28 PM                    5/2/2025  7:46 AM                    5/2/2025  "12:00 AM CDT         3737           5/1/2025  5:53 PM                    5/1/2025 12:00 AM CDT                        Patient medication regimen is as below.     CURRENT DM MEDICATIONS:   Lantus 28 units at night   Fiasp 28 - 32 units before meals and 3 units with dessert  Jardiance 25 mg daily  Mounjaro 15 mg weekly     Labs Reviewed.       No results found for: "CPEPTIDE"  No results found for: "GLUTAMICACID"       //   , There is no height or weight on file to calculate BMI.  His blood sugar in clinic today is:    Lab Results   Component Value Date    POCGLU 176 (A) 03/07/2025       Review of Systems   Constitutional:  Negative for activity change, appetite change, chills and fever.   HENT:  Negative for dental problem, mouth sores, nosebleeds, sore throat and trouble swallowing.    Eyes:  Negative for pain and discharge.   Respiratory:  Negative for shortness of breath, wheezing and stridor.    Cardiovascular:  Negative for chest pain, palpitations and leg swelling.   Gastrointestinal:  Negative for abdominal pain, diarrhea, nausea and vomiting.   Endocrine: Negative for polydipsia, polyphagia and polyuria.   Genitourinary:  Negative for dysuria, frequency and urgency.   Musculoskeletal:  Negative for joint swelling and myalgias.   Skin:  Negative for rash and wound.   Neurological:  Negative for dizziness, syncope, weakness and headaches.   Psychiatric/Behavioral:  Negative for behavioral problems and dysphoric mood.          Diabetes Management Status  Statin: Taking  ACE/ARB: Taking    Screening or Prevention Patient's value Goal Complete/Controlled?   HgA1C Testing and Control   Lab Results   Component Value Date    HGBA1C 5.3 04/03/2025      Annually/Less than 8% Yes   Lipid profile : 04/03/2025 Annually Yes   LDL control Lab Results   Component Value Date    LDLCALC 63.8 04/03/2025    Annually/Less than 100 mg/dl  Yes   Nephropathy screening Lab Results   Component Value Date    MICALBCREAT Unable " "to calculate 2024     No results found for: "PROTEINUA" Annually Yes   Blood pressure BP Readings from Last 1 Encounters:   25 138/76    Less than 140/90 Yes   Dilated retinal exam : 2025 Annually Yes    Foot exam   : 2025 Annually Yes     ACTIVITY LEVEL: Moderately Active. Discussed activities, benefits, methods, and precautions.  MEAL PLANNING: Patient reports number of meals per day to be 3 and number of snacks per day to be 2.   Patient is encouraged to carb count and consume no more than 30 - 45 grams of carbohydrates in each meal and 15 grams of carbohydrates in each snack.     Social History     Socioeconomic History    Marital status:    Tobacco Use    Smoking status: Former     Current packs/day: 0.00     Average packs/day: 1 pack/day for 20.0 years (20.0 ttl pk-yrs)     Types: Cigarettes     Start date: 1980     Quit date: 2000     Years since quittin.3     Passive exposure: Past    Smokeless tobacco: Never   Substance and Sexual Activity    Alcohol use: No     Alcohol/week: 0.0 standard drinks of alcohol    Drug use: No    Sexual activity: Yes     Partners: Female   Social History Narrative    . Lives with spouse. Has 2 children. Retired. No pets or smokers in household.     Social Drivers of Health     Financial Resource Strain: Patient Unable To Answer (3/25/2025)    Overall Financial Resource Strain (CARDIA)     Difficulty of Paying Living Expenses: Patient unable to answer   Food Insecurity: No Food Insecurity (3/25/2025)    Hunger Vital Sign     Worried About Running Out of Food in the Last Year: Never true     Ran Out of Food in the Last Year: Never true   Transportation Needs: No Transportation Needs (3/25/2025)    PRAPARE - Transportation     Lack of Transportation (Medical): No     Lack of Transportation (Non-Medical): No   Physical Activity: Inactive (3/25/2025)    Exercise Vital Sign     Days of Exercise per Week: 0 days     Minutes of Exercise " per Session: 0 min   Stress: No Stress Concern Present (3/25/2025)    [employer] [place] - Occupational Stress Questionnaire     Feeling of Stress : Not at all   Housing Stability: Low Risk  (3/25/2025)    Housing Stability Vital Sign     Unable to Pay for Housing in the Last Year: No     Number of Times Moved in the Last Year: 0     Homeless in the Last Year: No     Past Medical History:   Diagnosis Date    Coronary artery disease 2010    [facility] - no stents    DM (diabetes mellitus) 2002     lunch 10/28/2016    DM (diabetes mellitus)      am 04/12/2021    DM (diabetes mellitus)     BS 96 am 04/13/2022 Insulin for years    Groin pain, left 2/11/2021    Hyperlipemia     Hypertension     Kidney stones     Nephrolithiasis 2/11/2021    Neuropathy     Type 2 diabetes mellitus 10-12 years     am 10/31/2014       Objective:        Physical Exam  Constitutional:       General: He is not in acute distress.     Appearance: He is well-developed. He is not diaphoretic.   HENT:      Head: Normocephalic and atraumatic.      Right Ear: External ear normal.      Left Ear: External ear normal.      Nose: Nose normal.   Eyes:      General:         Right eye: No discharge.         Left eye: No discharge.      Pupils: Pupils are equal, round, and reactive to light.   Cardiovascular:      Rate and Rhythm: Normal rate and regular rhythm.      Heart sounds: Normal heart sounds.   Pulmonary:      Effort: Pulmonary effort is normal.      Breath sounds: Normal breath sounds.   Abdominal:      Palpations: Abdomen is soft.   Musculoskeletal:         General: Normal range of motion.      Cervical back: Normal range of motion and neck supple.   Skin:     General: Skin is warm and dry.      Capillary Refill: Capillary refill takes less than 2 seconds.   Neurological:      Mental Status: He is alert and oriented to person, place, and time.      Motor: No abnormal muscle tone.      Coordination: Coordination  normal.   Psychiatric:         Behavior: Behavior normal.         Thought Content: Thought content normal.         Judgment: Judgment normal.           Assessment / Plan:     Type 2 diabetes mellitus with complication, with long-term current use of insulin  -     POCT Glucose, Hand-Held Device  -     empagliflozin (JARDIANCE) 25 mg tablet; Take 1 tablet (25 mg total) by mouth once daily.  Dispense: 90 tablet; Refill: 3    Hypertension associated with diabetes    Hyperlipidemia associated with type 2 diabetes mellitus    Coronary artery disease involving native coronary artery of native heart without angina pectoris    BMI 26.0-26.9,adult      Additional Plan Details:    - POCT Glucose  - Encouraged continuation of lifestyle changes including regular exercise and limiting carbohydrates to 30-45 grams per meal threes times daily and 15 grams per snack with a limit of two daily.   - Encouraged continued monitoring of blood glucose with maintenance of 3 times daily at Fasting, Before Lunch and Before Dinner. CGM declined.   - Current DM Medication Regimen: continue Mounjaro 15 mg weekly. Continue Lantus 28 units qhs. Change Fiasp 28 - 32 units before meals TID. Continue Jardiance 25 mg daily.  Resume diet and exercise - if no improvement in 2 mo, will pull A1c and change regimen.   - Health Maintenance standards addressed today: Lipid panel to be scheduled today, Urine Microalbumin / Creatinine Ratio scheduled, A1c to be scheduled, and Psa screening - labs sched.   - Nursing Visit: Patient is below goal range for nursing visit for age group and will not need nursing visit at this time .   - Follow up in 2 months with A1c prior.    CURRENT DM MEDICATIONS:   Lantus 28 units at night   Fiasp 28 - 32 units before meals and 3 units with dessert  Jardiance 25 mg daily  Mounjaro 15 mg weekly       Blakeney McKnight, PA-C [facility]    A total of 20 minutes was spent in face to face time, of which over 50 %  was spent in counseling patient on disease process, complications, treatment, and side effects of medications.

## 2025-06-02 DIAGNOSIS — E11.59 HYPERTENSION ASSOCIATED WITH DIABETES: ICD-10-CM

## 2025-06-02 DIAGNOSIS — I15.2 HYPERTENSION ASSOCIATED WITH DIABETES: ICD-10-CM

## 2025-06-03 RX ORDER — AMLODIPINE BESYLATE 5 MG/1
5 TABLET ORAL DAILY
Qty: 90 TABLET | Refills: 3 | Status: SHIPPED | OUTPATIENT
Start: 2025-06-03

## 2025-06-18 RX ORDER — ISOSORBIDE MONONITRATE 30 MG/1
30 TABLET, EXTENDED RELEASE ORAL DAILY
Qty: 30 TABLET | Refills: 11 | Status: SHIPPED | OUTPATIENT
Start: 2025-06-18 | End: 2026-06-18

## 2025-07-11 ENCOUNTER — LAB VISIT (OUTPATIENT)
Dept: LAB | Facility: HOSPITAL | Age: 75
End: 2025-07-11
Attending: UROLOGY
Payer: MEDICARE

## 2025-07-11 DIAGNOSIS — R97.20 ELEVATED PROSTATE SPECIFIC ANTIGEN (PSA): ICD-10-CM

## 2025-07-11 LAB — PSA SERPL-MCNC: 1.14 NG/ML

## 2025-07-11 PROCEDURE — 36415 COLL VENOUS BLD VENIPUNCTURE: CPT

## 2025-07-11 PROCEDURE — 84153 ASSAY OF PSA TOTAL: CPT

## 2025-07-17 ENCOUNTER — OFFICE VISIT (OUTPATIENT)
Dept: UROLOGY | Facility: CLINIC | Age: 75
End: 2025-07-17
Payer: MEDICARE

## 2025-07-17 VITALS
HEART RATE: 68 BPM | SYSTOLIC BLOOD PRESSURE: 108 MMHG | HEIGHT: 71 IN | BODY MASS INDEX: 26.17 KG/M2 | WEIGHT: 186.94 LBS | DIASTOLIC BLOOD PRESSURE: 51 MMHG

## 2025-07-17 DIAGNOSIS — N40.0 BENIGN PROSTATIC HYPERPLASIA, UNSPECIFIED WHETHER LOWER URINARY TRACT SYMPTOMS PRESENT: ICD-10-CM

## 2025-07-17 DIAGNOSIS — R36.1 HEMATOSPERMIA: ICD-10-CM

## 2025-07-17 DIAGNOSIS — N20.0 NEPHROLITHIASIS: Primary | ICD-10-CM

## 2025-07-17 DIAGNOSIS — N39.0 RECURRENT UTI: ICD-10-CM

## 2025-07-17 LAB
BILIRUBIN, UA POC OHS: NEGATIVE
BLOOD, UA POC OHS: NEGATIVE
CLARITY, UA POC OHS: CLEAR
COLOR, UA POC OHS: YELLOW
GLUCOSE, UA POC OHS: >=1000
KETONES, UA POC OHS: ABNORMAL
LEUKOCYTES, UA POC OHS: NEGATIVE
NITRITE, UA POC OHS: NEGATIVE
PH, UA POC OHS: 5.5
POC RESIDUAL URINE VOLUME: 59 ML (ref 0–100)
PROTEIN, UA POC OHS: NEGATIVE
SPECIFIC GRAVITY, UA POC OHS: 1.01
UROBILINOGEN, UA POC OHS: 0.2

## 2025-07-17 PROCEDURE — 99999PBSHW POCT BLADDER SCAN: Mod: PBBFAC,,,

## 2025-07-17 PROCEDURE — 99999 PR PBB SHADOW E&M-EST. PATIENT-LVL IV: CPT | Mod: PBBFAC,,, | Performed by: UROLOGY

## 2025-07-17 PROCEDURE — 99999PBSHW POCT URINALYSIS(INSTRUMENT): Mod: PBBFAC,,,

## 2025-07-17 PROCEDURE — 99214 OFFICE O/P EST MOD 30 MIN: CPT | Mod: PBBFAC | Performed by: UROLOGY

## 2025-07-17 PROCEDURE — 51798 US URINE CAPACITY MEASURE: CPT | Mod: PBBFAC | Performed by: UROLOGY

## 2025-07-17 PROCEDURE — 81003 URINALYSIS AUTO W/O SCOPE: CPT | Mod: PBBFAC | Performed by: UROLOGY

## 2025-07-17 PROCEDURE — 99214 OFFICE O/P EST MOD 30 MIN: CPT | Mod: S$PBB,,, | Performed by: UROLOGY

## 2025-07-28 ENCOUNTER — OFFICE VISIT (OUTPATIENT)
Dept: DIABETES | Facility: CLINIC | Age: 75
End: 2025-07-28
Payer: MEDICARE

## 2025-07-28 VITALS
HEART RATE: 71 BPM | SYSTOLIC BLOOD PRESSURE: 110 MMHG | BODY MASS INDEX: 25.89 KG/M2 | WEIGHT: 185.63 LBS | OXYGEN SATURATION: 99 % | DIASTOLIC BLOOD PRESSURE: 70 MMHG

## 2025-07-28 DIAGNOSIS — Z79.4 TYPE 2 DIABETES MELLITUS WITH COMPLICATION, WITH LONG-TERM CURRENT USE OF INSULIN: Primary | ICD-10-CM

## 2025-07-28 DIAGNOSIS — I25.10 CORONARY ARTERY DISEASE INVOLVING NATIVE CORONARY ARTERY OF NATIVE HEART WITHOUT ANGINA PECTORIS: ICD-10-CM

## 2025-07-28 DIAGNOSIS — I15.2 HYPERTENSION ASSOCIATED WITH DIABETES: ICD-10-CM

## 2025-07-28 DIAGNOSIS — E11.69 HYPERLIPIDEMIA ASSOCIATED WITH TYPE 2 DIABETES MELLITUS: ICD-10-CM

## 2025-07-28 DIAGNOSIS — E11.8 TYPE 2 DIABETES MELLITUS WITH COMPLICATION, WITH LONG-TERM CURRENT USE OF INSULIN: Primary | ICD-10-CM

## 2025-07-28 DIAGNOSIS — E11.59 HYPERTENSION ASSOCIATED WITH DIABETES: ICD-10-CM

## 2025-07-28 DIAGNOSIS — E78.5 HYPERLIPIDEMIA ASSOCIATED WITH TYPE 2 DIABETES MELLITUS: ICD-10-CM

## 2025-07-28 LAB — GLUCOSE SERPL-MCNC: 145 MG/DL (ref 70–110)

## 2025-07-28 PROCEDURE — G2211 COMPLEX E/M VISIT ADD ON: HCPCS | Mod: ,,, | Performed by: PHYSICIAN ASSISTANT

## 2025-07-28 PROCEDURE — 99214 OFFICE O/P EST MOD 30 MIN: CPT | Mod: S$PBB,,, | Performed by: PHYSICIAN ASSISTANT

## 2025-07-28 PROCEDURE — 99999PBSHW POCT GLUCOSE, HAND-HELD DEVICE: Mod: PBBFAC,,,

## 2025-07-28 PROCEDURE — 99999 PR PBB SHADOW E&M-EST. PATIENT-LVL V: CPT | Mod: PBBFAC,,, | Performed by: PHYSICIAN ASSISTANT

## 2025-07-28 PROCEDURE — 82962 GLUCOSE BLOOD TEST: CPT | Mod: PBBFAC | Performed by: PHYSICIAN ASSISTANT

## 2025-07-28 PROCEDURE — 99215 OFFICE O/P EST HI 40 MIN: CPT | Mod: PBBFAC | Performed by: PHYSICIAN ASSISTANT

## 2025-07-28 NOTE — PROGRESS NOTES
PCP: Shlomo Ochoa MD    Subjective:     Chief Complaint: Diabetes - Established Patient    HISTORY OF PRESENT ILLNESS: 74 y.o.  male presenting for diabetes management visit.   The patient's last visit with me was on 5/14/2025.    Patient has had Type II diabetes since 1990s.  Pertinent to decision making is the following comorbidities: HTN, HLD, CAD and Overweight by BMI  Patient has the following Diabetes complications: without complications  He has attended diabetes education in the past.     Patient's most recent A1c of 5.3% was completed 1 months ago.   Patient states since His last A1c His blood glucose levels have been within range throughout the day .   Patient monitors blood glucose 4 times per day with [facility] meter : Fasting, Before Lunch, Before Dinner, and Before Bed. CGM previously declined.   Patient blood glucose monitoring device data will be reviewed under the Episodes tab today - see below.   Patient endorses the following diabetes related symptoms: None.   Patient is due today for the following diabetes-related health maintenance standards: Lipid panel, Urine Microalbumin/creatinine ratio, A1c, and Psa screening.   He denies any recent hospital admissions or emergency room visits.   He denies having hypoglycemia.  Patient's concerns today include glycemic control. Per pt, glycemic variations related to high carb intake after improving A1c.      Timothy's recent readings (past 60 days):    Time Taken Glucose (mg/dL) Fasting Glucose (mg/dL) Before Meal or Snack (mg/dL) After Meal or Snack (mg/dL) Bedtime (mg/dL) Feeling Symptoms (mg/dL) Re-check (mg/dL) Patient Comments Steps Walked (steps) Weight (lb) Body Mass Index (BMI) Activity Log Activity Minutes Breakfast Log Lunch Log Dinner Log Snack Log   7/28/2025  8:39 AM   141                 7/28/2025 12:00 AM CDT         636           7/27/2025  8:14 AM   268                 7/27/2025  8:13 AM        252             7/27/2025  8:12 AM   219                 7/27/2025 12:00 AM CDT         1410           7/26/2025  4:59 PM   183                 7/26/2025 12:59 PM   206                 7/26/2025  8:34 AM   168                 7/26/2025 12:00 AM CDT         1998           7/25/2025  8:04 PM   193                 7/25/2025  7:22 AM   155                 7/25/2025 12:00 AM CDT         1720           7/24/2025  6:01 PM   212                 7/24/2025  7:16 AM CDT   143                 7/24/2025  7:16 AM                    7/24/2025 12:00 AM CDT         523           7/23/2025  5:59 PM   187                 7/23/2025 12:00 AM CDT         1984           7/22/2025  5:41 PM   174                 7/22/2025  1:25 PM   199                 7/22/2025 12:00 AM CDT         2138           7/21/2025  6:11 PM   166                 7/21/2025 12:54 PM   138                 7/21/2025  8:31 AM CDT   155                 7/21/2025  8:31 AM                    7/21/2025 12:00 AM CDT         2926           7/20/2025 11:54 AM   182                 7/20/2025  8:06 AM   160                 7/20/2025 12:00 AM CDT         2008           7/19/2025  5:48 PM CDT   99                 7/19/2025  5:48 PM CDT 99                   7/19/2025 12:13 PM   145                 7/19/2025  7:51 AM   156                 7/19/2025 12:00 AM CDT         1678           7/18/2025  4:41 PM CDT   175                 7/18/2025  4:41 PM                    7/18/2025 12:55 PM   195                 7/18/2025  7:46 AM   148                 7/18/2025 12:00 AM CDT         2299           7/17/2025  5:50 PM   166                 7/17/2025 12:47 PM   143                 7/17/2025 12:00 AM CDT         1607           7/16/2025  6:04 PM   109                 7/16/2025  7:51 AM CDT   110                 7/16/2025   "7:51 AM                    7/16/2025 12:00 AM CDT         1747           7/15/2025  5:52 PM   105                 7/15/2025 12:48 PM CDT   95                 7/15/2025 12:00 AM CDT                          Patient medication regimen is as below.     CURRENT DM MEDICATIONS:   Lantus 28 units at night   Fiasp 28 - 32 units before meals and 3 units with dessert - Not taking HC dose with HC meal  Jardiance 25 mg daily  Mounjaro 15 mg weekly     Labs Reviewed.       No results found for: "CPEPTIDE"  No results found for: "GLUTAMICACID"       //  Weight: 84.2 kg (185 lb 10 oz), Body mass index is 25.89 kg/m².  His blood sugar in clinic today is:    Lab Results   Component Value Date    POCGLU 145 (A) 07/28/2025       Review of Systems   Constitutional:  Negative for activity change, appetite change, chills and fever.   HENT:  Negative for dental problem, mouth sores, nosebleeds, sore throat and trouble swallowing.    Eyes:  Negative for pain and discharge.   Respiratory:  Negative for shortness of breath, wheezing and stridor.    Cardiovascular:  Negative for chest pain, palpitations and leg swelling.   Gastrointestinal:  Negative for abdominal pain, diarrhea, nausea and vomiting.   Endocrine: Negative for polydipsia, polyphagia and polyuria.   Genitourinary:  Negative for dysuria, frequency and urgency.   Musculoskeletal:  Negative for joint swelling and myalgias.   Skin:  Negative for rash and wound.   Neurological:  Negative for dizziness, syncope, weakness and headaches.   Psychiatric/Behavioral:  Negative for behavioral problems and dysphoric mood.          Diabetes Management Status  Statin: Taking  ACE/ARB: Taking    Screening or Prevention Patient's value Goal Complete/Controlled?   HgA1C Testing and Control   Lab Results   Component Value Date    HGBA1C 5.3 04/03/2025      Annually/Less than 8% Yes   Lipid profile : 04/03/2025 Annually Yes   LDL control Lab Results   Component Value Date    LDLCALC " "63.8 2025    Annually/Less than 100 mg/dl  Yes   Nephropathy screening Lab Results   Component Value Date    MICALBCREAT Unable to calculate 2024     No results found for: "PROTEINUA" Annually Yes   Blood pressure BP Readings from Last 1 Encounters:   25 110/70    Less than 140/90 Yes   Dilated retinal exam : 2025 Annually Yes    Foot exam   : 2025 Annually Yes     ACTIVITY LEVEL: Moderately Active. Discussed activities, benefits, methods, and precautions.  MEAL PLANNING: Patient reports number of meals per day to be 3 and number of snacks per day to be 2.   Patient is encouraged to carb count and consume no more than 30 - 45 grams of carbohydrates in each meal and 15 grams of carbohydrates in each snack.     Social History     Socioeconomic History    Marital status:    Tobacco Use    Smoking status: Former     Current packs/day: 0.00     Average packs/day: 1 pack/day for 20.0 years (20.0 ttl pk-yrs)     Types: Cigarettes     Start date: 1980     Quit date: 2000     Years since quittin.5     Passive exposure: Past    Smokeless tobacco: Never   Substance and Sexual Activity    Alcohol use: No     Alcohol/week: 0.0 standard drinks of alcohol    Drug use: No    Sexual activity: Yes     Partners: Female   Social History Narrative    . Lives with spouse. Has 2 children. Retired. No pets or smokers in household.     Social Drivers of Health     Financial Resource Strain: Patient Unable To Answer (3/25/2025)    Overall Financial Resource Strain (CARDIA)     Difficulty of Paying Living Expenses: Patient unable to answer   Food Insecurity: No Food Insecurity (3/25/2025)    Hunger Vital Sign     Worried About Running Out of Food in the Last Year: Never true     Ran Out of Food in the Last Year: Never true   Transportation Needs: No Transportation Needs (3/25/2025)    PRAPARE - Transportation     Lack of Transportation (Medical): No     Lack of Transportation " (Non-Medical): No   Physical Activity: Inactive (3/25/2025)    Exercise Vital Sign     Days of Exercise per Week: 0 days     Minutes of Exercise per Session: 0 min   Stress: No Stress Concern Present (3/25/2025)    [employer] - Occupational Stress Questionnaire     Feeling of Stress : Not at all   Housing Stability: Low Risk  (3/25/2025)    Housing Stability Vital Sign     Unable to Pay for Housing in the Last Year: No     Number of Times Moved in the Last Year: 0     Homeless in the Last Year: No     Past Medical History:   Diagnosis Date    Coronary artery disease 2010    [facility] - no stents    DM (diabetes mellitus) 2002     lunch 10/28/2016    DM (diabetes mellitus)      am 04/12/2021    DM (diabetes mellitus)     BS 96 am 04/13/2022 Insulin for years    Groin pain, left 2/11/2021    Hyperlipemia     Hypertension     Kidney stones     Nephrolithiasis 2/11/2021    Neuropathy     Type 2 diabetes mellitus 10-12 years     am 10/31/2014       Objective:        Physical Exam  Constitutional:       General: He is not in acute distress.     Appearance: He is well-developed. He is not diaphoretic.   HENT:      Head: Normocephalic and atraumatic.      Right Ear: External ear normal.      Left Ear: External ear normal.      Nose: Nose normal.   Eyes:      General:         Right eye: No discharge.         Left eye: No discharge.      Pupils: Pupils are equal, round, and reactive to light.   Cardiovascular:      Rate and Rhythm: Normal rate and regular rhythm.      Heart sounds: Normal heart sounds.   Pulmonary:      Effort: Pulmonary effort is normal.      Breath sounds: Normal breath sounds.   Abdominal:      Palpations: Abdomen is soft.   Musculoskeletal:         General: Normal range of motion.      Cervical back: Normal range of motion and neck supple.   Skin:     General: Skin is warm and dry.      Capillary Refill: Capillary refill takes less than 2 seconds.   Neurological:       Mental Status: He is alert and oriented to person, place, and time.      Motor: No abnormal muscle tone.      Coordination: Coordination normal.   Psychiatric:         Behavior: Behavior normal.         Thought Content: Thought content normal.         Judgment: Judgment normal.           Assessment / Plan:     Type 2 diabetes mellitus with complication, with long-term current use of insulin  -     POCT Glucose, Hand-Held Device  -     Continuous Glucose Monitoring for Home Use (HME vendor/non-pharmacy)    Hypertension associated with diabetes    Hyperlipidemia associated with type 2 diabetes mellitus    Coronary artery disease involving native coronary artery of native heart without angina pectoris    BMI 25.0-25.9,adult      Additional Plan Details:    - POCT Glucose  - Encouraged continuation of lifestyle changes including regular exercise and limiting carbohydrates to 30-45 grams per meal threes times daily and 15 grams per snack with a limit of two daily.   - Encouraged continued monitoring of blood glucose with maintenance of 4 times daily at Fasting, Before Lunch, Before Dinner, and Before Bed. Dex G7 to [facility].   - Current DM Medication Regimen: continue Mounjaro 15 mg weekly. Continue Lantus 28 units qhs. Change Fiasp 28 - 32 units before meals TID. Continue Jardiance 25 mg daily.  Focus on taking HC amount.   - Health Maintenance standards addressed today: Lipid panel to be scheduled today, Urine Microalbumin / Creatinine Ratio scheduled, A1c to be scheduled, and Psa screening - labs sched.   - Nursing Visit: Patient is below goal range for nursing visit for age group and will not need nursing visit at this time .   - Follow up in 2 months with A1c prior.    CURRENT DM MEDICATIONS:   Lantus 28 units at night   Fiasp 28 - 32 units before meals and 3 units with dessert  Jardiance 25 mg daily  Mounjaro 15 mg weekly       Blakeney McKnight, PA-C [facility]    A total of 30 minutes  was spent in face to face time, of which over 50 % was spent in counseling patient on disease process, complications, treatment, and side effects of medications.

## 2025-07-29 ENCOUNTER — TELEPHONE (OUTPATIENT)
Dept: DIABETES | Facility: CLINIC | Age: 75
End: 2025-07-29
Payer: MEDICARE

## 2025-07-29 DIAGNOSIS — Z79.4 TYPE 2 DIABETES MELLITUS WITH COMPLICATION, WITH LONG-TERM CURRENT USE OF INSULIN: Primary | ICD-10-CM

## 2025-07-29 DIAGNOSIS — E11.8 TYPE 2 DIABETES MELLITUS WITH COMPLICATION, WITH LONG-TERM CURRENT USE OF INSULIN: Primary | ICD-10-CM

## 2025-07-29 NOTE — TELEPHONE ENCOUNTER
----- Message from Al Daugherty PA-C sent at 7/28/2025  3:02 PM CDT -----  Pt [facility] ETA 2-3 days to him. Lives between [facility] and [facility] - can someone get him setting up for training

## 2025-07-29 NOTE — TELEPHONE ENCOUNTER
Per provider Philip, msg left regarding device training appt. Return contact provided. Referral order entered to support visit coordination.

## 2025-07-30 ENCOUNTER — TELEPHONE (OUTPATIENT)
Dept: DIABETES | Facility: CLINIC | Age: 75
End: 2025-07-30
Payer: MEDICARE

## 2025-08-06 ENCOUNTER — CLINICAL SUPPORT (OUTPATIENT)
Dept: DIABETES | Facility: CLINIC | Age: 75
End: 2025-08-06
Payer: MEDICARE

## 2025-08-06 VITALS — BODY MASS INDEX: 25.96 KG/M2 | HEIGHT: 71 IN | WEIGHT: 185.44 LBS

## 2025-08-06 DIAGNOSIS — E11.8 TYPE 2 DIABETES MELLITUS WITH COMPLICATION, WITH LONG-TERM CURRENT USE OF INSULIN: ICD-10-CM

## 2025-08-06 DIAGNOSIS — Z79.4 TYPE 2 DIABETES MELLITUS WITH COMPLICATION, WITH LONG-TERM CURRENT USE OF INSULIN: ICD-10-CM

## 2025-08-06 PROCEDURE — G0108 DIAB MANAGE TRN  PER INDIV: HCPCS | Mod: PBBFAC | Performed by: DIETITIAN, REGISTERED

## 2025-08-06 PROCEDURE — 99999 PR PBB SHADOW E&M-EST. PATIENT-LVL III: CPT | Mod: PBBFAC,,, | Performed by: DIETITIAN, REGISTERED

## 2025-08-06 PROCEDURE — 99213 OFFICE O/P EST LOW 20 MIN: CPT | Mod: PBBFAC | Performed by: DIETITIAN, REGISTERED

## 2025-08-06 PROCEDURE — 99999PBSHW PR PBB SHADOW TECHNICAL ONLY FILED TO HB: Mod: PBBFAC,,,

## 2025-08-06 NOTE — PROGRESS NOTES
"Diabetes Care Specialist Progress Note  Author: Juani Martin RD, [facility]  Date: 8/6/2025    Intake    Program Intake  Reason for Diabetes Program Visit:: Initial Diabetes Assessment (DM referral 7/29/25 Al Daugherty, PAG[facility])  Type of Intervention:: Individual  Education: Self-Management Skill Review  Device Training: Personal CGM (DexG7 mobile)  Current diabetes risk level:: low    Current Diabetes Treatment: Insulin  Method of insulin delivery?: Injections  Injection Type: Pens  Pen Type/Dose: Lantus 28 units at night. Fiasp 28 - 32 units before meals and 3 units with dessert. Jardiance 25 mg daily. Mounjaro 15 mg weekly.    Continuous Glucose Monitoring  Patient has CGM: No  Personal CGM type:: DexG7 mobile - pt in clinic for training.    Lab Results   Component Value Date    HGBA1C 5.3 04/03/2025       Weight: 84.1 kg (185 lb 6.5 oz)   Height: 5' 11" (180.3 cm)   Body mass index is 25.86 kg/m².    Lifestyle Coping Support & Clinical    Problem Review  Active Comorbidities:  (HTN, HLD, CAD, ED)    Diabetes Self-Management Skills Assessment    Medication Skills Assessment  Patient is able to identify current diabetes medications, dosages, and appropriate timing of medications.: yes  Patient reports problems or concerns with current medication regimen.: no  Patient is  aware that some diabetes medications can cause low blood sugar?: Yes  Medication Skills Assessment Completed:: Yes  Assessment indicates:: Adequate understanding  Area of need?: No     Home Blood Glucose Monitoring  Patient states that blood sugar is checked at home daily.: yes  Monitoring Method:: home glucometer, personal continuous glucose monitor  Home glucometer meter type:: i[employer] meter - readings reviewed from logs saved to episodes  Personal CGM type:: DexG7 mobile - pt in clinic for training.  Home Blood Glucose Monitoring Skills Assessment Completed: : Yes  Assessment indicates:: Knowledge deficit, Instruction Needed  Area of " "need?: Yes    Assessment Summary and Plan  Based on today's diabetes care assessment, the following areas of need were identified:      Identified Areas of Need      Medication/Current Diabetes Treatment: No   Home Blood Glucose Monitoring: Yes - see care plan; reviewed BG targets per ADA protocol.      Today's interventions were provided through individual discussion, instruction, and written materials were provided.    Patient verbalized understanding of instruction and written materials.  Pt was able to return back demonstration of instructions today. Patient understood key points, needs reinforcement and further instruction.     Diabetes Self-Management Care Plan:  Today's Diabetes Self-Management Care Plan was developed with Timothy's input. Timothy has agreed to work toward the following goal(s) to improve his/her overall diabetes control.      Care Plan: Diabetes Management   Updates made since 8/6/2024 12:00 AM        Problem: Blood Glucose Self-Monitoring         Goal: Patient agrees to use DexG7 mobile and backup meter as directed.    Start Date: 8/6/2025   Expected End Date: 7/29/2026   Priority: High   Barriers: No Barriers Identified   Note:    DEXCOM G7 CGM TRAINING  Dexcom G7 mobile stuart downloaded to phone. Education was provided using "Quick Start Guide" and demo device per Dexcom protocol. [facility] data share set-up.      Overview:  5 minute glucose reading updates, trending arrows, BG graph screens, reports screen,  connectivity and functions.   Menus: Trend graph, start sensor, enter BG, events, alerts, settings, replace sensor, stop sensor, and shutdown.  Dexcom G7 mobile stuart/ Settings:                          * Urgent Low: 55 mg/dL                          * Urgent Low Soon: On                          * Low Alert: 70 mg/dL                          * High Alert: 250 mg/dL                          * Signal Loss: on                          * Brief Sensor Issue: on   "   Reviewed additional setting options.  Patient paired sensor using 4-digit code listed on sensor cap.  Reviewed where to find sensor insertion time and expiration date.   Reviewed appropriate calibration techniques.  Reviewed sensor site selection. Patient selected and prepped site using aseptic technique, inserted sensor, applied overlay tape and started session.   Reviewed sensor removal from site. Discussed times to remove sensor per  guidelines include MRI or diatherapy.   Patient able to demonstrate without difficulty. Encouraged to review manual and/or training videos prior to starting another sensor.   Reviewed problem solving aspects of sensor transmission/variables that can disrupt RF transmission.  range 20 feet, but the first 3 hrs keep within 3 feet of transmitter.  Patient instructed on lag time of interstitial fluid from CBG and was advised to treat hypoglycemia and dose insulin based on SMBG values.  Dexcom technical support contact number given and examples of when to contact them discussed.             Task: Reviewed the importance of self-monitoring blood glucose and keeping logs. Completed 8/6/2025        Task: Reviewed appropriate timing and frequency to SMBG, education on parameters on when to notify provider and advised patient to bring logs to all appts with PCP/Endocrinologist/Diabetes Care Specialist. Completed 8/6/2025        Follow Up Plan  Follow up in about 2 weeks (around 8/20/2025).    Today's care plan and follow up schedule was discussed with patient.  Timothy verbalized understanding of the care plan, goals, and agrees to follow up plan.        The patient was encouraged to communicate with his/her health care provider/physician and care team regarding his/her condition(s) and treatment.  I provided the patient with my contact information today and encouraged to contact me via phone or [facility] Patient Portal as needed.     Length of Visit   Total Time: 60  Minutes

## 2025-08-08 ENCOUNTER — CLINICAL SUPPORT (OUTPATIENT)
Dept: DIABETES | Facility: CLINIC | Age: 75
End: 2025-08-08
Payer: MEDICARE

## 2025-08-08 DIAGNOSIS — E11.8 TYPE 2 DIABETES MELLITUS WITH COMPLICATION, WITH LONG-TERM CURRENT USE OF INSULIN: Primary | ICD-10-CM

## 2025-08-08 DIAGNOSIS — Z79.4 TYPE 2 DIABETES MELLITUS WITH COMPLICATION, WITH LONG-TERM CURRENT USE OF INSULIN: Primary | ICD-10-CM

## 2025-08-08 NOTE — PROGRESS NOTES
Pt present in [facility]. He had an appt with Digital Medicine but I shaving problems with the Dexcom stuart on his phone. Pt coordinator asked DM to assist with getting the pt back into his stuart so that he link it to the digital medicine program.    When stuart on I[employer], indicator noted to be endlessly spinning. With permission from pt, [employer]arity stuart uninstalled from phone and reinstalled. Pt went through all steps to pair the stuart with the sensor. With 15 minutes left on sensor warming, Digital medicine then took over to link him to their program.    It required approx 20 minutes to complete.

## 2025-08-15 ENCOUNTER — HOSPITAL ENCOUNTER (OUTPATIENT)
Dept: CARDIOLOGY | Facility: HOSPITAL | Age: 75
Discharge: HOME | End: 2025-08-15
Attending: PEDIATRICS
Payer: MEDICARE

## 2025-08-15 ENCOUNTER — OFFICE VISIT (OUTPATIENT)
Dept: INTERNAL MEDICINE | Facility: CLINIC | Age: 75
End: 2025-08-15
Payer: MEDICARE

## 2025-08-15 VITALS
WEIGHT: 186.94 LBS | HEIGHT: 71 IN | BODY MASS INDEX: 26.17 KG/M2 | HEART RATE: 61 BPM | OXYGEN SATURATION: 98 % | TEMPERATURE: 97 F | DIASTOLIC BLOOD PRESSURE: 62 MMHG | SYSTOLIC BLOOD PRESSURE: 112 MMHG

## 2025-08-15 DIAGNOSIS — E11.8 TYPE 2 DIABETES MELLITUS WITH COMPLICATION, WITH LONG-TERM CURRENT USE OF INSULIN: ICD-10-CM

## 2025-08-15 DIAGNOSIS — Z01.818 PREOP EXAM FOR INTERNAL MEDICINE: ICD-10-CM

## 2025-08-15 DIAGNOSIS — E78.5 HYPERLIPIDEMIA ASSOCIATED WITH TYPE 2 DIABETES MELLITUS: ICD-10-CM

## 2025-08-15 DIAGNOSIS — M65.30 TRIGGER FINGER OF RIGHT HAND, UNSPECIFIED FINGER: ICD-10-CM

## 2025-08-15 DIAGNOSIS — E11.42 DIABETIC POLYNEUROPATHY ASSOCIATED WITH TYPE 2 DIABETES MELLITUS: ICD-10-CM

## 2025-08-15 DIAGNOSIS — I15.2 HYPERTENSION ASSOCIATED WITH DIABETES: ICD-10-CM

## 2025-08-15 DIAGNOSIS — E11.59 HYPERTENSION ASSOCIATED WITH DIABETES: ICD-10-CM

## 2025-08-15 DIAGNOSIS — Z79.4 TYPE 2 DIABETES MELLITUS WITH COMPLICATION, WITH LONG-TERM CURRENT USE OF INSULIN: ICD-10-CM

## 2025-08-15 DIAGNOSIS — M18.11 PRIMARY OSTEOARTHRITIS OF FIRST CARPOMETACARPAL JOINT OF RIGHT HAND: ICD-10-CM

## 2025-08-15 DIAGNOSIS — I25.118 CORONARY ARTERY DISEASE OF NATIVE ARTERY OF NATIVE HEART WITH STABLE ANGINA PECTORIS: ICD-10-CM

## 2025-08-15 DIAGNOSIS — G47.33 OBSTRUCTIVE SLEEP APNEA: ICD-10-CM

## 2025-08-15 DIAGNOSIS — Z01.818 PREOP EXAM FOR INTERNAL MEDICINE: Primary | ICD-10-CM

## 2025-08-15 DIAGNOSIS — E11.69 HYPERLIPIDEMIA ASSOCIATED WITH TYPE 2 DIABETES MELLITUS: ICD-10-CM

## 2025-08-15 LAB
OHS QRS DURATION: 80 MS
OHS QTC CALCULATION: 408 MS

## 2025-08-15 PROCEDURE — 99999 PR PBB SHADOW E&M-EST. PATIENT-LVL V: CPT | Mod: PBBFAC,,, | Performed by: PEDIATRICS

## 2025-08-15 PROCEDURE — 93005 ELECTROCARDIOGRAM TRACING: CPT

## 2025-08-15 PROCEDURE — 99215 OFFICE O/P EST HI 40 MIN: CPT | Mod: PBBFAC | Performed by: PEDIATRICS

## 2025-08-15 PROCEDURE — 93010 ELECTROCARDIOGRAM REPORT: CPT | Mod: ,,, | Performed by: INTERNAL MEDICINE

## 2025-08-18 ENCOUNTER — PATIENT MESSAGE (OUTPATIENT)
Dept: ADMINISTRATIVE | Facility: OTHER | Age: 75
End: 2025-08-18
Payer: MEDICARE

## 2025-08-18 DIAGNOSIS — E11.8 TYPE 2 DIABETES MELLITUS WITH COMPLICATION, WITH LONG-TERM CURRENT USE OF INSULIN: ICD-10-CM

## 2025-08-18 DIAGNOSIS — Z79.4 TYPE 2 DIABETES MELLITUS WITH COMPLICATION, WITH LONG-TERM CURRENT USE OF INSULIN: ICD-10-CM

## 2025-08-18 RX ORDER — TIRZEPATIDE 15 MG/.5ML
15 INJECTION, SOLUTION SUBCUTANEOUS
Qty: 4 PEN | Refills: 0 | Status: SHIPPED | OUTPATIENT
Start: 2025-08-18

## 2025-08-19 ENCOUNTER — OFFICE VISIT (OUTPATIENT)
Dept: CARDIOLOGY | Facility: CLINIC | Age: 75
End: 2025-08-19
Payer: MEDICARE

## 2025-08-19 VITALS
WEIGHT: 184.75 LBS | DIASTOLIC BLOOD PRESSURE: 64 MMHG | HEIGHT: 71 IN | OXYGEN SATURATION: 96 % | BODY MASS INDEX: 25.86 KG/M2 | HEART RATE: 73 BPM | SYSTOLIC BLOOD PRESSURE: 128 MMHG

## 2025-08-19 DIAGNOSIS — Z79.4 TYPE 2 DIABETES MELLITUS WITH COMPLICATION, WITH LONG-TERM CURRENT USE OF INSULIN: ICD-10-CM

## 2025-08-19 DIAGNOSIS — Z01.810 PREOP CARDIOVASCULAR EXAM: Primary | ICD-10-CM

## 2025-08-19 DIAGNOSIS — Z95.5 H/O HEART ARTERY STENT: ICD-10-CM

## 2025-08-19 DIAGNOSIS — E11.69 HYPERLIPIDEMIA ASSOCIATED WITH TYPE 2 DIABETES MELLITUS: ICD-10-CM

## 2025-08-19 DIAGNOSIS — E11.8 TYPE 2 DIABETES MELLITUS WITH COMPLICATION, WITH LONG-TERM CURRENT USE OF INSULIN: ICD-10-CM

## 2025-08-19 DIAGNOSIS — E78.5 HYPERLIPIDEMIA ASSOCIATED WITH TYPE 2 DIABETES MELLITUS: ICD-10-CM

## 2025-08-19 DIAGNOSIS — I25.118 CORONARY ARTERY DISEASE OF NATIVE ARTERY OF NATIVE HEART WITH STABLE ANGINA PECTORIS: ICD-10-CM

## 2025-08-19 DIAGNOSIS — G56.03 BILATERAL CARPAL TUNNEL SYNDROME: ICD-10-CM

## 2025-08-19 PROCEDURE — 99999 PR PBB SHADOW E&M-EST. PATIENT-LVL V: CPT | Mod: PBBFAC,,, | Performed by: INTERNAL MEDICINE

## 2025-08-19 PROCEDURE — 99214 OFFICE O/P EST MOD 30 MIN: CPT | Mod: S$PBB,,, | Performed by: INTERNAL MEDICINE

## 2025-08-19 PROCEDURE — 99215 OFFICE O/P EST HI 40 MIN: CPT | Mod: PBBFAC | Performed by: INTERNAL MEDICINE

## (undated) DEVICE — DRAPE ABDOMINAL TIBURON 14X11

## (undated) DEVICE — PAD DEFIB CADENCE ADULT R2

## (undated) DEVICE — ELECTRODE REM PLYHSV RETURN 9

## (undated) DEVICE — MANIFOLD 4 PORT

## (undated) DEVICE — KIT SITE-RITE NDL GUIDE 21G

## (undated) DEVICE — APPLICATOR CHLORAPREP ORN 26ML

## (undated) DEVICE — NDL PNEUMO INSUFFLATI 120MM

## (undated) DEVICE — SET PNEUMOCLEAR HEAT HUM SE HF

## (undated) DEVICE — DRAPE ANGIO BRACH 38X44IN

## (undated) DEVICE — SEE MEDLINE ITEM 157027

## (undated) DEVICE — CATH PIG145 INFINITI 5X110CM

## (undated) DEVICE — CATH EMERGE MR 12 X 2.00

## (undated) DEVICE — GLOVE BIOGEL ULTRATOUCH 6.5

## (undated) DEVICE — DECANTER VIAL ASEPTIC TRANSFER

## (undated) DEVICE — KIT ANTIFOG

## (undated) DEVICE — SOL NS 1000CC

## (undated) DEVICE — SEE MEDLINE ITEM 152622

## (undated) DEVICE — DRAPE COLUMN DAVINCI XI

## (undated) DEVICE — CLIPPER BLADE MOD 4406 (CAREF)

## (undated) DEVICE — UNDERGLOVES BIOGEL PI SZ 7 LF

## (undated) DEVICE — OBTURATOR BLADELESS 8MM XI CLR

## (undated) DEVICE — CATH SAPPHIRE II PRO SC 1X15MM

## (undated) DEVICE — PACK HEART CATH BR

## (undated) DEVICE — KIT MANIFOLD LOW PRESS TUBING

## (undated) DEVICE — Device

## (undated) DEVICE — COVER TIP CURVED SCISSORS XI

## (undated) DEVICE — ANGIOTOUCH KIT

## (undated) DEVICE — CATH INFINITI MULTIPAK JR4 5FR

## (undated) DEVICE — POSITIONER HEAD DONUT 9IN FOAM

## (undated) DEVICE — SEAL UNIVERSAL 5MM-8MM XI

## (undated) DEVICE — COVER LIGHT HANDLE 80/CA

## (undated) DEVICE — KIT GLIDESHEATH SLEND 6FR 10CM

## (undated) DEVICE — GUIDEWIRE EMERALD .035IN 260CM

## (undated) DEVICE — DRAPE ARM DAVINCI XI

## (undated) DEVICE — KIT SYR REUSABLE

## (undated) DEVICE — SYR 3CC LUER LOC

## (undated) DEVICE — SOL 9P NACL IRR PIC IL

## (undated) DEVICE — CATH JL4 5FR

## (undated) DEVICE — NDL SAFETY 22G X 1.5 ECLIPSE

## (undated) DEVICE — CATH SHOCKWAVE C2+ IVL 3.0X12M

## (undated) DEVICE — SYR ONLY LUER LOCK 20CC

## (undated) DEVICE — GLOVE SURGICAL LATEX SZ 7

## (undated) DEVICE — GUIDEWIRE ALLSTAR .014X190

## (undated) DEVICE — SUPPORT ULNA NERVE PROTECTOR

## (undated) DEVICE — GUIDEWIRE RUNTHROUGH NS 180CM

## (undated) DEVICE — ADHESIVE DERMABOND ADVANCED

## (undated) DEVICE — GUIDE LAUNCHER 6FR EBU 3.5

## (undated) DEVICE — OMNIPAQUE 300MG 150ML VIAL

## (undated) DEVICE — CATH NC EMERGE MR 3X12MM

## (undated) DEVICE — CONTAINER SPECIMEN STRL 4OZ

## (undated) DEVICE — BAND TR COMP DEVICE REG 24CM

## (undated) DEVICE — KIT WING PAD POSITIONING

## (undated) DEVICE — SUT STRATAFIX 2-0 30CM

## (undated) DEVICE — SUT MONOCRYL 4.0 PS2 CP496G

## (undated) DEVICE — SEE MEDLINE ITEM 157117

## (undated) DEVICE — CATH JR4 5FR